# Patient Record
Sex: FEMALE | Race: BLACK OR AFRICAN AMERICAN | NOT HISPANIC OR LATINO | Employment: OTHER | URBAN - METROPOLITAN AREA
[De-identification: names, ages, dates, MRNs, and addresses within clinical notes are randomized per-mention and may not be internally consistent; named-entity substitution may affect disease eponyms.]

---

## 2017-01-03 DIAGNOSIS — Z85.3 HISTORY OF BREAST CANCER: Primary | ICD-10-CM

## 2017-01-04 DIAGNOSIS — R52 PAIN: ICD-10-CM

## 2017-01-04 RX ORDER — ACETAMINOPHEN AND CODEINE PHOSPHATE 300; 60 MG/1; MG/1
1 TABLET ORAL 2 TIMES DAILY PRN
Qty: 60 TABLET | Refills: 1 | Status: ON HOLD | OUTPATIENT
Start: 2017-01-04 | End: 2017-02-22 | Stop reason: HOSPADM

## 2017-01-10 ENCOUNTER — OFFICE VISIT (OUTPATIENT)
Dept: SPINE | Facility: CLINIC | Age: 57
End: 2017-01-10
Attending: NEUROLOGICAL SURGERY
Payer: COMMERCIAL

## 2017-01-10 VITALS
BODY MASS INDEX: 49.09 KG/M2 | HEART RATE: 93 BPM | HEIGHT: 61 IN | DIASTOLIC BLOOD PRESSURE: 76 MMHG | WEIGHT: 260 LBS | SYSTOLIC BLOOD PRESSURE: 146 MMHG

## 2017-01-10 DIAGNOSIS — M48.061 SPINAL STENOSIS, LUMBAR: ICD-10-CM

## 2017-01-10 DIAGNOSIS — M43.16 SPONDYLOLISTHESIS AT L3-L4 LEVEL: Primary | ICD-10-CM

## 2017-01-10 PROCEDURE — 99999 PR PBB SHADOW E&M-EST. PATIENT-LVL III: CPT | Mod: PBBFAC,,, | Performed by: NEUROLOGICAL SURGERY

## 2017-01-10 PROCEDURE — 99214 OFFICE O/P EST MOD 30 MIN: CPT | Mod: S$GLB,,, | Performed by: NEUROLOGICAL SURGERY

## 2017-01-10 NOTE — PROGRESS NOTES
CHIEF COMPLAINT:  Pain across the whole lower back and in the bilateral leg pain       HPI:  Tanika Wayne is a 57 y.o. female who presents for neurosurgical evaluation. She is having pain right low back  6 as that started 1 1/2 years ago.  The pain came on gradually, as a result of lifting/twisting/pulling/bending, as a result of a fall and as a result of an injury at work, and it has been constant ever since. The patient describes the pain as aching, rated as a 6 on a pain scale of 1-10. She reports there is weakness in the right leg and left leg. The pain gets better with lying down, and the pain gets worse with standing ,bending and sitting . She has tried epidural steroid injections and muscle relaxers with little relief. Patient denies accidents or trauma, denies bowel or bladder symptoms, denies gait change, reports clumsiness, reports incoordination, and denies changes in fine motor skills in the hands/fingers.    She was scheduled for surgery but had to have thyroidectomy prior to her lumbar surgery.       (Not in a hospital admission)    Review of patient's allergies indicates:  No Known Allergies    Past Medical History   Diagnosis Date    Abnormal echocardiogram 11/26/2012    Arthritis     Breast cancer 1998    Breast cancer, right breast 11/26/2012    Cardiomyopathy due to systemic disease     Clotting disorder     Diabetes mellitus     Diabetes mellitus type II     DM (diabetes mellitus) 11/26/2012    HTN (hypertension) 11/26/2012    Hypertension     Increased glucose level 11/26/2012    ITP (idiopathic thrombocytopenic purpura) 11/26/2012    Kidney stones 11/26/2012    PONV (postoperative nausea and vomiting)     Stenosis of lumbosacral spine 11/26/2012    Tympanic membrane perforation 9/15/2014     right 2014 Dr. Jeffries     Past Surgical History   Procedure Laterality Date    Strabismus surgery  8.30.06     OU    Eye surgery      Cholecystectomy      Hysterectomy       Splenectomy, total      Thyroidectomy  09/2016     Family History   Problem Relation Age of Onset    Heart disease Mother 69     MI    Diabetes Mother     Diabetes Father     Hypertension Father     Stroke Father      Social History   Substance Use Topics    Smoking status: Never Smoker    Smokeless tobacco: None    Alcohol use No        Review of Systems:  Constitutional: no fever or chills, pain well controlled  Eyes: no visual changes  ENT: no nasal congestion or sore throat  Respiratory: no cough or shortness of breath  Cardiovascular: no chest pain or palpitations  Gastrointestinal: no nausea or vomiting, tolerating diet  Genitourinary: no hematuria or dysuria  Integument/Breast: no rash or pruritis  Hematologic/Lymphatic: no easy bruising or lymphadenopathy  Musculoskeletal: no arthralgias or myalgias, positive for back pain  Neurological: no seizures or tremors  Behavioral/Psych: no auditory or visual hallucinations  Endocrine: no heat or cold intolerance    Review of Systems    OBJECTIVE:     Vital Signs (Most Recent)       Physical Exam:    Physical Exam:  Nursing note and vitals reviewed.    Constitutional: She appears well-developed and well-nourished.     Eyes: Pupils are equal, round, and reactive to light. Conjunctivae and EOM are normal.     Abdominal: Soft. Bowel sounds are normal.     Psych/Behavior: She is alert. She has a normal mood and affect.     Musculoskeletal: Gait is abnormal.        Neck: Range of motion is full. Muscle strength is 5/5. Tone is normal.        Back: Range of motion is limited. Muscle strength is 5/5. Tone is normal.        Right Upper Extremities: Range of motion is full. Muscle strength is 5/5. Tone is normal.        Left Upper Extremities: Range of motion is full. Muscle strength is 5/5. Tone is normal.       Right Lower Extremities: Range of motion is limited. Muscle strength is 4/5. Tone is normal.        Left Lower Extremities: Range of motion is full. Muscle  strength is 5/5. Tone is normal.     Neurological:        DTRs: DTRs are DTRS NORMAL AND SYMMETRICnormal and symmetric.        Cranial nerves: Cranial nerve(s) II, III, IV, V, VI, VII, VIII, IX, X, XI and XII are intact.       Laboratory  none    Diagnostic Results:  none new    ASSESSMENT/PLAN:     Lumbar spondylosis and lumbar spinal stenosis L3-4.     Plan- Repeat L-spine MRI since last one was more than 6 months ago.

## 2017-01-16 ENCOUNTER — HOSPITAL ENCOUNTER (OUTPATIENT)
Dept: RADIOLOGY | Facility: OTHER | Age: 57
Discharge: HOME | End: 2017-01-16
Attending: INTERNAL MEDICINE
Payer: COMMERCIAL

## 2017-01-16 DIAGNOSIS — Z85.3 HISTORY OF BREAST CANCER: ICD-10-CM

## 2017-01-16 DIAGNOSIS — M62.838 NIGHT MUSCLE SPASMS: ICD-10-CM

## 2017-01-16 PROCEDURE — 77066 DX MAMMO INCL CAD BI: CPT | Mod: 26,,, | Performed by: RADIOLOGY

## 2017-01-16 PROCEDURE — 77062 BREAST TOMOSYNTHESIS BI: CPT | Mod: 26,,, | Performed by: RADIOLOGY

## 2017-01-16 PROCEDURE — 77062 BREAST TOMOSYNTHESIS BI: CPT | Mod: TC

## 2017-01-16 RX ORDER — CYCLOBENZAPRINE HCL 10 MG
TABLET ORAL
Qty: 30 TABLET | Refills: 3 | Status: ON HOLD | OUTPATIENT
Start: 2017-01-16 | End: 2017-02-22 | Stop reason: HOSPADM

## 2017-02-07 ENCOUNTER — OFFICE VISIT (OUTPATIENT)
Dept: SPINE | Facility: CLINIC | Age: 57
End: 2017-02-07
Attending: NEUROLOGICAL SURGERY
Payer: COMMERCIAL

## 2017-02-07 ENCOUNTER — HOSPITAL ENCOUNTER (OUTPATIENT)
Dept: RADIOLOGY | Facility: OTHER | Age: 57
Discharge: HOME | End: 2017-02-07
Attending: NEUROLOGICAL SURGERY
Payer: COMMERCIAL

## 2017-02-07 VITALS
HEIGHT: 61 IN | SYSTOLIC BLOOD PRESSURE: 129 MMHG | DIASTOLIC BLOOD PRESSURE: 78 MMHG | BODY MASS INDEX: 47.95 KG/M2 | HEART RATE: 97 BPM | WEIGHT: 254 LBS

## 2017-02-07 DIAGNOSIS — M48.061 SPINAL STENOSIS, LUMBAR: ICD-10-CM

## 2017-02-07 DIAGNOSIS — M48.061 SPINAL STENOSIS, LUMBAR: Primary | ICD-10-CM

## 2017-02-07 DIAGNOSIS — M43.16 SPONDYLOLISTHESIS AT L3-L4 LEVEL: ICD-10-CM

## 2017-02-07 PROCEDURE — 99214 OFFICE O/P EST MOD 30 MIN: CPT | Mod: S$GLB,,, | Performed by: NEUROLOGICAL SURGERY

## 2017-02-07 PROCEDURE — 99999 PR PBB SHADOW E&M-EST. PATIENT-LVL II: CPT | Mod: PBBFAC,,, | Performed by: NEUROLOGICAL SURGERY

## 2017-02-07 PROCEDURE — 72148 MRI LUMBAR SPINE W/O DYE: CPT | Mod: TC

## 2017-02-07 PROCEDURE — 72148 MRI LUMBAR SPINE W/O DYE: CPT | Mod: 26,,, | Performed by: RADIOLOGY

## 2017-02-07 NOTE — PROGRESS NOTES
CHIEF COMPLAINT:    Follow-up: Low Back Pain    HPI:    Tanika Wayne is a 57 y.o.-year-old female who presents for neurosurgical follow-up. I last saw Tanika Wayne on 1/10/17, and at that time she was complaining of pain across the whole lower back. I sent the patient for a MRI Lumbar to get updates on her spinal stenosis. The patient's symptoms are now unchanged. Today She is complaining of pain across the whole lower back moving into both legs with associated paresthesias into the anterior, posterior, medial and lateral right leg and anterior, posterior, medial and lateral left leg. She denies any new onset of weakness. The patient describes the pain as aching. The patient rates the pain 6 on the pain scale. The patient has now been suffering with this pain for 7 month(s), and it originally started gradually. The pain is worse with sitting, standing, walking and bending forward and better with lying down. The patient currently denies any changes in bowel or bladder control.      (Not in a hospital admission)    Review of patient's allergies indicates:  No Known Allergies    Past Medical History   Diagnosis Date    Abnormal echocardiogram 11/26/2012    Arthritis     Breast cancer 1998    Breast cancer, right breast 11/26/2012    Cardiomyopathy due to systemic disease     Clotting disorder     Diabetes mellitus     Diabetes mellitus type II     DM (diabetes mellitus) 11/26/2012    HTN (hypertension) 11/26/2012    Hypertension     Increased glucose level 11/26/2012    ITP (idiopathic thrombocytopenic purpura) 11/26/2012    Kidney stones 11/26/2012    PONV (postoperative nausea and vomiting)     Stenosis of lumbosacral spine 11/26/2012    Tympanic membrane perforation 9/15/2014     right 2014 Dr. Jeffries     Past Surgical History   Procedure Laterality Date    Strabismus surgery  8.30.06     OU    Eye surgery      Cholecystectomy      Hysterectomy      Splenectomy, total       Thyroidectomy  09/2016     Family History   Problem Relation Age of Onset    Heart disease Mother 69     MI    Diabetes Mother     Diabetes Father     Hypertension Father     Stroke Father      Social History   Substance Use Topics    Smoking status: Never Smoker    Smokeless tobacco: None    Alcohol use No        Review of Systems:  Constitutional: no fever or chills, pain well controlled  Eyes: no visual changes  ENT: no nasal congestion or sore throat  Respiratory: no cough or shortness of breath  Cardiovascular: no chest pain or palpitations  Gastrointestinal: no nausea or vomiting, tolerating diet  Genitourinary: no hematuria or dysuria  Integument/Breast: no rash or pruritis  Hematologic/Lymphatic: no easy bruising or lymphadenopathy  Musculoskeletal: no arthralgias or myalgias  Neurological: no seizures or tremors  Behavioral/Psych: no auditory or visual hallucinations  Endocrine: no heat or cold intolerance    Review of Systems    OBJECTIVE:     Vital Signs (Most Recent)       Physical Exam:    Physical Exam:  Vitals reviewed.    Constitutional: She appears well-nourished.     Eyes: Pupils are equal, round, and reactive to light. Conjunctivae and EOM are normal.     Cardiovascular: Normal rate.     Abdominal: Soft.     Psych/Behavior: She is alert. She is oriented to person, place, and time. She has a normal mood and affect.     Musculoskeletal: Gait is normal.        Neck: Range of motion is full. Muscle strength is 5/5. Tone is normal.        Back: Range of motion is limited. Muscle strength is 5/5. Tone is normal.        Right Upper Extremities: Range of motion is full. Muscle strength is 5/5. Tone is normal.        Left Upper Extremities: Range of motion is full. Muscle strength is 5/5. Tone is normal.       Right Lower Extremities: Range of motion is full. Muscle strength is 5/5. Tone is normal.        Left Lower Extremities: Range of motion is full. Muscle strength is 5/5. Tone is normal.      Neurological:        DTRs: DTRs are DTRS NORMAL AND SYMMETRICnormal and symmetric.        Cranial nerves: Cranial nerve(s) II, III, IV, V, VI, VII, VIII, IX, X, XI and XII are intact.       Laboratory  none    Diagnostic Results:  MRI: Reviewed  L-Spine: L4-5 grade I spondylolisthesis with moderate spinal stenosis    ASSESSMENT/PLAN:     Impression- we will do L4-5 MIS laminectomy to improve her walking ability but she is not a candidate for spinal fusion given morbid obesity.    Plan- MIS L4-5 laminectomy. All attendant risks, benefits and potential complications of the anticipated surgery were dicussed and patient wants to proceed with surgery

## 2017-02-08 ENCOUNTER — TELEPHONE (OUTPATIENT)
Dept: NEUROSURGERY | Facility: CLINIC | Age: 57
End: 2017-02-08

## 2017-02-08 DIAGNOSIS — M48.061 LUMBAR STENOSIS: Primary | ICD-10-CM

## 2017-02-13 RX ORDER — TRAMADOL HYDROCHLORIDE 50 MG/1
50 TABLET ORAL EVERY 6 HOURS PRN
Qty: 120 TABLET | Refills: 1 | Status: ON HOLD | OUTPATIENT
Start: 2017-02-13 | End: 2017-02-22 | Stop reason: HOSPADM

## 2017-02-15 ENCOUNTER — OFFICE VISIT (OUTPATIENT)
Dept: INTERNAL MEDICINE | Facility: CLINIC | Age: 57
End: 2017-02-15
Payer: COMMERCIAL

## 2017-02-15 ENCOUNTER — HOSPITAL ENCOUNTER (OUTPATIENT)
Dept: RADIOLOGY | Facility: OTHER | Age: 57
Discharge: HOME | End: 2017-02-15
Attending: NURSE PRACTITIONER
Payer: COMMERCIAL

## 2017-02-15 ENCOUNTER — HOSPITAL ENCOUNTER (OUTPATIENT)
Dept: PREADMISSION TESTING | Facility: OTHER | Age: 57
Discharge: HOME | End: 2017-02-15
Attending: NEUROLOGICAL SURGERY
Payer: COMMERCIAL

## 2017-02-15 ENCOUNTER — ANESTHESIA EVENT (OUTPATIENT)
Dept: SURGERY | Facility: OTHER | Age: 57
End: 2017-02-15
Payer: COMMERCIAL

## 2017-02-15 VITALS
BODY MASS INDEX: 48.71 KG/M2 | SYSTOLIC BLOOD PRESSURE: 110 MMHG | OXYGEN SATURATION: 95 % | DIASTOLIC BLOOD PRESSURE: 70 MMHG | HEART RATE: 90 BPM | WEIGHT: 258 LBS | HEIGHT: 61 IN

## 2017-02-15 VITALS
WEIGHT: 254 LBS | SYSTOLIC BLOOD PRESSURE: 145 MMHG | TEMPERATURE: 99 F | RESPIRATION RATE: 16 BRPM | HEART RATE: 95 BPM | DIASTOLIC BLOOD PRESSURE: 78 MMHG | HEIGHT: 61 IN | OXYGEN SATURATION: 98 % | BODY MASS INDEX: 47.95 KG/M2

## 2017-02-15 DIAGNOSIS — N39.0 URINARY TRACT INFECTION WITHOUT HEMATURIA, SITE UNSPECIFIED: ICD-10-CM

## 2017-02-15 DIAGNOSIS — Z01.818 PREOP EXAMINATION: ICD-10-CM

## 2017-02-15 DIAGNOSIS — R94.31 ABNORMAL FINDING ON EKG: ICD-10-CM

## 2017-02-15 DIAGNOSIS — M54.50 BACK PAIN AT L4-L5 LEVEL: Primary | ICD-10-CM

## 2017-02-15 DIAGNOSIS — R06.02 SOBOE (SHORTNESS OF BREATH ON EXERTION): ICD-10-CM

## 2017-02-15 DIAGNOSIS — Z00.00 ROUTINE HISTORY AND PHYSICAL EXAMINATION OF ADULT: ICD-10-CM

## 2017-02-15 LAB — EKG 12-LEAD: NORMAL

## 2017-02-15 PROCEDURE — 99213 OFFICE O/P EST LOW 20 MIN: CPT | Mod: 25,S$GLB,, | Performed by: NURSE PRACTITIONER

## 2017-02-15 PROCEDURE — 3074F SYST BP LT 130 MM HG: CPT | Mod: S$GLB,,, | Performed by: NURSE PRACTITIONER

## 2017-02-15 PROCEDURE — 71020 XR CHEST PA AND LATERAL: CPT | Mod: 26,,, | Performed by: RADIOLOGY

## 2017-02-15 PROCEDURE — 3078F DIAST BP <80 MM HG: CPT | Mod: S$GLB,,, | Performed by: NURSE PRACTITIONER

## 2017-02-15 PROCEDURE — 93000 ELECTROCARDIOGRAM COMPLETE: CPT | Mod: S$GLB,,, | Performed by: NURSE PRACTITIONER

## 2017-02-15 PROCEDURE — 71020 XR CHEST PA AND LATERAL: CPT | Mod: TC

## 2017-02-15 RX ORDER — SODIUM CHLORIDE, SODIUM LACTATE, POTASSIUM CHLORIDE, CALCIUM CHLORIDE 600; 310; 30; 20 MG/100ML; MG/100ML; MG/100ML; MG/100ML
INJECTION, SOLUTION INTRAVENOUS CONTINUOUS
Status: CANCELLED | OUTPATIENT
Start: 2017-02-15

## 2017-02-15 NOTE — IP AVS SNAPSHOT
[facility] Location ([facility])  [address] [place], [place] [address]  Phone: [phone]           Patient Discharge Instructions    Our goal is to set you up for success. This packet includes information on your condition, medications, and your home care. It will help you to care for yourself so you don't get sicker.     Please ask your nurse if you have any questions.        There are many details to remember when preparing for your surgery. Here is what you will need to do, please ask your nurse if there are more specific instructions and if you have any questions:    1. 24 hours before procedure Do not smoke or drink alcoholic beverages 24 hours prior to your procedure    2. Eating before procedure Do not eat or drink anything 8 hours before your procedure - this includes gum, mints, and candy.     3. Day of procedure Please remove all jewelry for the procedure. If you wear contact lenses, dentures, hearing aids or glasses, bring a container to put them in during your surgery and give to a family member for safekeeping.  If your doctor has scheduled you for an overnight stay, bring a small overnight bag with any personal items that you need.    4. After procedure Make arrangements in advance for transportation home by a responsible adult. It is not safe to drive a vehicle during the 24 hours following surgery.     PLEASE NOTE: You may be contacted the day before your surgery to confirm your surgery date and arrival time. The Surgery schedule has many variables which may affect the time of your surgery case. Family members should be available if your surgery time changes.                [facility]  Unless otherwise directed by your provider, please contact [facility], our nurse care line that is available for 24/7 assistance.     [phone] (toll-free)    Registered nurses in the [facility] provide clinical advisement, health education, appointment booking, and other  advisory services.                    ** Verify the list of medication(s) below is accurate and up to date. Carry this with you in case of emergency. If your medications have changed, please notify your healthcare provider.             Medication List      TAKE these medications        Additional Info                      acetaminophen-codeine 300-60mg 300-60 mg Tab   Commonly known as:  TYLENOL #4   Quantity:  60 tablet   Refills:  1   Dose:  1 tablet    Instructions:  Take 1 tablet by mouth 2 (two) times daily as needed.     Begin Date    AM    Noon    PM    Bedtime       alprazolam 0.25 MG tablet   Commonly known as:  XANAX   Quantity:  30 tablet   Refills:  1   Dose:  0.25 mg    Instructions:  Take 1 tablet (0.25 mg total) by mouth daily as needed.     Begin Date    AM    Noon    PM    Bedtime       carvedilol 3.125 MG tablet   Commonly known as:  COREG   Quantity:  180 tablet   Refills:  2    Instructions:  TAKE 1 TABLET TWICE DAILY     Begin Date    AM    Noon    PM    Bedtime       clotrimazole-betamethasone 1-0.05% cream   Commonly known as:  LOTRISONE   Quantity:  45 g   Refills:  1    Instructions:  Apply topically 2 (two) times daily as needed.     Begin Date    AM    Noon    PM    Bedtime       cyclobenzaprine 10 MG tablet   Commonly known as:  FLEXERIL   Quantity:  30 tablet   Refills:  3    Instructions:  TAKE ONE-HALF TO ONE TABLET BY MOUTH EVERY NIGHT AT BEDTIME     Begin Date    AM    Noon    PM    Bedtime       fluticasone 50 mcg/actuation nasal spray   Commonly known as:  FLONASE   Quantity:  16 g   Refills:  3   Dose:  2 spray    Instructions:  2 sprays by Each Nare route once daily.     Begin Date    AM    Noon    PM    Bedtime       * levothyroxine 75 MCG tablet   Commonly known as:  SYNTHROID   Quantity:  30 tablet   Refills:  11   Dose:  75 mcg    Instructions:  Take 1 tablet (75 mcg total) by mouth once daily.     Begin Date    AM    Noon    PM    Bedtime       * levothyroxine 100 MCG tablet    Commonly known as:  SYNTHROID   Quantity:  30 tablet   Refills:  11   Dose:  100 mcg    Instructions:  Take 1 tablet (100 mcg total) by mouth once daily.     Begin Date    AM    Noon    PM    Bedtime       losartan-hydrochlorothiazide 100-12.5 mg 100-12.5 mg Tab   Commonly known as:  HYZAAR   Quantity:  90 tablet   Refills:  3   Dose:  1 tablet    Instructions:  Take 1 tablet by mouth every morning.     Begin Date    AM    Noon    PM    Bedtime       naproxen sodium 220 MG tablet   Commonly known as:  ANAPROX   Refills:  0   Dose:  220 mg    Instructions:  Take 220 mg by mouth every 12 (twelve) hours.     Begin Date    AM    Noon    PM    Bedtime       nifedipine 90 MG (OSM) Tr24   Commonly known as:  PROCARDIA-XL   Quantity:  90 tablet   Refills:  3    Instructions:  TAKE 1 TABLET ONE TIME DAILY     Begin Date    AM    Noon    PM    Bedtime       potassium chloride SA 20 MEQ tablet   Commonly known as:  K-DUR,KLOR-CON   Quantity:  360 tablet   Refills:  2    Instructions:  TAKE 2 TABLETS TWICE DAILY     Begin Date    AM    Noon    PM    Bedtime       tramadol 50 mg tablet   Commonly known as:  ULTRAM   Quantity:  120 tablet   Refills:  1   Dose:  50 mg    Instructions:  Take 1 tablet (50 mg total) by mouth every 6 (six) hours as needed for Pain.     Begin Date    AM    Noon    PM    Bedtime       venlafaxine 75 MG 24 hr capsule   Commonly known as:  EFFEXOR-XR   Quantity:  90 capsule   Refills:  3    Instructions:  TAKE 1 CAPSULE EVERY NIGHT     Begin Date    AM    Noon    PM    Bedtime       * Notice:  This list has 2 medication(s) that are the same as other medications prescribed for you. Read the directions carefully, and ask your doctor or other care provider to review them with you.               Please bring to all follow up appointments:    1. A copy of your discharge instructions.  2. All medicines you are currently taking in their original bottles.  3. Identification and insurance card.    Please arrive  15 minutes ahead of scheduled appointment time.    Please call 24 hours in advance if you must reschedule your appointment and/or time.        Your Scheduled Appointments     Feb 15, 2017 10:00 AM CST   Pre-Admit Testing Visit with PRE-ADMIT, [facility])    [address] [place] [id number] [phone]            Feb 15, 2017  1:00 PM CST   Pre OP with LAMONT Rao ([facility])    [address] [place] [address]  [facility] [id number] [phone]            Feb 20, 2017  3:00 PM CST   Education Class with SPINE CLASS, [facility] - Spine Class [facility] ([facility] )    [facility]  [place] [address]   [phone]            Mar 09, 2017 10:30 AM CST   Post OP with RN, NEUROSURGERY   [facility] - Neurosurgery [facility] ([facility] )    [facility]  [place] [address]   [phone]            Mar 24, 2017  4:00 PM CDT   Back & Spine Post-Op with Dave Sullivan MD   [facility] - Spine Services ([address]  [facility] [id number] [phone]              Your Future Surgeries/Procedures     Feb 22, 2017   Surgery with Dave Sullivan MD   [facility]    [address] [place] [id number] [phone]                  Discharge Instructions       PRE-ADMIT TESTING -  [phone]    [address] [place] [address]        OUTPATIENT SURGERY UNIT - [phone]    Your surgery has been scheduled at [facility]. We are pleased to have the opportunity to serve you. For Further Information please call [phone].    On the day of surgery please report to the Information Desk on the 1st floor.    CONTACT YOUR PHYSICIAN'S OFFICE THE DAY PRIOR TO YOUR SURGERY TO OBTAIN YOUR ARRIVAL TIME.     The evening before surgery do not eat anything after 9 p.m. ( this includes hard candy,  chewing gum and mints).  You may have GATORADE, POWERADE AND WATER FROM 9 p.m. until leaving home to come to the hospital.   DO NOT DRINK ANY LIQUIDS ON THE WAY TO THE HOSPITAL.     SPECIAL MEDICATION INSTRUCTIONS: TAKE medications checked off by the Anesthesiologist on your Medication List.    Angiogram Patients: Take medications as instructed by your physician, including aspirin.     Surgery Patients:    If you take ASPIRIN - Your PHYSICIAN/SURGEON will need to inform you IF/OR when you need to stop taking aspirin prior to your surgery.     Do Not take any medications containing IBUPROFEN.  Do Not Wear any make-up or dark nail polish   (especially eye make-up) to surgery. If you come to surgery with makeup on you will be required to remove the makeup or nail polish.    Do not shave your surgical area at least 5 days prior to your surgery. The surgical prep will be performed at the hospital according to Infection Control regulations.    Leave all valuables at home.   Do Not wear any jewelry or watches, including any metal in body piercings.  Contact Lens must be removed before surgery. Either do not wear the contact lens or bring a case and solution for storage.  Please bring a container for eyeglasses or dentures as required.  Bring any paperwork your physician has provided, such as consent forms,  history and physicals, doctor's orders, etc.   Bring comfortable clothes that are loose fitting to wear upon discharge. Take into consideration the type of surgery being performed.  Maintain your diet as advised per your physician the day prior to surgery.      Adequate rest the night before surgery is advised.   Park in the Parking lot behind the hospital or in the [facility] across the street from the parking lot. Parking is complimentary.  If you will be discharged the same day as your procedure, please arrange for a responsible adult to drive you home or to accompany you if traveling by taxi.   YOU WILL  "NOT BE PERMITTED TO DRIVE OR TO LEAVE THE HOSPITAL ALONE AFTER SURGERY.   It is strongly recommended that you arrange for someone to remain with you for the first 24 hrs following your surgery.       Thank you for your cooperation.  The Staff of [facility].        Bathing Instructions                                                                 Please shower the evening before and morning of your procedure with    ANTIBACTERIAL SOAP. ( DIAL, etc )  Concentrate on the surgical area   for at least 3 minutes and rinse completely. Dry off as usual.   Do not use any deodorant, powder, body lotions, perfume, after shave or    cologne.                                                Admission Information     Date & Time Provider Department CSN    2/15/2017 10:00 AM Dave Sullivan MD [facility] [phone]      Care Providers     Provider Role Specialty Primary office phone    Dave Sullivan MD Attending Provider Neurosurgery [phone]      Your Vitals Were     BP Pulse Temp Resp Height Weight    145/78 95 98.6 °F (37 °C) 16 5' 1" (1.549 m) 115.2 kg (254 lb)    SpO2 BMI             98% 47.99 kg/m2         Recent Lab Values     No lab values to display.      Allergies as of 2/15/2017     No Known Allergies      Advance Directives     An advance directive is a document which, in the event you are no longer able to make decisions for yourself, tells your healthcare team what kind of treatment you do or do not want to receive, or who you would like to make those decisions for you.  If you do not currently have an advance directive, [facility] encourages you to create one.  For more information call:  [phone]), [phone] ([phone]),  or log on to [email or web address].        Language Assistance Services     ATTENTION: Language assistance services are available, free of charge. Please call [phone].      ATENCIÓN: rukhsana Ryan " disposición servicios gratuitos de asistencia lingüística. Pablo al [phone].     BEREKET Ý: N?u b?n nói Ti?ng Vi?t, có các d?ch v? h? tr? ngôn ng? mi?n phí dành cho b?n. G?i s? [phone].        Diabetes Discharge Instructions                                    [facility] complies with applicable Federal civil rights laws and does not discriminate on the basis of race, color, national origin, age, disability, or sex.

## 2017-02-15 NOTE — ANESTHESIA PREPROCEDURE EVALUATION
02/15/2017  Tanika Wayne is a 57 y.o., female.    [facility] Anesthesia Evaluation    I have reviewed the Patient Summary Reports.    I have reviewed the Nursing Notes.   I have reviewed the Medications.     Review of Systems  Anesthesia Hx:  No problems with previous Anesthesia  History of prior surgery of interest to airway management or planning: Previous anesthesia: General 9/16 thyroidectomy with general anesthesia.  Airway issues documented on chart review include mask, easy, GETA, glidescope used , view on video-laryngoscopy Grade I      Social:  Non-Smoker    Hematology/Oncology:        Hematology Comments: History of ITP resolved with splenectomy inh 2001   Cardiovascular:   Exercise tolerance: good Hypertension, well controlled    Pulmonary:  Pulmonary Normal    Renal/:   Denies Chronic Renal Disease. renal calculi     Neurological:  Neurology Normal    Endocrine:   Hyperthyroidism No meds. Diet controled       Physical Exam  General:  Morbid Obesity    Airway/Jaw/Neck:  Airway Findings: Mouth Opening: Normal Tongue: Normal  General Airway Assessment: Adult, Possible difficult intubation  posible difficult ETT due to thyroid  Mallampati: II  TM Distance: Normal, at least 6 cm      Dental:  Dental Findings: upper partial dentures             Anesthesia Plan  Type of Anesthesia, risks & benefits discussed:  Anesthesia Type:  general  Patient's Preference:   Intra-op Monitoring Plan:   Intra-op Monitoring Plan Comments:   Post Op Pain Control Plan:   Post Op Pain Control Plan Comments:   Induction:    Beta Blocker:         Informed Consent: Patient understands risks and agrees with Anesthesia plan.  Questions answered. Anesthesia consent signed with patient representative.  ASA Score: 3     Day of Surgery Review of History & Physical:    H&P update referred to the surgeon.     Anesthesia Plan Notes:  Had thyroidectomy 9/9/16 at [facility] without incident. Will get labs today and have clearance by Dr. Marcelo.    Labs reviewed, Dr. Marcelo cleared pt        Ready For Surgery From Anesthesia Perspective.

## 2017-02-15 NOTE — DISCHARGE INSTRUCTIONS
PRE-ADMIT TESTING -  [phone]    [address]  [facility]        OUTPATIENT SURGERY UNIT - [phone]    Your surgery has been scheduled at [facility]. We are pleased to have the opportunity to serve you. For Further Information please call [phone].    On the day of surgery please report to the Information Desk on the 1st floor.    CONTACT YOUR PHYSICIAN'S OFFICE THE DAY PRIOR TO YOUR SURGERY TO OBTAIN YOUR ARRIVAL TIME.     The evening before surgery do not eat anything after 9 p.m. ( this includes hard candy, chewing gum and mints).  You may have GATORADE, POWERADE AND WATER FROM 9 p.m. until leaving home to come to the hospital.   DO NOT DRINK ANY LIQUIDS ON THE WAY TO THE HOSPITAL.     SPECIAL MEDICATION INSTRUCTIONS: TAKE medications checked off by the Anesthesiologist on your Medication List.    Angiogram Patients: Take medications as instructed by your physician, including aspirin.     Surgery Patients:    If you take ASPIRIN - Your PHYSICIAN/SURGEON will need to inform you IF/OR when you need to stop taking aspirin prior to your surgery.     Do Not take any medications containing IBUPROFEN.  Do Not Wear any make-up or dark nail polish   (especially eye make-up) to surgery. If you come to surgery with makeup on you will be required to remove the makeup or nail polish.    Do not shave your surgical area at least 5 days prior to your surgery. The surgical prep will be performed at the hospital according to Infection Control regulations.    Leave all valuables at home.   Do Not wear any jewelry or watches, including any metal in body piercings.  Contact Lens must be removed before surgery. Either do not wear the contact lens or bring a case and solution for storage.  Please bring a container for eyeglasses or dentures as required.  Bring any paperwork your physician has provided, such as consent forms,  history and physicals, doctor's orders, etc.   Bring comfortable  clothes that are loose fitting to wear upon discharge. Take into consideration the type of surgery being performed.  Maintain your diet as advised per your physician the day prior to surgery.      Adequate rest the night before surgery is advised.   Park in the Parking lot behind the hospital or in the [facility] across the street from the parking lot. Parking is complimentary.  If you will be discharged the same day as your procedure, please arrange for a responsible adult to drive you home or to accompany you if traveling by taxi.   YOU WILL NOT BE PERMITTED TO DRIVE OR TO LEAVE THE HOSPITAL ALONE AFTER SURGERY.   It is strongly recommended that you arrange for someone to remain with you for the first 24 hrs following your surgery.       Thank you for your cooperation.  The Staff of [facility].        Bathing Instructions                                                                 Please shower the evening before and morning of your procedure with    ANTIBACTERIAL SOAP. ( DIAL, etc )  Concentrate on the surgical area   for at least 3 minutes and rinse completely. Dry off as usual.   Do not use any deodorant, powder, body lotions, perfume, after shave or    cologne.

## 2017-02-15 NOTE — PROGRESS NOTES
"CC: Presurgical evaluation for L3-4 & 4-5 Laminectomy    Anesthesia planned: General    SSulaiman cheduled with Dr. DELFINA Reid on 2.22.17     PMH significant for :   Hypertension    This is a chronic problem. The problem is unchanged.    No chest pain; + SOB with exertin  Pt denies anxiety, blurred vision, headaches, malaise/fatigue, or sweats.      Additional Chronic Conditions include:  Morbid obesity, Thyroid nodularity (with thyroidectomy); Blood Glucose anomalies; arthritis and hip pain per [facility].      Review of Systems    Constitution: Denies eports being nervous, or nauseous.  Denies recent, fever, weakness, malaise/fatigue and weight gain.    Cardiovascular: Denies chest pain, claudication, + may have some "usual" dyspnea on exertion.  Denies, irregular heartbeat, leg swelling, near-syncope, palpitations, paroxysmal nocturnal dyspnea or syncope  HEENT.  Denies hx of glaucoma; Reports hx of injury to L eye as child (no vision loss); lid lags when pt is tired.  No nasal, o/p or ear complaints at this time.   Respiratory: Negative for chest tightness, cough, hemoptysis, shortness of breath and sputum production.    Musculoskeletal: Positive for arthritis and back pain. Positive for falls (uses a johnson to ambulate).  Denies hx of gou.  Reports occasional  muscle weakness, myalgias and back pain.    Gastrointestinal: Negative for abdominal pain, heartburn, hematemesis, hematochezia, hemorrhoids, jaundice, melena, nausea and vomiting.    Genitourinary: Negative for dysuria and hematuria.    Neurological: Negative for dizziness, focal weakness, light-headedness, loss of balance, numbness and vertigo.    Endocrine: Negative for cold intolerance and heat intolerance  Hx of Glucose intolerance noted.    Skin: Denies rash.or lesions    Behavioral: Denies depression, memory loss, anxiety or sadness        Objective:   Constitutional:  Morbidly obese, in no acute distress  Eye:  L eyelid lags, covering upper " 1/3 of eye.  Lungs:  B-CTA, no adventitious sounds or silent areas  Hear:  RRR, no R/M/G  Musculoskeletal: FROM; no wasting, no obvious deformity. Ambulates with cane; antalgic gait L knee scissors across midline and L foot appears to over-pronate.  Neurological: Alert & oriented. CN 2-1 2 grossly intact  Lymphadenopathy: No cervical adenopathy.scar at base of neck (thyroidectomy); thyroid not palpable Skin: Skin is warm and dry. No rashes appreciated  Behavioral/Psychiatric: No excess anxiety or distress; Denies sadness/depression; no difficulty attending/following conversation; information is judged to be reliable    Constitutional:     See Vital Signs, this visit (first set of vitals taken with large cuff; correct sized cuff utilized to obtain the 2nd set of vital signs); Appears well-developed and well-nourished.   Head: Normocephalic and atraumatic.    Neck: Neck supple.  Chest/Pulmonary  No gross anomalies of the the chest no labored breathing; B- CTA A/P& Lateral; no adventitious sounds appreciated; no respirtaroy distress noted  CV:   +RRR, +S1, S2; No rubs, murmurs or gallops appreciated. +soft click at mitral valve area   PV No carotid bruits appreciated; no lower extremity discolorations or varicosities noted; + pedal pulses equal and strong;   Neuro:  Alert and oriented x 3; CN 2- 12 grossly intact; no gait anomalies appreciated;  No cranial nerve deficit.   Skin: Skin is warm and dry.   Behavioral/Psychiatric:  No excess anxiety or distress; Denies sadness/depression; no difficulty attending/following conversation.      Assessment:        See Visit Diagnosis   .        Plan:     Evaluation for surgery continues with the following diagnostics ordered   EKG toady (no abnormalities)   CXR   CMP   CBC   PT/INR   PTT   UA   When tests return; evaluation summation to be completed

## 2017-02-15 NOTE — MR AVS SNAPSHOT
[facility]  [address], [address]  [facility] [address]  Phone: [phone]  Fax: [phone]                  Tanika Wayne   2/15/2017 1:00 PM   Office Visit    Description:  Female : 1960   Provider:  Nataly Schmidt NP   Department:  [facility]           Reason for Visit     Pre-op Exam           Diagnoses this Visit        Comments    Back pain at L4-L5 level    -  Primary     Abnormal finding on EKG         SOBOE (shortness of breath on exertion)         Routine history and physical examination of adult         Urinary tract infection without hematuria, site unspecified         Preop examination                To Do List           Future Appointments        Provider Department Dept Phone    2017 3[facility] [phone]    3/9/2017 10:30 AM RN, NEUROSURGERY [facility] Neuro[facility] [phone]    3/24/2017 4:00 PM Dave Sullivan MD [facility] - Spine Services [phone]      Your Future Surgeries/Procedures     2017   Surgery with Dave Sullivan MD   [facility] ([facility])    [address] [place] [id number] [phone]              Goals (5 Years of Data)     None      [facility]     [facility] -  Assistance  Registered nurses in the [facility] provide clinical advisement, health education, appointment booking, and other advisory services.  Call for this free service at [phone].             Medications           Message regarding Medications     Verify the changes and/or additions to your medication regime listed below are the same as discussed with your clinician today.  If any of these changes or additions are incorrect, please notify your healthcare provider.             Verify that the below list of medications is an accurate representation of the medications you are currently taking.  If none reported, the  "list may be blank. If incorrect, please contact your healthcare provider. Carry this list with you in case of emergency.           Current Medications     acetaminophen-codeine 300-60mg (TYLENOL #4) 300-60 mg Tab Take 1 tablet by mouth 2 (two) times daily as needed.    alprazolam (XANAX) 0.25 MG tablet Take 1 tablet (0.25 mg total) by mouth daily as needed.    carvedilol (COREG) 3.125 MG tablet TAKE 1 TABLET TWICE DAILY    clotrimazole-betamethasone 1-0.05% (LOTRISONE) cream Apply topically 2 (two) times daily as needed.    cyclobenzaprine (FLEXERIL) 10 MG tablet TAKE ONE-HALF TO ONE TABLET BY MOUTH EVERY NIGHT AT BEDTIME    fluticasone (FLONASE) 50 mcg/actuation nasal spray 2 sprays by Each Nare route once daily.    levothyroxine (SYNTHROID) 100 MCG tablet Take 1 tablet (100 mcg total) by mouth once daily.    levothyroxine (SYNTHROID) 75 MCG tablet Take 1 tablet (75 mcg total) by mouth once daily.    losartan-hydrochlorothiazide 100-12.5 mg (HYZAAR) 100-12.5 mg Tab Take 1 tablet by mouth every morning.    naproxen sodium (ANAPROX) 220 MG tablet Take 220 mg by mouth every 12 (twelve) hours.    nifedipine (PROCARDIA-XL) 90 MG (OSM) TR24 TAKE 1 TABLET ONE TIME DAILY    potassium chloride SA (K-DUR,KLOR-CON) 20 MEQ tablet TAKE 2 TABLETS TWICE DAILY    tramadol (ULTRAM) 50 mg tablet Take 1 tablet (50 mg total) by mouth every 6 (six) hours as needed for Pain.    venlafaxine (EFFEXOR-XR) 75 MG 24 hr capsule TAKE 1 CAPSULE EVERY NIGHT           Clinical Reference Information           Your Vitals Were     BP Pulse Height Weight SpO2 BMI    110/70 90 5' 1" (1.549 m) 117 kg (258 lb) 95% 48.75 kg/m2      Blood Pressure          Most Recent Value    BP  110/70      Allergies as of 2/15/2017     No Known Allergies      Immunizations Administered on Date of Encounter - 2/15/2017     None      Orders Placed During Today's Visit      Normal Orders This Visit    APTT     CBC auto differential     Comprehensive metabolic panel     " EKG 12-LEAD ([facility] Only)     Protime-INR     Urinalysis, Complete with Reflex To Culture     Urinalysis     Future Labs/Procedures Expected by Expires    APTT  2/15/2017 4/16/2018    CBC auto differential  2/15/2017 (Approximate) 4/16/2018    Comprehensive metabolic panel  2/15/2017 (Approximate) 4/16/2018    Protime-INR  2/15/2017 4/16/2018    Urinalysis, Complete with Reflex To Culture  2/15/2017 4/16/2018    X-Ray Chest PA And Lateral  2/15/2017 2/15/2018    EKG 12-lead  As directed 2/15/2018      Language Assistance Services     ATTENTION: Language assistance services are available, free of charge. Please call [phone].      ATENCIÓN: Si joselinla dante, tiene a dos santos disposición servicios gratuitos de asistencia lingüística. Llame al [phone].     CHÚ Ý: N?u b?n nói Ti?ng Vi?t, có các d?ch v? h? tr? ngôn ng? mi?n phí dành cho b?n. G?i s? [phone].         [facility] complies with applicable Federal civil rights laws and does not discriminate on the basis of race, color, national origin, age, disability, or sex.

## 2017-02-16 LAB
ALBUMIN SERPL-MCNC: 4.4 G/DL (ref 3.6–5.1)
ALBUMIN/GLOB SERPL: 1.3 (CALC) (ref 1–2.5)
ALP SERPL-CCNC: 69 U/L (ref 33–130)
ALT SERPL-CCNC: 22 U/L (ref 6–29)
APTT PPP: 30 SEC (ref 22–34)
AST SERPL-CCNC: 23 U/L (ref 10–35)
BASOPHILS # BLD AUTO: 37 CELLS/UL (ref 0–200)
BASOPHILS NFR BLD AUTO: 0.5 %
BILIRUB SERPL-MCNC: 0.3 MG/DL (ref 0.2–1.2)
BUN SERPL-MCNC: 19 MG/DL (ref 7–25)
BUN/CREAT SERPL: ABNORMAL (CALC) (ref 6–22)
CALCIUM SERPL-MCNC: 9.5 MG/DL (ref 8.6–10.4)
CHLORIDE SERPL-SCNC: 99 MMOL/L (ref 98–110)
CO2 SERPL-SCNC: 32 MMOL/L (ref 20–31)
CREAT SERPL-MCNC: 0.88 MG/DL (ref 0.5–1.05)
EOSINOPHIL # BLD AUTO: 644 CELLS/UL (ref 15–500)
EOSINOPHIL NFR BLD AUTO: 8.7 %
ERYTHROCYTE [DISTWIDTH] IN BLOOD BY AUTOMATED COUNT: 14.2 % (ref 11–15)
GFR SERPL CREATININE-BSD FRML MDRD: 73 ML/MIN/1.73M2
GLOBULIN SER CALC-MCNC: 3.3 G/DL (CALC) (ref 1.9–3.7)
GLUCOSE SERPL-MCNC: 109 MG/DL (ref 65–99)
HCT VFR BLD AUTO: 41.8 % (ref 35–45)
HGB BLD-MCNC: 13.6 G/DL (ref 11.7–15.5)
INR PPP: 1
LYMPHOCYTES # BLD AUTO: 4336 CELLS/UL (ref 850–3900)
LYMPHOCYTES NFR BLD AUTO: 58.6 %
MCH RBC QN AUTO: 28.2 PG (ref 27–33)
MCHC RBC AUTO-ENTMCNC: 32.4 G/DL (ref 32–36)
MCV RBC AUTO: 86.9 FL (ref 80–100)
MONOCYTES # BLD AUTO: 496 CELLS/UL (ref 200–950)
MONOCYTES NFR BLD AUTO: 6.7 %
NEUTROPHILS # BLD AUTO: 1887 CELLS/UL (ref 1500–7800)
NEUTROPHILS NFR BLD AUTO: 25.5 %
PLATELET # BLD AUTO: 253 THOUSAND/UL (ref 140–400)
PMV BLD REES-ECKER: 9 FL (ref 7.5–12.5)
POTASSIUM SERPL-SCNC: 4.2 MMOL/L (ref 3.5–5.3)
PROT SERPL-MCNC: 7.7 G/DL (ref 6.1–8.1)
PROTHROMBIN TIME: 10 SEC (ref 9–11.5)
RBC # BLD AUTO: 4.81 MILLION/UL (ref 3.8–5.1)
SODIUM SERPL-SCNC: 140 MMOL/L (ref 135–146)
WBC # BLD AUTO: 7.4 THOUSAND/UL (ref 3.8–10.8)

## 2017-02-17 DIAGNOSIS — F41.9 ANXIETY: ICD-10-CM

## 2017-02-17 DIAGNOSIS — I10 ESSENTIAL HYPERTENSION, MALIGNANT: ICD-10-CM

## 2017-02-17 RX ORDER — LOSARTAN POTASSIUM AND HYDROCHLOROTHIAZIDE 12.5; 1 MG/1; MG/1
1 TABLET ORAL EVERY MORNING
Qty: 90 TABLET | Refills: 3 | Status: SHIPPED | OUTPATIENT
Start: 2017-02-17 | End: 2018-02-22 | Stop reason: SDUPTHER

## 2017-02-17 RX ORDER — ALPRAZOLAM 0.25 MG/1
0.25 TABLET ORAL DAILY PRN
Qty: 30 TABLET | Refills: 1 | Status: SHIPPED | OUTPATIENT
Start: 2017-02-17 | End: 2017-06-14 | Stop reason: ALTCHOICE

## 2017-02-17 RX ORDER — CIPROFLOXACIN 500 MG/1
500 TABLET ORAL 2 TIMES DAILY
Qty: 10 TABLET | Refills: 0 | Status: SHIPPED | OUTPATIENT
Start: 2017-02-17 | End: 2017-02-22

## 2017-02-18 LAB
APPEARANCE UR: ABNORMAL
BACTERIA #/AREA URNS HPF: ABNORMAL /HPF
BACTERIA UR CULT: ABNORMAL
BACTERIA UR CULT: ABNORMAL
BILIRUB UR QL STRIP: NEGATIVE
COLOR UR: ABNORMAL
GLUCOSE UR QL STRIP: NEGATIVE
HGB UR QL STRIP: NEGATIVE
HYALINE CASTS #/AREA URNS LPF: ABNORMAL /LPF
KETONES UR QL STRIP: ABNORMAL
LEUKOCYTE ESTERASE UR QL STRIP: ABNORMAL
NITRITE UR QL STRIP: NEGATIVE
PH UR STRIP: 7 [PH] (ref 5–8)
PROT UR QL STRIP: ABNORMAL
RBC #/AREA URNS HPF: ABNORMAL /HPF
SERVICE CMNT-IMP: ABNORMAL
SP GR UR STRIP: 1.02 (ref 1–1.03)
SQUAMOUS #/AREA URNS HPF: ABNORMAL /HPF
WBC #/AREA URNS HPF: ABNORMAL /HPF

## 2017-02-20 ENCOUNTER — CLINICAL SUPPORT (OUTPATIENT)
Dept: NEUROSURGERY | Facility: CLINIC | Age: 57
End: 2017-02-20
Payer: COMMERCIAL

## 2017-02-21 ENCOUNTER — TELEPHONE (OUTPATIENT)
Dept: NEUROSURGERY | Facility: CLINIC | Age: 57
End: 2017-02-21

## 2017-02-21 NOTE — TELEPHONE ENCOUNTER
RN spoke with patient and advised to arrive for 6 am at [facility] for surgery with Dr. Sullivan in the morning. Verbalized understanding, agreed to comply and thanked RN for calling.

## 2017-02-22 ENCOUNTER — ANESTHESIA (OUTPATIENT)
Dept: SURGERY | Facility: OTHER | Age: 57
End: 2017-02-22
Payer: COMMERCIAL

## 2017-02-22 ENCOUNTER — HOSPITAL ENCOUNTER (OUTPATIENT)
Facility: OTHER | Age: 57
Discharge: HOME | End: 2017-02-24
Attending: NEUROLOGICAL SURGERY | Admitting: NEUROLOGICAL SURGERY
Payer: COMMERCIAL

## 2017-02-22 ENCOUNTER — SURGERY (OUTPATIENT)
Age: 57
End: 2017-02-22

## 2017-02-22 DIAGNOSIS — M48.061 LUMBAR SPINAL STENOSIS: ICD-10-CM

## 2017-02-22 DIAGNOSIS — M48.061 SPINAL STENOSIS, LUMBAR: Primary | ICD-10-CM

## 2017-02-22 LAB — POCT GLUCOSE: 122 MG/DL (ref 70–110)

## 2017-02-22 PROCEDURE — 82962 GLUCOSE BLOOD TEST: CPT | Performed by: NEUROLOGICAL SURGERY

## 2017-02-22 PROCEDURE — 63600175 PHARM REV CODE 636 W HCPCS: Performed by: PHYSICIAN ASSISTANT

## 2017-02-22 PROCEDURE — 25000003 PHARM REV CODE 250: Performed by: ANESTHESIOLOGY

## 2017-02-22 PROCEDURE — 63047 LAM FACETEC & FORAMOT LUMBAR: CPT | Mod: ,,, | Performed by: NEUROLOGICAL SURGERY

## 2017-02-22 PROCEDURE — 63600175 PHARM REV CODE 636 W HCPCS: Performed by: ANESTHESIOLOGY

## 2017-02-22 PROCEDURE — 36000711: Performed by: NEUROLOGICAL SURGERY

## 2017-02-22 PROCEDURE — 25000003 PHARM REV CODE 250: Performed by: NEUROLOGICAL SURGERY

## 2017-02-22 PROCEDURE — 63047 LAM FACETEC & FORAMOT LUMBAR: CPT | Mod: AS,,, | Performed by: PHYSICIAN ASSISTANT

## 2017-02-22 PROCEDURE — 71000033 HC RECOVERY, INTIAL HOUR: Performed by: NEUROLOGICAL SURGERY

## 2017-02-22 PROCEDURE — 37000009 HC ANESTHESIA EA ADD 15 MINS: Performed by: NEUROLOGICAL SURGERY

## 2017-02-22 PROCEDURE — 25000003 PHARM REV CODE 250: Performed by: NURSE ANESTHETIST, CERTIFIED REGISTERED

## 2017-02-22 PROCEDURE — 63600175 PHARM REV CODE 636 W HCPCS: Performed by: NURSE ANESTHETIST, CERTIFIED REGISTERED

## 2017-02-22 PROCEDURE — 63048 LAM FACETEC &FORAMOT EA ADDL: CPT | Mod: ,,, | Performed by: NEUROLOGICAL SURGERY

## 2017-02-22 PROCEDURE — 63600175 PHARM REV CODE 636 W HCPCS: Performed by: NEUROLOGICAL SURGERY

## 2017-02-22 PROCEDURE — 27201423 OPTIME MED/SURG SUP & DEVICES STERILE SUPPLY: Performed by: NEUROLOGICAL SURGERY

## 2017-02-22 PROCEDURE — 37000008 HC ANESTHESIA 1ST 15 MINUTES: Performed by: NEUROLOGICAL SURGERY

## 2017-02-22 PROCEDURE — 63048 LAM FACETEC &FORAMOT EA ADDL: CPT | Mod: AS,,, | Performed by: PHYSICIAN ASSISTANT

## 2017-02-22 PROCEDURE — 71000016 HC POSTOP RECOV ADDL HR: Performed by: NEUROLOGICAL SURGERY

## 2017-02-22 PROCEDURE — 71000015 HC POSTOP RECOV 1ST HR: Performed by: NEUROLOGICAL SURGERY

## 2017-02-22 PROCEDURE — 71000039 HC RECOVERY, EACH ADD'L HOUR: Performed by: NEUROLOGICAL SURGERY

## 2017-02-22 PROCEDURE — 36000710: Performed by: NEUROLOGICAL SURGERY

## 2017-02-22 PROCEDURE — 25000003 PHARM REV CODE 250: Performed by: PHYSICIAN ASSISTANT

## 2017-02-22 RX ORDER — DIAZEPAM 5 MG/1
5 TABLET ORAL EVERY 6 HOURS PRN
Status: DISCONTINUED | OUTPATIENT
Start: 2017-02-22 | End: 2017-02-24 | Stop reason: HOSPADM

## 2017-02-22 RX ORDER — FENTANYL CITRATE 50 UG/ML
25 INJECTION, SOLUTION INTRAMUSCULAR; INTRAVENOUS EVERY 5 MIN PRN
Status: DISCONTINUED | OUTPATIENT
Start: 2017-02-22 | End: 2017-02-22 | Stop reason: HOSPADM

## 2017-02-22 RX ORDER — ONDANSETRON 2 MG/ML
8 INJECTION INTRAMUSCULAR; INTRAVENOUS EVERY 6 HOURS PRN
Status: DISCONTINUED | OUTPATIENT
Start: 2017-02-22 | End: 2017-02-24 | Stop reason: HOSPADM

## 2017-02-22 RX ORDER — ROCURONIUM BROMIDE 10 MG/ML
INJECTION, SOLUTION INTRAVENOUS
Status: DISCONTINUED | OUTPATIENT
Start: 2017-02-22 | End: 2017-02-22

## 2017-02-22 RX ORDER — METHYLPREDNISOLONE ACETATE 40 MG/ML
INJECTION, SUSPENSION INTRA-ARTICULAR; INTRALESIONAL; INTRAMUSCULAR; SOFT TISSUE
Status: DISCONTINUED | OUTPATIENT
Start: 2017-02-22 | End: 2017-02-22 | Stop reason: HOSPADM

## 2017-02-22 RX ORDER — OXYCODONE HYDROCHLORIDE 5 MG/1
5 TABLET ORAL
Status: DISCONTINUED | OUTPATIENT
Start: 2017-02-22 | End: 2017-02-22 | Stop reason: HOSPADM

## 2017-02-22 RX ORDER — PROPOFOL 10 MG/ML
VIAL (ML) INTRAVENOUS
Status: DISCONTINUED | OUTPATIENT
Start: 2017-02-22 | End: 2017-02-22

## 2017-02-22 RX ORDER — DOCUSATE SODIUM 100 MG/1
100 CAPSULE, LIQUID FILLED ORAL 3 TIMES DAILY PRN
COMMUNITY
End: 2019-01-18 | Stop reason: SDUPTHER

## 2017-02-22 RX ORDER — MEPERIDINE HYDROCHLORIDE 50 MG/ML
12.5 INJECTION INTRAMUSCULAR; INTRAVENOUS; SUBCUTANEOUS ONCE AS NEEDED
Status: DISCONTINUED | OUTPATIENT
Start: 2017-02-22 | End: 2017-02-22 | Stop reason: HOSPADM

## 2017-02-22 RX ORDER — NEOSTIGMINE METHYLSULFATE 1 MG/ML
INJECTION, SOLUTION INTRAVENOUS
Status: DISCONTINUED | OUTPATIENT
Start: 2017-02-22 | End: 2017-02-22

## 2017-02-22 RX ORDER — SODIUM CHLORIDE 9 MG/ML
INJECTION, SOLUTION INTRAVENOUS CONTINUOUS
Status: DISCONTINUED | OUTPATIENT
Start: 2017-02-22 | End: 2017-02-23

## 2017-02-22 RX ORDER — SODIUM CHLORIDE 0.9 % (FLUSH) 0.9 %
3 SYRINGE (ML) INJECTION
Status: DISCONTINUED | OUTPATIENT
Start: 2017-02-22 | End: 2017-02-24 | Stop reason: HOSPADM

## 2017-02-22 RX ORDER — OXYCODONE AND ACETAMINOPHEN 10; 325 MG/1; MG/1
1 TABLET ORAL EVERY 4 HOURS PRN
Status: DISCONTINUED | OUTPATIENT
Start: 2017-02-22 | End: 2017-02-23

## 2017-02-22 RX ORDER — SODIUM CHLORIDE, SODIUM LACTATE, POTASSIUM CHLORIDE, CALCIUM CHLORIDE 600; 310; 30; 20 MG/100ML; MG/100ML; MG/100ML; MG/100ML
INJECTION, SOLUTION INTRAVENOUS CONTINUOUS PRN
Status: DISCONTINUED | OUTPATIENT
Start: 2017-02-22 | End: 2017-02-22

## 2017-02-22 RX ORDER — PHENYLEPHRINE HYDROCHLORIDE 10 MG/ML
INJECTION INTRAVENOUS
Status: DISCONTINUED | OUTPATIENT
Start: 2017-02-22 | End: 2017-02-22

## 2017-02-22 RX ORDER — CEPHALEXIN 500 MG/1
500 CAPSULE ORAL 3 TIMES DAILY
Qty: 30 CAPSULE | Refills: 0 | Status: SHIPPED | OUTPATIENT
Start: 2017-02-22 | End: 2017-02-24 | Stop reason: HOSPADM

## 2017-02-22 RX ORDER — DOCUSATE SODIUM 100 MG/1
100 CAPSULE, LIQUID FILLED ORAL 2 TIMES DAILY
Status: DISCONTINUED | OUTPATIENT
Start: 2017-02-22 | End: 2017-02-24 | Stop reason: HOSPADM

## 2017-02-22 RX ORDER — HYDROCODONE BITARTRATE AND ACETAMINOPHEN 7.5; 325 MG/1; MG/1
1 TABLET ORAL EVERY 6 HOURS PRN
Qty: 60 TABLET | Refills: 0 | Status: SHIPPED | OUTPATIENT
Start: 2017-02-22 | End: 2017-02-24 | Stop reason: HOSPADM

## 2017-02-22 RX ORDER — MIDAZOLAM HYDROCHLORIDE 1 MG/ML
INJECTION INTRAMUSCULAR; INTRAVENOUS
Status: DISCONTINUED | OUTPATIENT
Start: 2017-02-22 | End: 2017-02-22

## 2017-02-22 RX ORDER — OXYCODONE HYDROCHLORIDE 5 MG/1
5 TABLET ORAL ONCE
Status: COMPLETED | OUTPATIENT
Start: 2017-02-22 | End: 2017-02-22

## 2017-02-22 RX ORDER — SODIUM CHLORIDE, SODIUM LACTATE, POTASSIUM CHLORIDE, CALCIUM CHLORIDE 600; 310; 30; 20 MG/100ML; MG/100ML; MG/100ML; MG/100ML
INJECTION, SOLUTION INTRAVENOUS CONTINUOUS
Status: DISCONTINUED | OUTPATIENT
Start: 2017-02-22 | End: 2017-02-23

## 2017-02-22 RX ORDER — OXYCODONE HYDROCHLORIDE 5 MG/1
5 TABLET ORAL ONCE AS NEEDED
Status: COMPLETED | OUTPATIENT
Start: 2017-02-22 | End: 2017-02-22

## 2017-02-22 RX ORDER — DIAZEPAM 5 MG/1
5 TABLET ORAL EVERY 6 HOURS PRN
Status: DISCONTINUED | OUTPATIENT
Start: 2017-02-22 | End: 2017-02-23

## 2017-02-22 RX ORDER — ONDANSETRON 2 MG/ML
4 INJECTION INTRAMUSCULAR; INTRAVENOUS ONCE AS NEEDED
Status: DISCONTINUED | OUTPATIENT
Start: 2017-02-22 | End: 2017-02-22 | Stop reason: HOSPADM

## 2017-02-22 RX ORDER — GLYCOPYRROLATE 0.2 MG/ML
INJECTION INTRAMUSCULAR; INTRAVENOUS
Status: DISCONTINUED | OUTPATIENT
Start: 2017-02-22 | End: 2017-02-22

## 2017-02-22 RX ORDER — BACITRACIN 50000 [IU]/1
INJECTION, POWDER, FOR SOLUTION INTRAMUSCULAR
Status: DISCONTINUED | OUTPATIENT
Start: 2017-02-22 | End: 2017-02-22 | Stop reason: HOSPADM

## 2017-02-22 RX ORDER — ONDANSETRON 2 MG/ML
INJECTION INTRAMUSCULAR; INTRAVENOUS
Status: DISCONTINUED | OUTPATIENT
Start: 2017-02-22 | End: 2017-02-22

## 2017-02-22 RX ORDER — MULTIVITAMIN
1 TABLET ORAL DAILY
COMMUNITY

## 2017-02-22 RX ORDER — LIDOCAINE HCL/PF 100 MG/5ML
SYRINGE (ML) INTRAVENOUS
Status: DISCONTINUED | OUTPATIENT
Start: 2017-02-22 | End: 2017-02-22

## 2017-02-22 RX ORDER — CEFAZOLIN SODIUM 2 G/50ML
2 SOLUTION INTRAVENOUS
Status: COMPLETED | OUTPATIENT
Start: 2017-02-22 | End: 2017-02-22

## 2017-02-22 RX ORDER — HYDROCODONE BITARTRATE AND ACETAMINOPHEN 7.5; 325 MG/1; MG/1
1 TABLET ORAL EVERY 6 HOURS PRN
Status: DISCONTINUED | OUTPATIENT
Start: 2017-02-22 | End: 2017-02-23

## 2017-02-22 RX ORDER — BACITRACIN 500 [USP'U]/G
OINTMENT TOPICAL
Status: DISCONTINUED | OUTPATIENT
Start: 2017-02-22 | End: 2017-02-22 | Stop reason: HOSPADM

## 2017-02-22 RX ORDER — DIAZEPAM 5 MG/1
5 TABLET ORAL EVERY 6 HOURS PRN
Qty: 60 TABLET | Refills: 0 | Status: SHIPPED | OUTPATIENT
Start: 2017-02-22 | End: 2017-03-21 | Stop reason: SDUPTHER

## 2017-02-22 RX ORDER — FENTANYL CITRATE 50 UG/ML
INJECTION, SOLUTION INTRAMUSCULAR; INTRAVENOUS
Status: DISCONTINUED | OUTPATIENT
Start: 2017-02-22 | End: 2017-02-22

## 2017-02-22 RX ORDER — ONDANSETRON 8 MG/1
8 TABLET, ORALLY DISINTEGRATING ORAL EVERY 6 HOURS PRN
Status: DISCONTINUED | OUTPATIENT
Start: 2017-02-22 | End: 2017-02-24 | Stop reason: HOSPADM

## 2017-02-22 RX ORDER — ACETAMINOPHEN 10 MG/ML
INJECTION, SOLUTION INTRAVENOUS
Status: DISCONTINUED | OUTPATIENT
Start: 2017-02-22 | End: 2017-02-22

## 2017-02-22 RX ORDER — LIDOCAINE HYDROCHLORIDE AND EPINEPHRINE 5; 5 MG/ML; UG/ML
INJECTION, SOLUTION INFILTRATION; PERINEURAL
Status: DISCONTINUED | OUTPATIENT
Start: 2017-02-22 | End: 2017-02-22 | Stop reason: HOSPADM

## 2017-02-22 RX ORDER — CEPHALEXIN 500 MG/1
500 CAPSULE ORAL 3 TIMES DAILY
Status: DISCONTINUED | OUTPATIENT
Start: 2017-02-22 | End: 2017-02-23

## 2017-02-22 RX ORDER — BUPIVACAINE HCL/EPINEPHRINE 0.5-1:200K
VIAL (ML) INJECTION
Status: DISCONTINUED | OUTPATIENT
Start: 2017-02-22 | End: 2017-02-22 | Stop reason: HOSPADM

## 2017-02-22 RX ORDER — HYDROMORPHONE HYDROCHLORIDE 2 MG/ML
0.4 INJECTION, SOLUTION INTRAMUSCULAR; INTRAVENOUS; SUBCUTANEOUS EVERY 5 MIN PRN
Status: DISCONTINUED | OUTPATIENT
Start: 2017-02-22 | End: 2017-02-22 | Stop reason: HOSPADM

## 2017-02-22 RX ADMIN — PROPOFOL 220 MG: 10 INJECTION, EMULSION INTRAVENOUS at 08:02

## 2017-02-22 RX ADMIN — HYDROMORPHONE HYDROCHLORIDE 0.4 MG: 2 INJECTION, SOLUTION INTRAMUSCULAR; INTRAVENOUS; SUBCUTANEOUS at 03:02

## 2017-02-22 RX ADMIN — ACETAMINOPHEN 1000 MG: 10 INJECTION, SOLUTION INTRAVENOUS at 09:02

## 2017-02-22 RX ADMIN — ROCURONIUM BROMIDE 50 MG: 10 INJECTION, SOLUTION INTRAVENOUS at 08:02

## 2017-02-22 RX ADMIN — NEOSTIGMINE METHYLSULFATE 2.5 MG: 1 INJECTION INTRAVENOUS at 12:02

## 2017-02-22 RX ADMIN — BACITRACIN 50000 UNITS: 50000 INJECTION, POWDER, FOR SOLUTION INTRAMUSCULAR at 10:02

## 2017-02-22 RX ADMIN — THROMBIN, TOPICAL (BOVINE) 5000 UNITS: KIT at 10:02

## 2017-02-22 RX ADMIN — LIDOCAINE HYDROCHLORIDE 60 MG: 20 INJECTION, SOLUTION INTRAVENOUS at 08:02

## 2017-02-22 RX ADMIN — SODIUM CHLORIDE, SODIUM LACTATE, POTASSIUM CHLORIDE, AND CALCIUM CHLORIDE: 600; 310; 30; 20 INJECTION, SOLUTION INTRAVENOUS at 10:02

## 2017-02-22 RX ADMIN — CEPHALEXIN 500 MG: 500 CAPSULE ORAL at 10:02

## 2017-02-22 RX ADMIN — FENTANYL CITRATE 50 MCG: 50 INJECTION, SOLUTION INTRAMUSCULAR; INTRAVENOUS at 11:02

## 2017-02-22 RX ADMIN — SODIUM CHLORIDE, SODIUM LACTATE, POTASSIUM CHLORIDE, AND CALCIUM CHLORIDE: 600; 310; 30; 20 INJECTION, SOLUTION INTRAVENOUS at 08:02

## 2017-02-22 RX ADMIN — DIAZEPAM 5 MG: 5 TABLET ORAL at 10:02

## 2017-02-22 RX ADMIN — GLYCOPYRROLATE 0.4 MG: 0.2 INJECTION, SOLUTION INTRAMUSCULAR; INTRAVENOUS at 12:02

## 2017-02-22 RX ADMIN — FENTANYL CITRATE 100 MCG: 50 INJECTION, SOLUTION INTRAMUSCULAR; INTRAVENOUS at 08:02

## 2017-02-22 RX ADMIN — DOCUSATE SODIUM 100 MG: 100 CAPSULE, LIQUID FILLED ORAL at 10:02

## 2017-02-22 RX ADMIN — OXYCODONE HYDROCHLORIDE 5 MG: 5 TABLET ORAL at 04:02

## 2017-02-22 RX ADMIN — MIDAZOLAM HYDROCHLORIDE 2 MG: 1 INJECTION, SOLUTION INTRAMUSCULAR; INTRAVENOUS at 08:02

## 2017-02-22 RX ADMIN — FENTANYL CITRATE 50 MCG: 50 INJECTION, SOLUTION INTRAMUSCULAR; INTRAVENOUS at 10:02

## 2017-02-22 RX ADMIN — BUPIVACAINE HYDROCHLORIDE AND EPINEPHRINE BITARTRATE 50 ML: 5; .005 INJECTION, SOLUTION PERINEURAL at 10:02

## 2017-02-22 RX ADMIN — OXYCODONE HYDROCHLORIDE 5 MG: 5 TABLET ORAL at 07:02

## 2017-02-22 RX ADMIN — LIDOCAINE HYDROCHLORIDE AND EPINEPHRINE 50 ML: 5; 5 INJECTION, SOLUTION INFILTRATION; PERINEURAL at 10:02

## 2017-02-22 RX ADMIN — HYDROMORPHONE HYDROCHLORIDE 0.4 MG: 2 INJECTION, SOLUTION INTRAMUSCULAR; INTRAVENOUS; SUBCUTANEOUS at 01:02

## 2017-02-22 RX ADMIN — ONDANSETRON 8 MG: 2 INJECTION INTRAMUSCULAR; INTRAVENOUS at 12:02

## 2017-02-22 RX ADMIN — BACITRACIN 7 G: 500 OINTMENT TOPICAL at 12:02

## 2017-02-22 RX ADMIN — PHENYLEPHRINE HYDROCHLORIDE 100 MCG: 10 INJECTION INTRAVENOUS at 11:02

## 2017-02-22 RX ADMIN — PROMETHAZINE HYDROCHLORIDE 6.25 MG: 25 INJECTION INTRAMUSCULAR; INTRAVENOUS at 05:02

## 2017-02-22 RX ADMIN — PHENYLEPHRINE HYDROCHLORIDE 100 MCG: 10 INJECTION INTRAVENOUS at 10:02

## 2017-02-22 RX ADMIN — METHYLPREDNISOLONE ACETATE 40 MG: 40 INJECTION, SUSPENSION INTRA-ARTICULAR; INTRALESIONAL; INTRAMUSCULAR; SOFT TISSUE at 11:02

## 2017-02-22 RX ADMIN — FENTANYL CITRATE 50 MCG: 50 INJECTION, SOLUTION INTRAMUSCULAR; INTRAVENOUS at 09:02

## 2017-02-22 RX ADMIN — PROPOFOL 30 MG: 10 INJECTION, EMULSION INTRAVENOUS at 10:02

## 2017-02-22 RX ADMIN — CEFAZOLIN SODIUM 2 G: 2 SOLUTION INTRAVENOUS at 08:02

## 2017-02-22 RX ADMIN — OXYCODONE HYDROCHLORIDE AND ACETAMINOPHEN 1 TABLET: 10; 325 TABLET ORAL at 10:02

## 2017-02-22 RX ADMIN — Medication 1 G: at 10:02

## 2017-02-22 NOTE — OR NURSING
Dr Sullivan and Natividad at the bedside to see pt. Dr Sullivan brings head of bed to 30 degrees, asks Natividad to check her in 1 hour and she will determine if she can go home. Pt wihtout headache or pain at this time.

## 2017-02-22 NOTE — ANESTHESIA POSTPROCEDURE EVALUATION
"Anesthesia Post Evaluation    Patient: Tanika Wayne    Procedure(s) Performed: Procedure(s) (LRB):  Left LAMINECTOMY-MINIMALLY INVASIVE L3-4 and L4-5 (Right)    Final Anesthesia Type: general  Patient location during evaluation: PACU  Patient participation: Yes- Able to Participate  Level of consciousness: awake and alert  Post-procedure vital signs: reviewed and stable  Pain management: adequate  Airway patency: patent  PONV status at discharge: No PONV  Anesthetic complications: no      Cardiovascular status: blood pressure returned to baseline  Respiratory status: unassisted, spontaneous ventilation and room air  Hydration status: euvolemic  Follow-up not needed.        Visit Vitals    BP (!) 133/54    Pulse 101    Temp 36.6 °C (97.9 °F) (Oral)    Resp 16    Ht 5' 1" (1.549 m)    Wt 115.2 kg (254 lb)    SpO2 96%    Breastfeeding No    BMI 47.99 kg/m2       Pain/Conor Score: Pain Assessment Performed: Yes (2/22/2017  1:43 PM)  Presence of Pain: complains of pain/discomfort (2/22/2017  1:43 PM)  Pain Rating Prior to Med Admin: 5 (2/22/2017  1:56 PM)  Conor Score: 8 (2/22/2017  1:43 PM)      "

## 2017-02-22 NOTE — IP AVS SNAPSHOT
[facility]  [address] [place], [place] [id number]  Phone: [phone]           Patient Discharge Instructions     Our goal is to set you up for success. This packet includes information on your condition, medications, and your home care. It will help you to care for yourself so you don't get sicker and need to go back to the hospital.     Please ask your nurse if you have any questions.        There are many details to remember when preparing to leave the hospital. Here is what you will need to do:    1. Take your medicine. If you are prescribed medications, review your Medication List in the following pages. You may have new medications to  at the pharmacy and others that you'll need to stop taking. Review the instructions for how and when to take your medications. Talk with your doctor or nurses if you are unsure of what to do.     2. Go to your follow-up appointments. Specific follow-up information is listed in the following pages. Your may be contacted by a transition nurse or clinical provider about future appointments. Be sure we have all of the phone numbers to reach you, if needed. Please contact your provider's office if you are unable to make an appointment.     3. Watch for warning signs. Your doctor or nurse will give you detailed warning signs to watch for and when to call for assistance. These instructions may also include educational information about your condition. If you experience any of warning signs to your health, call your doctor.               [facility]  Unless otherwise directed by your provider, please contact [facility], our nurse care line that is available for 24/7 assistance.     [phone] (toll-free)    Registered nurses in the [facility] provide clinical advisement, health education, appointment booking, and other advisory services.                    ** Verify the list of medication(s) below is accurate and up to  date. Carry this with you in case of emergency. If your medications have changed, please notify your healthcare provider.             Medication List      START taking these medications        Additional Info                      * diazePAM 5 MG tablet   Commonly known as:  VALIUM   Quantity:  60 tablet   Refills:  0   Dose:  5 mg    Last time this was given:  5 mg on 2/24/2017  5:02 PM   Instructions:  Take 1 tablet (5 mg total) by mouth every 6 (six) hours as needed (spasm).     Begin Date    AM    Noon    PM    Bedtime       * diazePAM 5 MG tablet   Commonly known as:  VALIUM   Quantity:  60 tablet   Refills:  0   Dose:  5 mg    Last time this was given:  5 mg on 2/24/2017  5:02 PM   Instructions:  Take 1 tablet (5 mg total) by mouth every 6 (six) hours as needed (spasm).     Begin Date    AM    Noon    PM    Bedtime       methylPREDNISolone 4 mg tablet   Commonly known as:  MEDROL DOSEPACK   Quantity:  1 Package   Refills:  0    Instructions:  Take as directed     Begin Date    AM    Noon    PM    Bedtime       oxycodone-acetaminophen  mg per tablet   Commonly known as:  PERCOCET   Quantity:  90 tablet   Refills:  0   Dose:  1 tablet    Last time this was given:  1 tablet on 2/24/2017  2:29 PM   Instructions:  Take 1 tablet by mouth every 6 (six) hours as needed for Pain.     Begin Date    AM    Noon    PM    Bedtime       * Notice:  This list has 2 medication(s) that are the same as other medications prescribed for you. Read the directions carefully, and ask your doctor or other care provider to review them with you.      CONTINUE taking these medications        Additional Info                      alprazolam 0.25 MG tablet   Commonly known as:  XANAX   Quantity:  30 tablet   Refills:  1   Dose:  0.25 mg    Instructions:  Take 1 tablet (0.25 mg total) by mouth daily as needed.     Begin Date    AM    Noon    PM    Bedtime       carvedilol 3.125 MG tablet   Commonly known as:  COREG   Quantity:  180 tablet    Refills:  2    Last time this was given:  3.125 mg on 2/24/2017  8:25 AM   Instructions:  TAKE 1 TABLET TWICE DAILY     Begin Date    AM    Noon    PM    Bedtime       clotrimazole-betamethasone 1-0.05% cream   Commonly known as:  LOTRISONE   Quantity:  45 g   Refills:  1    Instructions:  Apply topically 2 (two) times daily as needed.     Begin Date    AM    Noon    PM    Bedtime       fluticasone 50 mcg/actuation nasal spray   Commonly known as:  FLONASE   Quantity:  16 g   Refills:  3   Dose:  2 spray    Instructions:  2 sprays by Each Nare route once daily.     Begin Date    AM    Noon    PM    Bedtime       levothyroxine 100 MCG tablet   Commonly known as:  SYNTHROID   Quantity:  30 tablet   Refills:  11   Dose:  100 mcg    Last time this was given:  100 mcg on 2/24/2017  5:38 AM   Instructions:  Take 1 tablet (100 mcg total) by mouth once daily.     Begin Date    AM    Noon    PM    Bedtime       losartan-hydrochlorothiazide 100-12.5 mg 100-12.5 mg Tab   Commonly known as:  HYZAAR   Quantity:  90 tablet   Refills:  3   Dose:  1 tablet    Instructions:  Take 1 tablet by mouth every morning.     Begin Date    AM    Noon    PM    Bedtime       multivitamin per tablet   Commonly known as:  THERAGRAN   Refills:  0   Dose:  1 tablet    Instructions:  Take 1 tablet by mouth once daily.     Begin Date    AM    Noon    PM    Bedtime       nifedipine 90 MG (OSM) Tr24   Commonly known as:  PROCARDIA-XL   Quantity:  90 tablet   Refills:  3    Last time this was given:  90 mg on 2/24/2017  8:25 AM   Instructions:  TAKE 1 TABLET ONE TIME DAILY     Begin Date    AM    Noon    PM    Bedtime       potassium chloride SA 20 MEQ tablet   Commonly known as:  K-DUR,KLOR-CON   Quantity:  360 tablet   Refills:  2    Instructions:  TAKE 2 TABLETS TWICE DAILY     Begin Date    AM    Noon    PM    Bedtime       STOOL SOFTENER 100 MG capsule   Refills:  0   Dose:  100 mg   Generic drug:  docusate sodium    Last time this was given:   100 mg on 2/24/2017  8:24 AM   Instructions:  Take 100 mg by mouth 3 (three) times daily as needed for Constipation.     Begin Date    AM    Noon    PM    Bedtime       venlafaxine 75 MG 24 hr capsule   Commonly known as:  EFFEXOR-XR   Quantity:  90 capsule   Refills:  3    Last time this was given:  75 mg on 2/23/2017  8:58 PM   Instructions:  TAKE 1 CAPSULE EVERY NIGHT     Begin Date    AM    Noon    PM    Bedtime         STOP taking these medications     acetaminophen-codeine 300-60mg 300-60 mg Tab   Commonly known as:  TYLENOL #4       cyclobenzaprine 10 MG tablet   Commonly known as:  FLEXERIL       naproxen sodium 220 MG tablet   Commonly known as:  ANAPROX       tramadol 50 mg tablet   Commonly known as:  ULTRAM         ASK your doctor about these medications        Additional Info                      ciprofloxacin HCl 500 MG tablet   Commonly known as:  CIPRO   Quantity:  10 tablet   Refills:  0   Dose:  500 mg   Ask about: Should I take this medication?    Instructions:  Take 1 tablet (500 mg total) by mouth 2 (two) times daily.     Begin Date    AM    Noon    PM    Bedtime            Where to Get Your Medications      You can get these medications from any pharmacy     Bring a paper prescription for each of these medications     diazePAM 5 MG tablet    diazePAM 5 MG tablet    methylPREDNISolone 4 mg tablet    oxycodone-acetaminophen  mg per tablet                  Please bring to all follow up appointments:    1. A copy of your discharge instructions.  2. All medicines you are currently taking in their original bottles.  3. Identification and insurance card.    Please arrive 15 minutes ahead of scheduled appointment time.    Please call 24 hours in advance if you must reschedule your appointment and/or time.        Your Scheduled Appointments     Mar 09, 2017 10:30 AM CST   Post OP with RN, NEUROSURGERY   [facility] ([facility] ) [address]  [facility]  [address]   [phone]            Mar 24, 2017  4:00 PM CDT   [facility] Spine [facility]-Op with Dave Sullivan MD   [facility]    [address]  [address]  [facility] [address]   [phone]                Discharge Instructions     Future Orders    Call MD for:  difficulty breathing, headache or visual disturbances     Call MD for:  extreme fatigue     Call MD for:  hives     Call MD for:  persistent dizziness or light-headedness     Call MD for:  persistent nausea and vomiting     Call MD for:  redness, tenderness, or signs of infection (pain, swelling, redness, odor or green/yellow discharge around incision site)     Call MD for:  severe uncontrolled pain     Call MD for:  temperature >100.4     Diet general     Questions:    Total calories:      Fat restriction, if any:      Protein restriction, if any:      Na restriction, if any:      Fluid restriction:      Additional restrictions:      Remove dressing in 48 hours         Discharge Instructions           Anesthesia: After Your Surgery  Youve just had surgery. During surgery, you received medication called anesthesia to keep you comfortable and pain-free. After surgery, you may experience some pain or nausea. This is common. Here are some tips for feeling better and recovering after surgery.    Going home  Your doctor or nurse will show you how to take care of yourself when you go home. He or she will also answer your questions. Have an adult family member or friend drive you home. For the first 24 hours after your surgery:  · Do not drive or use heavy equipment.  · Do not make important decisions or sign legal documents.  · Avoid alcohol.  · Have someone stay with you, if needed. He or she can watch for problems and help keep you safe.  Be sure to keep all follow-up appointments with your doctor. And rest after your procedure for as long as your doctor tells you to.    Coping with pain  If you have pain after surgery, pain  medication will help you feel better. Take it as directed, before pain becomes severe. Also, ask your doctor or pharmacist about other ways to control pain, such as with heat, ice, and relaxation. And follow any other instructions your surgeon or nurse gives you.    Tips for taking pain medication  To get the best relief possible, remember these points:  · Pain medications can upset your stomach. Taking them with a little food may help.  · Most pain relievers taken by mouth need at least 20 to 30 minutes to take effect.  · Taking medication on a schedule can help you remember to take it. Try to time your medication so that you can take it before beginning an activity, such as dressing, walking, or sitting down for dinner.  · Constipation is a common side effect of pain medications. Contact your doctor before taking any medications like laxatives or stool softeners to help relieve constipation. Also ask about any dietary restrictions, because drinking lots of fluids and eating foods like fruits and vegetables that are high in fiber can also help. Remember, dont take laxatives unless your surgeon has prescribed them.  · Mixing alcohol and pain medication can cause dizziness and slow your breathing. It can even be fatal. Dont drink alcohol while taking pain medication.  · Pain medication can slow your reflexes. Dont drive or operate machinery while taking pain medication.  If your health care provider tells you to take acetaminophen to help relieve your pain, ask him or her how much you are supposed to take each day. (Acetaminophen is the generic name for Tylenol and other brand-name pain relievers.) Acetaminophen or other pain relievers may interact with your prescription medicines or other over-the-counter (OTC) drugs. Some prescription medications contain acetaminophen along with other active ingredients. Using both prescription and OTC acetaminophen for pain can cause you to overdose. The FDA recommends that you  read the labels on your OTC medications carefully. This will help you to clearly understand the list of active ingredients, dosing instructions, and any warnings. It may also help you avoid taking too much acetaminophen. If you have questions or don't understand the information, ask your pharmacist or health care provider to explain it to you before you take the OTC medication.    Managing nausea  Some people have an upset stomach after surgery. This is often due to anesthesia, pain, pain medications, or the stress of surgery. The following tips will help you manage nausea and get good nutrition as you recover. If you were on a special diet before surgery, ask your doctor if you should follow it during recovery. These tips may help:  · Dont push yourself to eat. Your body will tell you when to eat and how much.  · Start off with clear liquids and soup. They are easier to digest.  · Progress to semi-solid foods (mashed potatoes, applesauce, and gelatin) as you feel ready.  · Slowly move to solid foods. Dont eat fatty, rich, or spicy foods at first.  · Dont force yourself to have three large meals a day. Instead, eat smaller amounts more often.  · Take pain medications with a small amount of solid food, such as crackers or toast to avoid nausea.      Call your surgeon if  · You still have pain an hour after taking medication (it may not be strong enough).  · You feel too sleepy, dizzy, or groggy (medication may be too strong).  · You have side effects like nausea, vomiting, or skin changes (rash, itching, or hives).   © 2913-9763 [employer]. [address] [place] [address], [place] [address]. All rights reserved. This information is not intended as a substitute for professional medical care. Always follow your healthcare professional's instructions.                    Please follow ONLY the instructions that are checked below.    Activity Restrictions:  [x]  Return to work will be determined on an individual  basis.  [x]  No lifting greater than 10 pounds.  [x]  Avoid bending and twisting the area of your surgery more than 45 degrees from neutral position in any direction.  [x]  No driving or operating machinery:  [x]  until cleared by your surgeon.  [x]  while taking narcotic pain medications or muscle relaxants.  [x]  No cervical collar, soft collar, or lumbar brace required.  []  Wear your brace at all times. You may be given an extra brace or soft collar to wear when showering.  []  Wear your brace at all times except when flat in bed.  []  Wear brace for comfort only.  [x]  Increase ambulation over the next 2 weeks so that you are walking 2 miles per day at 2 weeks post-operatively.  [x]  Walk on paved surfaces only. It is okay to walk up and down stairs while holding onto a side rail.  [x]  No sexual activity for 2-3 weeks.    Discharge Medication/Follow-up:  [x]  Please refer to discharge medication reconciliation form.  [x]  Do not take ANY non-steroidal anti-inflammatory drugs (NSAIDS), including the following: ibuprofen, naprosyn, Aleve, Advil, Indocin, Mobic, or Celebrex for:  []  4 weeks  [x]  8 weeks  []  6 months  [x]  Prescriptions for appropriate medication will be given upon discharge.   [x]  Pain control:             [x]  Muscle relaxer:            [x]  Take docusate (Colace 100 mg): take one capsule a day as needed for constipation. You can get this over the counter.  [x]  Follow-up appointment:  [x]  10-14 days post-op for wound check by physician assistant/nurse  [x]  4-6 weeks with MD:  []  with x-rays  [x]  without x-rays  []  An appointment will be mailed to you.    Wound Care:  [x]  Remove dressing or bandaid in  1  days.  [x]  No bandage required. Keep your incision open to the air once the dressing is removed.  [x]  You may shower on the 2nd day after your surgery. Have the force of water hit you opposite from the incision. Pat the incision dry after your shower; do not scrub the incision.  [x]   You cannot take a bath until 8 weeks after surgery.  [x]  Apply bacitracin to incision twice a day for  10  more days once the dressing is removed.    Call your doctor or go to the Emergency Room for any signs of infection, including: increased redness, drainage, pain, or fever (temperature ?101.5 for 24 hours). Call your doctor or go to the Emergency Room if there are any localized neurological changes; problems with speech, vision, numbness, tingling, weakness, or severe headache; or for other concerns.    Special Instructions:  [x]  No use of tobacco products.  [x]  Diet: Please eat a regular diet as tolerated.  []  Other diet:              Specific physician instructions:  Stay flat tonight and overnight in bed.  May roll side to side.  Can get up to go to the bathroom.  Can get up in the morning and start walking around. Start day three of the medrol dosepack on 02/25/17.        Physicians need 3 days' notice for pain medicine to be refilled. Pain medicine will only be refilled between 8 AM and 5 PM, Monday through Friday, due to Food and Drug Administration regulation of documentation.    If you have any questions about this form, please call [phone].    Form No. 49772 (Revised 10/31/2013)        Primary Diagnosis     Your primary diagnosis was:  Narrowing Of Spinal Canal      Admission Information     Date & Time Provider Department CSN    2/22/2017  5:55 AM Dave Sullivan MD [facility] [id number]      Care Providers     Provider Role Specialty Primary office phone    Dave Sullivan MD Attending Provider Neurosurgery [phone]    Dave Sullivan MD Surgeon  Neurosurgery [phone]      Your Vitals Were     BP                   134/63 (BP Location: Right arm, Patient Position: Lying, BP Method: Automatic)           Recent Lab Values        2/23/2017                           5:57 PM           A1C 6.8 (H)           Comment for A1C at  5:57 PM on 2/23/2017:  According to ADA  guidelines, hemoglobin A1C <7.0% represents  optimal control in non-pregnant diabetic patients.  Different  metrics may apply to specific populations.   Standards of Medical Care in Diabetes - 2016.  For the purpose of screening for the presence of diabetes:  <5.7%     Consistent with the absence of diabetes  5.7-6.4%  Consistent with increasing risk for diabetes   (prediabetes)  >or=6.5%  Consistent with diabetes  Currently no consensus exists for use of hemoglobin A1C  for diagnosis of diabetes for children.        Allergies as of 2/24/2017     No Known Allergies      Advance Directives     An advance directive is a document which, in the event you are no longer able to make decisions for yourself, tells your healthcare team what kind of treatment you do or do not want to receive, or who you would like to make those decisions for you.  If you do not currently have an advance directive, [facility] encourages you to create one.  For more information call:  [phone]), [phone] ([phone]),  or log on to [email or web address].        Language Assistance Services     ATTENTION: Language assistance services are available, free of charge. Please call [phone].      ATENCIÓN: Si habla español, tiene a dos santos disposición servicios gratuitos de asistencia lingüística. Llame al [phone].     CHÚ Ý: N?u b?n nói Ti?ng Vi?t, có các d?ch v? h? tr? ngôn ng? mi?n phí dành cho b?n. G?i s? [phone].        Diabetes Discharge Instructions                                    [facility] complies with applicable Federal civil rights laws and does not discriminate on the basis of race, color, national origin, age, disability, or sex.

## 2017-02-22 NOTE — PLAN OF CARE
Patient prefers to have sister Delmi Frye present for discharge teaching. Please contact them @[phone].

## 2017-02-22 NOTE — DISCHARGE INSTRUCTIONS
Anesthesia: After Your Surgery  Youve just had surgery. During surgery, you received medication called anesthesia to keep you comfortable and pain-free. After surgery, you may experience some pain or nausea. This is common. Here are some tips for feeling better and recovering after surgery.    Going home  Your doctor or nurse will show you how to take care of yourself when you go home. He or she will also answer your questions. Have an adult family member or friend drive you home. For the first 24 hours after your surgery:  · Do not drive or use heavy equipment.  · Do not make important decisions or sign legal documents.  · Avoid alcohol.  · Have someone stay with you, if needed. He or she can watch for problems and help keep you safe.  Be sure to keep all follow-up appointments with your doctor. And rest after your procedure for as long as your doctor tells you to.    Coping with pain  If you have pain after surgery, pain medication will help you feel better. Take it as directed, before pain becomes severe. Also, ask your doctor or pharmacist about other ways to control pain, such as with heat, ice, and relaxation. And follow any other instructions your surgeon or nurse gives you.    Tips for taking pain medication  To get the best relief possible, remember these points:  · Pain medications can upset your stomach. Taking them with a little food may help.  · Most pain relievers taken by mouth need at least 20 to 30 minutes to take effect.  · Taking medication on a schedule can help you remember to take it. Try to time your medication so that you can take it before beginning an activity, such as dressing, walking, or sitting down for dinner.  · Constipation is a common side effect of pain medications. Contact your doctor before taking any medications like laxatives or stool softeners to help relieve constipation. Also ask about any dietary restrictions, because drinking lots of fluids and eating  foods like fruits and vegetables that are high in fiber can also help. Remember, dont take laxatives unless your surgeon has prescribed them.  · Mixing alcohol and pain medication can cause dizziness and slow your breathing. It can even be fatal. Dont drink alcohol while taking pain medication.  · Pain medication can slow your reflexes. Dont drive or operate machinery while taking pain medication.  If your health care provider tells you to take acetaminophen to help relieve your pain, ask him or her how much you are supposed to take each day. (Acetaminophen is the generic name for Tylenol and other brand-name pain relievers.) Acetaminophen or other pain relievers may interact with your prescription medicines or other over-the-counter (OTC) drugs. Some prescription medications contain acetaminophen along with other active ingredients. Using both prescription and OTC acetaminophen for pain can cause you to overdose. The FDA recommends that you read the labels on your OTC medications carefully. This will help you to clearly understand the list of active ingredients, dosing instructions, and any warnings. It may also help you avoid taking too much acetaminophen. If you have questions or don't understand the information, ask your pharmacist or health care provider to explain it to you before you take the OTC medication.    Managing nausea  Some people have an upset stomach after surgery. This is often due to anesthesia, pain, pain medications, or the stress of surgery. The following tips will help you manage nausea and get good nutrition as you recover. If you were on a special diet before surgery, ask your doctor if you should follow it during recovery. These tips may help:  · Dont push yourself to eat. Your body will tell you when to eat and how much.  · Start off with clear liquids and soup. They are easier to digest.  · Progress to semi-solid foods (mashed potatoes, applesauce, and gelatin) as you feel  ready.  · Slowly move to solid foods. Dont eat fatty, rich, or spicy foods at first.  · Dont force yourself to have three large meals a day. Instead, eat smaller amounts more often.  · Take pain medications with a small amount of solid food, such as crackers or toast to avoid nausea.      Call your surgeon if  · You still have pain an hour after taking medication (it may not be strong enough).  · You feel too sleepy, dizzy, or groggy (medication may be too strong).  · You have side effects like nausea, vomiting, or skin changes (rash, itching, or hives).   © 2611-7876 [employer]. [address] [place] [address], [place] [address]. All rights reserved. This information is not intended as a substitute for professional medical care. Always follow your healthcare professional's instructions.                    Please follow ONLY the instructions that are checked below.    Activity Restrictions:  [x]  Return to work will be determined on an individual basis.  [x]  No lifting greater than 10 pounds.  [x]  Avoid bending and twisting the area of your surgery more than 45 degrees from neutral position in any direction.  [x]  No driving or operating machinery:  [x]  until cleared by your surgeon.  [x]  while taking narcotic pain medications or muscle relaxants.  [x]  No cervical collar, soft collar, or lumbar brace required.  []  Wear your brace at all times. You may be given an extra brace or soft collar to wear when showering.  []  Wear your brace at all times except when flat in bed.  []  Wear brace for comfort only.  [x]  Increase ambulation over the next 2 weeks so that you are walking 2 miles per day at 2 weeks post-operatively.  [x]  Walk on paved surfaces only. It is okay to walk up and down stairs while holding onto a side rail.  [x]  No sexual activity for 2-3 weeks.    Discharge Medication/Follow-up:  [x]  Please refer to discharge medication reconciliation form.  [x]  Do not take ANY non-steroidal  anti-inflammatory drugs (NSAIDS), including the following: ibuprofen, naprosyn, Aleve, Advil, Indocin, Mobic, or Celebrex for:  []  4 weeks  [x]  8 weeks  []  6 months  [x]  Prescriptions for appropriate medication will be given upon discharge.   [x]  Pain control:             [x]  Muscle relaxer:            [x]  Take docusate (Colace 100 mg): take one capsule a day as needed for constipation. You can get this over the counter.  [x]  Follow-up appointment:  [x]  10-14 days post-op for wound check by physician assistant/nurse  [x]  4-6 weeks with MD:  []  with x-rays  [x]  without x-rays  []  An appointment will be mailed to you.    Wound Care:  [x]  Remove dressing or bandaid in  1  days.  [x]  No bandage required. Keep your incision open to the air once the dressing is removed.  [x]  You may shower on the 2nd day after your surgery. Have the force of water hit you opposite from the incision. Pat the incision dry after your shower; do not scrub the incision.  [x]  You cannot take a bath until 8 weeks after surgery.  [x]  Apply bacitracin to incision twice a day for  10  more days once the dressing is removed.    Call your doctor or go to the Emergency Room for any signs of infection, including: increased redness, drainage, pain, or fever (temperature ?101.5 for 24 hours). Call your doctor or go to the Emergency Room if there are any localized neurological changes; problems with speech, vision, numbness, tingling, weakness, or severe headache; or for other concerns.    Special Instructions:  [x]  No use of tobacco products.  [x]  Diet: Please eat a regular diet as tolerated.  []  Other diet:              Specific physician instructions:  Stay flat tonight and overnight in bed.  May roll side to side.  Can get up to go to the bathroom.  Can get up in the morning and start walking around. Start day three of the medrol dosepack on 02/25/17.        Physicians need 3 days' notice for pain medicine to be refilled. Pain  medicine will only be refilled between 8 AM and 5 PM, Monday through Friday, due to Food and Drug Administration regulation of documentation.    If you have any questions about this form, please call [phone].    Form No. 34224 (Revised 10/31/2013)

## 2017-02-22 NOTE — PROGRESS NOTES
Certification of Assistant at Surgery       Surgery Date: 2/22/2017     Participating Surgeons:  Surgeon(s) and Role:     * Dave Sullivan MD - Primary    Procedures:  Procedure(s) (LRB):  Left LAMINECTOMY-MINIMALLY INVASIVE L3-4 and L4-5 (Right)    Assistant Surgeon's Certification of Necessity:  I understand that section 1842 (b) (6) (d) of the Social Security Act generally prohibits Medicare Part B reasonable charge payment for the services of assistants at surgery in teaching hospitals when qualified residents are available to furnish such services. I certify that the services for which payment is claimed were medically necessary, and that no qualified resident was available to perform the services. I further understand that these services are subject to post-payment review by the Medicare carrier.      Natividad Tafoya PA-C    02/22/2017  3:48 PM

## 2017-02-22 NOTE — INTERVAL H&P NOTE
The patient has been examined and the H&P has been reviewed:    I concur with the findings and no changes have occurred since H&P was written.    Anesthesia/Surgery risks, benefits and alternative options discussed and understood by patient/family.          Active Hospital Problems    Diagnosis  POA    Lumbar spinal stenosis [M48.06]  Yes      Resolved Hospital Problems    Diagnosis Date Resolved POA   No resolved problems to display.       FANNIE Nevarez, PA-C  Neurosurgery  [facility]  [facility]

## 2017-02-22 NOTE — OP NOTE
DATE OF PROCEDURE: 2/22/2017     PREOPERATIVE DIAGNOSES:   Lumbar stenosis [M48.06]    POSTOPERATIVE DIAGNOSES:   Lumbar stenosis [M48.06]    PROCEDURES PERFORMED:   Procedure(s) (LRB):  Left LAMINECTOMY-MINIMALLY INVASIVE L3-4 and L4-5 (Right)    Surgeon(s) and Role:     * Dave Sullivan MD - Primary  Assistant- Natividad Tafoya PA-C (there was no neurosurgery resident to assist in the surgery)    ANESTHESIA: General     ESTIMATED BLOOD LOSS: 50cc    CLINICAL HISTORY AND INDICATIONS FOR SURGERY:  Tanika Wayne is a 57 y.o. morbidly obese female that presents with chronic LBP and signs and symptoms of neurogenic claudication. she  had tried physical therapy, pain management and medication management without any significant improvement in her symptoms. We discussed all the attendant risks, benefits, and potential complications of surgery and she wanted us to proceed with surgery. Please note that the patient has a sacralized lumbar spine with an S1-2 segment, so the levels operated on were the 3rd and 4th disc levels from sacrum.     OPERATIVE PROCEDURE: The patient was brought into the Operating Room, identified and general anesthesia was induced using endotracheal intubation.   All the required IV lines were placed. Thigh-high NIKKI stockings and SCDs were placed for DVT prophylaxis. The patient was then gently logrolled in a prone   position on the Balaji frame and all neurocutaneous pressure points were checked and padded. Lumbosacral region was prepped and draped in the usual sterile   technique. We then used AP x-rays to tammy the midline, the medial aspect of the L4 to L5 pediclse on the right side. We then designed a paramedian incision over the L4 to L5 disk space after local anesthetic.     We incised the skin, subcutaneous tissue, and assembled the METRx X-Tube over the L4-5 disk space using lateral x-ray. We brought in the operating microscope and then used monopolar cautery to remove soft tissue  overlying the L4-5 lamina and medial one-third of the L4-5 facet. We then used a combination of high speed drill and Kerrison of different configuration to do a laminotomy at L4-5 and to remove medial 1/3 of L4-5 facet on the right side. We found the attachment of the ligamentum flavum and peeled inferolaterally. We cut it in peace meal using kerrison of different configurations. We then undercut the overgrown facet fully decompressing the lateral recess and had good visualization of the lateral margin of the thecal sac.     Once we were satisfied with the decompression, we then angled our METRx X-Tube medially to see the base of the spinous process and then drilled across to  the   contralateral side, left side. On the contralateral side. We used Kerrison of different configurations to undercut the contralateral facet and to remove the lamina. We then undercut the thickened ligament flavum. The thecal sac was well decompressed at the end of the surgery.     Once we were done at L4-5 we took down the Metrx tube and reassembled it at L3-4. We the followed the same step to decompress L3-4. We brought in the operating microscope and then used monopolar cautery to remove soft tissue overlying the L3-4 lamina and medial one-third of the L3-4 facet. We then used a combination of high speed drill and Kerrison of different configuration to do a laminotomy at L3-4 and to remove medial 1/3 of L3-4 facet on the right side. We found the attachment of the ligamentum flavum and peeled inferolaterally. We cut it in peace meal using kerrison of different configurations. We then undercut the overgrown facet fully decompressing the lateral recess and had good visualization of the lateral margin of the thecal sac.     Once we were satisfied with the decompression, we then angled our METRx X-Tube medially to see the base of the spinous process and then drilled across to  the   contralateral side at L3-4, left side. On the contralateral  side. We used Kerrison of different configurations to undercut the contralateral facet and to remove the lamina. We then undercut the thickened ligament flavum. The thecal sac was well decompressed at the end of the surgery at L3-4. We used bipolar cautery and gelfoam soaked in thrombin for hemostasis throughout the surgery. There was a couple of dura tear which we packed with gelfoam and sealed with Tiseel.      We used 0 Vicryl suture to approximate the thoracolumbar fascia and deep cutaneous tissue, 2-0 Vicryl stitch for the subcuticular layer and 4-0 Monocryl   to approximate the skin edges. We applied bacitracin ointment, Telfa and Tegaderm for final dressing of the incision. The patient was repositioned supine on the hospital bed, weaned off general anesthesia and extubated. she  was moving both lower extremities symmetrically and strongly and was transferred to the Recovery Room in stable condition.

## 2017-02-22 NOTE — OR NURSING
Patient remains in PACU awaiting re-evaluation by MABEL Henson for Dr Sullivan prior to transferring to ACU.  Paged Natividad, awaiting call back. Patient denies headaches, experiencing urgency to void. Patient remains at HOB less than 30 degrees as ordered, having difficulty voiding on bedpan.

## 2017-02-22 NOTE — TRANSFER OF CARE
"Anesthesia Transfer of Care Note    Patient: Tanika Wayne    Procedure(s) Performed: Procedure(s) (LRB):  Left LAMINECTOMY-MINIMALLY INVASIVE L3-4 and L4-5 (Right)    Patient location: PACU    Anesthesia Type: general    Transport from OR: Transported from OR on 2-3 L/min O2 by NC with adequate spontaneous ventilation    Post pain: adequate analgesia    Post assessment: no apparent anesthetic complications and tolerated procedure well    Post vital signs: stable    Level of consciousness: awake, alert and oriented    Nausea/Vomiting: no nausea/vomiting    Complications: none          Last vitals:   Visit Vitals    BP (!) 141/72 (BP Location: Right arm, Patient Position: Lying, BP Method: Automatic)    Pulse 94    Temp 36.8 °C (98.2 °F) (Oral)    Resp 16    Ht 5' 1" (1.549 m)    Wt 115.2 kg (254 lb)    SpO2 95%    Breastfeeding No    BMI 47.99 kg/m2     "

## 2017-02-22 NOTE — DISCHARGE SUMMARY
[facility]  Discharge Summary  Neurosurgery    Admit Date: 2/22/2017    Discharge Date and Time: 02/24/17  4:00PM    Attending Physician: Dave Sullivan MD     Discharge Physician: Natividad Tafoya PA-C    Procedures Performed: Procedure(s) (LRB):  Left LAMINECTOMY-MINIMALLY INVASIVE L3-4 and L4-5 (Right)    Hospital Course Patient with chronic back and bilateral leg pain, right more than left consistent with neurogenic claudication.  She had the above named procedure and had a CSF leak.  Was doing well initially and started to have headache and was admitted overnight.  She was getting up to go to the bathroom every thirty minutes and persisted with headache.  The next morning she was put on bedrest with HOB flat, fluids started, increased caffeine intake, and medrol pack started and headache and nausea improved.  HOB increased to 30 degrees in the afternoon.  Today she got up after lunchtime and did well with no headaches.  Donohue taken out.  She had increased pain in her posterior thighs but no headache.  She was discharged home in stable condition and told to lay down and stay on bedrest overnight and to get up in the morning.  Fu in 2 weeks for wound check and in 6 weeks with Dr. Sullivan.    Discharged Condition: good   Principal Problem: Lumbar spinal stenosis   Secondary Diagnoses:   Active Hospital Problems    Diagnosis  POA    *Lumbar spinal stenosis [M48.06]  Yes      Resolved Hospital Problems    Diagnosis Date Resolved POA   No resolved problems to display.       Disposition: Home or Self Care    Follow Up/Patient Instructions:     Medications:  Reconciled Home Medications:   Current Discharge Medication List      START taking these medications    Details          !! diazePAM (VALIUM) 5 MG tablet Take 1 tablet (5 mg total) by mouth every 6 (six) hours as needed (spasm).  Qty: 60 tablet, Refills: 0      methylPREDNISolone (MEDROL DOSEPACK) 4 mg tablet Take as directed  Qty: 1  Package, Refills: 0      oxycodone-acetaminophen (PERCOCET)  mg per tablet Take 1 tablet by mouth every 6 (six) hours as needed for Pain.  Qty: 90 tablet, Refills: 0       !! - Potential duplicate medications found. Please discuss with provider.      CONTINUE these medications which have NOT CHANGED    Details   alprazolam (XANAX) 0.25 MG tablet Take 1 tablet (0.25 mg total) by mouth daily as needed.  Qty: 30 tablet, Refills: 1    Associated Diagnoses: Anxiety      carvedilol (COREG) 3.125 MG tablet TAKE 1 TABLET TWICE DAILY  Qty: 180 tablet, Refills: 2      docusate sodium (STOOL SOFTENER) 100 MG capsule Take 100 mg by mouth 3 (three) times daily as needed for Constipation.      fluticasone (FLONASE) 50 mcg/actuation nasal spray 2 sprays by Each Nare route once daily.  Qty: 16 g, Refills: 3    Associated Diagnoses: Allergic rhinitis, unspecified allergic rhinitis trigger, unspecified rhinitis seasonality      levothyroxine (SYNTHROID) 100 MCG tablet Take 1 tablet (100 mcg total) by mouth once daily.  Qty: 30 tablet, Refills: 11      losartan-hydrochlorothiazide 100-12.5 mg (HYZAAR) 100-12.5 mg Tab Take 1 tablet by mouth every morning.  Qty: 90 tablet, Refills: 3    Associated Diagnoses: Essential hypertension, malignant      multivitamin (THERAGRAN) per tablet Take 1 tablet by mouth once daily.      nifedipine (PROCARDIA-XL) 90 MG (OSM) TR24 TAKE 1 TABLET ONE TIME DAILY  Qty: 90 tablet, Refills: 3    Associated Diagnoses: Essential hypertension, malignant      potassium chloride SA (K-DUR,KLOR-CON) 20 MEQ tablet TAKE 2 TABLETS TWICE DAILY  Qty: 360 tablet, Refills: 2      venlafaxine (EFFEXOR-XR) 75 MG 24 hr capsule TAKE 1 CAPSULE EVERY NIGHT  Qty: 90 capsule, Refills: 3    Associated Diagnoses: Depression      clotrimazole-betamethasone 1-0.05% (LOTRISONE) cream Apply topically 2 (two) times daily as needed.  Qty: 45 g, Refills: 1    Associated Diagnoses: Rash         STOP taking these medications        ciprofloxacin HCl (CIPRO) 500 MG tablet Comments:   Reason for Stopping:         acetaminophen-codeine 300-60mg (TYLENOL #4) 300-60 mg Tab Comments:   Reason for Stopping:         cyclobenzaprine (FLEXERIL) 10 MG tablet Comments:   Reason for Stopping:         tramadol (ULTRAM) 50 mg tablet Comments:   Reason for Stopping:         naproxen sodium (ANAPROX) 220 MG tablet Comments:   Reason for Stopping:               Discharge Procedure Orders  Diet general     Call MD for:  temperature >100.4     Call MD for:  persistent nausea and vomiting     Call MD for:  difficulty breathing, headache or visual disturbances     Call MD for:  severe uncontrolled pain     Call MD for:  redness, tenderness, or signs of infection (pain, swelling, redness, odor or green/yellow discharge around incision site)     Call MD for:  hives     Call MD for:  persistent dizziness or light-headedness     Call MD for:  extreme fatigue     Remove dressing in 48 hours        Natividad Tafoya, FANNIE, PA-C  Neurosurgery  [facility]  [facility]

## 2017-02-23 LAB
POCT GLUCOSE: 150 MG/DL (ref 70–110)
POCT GLUCOSE: 165 MG/DL (ref 70–110)

## 2017-02-23 PROCEDURE — 25000003 PHARM REV CODE 250: Performed by: NEUROLOGICAL SURGERY

## 2017-02-23 PROCEDURE — 36415 COLL VENOUS BLD VENIPUNCTURE: CPT

## 2017-02-23 PROCEDURE — 25000003 PHARM REV CODE 250: Performed by: PHYSICIAN ASSISTANT

## 2017-02-23 PROCEDURE — 83036 HEMOGLOBIN GLYCOSYLATED A1C: CPT

## 2017-02-23 PROCEDURE — 63600175 PHARM REV CODE 636 W HCPCS: Performed by: PHYSICIAN ASSISTANT

## 2017-02-23 RX ORDER — CARVEDILOL 3.12 MG/1
3.12 TABLET ORAL 2 TIMES DAILY
Status: DISCONTINUED | OUTPATIENT
Start: 2017-02-23 | End: 2017-02-24 | Stop reason: HOSPADM

## 2017-02-23 RX ORDER — HYDROCHLOROTHIAZIDE 12.5 MG/1
12.5 TABLET ORAL DAILY
Status: DISCONTINUED | OUTPATIENT
Start: 2017-02-23 | End: 2017-02-24 | Stop reason: HOSPADM

## 2017-02-23 RX ORDER — VENLAFAXINE HYDROCHLORIDE 75 MG/1
75 CAPSULE, EXTENDED RELEASE ORAL NIGHTLY
Status: DISCONTINUED | OUTPATIENT
Start: 2017-02-23 | End: 2017-02-24 | Stop reason: HOSPADM

## 2017-02-23 RX ORDER — METHYLPREDNISOLONE 4 MG/1
4 TABLET ORAL ONCE
Status: COMPLETED | OUTPATIENT
Start: 2017-02-24 | End: 2017-02-24

## 2017-02-23 RX ORDER — METHYLPREDNISOLONE 4 MG/1
4 TABLET ORAL ONCE
Status: DISCONTINUED | OUTPATIENT
Start: 2017-02-26 | End: 2017-02-24 | Stop reason: HOSPADM

## 2017-02-23 RX ORDER — NIFEDIPINE 30 MG/1
90 TABLET, EXTENDED RELEASE ORAL DAILY
Status: DISCONTINUED | OUTPATIENT
Start: 2017-02-23 | End: 2017-02-24 | Stop reason: HOSPADM

## 2017-02-23 RX ORDER — INSULIN ASPART 100 [IU]/ML
1-10 INJECTION, SOLUTION INTRAVENOUS; SUBCUTANEOUS
Status: DISCONTINUED | OUTPATIENT
Start: 2017-02-23 | End: 2017-02-24 | Stop reason: HOSPADM

## 2017-02-23 RX ORDER — METHYLPREDNISOLONE 4 MG/1
4 TABLET ORAL ONCE
Status: DISCONTINUED | OUTPATIENT
Start: 2017-02-25 | End: 2017-02-24 | Stop reason: HOSPADM

## 2017-02-23 RX ORDER — SODIUM CHLORIDE AND POTASSIUM CHLORIDE 150; 900 MG/100ML; MG/100ML
INJECTION, SOLUTION INTRAVENOUS CONTINUOUS
Status: DISCONTINUED | OUTPATIENT
Start: 2017-02-23 | End: 2017-02-24 | Stop reason: HOSPADM

## 2017-02-23 RX ORDER — METHYLPREDNISOLONE 4 MG/1
8 TABLET ORAL ONCE
Status: COMPLETED | OUTPATIENT
Start: 2017-02-23 | End: 2017-02-23

## 2017-02-23 RX ORDER — LOSARTAN POTASSIUM 50 MG/1
100 TABLET ORAL DAILY
Status: DISCONTINUED | OUTPATIENT
Start: 2017-02-23 | End: 2017-02-24 | Stop reason: HOSPADM

## 2017-02-23 RX ORDER — METHYLPREDNISOLONE 4 MG/1
8 TABLET ORAL ONCE
Status: DISCONTINUED | OUTPATIENT
Start: 2017-02-24 | End: 2017-02-24 | Stop reason: HOSPADM

## 2017-02-23 RX ORDER — METHYLPREDNISOLONE 4 MG/1
4 TABLET ORAL ONCE
Status: DISCONTINUED | OUTPATIENT
Start: 2017-02-28 | End: 2017-02-24 | Stop reason: HOSPADM

## 2017-02-23 RX ORDER — LEVOTHYROXINE SODIUM 50 UG/1
100 TABLET ORAL
Status: DISCONTINUED | OUTPATIENT
Start: 2017-02-23 | End: 2017-02-24 | Stop reason: HOSPADM

## 2017-02-23 RX ORDER — OXYCODONE AND ACETAMINOPHEN 10; 325 MG/1; MG/1
1 TABLET ORAL EVERY 4 HOURS PRN
Status: DISCONTINUED | OUTPATIENT
Start: 2017-02-23 | End: 2017-02-24 | Stop reason: HOSPADM

## 2017-02-23 RX ORDER — LOSARTAN POTASSIUM AND HYDROCHLOROTHIAZIDE 12.5; 1 MG/1; MG/1
1 TABLET ORAL EVERY MORNING
Status: DISCONTINUED | OUTPATIENT
Start: 2017-02-23 | End: 2017-02-23

## 2017-02-23 RX ORDER — METHYLPREDNISOLONE 4 MG/1
4 TABLET ORAL ONCE
Status: DISCONTINUED | OUTPATIENT
Start: 2017-02-27 | End: 2017-02-24 | Stop reason: HOSPADM

## 2017-02-23 RX ORDER — IBUPROFEN 200 MG
24 TABLET ORAL
Status: DISCONTINUED | OUTPATIENT
Start: 2017-02-23 | End: 2017-02-24 | Stop reason: HOSPADM

## 2017-02-23 RX ORDER — METHYLPREDNISOLONE 4 MG/1
4 TABLET ORAL ONCE
Status: COMPLETED | OUTPATIENT
Start: 2017-02-23 | End: 2017-02-23

## 2017-02-23 RX ORDER — METHYLPREDNISOLONE 4 MG/1
4 TABLET ORAL ONCE
Status: DISCONTINUED | OUTPATIENT
Start: 2017-02-24 | End: 2017-02-24 | Stop reason: HOSPADM

## 2017-02-23 RX ORDER — IBUPROFEN 200 MG
16 TABLET ORAL
Status: DISCONTINUED | OUTPATIENT
Start: 2017-02-23 | End: 2017-02-24 | Stop reason: HOSPADM

## 2017-02-23 RX ORDER — CEFAZOLIN SODIUM 2 G/50ML
2 SOLUTION INTRAVENOUS
Status: COMPLETED | OUTPATIENT
Start: 2017-02-23 | End: 2017-02-23

## 2017-02-23 RX ORDER — GLUCAGON 1 MG
1 KIT INJECTION
Status: DISCONTINUED | OUTPATIENT
Start: 2017-02-23 | End: 2017-02-24 | Stop reason: HOSPADM

## 2017-02-23 RX ORDER — CEFAZOLIN SODIUM 2 G/50ML
2 SOLUTION INTRAVENOUS
Status: DISCONTINUED | OUTPATIENT
Start: 2017-02-23 | End: 2017-02-23

## 2017-02-23 RX ADMIN — OXYCODONE HYDROCHLORIDE AND ACETAMINOPHEN 1 TABLET: 10; 325 TABLET ORAL at 06:02

## 2017-02-23 RX ADMIN — HYDROCODONE BITARTRATE AND ACETAMINOPHEN 1 TABLET: 7.5; 325 TABLET ORAL at 06:02

## 2017-02-23 RX ADMIN — SODIUM CHLORIDE AND POTASSIUM CHLORIDE: 9; 1.49 INJECTION, SOLUTION INTRAVENOUS at 11:02

## 2017-02-23 RX ADMIN — CARVEDILOL 3.12 MG: 3.12 TABLET, FILM COATED ORAL at 08:02

## 2017-02-23 RX ADMIN — HYDROCODONE BITARTRATE AND ACETAMINOPHEN 1 TABLET: 7.5; 325 TABLET ORAL at 12:02

## 2017-02-23 RX ADMIN — CEFAZOLIN SODIUM 2 G: 2 SOLUTION INTRAVENOUS at 10:02

## 2017-02-23 RX ADMIN — DIAZEPAM 5 MG: 5 TABLET ORAL at 06:02

## 2017-02-23 RX ADMIN — LOSARTAN POTASSIUM 100 MG: 50 TABLET, FILM COATED ORAL at 11:02

## 2017-02-23 RX ADMIN — VENLAFAXINE HYDROCHLORIDE 75 MG: 75 CAPSULE, EXTENDED RELEASE ORAL at 08:02

## 2017-02-23 RX ADMIN — SODIUM CHLORIDE AND POTASSIUM CHLORIDE: 9; 1.49 INJECTION, SOLUTION INTRAVENOUS at 09:02

## 2017-02-23 RX ADMIN — METHYLPREDNISOLONE 4 MG: 4 TABLET ORAL at 06:02

## 2017-02-23 RX ADMIN — OXYCODONE HYDROCHLORIDE AND ACETAMINOPHEN 1 TABLET: 10; 325 TABLET ORAL at 01:02

## 2017-02-23 RX ADMIN — OXYCODONE HYDROCHLORIDE AND ACETAMINOPHEN 1 TABLET: 10; 325 TABLET ORAL at 03:02

## 2017-02-23 RX ADMIN — METHYLPREDNISOLONE 4 MG: 4 TABLET ORAL at 02:02

## 2017-02-23 RX ADMIN — CEPHALEXIN 500 MG: 500 CAPSULE ORAL at 06:02

## 2017-02-23 RX ADMIN — DOCUSATE SODIUM 100 MG: 100 CAPSULE, LIQUID FILLED ORAL at 08:02

## 2017-02-23 RX ADMIN — METHYLPREDNISOLONE 8 MG: 4 TABLET ORAL at 11:02

## 2017-02-23 RX ADMIN — CARVEDILOL 3.12 MG: 3.12 TABLET, FILM COATED ORAL at 11:02

## 2017-02-23 RX ADMIN — METHYLPREDNISOLONE 8 MG: 4 TABLET ORAL at 10:02

## 2017-02-23 RX ADMIN — NIFEDIPINE 90 MG: 30 TABLET, FILM COATED, EXTENDED RELEASE ORAL at 11:02

## 2017-02-23 RX ADMIN — LEVOTHYROXINE SODIUM 100 MCG: 50 TABLET ORAL at 11:02

## 2017-02-23 RX ADMIN — ONDANSETRON 8 MG: 8 TABLET, ORALLY DISINTEGRATING ORAL at 08:02

## 2017-02-23 RX ADMIN — HYDROCHLOROTHIAZIDE 12.5 MG: 12.5 TABLET ORAL at 11:02

## 2017-02-23 RX ADMIN — CEFAZOLIN SODIUM 2 G: 2 SOLUTION INTRAVENOUS at 01:02

## 2017-02-23 NOTE — PLAN OF CARE
02/23/17 0957   Discharge Assessment   Assessment Type Discharge Planning Assessment   Confirmed/corrected address and phone number on facesheet? Yes   Assessment information obtained from? Patient   Expected Length of Stay (days) 1   Communicated expected length of stay with patient/caregiver yes   If Healthcare Directive is received, is it scanned into Epic? no (comment)   Prior to hospitilization cognitive status: Alert/Oriented   Prior to hospitalization functional status: Assistive Equipment;Needs Assistance   Current cognitive status: Alert/Oriented   Current Functional Status: Assistive Equipment;Needs Assistance   Arrived From home or self-care   Lives With spouse;child(jennifer), adult   Able to Return to Prior Arrangements yes   Is patient able to care for self after discharge? Yes   How many people do you have in your home that can help with your care after discharge? 2   Who are your caregiver(s) and their phone number(s)? Bravo  [phone], Daughter [phone]   Patient's perception of discharge disposition home or selfcare   Readmission Within The Last 30 Days no previous admission in last 30 days   Patient currently being followed by outpatient case management? No   Patient currently receives home health services? No   Does the patient currently use HME? Yes   Patient currently receives private duty nursing? N/A   Patient currently receives any other outside agency services? No   Equipment Currently Used at Home cane, straight   Do you have any problems affording any of your prescribed medications? TBD   Is the patient taking medications as prescribed? yes   Do you have any financial concerns preventing you from receiving the healthcare you need? No   Does the patient have transportation to healthcare appointments? Yes   Transportation Available family or friend will provide   On Dialysis? No   Does the patient receive services at the Coumadin Clinic? No   Are there any open cases? No    Discharge Plan A Home with family;Other  (Outpatient Rehab)   Discharge Plan B Home with family   Patient/Family In Agreement With Plan yes

## 2017-02-23 NOTE — PLAN OF CARE
Patient states she does not want to be discharge today. She does not feel she is capable of going home. She request to stay overnight.

## 2017-02-23 NOTE — PROGRESS NOTES
Natividad MIGUEL paged in ref to pt's c/o ha, frequent urination and pains to back of legs.  Waiting for callback

## 2017-02-23 NOTE — PROGRESS NOTES
Spoke with Marilee in ref to trying to contact Natividad for pt's complaints, Marilee related message and stated Natividad will come see pt  Will continue to monitor

## 2017-02-23 NOTE — PROGRESS NOTES
Progress Note  Neurosurgery  02/23/17  Admit Date: 2/22/2017  Post-operative Day: 1 Day Post-Op  Hospital Day: 2    SUBJECTIVE:     Tanika Wayne is a 57 y.o. female   Follow-up For:  Procedure(s) (LRB):  Left LAMINECTOMY-MINIMALLY INVASIVE L3-4 and L4-5 (Right)    Patient started to have severe headache and bilateral posterior leg pain and was admitted overnight.  She states she has had urinary frequency and has been getting up to the bathroom almost every thirty minutes throughout the night.      Headache is constant rated 9/10 that is better with pain meds.  Some nausea.  No vomiting.    Leg pain is worse than preoperatively and is in the bilateral posterior legs to the ankle worse when she gets up to walk.    She has been laying in bed and only getting up to urinate which has been frequent.    Scheduled Meds:   carvedilol  3.125 mg Oral BID    ceFAZolin (ANCEF) IVPB  2 g Intravenous Q8H    docusate sodium  100 mg Oral BID    losartan  100 mg Oral Daily    And    hydrochlorothiazide  12.5 mg Oral Daily    levothyroxine  100 mcg Oral Before breakfast    methylPREDNISolone  4 mg Oral Once    Followed by    methylPREDNISolone  4 mg Oral Once    Followed by    methylPREDNISolone  8 mg Oral Once    Followed by    [START ON 2/24/2017] methylPREDNISolone  4 mg Oral Once    Followed by    [START ON 2/24/2017] methylPREDNISolone  4 mg Oral Once    Followed by    [START ON 2/24/2017] methylPREDNISolone  4 mg Oral Once    Followed by    [START ON 2/24/2017] methylPREDNISolone  8 mg Oral Once    Followed by    [START ON 2/25/2017] methylPREDNISolone  4 mg Oral Once    Followed by    [START ON 2/25/2017] methylPREDNISolone  4 mg Oral Once    Followed by    [START ON 2/25/2017] methylPREDNISolone  4 mg Oral Once    Followed by    [START ON 2/25/2017] methylPREDNISolone  4 mg Oral Once    Followed by    [START ON 2/26/2017] methylPREDNISolone  4 mg Oral Once    Followed by    [START ON 2/26/2017]  methylPREDNISolone  4 mg Oral Once    Followed by    [START ON 2/26/2017] methylPREDNISolone  4 mg Oral Once    Followed by    [START ON 2/27/2017] methylPREDNISolone  4 mg Oral Once    Followed by    [START ON 2/27/2017] methylPREDNISolone  4 mg Oral Once    Followed by    [START ON 2/28/2017] methylPREDNISolone  4 mg Oral Once    nifedipine  90 mg Oral Daily    venlafaxine  75 mg Oral QHS     Continuous Infusions:   0/9% NACL & POTASSIUM CHLORIDE 20 MEQ/L 100 mL/hr at 02/23/17 1131     PRN Meds:diazePAM, ondansetron, ondansetron, oxycodone-acetaminophen, sodium chloride 0.9%    Review of patient's allergies indicates:  No Known Allergies    OBJECTIVE:     Vital Signs (Most Recent)  Temp: 99.6 °F (37.6 °C) (02/23/17 1200)  Pulse: (!) 113 (02/23/17 1200)  Resp: 18 (02/23/17 1200)  BP: (!) 175/80 (02/23/17 1200)  SpO2: 95 % (02/23/17 1200)    Vital Signs Range (Last 24H):  Temp:  [98.1 °F (36.7 °C)-99.8 °F (37.7 °C)]   Pulse:  []   Resp:  [16-18]   BP: (104-186)/(54-83)   SpO2:  [94 %-98 %]     I & O (Last 24H):  Intake/Output Summary (Last 24 hours) at 02/23/17 1352  Last data filed at 02/23/17 0450   Gross per 24 hour   Intake              480 ml   Output             1300 ml   Net             -820 ml     Physical Exam:  General: well developed, well nourished, no distress  Mental Status: Awake, Alert, Oriented X3.Thought content appropriate  GCS: Motor: 6/Verbal: 5/Eyes: 4 GCS Total: 15    Motor Strength:  Strength            HF KF KE DF PF EHL   Lower: R 5/5 5/5 5/5 5/5 5/5 5/5    L 5/5 5/5 5/5 5/5 5/5 5/5     Clonus: absent B/L  Incision- CDI      Lines/Drains:       Urethral Catheter 02/23/17 1050 (Active)   Number of days:0         ASSESSMENT/PLAN:     A/P:  POD # 1 L3-5, L4-5 MIS laminectomy with CSF leak    -Bed rest  -HOB flat  -Insert patel  -Drink coffee for increased caffeine intake  -Start IV fluids  -Medrol Dosepack  -Insulin sliding scale  -Increased oral pain medication  -Restart home  medications and will monitor blood pressure    All of the above discussed and reviewed with Dr. Nate Tafoya, FANNIE, PA-C  Neurosurgery  [facility]  [facility]

## 2017-02-24 VITALS
SYSTOLIC BLOOD PRESSURE: 134 MMHG | RESPIRATION RATE: 18 BRPM | WEIGHT: 254 LBS | HEART RATE: 89 BPM | OXYGEN SATURATION: 91 % | TEMPERATURE: 99 F | HEIGHT: 61 IN | DIASTOLIC BLOOD PRESSURE: 63 MMHG | BODY MASS INDEX: 47.95 KG/M2

## 2017-02-24 LAB
ESTIMATED AVG GLUCOSE: 148 MG/DL
HBA1C MFR BLD HPLC: 6.8 %
POCT GLUCOSE: 102 MG/DL (ref 70–110)
POCT GLUCOSE: 134 MG/DL (ref 70–110)
POCT GLUCOSE: 195 MG/DL (ref 70–110)

## 2017-02-24 PROCEDURE — 25000003 PHARM REV CODE 250: Performed by: PHYSICIAN ASSISTANT

## 2017-02-24 PROCEDURE — 63600175 PHARM REV CODE 636 W HCPCS: Performed by: PHYSICIAN ASSISTANT

## 2017-02-24 PROCEDURE — 25000003 PHARM REV CODE 250: Performed by: NEUROLOGICAL SURGERY

## 2017-02-24 RX ORDER — METHYLPREDNISOLONE 4 MG/1
TABLET ORAL
Qty: 1 PACKAGE | Refills: 0 | Status: SHIPPED | OUTPATIENT
Start: 2017-02-24 | End: 2017-03-21

## 2017-02-24 RX ORDER — DIAZEPAM 5 MG/1
5 TABLET ORAL EVERY 6 HOURS PRN
Qty: 60 TABLET | Refills: 0 | Status: SHIPPED | OUTPATIENT
Start: 2017-02-24 | End: 2017-03-21

## 2017-02-24 RX ORDER — OXYCODONE AND ACETAMINOPHEN 10; 325 MG/1; MG/1
1 TABLET ORAL EVERY 6 HOURS PRN
Qty: 90 TABLET | Refills: 0 | Status: SHIPPED | OUTPATIENT
Start: 2017-02-24 | End: 2017-03-09 | Stop reason: SDUPTHER

## 2017-02-24 RX ADMIN — OXYCODONE HYDROCHLORIDE AND ACETAMINOPHEN 1 TABLET: 10; 325 TABLET ORAL at 10:02

## 2017-02-24 RX ADMIN — NIFEDIPINE 90 MG: 30 TABLET, FILM COATED, EXTENDED RELEASE ORAL at 08:02

## 2017-02-24 RX ADMIN — OXYCODONE HYDROCHLORIDE AND ACETAMINOPHEN 1 TABLET: 10; 325 TABLET ORAL at 12:02

## 2017-02-24 RX ADMIN — CARVEDILOL 3.12 MG: 3.12 TABLET, FILM COATED ORAL at 08:02

## 2017-02-24 RX ADMIN — METHYLPREDNISOLONE 4 MG: 4 TABLET ORAL at 08:02

## 2017-02-24 RX ADMIN — OXYCODONE HYDROCHLORIDE AND ACETAMINOPHEN 1 TABLET: 10; 325 TABLET ORAL at 05:02

## 2017-02-24 RX ADMIN — OXYCODONE HYDROCHLORIDE AND ACETAMINOPHEN 1 TABLET: 10; 325 TABLET ORAL at 02:02

## 2017-02-24 RX ADMIN — HYDROCHLOROTHIAZIDE 12.5 MG: 12.5 TABLET ORAL at 08:02

## 2017-02-24 RX ADMIN — DOCUSATE SODIUM 100 MG: 100 CAPSULE, LIQUID FILLED ORAL at 08:02

## 2017-02-24 RX ADMIN — SODIUM CHLORIDE AND POTASSIUM CHLORIDE: 9; 1.49 INJECTION, SOLUTION INTRAVENOUS at 08:02

## 2017-02-24 RX ADMIN — LEVOTHYROXINE SODIUM 100 MCG: 50 TABLET ORAL at 05:02

## 2017-02-24 RX ADMIN — DIAZEPAM 5 MG: 5 TABLET ORAL at 02:02

## 2017-02-24 RX ADMIN — METHYLPREDNISOLONE 4 MG: 4 TABLET ORAL at 02:02

## 2017-02-24 RX ADMIN — LOSARTAN POTASSIUM 100 MG: 50 TABLET, FILM COATED ORAL at 08:02

## 2017-02-24 RX ADMIN — DIAZEPAM 5 MG: 5 TABLET ORAL at 05:02

## 2017-02-24 NOTE — PROGRESS NOTES
Patient walked in the halls and no complaint of headache or nausea.    She had severe pain in the back of the thighs after walking a few steps.    Discussed with Dr. Sullivan and she will be discharged home today after she voids.    She is to lay at home on bedrest and can get up in the morning.    Natividad Tafoya, [facility], PA-C  Neurosurgery  [facility]  [facility]

## 2017-02-24 NOTE — PROGRESS NOTES
Progress Note  Neurosurgery  02/24/17  Admit Date: 2/22/2017  Post-operative Day: 2 Days Post-Op  Hospital Day: 3    SUBJECTIVE:     Tanika Wayne is a 57 y.o. female  Follow-up For:  Procedure(s) (LRB):  Left LAMINECTOMY-MINIMALLY INVASIVE L3-4 and L4-5 (Right)     Patient states that she hasn't had a headache since last night.  No nausea.  No leg pain just some cramping in the bilateral calves.  No back pain.    Patient Active Problem List   Diagnosis    History of blurred vision - Both Eyes    Kidney stones    Breast cancer, right breast    ITP (idiopathic thrombocytopenic purpura)    HTN (hypertension)    DM (diabetes mellitus)    Stenosis of lumbosacral spine    Abnormal echocardiogram    Thoracic or lumbosacral neuritis or radiculitis    Pain in knee    Hip pain    Tympanic membrane perforation    Spondylolisthesis at L3-L4 level    DDD (degenerative disc disease), lumbar    Multiple thyroid nodules    History of subtotal thyroidectomy    Spinal stenosis, lumbar    Lumbar spinal stenosis         Scheduled Meds:   carvedilol  3.125 mg Oral BID    docusate sodium  100 mg Oral BID    losartan  100 mg Oral Daily    And    hydrochlorothiazide  12.5 mg Oral Daily    levothyroxine  100 mcg Oral Before breakfast    methylPREDNISolone  4 mg Oral Once    Followed by    methylPREDNISolone  4 mg Oral Once    Followed by    methylPREDNISolone  8 mg Oral Once    Followed by    [START ON 2/25/2017] methylPREDNISolone  4 mg Oral Once    Followed by    [START ON 2/25/2017] methylPREDNISolone  4 mg Oral Once    Followed by    [START ON 2/25/2017] methylPREDNISolone  4 mg Oral Once    Followed by    [START ON 2/25/2017] methylPREDNISolone  4 mg Oral Once    Followed by    [START ON 2/26/2017] methylPREDNISolone  4 mg Oral Once    Followed by    [START ON 2/26/2017] methylPREDNISolone  4 mg Oral Once    Followed by    [START ON 2/26/2017] methylPREDNISolone  4 mg Oral Once    Followed by     [START ON 2/27/2017] methylPREDNISolone  4 mg Oral Once    Followed by    [START ON 2/27/2017] methylPREDNISolone  4 mg Oral Once    Followed by    [START ON 2/28/2017] methylPREDNISolone  4 mg Oral Once    nifedipine  90 mg Oral Daily    venlafaxine  75 mg Oral QHS     Continuous Infusions:   0/9% NACL & POTASSIUM CHLORIDE 20 MEQ/L 100 mL/hr at 02/24/17 0851     PRN Meds:dextrose 50%, dextrose 50%, diazePAM, glucagon (human recombinant), glucose, glucose, insulin aspart, ondansetron, ondansetron, oxycodone-acetaminophen, sodium chloride 0.9%    Review of patient's allergies indicates:  No Known Allergies    OBJECTIVE:     Vital Signs (Most Recent)  Temp: 99.2 °F (37.3 °C) (02/24/17 0807)  Pulse: 110 (02/24/17 0807)  Resp: 18 (02/24/17 0807)  BP: (!) 172/77 (02/24/17 0807)  SpO2: (!) 92 % (02/24/17 0807)    Vital Signs Range (Last 24H):  Temp:  [99.2 °F (37.3 °C)-99.9 °F (37.7 °C)]   Pulse:  [105-113]   Resp:  [18]   BP: (138-175)/(65-80)   SpO2:  [91 %-100 %]     I & O (Last 24H):  Intake/Output Summary (Last 24 hours) at 02/24/17 1151  Last data filed at 02/24/17 0807   Gross per 24 hour   Intake                0 ml   Output             4000 ml   Net            -4000 ml   Physical Exam:  General: well developed, well nourished, no distress  Mental Status: Awake, Alert, Oriented X3.Thought content appropriate  GCS: Motor: 6/Verbal: 5/Eyes: 4 GCS Total: 15     Motor Strength:  Strength                     HF KF KE DF PF EHL   Lower: R 5/5 5/5 5/5 5/5 5/5 5/5     L 5/5 5/5 5/5 5/5 5/5 5/5      Clonus: absent B/L  Incision- CDI      Lines/Drains:       Peripheral IV - Single Lumen 02/22/17 0658 Left Hand (Active)   Site Assessment Clean;Dry;Intact;No redness;No swelling 2/24/2017  8:07 AM   Line Status Infusing 2/24/2017  8:07 AM   Dressing Status Dry;Intact;Clean 2/24/2017  8:07 AM   Dressing Change Due 02/26/17 2/24/2017  8:07 AM   Site Change Due 02/26/17 2/24/2017  8:07 AM   Reason Not Rotated Not due  "2/24/2017  8:07 AM   Number of days:2            Urethral Catheter 02/23/17 1050 (Active)   Site Assessment Clean;Intact 2/24/2017  8:07 AM   Collection Container Urimeter 2/24/2017  8:07 AM   Securement Method secured to top of thigh w/ adhesive device 2/24/2017  8:07 AM   Catheter Care Performed yes 2/24/2017  8:07 AM   Reason for Continuing Urinary Catheterization Required immobilization 2/24/2017  8:07 AM   CAUTI Prevention Bundle StatLock in place w 1" slack;Intact seal between catheter & drainage tubing;Drainage bag off the floor;Green sheeting clip in use;No dependent loops or kinks;Drainage bag not overfilled (<2/3 full);Drainage bag below bladder 2/24/2017  8:07 AM   Output (mL) 325 mL 2/24/2017  8:07 AM   Number of days:1           ASSESSMENT/PLAN:     A/P: POD # 2 L3-5, L4-5 MIS laminectomy with CSF leak     -OOB with assistance with patel in  -HOB 45 or less  -Drink coffee for increased caffeine intake  -IV fluids  -Continue Medrol Dosepack  -Insulin sliding scale  -If no headache with ambulation will remove patel.    -If doing ok with ambulation, no headache, and can void will discharge home this afternoon     All of the above discussed and reviewed with Dr. Nate Tafoya, [facility], PA-C  Neurosurgery  [facility]  [facility]  "

## 2017-02-24 NOTE — PROGRESS NOTES
"Pt ambulated in [place] with cane escorted by Britta, KRIS. Pt denies HA, N/V but complaines of pain "in the back on my thighs". Pt rates pain 10/10.  "

## 2017-02-24 NOTE — PLAN OF CARE
02/24/17 1651   Final Note   Assessment Type Final Discharge Note   Discharge Disposition Home   Discharge planning education complete? Yes   Hospital Follow Up  Appt(s) scheduled? Yes   Discharge plans and expectations educations in teach back method with documentation complete? Yes   Offered [facility] Bedside Delivery? n/a   Discharge/Hospital Encounter Summary to (non-[facility]) PCP No   Referral to Outpatient Case Management complete? n/a   Referral to / orders for Home Health Complete? n/a   30 day supply of medicines given at discharge, if documented non-compliance / non-adherence? n/a   Any social issues identified prior to discharge? n/a   Did you assess the readiness or willingness of the family or caregiver to support self management of care? Yes

## 2017-02-24 NOTE — PLAN OF CARE
Problem: Patient Care Overview  Goal: Plan of Care Review  Outcome: Ongoing (interventions implemented as appropriate)  Pt eager & in agreement w/ DC. VU of DC instructions and the need to attend follow-up appointment--paperwork & pain prescription passed & explained. IV removed w/ cath tip intact, WNL. Voiding, ambulating, & tolerating PO well. Incision WNL. Dressing CDI. To be DCd home w/ family--will be escorted downstairs via  transport team once dressed, ready & ride arrives. Free from falls, injury, or skin breakdown this hospital admission. Pt discharged in no distress.

## 2017-02-24 NOTE — PROGRESS NOTES
Pt ambulated in room x3 with cane and escorted by KRIS Callejas. Pt denies headache, N/V at this time but noted spasms in legs.

## 2017-03-02 ENCOUNTER — TELEPHONE (OUTPATIENT)
Dept: NEUROSURGERY | Facility: CLINIC | Age: 57
End: 2017-03-02

## 2017-03-02 NOTE — TELEPHONE ENCOUNTER
Long Term Disability claim received from [employer] x 6 pages. Documents labeled and placed in Disability bin for .

## 2017-03-08 ENCOUNTER — TELEPHONE (OUTPATIENT)
Dept: NEUROSURGERY | Facility: CLINIC | Age: 57
End: 2017-03-08

## 2017-03-08 NOTE — TELEPHONE ENCOUNTER
[place] Insurance Restriction form completed and signed by Dr. Sullivan. RN placed in the Disability bin for .

## 2017-03-09 ENCOUNTER — CLINICAL SUPPORT (OUTPATIENT)
Dept: NEUROSURGERY | Facility: CLINIC | Age: 57
End: 2017-03-09
Payer: COMMERCIAL

## 2017-03-09 VITALS
BODY MASS INDEX: 47.95 KG/M2 | HEIGHT: 61 IN | SYSTOLIC BLOOD PRESSURE: 149 MMHG | DIASTOLIC BLOOD PRESSURE: 84 MMHG | HEART RATE: 105 BPM | WEIGHT: 254 LBS

## 2017-03-09 PROCEDURE — 99999 PR PBB SHADOW E&M-EST. PATIENT-LVL III: CPT | Mod: PBBFAC,,,

## 2017-03-09 RX ORDER — OXYCODONE AND ACETAMINOPHEN 10; 325 MG/1; MG/1
1 TABLET ORAL EVERY 6 HOURS PRN
Qty: 90 TABLET | Refills: 0 | Status: SHIPPED | OUTPATIENT
Start: 2017-03-09 | End: 2017-03-21 | Stop reason: SDUPTHER

## 2017-03-09 NOTE — PROGRESS NOTES
Patient seen 2 weeks post op L3-5 laminectomy on 2/22/17 with Dr. Sullivan. Accompanied by family member in waiting area. Patient presents today in wheelchair. Able to ambulate into exam room with cane. Incision to lower back assessed. Dissolvable sutures intact. Edges well approximated. No erythema, swelling, or drainage noted. Instructed patient to keep incision open to air, no scrubbing incision, no submerging incision in tub bath/pool for 6 more weeks, and pat to dry. Patient denies any new numbness or weakness. Reports preoperative pain to the back of bilateral legs is improving. Discharge instructions reviewed including no lifting greater than 10 pounds, avoid bending or twisting of the back, no driving while taking narcotic pain medication/muscle relaxants, and continue to increase ambulation daily as tolerated. Patient verbalized understanding of all instructions given. Patient requesting Percocet refill. Percocet Rx provided in print. Encouraged patient to begin weaning off of pain medication. 6 week post op appointment reviewed and provided in print. Encouraged to call clinic with any questions or concerns.

## 2017-03-13 DIAGNOSIS — I10 ESSENTIAL HYPERTENSION, MALIGNANT: ICD-10-CM

## 2017-03-13 DIAGNOSIS — R21 RASH: ICD-10-CM

## 2017-03-13 RX ORDER — NIFEDIPINE 90 MG/1
TABLET, EXTENDED RELEASE ORAL
Qty: 90 TABLET | Refills: 3 | Status: SHIPPED | OUTPATIENT
Start: 2017-03-13 | End: 2018-03-08 | Stop reason: SDUPTHER

## 2017-03-13 RX ORDER — CLOTRIMAZOLE AND BETAMETHASONE DIPROPIONATE 10; .64 MG/G; MG/G
CREAM TOPICAL 2 TIMES DAILY PRN
Qty: 45 G | Refills: 1 | Status: SHIPPED | OUTPATIENT
Start: 2017-03-13 | End: 2019-08-20

## 2017-03-21 ENCOUNTER — OFFICE VISIT (OUTPATIENT)
Dept: NEUROSURGERY | Facility: CLINIC | Age: 57
End: 2017-03-21
Attending: NEUROLOGICAL SURGERY
Payer: COMMERCIAL

## 2017-03-21 VITALS
DIASTOLIC BLOOD PRESSURE: 77 MMHG | SYSTOLIC BLOOD PRESSURE: 143 MMHG | TEMPERATURE: 99 F | HEART RATE: 89 BPM | BODY MASS INDEX: 47.95 KG/M2 | HEIGHT: 61 IN | WEIGHT: 254 LBS

## 2017-03-21 DIAGNOSIS — Z98.890 S/P LUMBAR LAMINECTOMY: Primary | ICD-10-CM

## 2017-03-21 PROCEDURE — 99024 POSTOP FOLLOW-UP VISIT: CPT | Mod: S$GLB,,, | Performed by: NEUROLOGICAL SURGERY

## 2017-03-21 PROCEDURE — 99999 PR PBB SHADOW E&M-EST. PATIENT-LVL III: CPT | Mod: PBBFAC,,, | Performed by: NEUROLOGICAL SURGERY

## 2017-03-21 RX ORDER — SULFAMETHOXAZOLE AND TRIMETHOPRIM 800; 160 MG/1; MG/1
1 TABLET ORAL 2 TIMES DAILY
Status: SHIPPED | OUTPATIENT
Start: 2017-03-21 | End: 2017-03-28

## 2017-03-21 RX ORDER — DIAZEPAM 5 MG/1
5 TABLET ORAL EVERY 12 HOURS PRN
Qty: 60 TABLET | Refills: 0 | Status: SHIPPED | OUTPATIENT
Start: 2017-03-21 | End: 2017-04-10 | Stop reason: SDUPTHER

## 2017-03-21 RX ORDER — OXYCODONE AND ACETAMINOPHEN 10; 325 MG/1; MG/1
1 TABLET ORAL EVERY 8 HOURS PRN
Qty: 90 TABLET | Refills: 0 | Status: SHIPPED | OUTPATIENT
Start: 2017-03-21 | End: 2017-04-10 | Stop reason: SDUPTHER

## 2017-03-21 NOTE — LETTER
March 21, 2017      FREDY Marcelo MD  [address]  [address]  [facility] [address]           [facility] - Neurosurgery 7th Fl  [address]  [place] [address]  Phone: [phone]          Patient: Tanika Wayne   MR Number: [id number]   YOB: 1960   Date of Visit: 3/21/2017       Dear Dr. FREDY Marcelo:    Thank you for referring Tanika Wayne to me for evaluation. Attached you will find relevant portions of my assessment and plan of care.    If you have questions, please do not hesitate to call me. I look forward to following Tanika Wayne along with you.    Sincerely,    Dave Sullivan MD    Enclosure  CC:  No Recipients    If you would like to receive this communication electronically, please contact [email or web address] or [phone] to request more information on [employer] Link access.    For providers and/or their staff who would like to refer a patient to [facility], please contact us through our one-stop-shop provider referral line, [facility] , at [phone].    If you feel you have received this communication in error or would no longer like to receive these types of communications, please e-mail [email or web address]

## 2017-03-21 NOTE — PROGRESS NOTES
CHIEF COMPLAINT:    4-6 week follow-up    HPI:    Tanika Wayne is a 57 y.o.-year-old female who presents today for post-operative follow-up.She is s/p Left MIS L3-4 and L4-5 Laminectomy  that was done by  on 2/22/17. Currently, the patient's symptoms are better in the legs but she still has back and hip pain since surgery. The patient is complaining of pain in the low back pain moving into the right hip. The patient describes the pain as aching. The patient rates the pain 5 on the pain scale. The pain is worse with walking and better with laying down and resting. Post-op medications the patient is currently taking are:medication.    ROS:    Morbid obesity    PE:    AAOX3  PERRL  EOMI    Lumbar incision:  incision is open, with crusty yellowish tissue, no discharges and no fowl smell     ROM:   Decreased secondary to pain    Sensation:  Intact to light touch (All 4 extremities)    Strength: Full strength 4/5      Deltoids Biceps Triceps Wrist Ext. Wrist Flex. Hand    RUE         LUE          Hip Flex. Knee Flex. Knee Ext. Dorsi Flex Plantar Flex EHL   RLE         LLE           Gait:  antalgic    DTR:  2+ and symmetric bilaterally    No abnormal reflexes    SLR- negative    IMAGING:    XRays: none    CT: none    MRI: none    ASSESSMENT:   Doing better with regards to leg pain but she is expectedly still having back and hip pain which is related to her BMI    PLAN:   Refill pain meds and valium. Started on Bactrim for superficial wound infection. Follow up with one of the MLPs for wound check in 2 weeks. Follow up with me in 6 months.

## 2017-03-22 ENCOUNTER — PATIENT MESSAGE (OUTPATIENT)
Dept: NEUROSURGERY | Facility: CLINIC | Age: 57
End: 2017-03-22

## 2017-03-23 ENCOUNTER — TELEPHONE (OUTPATIENT)
Dept: NEUROSURGERY | Facility: CLINIC | Age: 57
End: 2017-03-23

## 2017-03-23 NOTE — TELEPHONE ENCOUNTER
RN spoke with patient and advised the Bactrim prescription has been called in to the [facility] on [address]. Verbalized understanding and thanked RN for calling.

## 2017-03-23 NOTE — TELEPHONE ENCOUNTER
RN called to speak with pharmacist at [facility] on [address]. States they did not receive e-scribed Bactrim DS. RN provided details for prescription and advised she will fill for the patient.

## 2017-03-23 NOTE — TELEPHONE ENCOUNTER
----- Message from Ana Bobby sent at 3/23/2017 10:16 AM CDT -----  Contact: pt [phone]  Pt is calling to speak with nurse regarding her medication.pls call

## 2017-04-03 ENCOUNTER — OFFICE VISIT (OUTPATIENT)
Dept: NEUROSURGERY | Facility: CLINIC | Age: 57
End: 2017-04-03
Payer: COMMERCIAL

## 2017-04-03 VITALS
HEART RATE: 83 BPM | BODY MASS INDEX: 47.95 KG/M2 | SYSTOLIC BLOOD PRESSURE: 141 MMHG | WEIGHT: 254 LBS | HEIGHT: 61 IN | TEMPERATURE: 99 F | DIASTOLIC BLOOD PRESSURE: 80 MMHG

## 2017-04-03 PROBLEM — T81.49XA SUPERFICIAL POSTOPERATIVE WOUND INFECTION: Status: ACTIVE | Noted: 2017-04-03

## 2017-04-03 PROCEDURE — 99999 PR PBB SHADOW E&M-EST. PATIENT-LVL IV: CPT | Mod: PBBFAC,,, | Performed by: PHYSICIAN ASSISTANT

## 2017-04-03 PROCEDURE — 99024 POSTOP FOLLOW-UP VISIT: CPT | Mod: S$GLB,,, | Performed by: PHYSICIAN ASSISTANT

## 2017-04-03 RX ORDER — SULFAMETHOXAZOLE AND TRIMETHOPRIM 800; 160 MG/1; MG/1
1 TABLET ORAL 2 TIMES DAILY
Qty: 14 TABLET | Refills: 0 | Status: SHIPPED | OUTPATIENT
Start: 2017-04-03 | End: 2017-04-10

## 2017-04-03 NOTE — LETTER
April 3, 2017      FREDY Marcelo MD  [address]  [address]  [facility] [address]           [facility] - Neurosurgery 7th Fl  [address]  [place] [address]  Phone: [phone]          Patient: Tanika Wayne   MR Number: [id number]   YOB: 1960   Date of Visit: 4/3/2017       Dear Dr. FREDY Marcelo:    Thank you for referring Tanika Wayne to me for evaluation. Attached you will find relevant portions of my assessment and plan of care.    If you have questions, please do not hesitate to call me. I look forward to following Tanika Wayne along with you.    Sincerely,    Marla Darby PA-C    Enclosure  CC:  No Recipients    If you would like to receive this communication electronically, please contact [email or web address] or [phone] to request more information on [employer] Link access.    For providers and/or their staff who would like to refer a patient to [facility], please contact us through our one-stop-shop provider referral line, [facility] , at [phone].    If you feel you have received this communication in error or would no longer like to receive these types of communications, please e-mail [email or web address]

## 2017-04-03 NOTE — PROGRESS NOTES
Wound Check   Neurosurgery      Tanika Wayne is a 57 y.o. female with superficial wound infection s/p L3-5 laminectomy on 2/22/17 who presents to clinic today for wound check. Denies fevers, chills, night sweats or N/V. Pts  report the incision looks improved from previous.     Physical Exam:   General: well developed, well nourished, no distress  Neurologic: Alert and oriented. Thought content appropriate.   GCS: Motor: 6/Verbal: 5/Eyes: 4 GCS Total: 15   Mental Status: Awake, Alert, Oriented x3   Cranial nerves: face symmetric, tongue midline, pupils equal, round, reactive to light with accomodation, EOMI.   Motor Strength: moves all extremities with good strength and tone   Sensation: response to light touch throughout  No gait disturbances     Incision is clean, dry and intact with no signs of erythema, swelling or purulent drainage. There is some superficial breakdown at the superior portion of the incision with granulation tissue. All skin edges are completely approximated.       Vitals:    04/03/17 0932   BP: (!) 141/80   Pulse: 83   Temp: 98.9 °F (37.2 °C)         Assessment/Plan:   -Keep incision open to air   -Decrease bacitracin to once daily  -Rx for bactrim x 7 days  -Can shower and get incision wet, just pat dry and no vigorous scrubbing. Do not submerge incision for another 4 weeks.   -No lifting more than 10 lbs or excessive bending/twisting.   -Follow up in 1 week   -Encouraged patient to call if they have any questions or concerns prior to next follow up appt      Marla Darby PA-C  Neurosurgery  [phone]

## 2017-04-03 NOTE — MR AVS SNAPSHOT
[address]  [address]  [place] [address]  Phone: [phone]                  Tanika Wayne   4/3/2017 9:20 AM   Office Visit    Description:  Female : 1960   Provider:  Marla Darby PA-C   Department:  [address]           Reason for Visit     Post-op Evaluation           Diagnoses this Visit        Comments    Superficial postoperative wound infection, initial encounter                To Do List           Future Appointments        Provider Department Dept Phone    4/10/2017 11:00 AM RN, NEUROSURGERY [address] [phone]      Goals (5 Years of Data)     None       These Medications        Disp Refills Start End    sulfamethoxazole-trimethoprim 800-160mg (BACTRIM DS) 800-160 mg Tab 14 tablet 0 4/3/2017 4/10/2017    Take 1 tablet by mouth 2 (two) times daily. - Oral    Pharmacy: [facility] #[id number] - [place], [place] - [address] Ph #: [phone]         [facility] On Call     [facility] On Call Nurse Care Line -  Assistance  Unless otherwise directed by your provider, please contact [facility], our nurse care line that is available for  assistance.     Registered nurses in the [facility] provide: appointment scheduling, clinical advisement, health education, and other advisory services.  Call: [phone] (toll free)               Medications           Message regarding Medications     Verify the changes and/or additions to your medication regime listed below are the same as discussed with your clinician today.  If any of these changes or additions are incorrect, please notify your healthcare provider.        START taking these NEW medications        Refills    sulfamethoxazole-trimethoprim 800-160mg (BACTRIM DS) 800-160 mg Tab 0    Sig: Take 1 tablet by mouth 2 (two) times daily.    Class: Print    Route: Oral           Verify that the below list of medications is an  "accurate representation of the medications you are currently taking.  If none reported, the list may be blank. If incorrect, please contact your healthcare provider. Carry this list with you in case of emergency.           Current Medications     alprazolam (XANAX) 0.25 MG tablet Take 1 tablet (0.25 mg total) by mouth daily as needed.    carvedilol (COREG) 3.125 MG tablet TAKE 1 TABLET TWICE DAILY    clotrimazole-betamethasone 1-0.05% (LOTRISONE) cream Apply topically 2 (two) times daily as needed.    diazePAM (VALIUM) 5 MG tablet Take 1 tablet (5 mg total) by mouth every 12 (twelve) hours as needed (spasm).    docusate sodium (STOOL SOFTENER) 100 MG capsule Take 100 mg by mouth 3 (three) times daily as needed for Constipation.    fluticasone (FLONASE) 50 mcg/actuation nasal spray 2 sprays by Each Nare route once daily.    levothyroxine (SYNTHROID) 100 MCG tablet Take 1 tablet (100 mcg total) by mouth once daily.    losartan-hydrochlorothiazide 100-12.5 mg (HYZAAR) 100-12.5 mg Tab Take 1 tablet by mouth every morning.    multivitamin (THERAGRAN) per tablet Take 1 tablet by mouth once daily.    nifedipine (PROCARDIA-XL) 90 MG (OSM) TR24 TAKE 1 TABLET ONE TIME DAILY    oxycodone-acetaminophen (PERCOCET)  mg per tablet Take 1 tablet by mouth every 8 (eight) hours as needed for Pain.    potassium chloride SA (K-DUR,KLOR-CON) 20 MEQ tablet TAKE 2 TABLETS TWICE DAILY    venlafaxine (EFFEXOR-XR) 75 MG 24 hr capsule TAKE 1 CAPSULE EVERY NIGHT    sulfamethoxazole-trimethoprim 800-160mg (BACTRIM DS) 800-160 mg Tab Take 1 tablet by mouth 2 (two) times daily.           Clinical Reference Information           Your Vitals Were     BP Pulse Temp Height Weight BMI    141/80 83 98.9 °F (37.2 °C) (Oral) 5' 1" (1.549 m) 115.2 kg (254 lb) 47.99 kg/m2      Blood Pressure          Most Recent Value    BP  (!)  141/80      Allergies as of 4/3/2017     No Known Allergies      Immunizations Administered on Date of Encounter - " 4/3/2017     None      Language Assistance Services     ATTENTION: Language assistance services are available, free of charge. Please call [phone].      ATENCIÓN: Si habla dante, tiene a dos santos disposición servicios gratuitos de asistencia lingüística. Llame al [phone].     CHÚ Ý: N?u b?n nói Ti?ng Vi?t, có các d?ch v? h? tr? ngôn ng? mi?n phí dành cho b?n. G?i s? [phone].         [facility] [address] complies with applicable Federal civil rights laws and does not discriminate on the basis of race, color, national origin, age, disability, or sex.

## 2017-04-05 ENCOUNTER — TELEPHONE (OUTPATIENT)
Dept: NEUROSURGERY | Facility: CLINIC | Age: 57
End: 2017-04-05

## 2017-04-07 ENCOUNTER — TELEPHONE (OUTPATIENT)
Dept: NEUROSURGERY | Facility: CLINIC | Age: 57
End: 2017-04-07

## 2017-04-07 NOTE — TELEPHONE ENCOUNTER
RN spoke with My at [facility]. States she hasn't received the Disability forms yet and she needs to process the claim to prevent any further delay. RN answered questions regarding the type of surgical procedure, follow up appointments, recovery delays, restrictions and estimated recovery time. States she will fax a form over today for Dr. Sullivan to sign in relation to the above mentioned. Advised I will await fax and have Dr. Sullivan sign the document when he returns to the office. Advised it may not be until next week as he is in meetings all day today. Also advised I will send a message to the disability department regarding the documents being completed and faxed over. Thanked RN for calling. E-mail sent to Иван Sebastian.

## 2017-04-07 NOTE — TELEPHONE ENCOUNTER
----- Message from Chris Quinones sent at 4/7/2017 10:06 AM CDT -----  Contact: Arron Thompson  X_  1st Request  _  2nd Request  _  3rd Request        Who: Arron Thompson    Why: Need to verify what type of surgery patient had and needs information on the surgery conducted for disability reasons. Please call back to follow up. Fax No. [phone]    What Number to Call Back: [phone]    When to Expect a call back: (Before the end of the day)   -- if the call is after 12:00, the call back will be tomorrow.

## 2017-04-10 ENCOUNTER — CLINICAL SUPPORT (OUTPATIENT)
Dept: NEUROSURGERY | Facility: CLINIC | Age: 57
End: 2017-04-10
Payer: COMMERCIAL

## 2017-04-10 VITALS
WEIGHT: 256.75 LBS | TEMPERATURE: 98 F | HEART RATE: 84 BPM | BODY MASS INDEX: 48.47 KG/M2 | SYSTOLIC BLOOD PRESSURE: 149 MMHG | HEIGHT: 61 IN | DIASTOLIC BLOOD PRESSURE: 78 MMHG

## 2017-04-10 PROCEDURE — 99999 PR PBB SHADOW E&M-EST. PATIENT-LVL III: CPT | Mod: PBBFAC,,,

## 2017-04-10 RX ORDER — DIAZEPAM 5 MG/1
5 TABLET ORAL EVERY 12 HOURS PRN
Qty: 60 TABLET | Refills: 0 | Status: SHIPPED | OUTPATIENT
Start: 2017-04-10 | End: 2017-05-10

## 2017-04-10 RX ORDER — OXYCODONE AND ACETAMINOPHEN 10; 325 MG/1; MG/1
1 TABLET ORAL EVERY 8 HOURS PRN
Qty: 90 TABLET | Refills: 0 | Status: SHIPPED | OUTPATIENT
Start: 2017-04-10 | End: 2017-06-09

## 2017-04-12 NOTE — PROGRESS NOTES
Patient seen for wound check s/p L3-5 laminectomy on 2/22/17. Patient presents today in wheelchair. She is able to ambulate into exam room with cane. She is accompanied by friend today. She was last seen in clinic on 4/3/17 by Marla Darby PA-C for a superficial wound infection. States she completed Bactrim Rx and has discontinued Bacitracin. Incision to lower back assessed. Scab noted to upper portion of incision. No erythema, swelling, or drainage. Patient denies fevers, night sweats, chills, or drainage from incision. Instructed patient to keep incision open to air, no scrubbing incision, no submerging incision in tub bath/pool for another 3 weeks, and pat to dry. Patient verbalized understanding of all instructions given. Percocet and Valium Rx's provided in print. Encouraged to call clinic with any questions or concerns.

## 2017-04-17 ENCOUNTER — TELEPHONE (OUTPATIENT)
Dept: NEUROSURGERY | Facility: CLINIC | Age: 57
End: 2017-04-17

## 2017-04-17 NOTE — TELEPHONE ENCOUNTER
----- Message from Judy Sotelo sent at 4/17/2017 10:05 AM CDT -----  Contact: Regina Pepe would like to speak to Flor Mohr about the signed documents and medical records for the above referenced pts. My can be reached at the numbers listed below  [phone] (F)  [phone] (P)

## 2017-04-17 NOTE — TELEPHONE ENCOUNTER
RN called Ms. Pepe with [facility]; no answer. Message left advising the document she sent to me was signed and faxed to her on last week. Any other disability documents received were sent to the disability department for completion and I do not have those on hand.

## 2017-04-24 ENCOUNTER — TELEPHONE (OUTPATIENT)
Dept: NEUROSURGERY | Facility: CLINIC | Age: 57
End: 2017-04-24

## 2017-04-24 NOTE — TELEPHONE ENCOUNTER
Requesting clearance to do physical therapy to address her hip. Advised she is only 8 weeks post-op and will not be able to maintain post-op restrictions during physical therapy for hip. Advised we can address on her follow up visit with Dr. Sullivan. Verbalized understanding and agreed to comply. Thanked RN for calling.

## 2017-04-24 NOTE — TELEPHONE ENCOUNTER
----- Message from Stacie Wilson sent at 4/24/2017  2:43 PM CDT -----  Contact: WALTER CABA [[id number]]  x_  1st Request  _  2nd Request  _  3rd Request        Who: WALTER CABA [[id number]]    Why: Patient states she/he would like to speak with staff in regards to her physical therapy    What Number to Call Back: [phone]    When to Expect a call back: (Before the end of the day)   -- if call after 3:00 call back will be tomorrow.

## 2017-05-01 DIAGNOSIS — J30.9 ALLERGIC RHINITIS, UNSPECIFIED ALLERGIC RHINITIS TRIGGER, UNSPECIFIED RHINITIS SEASONALITY: ICD-10-CM

## 2017-05-01 RX ORDER — FLUTICASONE PROPIONATE 50 MCG
2 SPRAY, SUSPENSION (ML) NASAL DAILY
Qty: 16 G | Refills: 3 | Status: SHIPPED | OUTPATIENT
Start: 2017-05-01 | End: 2018-04-03 | Stop reason: SDUPTHER

## 2017-05-15 ENCOUNTER — PATIENT MESSAGE (OUTPATIENT)
Dept: NEUROSURGERY | Facility: CLINIC | Age: 57
End: 2017-05-15

## 2017-05-18 ENCOUNTER — PATIENT MESSAGE (OUTPATIENT)
Dept: NEUROSURGERY | Facility: CLINIC | Age: 57
End: 2017-05-18

## 2017-05-19 ENCOUNTER — TELEPHONE (OUTPATIENT)
Dept: NEUROSURGERY | Facility: CLINIC | Age: 57
End: 2017-05-19

## 2017-05-19 NOTE — TELEPHONE ENCOUNTER
----- Message from Gabby Adames sent at 5/19/2017 10:09 AM CDT -----  Contact: PT  Has a few questions regarding physical therapy clearance.    Call: [phone]

## 2017-05-19 NOTE — TELEPHONE ENCOUNTER
Patient is requesting clearance for physical therapy. I explained to patient that request must come from worker's comp . Patient stated that she is not requesting therapy for her back but is needing it for her hip. Will discuss with PANCHITO Ray.

## 2017-05-30 ENCOUNTER — TELEPHONE (OUTPATIENT)
Dept: NEUROSURGERY | Facility: CLINIC | Age: 57
End: 2017-05-30

## 2017-05-30 ENCOUNTER — PATIENT MESSAGE (OUTPATIENT)
Dept: NEUROSURGERY | Facility: CLINIC | Age: 57
End: 2017-05-30

## 2017-05-30 DIAGNOSIS — Z98.890 S/P LUMBAR LAMINECTOMY: Primary | ICD-10-CM

## 2017-05-30 DIAGNOSIS — G89.29 CHRONIC BACK PAIN, UNSPECIFIED BACK LOCATION, UNSPECIFIED BACK PAIN LATERALITY: ICD-10-CM

## 2017-05-30 DIAGNOSIS — M54.9 CHRONIC BACK PAIN, UNSPECIFIED BACK LOCATION, UNSPECIFIED BACK PAIN LATERALITY: ICD-10-CM

## 2017-05-30 NOTE — TELEPHONE ENCOUNTER
----- Message from Gabby Adames sent at 5/30/2017 12:39 PM CDT -----  Contact: PT  Has a few questions regarding her physical therapy and medication.    Call; [phone]

## 2017-05-30 NOTE — TELEPHONE ENCOUNTER
Letter for Physical therapy written an emailed to Israel Ordaz. Referral to Physical Medicine and Rehab for pain management.

## 2017-06-08 DIAGNOSIS — R52 PAIN: ICD-10-CM

## 2017-06-09 RX ORDER — ACETAMINOPHEN AND CODEINE PHOSPHATE 300; 60 MG/1; MG/1
1 TABLET ORAL DAILY PRN
Qty: 30 TABLET | Refills: 1 | Status: SHIPPED | OUTPATIENT
Start: 2017-06-09 | End: 2017-06-14 | Stop reason: ALTCHOICE

## 2017-06-14 ENCOUNTER — INITIAL CONSULT (OUTPATIENT)
Dept: PHYSICAL MEDICINE AND REHAB | Facility: CLINIC | Age: 57
End: 2017-06-14
Payer: COMMERCIAL

## 2017-06-14 VITALS
SYSTOLIC BLOOD PRESSURE: 152 MMHG | BODY MASS INDEX: 50.31 KG/M2 | DIASTOLIC BLOOD PRESSURE: 89 MMHG | HEIGHT: 61 IN | HEART RATE: 83 BPM | WEIGHT: 266.44 LBS

## 2017-06-14 DIAGNOSIS — M48.07 STENOSIS OF LUMBOSACRAL SPINE: ICD-10-CM

## 2017-06-14 DIAGNOSIS — M48.061 LUMBAR SPINAL STENOSIS: ICD-10-CM

## 2017-06-14 DIAGNOSIS — M51.36 DDD (DEGENERATIVE DISC DISEASE), LUMBAR: ICD-10-CM

## 2017-06-14 DIAGNOSIS — M48.061 SPINAL STENOSIS, LUMBAR: Primary | ICD-10-CM

## 2017-06-14 DIAGNOSIS — Z98.890 S/P LUMBAR LAMINECTOMY: ICD-10-CM

## 2017-06-14 DIAGNOSIS — M43.16 SPONDYLOLISTHESIS AT L3-L4 LEVEL: ICD-10-CM

## 2017-06-14 PROCEDURE — 99999 PR PBB SHADOW E&M-EST. PATIENT-LVL III: CPT | Mod: PBBFAC,,, | Performed by: PHYSICAL MEDICINE & REHABILITATION

## 2017-06-14 PROCEDURE — 99204 OFFICE O/P NEW MOD 45 MIN: CPT | Mod: S$GLB,,, | Performed by: PHYSICAL MEDICINE & REHABILITATION

## 2017-06-14 RX ORDER — DIAZEPAM 5 MG/1
5 TABLET ORAL EVERY 12 HOURS PRN
Qty: 60 TABLET | Refills: 1 | Status: SHIPPED | OUTPATIENT
Start: 2017-06-14 | End: 2017-08-11 | Stop reason: SDUPTHER

## 2017-06-14 RX ORDER — OXYCODONE AND ACETAMINOPHEN 10; 325 MG/1; MG/1
1 TABLET ORAL EVERY 8 HOURS PRN
Qty: 90 TABLET | Refills: 0 | Status: SHIPPED | OUTPATIENT
Start: 2017-07-14 | End: 2017-08-11 | Stop reason: SDUPTHER

## 2017-06-14 RX ORDER — OXYCODONE AND ACETAMINOPHEN 10; 325 MG/1; MG/1
1 TABLET ORAL EVERY 8 HOURS PRN
Qty: 90 TABLET | Refills: 0 | Status: SHIPPED | OUTPATIENT
Start: 2017-06-14 | End: 2017-07-14

## 2017-06-14 NOTE — LETTER
June 18, 2017      Dave Sullivan MD  [address]  [facility] [address]           [facility]  [address]  [facility] [address]  Phone: [phone]          Patient: Tanika Wayne   MR Number: [id number]   YOB: 1960   Date of Visit: 6/14/2017       Dear Dr. Dave Sullivan:    Thank you for referring Tanika Wayne to me for evaluation. Attached you will find relevant portions of my assessment and plan of care.    If you have questions, please do not hesitate to call me. I look forward to following Tanika Wayne along with you.    Sincerely,    Rosario Cruz MD    Enclosure  CC:  No Recipients    If you would like to receive this communication electronically, please contact [email or web address] or [phone] to request more information on [facility] access.    For providers and/or their staff who would like to refer a patient to [facility], please contact us through our one-stop-shop provider referral line, [facility], at [phone].    If you feel you have received this communication in error or would no longer like to receive these types of communications, please e-mail [email or web address]

## 2017-06-14 NOTE — PROGRESS NOTES
Subjective:       Patient ID: Tanika Wayne is a 57 y.o. female.    Chief Complaint: Back Pain    HPI   Mrs. Wayne is coming first time to clinic for worsening of chronic back pain, and chronic pain management.   She is s/p L3-5 laminectomy on 2/22/17, cx with superficial wound infection.  Referred by Dr. Sullivan.   She is on long term disability after back surgery.  She is also on [employer] for Rt knee and hip ( she fell at work , on January 7/2016)   Back Pain Description:  Lenght: pain is a chronic pain. Length > many, more than 10 yrs, with worsening last year.   Any past, recent injury, falls.  Intensity:  CURRENT  8/10,  AVERAGE  Pain  /10. at BEST /10 , At WORST 10 /10 on the WORST day.   Location: pain is localized at  Rt side of back and buttock, running to Rt hip and leg.    It is more Back at right side >> leg pain.   Radiation: Rt  Buttocks all the way to Rt legs., to calf   Timing :it is  constant day/yamila at night pain ,cannot sleep on RT side,  worse with activity, in evening  Cannot stand 2-5 minutes, needs tos it down, cannot walk a distance of one room, secondary to back pain , has to sit down for 5-10 minutes,   and can walk again.   Uses SC  ( borrowed from her ). She does noot have any AD at home.   She states that she ordered RW with seat through [employer].   QUALITY: Back pain is more dull ache.  No Sharp/shooting/ neuropathic : burning, tingling, numbness.  She reports muscle spasm in RT calf.   She has  RT weakness, she reports a couple of times inside house, no injury.   Worsening factors:  Standing, walking.   Alleviating factors: sitting and laying down.   Symptoms interfere with daily activity, sleeping and work.   Current medications: Tramadol , Aleve   Failed medications: Gabapentin ( bruises, bluish nails) Lyrica ( ankle swelling) .   Prior procedures.  Minimally invasive laminectomy.   PT/OT: None. Per patient, Dr Sullivan told her not to have PT until October  .   Patient denies night fever/night sweats, bowel incontinence, significant weight loss and significant motor weakness (red flags).  Patient denies any suicidal or homicidal ideations.  She is here for chronic pain management with opiates.    Past Medical History:   Diagnosis Date    Abnormal echocardiogram 2012    Arthritis     Breast cancer 1998    Breast cancer, right breast 2012    Cardiomyopathy due to systemic disease     Clotting disorder     Diabetes mellitus     Diabetes mellitus type II     DM (diabetes mellitus) 2012    Encounter for blood transfusion     HTN (hypertension) 2012    Hypertension     Increased glucose level 2012    ITP (idiopathic thrombocytopenic purpura) 2012    Kidney stones 2012    PONV (postoperative nausea and vomiting)     Stenosis of lumbosacral spine 2012    Thyroid disease     Tympanic membrane perforation 9/15/2014    right 2014 Dr. Jeffries       Past Surgical History:   Procedure Laterality Date    BREAST SURGERY       SECTION      CHOLECYSTECTOMY      EYE SURGERY      HYSTERECTOMY      SPLENECTOMY, TOTAL      STRABISMUS SURGERY  8.30.06    OU    THYROIDECTOMY  2016       Family History   Problem Relation Age of Onset    Heart disease Mother 69     MI    Diabetes Mother     Diabetes Father     Hypertension Father     Stroke Father        Social History     Social History    Marital status:      Spouse name: N/A    Number of children: 1    Years of education: N/A     Occupational History    registration      [employer]     Social History Main Topics    Smoking status: Never Smoker    Smokeless tobacco: None    Alcohol use No    Drug use: Unknown    Sexual activity: Not Asked     Other Topics Concern    None     Social History Narrative    None       Current Outpatient Prescriptions   Medication Sig Dispense Refill    carvedilol (COREG) 3.125 MG tablet TAKE 1 TABLET TWICE  DAILY 180 tablet 2    clotrimazole-betamethasone 1-0.05% (LOTRISONE) cream Apply topically 2 (two) times daily as needed. 45 g 1    docusate sodium (STOOL SOFTENER) 100 MG capsule Take 100 mg by mouth 3 (three) times daily as needed for Constipation.      fluticasone (FLONASE) 50 mcg/actuation nasal spray 2 sprays by Each Nare route once daily. 16 g 3    levothyroxine (SYNTHROID) 100 MCG tablet Take 1 tablet (100 mcg total) by mouth once daily. 30 tablet 11    losartan-hydrochlorothiazide 100-12.5 mg (HYZAAR) 100-12.5 mg Tab Take 1 tablet by mouth every morning. 90 tablet 3    multivitamin (THERAGRAN) per tablet Take 1 tablet by mouth once daily.      nifedipine (PROCARDIA-XL) 90 MG (OSM) TR24 TAKE 1 TABLET ONE TIME DAILY 90 tablet 3    potassium chloride SA (K-DUR,KLOR-CON) 20 MEQ tablet TAKE 2 TABLETS TWICE DAILY 360 tablet 2    venlafaxine (EFFEXOR-XR) 75 MG 24 hr capsule TAKE 1 CAPSULE EVERY NIGHT 90 capsule 3    diazePAM (VALIUM) 5 MG tablet Take 1 tablet (5 mg total) by mouth every 12 (twelve) hours as needed for Anxiety (muscle spasm). 60 tablet 1    oxycodone-acetaminophen (PERCOCET)  mg per tablet Take 1 tablet by mouth every 8 (eight) hours as needed for Pain. 90 tablet 0    [START ON 7/14/2017] oxycodone-acetaminophen (PERCOCET)  mg per tablet Take 1 tablet by mouth every 8 (eight) hours as needed for Pain. 90 tablet 0     No current facility-administered medications for this visit.        Review of patient's allergies indicates:  No Known Allergies      Review of Systems   Constitutional: Negative for appetite change, chills, fatigue, fever and unexpected weight change.   HENT: Negative for drooling, trouble swallowing and voice change.    Eyes: Negative for pain and visual disturbance.   Respiratory: Negative for shortness of breath and wheezing.    Cardiovascular: Negative for chest pain and palpitations.   Gastrointestinal: Negative for abdominal distention, abdominal pain,  constipation and diarrhea.   Genitourinary: Negative for difficulty urinating.   Musculoskeletal: Positive for back pain and gait problem. Negative for arthralgias, joint swelling, myalgias and neck stiffness.               Skin: Negative for color change and rash.   Neurological: Negative for dizziness, facial asymmetry, speech difficulty, weakness, light-headedness and numbness. Headaches:     Hematological: Negative for adenopathy.   Psychiatric/Behavioral: Negative for behavioral problems, confusion and sleep disturbance. The patient is not nervous/anxious.            Objective:      Physical Exam      GENERAL: The patient is alert, oriented, pleasant.   HEENT; PERRLA  NECK: supple,    MUSCULOSKELETAL:   Gait is normal .  Cervical spine: full  AROM in cervical spine.  Lumbar spine, full range of motion in all planes, flexion to 90 degrees , ext. 0.  Side bending and rotation to 35-40 degrees, b/l.  Straight leg raising Negative bilaterally.   Full range of motion in all joints x4 extremities.   Muscle strength 5/5 throughout x4 extremities.   No  joint laxity throughout x4 extremities.   NEUROLOGIC: Cranial nerves II through XII intact.   Deep tendon reflexes is normal, +2 in the upper and lower extremities bilaterally.   Muscle tone is normal.   Sensory is intact to light touch and pinprick throughout x4 extremities.       MRI of Lumbar spine ( 01/2017) showed:   Continued grade 1 anterolisthesis of L3 on L4.   The lumbar vertebral body height, contour and bone marrow signal is relatively stable without evidence for acute fracture.  The distal spinal cord and conus is normal in signal and contour the tip of the conus approximates the mid [id number].  T12/L1 No significant disc bulge, central canal or neural foraminal stenosis.   L1/L2: Small disc bulge with ligamentum flavum hypertrophy and facet joint arthropathy with mild central canal and bilateral neural foraminal stenosis  L2/L3: Trace disc bulge with  ligamentum flavum hypertrophy and facet joint arthropathy with moderate-to-severe central canal stenosis.   There is mild resulting neuroforaminal stenosis.  L3/L4: Bulging disc with ligament flavum hypertrophy and facet arthropathy with severe central canal stenosis and mild bilateral neuroforaminal stenosis.  L4/L5: Bulging disc ligamentum flavum hypertrophy and facet joint arthropathy without significant central canal stenosis   with mild/moderate neuroforaminal stenosis bilaterally  L5/S1: Bulging disc with left paracentral disc protrusion and annular fissure. No significant central canal stenosis with mild right and   moderate left neuroforaminal stenosis.  Impression:   Continued multilevel degenerative change of the lumbar spine which remains most prominent at L3/L4 with small disc bulge with   exuberant ligamentum flavum hypertrophy and facet joint arthropathy   with severe central canal stenosis and mild neuroforaminal stenosis.  Continued bulging disc with left paracentral disc protrusion L5/S1.    Assessment:       1. Spinal stenosis, lumbar    2. S/P lumbar laminectomy    3. Stenosis of lumbosacral spine    4. Spondylolisthesis at L3-L4 level    5. Lumbar spinal stenosis    6. DDD (degenerative disc disease), lumbar        Plan:       Spinal stenosis, lumbar  -     oxycodone-acetaminophen (PERCOCET)  mg per tablet; Take 1 tablet by mouth every 8 (eight) hours as needed for Pain.  Dispense: 90 tablet; Refill: 0  -     oxycodone-acetaminophen (PERCOCET)  mg per tablet; Take 1 tablet by mouth every 8 (eight) hours as needed for Pain.  Dispense: 90 tablet; Refill: 0  -     diazePAM (VALIUM) 5 MG tablet; Take 1 tablet (5 mg total) by mouth every 12 (twelve) hours as needed for Anxiety (muscle spasm).  Dispense: 60 tablet; Refill: 1    S/P lumbar laminectomy  -     oxycodone-acetaminophen (PERCOCET)  mg per tablet; Take 1 tablet by mouth every 8 (eight) hours as needed for Pain.  Dispense: 90  tablet; Refill: 0  -     oxycodone-acetaminophen (PERCOCET)  mg per tablet; Take 1 tablet by mouth every 8 (eight) hours as needed for Pain.  Dispense: 90 tablet; Refill: 0  -     diazePAM (VALIUM) 5 MG tablet; Take 1 tablet (5 mg total) by mouth every 12 (twelve) hours as needed for Anxiety (muscle spasm).  Dispense: 60 tablet; Refill: 1    Stenosis of lumbosacral spine  -     oxycodone-acetaminophen (PERCOCET)  mg per tablet; Take 1 tablet by mouth every 8 (eight) hours as needed for Pain.  Dispense: 90 tablet; Refill: 0  -     oxycodone-acetaminophen (PERCOCET)  mg per tablet; Take 1 tablet by mouth every 8 (eight) hours as needed for Pain.  Dispense: 90 tablet; Refill: 0  -     diazePAM (VALIUM) 5 MG tablet; Take 1 tablet (5 mg total) by mouth every 12 (twelve) hours as needed for Anxiety (muscle spasm).  Dispense: 60 tablet; Refill: 1    Spondylolisthesis at L3-L4 level  -     oxycodone-acetaminophen (PERCOCET)  mg per tablet; Take 1 tablet by mouth every 8 (eight) hours as needed for Pain.  Dispense: 90 tablet; Refill: 0  -     oxycodone-acetaminophen (PERCOCET)  mg per tablet; Take 1 tablet by mouth every 8 (eight) hours as needed for Pain.  Dispense: 90 tablet; Refill: 0  -     diazePAM (VALIUM) 5 MG tablet; Take 1 tablet (5 mg total) by mouth every 12 (twelve) hours as needed for Anxiety (muscle spasm).  Dispense: 60 tablet; Refill: 1    Lumbar spinal stenosis  -     oxycodone-acetaminophen (PERCOCET)  mg per tablet; Take 1 tablet by mouth every 8 (eight) hours as needed for Pain.  Dispense: 90 tablet; Refill: 0  -     oxycodone-acetaminophen (PERCOCET)  mg per tablet; Take 1 tablet by mouth every 8 (eight) hours as needed for Pain.  Dispense: 90 tablet; Refill: 0  -     diazePAM (VALIUM) 5 MG tablet; Take 1 tablet (5 mg total) by mouth every 12 (twelve) hours as needed for Anxiety (muscle spasm).  Dispense: 60 tablet; Refill: 1    DDD (degenerative disc disease),  lumbar  -     oxycodone-acetaminophen (PERCOCET)  mg per tablet; Take 1 tablet by mouth every 8 (eight) hours as needed for Pain.  Dispense: 90 tablet; Refill: 0  -     oxycodone-acetaminophen (PERCOCET)  mg per tablet; Take 1 tablet by mouth every 8 (eight) hours as needed for Pain.  Dispense: 90 tablet; Refill: 0  -     diazePAM (VALIUM) 5 MG tablet; Take 1 tablet (5 mg total) by mouth every 12 (twelve) hours as needed for Anxiety (muscle spasm).  Dispense: 60 tablet; Refill: 1    Patient with chronic low back pain, with neurogenic claudication secondary to multi level Lumbar spinal stenosis, moderate-to-severe at L2-3, and   Severe at L3/L4, secondary to bulging disc with ligament flavum hypertrophy and facet arthropathy, s/p L3-5 laminectomy on 2/22/17, cx with   superficial wound infection.    RTC in 2 months.    Total time spent face to face with patient was 45 minutes.   More than 50% of that time was spent in counseling on diagnosis , prognosis and treatment options.   I also caunsel patient  on common and most usual side effect of prescribed medications.   Risk and benefits of opiates, possible risk of developing opiate dependence and tolerance, need of strict compliance with prescribed medications.  Pain contract, rules and obligations were discussed with patient in details.  He is aware that I would be the only doctor prescribing him pain medications and ED in a case of emergency.  I reviewed Primary care , and other specialty's notes to better coordinate patient's  care.   All questions were answered, and patient voiced understanding.

## 2017-08-02 ENCOUNTER — TELEPHONE (OUTPATIENT)
Dept: SPINE | Facility: CLINIC | Age: 57
End: 2017-08-02

## 2017-08-02 NOTE — TELEPHONE ENCOUNTER
----- Message from Chris Quinones sent at 8/2/2017  9:41 AM CDT -----  Contact: Tanika Wayne  _X  1st Request  _  2nd Request  _  3rd Request        Who: Tanika Wayne    Why: Patient needs to schedule a six month follow up post-op appt. Please call back to follow up.    What Number to Call Back: [phone]    When to Expect a call back: (With in 24 hours)

## 2017-08-11 ENCOUNTER — OFFICE VISIT (OUTPATIENT)
Dept: PHYSICAL MEDICINE AND REHAB | Facility: CLINIC | Age: 57
End: 2017-08-11
Payer: COMMERCIAL

## 2017-08-11 VITALS
WEIGHT: 266 LBS | BODY MASS INDEX: 50.22 KG/M2 | HEIGHT: 61 IN | DIASTOLIC BLOOD PRESSURE: 81 MMHG | HEART RATE: 83 BPM | SYSTOLIC BLOOD PRESSURE: 139 MMHG

## 2017-08-11 DIAGNOSIS — M43.16 SPONDYLOLISTHESIS AT L3-L4 LEVEL: ICD-10-CM

## 2017-08-11 DIAGNOSIS — M54.41 CHRONIC BILATERAL LOW BACK PAIN WITH RIGHT-SIDED SCIATICA: ICD-10-CM

## 2017-08-11 DIAGNOSIS — Z98.890 S/P LUMBAR LAMINECTOMY: ICD-10-CM

## 2017-08-11 DIAGNOSIS — G89.29 CHRONIC BILATERAL LOW BACK PAIN WITH RIGHT-SIDED SCIATICA: ICD-10-CM

## 2017-08-11 DIAGNOSIS — M51.36 DDD (DEGENERATIVE DISC DISEASE), LUMBAR: ICD-10-CM

## 2017-08-11 DIAGNOSIS — Z79.891 CHRONIC USE OF OPIATE FOR THERAPEUTIC PURPOSE: ICD-10-CM

## 2017-08-11 DIAGNOSIS — M48.061 SPINAL STENOSIS, LUMBAR: ICD-10-CM

## 2017-08-11 DIAGNOSIS — M48.061 LUMBAR SPINAL STENOSIS: ICD-10-CM

## 2017-08-11 DIAGNOSIS — M48.07 STENOSIS OF LUMBOSACRAL SPINE: Primary | ICD-10-CM

## 2017-08-11 PROCEDURE — 99999 PR PBB SHADOW E&M-EST. PATIENT-LVL III: CPT | Mod: PBBFAC,,, | Performed by: PHYSICAL MEDICINE & REHABILITATION

## 2017-08-11 PROCEDURE — 3008F BODY MASS INDEX DOCD: CPT | Mod: S$GLB,,, | Performed by: PHYSICAL MEDICINE & REHABILITATION

## 2017-08-11 PROCEDURE — 99214 OFFICE O/P EST MOD 30 MIN: CPT | Mod: S$GLB,,, | Performed by: PHYSICAL MEDICINE & REHABILITATION

## 2017-08-11 PROCEDURE — 3075F SYST BP GE 130 - 139MM HG: CPT | Mod: S$GLB,,, | Performed by: PHYSICAL MEDICINE & REHABILITATION

## 2017-08-11 PROCEDURE — 3079F DIAST BP 80-89 MM HG: CPT | Mod: S$GLB,,, | Performed by: PHYSICAL MEDICINE & REHABILITATION

## 2017-08-11 RX ORDER — OXYCODONE AND ACETAMINOPHEN 10; 325 MG/1; MG/1
1 TABLET ORAL EVERY 8 HOURS PRN
Qty: 90 TABLET | Refills: 0 | Status: SHIPPED | OUTPATIENT
Start: 2017-09-17 | End: 2017-10-10

## 2017-08-11 RX ORDER — DIAZEPAM 5 MG/1
5 TABLET ORAL EVERY 12 HOURS PRN
Qty: 60 TABLET | Refills: 2 | Status: SHIPPED | OUTPATIENT
Start: 2017-08-11 | End: 2017-11-15 | Stop reason: SDUPTHER

## 2017-08-11 RX ORDER — OXYCODONE AND ACETAMINOPHEN 10; 325 MG/1; MG/1
1 TABLET ORAL EVERY 8 HOURS PRN
Qty: 90 TABLET | Refills: 0 | Status: SHIPPED | OUTPATIENT
Start: 2017-08-17 | End: 2017-09-16

## 2017-08-11 RX ORDER — OXYCODONE AND ACETAMINOPHEN 10; 325 MG/1; MG/1
1 TABLET ORAL EVERY 8 HOURS PRN
Qty: 90 TABLET | Refills: 0 | Status: SHIPPED | OUTPATIENT
Start: 2017-10-17 | End: 2017-11-15 | Stop reason: SDUPTHER

## 2017-08-11 NOTE — PROGRESS NOTES
Subjective:       Patient ID: Tanika Wayne is a 57 y.o. female.    Chief Complaint: Back Pain    Back Pain   Pertinent negatives include no abdominal pain, chest pain, fever, numbness or weakness. Headaches:        Mrs. Wayne returns to clinic for worsening of chronic back pain, and chronic pain management.   She is s/p L3-5 laminectomy on 2/22/17, cx with superficial wound infection.  Referred by Dr. Sullivan.   LCV 6/14/17.  Since LCV, she reports significant improvement of back pain.   Percocet decreases her pain down to 3-4. She takes it BID, early in am,a nd 7-8 pm.  She also reports that Valium helps with leg spasms, takes it in am/pm. Takes also Flexerila t bedtime, that helps with sleep.   She sates that she was taking Tramadol before surgery and was making her constipated.  Also , reports that  did not want her to have PT until October 2017.    She is on long term disability after back surgery.  She is also on [employer] for Rt knee and hip ( she fell at work , on January 7/2016)   Back Pain Description:  Lenght: pain is a chronic pain. Length > many, more than 10 yrs, with worsening last year.   Any past, recent injury, falls.  Intensity:  CURRENT  5/10,  AVERAGE  Pain  /10. at BEST /10 , At WORST 7-8 /10 on the WORST day.   Location: pain is localized at  Rt side of back and buttock, running to Rt hip and leg.    It is more Back at right side >> leg pain.   Radiation: Rt  Buttocks all the way to Rt legs, to mid calf   Timing :it is  constant day/yamila at night pain ,cannot sleep on RT side,  worse with activity, in evening  Cannot stand 2-5 minutes, needs tos it down, cannot walk a distance of one room, secondary to back pain , has to sit down for 5-10 minutes,   and can walk again.   Uses SC  ( borrowed from her ). She does noot have any AD at home.   She states that she ordered RW with seat through [employer] comp.   QUALITY: Back pain is more dull ache.  No Sharp/shooting/  neuropathic : burning, tingling, numbness.  She reports muscle spasm in RT calf.   She has  RT weakness, she reports a couple of times inside house, no injury.   Worsening factors:  Standing, walking.   Alleviating factors: sitting and laying down.   Symptoms interfere with daily activity, sleeping and work.   Current medications: Tramadol , Aleve   Failed medications: Gabapentin ( makes bruises on skin, bluish nails) Lyrica ( makes ankle swelling) .   Prior procedures.  Minimally invasive laminectomy.   PT/OT: None. Per patient, Dr Sullivan told her not to have PT until 2017.   Patient denies night fever/night sweats, bowel incontinence, significant weight loss and significant motor weakness (red flags).  Patient denies any suicidal or homicidal ideations.  She is here for chronic pain management with opiates.    Past Medical History:   Diagnosis Date    Abnormal echocardiogram 2012    Arthritis     Breast cancer 1998    Breast cancer, right breast 2012    Cardiomyopathy due to systemic disease     Clotting disorder     Diabetes mellitus     Diabetes mellitus type II     DM (diabetes mellitus) 2012    Encounter for blood transfusion     HTN (hypertension) 2012    Hypertension     Increased glucose level 2012    ITP (idiopathic thrombocytopenic purpura) 2012    Kidney stones 2012    PONV (postoperative nausea and vomiting)     Stenosis of lumbosacral spine 2012    Thyroid disease     Tympanic membrane perforation 9/15/2014    right  Dr. Jeffries       Past Surgical History:   Procedure Laterality Date    BREAST SURGERY       SECTION      CHOLECYSTECTOMY      EYE SURGERY      HYSTERECTOMY      SPLENECTOMY, TOTAL      STRABISMUS SURGERY  8.30.06    OU    THYROIDECTOMY  2016       Family History   Problem Relation Age of Onset    Heart disease Mother 69     MI    Diabetes Mother     Diabetes Father     Hypertension  Father     Stroke Father        Social History     Social History    Marital status:      Spouse name: N/A    Number of children: 1    Years of education: N/A     Occupational History    [place]      [employer] [place]     Social History Main Topics    Smoking status: Never Smoker    Smokeless tobacco: None    Alcohol use No    Drug use: Unknown    Sexual activity: Not Asked     Other Topics Concern    None     Social History Narrative    None       Current Outpatient Prescriptions   Medication Sig Dispense Refill    carvedilol (COREG) 3.125 MG tablet TAKE 1 TABLET TWICE DAILY 180 tablet 2    clotrimazole-betamethasone 1-0.05% (LOTRISONE) cream Apply topically 2 (two) times daily as needed. 45 g 1    diazePAM (VALIUM) 5 MG tablet Take 1 tablet (5 mg total) by mouth every 12 (twelve) hours as needed for Anxiety (muscle spasm). 60 tablet 2    docusate sodium (STOOL SOFTENER) 100 MG capsule Take 100 mg by mouth 3 (three) times daily as needed for Constipation.      fluticasone (FLONASE) 50 mcg/actuation nasal spray 2 sprays by Each Nare route once daily. 16 g 3    levothyroxine (SYNTHROID) 100 MCG tablet Take 1 tablet (100 mcg total) by mouth once daily. 30 tablet 11    losartan-hydrochlorothiazide 100-12.5 mg (HYZAAR) 100-12.5 mg Tab Take 1 tablet by mouth every morning. 90 tablet 3    multivitamin (THERAGRAN) per tablet Take 1 tablet by mouth once daily.      nifedipine (PROCARDIA-XL) 90 MG (OSM) TR24 TAKE 1 TABLET ONE TIME DAILY 90 tablet 3    [START ON 8/17/2017] oxycodone-acetaminophen (PERCOCET)  mg per tablet Take 1 tablet by mouth every 8 (eight) hours as needed for Pain. 90 tablet 0    [START ON 9/17/2017] oxycodone-acetaminophen (PERCOCET)  mg per tablet Take 1 tablet by mouth every 8 (eight) hours as needed for Pain. 90 tablet 0    [START ON 10/17/2017] oxycodone-acetaminophen (PERCOCET)  mg per tablet Take 1 tablet by mouth every 8 (eight) hours as needed for Pain. 90  tablet 0    potassium chloride SA (K-DUR,KLOR-CON) 20 MEQ tablet TAKE 2 TABLETS TWICE DAILY 360 tablet 2    venlafaxine (EFFEXOR-XR) 75 MG 24 hr capsule TAKE 1 CAPSULE EVERY NIGHT 90 capsule 3     No current facility-administered medications for this visit.        Review of patient's allergies indicates:  No Known Allergies      Review of Systems   Constitutional: Negative for appetite change, chills, fatigue, fever and unexpected weight change.   HENT: Negative for drooling, trouble swallowing and voice change.    Eyes: Negative for pain and visual disturbance.   Respiratory: Negative for shortness of breath and wheezing.    Cardiovascular: Negative for chest pain and palpitations.   Gastrointestinal: Negative for abdominal distention, abdominal pain, constipation and diarrhea.   Genitourinary: Negative for difficulty urinating.   Musculoskeletal: Positive for back pain and gait problem. Negative for arthralgias, joint swelling, myalgias and neck stiffness.               Skin: Negative for color change and rash.   Neurological: Negative for dizziness, facial asymmetry, speech difficulty, weakness, light-headedness and numbness. Headaches:     Hematological: Negative for adenopathy.   Psychiatric/Behavioral: Negative for behavioral problems, confusion and sleep disturbance. The patient is not nervous/anxious.        Objective:      Physical Exam      GENERAL: The patient is alert, oriented, pleasant.   HEENT; PERRLA  NECK: supple,    MUSCULOSKELETAL:   Gait is normal .  Cervical spine: full  AROM in cervical spine.  Lumbar spine, full range of motion in all planes, flexion to 90 degrees , ext. 0.  Side bending and rotation to 35-40 degrees, b/l.  Straight leg raising Negative bilaterally.   Full range of motion in all joints x4 extremities.   Muscle strength 5/5 throughout x4 extremities.   No  joint laxity throughout x4 extremities.   NEUROLOGIC: Cranial nerves II through XII intact.   Deep tendon reflexes is  normal, +2 in the upper and lower extremities bilaterally.   Muscle tone is normal.   Sensory is intact to light touch and pinprick throughout x4 extremities.       MRI of Lumbar spine ( 01/2017) showed:   Continued grade 1 anterolisthesis of L3 on L4.   The lumbar vertebral body height, contour and bone marrow signal is relatively stable without evidence for acute fracture.  The distal spinal cord and conus is normal in signal and contour the tip of the conus approximates the mid [id number].  T12/L1 No significant disc bulge, central canal or neural foraminal stenosis.   L1/L2: Small disc bulge with ligamentum flavum hypertrophy and facet joint arthropathy with mild central canal and bilateral neural foraminal stenosis  L2/L3: Trace disc bulge with ligamentum flavum hypertrophy and facet joint arthropathy with moderate-to-severe central canal stenosis.   There is mild resulting neuroforaminal stenosis.  L3/L4: Bulging disc with ligament flavum hypertrophy and facet arthropathy with severe central canal stenosis and mild bilateral neuroforaminal stenosis.  L4/L5: Bulging disc ligamentum flavum hypertrophy and facet joint arthropathy without significant central canal stenosis   with mild/moderate neuroforaminal stenosis bilaterally  L5/S1: Bulging disc with left paracentral disc protrusion and annular fissure. No significant central canal stenosis with mild right and   moderate left neuroforaminal stenosis.  Impression:   Continued multilevel degenerative change of the lumbar spine which remains most prominent at L3/L4 with small disc bulge with   exuberant ligamentum flavum hypertrophy and facet joint arthropathy   with severe central canal stenosis and mild neuroforaminal stenosis.  Continued bulging disc with left paracentral disc protrusion L5/S1.    Assessment:       1. Stenosis of lumbosacral spine    2. Spondylolisthesis at L3-L4 level    3. Spinal stenosis, lumbar    4. S/P lumbar laminectomy    5. Chronic  bilateral low back pain with right-sided sciatica    6. Chronic use of opiate for therapeutic purpose    7. Lumbar spinal stenosis    8. DDD (degenerative disc disease), lumbar        Plan:       Stenosis of lumbosacral spine  -     oxycodone-acetaminophen (PERCOCET)  mg per tablet; Take 1 tablet by mouth every 8 (eight) hours as needed for Pain.  Dispense: 90 tablet; Refill: 0  -     oxycodone-acetaminophen (PERCOCET)  mg per tablet; Take 1 tablet by mouth every 8 (eight) hours as needed for Pain.  Dispense: 90 tablet; Refill: 0  -     oxycodone-acetaminophen (PERCOCET)  mg per tablet; Take 1 tablet by mouth every 8 (eight) hours as needed for Pain.  Dispense: 90 tablet; Refill: 0  -     diazePAM (VALIUM) 5 MG tablet; Take 1 tablet (5 mg total) by mouth every 12 (twelve) hours as needed for Anxiety (muscle spasm).  Dispense: 60 tablet; Refill: 2    Spondylolisthesis at L3-L4 level  -     oxycodone-acetaminophen (PERCOCET)  mg per tablet; Take 1 tablet by mouth every 8 (eight) hours as needed for Pain.  Dispense: 90 tablet; Refill: 0  -     oxycodone-acetaminophen (PERCOCET)  mg per tablet; Take 1 tablet by mouth every 8 (eight) hours as needed for Pain.  Dispense: 90 tablet; Refill: 0  -     oxycodone-acetaminophen (PERCOCET)  mg per tablet; Take 1 tablet by mouth every 8 (eight) hours as needed for Pain.  Dispense: 90 tablet; Refill: 0  -     diazePAM (VALIUM) 5 MG tablet; Take 1 tablet (5 mg total) by mouth every 12 (twelve) hours as needed for Anxiety (muscle spasm).  Dispense: 60 tablet; Refill: 2    Spinal stenosis, lumbar  -     oxycodone-acetaminophen (PERCOCET)  mg per tablet; Take 1 tablet by mouth every 8 (eight) hours as needed for Pain.  Dispense: 90 tablet; Refill: 0  -     oxycodone-acetaminophen (PERCOCET)  mg per tablet; Take 1 tablet by mouth every 8 (eight) hours as needed for Pain.  Dispense: 90 tablet; Refill: 0  -     oxycodone-acetaminophen  (PERCOCET)  mg per tablet; Take 1 tablet by mouth every 8 (eight) hours as needed for Pain.  Dispense: 90 tablet; Refill: 0  -     diazePAM (VALIUM) 5 MG tablet; Take 1 tablet (5 mg total) by mouth every 12 (twelve) hours as needed for Anxiety (muscle spasm).  Dispense: 60 tablet; Refill: 2    S/P lumbar laminectomy  -     oxycodone-acetaminophen (PERCOCET)  mg per tablet; Take 1 tablet by mouth every 8 (eight) hours as needed for Pain.  Dispense: 90 tablet; Refill: 0  -     oxycodone-acetaminophen (PERCOCET)  mg per tablet; Take 1 tablet by mouth every 8 (eight) hours as needed for Pain.  Dispense: 90 tablet; Refill: 0  -     oxycodone-acetaminophen (PERCOCET)  mg per tablet; Take 1 tablet by mouth every 8 (eight) hours as needed for Pain.  Dispense: 90 tablet; Refill: 0  -     diazePAM (VALIUM) 5 MG tablet; Take 1 tablet (5 mg total) by mouth every 12 (twelve) hours as needed for Anxiety (muscle spasm).  Dispense: 60 tablet; Refill: 2    Chronic bilateral low back pain with right-sided sciatica  -     oxycodone-acetaminophen (PERCOCET)  mg per tablet; Take 1 tablet by mouth every 8 (eight) hours as needed for Pain.  Dispense: 90 tablet; Refill: 0  -     oxycodone-acetaminophen (PERCOCET)  mg per tablet; Take 1 tablet by mouth every 8 (eight) hours as needed for Pain.  Dispense: 90 tablet; Refill: 0  -     oxycodone-acetaminophen (PERCOCET)  mg per tablet; Take 1 tablet by mouth every 8 (eight) hours as needed for Pain.  Dispense: 90 tablet; Refill: 0  -     diazePAM (VALIUM) 5 MG tablet; Take 1 tablet (5 mg total) by mouth every 12 (twelve) hours as needed for Anxiety (muscle spasm).  Dispense: 60 tablet; Refill: 2    Chronic use of opiate for therapeutic purpose  -     oxycodone-acetaminophen (PERCOCET)  mg per tablet; Take 1 tablet by mouth every 8 (eight) hours as needed for Pain.  Dispense: 90 tablet; Refill: 0  -     oxycodone-acetaminophen (PERCOCET)  mg  per tablet; Take 1 tablet by mouth every 8 (eight) hours as needed for Pain.  Dispense: 90 tablet; Refill: 0  -     oxycodone-acetaminophen (PERCOCET)  mg per tablet; Take 1 tablet by mouth every 8 (eight) hours as needed for Pain.  Dispense: 90 tablet; Refill: 0  -     diazePAM (VALIUM) 5 MG tablet; Take 1 tablet (5 mg total) by mouth every 12 (twelve) hours as needed for Anxiety (muscle spasm).  Dispense: 60 tablet; Refill: 2    Lumbar spinal stenosis  -     oxycodone-acetaminophen (PERCOCET)  mg per tablet; Take 1 tablet by mouth every 8 (eight) hours as needed for Pain.  Dispense: 90 tablet; Refill: 0  -     oxycodone-acetaminophen (PERCOCET)  mg per tablet; Take 1 tablet by mouth every 8 (eight) hours as needed for Pain.  Dispense: 90 tablet; Refill: 0  -     oxycodone-acetaminophen (PERCOCET)  mg per tablet; Take 1 tablet by mouth every 8 (eight) hours as needed for Pain.  Dispense: 90 tablet; Refill: 0  -     diazePAM (VALIUM) 5 MG tablet; Take 1 tablet (5 mg total) by mouth every 12 (twelve) hours as needed for Anxiety (muscle spasm).  Dispense: 60 tablet; Refill: 2    DDD (degenerative disc disease), lumbar  -     oxycodone-acetaminophen (PERCOCET)  mg per tablet; Take 1 tablet by mouth every 8 (eight) hours as needed for Pain.  Dispense: 90 tablet; Refill: 0  -     oxycodone-acetaminophen (PERCOCET)  mg per tablet; Take 1 tablet by mouth every 8 (eight) hours as needed for Pain.  Dispense: 90 tablet; Refill: 0  -     oxycodone-acetaminophen (PERCOCET)  mg per tablet; Take 1 tablet by mouth every 8 (eight) hours as needed for Pain.  Dispense: 90 tablet; Refill: 0  -     diazePAM (VALIUM) 5 MG tablet; Take 1 tablet (5 mg total) by mouth every 12 (twelve) hours as needed for Anxiety (muscle spasm).  Dispense: 60 tablet; Refill: 2    Patient with chronic low back pain, with neurogenic claudication secondary to multi level Lumbar spinal stenosis, moderate-to-severe at  L2-3, and   Severe at L3/L4, secondary to bulging disc with ligament flavum hypertrophy and facet arthropathy, s/p L3-5 laminectomy on 2/22/17, cx with   superficial wound infection.    RTC in 3 months.    Total time spent face to face with patient was 25 minutes.   More than 50% of that time was spent in counseling on diagnosis , prognosis and treatment options.   I also caunsel patient  on common and most usual side effect of prescribed medications.   Risk and benefits of opiates, possible risk of developing opiate dependence and tolerance, need of strict compliance with prescribed medications.  Pain contract, rules and obligations were discussed with patient in details.  He is aware that I would be the only doctor prescribing him pain medications and ED in a case of emergency.  I reviewed Primary care , and other specialty's notes to better coordinate patient's  care.   All questions were answered, and patient voiced understanding.

## 2017-08-29 DIAGNOSIS — M62.838 NIGHT MUSCLE SPASMS: ICD-10-CM

## 2017-08-29 RX ORDER — CYCLOBENZAPRINE HCL 10 MG
TABLET ORAL
Qty: 30 TABLET | Refills: 3 | Status: SHIPPED | OUTPATIENT
Start: 2017-08-29 | End: 2017-12-20 | Stop reason: SDUPTHER

## 2017-09-11 DIAGNOSIS — F41.9 ANXIETY: ICD-10-CM

## 2017-09-11 RX ORDER — ALPRAZOLAM 0.25 MG/1
0.25 TABLET ORAL DAILY PRN
Qty: 30 TABLET | Refills: 0 | Status: SHIPPED | OUTPATIENT
Start: 2017-09-11 | End: 2017-11-15

## 2017-10-02 DIAGNOSIS — F32.A DEPRESSION, UNSPECIFIED DEPRESSION TYPE: ICD-10-CM

## 2017-10-02 RX ORDER — CARVEDILOL 3.12 MG/1
3.12 TABLET ORAL 2 TIMES DAILY
Qty: 180 TABLET | Refills: 0 | Status: SHIPPED | OUTPATIENT
Start: 2017-10-02 | End: 2017-12-15 | Stop reason: SDUPTHER

## 2017-10-02 RX ORDER — POTASSIUM CHLORIDE 20 MEQ/1
40 TABLET, EXTENDED RELEASE ORAL 2 TIMES DAILY
Qty: 360 TABLET | Refills: 0 | Status: SHIPPED | OUTPATIENT
Start: 2017-10-02 | End: 2017-12-15 | Stop reason: SDUPTHER

## 2017-10-02 RX ORDER — VENLAFAXINE HYDROCHLORIDE 75 MG/1
CAPSULE, EXTENDED RELEASE ORAL
Qty: 90 CAPSULE | Refills: 0 | Status: SHIPPED | OUTPATIENT
Start: 2017-10-02 | End: 2017-12-15 | Stop reason: SDUPTHER

## 2017-10-10 ENCOUNTER — OFFICE VISIT (OUTPATIENT)
Dept: INTERNAL MEDICINE | Facility: CLINIC | Age: 57
End: 2017-10-10
Attending: INTERNAL MEDICINE
Payer: COMMERCIAL

## 2017-10-10 VITALS
DIASTOLIC BLOOD PRESSURE: 72 MMHG | SYSTOLIC BLOOD PRESSURE: 138 MMHG | OXYGEN SATURATION: 98 % | BODY MASS INDEX: 51.54 KG/M2 | WEIGHT: 273 LBS | HEIGHT: 61 IN | HEART RATE: 91 BPM

## 2017-10-10 DIAGNOSIS — Z13.89 SCREENING FOR OBESITY: ICD-10-CM

## 2017-10-10 DIAGNOSIS — Z00.00 ROUTINE ADULT HEALTH MAINTENANCE: ICD-10-CM

## 2017-10-10 DIAGNOSIS — Z13.39 SCREENING FOR ALCOHOLISM: ICD-10-CM

## 2017-10-10 DIAGNOSIS — E11.9 TYPE 2 DIABETES MELLITUS WITHOUT COMPLICATION, WITHOUT LONG-TERM CURRENT USE OF INSULIN: Primary | ICD-10-CM

## 2017-10-10 DIAGNOSIS — I10 ESSENTIAL HYPERTENSION: ICD-10-CM

## 2017-10-10 DIAGNOSIS — Z13.31 SCREENING FOR DEPRESSION: ICD-10-CM

## 2017-10-10 DIAGNOSIS — M25.561 CHRONIC PAIN OF RIGHT KNEE: ICD-10-CM

## 2017-10-10 DIAGNOSIS — E66.01 OBESITY, MORBID, BMI 50 OR HIGHER: ICD-10-CM

## 2017-10-10 DIAGNOSIS — M25.551 RIGHT HIP PAIN: ICD-10-CM

## 2017-10-10 DIAGNOSIS — G89.29 CHRONIC PAIN OF RIGHT KNEE: ICD-10-CM

## 2017-10-10 PROCEDURE — 90686 IIV4 VACC NO PRSV 0.5 ML IM: CPT | Mod: S$GLB,,, | Performed by: INTERNAL MEDICINE

## 2017-10-10 PROCEDURE — 99396 PREV VISIT EST AGE 40-64: CPT | Mod: 25,S$GLB,, | Performed by: INTERNAL MEDICINE

## 2017-10-10 PROCEDURE — 90471 IMMUNIZATION ADMIN: CPT | Mod: S$GLB,,, | Performed by: INTERNAL MEDICINE

## 2017-10-10 PROCEDURE — G0442 ANNUAL ALCOHOL SCREEN 15 MIN: HCPCS | Mod: S$GLB,,, | Performed by: INTERNAL MEDICINE

## 2017-10-10 PROCEDURE — G0444 DEPRESSION SCREEN ANNUAL: HCPCS | Mod: S$GLB,,, | Performed by: INTERNAL MEDICINE

## 2017-10-10 NOTE — PROGRESS NOTES
Subjective:       Patient ID: Tanika Wayne is a 57 y.o. female.    Chief Complaint: Follow-up (wants flu and pneumonia vaccine)    Using a walker since L-spine surgery in 2/17.  Needs Right BRYNN and TKA. Needs to loose weight first.      Diabetes   She presents for her follow-up diabetic visit. She has type 2 diabetes mellitus. Her disease course has been stable. Pertinent negatives for diabetes include no chest pain.   Hypertension   This is a chronic problem. The current episode started more than 1 year ago. The problem is controlled. Pertinent negatives include no chest pain or shortness of breath.     Review of Systems   Constitutional: Negative.    Respiratory: Negative for shortness of breath.    Cardiovascular: Negative for chest pain.   Psychiatric/Behavioral: Negative for dysphoric mood.       Objective:      Physical Exam   Constitutional: She appears well-developed and well-nourished.   HENT:   Head: Normocephalic.   Eyes: Pupils are equal, round, and reactive to light.   Cardiovascular: Normal rate, regular rhythm and normal heart sounds.  Exam reveals no friction rub.    Pulmonary/Chest: Effort normal.   Neurological: She is alert.       Assessment:       1. Type 2 diabetes mellitus without complication, without long-term current use of insulin    2. Essential hypertension    3. Right hip pain    4. Chronic pain of right knee    5. Obesity, morbid, BMI 50 or higher    6. Screening for depression    7. Screening for alcoholism    8. Screening for obesity        Plan:       Per orders and D/C instructions.  Continue meds/diet for DM and HTN, which are stable.  F/u with Dr. Fischer for right hip and knee pain.  She defers labs until next visit.    Screening: The patient was screened for depression with the PHQ2 questionnaire and possible health consequences were discussed with the patient, who understands (15 minutes spent). The patient was screened for the misuse of alcohol, by asking the number of  drinks per average week, and if pt has had more than 4 drinks (more than 3 for women and elderly) in 1 day within the past year. The health and legal consequences of misuse were discussed (15 minutes spent). The patient was screened for obesity (BMI>30), If the current BMI > 30, then the possible consequences of obesity, as well as the benefits of diet, exercise, and weight loss were discussed (15 minutes spent).

## 2017-10-24 ENCOUNTER — OFFICE VISIT (OUTPATIENT)
Dept: SPINE | Facility: CLINIC | Age: 57
End: 2017-10-24
Attending: NEUROLOGICAL SURGERY
Payer: COMMERCIAL

## 2017-10-24 VITALS
BODY MASS INDEX: 50.41 KG/M2 | SYSTOLIC BLOOD PRESSURE: 128 MMHG | TEMPERATURE: 96 F | WEIGHT: 267 LBS | HEIGHT: 61 IN | HEART RATE: 86 BPM | DIASTOLIC BLOOD PRESSURE: 82 MMHG

## 2017-10-24 DIAGNOSIS — E66.01 MORBID OBESITY WITH BMI OF 50.0-59.9, ADULT: ICD-10-CM

## 2017-10-24 DIAGNOSIS — Z98.890 S/P LUMBAR LAMINECTOMY: Primary | ICD-10-CM

## 2017-10-24 PROCEDURE — 99212 OFFICE O/P EST SF 10 MIN: CPT | Mod: S$GLB,,, | Performed by: NEUROLOGICAL SURGERY

## 2017-10-24 PROCEDURE — 99999 PR PBB SHADOW E&M-EST. PATIENT-LVL III: CPT | Mod: PBBFAC,,, | Performed by: NEUROLOGICAL SURGERY

## 2017-10-24 NOTE — PROGRESS NOTES
CHIEF COMPLAINT:    6 month follow-up    HPI:    Tanika Wayne is a 57 y.o.-year-old female who presents today for post-operative follow-up. She is s/p L3-4, L4-5 laminectomy that was done by Dr. Sullivan on 2/22/2017. Currently, the patient's symptoms are slightly better since surgery mostly due to ongoing issues with back pain. She has lost some weight but still significantly overweight. The patient is complaining of pain in the lower back  moving into the hips with associated paresthesias into the knees. She states that she has been experiencing new onset of weakness due to bilateral knee pain. The patient describes the pain as aching. The patient rates the pain 7 on the pain scale. The pain is worse with walking and better with laying down. Post-op medications the patient is currently taking are: Diazepam.    ROS:    Morbid obesity    PE:    AAOX3  PERRL  EOMI    Lumbar incision:  C/D/I    ROM:   Decreased secondary to pain    Sensation:  Intact to light touch (All 4 extremities)    Strength: Full strength      Deltoids Biceps Triceps Wrist Ext. Wrist Flex. Hand    RUE         LUE          Hip Flex. Knee Flex. Knee Ext. Dorsi Flex Plantar Flex EHL   RLE         LLE           Gait:  antalgic and She sat in the wheel chair in the clinic    DTR:  not tested      IMAGING:    XRays: none    CT: none    MRI: none    ASSESSMENT:   Axial mechanical LBP due to morbid obesity    PLAN:   I will strongly recommend bariatric surgery for Mrs Wayne which will address a lot of her underlying medical issues including LBP. Nutrition consult and refer to healthy back program.  Follow up as needed.

## 2017-10-30 DIAGNOSIS — R52 PAIN: Primary | ICD-10-CM

## 2017-10-30 RX ORDER — TRAMADOL HYDROCHLORIDE 50 MG/1
50 TABLET ORAL EVERY 6 HOURS PRN
Qty: 120 TABLET | Refills: 0 | Status: SHIPPED | OUTPATIENT
Start: 2017-10-30 | End: 2017-11-09

## 2017-11-15 ENCOUNTER — OFFICE VISIT (OUTPATIENT)
Dept: PHYSICAL MEDICINE AND REHAB | Facility: CLINIC | Age: 57
End: 2017-11-15
Payer: COMMERCIAL

## 2017-11-15 VITALS
BODY MASS INDEX: 50.6 KG/M2 | WEIGHT: 268 LBS | HEART RATE: 90 BPM | DIASTOLIC BLOOD PRESSURE: 97 MMHG | HEIGHT: 61 IN | SYSTOLIC BLOOD PRESSURE: 166 MMHG

## 2017-11-15 DIAGNOSIS — M43.16 SPONDYLOLISTHESIS AT L3-L4 LEVEL: ICD-10-CM

## 2017-11-15 DIAGNOSIS — M48.07 STENOSIS OF LUMBOSACRAL SPINE: ICD-10-CM

## 2017-11-15 DIAGNOSIS — M48.061 SPINAL STENOSIS OF LUMBAR REGION, UNSPECIFIED WHETHER NEUROGENIC CLAUDICATION PRESENT: Primary | ICD-10-CM

## 2017-11-15 DIAGNOSIS — M54.41 CHRONIC BILATERAL LOW BACK PAIN WITH RIGHT-SIDED SCIATICA: ICD-10-CM

## 2017-11-15 DIAGNOSIS — Z98.890 S/P LUMBAR LAMINECTOMY: ICD-10-CM

## 2017-11-15 DIAGNOSIS — Z79.891 CHRONIC USE OF OPIATE FOR THERAPEUTIC PURPOSE: ICD-10-CM

## 2017-11-15 DIAGNOSIS — M51.36 DDD (DEGENERATIVE DISC DISEASE), LUMBAR: ICD-10-CM

## 2017-11-15 DIAGNOSIS — G89.29 CHRONIC BILATERAL LOW BACK PAIN WITH RIGHT-SIDED SCIATICA: ICD-10-CM

## 2017-11-15 PROCEDURE — 99999 PR PBB SHADOW E&M-EST. PATIENT-LVL III: CPT | Mod: PBBFAC,,, | Performed by: PHYSICAL MEDICINE & REHABILITATION

## 2017-11-15 PROCEDURE — 99214 OFFICE O/P EST MOD 30 MIN: CPT | Mod: S$GLB,,, | Performed by: PHYSICAL MEDICINE & REHABILITATION

## 2017-11-15 RX ORDER — OXYCODONE AND ACETAMINOPHEN 10; 325 MG/1; MG/1
1 TABLET ORAL EVERY 8 HOURS PRN
Qty: 70 TABLET | Refills: 0 | Status: SHIPPED | OUTPATIENT
Start: 2018-01-15 | End: 2018-02-15 | Stop reason: SDUPTHER

## 2017-11-15 RX ORDER — OXYCODONE AND ACETAMINOPHEN 10; 325 MG/1; MG/1
1 TABLET ORAL EVERY 8 HOURS PRN
Qty: 70 TABLET | Refills: 0 | Status: SHIPPED | OUTPATIENT
Start: 2017-11-15 | End: 2017-12-15

## 2017-11-15 RX ORDER — OXYCODONE AND ACETAMINOPHEN 10; 325 MG/1; MG/1
1 TABLET ORAL EVERY 8 HOURS PRN
Qty: 70 TABLET | Refills: 0 | Status: SHIPPED | OUTPATIENT
Start: 2017-12-15 | End: 2018-01-14

## 2017-11-15 RX ORDER — DIAZEPAM 5 MG/1
5 TABLET ORAL EVERY 12 HOURS PRN
Qty: 60 TABLET | Refills: 2 | Status: SHIPPED | OUTPATIENT
Start: 2017-11-15 | End: 2018-05-15 | Stop reason: SDUPTHER

## 2017-11-15 NOTE — PROGRESS NOTES
Subjective:       Patient ID: Tanika Wayne is a 57 y.o. female.    Chief Complaint: Back Pain and Hip Pain (R hip)    Back Pain   Pertinent negatives include no abdominal pain, chest pain, fever, numbness or weakness. Headaches:     Hip Pain    Pertinent negatives include no numbness.      Mrs. Wayne returns to clinic for worsening of chronic back pain, and chronic pain management.   She is s/p L3-5 laminectomy on 2/22/17, cx with superficial wound infection.  Referred by Dr. Sullivan.   LCV 8/11/17.  Since LCV, she reports significant improvement of back pain.   Percocet decreases her pain down to 3-4. She takes it BID, early in am,a nd 7-8 pm.  She also reports that Valium helps with leg spasms, takes it in am/pm. Takes also Flexerila t bedtime, that helps with sleep.   She sates that she was taking Tramadol before surgery and was making her constipated.  Also , reports that  did not want her to have PT until October 2017.    She is on long term disability after back surgery.  She is also on Workman's Comp for Rt knee and hip ( she fell at work , on January 7/2016)   Back Pain Description:  Lenght: pain is a chronic pain. Length > many, more than 10 yrs, with worsening last year.   Any past, recent injury, falls.  Intensity:  CURRENT  5/10,  AVERAGE  Pain  /10. at BEST /10 , At WORST 7-8 /10 on the WORST day.   Location: pain is localized at  Rt side of back and buttock, running to Rt hip and leg.    It is more Back at right side >> leg pain.   Radiation: Rt  Buttocks all the way to Rt legs, to mid calf   Timing :it is  constant day/yamila at night pain ,cannot sleep on RT side,  worse with activity, in evening  Cannot stand 2-5 minutes, needs tos it down, cannot walk a distance of one room, secondary to back pain , has to sit down for 5-10 minutes,   and can walk again.   Uses SC  ( borrowed from her ). She does noot have any AD at home.   She states that she ordered RW with seat through  Workmans' comp.   QUALITY: Back pain is more dull ache.  No Sharp/shooting/ neuropathic : burning, tingling, numbness.  She reports muscle spasm in RT calf.   She has  RT weakness, she reports a couple of times inside house, no injury.   Worsening factors:  Standing, walking.   Alleviating factors: sitting and laying down.   Symptoms interfere with daily activity, sleeping and work.   Current medications: Tramadol , Aleve   Failed medications: Gabapentin ( makes bruises on skin, bluish nails) Lyrica ( makes ankle swelling) .   Prior procedures.  Minimally invasive laminectomy.   PT/OT: None. Per patient, Dr Sullivan told her not to have PT until 2017.   Patient denies night fever/night sweats, bowel incontinence, significant weight loss and significant motor weakness (red flags).  Patient denies any suicidal or homicidal ideations.  She is here for chronic pain management with opiates.    Past Medical History:   Diagnosis Date    Abnormal echocardiogram 2012    Arthritis     Breast cancer 1998    Breast cancer, right breast 2012    Cardiomyopathy due to systemic disease     Clotting disorder     Diabetes mellitus     Diabetes mellitus type II     DM (diabetes mellitus) 2012    Encounter for blood transfusion     HTN (hypertension) 2012    Hypertension     Increased glucose level 2012    ITP (idiopathic thrombocytopenic purpura) 2012    Kidney stones 2012    PONV (postoperative nausea and vomiting)     Stenosis of lumbosacral spine 2012    Thyroid disease     Tympanic membrane perforation 9/15/2014    right  Dr. Jeffries       Past Surgical History:   Procedure Laterality Date    BREAST SURGERY       SECTION      CHOLECYSTECTOMY      EYE SURGERY      HYSTERECTOMY      L3-5 Laminectomy  2017    Dr. 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 180 tablet 0    clotrimazole-betamethasone 1-0.05% (LOTRISONE) cream Apply topically 2 (two) times daily as needed. 45 g 1    cyclobenzaprine (FLEXERIL) 10 MG tablet TAKE ONE-HALF TO ONE TABLET BY MOUTH EVERY NIGHT AT BEDTIME 30 tablet 3    diazePAM (VALIUM) 5 MG tablet Take 1 tablet (5 mg total) by mouth every 12 (twelve) hours as needed for Anxiety (muscle spasm). 60 tablet 2    docusate sodium (STOOL SOFTENER) 100 MG capsule Take 100 mg by mouth 3 (three) times daily as needed for Constipation.      fluticasone (FLONASE) 50 mcg/actuation nasal spray 2 sprays by Each Nare route once daily. 16 g 3    levothyroxine (SYNTHROID) 100 MCG tablet TAKE 1 TABLET BY MOUTH ONCE DAILY 30 tablet 11    losartan-hydrochlorothiazide 100-12.5 mg (HYZAAR) 100-12.5 mg Tab Take 1 tablet by mouth every morning. 90 tablet 3    multivitamin (THERAGRAN) per tablet Take 1 tablet by mouth once daily.      nifedipine (PROCARDIA-XL) 90 MG (OSM) TR24 TAKE 1 TABLET ONE TIME DAILY 90 tablet 3    oxyCODONE-acetaminophen (PERCOCET)  mg per tablet Take 1 tablet by mouth every 8 (eight) hours as needed for Pain (max 3 tabs a day). 70 tablet 0    [START ON 12/15/2017] oxyCODONE-acetaminophen  (PERCOCET)  mg per tablet Take 1 tablet by mouth every 8 (eight) hours as needed for Pain (max 3 tabs a day). 70 tablet 0    [START ON 1/15/2018] oxyCODONE-acetaminophen (PERCOCET)  mg per tablet Take 1 tablet by mouth every 8 (eight) hours as needed for Pain (max 3 tabs a day). 70 tablet 0    potassium chloride SA (K-DUR,KLOR-CON) 20 MEQ tablet Take 2 tablets (40 mEq total) by mouth 2 (two) times daily. 360 tablet 0    venlafaxine (EFFEXOR-XR) 75 MG 24 hr capsule TAKE 1 CAPSULE EVERY NIGHT 90 capsule 0     No current facility-administered medications for this visit.        Review of patient's allergies indicates:  No Known Allergies      Review of Systems   Constitutional: Negative for appetite change, chills, fatigue, fever and unexpected weight change.   HENT: Negative for drooling, trouble swallowing and voice change.    Eyes: Negative for pain and visual disturbance.   Respiratory: Negative for shortness of breath and wheezing.    Cardiovascular: Negative for chest pain and palpitations.   Gastrointestinal: Negative for abdominal distention, abdominal pain, constipation and diarrhea.   Genitourinary: Negative for difficulty urinating.   Musculoskeletal: Positive for back pain and gait problem. Negative for arthralgias, joint swelling, myalgias and neck stiffness.               Skin: Negative for color change and rash.   Neurological: Negative for dizziness, facial asymmetry, speech difficulty, weakness, light-headedness and numbness. Headaches:     Hematological: Negative for adenopathy.   Psychiatric/Behavioral: Negative for behavioral problems, confusion and sleep disturbance. The patient is not nervous/anxious.        Objective:      Physical Exam      GENERAL: The patient is alert, oriented, pleasant.   HEENT; PERRLA  NECK: supple,    MUSCULOSKELETAL:   Gait is normal .  Cervical spine: full  AROM in cervical spine.  Lumbar spine, full range of motion in all planes, flexion to 90 degrees ,  ext. 0.  Side bending and rotation to 35-40 degrees, b/l.  Straight leg raising Negative bilaterally.   Full range of motion in all joints x4 extremities.   Muscle strength 5/5 throughout x4 extremities.   No  joint laxity throughout x4 extremities.   NEUROLOGIC: Cranial nerves II through XII intact.   Deep tendon reflexes is normal, +2 in the upper and lower extremities bilaterally.   Muscle tone is normal.   Sensory is intact to light touch and pinprick throughout x4 extremities.       MRI of Lumbar spine ( 01/2017) showed:   Continued grade 1 anterolisthesis of L3 on L4.   The lumbar vertebral body height, contour and bone marrow signal is relatively stable without evidence for acute fracture.  The distal spinal cord and conus is normal in signal and contour the tip of the conus approximates the mid [id number].  T12/L1 No significant disc bulge, central canal or neural foraminal stenosis.   L1/L2: Small disc bulge with ligamentum flavum hypertrophy and facet joint arthropathy with mild central canal and bilateral neural foraminal stenosis  L2/L3: Trace disc bulge with ligamentum flavum hypertrophy and facet joint arthropathy with moderate-to-severe central canal stenosis.   There is mild resulting neuroforaminal stenosis.  L3/L4: Bulging disc with ligament flavum hypertrophy and facet arthropathy with severe central canal stenosis and mild bilateral neuroforaminal stenosis.  L4/L5: Bulging disc ligamentum flavum hypertrophy and facet joint arthropathy without significant central canal stenosis   with mild/moderate neuroforaminal stenosis bilaterally  L5/S1: Bulging disc with left paracentral disc protrusion and annular fissure. No significant central canal stenosis with mild right and   moderate left neuroforaminal stenosis.  Impression:   Continued multilevel degenerative change of the lumbar spine which remains most prominent at L3/L4 with small disc bulge with   exuberant ligamentum flavum hypertrophy and facet  joint arthropathy   with severe central canal stenosis and mild neuroforaminal stenosis.  Continued bulging disc with left paracentral disc protrusion L5/S1.    Assessment:       1. Spinal stenosis of lumbar region, unspecified whether neurogenic claudication present    2. DDD (degenerative disc disease), lumbar    3. Spondylolisthesis at L3-L4 level    4. S/P lumbar laminectomy    5. Chronic bilateral low back pain with right-sided sciatica    6. Stenosis of lumbosacral spine    7. Chronic use of opiate for therapeutic purpose        Plan:       Spinal stenosis of lumbar region, unspecified whether neurogenic claudication present  -     oxyCODONE-acetaminophen (PERCOCET)  mg per tablet; Take 1 tablet by mouth every 8 (eight) hours as needed for Pain (max 3 tabs a day).  Dispense: 70 tablet; Refill: 0  -     oxyCODONE-acetaminophen (PERCOCET)  mg per tablet; Take 1 tablet by mouth every 8 (eight) hours as needed for Pain (max 3 tabs a day).  Dispense: 70 tablet; Refill: 0  -     oxyCODONE-acetaminophen (PERCOCET)  mg per tablet; Take 1 tablet by mouth every 8 (eight) hours as needed for Pain (max 3 tabs a day).  Dispense: 70 tablet; Refill: 0  -     diazePAM (VALIUM) 5 MG tablet; Take 1 tablet (5 mg total) by mouth every 12 (twelve) hours as needed for Anxiety (muscle spasm).  Dispense: 60 tablet; Refill: 2    DDD (degenerative disc disease), lumbar  -     oxyCODONE-acetaminophen (PERCOCET)  mg per tablet; Take 1 tablet by mouth every 8 (eight) hours as needed for Pain (max 3 tabs a day).  Dispense: 70 tablet; Refill: 0  -     oxyCODONE-acetaminophen (PERCOCET)  mg per tablet; Take 1 tablet by mouth every 8 (eight) hours as needed for Pain (max 3 tabs a day).  Dispense: 70 tablet; Refill: 0  -     oxyCODONE-acetaminophen (PERCOCET)  mg per tablet; Take 1 tablet by mouth every 8 (eight) hours as needed for Pain (max 3 tabs a day).  Dispense: 70 tablet; Refill: 0  -     diazePAM  (VALIUM) 5 MG tablet; Take 1 tablet (5 mg total) by mouth every 12 (twelve) hours as needed for Anxiety (muscle spasm).  Dispense: 60 tablet; Refill: 2    Spondylolisthesis at L3-L4 level  -     oxyCODONE-acetaminophen (PERCOCET)  mg per tablet; Take 1 tablet by mouth every 8 (eight) hours as needed for Pain (max 3 tabs a day).  Dispense: 70 tablet; Refill: 0  -     oxyCODONE-acetaminophen (PERCOCET)  mg per tablet; Take 1 tablet by mouth every 8 (eight) hours as needed for Pain (max 3 tabs a day).  Dispense: 70 tablet; Refill: 0  -     oxyCODONE-acetaminophen (PERCOCET)  mg per tablet; Take 1 tablet by mouth every 8 (eight) hours as needed for Pain (max 3 tabs a day).  Dispense: 70 tablet; Refill: 0  -     diazePAM (VALIUM) 5 MG tablet; Take 1 tablet (5 mg total) by mouth every 12 (twelve) hours as needed for Anxiety (muscle spasm).  Dispense: 60 tablet; Refill: 2    S/P lumbar laminectomy  -     oxyCODONE-acetaminophen (PERCOCET)  mg per tablet; Take 1 tablet by mouth every 8 (eight) hours as needed for Pain (max 3 tabs a day).  Dispense: 70 tablet; Refill: 0  -     oxyCODONE-acetaminophen (PERCOCET)  mg per tablet; Take 1 tablet by mouth every 8 (eight) hours as needed for Pain (max 3 tabs a day).  Dispense: 70 tablet; Refill: 0  -     oxyCODONE-acetaminophen (PERCOCET)  mg per tablet; Take 1 tablet by mouth every 8 (eight) hours as needed for Pain (max 3 tabs a day).  Dispense: 70 tablet; Refill: 0  -     diazePAM (VALIUM) 5 MG tablet; Take 1 tablet (5 mg total) by mouth every 12 (twelve) hours as needed for Anxiety (muscle spasm).  Dispense: 60 tablet; Refill: 2    Chronic bilateral low back pain with right-sided sciatica  -     oxyCODONE-acetaminophen (PERCOCET)  mg per tablet; Take 1 tablet by mouth every 8 (eight) hours as needed for Pain (max 3 tabs a day).  Dispense: 70 tablet; Refill: 0  -     oxyCODONE-acetaminophen (PERCOCET)  mg per tablet; Take 1 tablet  by mouth every 8 (eight) hours as needed for Pain (max 3 tabs a day).  Dispense: 70 tablet; Refill: 0  -     oxyCODONE-acetaminophen (PERCOCET)  mg per tablet; Take 1 tablet by mouth every 8 (eight) hours as needed for Pain (max 3 tabs a day).  Dispense: 70 tablet; Refill: 0  -     diazePAM (VALIUM) 5 MG tablet; Take 1 tablet (5 mg total) by mouth every 12 (twelve) hours as needed for Anxiety (muscle spasm).  Dispense: 60 tablet; Refill: 2    Stenosis of lumbosacral spine  -     oxyCODONE-acetaminophen (PERCOCET)  mg per tablet; Take 1 tablet by mouth every 8 (eight) hours as needed for Pain (max 3 tabs a day).  Dispense: 70 tablet; Refill: 0  -     oxyCODONE-acetaminophen (PERCOCET)  mg per tablet; Take 1 tablet by mouth every 8 (eight) hours as needed for Pain (max 3 tabs a day).  Dispense: 70 tablet; Refill: 0  -     oxyCODONE-acetaminophen (PERCOCET)  mg per tablet; Take 1 tablet by mouth every 8 (eight) hours as needed for Pain (max 3 tabs a day).  Dispense: 70 tablet; Refill: 0  -     diazePAM (VALIUM) 5 MG tablet; Take 1 tablet (5 mg total) by mouth every 12 (twelve) hours as needed for Anxiety (muscle spasm).  Dispense: 60 tablet; Refill: 2    Chronic use of opiate for therapeutic purpose  -     oxyCODONE-acetaminophen (PERCOCET)  mg per tablet; Take 1 tablet by mouth every 8 (eight) hours as needed for Pain (max 3 tabs a day).  Dispense: 70 tablet; Refill: 0  -     oxyCODONE-acetaminophen (PERCOCET)  mg per tablet; Take 1 tablet by mouth every 8 (eight) hours as needed for Pain (max 3 tabs a day).  Dispense: 70 tablet; Refill: 0  -     oxyCODONE-acetaminophen (PERCOCET)  mg per tablet; Take 1 tablet by mouth every 8 (eight) hours as needed for Pain (max 3 tabs a day).  Dispense: 70 tablet; Refill: 0  -     diazePAM (VALIUM) 5 MG tablet; Take 1 tablet (5 mg total) by mouth every 12 (twelve) hours as needed for Anxiety (muscle spasm).  Dispense: 60 tablet; Refill:  2    Patient with chronic low back pain, with neurogenic claudication secondary to multi level Lumbar spinal stenosis, moderate-to-severe at L2-3, and   Severe at L3/L4, secondary to bulging disc with ligament flavum hypertrophy and facet arthropathy, s/p L3-5 laminectomy on 2/22/17.    -- Pain mgm, will resume Percocet 10/325 mg po q8 hrs prn.  And Valium prn muscle spasms.  RTC in 3 months.    Total time spent face to face with patient was 25 minutes.   More than 50% of that time was spent in counseling on diagnosis , prognosis and treatment options.   I also caunsel patient  on common and most usual side effect of prescribed medications.   Risk and benefits of opiates, possible risk of developing opiate dependence and tolerance, need of strict compliance with prescribed medications.  Pain contract, rules and obligations were discussed with patient in details.  He is aware that I would be the only doctor prescribing him pain medications and ED in a case of emergency.  I reviewed Primary care , and other specialty's notes to better coordinate patient's  care.   All questions were answered, and patient voiced understanding.

## 2017-11-24 ENCOUNTER — CLINICAL SUPPORT (OUTPATIENT)
Dept: REHABILITATION | Facility: OTHER | Age: 57
End: 2017-11-24
Attending: NEUROLOGICAL SURGERY
Payer: COMMERCIAL

## 2017-11-24 DIAGNOSIS — Z98.890 HISTORY OF LUMBAR LAMINECTOMY: ICD-10-CM

## 2017-11-24 PROCEDURE — 97162 PT EVAL MOD COMPLEX 30 MIN: CPT | Performed by: PHYSICAL MEDICINE & REHABILITATION

## 2017-11-24 PROCEDURE — 97110 THERAPEUTIC EXERCISES: CPT | Performed by: PHYSICAL MEDICINE & REHABILITATION

## 2017-11-24 NOTE — PLAN OF CARE
[facility] BACK - PHYSICAL THERAPY EVALUATION     Name: Tanika Wayne  Clinic Number: [id number]    Tanika is a 57 y.o. female evaluated on 11/24/2017.   Time In: 9:00 am  Time out: 11:00 am    Diagnosis:   Encounter Diagnosis   Name Primary?    History of lumbar laminectomy      Physician: Dave Sullivan MD  Treatment Orders: PT Eval and Treat    Past Medical History:   Diagnosis Date    Abnormal echocardiogram 11/26/2012    Arthritis     Breast cancer 1998    Breast cancer, right breast 11/26/2012    Cardiomyopathy due to systemic disease     Clotting disorder     Diabetes mellitus     Diabetes mellitus type II     DM (diabetes mellitus) 11/26/2012    Encounter for blood transfusion     HTN (hypertension) 11/26/2012    Hypertension     Increased glucose level 11/26/2012    ITP (idiopathic thrombocytopenic purpura) 11/26/2012    Kidney stones 11/26/2012    PONV (postoperative nausea and vomiting)     Stenosis of lumbosacral spine 11/26/2012    Thyroid disease     Tympanic membrane perforation 9/15/2014    right 2014 Dr. Jeffries     Current Outpatient Prescriptions   Medication Sig    carvedilol (COREG) 3.125 MG tablet Take 1 tablet (3.125 mg total) by mouth 2 (two) times daily.    clotrimazole-betamethasone 1-0.05% (LOTRISONE) cream Apply topically 2 (two) times daily as needed.    cyclobenzaprine (FLEXERIL) 10 MG tablet TAKE ONE-HALF TO ONE TABLET BY MOUTH EVERY NIGHT AT BEDTIME    diazePAM (VALIUM) 5 MG tablet Take 1 tablet (5 mg total) by mouth every 12 (twelve) hours as needed for Anxiety (muscle spasm).    docusate sodium (STOOL SOFTENER) 100 MG capsule Take 100 mg by mouth 3 (three) times daily as needed for Constipation.    fluticasone (FLONASE) 50 mcg/actuation nasal spray 2 sprays by Each Nare route once daily.    levothyroxine (SYNTHROID) 100 MCG tablet TAKE 1 TABLET BY MOUTH ONCE DAILY    losartan-hydrochlorothiazide 100-12.5 mg (HYZAAR) 100-12.5 mg Tab Take 1  tablet by mouth every morning.    multivitamin (THERAGRAN) per tablet Take 1 tablet by mouth once daily.    nifedipine (PROCARDIA-XL) 90 MG (OSM) TR24 TAKE 1 TABLET ONE TIME DAILY    oxyCODONE-acetaminophen (PERCOCET)  mg per tablet Take 1 tablet by mouth every 8 (eight) hours as needed for Pain (max 3 tabs a day).    [START ON 12/15/2017] oxyCODONE-acetaminophen (PERCOCET)  mg per tablet Take 1 tablet by mouth every 8 (eight) hours as needed for Pain (max 3 tabs a day).    [START ON 1/15/2018] oxyCODONE-acetaminophen (PERCOCET)  mg per tablet Take 1 tablet by mouth every 8 (eight) hours as needed for Pain (max 3 tabs a day).    potassium chloride SA (K-DUR,KLOR-CON) 20 MEQ tablet Take 2 tablets (40 mEq total) by mouth 2 (two) times daily.    venlafaxine (EFFEXOR-XR) 75 MG 24 hr capsule TAKE 1 CAPSULE EVERY NIGHT     No current facility-administered medications for this visit.      Review of patient's allergies indicates:  No Known Allergies    Precautions:  - L3-4, L4-5 laminectomy that was done by Dr. Sullivan on 2/22/2017  -will be having right hip replacement and right knee replacement at some point for arthritis and pain in these joints  -fall 2016  -thyroidectomy  -DM borderline  -breast CA 1998       Visit # authorized:  20  Authorization period: 12/31/17  Plan of care Expiration: sent 11/24/17    PATTERN: 1 PEN    Date of eval:  11/24/17  Assessment due:  12/24/17      HISTORY   History of Present Illness: Tanika Wayne is a 57 y.o.-year-old female who presents today for post-operative follow-up. She is s/p L3-4, L4-5 laminectomy that was done by Dr. Sullivan on 2/22/2017.   She had back pain for over 5 years that got to the point she couldn't function.  She also had leg pain with walking/standing.    She had failed MILLY before the sx.  Currently, the patient's symptoms are slightly better since surgery.  She reports her leg pain is better since sx, but better.   She has  complicated factors of right hip and knee deterioration and is waiting for replacements but needs to loose weight first.   She has been using a rolator walker since June due to back, and right hip and knee.  She always uses the walker.     She currently has back pain, intermittent,daily, worse on the right, worse with walking/standing and sitting too long.  She currently has right hip and knee pain with standing/walking.  She doesn't really think her pain runs from her back into her legs  No numbness or tingling in  legs      Aggravating factors: standing < 5 min, can't stand to shower, walking > 1 block, sitting too long, doesn't go on outing due to reduced function  Easing Factors: sitting after walking  Disturbed Sleep: not from back      Pain Scale: Tanika rates pain on a scale of 0-10 to be 10 at worst; 1 currently; 0 at best using VAS.   Pain location: back and right hip and right knee           Diagnostic Tests: From EPIC MRI   Continued multilevel degenerative change of the lumbar spine which remains most prominent at L3/L4 with small disc bulge with exuberant ligamentum flavum hypertrophy and facet joint arthropathy with severe central canal stenosis and mild neuroforaminal stenosis.  Pattern of pain questions:  1.  Where is your pain the worst? back  2.  Is your pain constant or intermittent? intermittent  3.  Does bending forward make your typical pain worse? yes  4.  Since the start of your back pain, has there been a change in your bowel or bladder? no  5.  What can't you do now that you use to be able to do? Stand/walk, cook a meal    Prior Treatment: no therapy  Prior functional status: lives with dtr who helps with shopping and meal prep, tub bench, rolator walker,   DME owned/used: tub bench, she can bath and dress self, using walker all the time  Occupation:  disability   Leisure: sits a lot                     Pts goals:  Wants to get out more, she can't walk far enough now    Red Flag Screening:    Cough  Sneeze  Strain: neg  Bladder/ bowel: neg  Falls: no  General health: good  Night pain: neg  Unexplained weight loss: neg    OBJECTIVE     POSTURE  Posture Alignment :poor posture, sits slouched with head forward  Postural examination/scapula alignment:rounded shoulders, head forward  Standing: stands with weight shifted left, uses a rolator walker  Correction of posture: better with lumbar roll    MOVEMENT LOSS    ROM Loss   Flexion major loss   Extension major loss   Side bending Right major loss   Side bending Left major loss   Rotation Right major loss   Rotation Left major loss     Lower Extremity Strength  Right LE  Left LE    Hip flexion: 4/5 Hip flexion: 4/5   Hip extension:  4/5 Hip extension: 4/5   Hip abduction: 4/5 Hip abduction: 4/5   Knee Flexion 4/5 Knee Flexion 4/5   Knee Extension 4/5 Knee Extension 4/5   Ankle dorsiflexion: 4/5 Ankle dorsiflexion: 4/5   Ankle plantarflexion: 4/5 Ankle plantarflexion: 4/5     Dynamic strength 4-/5  Needs hand assist sit to stand and stand to sit  Unable to SLS   Able to stand 1 min without support and able to reach mod out side base of support  Able to stand and close eyes 10 sec  Unable to squat and return at all  Unable to do stairs without rail and stand by        GAIT:  amb with rolator walker, step through gait, trendelenberg bilat but pronounced on right    Special Tests:   Test Name  Test Result   Prone Instability Test (--)   SI Joint Provocation Test (--)   Straight Leg Raise (--)   Neural Tension Test (--)   Crossed Straight Leg Raise (--)   Walking on toes able   Walking on heels  able       NEUROLOGICAL SCREENING     Sensory deficit: intact including saddle sensation    Reflexes:    Left Right   Patella Tendon 1+ 1+   Achilles Tendon 1+ 1+   Babinski  (--) (--)   Clonus (--) (--)     REPEATED TEST MOVEMENTS: back 2/10  Repeated Flexion in Standing end range pain  no worse   Repeated Extension in Standing end range pain  no worse   Repeated  Flexion in lying end range pain  no worse   Repeated Extension in lying  not tested         Baseline Isometric Testing on Med X equipment: Testing administered by PT    [facility] 2017   Visit Number 1   VAS Pain Rating 2   Lumbar Extension Seat Pad 1   Femur Restraint 6   Top Dead Center 24   Counterweight 200   Lumbar Flexion 36   Lumbar Extension 6   Lumbar Peak Torque 94   Min Torque 41   Percent From Norm 72   Ice - Z Lie (in min.) 10         Baseline IM Testing Results:   Date of testin17  ROM 6-36 deg   Max Peak Torque 94 ft lbs    Min Peak Torque 41 ft lbs    Flex/Ext Ratio 2/1   % below normative data 72%   Counter weight 200   Femur number 6   Seat pad 1           FOTO: Focus on Therapeutic Outcomes   Category: lumbar   % Impaired: 73%  Current Score  = CL = least 60% but < 80% impaired, limited or restricted  Goal at Discharge Score = CK = at least 40% but < 60% impaired, limited or restricted    Score interpretation is as follows:     TEST SCORE  Modifier  Impairment Limitation Restriction    0/50  CH  0 % impaired, limited or restricted   1-9/50  CI  @ least 1% but less than 20% impaired, limited or restricted   10-19/50  CJ  @ least 20%<40% impaired, limited or restricted   20-29/50  CK  @ least 40%<60% impaired, limited or restricted   30-39/50  CL  @ least 60% <80% impaired, limited or restricted   40-49/50  CM  @ least 80%<100% impaired limited or restricted   50/50  CN  100% impaired, limited or restricted       Treatment   Time In: 10:20 am  Time Out: 11:00 am    PT Evaluation Completed? Yes  Discussed Plan of Care with patient: Yes      Written Home Exercises Provided:   Handouts were given to the patient. Pt demo good understanding of the education provided. Tanika demonstrated good return demonstration of activities.        Pt was instructed in and performed the following:   Cardiovascular exercise and therapeutic exercise to improve posture, lumbar/cervical ROM,  "strength, and muscular endurance as follows:     Tanika received therapeutic exercises to develop/improve posture, lumbar/cervical ROM, strength and muscular endurance for 30 minutes including the following exercises:   Med ex warm up  Med ex testing  Education regarding posture  HEP performed      HEP started as follows:  -handouts given regarding back care, with information regarding posture, body mech, ergonomics, and components of good exercise program  -Patient received education regarding proper posture and body mechanics.  Chen roll tried, recommended, and purchase information was provided.  -discussed concept of developing "tool box" or "strategies, using positions or exercises to reduce symptoms.  Discussed using these tools to reduce symptoms, and also to prevent symptoms if able.      --gave top 10 tips handouts on back and neck care and discusses sitting posture, use of lumbar roll, standing  Posture, correct lifting techniques, need to exercise and encouraged walking, drinking water, healthy diet, regular sleep    HEP as follows:  Seated hip flexion 10 reps 2/day  Seated long arc quads 10 reps 2/day  Seated pillow squeeze 10 reps 2/day  Seated pelvic tilts 10 reps 2/day    Fall precautions and safety reviewed          - Patient received a handout regarding anticipated muscular soreness following the isometric test and strategies for management were reviewed with patient including stretching, using ice and scheduled rest.     Use this checklist to minimize fall risk  Outside your home   Paint the edges of outdoor steps and any steps that are especially narrow or are higher or lower than the rest.   Paint outside stairs with a mixture of sand and paint for better traction. Keep outdoor walkways clear and well-lit.   Clear snow and ice from entrances and sidewalks.  Inside your home   Remove all extraneous clutter in your house.   Keep telephone and electrical cords out of pathways.   Tack rugs " and glue vinyl citlaly so they lie flat. Remove or replace rugs or runners that tend to slip, or attach nonslip backing.   Ensure that carpets are firmly attached to the stairs.   Do not stand on a chair to reach things. Store frequently used objects where you can reach them easily.  Keep a well-lit home   Have a lamp or light switch that you can easily reach without getting out of bed.   Use night lights in the bedroom, bathroom and hallways.   Keep a flashlight handy.   Have light switches at both ends of stairs and halls. Install handrails on both sides of stairs.   Turn on the lights when you go into the house at night.  Bathroom tips   Add grab bars in shower, tub and toilet areas.   Use nonslip adhesive strips or a mat in shower or tub.   Consider sitting on a bench or stool in the shower.   Consider using an elevated toilet seat.  Use care walking   Use helping devices, such as canes, as directed by your healthcare provider.   Wear nonslip, low-heeled shoes or slippers that fit snugly. Avoid walking in stocking feet.  And dont forget...   Review medications with your doctor or pharmacist. Some drugs, including over-thecounter drugs, can make you drowsy, dizzy and unsteady.   Have your hearing and eyesight tested. Inner ear problems can affect balance. Vision problems make it difficult to see potential hazards.   Discuss safe amounts of alcohol intake with your physician.   Exercise regularly to improve muscle flexibility, strength, and balance. Talk to your healthcare provider about exercise programs that are right for you.   If you feel dizzy or lightheaded, sit down or stay seated until your head clears. Stand up slowly to avoid unsteadiness.      Assessment   This is a 57 y.o. female referred to [facility] and presents with a medical diagnosis of   Encounter Diagnosis   Name Primary?    History of lumbar laminectomy     and demonstrates limitations as described below in the  problem list. Pt rehab potential is Good. Pt presents with back pain, reduced back strength and reduced back mobility, poor LE and back strength, poor function, unable to manage indep without dtr assist    Pain Pattern: 1 PEN       Patient received education on the [facility] program, purpose of the isometric test, progression of back strengthening as well as wellness approach and systemic strengthening.  Details of the program were discussed.  Reviewed that patient should feel support/pressure from med ex restraints but no pain or discomfort and patient expressed understanding.    Based on the above history and physical examination an active physical therapy program is recommended.  Pt will continue to benefit from skilled outpatient physical therapy to address the deficits listed below in the chart, provide pt/family education and to maximize pt's level of independence in the home and community environment. .     No environmental, cultural, spiritual, developmental or education needs expressed or noted    Medical necessity is demonstrated by the following problem list.    Pt presents with the following impairments:   History  Co-morbidities and personal factors that may impact the plan of care Examination  Body Structures and Functions, activity limitations and participation restrictions that may impact the plan of care Clinical Presentation   Decision Making/ Complexity Score   Co-morbidities:     - L3-4, L4-5 laminectomy that was done by Dr. Sullivan on 2/22/2017  -will be having right hip replacement and right knee replacement at some point for arthritis and pain in these joints  -fall 2016  -thyroidectomy  -DM borderline  -breast CA 1998          Personal Factors:   Over weight  Fall precautions Body Regions:   back  lower extremities    Body Systems:   gross symmetry  ROM  strength  gross coordinated movement  transitions  motor control  motor learning    Activity limitations:   Learning and applying  knowledge  no deficits    General Tasks and Commands  no deficits    Communication  no deficits    Mobility  using transportation (bus, train, plane, car)    Self care  looking after one's health    Domestic Life  doing house work (cleaning house, washing dishes, laundry)    Interactions/Relationships  no deficits    Life Areas  no deficits    Community and Social Life  no deficits    Participation Restrictions:   Unable to shop and go out     evolving clinical presentation with changing clinical characteristics   mod         GOALS: Pt is in agreement with the following goals.    Short term goals:  6 weeks or 10 visits   1.  Pt will demonstrate increased lumbar ROM by at least 3 degrees from the initial ROM value with improvements noted in functional ROM and ability to perform ADLs  2.  Pt will demonstrate increased improved lumbar strength on testing by 10 % with improvements functionally noted with standing/walking to make a meal and get dressed with greater ease. Improved  self care  3.  Patient report a reduction in worst pain score by 1-2 points for improved tolerance during work and recreational activities  4.  Pt able to perform HEP correctly with minimal cueing or supervision for therapist  5. amb 2 blocks, into department without needing rest      Long term goals: 13 weeks or 20 visits   1. Pt will demonstrate increased lumbar ROM by at least 6 degrees from initial ROM value, resulting in improved ability to perform functional fwd bending while standing and sitting.   2. Pt will demonstrate increased maximum isometric torque value by 30% when compared to the initial value resulting in improved ability to perform bending, lifting, and carrying activities safely, confidently.  Go to store with dtr  3. Pt to demonstrate ability to independently control and reduce their pain through posture positioning and mechanical movements throughout a typical day.  4.  Patient will demonstrate improved overall function per  "FOTO Survey to CK = at least 40% but < 60% impaired, limited or restricted score or less.  5. No falls  6.go on outings  7. Walk > 2 blocks for store      Plan   Outpatient physical therapy 2x week for 13 weeks or 20 visits to include the following:   - Patient education  - Therapeutic exercise  - Manual therapy  - Performance testing   - Neuromuscular Re-education  - Therapeutic activity   - Modalities    Pt may be seen by PTA as part of the rehabilitation team.     Therapist: Allie Robbins, PT  11/24/2017    "I certify the need for these services furnished under this plan of treatment and while under my care."    ____________________________________  Physician/Referring Practitioner    _______________  Date of Signature          "

## 2017-11-27 ENCOUNTER — CLINICAL SUPPORT (OUTPATIENT)
Dept: REHABILITATION | Facility: OTHER | Age: 57
End: 2017-11-27
Attending: NEUROLOGICAL SURGERY
Payer: COMMERCIAL

## 2017-11-27 ENCOUNTER — TELEPHONE (OUTPATIENT)
Dept: SPINE | Facility: CLINIC | Age: 57
End: 2017-11-27

## 2017-11-27 DIAGNOSIS — Z98.890 HISTORY OF LUMBAR LAMINECTOMY: ICD-10-CM

## 2017-11-27 PROCEDURE — 97110 THERAPEUTIC EXERCISES: CPT

## 2017-11-27 NOTE — PROGRESS NOTES
[facility]    Name: Tanika Wayne  Clinic Number: [id number]  Date of Treatment: 2017   Diagnosis:   Encounter Diagnosis   Name Primary?    History of lumbar laminectomy      Physician: Dave Sullivan MD    Pain pattern determined: 1 PEN  Plan of care signed: POC not signed as of 2017     Time in: 12:30  Time Out: 1:30  Total Treatment time: 45  Precautions: - L3-4, L4-5 laminectomy that was done by Dr. Sullivan on 2017  -will be having right hip replacement and right knee replacement at some point for arthritis and pain in these joints  -fall   -thyroidectomy  -DM borderline  -breast CA   Visit #: 2    POC due: POC not signed as of 2017  Reassessment due:17    Subjective   Tanika reports no significant change in symptoms. She had some mild DOMS after IE. She has been inconsistant with HEP. However, she notes she feels a little more active over the past week. She reported feeling a little better by end of session.     Patient reports their pain to be 4/10 on a 0-10 scale with 0 being no pain and 10 being the worst pain imaginable.    Pain Location: back and right hip and right knee     Occupation:  disability   Leisure: sits a lot                     Pts goals:  Wants to get out more, she can't walk far enough now       Objective        Baseline IM Testing Results:   Date of testin17  ROM 6-36 deg   Max Peak Torque 94 ft lbs    Min Peak Torque 41 ft lbs    Flex/Ext Ratio 2/1   % below normative data 72%   Counter weight 200   Femur number 6   Seat pad 1               FOTO: Focus on Therapeutic Outcomes   Category: lumbar   % Impaired: 73%  Current Score  = CL = least 60% but < 80% impaired, limited or restricted  Goal at Discharge Score = CK = at least 40% but < 60% impaired, limited or restricted    Treatment    Pt was instructed in and performed the following:     Tanika received therapeutic exercises to develop/improved  posture, cardiovascular endurance, muscular endurance, lumbar/cervical ROM, strength and muscular endurance for 45 minutes including the following exercises:   [facility] 11/27/2017   Visit Number 2   VAS Pain Rating 4   Recumbent Bike Seat Pos. 12   Time 5   Lumbar Extension Seat Pad -   Femur Restraint -   Top Dead Center -   Counterweight -   Lumbar Flexion -   Lumbar Extension -   Lumbar Peak Torque -   Min Torque -   Percent From Norm -   Lumbar Weight 47   Repetitions 20   Rating of Perceived Exertion 3   Ice - Z Lie (in min.) 10     Peripheral muscle strengthening which included 1 set of 15-20 repetitions at a slow, controlled 7 second per rep pace focused on strengthening supporting musculature for improved body mechanics and functional mobility.  Pt and therapist focused on proper form during treatment to ensure optimal strengthening of each targeted muscle group.  Machines were utilized including torso rotation, leg extension, leg curl, chest press, upright row. Tricep extension, bicep curl, leg press, and hip abduction added on third visit.       Tanika received the following manual therapy techniques: None      Home Exercise Program as follows:   Seated hip flexion 10 reps 2/day  Seated long arc quads 10 reps 2/day  Seated pillow squeeze 10 reps 2/day  Seated pelvic tilts 10 reps 2/day     Handouts were given to the patient. Pt demo good understanding of the education provided. Tanika demonstrated good return demonstration of activities.     Lumbar roll use compliance: Pt plans on buying lumbar roll by end of day  Additional exercises taught this treatment session:   HEP review     Assessment     Pt presents to physical therapy for 2nd visit following initial evaluation. Reviewed HEP exercises. Pt performed with moderate verbal cues. Pt is not consistently compliant with HEP. Pt introduced to Med X lumbar dynamic exercises and peripheral resistance exercises up to upright row.  Pt tolerated Med X  lumbar exercise weight of 50% peak torque  with reported 3/10 Abhinav Exertion scale. Pt required mild verbal cues to maintian speed to 7 sec per rep. Pt completed all peripheral resistance exercises with no reports of increased symptoms or discomfort.     Patient is making good progress towards established goals.  Pt will continue to benefit from skilled outpatient physical therapy to address the deficits stated in the impairment chart, provide pt/family education and to maximize pt's level of independence in the home and community environment.       Pt's spiritual, cultural and educational needs considered and pt agreeable to plan of care and goals as stated below:     Medical necessity is demonstrated by the following problem list.    Pt presents with the following impairments: back pain, reduced back strength and reduced back mobility, poor LE and back strength, poor function, unable to manage indep without dtr assist  History  Co-morbidities and personal factors that may impact the plan of care Examination  Body Structures and Functions, activity limitations and participation restrictions that may impact the plan of care Clinical Presentation Decision Making/ Complexity Score   Co-morbidities:      - L3-4, L4-5 laminectomy that was done by Dr. Sullivan on 2/22/2017  -will be having right hip replacement and right knee replacement at some point for arthritis and pain in these joints  -fall 2016  -thyroidectomy  -DM borderline  -breast CA 1998              Personal Factors:   Over weight  Fall precautions Body Regions:   back  lower extremities     Body Systems:   gross symmetry  ROM  strength  gross coordinated movement  transitions  motor control  motor learning     Activity limitations:   Learning and applying knowledge  no deficits     General Tasks and Commands  no deficits     Communication  no deficits     Mobility  using transportation (bus, train, plane, car)     Self care  looking after one's  health     Domestic Life  doing house work (cleaning house, washing dishes, laundry)     Interactions/Relationships  no deficits     Life Areas  no deficits     Community and Social Life  no deficits     Participation Restrictions:   Unable to shop and go out    evolving clinical presentation with changing clinical characteristics    mod            GOALS: Pt is in agreement with the following goals.     Short term goals:  6 weeks or 10 visits   1.  Pt will demonstrate increased lumbar ROM by at least 3 degrees from the initial ROM value with improvements noted in functional ROM and ability to perform ADLs  2.  Pt will demonstrate increased improved lumbar strength on testing by 10 % with improvements functionally noted with standing/walking to make a meal and get dressed with greater ease. Improved  self care  3.  Patient report a reduction in worst pain score by 1-2 points for improved tolerance during work and recreational activities  4.  Pt able to perform HEP correctly with minimal cueing or supervision for therapist  5. amb 2 blocks, into department without needing rest        Long term goals: 13 weeks or 20 visits   1. Pt will demonstrate increased lumbar ROM by at least 6 degrees from initial ROM value, resulting in improved ability to perform functional fwd bending while standing and sitting.   2. Pt will demonstrate increased maximum isometric torque value by 30% when compared to the initial value resulting in improved ability to perform bending, lifting, and carrying activities safely, confidently.  Go to store with dtr  3. Pt to demonstrate ability to independently control and reduce their pain through posture positioning and mechanical movements throughout a typical day.  4.  Patient will demonstrate improved overall function per FOTO Survey to CK = at least 40% but < 60% impaired, limited or restricted score or less.  5. No falls  6.go on outings  7. Walk > 2 blocks for store      Plan   Continue with  established Plan of Care towards established PT goals.

## 2017-11-27 NOTE — TELEPHONE ENCOUNTER
Spoke with patient , she states that she needs an appointment with a nutrition per Dr. Sullivan.  I let the patient know that we will set that appointment up and give her a call back     ----- Message from Flor Mohr RN sent at 11/27/2017 11:04 AM CST -----  Contact: Patient @ [phone]  Per his last visit note, she was to follow up prn. If she is having issues she can see a PA.  ----- Message -----  From: David Rashid  Sent: 11/27/2017   8:32 AM  To: Nate Acosta Staff    Patient is calling to schedule the f/u visit and if Dr Adhikari will accept her new insurance  ( medicaid ) at the first of year

## 2017-11-29 ENCOUNTER — CLINICAL SUPPORT (OUTPATIENT)
Dept: REHABILITATION | Facility: OTHER | Age: 57
End: 2017-11-29
Attending: NEUROLOGICAL SURGERY
Payer: COMMERCIAL

## 2017-11-29 DIAGNOSIS — Z98.890 HISTORY OF LUMBAR LAMINECTOMY: ICD-10-CM

## 2017-11-29 PROCEDURE — 97110 THERAPEUTIC EXERCISES: CPT

## 2017-11-29 NOTE — PROGRESS NOTES
[facility]    Name: Tanika Wayne  Clinic Number: [id number]  Date of Treatment: 2017   Diagnosis:   Encounter Diagnosis   Name Primary?    History of lumbar laminectomy      Physician: Dave Sullivan MD    Pain pattern determined: 1 PEN  Plan of care signed: POC not signed as of 2017     Time in: 1:36 pm  Time Out: 2:36 pm  Total Treatment time: 60  Precautions: - L3-4, L4-5 laminectomy that was done by Dr. Sullivan on 2017  -will be having right hip replacement and right knee replacement at some point for arthritis and pain in these joints  -fall   -thyroidectomy  -DM borderline  -breast CA   Visit #: 3    POC due: POC not signed as of 2017  Reassessment due:17    Subjective   Tanika reports no significant change in symptoms. She had some soreness after last vist but it did not last long, she reports minimal pain or discomfort in the hips at this time    Patient reports their pain to be 3/10 on a 0-10 scale with 0 being no pain and 10 being the worst pain imaginable.    Pain Location: back and right hip and right knee     Occupation:  disability   Leisure: sits a lot                     Pts goals:  Wants to get out more, she can't walk far enough now       Objective        Baseline IM Testing Results:   Date of testin17  ROM 6-36 deg   Max Peak Torque 94 ft lbs    Min Peak Torque 41 ft lbs    Flex/Ext Ratio 2/1   % below normative data 72%   Counter weight 200   Femur number 6   Seat pad 1               FOTO: Focus on Therapeutic Outcomes   Category: lumbar   % Impaired: 73%  Current Score  = CL = least 60% but < 80% impaired, limited or restricted  Goal at Discharge Score = CK = at least 40% but < 60% impaired, limited or restricted    Treatment    Pt was instructed in and performed the following:     Tanika received therapeutic exercises to develop/improved posture, cardiovascular endurance, muscular endurance,  lumbar/cervical ROM, strength and muscular endurance for 45 minutes including the following exercise    [facility] 11/29/2017   Visit Number 3   VAS Pain Rating 3   Recumbent Bike Seat Pos. 12   Time 5   Flexion in Sitting 10   Lumbar Extension Seat Pad -   Femur Restraint -   Top Dead Center -   Counterweight -   Lumbar Flexion -   Lumbar Extension -   Lumbar Peak Torque -   Min Torque -   Percent From Norm -   Lumbar Weight 49   Repetitions 20   Rating of Perceived Exertion 3   Ice - Z Lie (in min.) -   Ice - Sitting 10       Seated trunk flexion with T-Ball 10x  Seated trunk extension over chair 10x  Seated PPT 10x    Peripheral muscle strengthening which included 1 set of 15-20 repetitions at a slow, controlled 7 second per rep pace focused on strengthening supporting musculature for improved body mechanics and functional mobility.  Pt and therapist focused on proper form during treatment to ensure optimal strengthening of each targeted muscle group.  Machines were utilized including torso rotation, leg extension, leg curl, chest press, upright row. Tricep extension, bicep curl, leg press, and hip abduction added on third visit.       Tanika received the following manual therapy techniques: None      Home Exercise Program as follows:   Seated hip flexion 10 reps 2/day  Seated long arc quads 10 reps 2/day  Seated pillow squeeze 10 reps 2/day  Seated pelvic tilts 10 reps 2/day     Handouts were given to the patient. Pt demo good understanding of the education provided. Tanika demonstrated good return demonstration of activities.     Lumbar roll use compliance: Pt plans on buying lumbar roll by end of day  Additional exercises taught this treatment session:   HEP review     Assessment     Pt presents to physical therapy with minimal hip pain, pt reports feeling better post therapy. She was able to complete exercises with the addition of trunk flexion exercises and trunk extension  Pt tolerated Med X lumbar  exercise weight of with an increased resistance she was able to complete 20 repetitions with reported 3/10 Abhinav Exertion scale and will increase resistance by 5% next visit. Pt required mild verbal cues to maintian speed to 7 sec per rep. Pt completed all peripheral resistance exercises, she requires modification for leg curl and bicep curl because of difficulty getting on and off the machine and her increased risk of falls. Discussed with rehab tech that we will modify by using theraband for hamstring curl and weights for bicep curl.    Patient is making good progress towards established goals.  Pt will continue to benefit from skilled outpatient physical therapy to address the deficits stated in the impairment chart, provide pt/family education and to maximize pt's level of independence in the home and community environment.       Pt's spiritual, cultural and educational needs considered and pt agreeable to plan of care and goals as stated below:     Medical necessity is demonstrated by the following problem list.    Pt presents with the following impairments: back pain, reduced back strength and reduced back mobility, poor LE and back strength, poor function, unable to manage indep without dtr assist  History  Co-morbidities and personal factors that may impact the plan of care Examination  Body Structures and Functions, activity limitations and participation restrictions that may impact the plan of care Clinical Presentation Decision Making/ Complexity Score   Co-morbidities:      - L3-4, L4-5 laminectomy that was done by Dr. Sullivan on 2/22/2017  -will be having right hip replacement and right knee replacement at some point for arthritis and pain in these joints  -fall 2016  -thyroidectomy  -DM borderline  -breast CA 1998              Personal Factors:   Over weight  Fall precautions Body Regions:   back  lower extremities     Body Systems:   gross symmetry  ROM  strength  gross coordinated  movement  transitions  motor control  motor learning     Activity limitations:   Learning and applying knowledge  no deficits     General Tasks and Commands  no deficits     Communication  no deficits     Mobility  using transportation (bus, train, plane, car)     Self care  looking after one's health     Domestic Life  doing house work (cleaning house, washing dishes, laundry)     Interactions/Relationships  no deficits     Life Areas  no deficits     Community and Social Life  no deficits     Participation Restrictions:   Unable to shop and go out    evolving clinical presentation with changing clinical characteristics    mod            GOALS: Pt is in agreement with the following goals.     Short term goals:  6 weeks or 10 visits   1.  Pt will demonstrate increased lumbar ROM by at least 3 degrees from the initial ROM value with improvements noted in functional ROM and ability to perform ADLs  2.  Pt will demonstrate increased improved lumbar strength on testing by 10 % with improvements functionally noted with standing/walking to make a meal and get dressed with greater ease. Improved  self care  3.  Patient report a reduction in worst pain score by 1-2 points for improved tolerance during work and recreational activities  4.  Pt able to perform HEP correctly with minimal cueing or supervision for therapist  5. amb 2 blocks, into department without needing rest        Long term goals: 13 weeks or 20 visits   1. Pt will demonstrate increased lumbar ROM by at least 6 degrees from initial ROM value, resulting in improved ability to perform functional fwd bending while standing and sitting.   2. Pt will demonstrate increased maximum isometric torque value by 30% when compared to the initial value resulting in improved ability to perform bending, lifting, and carrying activities safely, confidently.  Go to store with dtr  3. Pt to demonstrate ability to independently control and reduce their pain through posture  positioning and mechanical movements throughout a typical day.  4.  Patient will demonstrate improved overall function per FOTO Survey to CK = at least 40% but < 60% impaired, limited or restricted score or less.  5. No falls  6.go on outings  7. Walk > 2 blocks for store      Plan   Continue with established Plan of Care towards established PT goals.

## 2017-12-06 ENCOUNTER — CLINICAL SUPPORT (OUTPATIENT)
Dept: REHABILITATION | Facility: OTHER | Age: 57
End: 2017-12-06
Attending: NEUROLOGICAL SURGERY
Payer: COMMERCIAL

## 2017-12-06 DIAGNOSIS — Z98.890 HISTORY OF LUMBAR LAMINECTOMY: ICD-10-CM

## 2017-12-06 PROCEDURE — 97110 THERAPEUTIC EXERCISES: CPT | Performed by: PHYSICAL THERAPIST

## 2017-12-06 NOTE — PROGRESS NOTES
[facility]    Name: Tanika Wayne  Clinic Number: [id number]  Date of Treatment: 2017   Diagnosis:   Encounter Diagnosis   Name Primary?    History of lumbar laminectomy      Physician: Dave Sullivan MD    Pain pattern determined: 1 PEN  Plan of care signed: POC not signed as of 2017     Time in: 1:30 pm  Time Out: 2:30 pm  Total Treatment time: 60  Precautions: - L3-4, L4-5 laminectomy that was done by Dr. Sullivan on 2017  -will be having right hip replacement and right knee replacement at some point for arthritis and pain in these joints  -fall   -thyroidectomy  -DM borderline  -breast CA   Visit #: 4    POC due: POC not signed as of 2017  Reassessment due:17    Subjective   Tanika reports she is feeling some better.  She had no pain after her last visit here. She reports minimal pain or discomfort in the hips at this time.  She denies any lower back pain but has left groin pain/hip pain today    Patient reports their pain to be 7/10 on a 0-10 scale at her hip, 0/10 at lower back with 0 being no pain and 10 being the worst pain imaginable.    Pain Location: back and right hip and right knee     Occupation:  disability   Leisure: sits a lot                     Pts goals:  Wants to get out more, she can't walk far enough now       Objective        Baseline IM Testing Results:   Date of testin17  ROM 6-36 deg   Max Peak Torque 94 ft lbs    Min Peak Torque 41 ft lbs    Flex/Ext Ratio 2/1   % below normative data 72%   Counter weight 200   Femur number 6   Seat pad 1               FOTO: Focus on Therapeutic Outcomes   Category: lumbar   % Impaired: 73%  Current Score  = CL = least 60% but < 80% impaired, limited or restricted  Goal at Discharge Score = CK = at least 40% but < 60% impaired, limited or restricted    Treatment    Pt was instructed in and performed the following:     Tanika received therapeutic exercises to  develop/improved posture, cardiovascular endurance, muscular endurance, lumbar/cervical ROM, strength and muscular endurance for 45 minutes including the following exercise    [facility] 12/6/2017   Visit Number 4   VAS Pain Rating 3   Recumbent Bike Seat Pos. 12   Time 6   Flexion in Sitting 10   Lumbar Weight 52   Repetitions 20   Rating of Perceived Exertion 3   Ice - Sitting 10       Seated trunk flexion with T-Ball 10x  Seated trunk extension over chair 10x  Seated PPT 10x    Peripheral muscle strengthening which included 1 set of 15-20 repetitions at a slow, controlled 7 second per rep pace focused on strengthening supporting musculature for improved body mechanics and functional mobility.  Pt and therapist focused on proper form during treatment to ensure optimal strengthening of each targeted muscle group.  Machines were utilized including torso rotation, leg extension, leg curl, chest press, upright row. Tricep extension, bicep curl, leg press, and hip abduction added on third visit.       Tanika received the following manual therapy techniques: None      Home Exercise Program as follows:   Supine GUY   Seated hip flexion 10 reps 2/day  Seated long arc quads 10 reps 2/day  Seated pillow squeeze 10 reps 2/day  Seated pelvic tilts 10 reps 2/day     Handouts were given to the patient. Pt demo good understanding of the education provided. Tanika demonstrated good return demonstration of activities.     Lumbar roll use compliance: Pt plans on buying lumbar roll by end of day  Additional exercises taught this treatment session:   HEP review, GUY, LTR    Assessment     Pt presents to physical therapy with moderate hip pain, pt reports feeling better post therapy. She was able to complete exercises with trunk flexion exercises and trunk extension, and supine GUY with ball.   Pt tolerated Med X lumbar exercise weight of with an increased resistance she was able to complete 20 repetitions with reported 3/10  Abhinav Exertion scale and will increase resistance by 5% next visit. May need to increase ROM next visit as well on MEd X.  Pt required mild verbal cues to maintian speed to 7 sec per rep. Pt completed all peripheral resistance exercises, she requires modification for leg curl and bicep curl because of difficulty getting on and off the machine and her increased risk of falls. Discussed with rehab tech that we will modify by using theraband for hamstring curl and weights for bicep curl.    Patient is making good progress towards established goals.  Pt will continue to benefit from skilled outpatient physical therapy to address the deficits stated in the impairment chart, provide pt/family education and to maximize pt's level of independence in the home and community environment.       Pt's spiritual, cultural and educational needs considered and pt agreeable to plan of care and goals as stated below:     Medical necessity is demonstrated by the following problem list.    Pt presents with the following impairments: back pain, reduced back strength and reduced back mobility, poor LE and back strength, poor function, unable to manage indep without dtr assist  History  Co-morbidities and personal factors that may impact the plan of care Examination  Body Structures and Functions, activity limitations and participation restrictions that may impact the plan of care Clinical Presentation Decision Making/ Complexity Score   Co-morbidities:      - L3-4, L4-5 laminectomy that was done by Dr. Sullivan on 2/22/2017  -will be having right hip replacement and right knee replacement at some point for arthritis and pain in these joints  -fall 2016  -thyroidectomy  -DM borderline  -breast CA 1998              Personal Factors:   Over weight  Fall precautions Body Regions:   back  lower extremities     Body Systems:   gross symmetry  ROM  strength  gross coordinated movement  transitions  motor control  motor learning     Activity  limitations:   Learning and applying knowledge  no deficits     General Tasks and Commands  no deficits     Communication  no deficits     Mobility  using transportation (bus, train, plane, car)     Self care  looking after one's health     Domestic Life  doing house work (cleaning house, washing dishes, laundry)     Interactions/Relationships  no deficits     Life Areas  no deficits     Community and Social Life  no deficits     Participation Restrictions:   Unable to shop and go out    evolving clinical presentation with changing clinical characteristics    mod            GOALS: Pt is in agreement with the following goals.     Short term goals:  6 weeks or 10 visits   1.  Pt will demonstrate increased lumbar ROM by at least 3 degrees from the initial ROM value with improvements noted in functional ROM and ability to perform ADLs  2.  Pt will demonstrate increased improved lumbar strength on testing by 10 % with improvements functionally noted with standing/walking to make a meal and get dressed with greater ease. Improved  self care  3.  Patient report a reduction in worst pain score by 1-2 points for improved tolerance during work and recreational activities  4.  Pt able to perform HEP correctly with minimal cueing or supervision for therapist  5. amb 2 blocks, into department without needing rest        Long term goals: 13 weeks or 20 visits   1. Pt will demonstrate increased lumbar ROM by at least 6 degrees from initial ROM value, resulting in improved ability to perform functional fwd bending while standing and sitting.   2. Pt will demonstrate increased maximum isometric torque value by 30% when compared to the initial value resulting in improved ability to perform bending, lifting, and carrying activities safely, confidently.  Go to store with dtr  3. Pt to demonstrate ability to independently control and reduce their pain through posture positioning and mechanical movements throughout a typical day.  4.   Patient will demonstrate improved overall function per FOTO Survey to CK = at least 40% but < 60% impaired, limited or restricted score or less.  5. No falls  6.go on outings  7. Walk > 2 blocks for store      Plan   Continue with established Plan of Care towards established PT goals. Increase ROM lumbar Med X machine next visit if no increase in symptoms after todays visit.

## 2017-12-11 ENCOUNTER — CLINICAL SUPPORT (OUTPATIENT)
Dept: REHABILITATION | Facility: OTHER | Age: 57
End: 2017-12-11
Attending: NEUROLOGICAL SURGERY
Payer: COMMERCIAL

## 2017-12-11 DIAGNOSIS — Z98.890 HISTORY OF LUMBAR LAMINECTOMY: ICD-10-CM

## 2017-12-11 PROCEDURE — G8979 MOBILITY GOAL STATUS: HCPCS | Mod: CK

## 2017-12-11 PROCEDURE — 97110 THERAPEUTIC EXERCISES: CPT

## 2017-12-11 PROCEDURE — G8978 MOBILITY CURRENT STATUS: HCPCS | Mod: CL

## 2017-12-11 NOTE — PROGRESS NOTES
"[facility]    Name: Tanika Wayne  Clinic Number: [id number]  Date of Treatment: 2017   Diagnosis:   Encounter Diagnosis   Name Primary?    History of lumbar laminectomy      Physician: Dave Sullivan MD    Pain pattern determined: 1 PEN  Plan of care signed: POC not signed as of 2017     Time in: 1:30 pm  Time Out: 2:30 pm  Total Treatment time: 60  Precautions: - L3-4, L4-5 laminectomy that was done by Dr. Sullivan on 2017  -will be having right hip replacement and right knee replacement at some point for arthritis and pain in these joints  -fall   -thyroidectomy  -DM borderline  -breast CA   Visit #: 5    POC due: POC not signed as of 2017  Reassessment due:17    Subjective   Tanika reports she is feeling some better, she can notice that she is standing up more straight. She is having a little pain in her R hip she states is "moderate"    Patient reports their pain to be 4/10 on a 0-10 scale at her hip, 0/10 at lower back with 0 being no pain and 10 being the worst pain imaginable.    Pain Location: back and right hip and right knee     Occupation:  disability   Leisure: sits a lot                     Pts goals:  Wants to get out more, she can't walk far enough now       Objective        Baseline IM Testing Results:   Date of testin17  ROM 6-36 deg   Max Peak Torque 94 ft lbs    Min Peak Torque 41 ft lbs    Flex/Ext Ratio 2/1   % below normative data 72%   Counter weight 200   Femur number 6   Seat pad 1               FOTO: Focus on Therapeutic Outcomes   Category: lumbar   % Impaired: 73%  Current Score  = CL = least 60% but < 80% impaired, limited or restricted  Goal at Discharge Score = CK = at least 40% but < 60% impaired, limited or restricted  17: 63%    Treatment    Pt was instructed in and performed the following:     Tanika received therapeutic exercises to develop/improved posture, cardiovascular endurance, " muscular endurance, lumbar/cervical ROM, strength and muscular endurance for 45 minutes including the following exercise    [facility] 12/11/2017   Visit Number 5   VAS Pain Rating 4   Recumbent Bike Seat Pos. 12   Time 6   Flexion in Lying 10   Flexion in Sitting 10   Lumbar Weight 54   Repetitions 10   Rating of Perceived Exertion 3   Ice - Z Lie (in min.) -   Ice - Sitting 10         Seated trunk flexion with T-Ball 10x  Seated trunk extension over chair 10x NT  Seated PPT 10x - NT  LTR without ball 10x  Supine PPT 10x    Peripheral muscle strengthening which included 1 set of 15-20 repetitions at a slow, controlled 7 second per rep pace focused on strengthening supporting musculature for improved body mechanics and functional mobility.  Pt and therapist focused on proper form during treatment to ensure optimal strengthening of each targeted muscle group.  Machines were utilized including torso rotation, leg extension, leg curl, chest press, upright row. Tricep extension, bicep curl, leg press, and hip abduction added on third visit.       Tanika received the following manual therapy techniques: None      Home Exercise Program as follows:   Supine GUY   Seated hip flexion 10 reps 2/day  Seated long arc quads 10 reps 2/day  Seated pillow squeeze 10 reps 2/day  Seated pelvic tilts 10 reps 2/day     Handouts were given to the patient. Pt demo good understanding of the education provided. Tanika demonstrated good return demonstration of activities.     Lumbar roll use compliance: Pt plans on buying lumbar roll by end of day  Additional exercises taught this treatment session:   HEP review, GUY, LTR    Assessment     Pt presents to physical therapy with moderate hip pain, pt reports feeling better post therapy. She was able to complete some supine and sitting exercises this visit.  Pt tolerated Med X lumbar exercise weight of with an increased resistance she was able to complete 20 repetitions with reported  3/10 Abhinav Exertion scale and will increase resistance by 5% next visit. May need to increase ROM next visit as well on Med X.  Pt required mild verbal cues to maintian speed to 7 sec per rep, she did well with the eccentric contraction but would go too fast or too slow for the concentric contraction. Pt completed all peripheral resistance exercises, she requires modification for leg curl and bicep curl because of difficulty getting on and off the machine and her increased risk of falls. Discussed with rehab tech that we will modify by using theraband for hamstring curl and weights for bicep curl.    Patient is making good progress towards established goals.  Pt will continue to benefit from skilled outpatient physical therapy to address the deficits stated in the impairment chart, provide pt/family education and to maximize pt's level of independence in the home and community environment.       Pt's spiritual, cultural and educational needs considered and pt agreeable to plan of care and goals as stated below:     Medical necessity is demonstrated by the following problem list.    Pt presents with the following impairments: back pain, reduced back strength and reduced back mobility, poor LE and back strength, poor function, unable to manage indep without dtr assist  History  Co-morbidities and personal factors that may impact the plan of care Examination  Body Structures and Functions, activity limitations and participation restrictions that may impact the plan of care Clinical Presentation Decision Making/ Complexity Score   Co-morbidities:      - L3-4, L4-5 laminectomy that was done by Dr. Sullivan on 2/22/2017  -will be having right hip replacement and right knee replacement at some point for arthritis and pain in these joints  -fall 2016  -thyroidectomy  -DM borderline  -breast CA 1998              Personal Factors:   Over weight  Fall precautions Body Regions:   back  lower extremities     Body Systems:   gross  symmetry  ROM  strength  gross coordinated movement  transitions  motor control  motor learning     Activity limitations:   Learning and applying knowledge  no deficits     General Tasks and Commands  no deficits     Communication  no deficits     Mobility  using transportation (bus, train, plane, car)     Self care  looking after one's health     Domestic Life  doing house work (cleaning house, washing dishes, laundry)     Interactions/Relationships  no deficits     Life Areas  no deficits     Community and Social Life  no deficits     Participation Restrictions:   Unable to shop and go out    evolving clinical presentation with changing clinical characteristics    mod            GOALS: Pt is in agreement with the following goals.     Short term goals:  6 weeks or 10 visits   1.  Pt will demonstrate increased lumbar ROM by at least 3 degrees from the initial ROM value with improvements noted in functional ROM and ability to perform ADLs  2.  Pt will demonstrate increased improved lumbar strength on testing by 10 % with improvements functionally noted with standing/walking to make a meal and get dressed with greater ease. Improved  self care  3.  Patient report a reduction in worst pain score by 1-2 points for improved tolerance during work and recreational activities  4.  Pt able to perform HEP correctly with minimal cueing or supervision for therapist  5. amb 2 blocks, into department without needing rest        Long term goals: 13 weeks or 20 visits   1. Pt will demonstrate increased lumbar ROM by at least 6 degrees from initial ROM value, resulting in improved ability to perform functional fwd bending while standing and sitting.   2. Pt will demonstrate increased maximum isometric torque value by 30% when compared to the initial value resulting in improved ability to perform bending, lifting, and carrying activities safely, confidently.  Go to store with dtr  3. Pt to demonstrate ability to independently control  and reduce their pain through posture positioning and mechanical movements throughout a typical day.  4.  Patient will demonstrate improved overall function per FOTO Survey to CK = at least 40% but < 60% impaired, limited or restricted score or less.  5. No falls  6.go on outings  7. Walk > 2 blocks for store      Plan   Continue with established Plan of Care towards established PT goals. Increase ROM lumbar Med X machine next visit if no increase in symptoms after todays visit.

## 2017-12-13 ENCOUNTER — CLINICAL SUPPORT (OUTPATIENT)
Dept: REHABILITATION | Facility: OTHER | Age: 57
End: 2017-12-13
Attending: NEUROLOGICAL SURGERY
Payer: COMMERCIAL

## 2017-12-13 DIAGNOSIS — Z98.890 HISTORY OF LUMBAR LAMINECTOMY: ICD-10-CM

## 2017-12-13 PROCEDURE — 97110 THERAPEUTIC EXERCISES: CPT

## 2017-12-13 NOTE — PROGRESS NOTES
"[facility]    Name: Tanika Wayne  Clinic Number: [id number]  Date of Treatment: 2017   Diagnosis:   No diagnosis found.  Physician: Dave Sullivan MD    Pain pattern determined: 1 PEN  Plan of care signed: POC not signed as of 2017     Time in: 9:00 pm  Time Out: 10:00 pm  Total Treatment time: 40  Precautions: - L3-4, L4-5 laminectomy that was done by Dr. Sullivan on 2017  -will be having right hip replacement and right knee replacement at some point for arthritis and pain in these joints  -fall   -thyroidectomy  -DM borderline  -breast CA   Visit #: 6    POC due: POC not signed as of 2017  Reassessment due:17    Face to Face discussion of patient was done between PT and PTA.     Subjective   Tanika reports she is feeling some better, she can notice that she is standing up more straight. She is having a little pain in her R hip she states is "moderate"    Patient reports their pain to be 4/10 on a 0-10 scale at her hip, 0/10 at lower back with 0 being no pain and 10 being the worst pain imaginable.    Pain Location: back and right hip and right knee     Occupation:  disability   Leisure: sits a lot                     Pts goals:  Wants to get out more, she can't walk far enough now       Objective        Baseline IM Testing Results:   Date of testin17  ROM 6-36 deg   Max Peak Torque 94 ft lbs    Min Peak Torque 41 ft lbs    Flex/Ext Ratio 2/1   % below normative data 72%   Counter weight 200   Femur number 6   Seat pad 1               FOTO: Focus on Therapeutic Outcomes   Category: lumbar   % Impaired: 73%  Current Score  = CL = least 60% but < 80% impaired, limited or restricted  Goal at Discharge Score = CK = at least 40% but < 60% impaired, limited or restricted  17: 63%    Treatment    Pt was instructed in and performed the following:     Tanika received therapeutic exercises to develop/improved posture, " cardiovascular endurance, muscular endurance, lumbar/cervical ROM, strength and muscular endurance for 45 minutes including the following exercise        [facility] 12/13/2017   Visit Number 6   VAS Pain Rating 4   Recumbent Bike Seat Pos. 12   Time 6   Flexion in Lying 10   Flexion in Sitting 10   Lumbar Weight 57   Repetitions 20   Rating of Perceived Exertion 3   Ice - Z Lie (in min.) -   Ice - Sitting 10       Seated trunk flexion with T-Ball 10x  Seated trunk extension over chair 10x NT  Seated PPT 10x - NT  LTR without ball 10x  Supine PPT 10x    Peripheral muscle strengthening which included 1 set of 15-20 repetitions at a slow, controlled 7 second per rep pace focused on strengthening supporting musculature for improved body mechanics and functional mobility.  Pt and therapist focused on proper form during treatment to ensure optimal strengthening of each targeted muscle group.  Machines were utilized including torso rotation, leg extension, leg curl, chest press, upright row. Tricep extension, bicep curl, leg press, and hip abduction added on third visit.       Tanika received the following manual therapy techniques: None      Home Exercise Program as follows:   Supine GUY   Seated hip flexion 10 reps 2/day  Seated long arc quads 10 reps 2/day  Seated pillow squeeze 10 reps 2/day  Seated pelvic tilts 10 reps 2/day     Handouts were given to the patient. Pt demo good understanding of the education provided. Tanika demonstrated good return demonstration of activities.     Lumbar roll use compliance: Pt plans on buying lumbar roll.  Additional exercises taught this treatment session:   HEP review    Assessment     Pt presents to physical therapy with moderate hip pain, pt reports feeling better post therapy. She was able to complete some supine and sitting exercises this visit.  Pt tolerated Med X lumbar exercise weight of with an increased resistance she was able to complete 20 repetitions with  reported 3/10 Abhinav Exertion scale and will increase resistance by 5% next visit. May need to increase ROM next visit as well on Med X.  Pt required mild verbal cues to maintian speed to 7 sec per rep, she did well with the eccentric contraction but would go too fast or too slow for the concentric contraction. Pt completed all peripheral resistance exercises and she was able to get on the leg curl machine and bicep machine better today. Please cont to monitor. Discussed with rehab tech if needed that to modify by using theraband for hamstring curl and weights for bicep curl.    Patient is making good progress towards established goals.  Pt will continue to benefit from skilled outpatient physical therapy to address the deficits stated in the impairment chart, provide pt/family education and to maximize pt's level of independence in the home and community environment.       Pt's spiritual, cultural and educational needs considered and pt agreeable to plan of care and goals as stated below:     Medical necessity is demonstrated by the following problem list.    Pt presents with the following impairments: back pain, reduced back strength and reduced back mobility, poor LE and back strength, poor function, unable to manage indep without dtr assist  History  Co-morbidities and personal factors that may impact the plan of care Examination  Body Structures and Functions, activity limitations and participation restrictions that may impact the plan of care Clinical Presentation Decision Making/ Complexity Score   Co-morbidities:      - L3-4, L4-5 laminectomy that was done by Dr. Sullivan on 2/22/2017  -will be having right hip replacement and right knee replacement at some point for arthritis and pain in these joints  -fall 2016  -thyroidectomy  -DM borderline  -breast CA 1998              Personal Factors:   Over weight  Fall precautions Body Regions:   back  lower extremities     Body Systems:   gross  symmetry  ROM  strength  gross coordinated movement  transitions  motor control  motor learning     Activity limitations:   Learning and applying knowledge  no deficits     General Tasks and Commands  no deficits     Communication  no deficits     Mobility  using transportation (bus, train, plane, car)     Self care  looking after one's health     Domestic Life  doing house work (cleaning house, washing dishes, laundry)     Interactions/Relationships  no deficits     Life Areas  no deficits     Community and Social Life  no deficits     Participation Restrictions:   Unable to shop and go out    evolving clinical presentation with changing clinical characteristics    mod            GOALS: Pt is in agreement with the following goals.     Short term goals:  6 weeks or 10 visits   1.  Pt will demonstrate increased lumbar ROM by at least 3 degrees from the initial ROM value with improvements noted in functional ROM and ability to perform ADLs  2.  Pt will demonstrate increased improved lumbar strength on testing by 10 % with improvements functionally noted with standing/walking to make a meal and get dressed with greater ease. Improved  self care  3.  Patient report a reduction in worst pain score by 1-2 points for improved tolerance during work and recreational activities  4.  Pt able to perform HEP correctly with minimal cueing or supervision for therapist  5. amb 2 blocks, into department without needing rest        Long term goals: 13 weeks or 20 visits   1. Pt will demonstrate increased lumbar ROM by at least 6 degrees from initial ROM value, resulting in improved ability to perform functional fwd bending while standing and sitting.   2. Pt will demonstrate increased maximum isometric torque value by 30% when compared to the initial value resulting in improved ability to perform bending, lifting, and carrying activities safely, confidently.  Go to store with dtr  3. Pt to demonstrate ability to independently control  and reduce their pain through posture positioning and mechanical movements throughout a typical day.  4.  Patient will demonstrate improved overall function per FOTO Survey to CK = at least 40% but < 60% impaired, limited or restricted score or less.  5. No falls  6.go on outings  7. Walk > 2 blocks for store      Plan   Continue with established Plan of Care towards established PT goals. Increase ROM lumbar Med X machine next visit if no increase in symptoms after todays visit.

## 2017-12-15 DIAGNOSIS — F32.A DEPRESSION, UNSPECIFIED DEPRESSION TYPE: ICD-10-CM

## 2017-12-15 RX ORDER — POTASSIUM CHLORIDE 20 MEQ/1
40 TABLET, EXTENDED RELEASE ORAL 2 TIMES DAILY
Qty: 360 TABLET | Refills: 2 | Status: SHIPPED | OUTPATIENT
Start: 2017-12-15 | End: 2018-07-23 | Stop reason: SDUPTHER

## 2017-12-15 RX ORDER — CARVEDILOL 3.12 MG/1
3.12 TABLET ORAL 2 TIMES DAILY
Qty: 180 TABLET | Refills: 3 | Status: SHIPPED | OUTPATIENT
Start: 2017-12-15 | End: 2018-07-23 | Stop reason: SDUPTHER

## 2017-12-15 RX ORDER — VENLAFAXINE HYDROCHLORIDE 75 MG/1
CAPSULE, EXTENDED RELEASE ORAL
Qty: 90 CAPSULE | Refills: 3 | Status: SHIPPED | OUTPATIENT
Start: 2017-12-15 | End: 2018-07-23 | Stop reason: SDUPTHER

## 2017-12-20 ENCOUNTER — CLINICAL SUPPORT (OUTPATIENT)
Dept: REHABILITATION | Facility: OTHER | Age: 57
End: 2017-12-20
Attending: NEUROLOGICAL SURGERY
Payer: COMMERCIAL

## 2017-12-20 DIAGNOSIS — M62.838 NIGHT MUSCLE SPASMS: ICD-10-CM

## 2017-12-20 DIAGNOSIS — Z98.890 HISTORY OF LUMBAR LAMINECTOMY: ICD-10-CM

## 2017-12-20 PROCEDURE — 97110 THERAPEUTIC EXERCISES: CPT

## 2017-12-20 RX ORDER — CYCLOBENZAPRINE HCL 10 MG
TABLET ORAL
Qty: 30 TABLET | Refills: 3 | Status: SHIPPED | OUTPATIENT
Start: 2017-12-20 | End: 2018-02-15 | Stop reason: SDUPTHER

## 2017-12-20 NOTE — PROGRESS NOTES
[facility]    Name: Tanika Wayne  Clinic Number: [id number]  Date of Treatment: 2017   Diagnosis:   No diagnosis found.  Physician: Dave Sullivan MD    Pain pattern determined: 1 PEN  Plan of care signed: POC not signed as of 2017     Time in: 1:10 pm  Time Out: 1:10 pm  Total Treatment time: 40  Precautions: - L3-4, L4-5 laminectomy that was done by Dr. Sullivan on 2017  -will be having right hip replacement and right knee replacement at some point for arthritis and pain in these joints  -fall   -thyroidectomy  -DM borderline  -breast CA   Visit #: 6    POC due: POC not signed as of 2017  Reassessment due:17    Face to Face discussion of patient was done between PT and PTA.     Subjective   Tanika reports she is feeling a lot of R hip pain today. She is stretching at home.    Patient reports their pain to be 6/10 on a 0-10 scale at her hip, 0/10 at lower back with 0 being no pain and 10 being the worst pain imaginable.    Pain Location: back and right hip and right knee     Occupation:  disability   Leisure: sits a lot                     Pts goals:  Wants to get out more, she can't walk far enough now       Objective        Baseline IM Testing Results:   Date of testin17  ROM 6-36 deg   Max Peak Torque 94 ft lbs    Min Peak Torque 41 ft lbs    Flex/Ext Ratio 2/1   % below normative data 72%   Counter weight 200   Femur number 6   Seat pad 1               FOTO: Focus on Therapeutic Outcomes   Category: lumbar   % Impaired: 73%  Current Score  = CL = least 60% but < 80% impaired, limited or restricted  Goal at Discharge Score = CK = at least 40% but < 60% impaired, limited or restricted  17: 63%    Treatment    Pt was instructed in and performed the following:     Tanika received therapeutic exercises to develop/improved posture, cardiovascular endurance, muscular endurance, lumbar/cervical ROM, strength and muscular  endurance for 45 minutes including the following exercise          [facility] 12/20/2017   Visit Number 7   VAS Pain Rating 4   Recumbent Bike Seat Pos. 12   Time 6   Flexion in Lying 10   Flexion in Sitting 10   Lumbar Weight 60   Repetitions 18   Rating of Perceived Exertion 3   Ice - Z Lie (in min.) -   Ice - Sitting 10       Seated trunk flexion with T-Ball 10x  Seated trunk extension over chair 10x NT  Seated PPT 10x - NT  LTR without ball 10x  Supine PPT 10x    Peripheral muscle strengthening which included 1 set of 15-20 repetitions at a slow, controlled 7 second per rep pace focused on strengthening supporting musculature for improved body mechanics and functional mobility.  Pt and therapist focused on proper form during treatment to ensure optimal strengthening of each targeted muscle group.  Machines were utilized including torso rotation, leg extension, leg curl, chest press, upright row. Tricep extension, bicep curl, leg press, and hip abduction added on third visit.       Tanika received the following manual therapy techniques: None      Home Exercise Program as follows:   Supine GUY   Seated hip flexion 10 reps 2/day  Seated long arc quads 10 reps 2/day  Seated pillow squeeze 10 reps 2/day  Seated pelvic tilts 10 reps 2/day   LTR 10x  Handouts were given to the patient. Pt demo good understanding of the education provided. Tanika demonstrated good return demonstration of activities.     Lumbar roll use compliance: Pt plans on buying lumbar roll.  Additional exercises taught this treatment session:   HEP review    Assessment     Pt presents to physical therapy with moderate hip pain, pt reports feeling better post therapy. She was able to complete some supine and sitting exercises this visit.  Pt tolerated Med X lumbar exercise weight of with an increased resistance she was able to complete 18 repetitions with reported 3/10 Abhinav Exertion. May need to increase ROM next visit as well on Med X.  Pt  completed all peripheral resistance exercises and she was able to get on the leg curl machine and bicep machine better today. Please cont to monitor. Discussed with rehab tech if needed that to modify by using theraband for bicep curl.    Patient is making good progress towards established goals.  Pt will continue to benefit from skilled outpatient physical therapy to address the deficits stated in the impairment chart, provide pt/family education and to maximize pt's level of independence in the home and community environment.       Pt's spiritual, cultural and educational needs considered and pt agreeable to plan of care and goals as stated below:     Medical necessity is demonstrated by the following problem list.    Pt presents with the following impairments: back pain, reduced back strength and reduced back mobility, poor LE and back strength, poor function, unable to manage indep without dtr assist  History  Co-morbidities and personal factors that may impact the plan of care Examination  Body Structures and Functions, activity limitations and participation restrictions that may impact the plan of care Clinical Presentation Decision Making/ Complexity Score   Co-morbidities:      - L3-4, L4-5 laminectomy that was done by Dr. Sullivan on 2/22/2017  -will be having right hip replacement and right knee replacement at some point for arthritis and pain in these joints  -fall 2016  -thyroidectomy  -DM borderline  -breast CA 1998              Personal Factors:   Over weight  Fall precautions Body Regions:   back  lower extremities     Body Systems:   gross symmetry  ROM  strength  gross coordinated movement  transitions  motor control  motor learning     Activity limitations:   Learning and applying knowledge  no deficits     General Tasks and Commands  no deficits     Communication  no deficits     Mobility  using transportation (bus, train, plane, car)     Self care  looking after one's health     Domestic  Life  doing house work (cleaning house, washing dishes, laundry)     Interactions/Relationships  no deficits     Life Areas  no deficits     Community and Social Life  no deficits     Participation Restrictions:   Unable to shop and go out    evolving clinical presentation with changing clinical characteristics    mod            GOALS: Pt is in agreement with the following goals.     Short term goals:  6 weeks or 10 visits   1.  Pt will demonstrate increased lumbar ROM by at least 3 degrees from the initial ROM value with improvements noted in functional ROM and ability to perform ADLs  2.  Pt will demonstrate increased improved lumbar strength on testing by 10 % with improvements functionally noted with standing/walking to make a meal and get dressed with greater ease. Improved  self care  3.  Patient report a reduction in worst pain score by 1-2 points for improved tolerance during work and recreational activities  4.  Pt able to perform HEP correctly with minimal cueing or supervision for therapist  5. amb 2 blocks, into department without needing rest        Long term goals: 13 weeks or 20 visits   1. Pt will demonstrate increased lumbar ROM by at least 6 degrees from initial ROM value, resulting in improved ability to perform functional fwd bending while standing and sitting.   2. Pt will demonstrate increased maximum isometric torque value by 30% when compared to the initial value resulting in improved ability to perform bending, lifting, and carrying activities safely, confidently.  Go to store with dtr  3. Pt to demonstrate ability to independently control and reduce their pain through posture positioning and mechanical movements throughout a typical day.  4.  Patient will demonstrate improved overall function per FOTO Survey to CK = at least 40% but < 60% impaired, limited or restricted score or less.  5. No falls  6.go on outings  7. Walk > 2 blocks for store      Plan   Continue with established Plan of  Care towards established PT goals. Increase ROM lumbar Med X machine next visit if no increase in symptoms after todays visit.

## 2017-12-27 ENCOUNTER — CLINICAL SUPPORT (OUTPATIENT)
Dept: REHABILITATION | Facility: OTHER | Age: 57
End: 2017-12-27
Attending: NEUROLOGICAL SURGERY
Payer: COMMERCIAL

## 2017-12-27 DIAGNOSIS — Z98.890 HISTORY OF LUMBAR LAMINECTOMY: ICD-10-CM

## 2017-12-27 PROCEDURE — 97110 THERAPEUTIC EXERCISES: CPT

## 2017-12-27 NOTE — PROGRESS NOTES
[facility]    Name: Tanika Wayne  Clinic Number: [id number]  Date of Treatment: 2017   Diagnosis:   Encounter Diagnosis   Name Primary?    History of lumbar laminectomy      Physician: Dave Sullivan MD    Pain pattern determined: 1 PEN  Plan of care signed: POC not signed as of 2017     Time in: 1:10 pm  Time Out: 1:10 pm  Total Treatment time: 40  Precautions: - L3-4, L4-5 laminectomy that was done by Dr. Sullivan on 2017  -will be having right hip replacement and right knee replacement at some point for arthritis and pain in these joints  -fall   -thyroidectomy  -DM borderline  -breast CA   Visit #: 6    POC due: POC not signed as of 2017  Reassessment due:17    Face to Face discussion of patient was done between PT and PTA.     Subjective   Tanika reports she is feeling a lot of R hip pain today. She is stretching at home.    Patient reports their pain to be 2/10 on a 0-10 scale at her hip, 0/10 at lower back with 0 being no pain and 10 being the worst pain imaginable.    Pain Location: back and right hip and right knee     Occupation:  disability   Leisure: sits a lot                     Pts goals:  Wants to get out more, she can't walk far enough now       Objective        Baseline IM Testing Results:   Date of testin17  ROM 6-36 deg   Max Peak Torque 94 ft lbs    Min Peak Torque 41 ft lbs    Flex/Ext Ratio 2/1   % below normative data 72%   Counter weight 200   Femur number 6   Seat pad 1               FOTO: Focus on Therapeutic Outcomes   Category: lumbar   % Impaired: 73%  Current Score  = CL = least 60% but < 80% impaired, limited or restricted  Goal at Discharge Score = CK = at least 40% but < 60% impaired, limited or restricted  17: 63%    Treatment    Pt was instructed in and performed the following:     Tanika received therapeutic exercises to develop/improved posture, cardiovascular endurance, muscular  endurance, lumbar/cervical ROM, strength and muscular endurance for 45 minutes including the following exercise        [facility] 12/27/2017   Visit Number 8   VAS Pain Rating 2   Recumbent Bike Seat Pos. 16   Time 8   Flexion in Lying 10   Flexion in Sitting 10   Lumbar Extension Seat Pad -   Femur Restraint -   Top Dead Center -   Counterweight -   Lumbar Flexion -   Lumbar Extension -   Lumbar Peak Torque -   Min Torque -   Percent From Norm -   Lumbar Weight 60   Repetitions 20   Rating of Perceived Exertion 3   Ice - Z Lie (in min.) -   Ice - Sitting 10           Seated trunk flexion with T-Ball 10x NT  Seated trunk extension over chair 10x NT  Seated PPT 10x -   LTR without ball 10x  Supine PPT 10x    Peripheral muscle strengthening which included 1 set of 15-20 repetitions at a slow, controlled 7 second per rep pace focused on strengthening supporting musculature for improved body mechanics and functional mobility.  Pt and therapist focused on proper form during treatment to ensure optimal strengthening of each targeted muscle group.  Machines were utilized including torso rotation, leg extension, leg curl, chest press, upright row. Tricep extension, bicep curl, leg press, and hip abduction added on third visit.       Tanika received the following manual therapy techniques: None      Home Exercise Program as follows:   Supine GUY   Seated hip flexion 10 reps 2/day  Seated long arc quads 10 reps 2/day  Seated pillow squeeze 10 reps 2/day  Seated pelvic tilts 10 reps 2/day   LTR 10x  Handouts were given to the patient. Pt demo good understanding of the education provided. Tanika demonstrated good return demonstration of activities.     Lumbar roll use compliance: Pt plans on buying lumbar roll.  Additional exercises taught this treatment session:   HEP review    Assessment     Pt presents to physical therapy with min hip pain, pt reports feeling better post therapy. She was able to complete some supine  and sitting exercises this visit.  Pt tolerated Med X lumbar exercise weight of with an increased resistance she was able to complete 20 repetitions with reported 3/10 Abhinav Exertion.  Pt completed all peripheral resistance exercises and she was able to get on the leg curl machine and bicep machine better today. Please cont to monitor. Discussed with rehab tech if needed that to modify by using theraband for bicep curl.    Patient is making good progress towards established goals.  Pt will continue to benefit from skilled outpatient physical therapy to address the deficits stated in the impairment chart, provide pt/family education and to maximize pt's level of independence in the home and community environment.       Pt's spiritual, cultural and educational needs considered and pt agreeable to plan of care and goals as stated below:     Medical necessity is demonstrated by the following problem list.    Pt presents with the following impairments: back pain, reduced back strength and reduced back mobility, poor LE and back strength, poor function, unable to manage indep without dtr assist  History  Co-morbidities and personal factors that may impact the plan of care Examination  Body Structures and Functions, activity limitations and participation restrictions that may impact the plan of care Clinical Presentation Decision Making/ Complexity Score   Co-morbidities:      - L3-4, L4-5 laminectomy that was done by Dr. Sullivan on 2/22/2017  -will be having right hip replacement and right knee replacement at some point for arthritis and pain in these joints  -fall 2016  -thyroidectomy  -DM borderline  -breast CA 1998              Personal Factors:   Over weight  Fall precautions Body Regions:   back  lower extremities     Body Systems:   gross symmetry  ROM  strength  gross coordinated movement  transitions  motor control  motor learning     Activity limitations:   Learning and applying knowledge  no deficits     General  Tasks and Commands  no deficits     Communication  no deficits     Mobility  using transportation (bus, train, plane, car)     Self care  looking after one's health     Domestic Life  doing house work (cleaning house, washing dishes, laundry)     Interactions/Relationships  no deficits     Life Areas  no deficits     Community and Social Life  no deficits     Participation Restrictions:   Unable to shop and go out    evolving clinical presentation with changing clinical characteristics    mod            GOALS: Pt is in agreement with the following goals.     Short term goals:  6 weeks or 10 visits   1.  Pt will demonstrate increased lumbar ROM by at least 3 degrees from the initial ROM value with improvements noted in functional ROM and ability to perform ADLs  2.  Pt will demonstrate increased improved lumbar strength on testing by 10 % with improvements functionally noted with standing/walking to make a meal and get dressed with greater ease. Improved  self care  3.  Patient report a reduction in worst pain score by 1-2 points for improved tolerance during work and recreational activities  4.  Pt able to perform HEP correctly with minimal cueing or supervision for therapist  5. amb 2 blocks, into department without needing rest        Long term goals: 13 weeks or 20 visits   1. Pt will demonstrate increased lumbar ROM by at least 6 degrees from initial ROM value, resulting in improved ability to perform functional fwd bending while standing and sitting.   2. Pt will demonstrate increased maximum isometric torque value by 30% when compared to the initial value resulting in improved ability to perform bending, lifting, and carrying activities safely, confidently.  Go to store with dtr  3. Pt to demonstrate ability to independently control and reduce their pain through posture positioning and mechanical movements throughout a typical day.  4.  Patient will demonstrate improved overall function per FOTO Survey to CK =  at least 40% but < 60% impaired, limited or restricted score or less.  5. No falls  6.go on outings  7. Walk > 2 blocks for store      Plan   Continue with established Plan of Care towards established PT goals. Increase ROM lumbar Med X machine next visit if no increase in symptoms after todays visit.

## 2018-01-10 ENCOUNTER — PATIENT MESSAGE (OUTPATIENT)
Dept: PHYSICAL MEDICINE AND REHAB | Facility: CLINIC | Age: 58
End: 2018-01-10

## 2018-01-10 DIAGNOSIS — Z79.891 CHRONIC USE OF OPIATE FOR THERAPEUTIC PURPOSE: ICD-10-CM

## 2018-01-10 DIAGNOSIS — M48.061 SPINAL STENOSIS OF LUMBAR REGION, UNSPECIFIED WHETHER NEUROGENIC CLAUDICATION PRESENT: ICD-10-CM

## 2018-01-10 DIAGNOSIS — M48.07 STENOSIS OF LUMBOSACRAL SPINE: ICD-10-CM

## 2018-01-10 DIAGNOSIS — G89.29 CHRONIC BILATERAL LOW BACK PAIN WITH RIGHT-SIDED SCIATICA: ICD-10-CM

## 2018-01-10 DIAGNOSIS — M43.16 SPONDYLOLISTHESIS AT L3-L4 LEVEL: ICD-10-CM

## 2018-01-10 DIAGNOSIS — Z98.890 S/P LUMBAR LAMINECTOMY: ICD-10-CM

## 2018-01-10 DIAGNOSIS — M51.36 DDD (DEGENERATIVE DISC DISEASE), LUMBAR: ICD-10-CM

## 2018-01-10 DIAGNOSIS — M54.41 CHRONIC BILATERAL LOW BACK PAIN WITH RIGHT-SIDED SCIATICA: ICD-10-CM

## 2018-01-10 NOTE — TELEPHONE ENCOUNTER
----- Message from David Rashid sent at 1/9/2018 10:41 AM CST -----  Contact:  [facility] @ [phone]  Caller is calling to inform that the medication is needing a prior authorization for   ( oxyCODONE-acetaminophen (PERCOCET)  mg per tablet)  Pt call Pharmacy Prior Authorization @  [phone]

## 2018-01-11 ENCOUNTER — PATIENT MESSAGE (OUTPATIENT)
Dept: PHYSICAL MEDICINE AND REHAB | Facility: CLINIC | Age: 58
End: 2018-01-11

## 2018-01-11 NOTE — TELEPHONE ENCOUNTER
----- Message from David Rashid sent at 1/11/2018 10:33 AM CST -----  Contact: Patient @ [phone]  Patient is requesting a return call about a prior authorization for   ( oxyCODONE-acetaminophen (PERCOCET)  mg per  )   Please contact [facility] @ [phone] to obtain/provide the authorization     [id number] - [place], [place] - [address] AT [facility] [address] [place] [address]  Phone: [phone] Fax: [phone]

## 2018-01-11 NOTE — TELEPHONE ENCOUNTER
Spoke with patient to inform her that waiting on the dr to fill out the paper work and it will be faxed over

## 2018-01-12 RX ORDER — OXYCODONE AND ACETAMINOPHEN 10; 325 MG/1; MG/1
1 TABLET ORAL EVERY 8 HOURS PRN
Qty: 70 TABLET | Refills: 0 | OUTPATIENT
Start: 2018-01-15 | End: 2018-02-14

## 2018-01-23 ENCOUNTER — OFFICE VISIT (OUTPATIENT)
Dept: INTERNAL MEDICINE | Facility: CLINIC | Age: 58
End: 2018-01-23
Attending: FAMILY MEDICINE
Payer: MEDICAID

## 2018-01-23 VITALS
TEMPERATURE: 98 F | DIASTOLIC BLOOD PRESSURE: 88 MMHG | WEIGHT: 268.5 LBS | OXYGEN SATURATION: 95 % | SYSTOLIC BLOOD PRESSURE: 138 MMHG | BODY MASS INDEX: 50.69 KG/M2 | HEART RATE: 87 BPM | HEIGHT: 61 IN

## 2018-01-23 DIAGNOSIS — E11.9 TYPE 2 DIABETES MELLITUS WITHOUT COMPLICATION, WITHOUT LONG-TERM CURRENT USE OF INSULIN: ICD-10-CM

## 2018-01-23 DIAGNOSIS — E89.0 HISTORY OF SUBTOTAL THYROIDECTOMY: ICD-10-CM

## 2018-01-23 DIAGNOSIS — C50.911 MALIGNANT NEOPLASM OF RIGHT FEMALE BREAST, UNSPECIFIED ESTROGEN RECEPTOR STATUS, UNSPECIFIED SITE OF BREAST: ICD-10-CM

## 2018-01-23 DIAGNOSIS — I10 ESSENTIAL HYPERTENSION: ICD-10-CM

## 2018-01-23 DIAGNOSIS — E66.01 OBESITY, MORBID, BMI 50 OR HIGHER: Primary | ICD-10-CM

## 2018-01-23 PROBLEM — M48.061 LUMBAR SPINAL STENOSIS: Status: RESOLVED | Noted: 2017-02-22 | Resolved: 2018-01-23

## 2018-01-23 PROBLEM — T81.49XA SUPERFICIAL POSTOPERATIVE WOUND INFECTION: Status: RESOLVED | Noted: 2017-04-03 | Resolved: 2018-01-23

## 2018-01-23 LAB
BACTERIA #/AREA URNS AUTO: ABNORMAL /HPF
BILIRUB UR QL STRIP: NEGATIVE
CLARITY UR REFRACT.AUTO: ABNORMAL
COLOR UR AUTO: YELLOW
GLUCOSE UR QL STRIP: NEGATIVE
HGB UR QL STRIP: NEGATIVE
HYALINE CASTS UR QL AUTO: 5 /LPF
KETONES UR QL STRIP: NEGATIVE
LEUKOCYTE ESTERASE UR QL STRIP: NEGATIVE
MICROSCOPIC COMMENT: ABNORMAL
NITRITE UR QL STRIP: NEGATIVE
PH UR STRIP: 6 [PH] (ref 5–8)
PROT UR QL STRIP: ABNORMAL
RBC #/AREA URNS AUTO: 0 /HPF (ref 0–4)
SP GR UR STRIP: 1.02 (ref 1–1.03)
SQUAMOUS #/AREA URNS AUTO: 1 /HPF
URN SPEC COLLECT METH UR: ABNORMAL
UROBILINOGEN UR STRIP-ACNC: 2 EU/DL
WBC #/AREA URNS AUTO: 4 /HPF (ref 0–5)

## 2018-01-23 PROCEDURE — 99999 PR PBB SHADOW E&M-EST. PATIENT-LVL III: CPT | Mod: PBBFAC,,, | Performed by: INTERNAL MEDICINE

## 2018-01-23 PROCEDURE — 87086 URINE CULTURE/COLONY COUNT: CPT

## 2018-01-23 PROCEDURE — 99214 OFFICE O/P EST MOD 30 MIN: CPT | Mod: S$PBB,,, | Performed by: INTERNAL MEDICINE

## 2018-01-23 PROCEDURE — 81001 URINALYSIS AUTO W/SCOPE: CPT

## 2018-01-23 PROCEDURE — 99213 OFFICE O/P EST LOW 20 MIN: CPT | Mod: PBBFAC,PO | Performed by: INTERNAL MEDICINE

## 2018-01-23 NOTE — PROGRESS NOTES
Subjective:       Patient ID: Tanika Wayne is a 58 y.o. female.    Chief Complaint: Establish Care    HPI the patient is a 58-year-old female comes in for a new patient evaluation.  The patient is a number of medical problems including diabetes, hypertension, obesity, hypothyroidism, breast cancer, and hyperlipidemia.  She is currently concern regarding mild burning at urination which has been going on for about 1-2 weeks.  She has not noticed any blood in the urine.  She has not had any fevers.  Review of Systems   Constitutional: Negative for fatigue, fever and unexpected weight change.   HENT: Negative for congestion, hearing loss and sore throat.    Eyes: Negative for visual disturbance.   Respiratory: Negative for cough, shortness of breath and wheezing.    Cardiovascular: Negative for chest pain and palpitations.   Gastrointestinal: Negative for abdominal distention, abdominal pain, blood in stool, diarrhea, nausea and vomiting.   Genitourinary: Negative for difficulty urinating, dysuria, frequency and hematuria.   Musculoskeletal: Positive for arthralgias and gait problem. Negative for joint swelling.        Positive for pain in her right hip and knee.   Skin: Negative for color change and rash.   Neurological: Negative for dizziness, syncope, weakness and headaches.   Hematological: Negative for adenopathy.   Psychiatric/Behavioral: Negative for confusion, decreased concentration and suicidal ideas.       Objective:      Physical Exam   Constitutional: She is oriented to person, place, and time. She appears well-developed and well-nourished.   Obesity   HENT:   Head: Normocephalic.   Mouth/Throat: No oropharyngeal exudate.   Eyes: Conjunctivae and EOM are normal. Pupils are equal, round, and reactive to light. Right eye exhibits no discharge. Left eye exhibits no discharge. No scleral icterus.   Slight ptosis of the left eyelid   Neck: No JVD present. No tracheal deviation present. No thyromegaly  present.   Cardiovascular: Normal rate, regular rhythm and normal heart sounds.  Exam reveals no gallop and no friction rub.    No murmur heard.  Pulmonary/Chest: Effort normal and breath sounds normal. No respiratory distress. She has no wheezes. She has no rales. She exhibits no tenderness.   Abdominal: Soft. Bowel sounds are normal. She exhibits no distension and no mass. There is no tenderness. There is no rebound and no guarding.   Musculoskeletal: Normal range of motion. She exhibits no edema or tenderness.   Lymphadenopathy:     She has no cervical adenopathy.   Neurological: She is alert and oriented to person, place, and time. She displays normal reflexes. No cranial nerve deficit. She exhibits normal muscle tone. Coordination normal.   Skin: Skin is warm and dry. No rash noted. No erythema. No pallor.   Psychiatric: She has a normal mood and affect. Her behavior is normal. Judgment and thought content normal.     foot exam: Intact sensation to monofilament line bilaterally.  No edema.  Good dorsalis pedis pulses.  Onychomycosis noted in right toenails  Assessment:       1. Obesity, morbid, BMI 50 or higher    2. History of subtotal thyroidectomy    3. Type 2 diabetes mellitus without complication, without long-term current use of insulin    4. Malignant neoplasm of right female breast, unspecified estrogen receptor status, unspecified site of breast    5. Essential hypertension        Plan:       1.  UA with [facility]  2.  TSH, CBC, CMP, fasting lipids, hemoglobin A1c  3.  Diagnostic mammogram to follow-up on previous breast cancer  4.  Weight reduction  5.  Return to clinic in 4 months

## 2018-01-25 ENCOUNTER — LAB VISIT (OUTPATIENT)
Dept: LAB | Facility: OTHER | Age: 58
End: 2018-01-25
Attending: INTERNAL MEDICINE
Payer: MEDICAID

## 2018-01-25 DIAGNOSIS — E89.0 HISTORY OF SUBTOTAL THYROIDECTOMY: ICD-10-CM

## 2018-01-25 DIAGNOSIS — E11.9 TYPE 2 DIABETES MELLITUS WITHOUT COMPLICATION, WITHOUT LONG-TERM CURRENT USE OF INSULIN: ICD-10-CM

## 2018-01-25 LAB
ALBUMIN SERPL BCP-MCNC: 4 G/DL
ALP SERPL-CCNC: 78 U/L
ALT SERPL W/O P-5'-P-CCNC: 27 U/L
ANION GAP SERPL CALC-SCNC: 12 MMOL/L
AST SERPL-CCNC: 25 U/L
BACTERIA UR CULT: NORMAL
BACTERIA UR CULT: NORMAL
BASOPHILS # BLD AUTO: 0.02 K/UL
BASOPHILS NFR BLD: 0.3 %
BILIRUB SERPL-MCNC: 0.4 MG/DL
BUN SERPL-MCNC: 19 MG/DL
CALCIUM SERPL-MCNC: 9.5 MG/DL
CHLORIDE SERPL-SCNC: 100 MMOL/L
CHOLEST SERPL-MCNC: 217 MG/DL
CHOLEST/HDLC SERPL: 4.3 {RATIO}
CO2 SERPL-SCNC: 27 MMOL/L
CREAT SERPL-MCNC: 0.9 MG/DL
DIFFERENTIAL METHOD: ABNORMAL
EOSINOPHIL # BLD AUTO: 0.2 K/UL
EOSINOPHIL NFR BLD: 2.3 %
ERYTHROCYTE [DISTWIDTH] IN BLOOD BY AUTOMATED COUNT: 14.6 %
EST. GFR  (AFRICAN AMERICAN): >60 ML/MIN/1.73 M^2
EST. GFR  (NON AFRICAN AMERICAN): >60 ML/MIN/1.73 M^2
ESTIMATED AVG GLUCOSE: 166 MG/DL
GLUCOSE SERPL-MCNC: 149 MG/DL
HBA1C MFR BLD HPLC: 7.4 %
HCT VFR BLD AUTO: 41.6 %
HDLC SERPL-MCNC: 50 MG/DL
HDLC SERPL: 23 %
HGB BLD-MCNC: 13.2 G/DL
LDLC SERPL CALC-MCNC: 133.6 MG/DL
LYMPHOCYTES # BLD AUTO: 4.9 K/UL
LYMPHOCYTES NFR BLD: 62.7 %
MCH RBC QN AUTO: 28.1 PG
MCHC RBC AUTO-ENTMCNC: 31.7 G/DL
MCV RBC AUTO: 89 FL
MONOCYTES # BLD AUTO: 0.5 K/UL
MONOCYTES NFR BLD: 6.8 %
NEUTROPHILS # BLD AUTO: 2.2 K/UL
NEUTROPHILS NFR BLD: 27.8 %
NONHDLC SERPL-MCNC: 167 MG/DL
PLATELET # BLD AUTO: 353 K/UL
PMV BLD AUTO: 10.3 FL
POTASSIUM SERPL-SCNC: 4.3 MMOL/L
PROT SERPL-MCNC: 8 G/DL
RBC # BLD AUTO: 4.69 M/UL
SODIUM SERPL-SCNC: 139 MMOL/L
T4 FREE SERPL-MCNC: 0.84 NG/DL
TRIGL SERPL-MCNC: 167 MG/DL
TSH SERPL DL<=0.005 MIU/L-ACNC: 25.01 UIU/ML
WBC # BLD AUTO: 7.81 K/UL

## 2018-01-25 PROCEDURE — 36415 COLL VENOUS BLD VENIPUNCTURE: CPT

## 2018-01-25 PROCEDURE — 83036 HEMOGLOBIN GLYCOSYLATED A1C: CPT

## 2018-01-25 PROCEDURE — 80061 LIPID PANEL: CPT

## 2018-01-25 PROCEDURE — 85025 COMPLETE CBC W/AUTO DIFF WBC: CPT

## 2018-01-25 PROCEDURE — 80053 COMPREHEN METABOLIC PANEL: CPT

## 2018-01-25 PROCEDURE — 84443 ASSAY THYROID STIM HORMONE: CPT

## 2018-01-25 PROCEDURE — 84439 ASSAY OF FREE THYROXINE: CPT

## 2018-01-26 ENCOUNTER — TELEPHONE (OUTPATIENT)
Dept: NEUROSURGERY | Facility: CLINIC | Age: 58
End: 2018-01-26

## 2018-01-26 ENCOUNTER — TELEPHONE (OUTPATIENT)
Dept: INTERNAL MEDICINE | Facility: CLINIC | Age: 58
End: 2018-01-26

## 2018-01-26 DIAGNOSIS — E89.0 HISTORY OF SUBTOTAL THYROIDECTOMY: Primary | ICD-10-CM

## 2018-01-26 RX ORDER — LEVOTHYROXINE SODIUM 112 UG/1
112 TABLET ORAL DAILY
Qty: 30 TABLET | Refills: 11 | Status: SHIPPED | OUTPATIENT
Start: 2018-01-26 | End: 2018-03-05 | Stop reason: SDUPTHER

## 2018-01-26 NOTE — TELEPHONE ENCOUNTER
Spoke to patient and states that she is taking Levothyroxine 100 mcg daily----pls send script to [facility]

## 2018-01-26 NOTE — TELEPHONE ENCOUNTER
Return call placed to Dr. Headley's office. Spoke with Kelin to clarify the need for a peer to peer with Physical Medicine and Rehab. Advised there may be a physician name discrepancy and the call may have been intended for Dr. Rosenberg and not Dr. Sullivan. Kelin will call back once clarified. Call back number provided.

## 2018-01-26 NOTE — TELEPHONE ENCOUNTER
Hi, increased dose sent in for 112mcg daily, take first thing in morning, 30 minute before any food or pills.  Repeat TSH in 4 weeks, order is in.  Let me know if patient has any questions.  Thank you, Rayo Hernandez

## 2018-01-26 NOTE — TELEPHONE ENCOUNTER
----- Message from Lisa Reynolds sent at 1/25/2018  2:57 PM CST -----  Contact: Dr. Headley- [phone]  Nate- Dr. Headley called to speak with Dr. Sullivan to do a peer to peer review regarding the pt- please call her back at [phone]

## 2018-01-26 NOTE — TELEPHONE ENCOUNTER
Hi, please call patient --  (I am covering for Dr. Abebe Gamboa Jr, MD   who is away from the office today.)  Her thyroid blood test was low.  Please make sure whether she is taking the levothyroxine thyroid medicine each day. If she has been taking it, then I would like to increase the day.  Let me know,  Thank you, Rayo Hernandez    The rest of the blood tests were OK, the sugars were a bit high but not very high. We will wait on Dr. Abebe Gamboa Jr, MD on whether to make any changes with the diabetes.

## 2018-01-29 ENCOUNTER — TELEPHONE (OUTPATIENT)
Dept: NEUROSURGERY | Facility: CLINIC | Age: 58
End: 2018-01-29

## 2018-01-29 ENCOUNTER — HOSPITAL ENCOUNTER (OUTPATIENT)
Dept: RADIOLOGY | Facility: OTHER | Age: 58
Discharge: HOME | End: 2018-01-29
Attending: INTERNAL MEDICINE
Payer: MEDICAID

## 2018-01-29 DIAGNOSIS — C50.911 MALIGNANT NEOPLASM OF RIGHT FEMALE BREAST, UNSPECIFIED ESTROGEN RECEPTOR STATUS, UNSPECIFIED SITE OF BREAST: ICD-10-CM

## 2018-01-29 PROCEDURE — 77066 DX MAMMO INCL CAD BI: CPT | Mod: TC

## 2018-01-29 PROCEDURE — 77066 DX MAMMO INCL CAD BI: CPT | Mod: 26,,, | Performed by: RADIOLOGY

## 2018-01-29 NOTE — TELEPHONE ENCOUNTER
"Spoke with Dr. Headley and advised that Kelin was suppose to call me back on Friday with clarification regarding the peer to peer and I did not receive a call. Advised that we typically receive notification for peer to peer requests in a reasonable amount of time which allows schedules to be considered on behalf of both MDs. Dr. Headley states she called on 3 separate occassions on 1/25, 1/26 and today. RN acknowledged the calls but advises that is not sufficient timing to set up peer to peer in less than a week. Dr. Headley states, "It's no big deal. If Dr. Sullivan disagrees with the Disability restrictions that I am imposing he can notate that on appeal". RN verbalized understanding and advised I will let Dr. Sullivan know.  "

## 2018-01-29 NOTE — TELEPHONE ENCOUNTER
----- Message from Stacy Robbins sent at 1/29/2018  1:06 PM CST -----  Contact: Dr Kayode ARMAS (long term disability with [employer])     [phone]  Calling for a peer to peer.  It is due today.  Called in the past but have not heard from Dr Sullivan yet.

## 2018-01-29 NOTE — TELEPHONE ENCOUNTER
No additional treatment for her glucoses necessary.  This should improve as she normalizes her thyroid function with the thyroid supplement.

## 2018-01-29 NOTE — TELEPHONE ENCOUNTER
Spoke with patient, states that she has started taking the 112 mcg Levothyroxine yesterday. Scheduled lab 2/26/18. Reminder letter mailed.

## 2018-01-31 ENCOUNTER — TELEPHONE (OUTPATIENT)
Dept: NEUROSURGERY | Facility: CLINIC | Age: 58
End: 2018-01-31

## 2018-01-31 NOTE — TELEPHONE ENCOUNTER
Called patient, patient did not answer. I left message with patient  imani    ----- Message from Flor Mohr RN sent at 1/31/2018  2:41 PM CST -----  Her appointment is not due until August. The schedule is not made out that far.  ----- Message -----  From: Erna Correa MA  Sent: 1/31/2018   1:16 PM  To: Flor Mohr RN        ----- Message -----  From: Jaci Ramos  Sent: 1/31/2018  10:56 AM  To: Nate Acosta Staff    _  1st Request  _  2nd Request  _  3rd Request        Who: patient     Why: Requesting a call back in regards to scheduling a f/u appt with dr Sullivan    What Number to Call Back: [phone]    When to Expect a call back: (Within 24 hours)    Please return the call at earliest convenience. Thanks!

## 2018-02-02 ENCOUNTER — TELEPHONE (OUTPATIENT)
Dept: NEUROSURGERY | Facility: CLINIC | Age: 58
End: 2018-02-02

## 2018-02-02 NOTE — TELEPHONE ENCOUNTER
Spoke with patient , I advised her that I will have pako scheduled her appointment with Dr. Godoy as though she's the only person that schedule on his schedule . When the appointment is scheduled I will call her and update her on the appointment and send her a appointment  letter in the mail      . ----- Message from Javier Walton sent at 2/2/2018  9:19 AM CST -----  Contact: Pt  X_ 1st Request  _ 2nd Request  _ 3rd Request    Who: WALTER CABA [[id number]]    Why: Patient would like to speak with staff in regards to a follow up appointment in March    What Number to Call Back: [phone]    When to Expect a call back: (Before the end of the day)  -- if call after 3:00 call back will be tomorrow.

## 2018-02-15 ENCOUNTER — OFFICE VISIT (OUTPATIENT)
Dept: PHYSICAL MEDICINE AND REHAB | Facility: CLINIC | Age: 58
End: 2018-02-15
Payer: MEDICAID

## 2018-02-15 VITALS
WEIGHT: 250.75 LBS | DIASTOLIC BLOOD PRESSURE: 88 MMHG | HEART RATE: 95 BPM | HEIGHT: 61 IN | SYSTOLIC BLOOD PRESSURE: 146 MMHG | BODY MASS INDEX: 47.34 KG/M2

## 2018-02-15 DIAGNOSIS — Z98.890 HISTORY OF LUMBAR LAMINECTOMY: ICD-10-CM

## 2018-02-15 DIAGNOSIS — M54.41 CHRONIC BILATERAL LOW BACK PAIN WITH RIGHT-SIDED SCIATICA: Primary | ICD-10-CM

## 2018-02-15 DIAGNOSIS — M51.36 DDD (DEGENERATIVE DISC DISEASE), LUMBAR: ICD-10-CM

## 2018-02-15 DIAGNOSIS — M48.061 SPINAL STENOSIS OF LUMBAR REGION, UNSPECIFIED WHETHER NEUROGENIC CLAUDICATION PRESENT: ICD-10-CM

## 2018-02-15 DIAGNOSIS — G89.29 CHRONIC BILATERAL LOW BACK PAIN WITH RIGHT-SIDED SCIATICA: Primary | ICD-10-CM

## 2018-02-15 DIAGNOSIS — M62.838 NIGHT MUSCLE SPASMS: ICD-10-CM

## 2018-02-15 DIAGNOSIS — Z98.890 S/P LUMBAR LAMINECTOMY: ICD-10-CM

## 2018-02-15 DIAGNOSIS — M25.551 PAIN OF RIGHT HIP JOINT: ICD-10-CM

## 2018-02-15 DIAGNOSIS — M48.062 SPINAL STENOSIS OF LUMBAR REGION WITH NEUROGENIC CLAUDICATION: ICD-10-CM

## 2018-02-15 DIAGNOSIS — Z79.891 CHRONIC USE OF OPIATE FOR THERAPEUTIC PURPOSE: ICD-10-CM

## 2018-02-15 DIAGNOSIS — M43.16 SPONDYLOLISTHESIS AT L3-L4 LEVEL: ICD-10-CM

## 2018-02-15 DIAGNOSIS — M48.07 STENOSIS OF LUMBOSACRAL SPINE: ICD-10-CM

## 2018-02-15 PROCEDURE — 3008F BODY MASS INDEX DOCD: CPT | Mod: ,,, | Performed by: PHYSICAL MEDICINE & REHABILITATION

## 2018-02-15 PROCEDURE — 99214 OFFICE O/P EST MOD 30 MIN: CPT | Mod: S$PBB,,, | Performed by: PHYSICAL MEDICINE & REHABILITATION

## 2018-02-15 PROCEDURE — 99999 PR PBB SHADOW E&M-EST. PATIENT-LVL III: CPT | Mod: PBBFAC,,, | Performed by: PHYSICAL MEDICINE & REHABILITATION

## 2018-02-15 PROCEDURE — 99213 OFFICE O/P EST LOW 20 MIN: CPT | Mod: PBBFAC | Performed by: PHYSICAL MEDICINE & REHABILITATION

## 2018-02-15 RX ORDER — CYCLOBENZAPRINE HCL 10 MG
TABLET ORAL
Qty: 30 TABLET | Refills: 11 | Status: SHIPPED | OUTPATIENT
Start: 2018-02-15 | End: 2019-01-17 | Stop reason: SDUPTHER

## 2018-02-15 RX ORDER — OXYCODONE AND ACETAMINOPHEN 10; 325 MG/1; MG/1
1 TABLET ORAL EVERY 12 HOURS PRN
Qty: 60 TABLET | Refills: 0 | Status: SHIPPED | OUTPATIENT
Start: 2018-04-15 | End: 2018-04-17

## 2018-02-15 RX ORDER — OXYCODONE AND ACETAMINOPHEN 10; 325 MG/1; MG/1
1 TABLET ORAL EVERY 12 HOURS PRN
Qty: 60 TABLET | Refills: 0 | Status: SHIPPED | OUTPATIENT
Start: 2018-03-15 | End: 2018-04-14

## 2018-02-15 RX ORDER — OXYCODONE AND ACETAMINOPHEN 10; 325 MG/1; MG/1
1 TABLET ORAL EVERY 12 HOURS PRN
Qty: 60 TABLET | Refills: 0 | Status: SHIPPED | OUTPATIENT
Start: 2018-02-15 | End: 2018-03-17

## 2018-02-15 NOTE — PROGRESS NOTES
Subjective:       Patient ID: Tanika Wayne is a 58 y.o. female.    Chief Complaint: Back Pain and Hip Pain    Back Pain   Pertinent negatives include no abdominal pain, chest pain, fever, numbness or weakness. Headaches:     Hip Pain    Pertinent negatives include no numbness.      Mrs. Wayne returns to clinic for worsening of chronic back pain, and chronic pain management.   She is s/p L3-5 laminectomy on 2/22/17, cx with superficial wound infection.  Referred by Dr. Sullivan.   LCV 11/15/17.    Since LCV, she reports some improvement of back pain.   Percocet decreases her pain down to 3-4.   She takes it BID, early in am,a nd 7-8 pm.  She also reports that Valium helps with leg spasms, takes it in am/pm.   Takes also Flexeril at bedtime, that helps with sleep.   She sates that she was taking Tramadol before surgery and was making her constipated.  Also , reported that  did not want her to have PT until October 2017.  She is on long term disability after back surgery.  She is also on Workman's Comp for Rt knee and hip ( she fell at work ,in January 7/2016).    Back Pain Description:  Lenght: pain is a chronic pain. Length > many, more than 10 yrs, with worsening last year.   Any past, recent injury, falls.  Intensity:  CURRENT  5/10,  AVERAGE  Pain  /10. at BEST /10 , At WORST 7-8 /10 on the WORST day.   Location: pain is localized at  Rt side of back and buttock, running to Rt hip and leg.    It is more Back at right side >> leg pain.   Radiation: Rt  Buttocks all the way to Rt legs, to mid calf   Timing :it is  constant day/yamila at night pain ,cannot sleep on RT side,  worse with activity, in evening  Cannot stand 2-5 minutes, needs tos it down, cannot walk a distance of one room, secondary to back pain , has to sit down for 5-10 minutes,   and can walk again.   Uses SC  ( borrowed from her ). She does noot have any AD at home.   She states that she ordered RW with seat through Workmans'  comp.   QUALITY: Back pain is more dull ache.  No Sharp/shooting/ neuropathic : burning, tingling, numbness.  She reports muscle spasm in RT calf.   She has  RT weakness, she reports a couple of times inside house, no injury.   Worsening factors:  Standing, walking.   Alleviating factors: sitting and laying down.   Symptoms interfere with daily activity, sleeping and work.   Current medications: Tramadol , Aleve   Failed medications: Gabapentin ( makes bruises on skin, bluish nails) Lyrica ( makes ankle swelling) .   Prior procedures.  Minimally invasive laminectomy.   PT/OT: None. Per patient, Dr Sullivan told her not to have PT until 2017.   Patient denies night fever/night sweats, bowel incontinence, significant weight loss and significant motor weakness (red flags).  Patient denies any suicidal or homicidal ideations.  She is here for chronic pain management with opiates.    Past Medical History:   Diagnosis Date    Abnormal echocardiogram 2012    Arthritis     Breast cancer 1998    Breast cancer, right breast     Cardiomyopathy due to systemic disease     Clotting disorder     Diabetes mellitus     Diabetes mellitus type II     DM (diabetes mellitus) 2012    Encounter for blood transfusion     HTN (hypertension) 2012    Hypertension     Increased glucose level 2012    ITP (idiopathic thrombocytopenic purpura) 2012    Kidney stones 2012    PONV (postoperative nausea and vomiting)     Stenosis of lumbosacral spine 2012    Thyroid disease     Tympanic membrane perforation 9/15/2014    right 2014 Dr. Jeffries       Past Surgical History:   Procedure Laterality Date    BREAST LUMPECTOMY Right      SECTION      CHOLECYSTECTOMY      EYE SURGERY      HYSTERECTOMY      L3-5 Laminectomy  2017    Dr. 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       Current Outpatient Prescriptions   Medication Sig Dispense Refill    carvedilol (COREG) 3.125 MG tablet Take 1 tablet (3.125 mg total) by mouth 2 (two) times daily. 180 tablet 3    clotrimazole-betamethasone 1-0.05% (LOTRISONE) cream Apply topically 2 (two) times daily as needed. 45 g 1    cyclobenzaprine (FLEXERIL) 10 MG tablet TAKE ONE-HALF TO ONE TABLET BY MOUTH EVERY NIGHT AT BEDTIME 30 tablet 11    diazePAM (VALIUM) 5 MG tablet Take 1 tablet (5 mg total) by mouth every 12 (twelve) hours as needed for Anxiety (muscle spasm). 60 tablet 2    docusate sodium (STOOL SOFTENER) 100 MG capsule Take 100 mg by mouth 3 (three) times daily as needed for Constipation.      fluticasone (FLONASE) 50 mcg/actuation nasal spray 2 sprays by Each Nare route once daily. 16 g 3    levothyroxine (SYNTHROID) 112 MCG tablet Take 1 tablet (112 mcg total) by mouth once daily. 30 tablet 11    losartan-hydrochlorothiazide 100-12.5 mg (HYZAAR) 100-12.5 mg Tab Take 1 tablet by mouth every morning. 90 tablet 3    multivitamin (THERAGRAN) per tablet Take 1 tablet by mouth once daily.      nifedipine (PROCARDIA-XL) 90 MG (OSM) TR24 TAKE 1 TABLET ONE TIME DAILY 90 tablet 3    oxyCODONE-acetaminophen (PERCOCET)   mg per tablet Take 1 tablet by mouth every 12 (twelve) hours as needed for Pain (max 3 tabs a day). 60 tablet 0    [START ON 3/15/2018] oxyCODONE-acetaminophen (PERCOCET)  mg per tablet Take 1 tablet by mouth every 12 (twelve) hours as needed for Pain (max 3 tabs a day). 60 tablet 0    [START ON 4/15/2018] oxyCODONE-acetaminophen (PERCOCET)  mg per tablet Take 1 tablet by mouth every 12 (twelve) hours as needed for Pain (max 3 tabs a day). 60 tablet 0    potassium chloride SA (K-DUR,KLOR-CON) 20 MEQ tablet Take 2 tablets (40 mEq total) by mouth 2 (two) times daily. 360 tablet 2    venlafaxine (EFFEXOR-XR) 75 MG 24 hr capsule TAKE 1 CAPSULE EVERY NIGHT 90 capsule 3     No current facility-administered medications for this visit.        Review of patient's allergies indicates:  No Known Allergies      Review of Systems   Constitutional: Negative for appetite change, chills, fatigue, fever and unexpected weight change.   HENT: Negative for drooling, trouble swallowing and voice change.    Eyes: Negative for pain and visual disturbance.   Respiratory: Negative for shortness of breath and wheezing.    Cardiovascular: Negative for chest pain and palpitations.   Gastrointestinal: Negative for abdominal distention, abdominal pain, constipation and diarrhea.   Genitourinary: Negative for difficulty urinating.   Musculoskeletal: Positive for back pain and gait problem. Negative for arthralgias, joint swelling, myalgias and neck stiffness.               Skin: Negative for color change and rash.   Neurological: Negative for dizziness, facial asymmetry, speech difficulty, weakness, light-headedness and numbness. Headaches:     Hematological: Negative for adenopathy.   Psychiatric/Behavioral: Negative for behavioral problems, confusion and sleep disturbance. The patient is not nervous/anxious.        Objective:      Physical Exam      GENERAL: The patient is alert, oriented, pleasant.   HEENT; PERRLA  NECK:  supple,    MUSCULOSKELETAL:   Gait- NT, she is in manual WC, pushed by her friend.  Cervical spine: full  AROM in cervical spine.  Lumbar spine, NT, in WC.  Straight leg raising Negative bilaterally ( sitting).  Full range of motion in all joints x4 extremities ( tested sitting).  Muscle strength 5/5 throughout x4 extremities.   No  joint laxity throughout x4 extremities.   NEUROLOGIC: Cranial nerves II through XII intact.   Deep tendon reflexes is normal, +2 in the upper and lower extremities bilaterally.   Muscle tone is normal.   Sensory is intact to light touch and pinprick throughout x4 extremities.       MRI of Lumbar spine ( 01/2017) showed:   Continued grade 1 anterolisthesis of L3 on L4.   The lumbar vertebral body height, contour and bone marrow signal is relatively stable without evidence for acute fracture.  The distal spinal cord and conus is normal in signal and contour the tip of the conus approximates the mid [id number].  T12/L1 No significant disc bulge, central canal or neural foraminal stenosis.   L1/L2: Small disc bulge with ligamentum flavum hypertrophy and facet joint arthropathy with mild central canal and bilateral neural foraminal stenosis  L2/L3: Trace disc bulge with ligamentum flavum hypertrophy and facet joint arthropathy with moderate-to-severe central canal stenosis.   There is mild resulting neuroforaminal stenosis.  L3/L4: Bulging disc with ligament flavum hypertrophy and facet arthropathy with severe central canal stenosis and mild bilateral neuroforaminal stenosis.  L4/L5: Bulging disc ligamentum flavum hypertrophy and facet joint arthropathy without significant central canal stenosis   with mild/moderate neuroforaminal stenosis bilaterally  L5/S1: Bulging disc with left paracentral disc protrusion and annular fissure. No significant central canal stenosis with mild right and   moderate left neuroforaminal stenosis.  Impression:   Continued multilevel degenerative change of the lumbar  spine which remains most prominent at L3/L4 with small disc bulge with   exuberant ligamentum flavum hypertrophy and facet joint arthropathy   with severe central canal stenosis and mild neuroforaminal stenosis.  Continued bulging disc with left paracentral disc protrusion L5/S1.    Assessment:       1. Chronic bilateral low back pain with right-sided sciatica    2. S/P lumbar laminectomy    3. Spondylolisthesis at L3-L4 level    4. Pain of right hip joint    5. Spinal stenosis of lumbar region with neurogenic claudication    6. History of lumbar laminectomy    7. DDD (degenerative disc disease), lumbar    8. Chronic use of opiate for therapeutic purpose    9. Spinal stenosis of lumbar region, unspecified whether neurogenic claudication present    10. Stenosis of lumbosacral spine    11. Night muscle spasms        Plan:       Chronic bilateral low back pain with right-sided sciatica  -     oxyCODONE-acetaminophen (PERCOCET)  mg per tablet; Take 1 tablet by mouth every 12 (twelve) hours as needed for Pain (max 3 tabs a day).  Dispense: 60 tablet; Refill: 0  -     oxyCODONE-acetaminophen (PERCOCET)  mg per tablet; Take 1 tablet by mouth every 12 (twelve) hours as needed for Pain (max 3 tabs a day).  Dispense: 60 tablet; Refill: 0  -     oxyCODONE-acetaminophen (PERCOCET)  mg per tablet; Take 1 tablet by mouth every 12 (twelve) hours as needed for Pain (max 3 tabs a day).  Dispense: 60 tablet; Refill: 0  -     cyclobenzaprine (FLEXERIL) 10 MG tablet; TAKE ONE-HALF TO ONE TABLET BY MOUTH EVERY NIGHT AT BEDTIME  Dispense: 30 tablet; Refill: 11    S/P lumbar laminectomy  -     oxyCODONE-acetaminophen (PERCOCET)  mg per tablet; Take 1 tablet by mouth every 12 (twelve) hours as needed for Pain (max 3 tabs a day).  Dispense: 60 tablet; Refill: 0  -     oxyCODONE-acetaminophen (PERCOCET)  mg per tablet; Take 1 tablet by mouth every 12 (twelve) hours as needed for Pain (max 3 tabs a day).   Dispense: 60 tablet; Refill: 0  -     oxyCODONE-acetaminophen (PERCOCET)  mg per tablet; Take 1 tablet by mouth every 12 (twelve) hours as needed for Pain (max 3 tabs a day).  Dispense: 60 tablet; Refill: 0  -     cyclobenzaprine (FLEXERIL) 10 MG tablet; TAKE ONE-HALF TO ONE TABLET BY MOUTH EVERY NIGHT AT BEDTIME  Dispense: 30 tablet; Refill: 11    Spondylolisthesis at L3-L4 level  -     oxyCODONE-acetaminophen (PERCOCET)  mg per tablet; Take 1 tablet by mouth every 12 (twelve) hours as needed for Pain (max 3 tabs a day).  Dispense: 60 tablet; Refill: 0  -     oxyCODONE-acetaminophen (PERCOCET)  mg per tablet; Take 1 tablet by mouth every 12 (twelve) hours as needed for Pain (max 3 tabs a day).  Dispense: 60 tablet; Refill: 0  -     oxyCODONE-acetaminophen (PERCOCET)  mg per tablet; Take 1 tablet by mouth every 12 (twelve) hours as needed for Pain (max 3 tabs a day).  Dispense: 60 tablet; Refill: 0  -     cyclobenzaprine (FLEXERIL) 10 MG tablet; TAKE ONE-HALF TO ONE TABLET BY MOUTH EVERY NIGHT AT BEDTIME  Dispense: 30 tablet; Refill: 11    Pain of right hip joint  -     oxyCODONE-acetaminophen (PERCOCET)  mg per tablet; Take 1 tablet by mouth every 12 (twelve) hours as needed for Pain (max 3 tabs a day).  Dispense: 60 tablet; Refill: 0  -     oxyCODONE-acetaminophen (PERCOCET)  mg per tablet; Take 1 tablet by mouth every 12 (twelve) hours as needed for Pain (max 3 tabs a day).  Dispense: 60 tablet; Refill: 0  -     oxyCODONE-acetaminophen (PERCOCET)  mg per tablet; Take 1 tablet by mouth every 12 (twelve) hours as needed for Pain (max 3 tabs a day).  Dispense: 60 tablet; Refill: 0  -     cyclobenzaprine (FLEXERIL) 10 MG tablet; TAKE ONE-HALF TO ONE TABLET BY MOUTH EVERY NIGHT AT BEDTIME  Dispense: 30 tablet; Refill: 11    Spinal stenosis of lumbar region with neurogenic claudication  -     oxyCODONE-acetaminophen (PERCOCET)  mg per tablet; Take 1 tablet by mouth every  12 (twelve) hours as needed for Pain (max 3 tabs a day).  Dispense: 60 tablet; Refill: 0  -     oxyCODONE-acetaminophen (PERCOCET)  mg per tablet; Take 1 tablet by mouth every 12 (twelve) hours as needed for Pain (max 3 tabs a day).  Dispense: 60 tablet; Refill: 0  -     oxyCODONE-acetaminophen (PERCOCET)  mg per tablet; Take 1 tablet by mouth every 12 (twelve) hours as needed for Pain (max 3 tabs a day).  Dispense: 60 tablet; Refill: 0  -     cyclobenzaprine (FLEXERIL) 10 MG tablet; TAKE ONE-HALF TO ONE TABLET BY MOUTH EVERY NIGHT AT BEDTIME  Dispense: 30 tablet; Refill: 11    History of lumbar laminectomy  -     oxyCODONE-acetaminophen (PERCOCET)  mg per tablet; Take 1 tablet by mouth every 12 (twelve) hours as needed for Pain (max 3 tabs a day).  Dispense: 60 tablet; Refill: 0  -     oxyCODONE-acetaminophen (PERCOCET)  mg per tablet; Take 1 tablet by mouth every 12 (twelve) hours as needed for Pain (max 3 tabs a day).  Dispense: 60 tablet; Refill: 0  -     oxyCODONE-acetaminophen (PERCOCET)  mg per tablet; Take 1 tablet by mouth every 12 (twelve) hours as needed for Pain (max 3 tabs a day).  Dispense: 60 tablet; Refill: 0  -     cyclobenzaprine (FLEXERIL) 10 MG tablet; TAKE ONE-HALF TO ONE TABLET BY MOUTH EVERY NIGHT AT BEDTIME  Dispense: 30 tablet; Refill: 11    DDD (degenerative disc disease), lumbar  -     oxyCODONE-acetaminophen (PERCOCET)  mg per tablet; Take 1 tablet by mouth every 12 (twelve) hours as needed for Pain (max 3 tabs a day).  Dispense: 60 tablet; Refill: 0  -     oxyCODONE-acetaminophen (PERCOCET)  mg per tablet; Take 1 tablet by mouth every 12 (twelve) hours as needed for Pain (max 3 tabs a day).  Dispense: 60 tablet; Refill: 0  -     oxyCODONE-acetaminophen (PERCOCET)  mg per tablet; Take 1 tablet by mouth every 12 (twelve) hours as needed for Pain (max 3 tabs a day).  Dispense: 60 tablet; Refill: 0  -     cyclobenzaprine (FLEXERIL) 10 MG  tablet; TAKE ONE-HALF TO ONE TABLET BY MOUTH EVERY NIGHT AT BEDTIME  Dispense: 30 tablet; Refill: 11    Chronic use of opiate for therapeutic purpose  -     oxyCODONE-acetaminophen (PERCOCET)  mg per tablet; Take 1 tablet by mouth every 12 (twelve) hours as needed for Pain (max 3 tabs a day).  Dispense: 60 tablet; Refill: 0  -     oxyCODONE-acetaminophen (PERCOCET)  mg per tablet; Take 1 tablet by mouth every 12 (twelve) hours as needed for Pain (max 3 tabs a day).  Dispense: 60 tablet; Refill: 0  -     oxyCODONE-acetaminophen (PERCOCET)  mg per tablet; Take 1 tablet by mouth every 12 (twelve) hours as needed for Pain (max 3 tabs a day).  Dispense: 60 tablet; Refill: 0  -     cyclobenzaprine (FLEXERIL) 10 MG tablet; TAKE ONE-HALF TO ONE TABLET BY MOUTH EVERY NIGHT AT BEDTIME  Dispense: 30 tablet; Refill: 11    Spinal stenosis of lumbar region, unspecified whether neurogenic claudication present  -     oxyCODONE-acetaminophen (PERCOCET)  mg per tablet; Take 1 tablet by mouth every 12 (twelve) hours as needed for Pain (max 3 tabs a day).  Dispense: 60 tablet; Refill: 0  -     oxyCODONE-acetaminophen (PERCOCET)  mg per tablet; Take 1 tablet by mouth every 12 (twelve) hours as needed for Pain (max 3 tabs a day).  Dispense: 60 tablet; Refill: 0  -     oxyCODONE-acetaminophen (PERCOCET)  mg per tablet; Take 1 tablet by mouth every 12 (twelve) hours as needed for Pain (max 3 tabs a day).  Dispense: 60 tablet; Refill: 0  -     cyclobenzaprine (FLEXERIL) 10 MG tablet; TAKE ONE-HALF TO ONE TABLET BY MOUTH EVERY NIGHT AT BEDTIME  Dispense: 30 tablet; Refill: 11    Stenosis of lumbosacral spine  -     oxyCODONE-acetaminophen (PERCOCET)  mg per tablet; Take 1 tablet by mouth every 12 (twelve) hours as needed for Pain (max 3 tabs a day).  Dispense: 60 tablet; Refill: 0  -     oxyCODONE-acetaminophen (PERCOCET)  mg per tablet; Take 1 tablet by mouth every 12 (twelve) hours as needed  for Pain (max 3 tabs a day).  Dispense: 60 tablet; Refill: 0  -     oxyCODONE-acetaminophen (PERCOCET)  mg per tablet; Take 1 tablet by mouth every 12 (twelve) hours as needed for Pain (max 3 tabs a day).  Dispense: 60 tablet; Refill: 0  -     cyclobenzaprine (FLEXERIL) 10 MG tablet; TAKE ONE-HALF TO ONE TABLET BY MOUTH EVERY NIGHT AT BEDTIME  Dispense: 30 tablet; Refill: 11    Night muscle spasms  -     cyclobenzaprine (FLEXERIL) 10 MG tablet; TAKE ONE-HALF TO ONE TABLET BY MOUTH EVERY NIGHT AT BEDTIME  Dispense: 30 tablet; Refill: 11    Patient with chronic low back pain, with neurogenic claudication secondary to multi level Lumbar spinal stenosis, moderate-to-severe at L2-3, and   Severe at L3/L4, secondary to bulging disc with ligament flavum hypertrophy and facet arthropathy, s/p L3-5 laminectomy on 2/22/17.    -- Pain mgm, will resume Percocet 10/325 mg po BID  prn.  And Valium prn muscle spasms.   reviewed and appropriate.    RTC in 3 months.    Total time spent face to face with patient was 25 minutes.   More than 50% of that time was spent in counseling on diagnosis , prognosis and treatment options.   I also caunsel patient  on common and most usual side effect of prescribed medications.   Risk and benefits of opiates, possible risk of developing opiate dependence and tolerance, need of strict compliance with prescribed medications.  Pain contract, rules and obligations were discussed with patient in details.  He is aware that I would be the only doctor prescribing him pain medications and ED in a case of emergency.  I reviewed Primary care , and other specialty's notes to better coordinate patient's  care.   All questions were answered, and patient voiced understanding.

## 2018-02-22 DIAGNOSIS — I10 ESSENTIAL HYPERTENSION, MALIGNANT: ICD-10-CM

## 2018-02-22 RX ORDER — LOSARTAN POTASSIUM AND HYDROCHLOROTHIAZIDE 12.5; 1 MG/1; MG/1
1 TABLET ORAL EVERY MORNING
Qty: 90 TABLET | Refills: 0 | Status: SHIPPED | OUTPATIENT
Start: 2018-02-22 | End: 2018-06-13 | Stop reason: SDUPTHER

## 2018-02-26 ENCOUNTER — LAB VISIT (OUTPATIENT)
Dept: LAB | Facility: OTHER | Age: 58
End: 2018-02-26
Payer: MEDICAID

## 2018-02-26 DIAGNOSIS — E89.0 HISTORY OF SUBTOTAL THYROIDECTOMY: ICD-10-CM

## 2018-02-26 LAB
T4 FREE SERPL-MCNC: 0.98 NG/DL
TSH SERPL DL<=0.005 MIU/L-ACNC: 10.24 UIU/ML

## 2018-02-26 PROCEDURE — 84443 ASSAY THYROID STIM HORMONE: CPT

## 2018-02-26 PROCEDURE — 36415 COLL VENOUS BLD VENIPUNCTURE: CPT

## 2018-02-26 PROCEDURE — 84439 ASSAY OF FREE THYROXINE: CPT

## 2018-02-27 ENCOUNTER — TELEPHONE (OUTPATIENT)
Dept: NEUROSURGERY | Facility: CLINIC | Age: 58
End: 2018-02-27

## 2018-02-27 NOTE — TELEPHONE ENCOUNTER
Spoke with patient. Appointment with Nutrition and follow up with Dr. Sullivan scheduled. Appointment letters mailed.

## 2018-02-27 NOTE — TELEPHONE ENCOUNTER
----- Message from Kathleen Torres sent at 2/26/2018 10:16 AM CST -----  Contact: pt   x 1st Request  _ 2nd Request  _ 3rd Request    Who:pt     Why:pt is requesting a f/u appt with Dr. Sullivan. Please call and advise.      What Number to Call Back:[phone]     When to Expect a call back: (Before the end of the day)  -- if call after 3:00 call back will be tomorrow.

## 2018-03-01 ENCOUNTER — DOCUMENTATION ONLY (OUTPATIENT)
Dept: REHABILITATION | Facility: OTHER | Age: 58
End: 2018-03-01

## 2018-03-01 NOTE — PROGRESS NOTES
DC NOTE FOR [facility]    Pt was treated 7 times from 11/24/17 to 12/27/17.  Treatments consisted of stretching and strengthening exercises for the lumbar spine.  Goals of PT not met. Pt did not return for any further follow up.  DC from [facility] as she did not continue with any further treatment.

## 2018-03-05 ENCOUNTER — NUTRITION (OUTPATIENT)
Dept: NUTRITION | Facility: CLINIC | Age: 58
End: 2018-03-05
Payer: MEDICAID

## 2018-03-05 VITALS — WEIGHT: 261.44 LBS | BODY MASS INDEX: 49.36 KG/M2 | HEIGHT: 61 IN

## 2018-03-05 DIAGNOSIS — E78.2 MIXED HYPERLIPIDEMIA: ICD-10-CM

## 2018-03-05 DIAGNOSIS — E66.01 MORBID OBESITY WITH BMI OF 45.0-49.9, ADULT: ICD-10-CM

## 2018-03-05 DIAGNOSIS — Z71.3 DIETARY COUNSELING: ICD-10-CM

## 2018-03-05 DIAGNOSIS — E66.9 DIABETES MELLITUS TYPE 2 IN OBESE: Primary | ICD-10-CM

## 2018-03-05 DIAGNOSIS — I10 ESSENTIAL HYPERTENSION: ICD-10-CM

## 2018-03-05 DIAGNOSIS — E11.69 DIABETES MELLITUS TYPE 2 IN OBESE: Primary | ICD-10-CM

## 2018-03-05 DIAGNOSIS — E89.0 HISTORY OF SUBTOTAL THYROIDECTOMY: ICD-10-CM

## 2018-03-05 PROCEDURE — 99213 OFFICE O/P EST LOW 20 MIN: CPT | Mod: PBBFAC

## 2018-03-05 PROCEDURE — 97802 MEDICAL NUTRITION INDIV IN: CPT | Mod: PBBFAC,59 | Performed by: DIETITIAN, REGISTERED

## 2018-03-05 PROCEDURE — 99999 PR PBB SHADOW E&M-EST. PATIENT-LVL III: CPT | Mod: PBBFAC,,,

## 2018-03-05 RX ORDER — LEVOTHYROXINE SODIUM 125 UG/1
125 TABLET ORAL DAILY
Qty: 90 TABLET | Refills: 11 | Status: SHIPPED | OUTPATIENT
Start: 2018-03-05 | End: 2018-07-23 | Stop reason: SDUPTHER

## 2018-03-05 NOTE — PROGRESS NOTES
"Referring Physician:Dave Sullivan MD     Reason for visit:  Chief Complaint   Patient presents with    Diabetes    Obesity    Hypertension    Hyperlipidemia    Nutrition Counseling        :1960     Allergies Reviewed  Meds Reviewed    Anthropometrics  Weight:118.6 kg (261 lb 7.5 oz)  Height:5' 1" (1.549 m)  BMI:Body mass index is 49.4 kg/m².   IBW:   47.7 kg    Meds:  Outpatient Medications Prior to Visit   Medication Sig Dispense Refill    carvedilol (COREG) 3.125 MG tablet Take 1 tablet (3.125 mg total) by mouth 2 (two) times daily. 180 tablet 3    clotrimazole-betamethasone 1-0.05% (LOTRISONE) cream Apply topically 2 (two) times daily as needed. 45 g 1    cyclobenzaprine (FLEXERIL) 10 MG tablet TAKE ONE-HALF TO ONE TABLET BY MOUTH EVERY NIGHT AT BEDTIME 30 tablet 11    diazePAM (VALIUM) 5 MG tablet Take 1 tablet (5 mg total) by mouth every 12 (twelve) hours as needed for Anxiety (muscle spasm). 60 tablet 2    docusate sodium (STOOL SOFTENER) 100 MG capsule Take 100 mg by mouth 3 (three) times daily as needed for Constipation.      fluticasone (FLONASE) 50 mcg/actuation nasal spray 2 sprays by Each Nare route once daily. 16 g 3    levothyroxine (SYNTHROID) 112 MCG tablet Take 1 tablet (112 mcg total) by mouth once daily. 30 tablet 11    losartan-hydrochlorothiazide 100-12.5 mg (HYZAAR) 100-12.5 mg Tab Take 1 tablet by mouth every morning. 90 tablet 0    multivitamin (THERAGRAN) per tablet Take 1 tablet by mouth once daily.      nifedipine (PROCARDIA-XL) 90 MG (OSM) TR24 TAKE 1 TABLET ONE TIME DAILY 90 tablet 3    oxyCODONE-acetaminophen (PERCOCET)  mg per tablet Take 1 tablet by mouth every 12 (twelve) hours as needed for Pain (max 3 tabs a day). 60 tablet 0    [START ON 3/15/2018] oxyCODONE-acetaminophen (PERCOCET)  mg per tablet Take 1 tablet by mouth every 12 (twelve) hours as needed for Pain (max 3 tabs a day). 60 tablet 0    [START ON 4/15/2018] " oxyCODONE-acetaminophen (PERCOCET)  mg per tablet Take 1 tablet by mouth every 12 (twelve) hours as needed for Pain (max 3 tabs a day). 60 tablet 0    potassium chloride SA (K-DUR,KLOR-CON) 20 MEQ tablet Take 2 tablets (40 mEq total) by mouth 2 (two) times daily. 360 tablet 2    venlafaxine (EFFEXOR-XR) 75 MG 24 hr capsule TAKE 1 CAPSULE EVERY NIGHT 90 capsule 3     No facility-administered medications prior to visit.          Labs:   01/25/18  HgbA1c  7.4   Chol  217   HDL  50   LDL Chol  133.6   TG  167     Estimated Nutrition Needs:   1431 Kcals/day( 30 kcal/kg IBW),    48 g protein( 1.0 g/kg IBW)     Diet Hx:   Pt & her sister-in-law present for encounter.  Pt presently on long-term disability from fall; using rolling walker today.  Pt reports she needs hip/knee surgery, but must lose weight first.  Note pt with approx 10 lb weight gain over past two weeks, attributed to excessive intake of honey buns/sweets and physical inactivity.  Pt makes grocery list; her  and daughter do grocery shopping;  is cooking all meals.  Pt states she has weakness for sweets, and cannot control intake of them.  Unable to exercise; states she plans to resume physical therapy in the next few weeks.     Breakfast:   Lemon muffin, banana, grapes, water.  Lunch:   Tur[place] and american cheese on wheat with regular haque, baked cheewees, water.  Dinner:    is fixing spaghetti and meat sauce, garlic bread, salad with regular ranch dressing for dinner tonight.  Drinks one caffeine-free diet Coke daily.  Snacks throughout the day:  Honey buns; muffins; cake; sweets.    Assessment:   Pt attentive and asked relevant questions about snack substitutes for sweets; foods recommended & to avoid; reading food labels for fat/carb/fiber content; sample meal plan and menus; portion sizes; grocery shopping and cooking tips.  All questions answered, and pt verbalized understanding of information.  Encouraged pt to make a  realistic plan for reducing calorie/fat/sugar intake, and to increase physical activity as tolerated.    Nutrition Diagnosis:   Obesity RT excess energy intake and physical inactivity AEB Obesity - grade III, BMI 40+ and food recall.    Recommendations:   Low fat, low sodium, high fiber diet.  Exercise as tolerated, with goal of 30 minutes per day.  Handouts provided and reviewed:  Cardiac Nutrition Therapy; 1200 Calorie Sample 5-Day Menus; Weight Loss Tips; Label Reading Tips for Weight Management; Cooking Tips for Weight Management; Get Fit Shopping List; Eating Right for a Healthy Weight; Eat Fit Plan...Anytime/Anywhere; Health and Nutrition-Related Websites; Servings of Carbohydrates for Meal Planning; Walking Works; Building a Healthy Plate;  Heart Healthy Eating:  Shopping Tips and Label Reading Tips; 30-45 gm CHO meal plan      Consultation Time:45 minutes.    Follow Up:  Pt provided with dietitian contact number and advised to call with questions or make future appointment if further intervention needed.

## 2018-03-07 DIAGNOSIS — I10 ESSENTIAL HYPERTENSION, MALIGNANT: ICD-10-CM

## 2018-03-07 RX ORDER — NIFEDIPINE 90 MG/1
90 TABLET, EXTENDED RELEASE ORAL DAILY
Qty: 90 TABLET | Refills: 0 | OUTPATIENT
Start: 2018-03-07

## 2018-03-08 DIAGNOSIS — I10 ESSENTIAL HYPERTENSION, MALIGNANT: ICD-10-CM

## 2018-03-09 RX ORDER — NIFEDIPINE 90 MG/1
TABLET, EXTENDED RELEASE ORAL
Qty: 90 TABLET | Refills: 3 | Status: SHIPPED | OUTPATIENT
Start: 2018-03-09 | End: 2018-07-23 | Stop reason: SDUPTHER

## 2018-03-12 ENCOUNTER — CLINICAL SUPPORT (OUTPATIENT)
Dept: REHABILITATION | Facility: OTHER | Age: 58
End: 2018-03-12
Attending: NEUROLOGICAL SURGERY
Payer: MEDICAID

## 2018-03-12 DIAGNOSIS — Z98.890 HISTORY OF LUMBAR LAMINECTOMY: ICD-10-CM

## 2018-03-12 PROCEDURE — 97110 THERAPEUTIC EXERCISES: CPT

## 2018-03-12 NOTE — PROGRESS NOTES
[facility]    Name: Tanika Wayne  Clinic Number: [id number]  Date of Treatment: 2018   Diagnosis:   Encounter Diagnosis   Name Primary?    History of lumbar laminectomy      Physician: Dave Sullivan MD    Pain pattern determined: 1 PEN  Plan of care signed: 2018     Time in: 2:50 pm  Time Out: 3:45 pm  Total Treatment time: 40  Precautions: - L3-4, L4-5 laminectomy that was done by Dr. Sullivan on 2017  -will be having right hip replacement and right knee replacement at some point for arthritis and pain in these joints  -fall   -thyroidectomy  -DM borderline  -breast CA   Visit #: 9    POC due: 2018 (sent 3/12/2018)  Reassessment due: 2018    Face to Face discussion of patient was done between PT and PTA.     Subjective   Tanika reports she had not been to therapy in 3 months initially because of insurance issues, then her  got very sick with the flu and was in the hospital for a month,  Now he is better and back to being her caregiver. Pt reports that she has not been doing her exercises frequently. She has minimal pain in the low back, more pain in the hip.    Patient reports their pain to be 2/10 on a 0-10 scale at her hip, 0/10 at lower back with 0 being no pain and 10 being the worst pain imaginable.    Pain Location: back and right hip and right knee     Occupation:  disability   Leisure: sits a lot                     Pts goals:  Wants to get out more, she can't walk far enough now       Objective        Baseline IM Testing Results:   Date of testin17  ROM 6-36 deg   Max Peak Torque 94 ft lbs    Min Peak Torque 41 ft lbs    Flex/Ext Ratio 2/1   % below normative data 72%   Counter weight 200   Femur number 6   Seat pad 1               FOTO: Focus on Therapeutic Outcomes   Category: lumbar   % Impaired: 73%  Current Score  = CL = least 60% but < 80% impaired, limited or restricted  Goal at Discharge Score = CK = at  least 40% but < 60% impaired, limited or restricted  12/11/17: 63%    Treatment    Pt was instructed in and performed the following:     Tanika received therapeutic exercises to develop/improved posture, cardiovascular endurance, muscular endurance, lumbar/cervical ROM, strength and muscular endurance for 45 minutes including the following exercise      [facility] 3/12/2018   Visit Number 9   VAS Pain Rating 2   Recumbent Bike Seat Pos. -   Time 4   Flexion in Lying 10   Flexion in Sitting 10   Lumbar Extension Seat Pad -   Femur Restraint -   Top Dead Center -   Counterweight -   Lumbar Flexion 39   Lumbar Extension 3   Lumbar Peak Torque -   Min Torque -   Percent From Norm -   Lumbar Weight 50   Repetitions 16   Rating of Perceived Exertion 5   Ice - Z Lie (in min.) -   Ice - Sitting 10           Seated trunk flexion with T-Ball 10x NT  Seated trunk extension over chair 10x NT  Seated PPT 10x -   LTR without ball 10x  Supine PPT 10x    Peripheral muscle strengthening which included 1 set of 15-20 repetitions at a slow, controlled 7 second per rep pace focused on strengthening supporting musculature for improved body mechanics and functional mobility.  Pt and therapist focused on proper form during treatment to ensure optimal strengthening of each targeted muscle group.  Machines were utilized including torso rotation, leg extension, leg curl, chest press, upright row. Tricep extension, bicep curl, leg press, and hip abduction added on third visit.       Tanika received the following manual therapy techniques: None      Home Exercise Program as follows:   Supine GUY   Seated hip flexion 10 reps 2/day  Seated long arc quads 10 reps 2/day  Seated pillow squeeze 10 reps 2/day  Seated pelvic tilts 10 reps 2/day   LTR 10x  Handouts were given to the patient. Pt demo good understanding of the education provided. Tanika demonstrated good return demonstration of activities.     Lumbar roll use compliance: Pt plans  on buying lumbar roll.  Additional exercises taught this treatment session:   HEP review    Assessment     Pt presents to physical therapy with minimal low back pain, and increased hip pain that is not related to the low back. She was able to increase ROM on the lumbar medX, however needed to decrease the resistance and was able to complete 16 repetitions with an exertion of 5/10. Will increase repetitions next visit as tolerated and continue to progress in strengthening. Pt completed all peripheral resistance exercises and she was able to get on the leg curl machine and bicep machine better today. Please cont to monitor. Discussed with rehab tech if needed that to modify by using theraband for bicep curl.    Patient is making good progress towards established goals.  Pt will continue to benefit from skilled outpatient physical therapy to address the deficits stated in the impairment chart, provide pt/family education and to maximize pt's level of independence in the home and community environment.       Pt's spiritual, cultural and educational needs considered and pt agreeable to plan of care and goals as stated below:     Medical necessity is demonstrated by the following problem list.    Pt presents with the following impairments: back pain, reduced back strength and reduced back mobility, poor LE and back strength, poor function, unable to manage indep without dtr assist  History  Co-morbidities and personal factors that may impact the plan of care Examination  Body Structures and Functions, activity limitations and participation restrictions that may impact the plan of care Clinical Presentation Decision Making/ Complexity Score   Co-morbidities:      - L3-4, L4-5 laminectomy that was done by Dr. Sullivan on 2/22/2017  -will be having right hip replacement and right knee replacement at some point for arthritis and pain in these joints  -fall 2016  -thyroidectomy  -DM borderline  -breast CA  1998              Personal Factors:   Over weight  Fall precautions Body Regions:   back  lower extremities     Body Systems:   gross symmetry  ROM  strength  gross coordinated movement  transitions  motor control  motor learning     Activity limitations:   Learning and applying knowledge  no deficits     General Tasks and Commands  no deficits     Communication  no deficits     Mobility  using transportation (bus, train, plane, car)     Self care  looking after one's health     Domestic Life  doing house work (cleaning house, washing dishes, laundry)     Interactions/Relationships  no deficits     Life Areas  no deficits     Community and Social Life  no deficits     Participation Restrictions:   Unable to shop and go out    evolving clinical presentation with changing clinical characteristics    mod            GOALS: Pt is in agreement with the following goals.     Short term goals:  6 weeks or 10 visits   1.  Pt will demonstrate increased lumbar ROM by at least 3 degrees from the initial ROM value with improvements noted in functional ROM and ability to perform ADLs  2.  Pt will demonstrate increased improved lumbar strength on testing by 10 % with improvements functionally noted with standing/walking to make a meal and get dressed with greater ease. Improved  self care  3.  Patient report a reduction in worst pain score by 1-2 points for improved tolerance during work and recreational activities  4.  Pt able to perform HEP correctly with minimal cueing or supervision for therapist  5. amb 2 blocks, into department without needing rest        Long term goals: 13 weeks or 20 visits   1. Pt will demonstrate increased lumbar ROM by at least 6 degrees from initial ROM value, resulting in improved ability to perform functional fwd bending while standing and sitting.   2. Pt will demonstrate increased maximum isometric torque value by 30% when compared to the initial value resulting in improved ability to perform  bending, lifting, and carrying activities safely, confidently.  Go to store with dtr  3. Pt to demonstrate ability to independently control and reduce their pain through posture positioning and mechanical movements throughout a typical day.  4.  Patient will demonstrate improved overall function per FOTO Survey to CK = at least 40% but < 60% impaired, limited or restricted score or less.  5. No falls  6.go on outings  7. Walk > 2 blocks for store      Plan   Continue with established Plan of Care towards established PT goals. Increase ROM lumbar Med X machine next visit if no increase in symptoms after todays visit.

## 2018-03-12 NOTE — PLAN OF CARE
[facility]    Name: Tanika Wayne  Clinic Number: [id number]  Date of Treatment: 2018   Diagnosis:   Encounter Diagnosis   Name Primary?    History of lumbar laminectomy      Physician: Dave Sullivan MD    Pain pattern determined: 1 PEN  Plan of care signed: POC not signed as of 2018     Time in: 2:50 pm  Time Out: 3:45 pm  Total Treatment time: 40  Precautions: - L3-4, L4-5 laminectomy that was done by Dr. Sullivan on 2017  -will be having right hip replacement and right knee replacement at some point for arthritis and pain in these joints  -fall   -thyroidectomy  -DM borderline  -breast CA   Visit #: 9    POC due: 2018 (sent 3/12/2018  Reassessment due: 2018    Face to Face discussion of patient was done between PT and PTA.     Subjective   Tanika reports she had not been to therapy in 3 months initially because of insurance issues, then her  got very sick with the flu and was in the hospital for a month,  Now he is better and back to being her caregiver. Pt reports that she has not been doing her exercises frequently. She has minimal pain in the low back, more pain in the hip.    Patient reports their pain to be 2/10 on a 0-10 scale at her hip, 0/10 at lower back with 0 being no pain and 10 being the worst pain imaginable.    Pain Location: back and right hip and right knee     Occupation:  disability   Leisure: sits a lot                     Pts goals:  Wants to get out more, she can't walk far enough now       Objective        Baseline IM Testing Results:   Date of testin17  ROM 6-36 deg   Max Peak Torque 94 ft lbs    Min Peak Torque 41 ft lbs    Flex/Ext Ratio 2/1   % below normative data 72%   Counter weight 200   Femur number 6   Seat pad 1               FOTO: Focus on Therapeutic Outcomes   Category: lumbar   % Impaired: 73%  Current Score  = CL = least 60% but < 80% impaired, limited or restricted  Goal at  Discharge Score = CK = at least 40% but < 60% impaired, limited or restricted  12/11/17: 63%    Treatment    Pt was instructed in and performed the following:     Tanika received therapeutic exercises to develop/improved posture, cardiovascular endurance, muscular endurance, lumbar/cervical ROM, strength and muscular endurance for 45 minutes including the following exercise      [facility] 3/12/2018   Visit Number 9   VAS Pain Rating 2   Recumbent Bike Seat Pos. -   Time 4   Flexion in Lying 10   Flexion in Sitting 10   Lumbar Extension Seat Pad -   Femur Restraint -   Top Dead Center -   Counterweight -   Lumbar Flexion 39   Lumbar Extension 3   Lumbar Peak Torque -   Min Torque -   Percent From Norm -   Lumbar Weight 50   Repetitions 16   Rating of Perceived Exertion 5   Ice - Z Lie (in min.) -   Ice - Sitting 10           Seated trunk flexion with T-Ball 10x NT  Seated trunk extension over chair 10x NT  Seated PPT 10x -   LTR without ball 10x  Supine PPT 10x    Peripheral muscle strengthening which included 1 set of 15-20 repetitions at a slow, controlled 7 second per rep pace focused on strengthening supporting musculature for improved body mechanics and functional mobility.  Pt and therapist focused on proper form during treatment to ensure optimal strengthening of each targeted muscle group.  Machines were utilized including torso rotation, leg extension, leg curl, chest press, upright row. Tricep extension, bicep curl, leg press, and hip abduction added on third visit.       Tanika received the following manual therapy techniques: None      Home Exercise Program as follows:   Supine GUY   Seated hip flexion 10 reps 2/day  Seated long arc quads 10 reps 2/day  Seated pillow squeeze 10 reps 2/day  Seated pelvic tilts 10 reps 2/day   LTR 10x  Handouts were given to the patient. Pt demo good understanding of the education provided. Tanika demonstrated good return demonstration of activities.     Lumbar roll  use compliance: Pt plans on buying lumbar roll.  Additional exercises taught this treatment session:   HEP review    Assessment     Pt presents to physical therapy with minimal low back pain, and increased hip pain that is not related to the low back. She was able to increase ROM on the lumbar medX, however needed to decrease the resistance and was able to complete 16 repetitions with an exertion of 5/10. Will increase repetitions next visit as tolerated and continue to progress in strengthening. Pt completed all peripheral resistance exercises and she was able to get on the leg curl machine and bicep machine better today. Please cont to monitor. Discussed with rehab tech if needed that to modify by using theraband for bicep curl.    Patient is making good progress towards established goals.  Pt will continue to benefit from skilled outpatient physical therapy to address the deficits stated in the impairment chart, provide pt/family education and to maximize pt's level of independence in the home and community environment.       Pt's spiritual, cultural and educational needs considered and pt agreeable to plan of care and goals as stated below:     Medical necessity is demonstrated by the following problem list.    Pt presents with the following impairments: back pain, reduced back strength and reduced back mobility, poor LE and back strength, poor function, unable to manage indep without dtr assist  History  Co-morbidities and personal factors that may impact the plan of care Examination  Body Structures and Functions, activity limitations and participation restrictions that may impact the plan of care Clinical Presentation Decision Making/ Complexity Score   Co-morbidities:      - L3-4, L4-5 laminectomy that was done by Dr. Sullivan on 2/22/2017  -will be having right hip replacement and right knee replacement at some point for arthritis and pain in these joints  -fall 2016  -thyroidectomy  -DM borderline  -breast  CA 1998              Personal Factors:   Over weight  Fall precautions Body Regions:   back  lower extremities     Body Systems:   gross symmetry  ROM  strength  gross coordinated movement  transitions  motor control  motor learning     Activity limitations:   Learning and applying knowledge  no deficits     General Tasks and Commands  no deficits     Communication  no deficits     Mobility  using transportation (bus, train, plane, car)     Self care  looking after one's health     Domestic Life  doing house work (cleaning house, washing dishes, laundry)     Interactions/Relationships  no deficits     Life Areas  no deficits     Community and Social Life  no deficits     Participation Restrictions:   Unable to shop and go out    evolving clinical presentation with changing clinical characteristics    mod            GOALS: Pt is in agreement with the following goals.     Short term goals:  6 weeks or 10 visits   1.  Pt will demonstrate increased lumbar ROM by at least 3 degrees from the initial ROM value with improvements noted in functional ROM and ability to perform ADLs  2.  Pt will demonstrate increased improved lumbar strength on testing by 10 % with improvements functionally noted with standing/walking to make a meal and get dressed with greater ease. Improved  self care  3.  Patient report a reduction in worst pain score by 1-2 points for improved tolerance during work and recreational activities  4.  Pt able to perform HEP correctly with minimal cueing or supervision for therapist  5. amb 2 blocks, into department without needing rest        Long term goals: 13 weeks or 20 visits   1. Pt will demonstrate increased lumbar ROM by at least 6 degrees from initial ROM value, resulting in improved ability to perform functional fwd bending while standing and sitting.   2. Pt will demonstrate increased maximum isometric torque value by 30% when compared to the initial value resulting in improved ability to perform  bending, lifting, and carrying activities safely, confidently.  Go to store with dtr  3. Pt to demonstrate ability to independently control and reduce their pain through posture positioning and mechanical movements throughout a typical day.  4.  Patient will demonstrate improved overall function per FOTO Survey to CK = at least 40% but < 60% impaired, limited or restricted score or less.  5. No falls  6.go on outings  7. Walk > 2 blocks for store      Plan   Continue with established Plan of Care towards established PT goals. Increase ROM lumbar Med X machine next visit if no increase in symptoms after todays visit.

## 2018-03-20 ENCOUNTER — CLINICAL SUPPORT (OUTPATIENT)
Dept: REHABILITATION | Facility: OTHER | Age: 58
End: 2018-03-20
Attending: NEUROLOGICAL SURGERY
Payer: MEDICAID

## 2018-03-20 DIAGNOSIS — Z98.890 HISTORY OF LUMBAR LAMINECTOMY: ICD-10-CM

## 2018-03-20 PROCEDURE — 97110 THERAPEUTIC EXERCISES: CPT

## 2018-03-20 NOTE — PROGRESS NOTES
"[facility] Physical Therapy Treatment    Name: Tanika Wayne  Clinic Number: [id number]  Date of Treatment: 2018   Diagnosis:   Encounter Diagnosis   Name Primary?    History of lumbar laminectomy      Physician: Dave Sullivan MD    Pain pattern determined: 1 PEN  Plan of care signed: 2018     Time in: 9:30am  Time Out:10:30am  Total Treatment time: 60  Precautions: - L3-4, L4-5 laminectomy that was done by Dr. Sullivan on 2017  -will be having right hip replacement and right knee replacement at some point for arthritis and pain in these joints  -fall   -thyroidectomy  -DM borderline  -breast CA   Visit #: 10    POC due: 2018 (sent 3/12/2018)  Reassessment due: 2018    Face to Face discussion of patient was done between PT and PTA.     Subjective   Tanika reports she"s doing pretty good today    Patient reports their pain to be 3/10 on a 0-10 scale at her hip, 0/10 at lower back with 0 being no pain and 10 being the worst pain imaginable.    Pain Location: back and right hip and right knee     Occupation:  disability   Leisure: sits a lot                     Pts goals:  Wants to get out more, she can't walk far enough now       Objective        Baseline IM Testing Results:   Date of testin17  ROM 6-36 deg   Max Peak Torque 94 ft lbs    Min Peak Torque 41 ft lbs    Flex/Ext Ratio 2/1   % below normative data 72%   Counter weight 200   Femur number 6   Seat pad 1               MID point IM Testing Results:   Date of testing: 3/20/18  ROM 6-36 deg   Max Peak Torque 108ft lbs    Min Peak Torque 52 ft lbs    Flex/Ext Ratio 2.07   % below normative data 72%   Counter weight 200   Femur number 6   Seat pad 1            FOTO: Focus on Therapeutic Outcomes   Category: lumbar   % Impaired: 73%  Current Score  = CL = least 60% but < 80% impaired, limited or restricted  Goal at Discharge Score = CK = at least 40% but < 60% impaired, limited or restricted  17: " 63%    Treatment    Pt was instructed in and performed the following:     Tanika received therapeutic exercises to develop/improved posture, cardiovascular endurance, muscular endurance, lumbar/cervical ROM, strength and muscular endurance for 40 minutes including the following exercise      [facility] 3/20/2018   Visit Number 10   VAS Pain Rating 3   Recumbent Bike Seat Pos. 16   Time 4   Extension in Standing 10   Flexion in Lying 10   Flexion in Sitting 10   Lumbar Extension Seat Pad -   Femur Restraint -   Top Dead Center -   Counterweight -   Lumbar Flexion -   Lumbar Extension -   Lumbar Peak Torque 108   Min Torque 52   Percent From Norm 72   Percent Change from Initial 8   Lumbar Weight -   Repetitions -   Rating of Perceived Exertion -   Ice - Z Lie (in min.) -   Ice - Sitting 10             Seated trunk flexion with T-Ball 10x NT  Seated trunk extension over chair 10x NT  Seated PPT 10x -   LTR without ball 10x  Supine PPT 10x    Peripheral muscle strengthening which included 1 set of 15-20 repetitions at a slow, controlled 7 second per rep pace focused on strengthening supporting musculature for improved body mechanics and functional mobility.  Pt and therapist focused on proper form during treatment to ensure optimal strengthening of each targeted muscle group.  Machines were utilized including torso rotation, leg extension, leg curl, chest press, upright row. Tricep extension, bicep curl, leg press, and hip abduction added on third visit.       Tanika received the following manual therapy techniques: None      Home Exercise Program as follows:   Supine GUY   Seated hip flexion 10 reps 2/day  Seated long arc quads 10 reps 2/day  Seated pillow squeeze 10 reps 2/day  Seated pelvic tilts 10 reps 2/day   LTR 10x  Handouts were given to the patient. Pt demo good understanding of the education provided. Tanika demonstrated good return demonstration of activities.     Lumbar roll use compliance: Pt plans  on buying lumbar roll.  Additional exercises taught this treatment session:   HEP review    Assessment     Patient has attended 10 visits at [facility] which included MD evaluation, PT evaluation with isometric testing, and physical therapy treatment including HEP instruction, education, aerobic work, dynamic strengthening on med ex equipment for the spine, and whole body strengthening on med ex equipment with increasing weight loads.  Patient  is demonstrating increased ability to reduce symptoms, improved posture, improved   ROM, and improved   strength on med ex test by 8 %  Average despite inconsistency with attendance.  Patient is making good progress towards established goals.  Pt will continue to benefit from skilled outpatient physical therapy to address the deficits stated in the impairment chart, provide pt/family education and to maximize pt's level of independence in the home and community environment.       Pt's spiritual, cultural and educational needs considered and pt agreeable to plan of care and goals as stated below:     Medical necessity is demonstrated by the following problem list.    Pt presents with the following impairments: back pain, reduced back strength and reduced back mobility, poor LE and back strength, poor function, unable to manage indep without dtr assist  History  Co-morbidities and personal factors that may impact the plan of care Examination  Body Structures and Functions, activity limitations and participation restrictions that may impact the plan of care Clinical Presentation Decision Making/ Complexity Score   Co-morbidities:      - L3-4, L4-5 laminectomy that was done by Dr. Sullivan on 2/22/2017  -will be having right hip replacement and right knee replacement at some point for arthritis and pain in these joints  -fall 2016  -thyroidectomy  -DM borderline  -breast CA 1998              Personal Factors:   Over weight  Fall precautions Body Regions:   back  lower  extremities     Body Systems:   gross symmetry  ROM  strength  gross coordinated movement  transitions  motor control  motor learning     Activity limitations:   Learning and applying knowledge  no deficits     General Tasks and Commands  no deficits     Communication  no deficits     Mobility  using transportation (bus, train, plane, car)     Self care  looking after one's health     Domestic Life  doing house work (cleaning house, washing dishes, laundry)     Interactions/Relationships  no deficits     Life Areas  no deficits     Community and Social Life  no deficits     Participation Restrictions:   Unable to shop and go out    evolving clinical presentation with changing clinical characteristics    mod            GOALS: Pt is in agreement with the following goals.     Short term goals:  6 weeks or 10 visits   1.  Pt will demonstrate increased lumbar ROM by at least 3 degrees from the initial ROM value with improvements noted in functional ROM and ability to perform ADLs  2.  Pt will demonstrate increased improved lumbar strength on testing by 10 % with improvements functionally noted with standing/walking to make a meal and get dressed with greater ease. Improved  self care  3.  Patient report a reduction in worst pain score by 1-2 points for improved tolerance during work and recreational activities  4.  Pt able to perform HEP correctly with minimal cueing or supervision for therapist  5. amb 2 blocks, into department without needing rest        Long term goals: 13 weeks or 20 visits   1. Pt will demonstrate increased lumbar ROM by at least 6 degrees from initial ROM value, resulting in improved ability to perform functional fwd bending while standing and sitting.   2. Pt will demonstrate increased maximum isometric torque value by 30% when compared to the initial value resulting in improved ability to perform bending, lifting, and carrying activities safely, confidently.  Go to store with dtr  3. Pt to  demonstrate ability to independently control and reduce their pain through posture positioning and mechanical movements throughout a typical day.  4.  Patient will demonstrate improved overall function per FOTO Survey to CK = at least 40% but < 60% impaired, limited or restricted score or less.  5. No falls  6.go on outings  7. Walk > 2 blocks for store      Plan   Continue with established Plan of Care towards established PT goals. Increase ROM lumbar Med X machine next visit if no increase in symptoms after todays visit.

## 2018-03-23 ENCOUNTER — TELEPHONE (OUTPATIENT)
Dept: PHYSICAL MEDICINE AND REHAB | Facility: CLINIC | Age: 58
End: 2018-03-23

## 2018-03-23 NOTE — TELEPHONE ENCOUNTER
----- Message from Stacy Robbins sent at 3/23/2018 11:02 AM CDT -----  Contact: Cherise NEWMAN    [employer], [employer]    [phone]  Calling for the status of pts prior auth for oxycodone 10/325.  Pt says she has been waiting since this past Monday.  [facility] ran it again on yesterday and it was rejected due to needing the prior auth.  Pls tammy the prior auth urgent.  Pls call.

## 2018-03-26 ENCOUNTER — TELEPHONE (OUTPATIENT)
Dept: PHYSICAL MEDICINE AND REHAB | Facility: CLINIC | Age: 58
End: 2018-03-26

## 2018-03-26 NOTE — TELEPHONE ENCOUNTER
----- Message from Marian Fournier MA sent at 3/19/2018  5:36 PM CDT -----  Contact: self @ [phone]  Medicaid [employer] for p/a    ----- Message -----  From: Stacy Robbins  Sent: 3/19/2018  10:26 AM  To: Anthony Ponce Staff    Pt says she needs a prior auth for oxycodone 10/325.    [facility] [id number] - [place], [place] - [address] AT [facility] [phone] (Phone)         [phone] (Fax)

## 2018-03-27 ENCOUNTER — PATIENT MESSAGE (OUTPATIENT)
Dept: PHYSICAL MEDICINE AND REHAB | Facility: CLINIC | Age: 58
End: 2018-03-27

## 2018-04-02 ENCOUNTER — TELEPHONE (OUTPATIENT)
Dept: PHYSICAL MEDICINE AND REHAB | Facility: CLINIC | Age: 58
End: 2018-04-02

## 2018-04-02 NOTE — TELEPHONE ENCOUNTER
----- Message from Marian Fournier MA sent at 3/28/2018 11:57 AM CDT -----  Contact: Margot soto [facility]  Request faxed as urgent.    ----- Message -----  From: David Rashid  Sent: 3/28/2018  11:09 AM  To: Anthony Ponce Staff    Patient needs a prior authorization for (oxyCODONE-acetaminophen (PERCOCET)  mg per tablet ) pls tammy as urgent    [facility] - [place], [place] - [address] AT [facility] [address] [facility] [address] [facility] [address] [place] [address]  Phone: [phone] Fax: [phone]

## 2018-04-03 DIAGNOSIS — J32.9 SINUSITIS, UNSPECIFIED CHRONICITY, UNSPECIFIED LOCATION: Primary | ICD-10-CM

## 2018-04-03 RX ORDER — FLUTICASONE PROPIONATE 50 MCG
2 SPRAY, SUSPENSION (ML) NASAL DAILY
Qty: 16 G | Refills: 3 | Status: SHIPPED | OUTPATIENT
Start: 2018-04-03 | End: 2018-07-31 | Stop reason: SDUPTHER

## 2018-04-04 ENCOUNTER — TELEPHONE (OUTPATIENT)
Dept: PHYSICAL MEDICINE AND REHAB | Facility: CLINIC | Age: 58
End: 2018-04-04

## 2018-04-04 NOTE — TELEPHONE ENCOUNTER
----- Message from Rosario Cruz MD sent at 4/3/2018  1:30 AM CDT -----  Contact: Nathalie ([facility]) @ [phone]  Marian, I follow this patient since 6/17, after she had a back surgery with Dr. Sullivan, and she has been on percocet since she started. Therefore  Dr. Sullivan sent her to me for opioid therapy.  On other side, she has Workman's comp for another problem ( knee after a work injury in 2016) and she is using that insurance.  That might be a Workman's comp insurance limitations.   OK, I understand there is something to sign, or they have approved it.  Did we sent that to Pharmacy.  Thanks   B.    ----- Message -----  From: Marian Fournier MA  Sent: 4/2/2018   5:33 PM  To: Rosario Cruz MD    I put a note from her insurance company letting you know what they will cover. Yellow folder outside the exam room.    ----- Message -----  From: Marian Fournier MA  Sent: 4/2/2018   1:38 PM  To: Marian Fournier MA    Spoke to [facility]. They will fax over additional paperwork to fill.     ----- Message -----  From: Kia Varghese  Sent: 4/2/2018  12:38 PM  To: Anthony Ponce Staff    Caller is asking to speak with someone in Dr. Cruz's office regarding medication: needing an auth for medication: oxyCODONE-acetaminophen (PERCOCET)  mg per tablet, saying that patient is being denied because it doesn't show that she has tried other medications.please call.

## 2018-04-04 NOTE — TELEPHONE ENCOUNTER
----- Message from Howard Kim sent at 4/4/2018  8:35 AM CDT -----  Contact: Destiny soto/ HUYEN @ [phone]  Destiny puri PA for oxyCODONE-acetaminophen (PERCOCET)  mg per tablet was denied, and they're asking another PA be faxed to .

## 2018-04-06 ENCOUNTER — TELEPHONE (OUTPATIENT)
Dept: PHYSICAL MEDICINE AND REHAB | Facility: CLINIC | Age: 58
End: 2018-04-06

## 2018-04-06 ENCOUNTER — PATIENT MESSAGE (OUTPATIENT)
Dept: PHYSICAL MEDICINE AND REHAB | Facility: CLINIC | Age: 58
End: 2018-04-06

## 2018-04-06 NOTE — TELEPHONE ENCOUNTER
----- Message from Stacy Robbins sent at 4/6/2018  1:47 PM CDT -----  Contact: Teja JOHNSTON   [facility]      [facility] Auth Dept    [phone] or fax [phone]  Pts paper work for prior shelia was faxed to you at 12:00 and we have not received it back yet.  Calling to see if you received it, if his has been filled out and when it will be sent back.  Pls call.  Pt is also asking for a call back.

## 2018-04-06 NOTE — TELEPHONE ENCOUNTER
----- Message from Stacy Robbins sent at 4/6/2018 11:25 AM CDT -----  Contact: self @ [phone]  Pt says she left a message this morning stating she needs a prior auth for oxycodone 10/325.  She is calling to see if the prior auth has been started.  Pls call.

## 2018-04-06 NOTE — TELEPHONE ENCOUNTER
Called and informed them that just waiting for the Dr to fill paperwork and it will be faxed once completed

## 2018-04-06 NOTE — TELEPHONE ENCOUNTER
I spoke with the patient and informed her that the new paperwork will be started but not sure if it will get approval on today

## 2018-04-09 ENCOUNTER — TELEPHONE (OUTPATIENT)
Dept: PHYSICAL MEDICINE AND REHAB | Facility: CLINIC | Age: 58
End: 2018-04-09

## 2018-04-09 ENCOUNTER — PATIENT MESSAGE (OUTPATIENT)
Dept: PHYSICAL MEDICINE AND REHAB | Facility: CLINIC | Age: 58
End: 2018-04-09

## 2018-04-09 NOTE — TELEPHONE ENCOUNTER
----- Message from Rosario Cruz MD sent at 4/6/2018  5:54 PM CDT -----  Contact: pt @ [phone]  I just called Mrs. Wayne, and left msg on phone, and send her this msg on portal.   Please be aware when she come we have her to sign the pain contract.   Thanks.        See msg mazin.   Mrs. Wayne,   Please come to my office to sign pain contract with my clinic, since I need to attach it to form that I completed for medication, Percocet authorizations,   Otherwise they will not approve.   Thanks,   .  ----- Message -----  From: Dexter Carpio  Sent: 4/6/2018  10:21 AM  To: Rosario Cruz MD, Marian Fournier MA        ----- Message -----  From: Kia Varghese  Sent: 4/6/2018   9:23 AM  To: Anthony Ponce Staff    Calling regarding patient medication: oxyCODONE-acetaminophen (PERCOCET)  mg per tablet needing prior authorization, patient had 1 denial,  the doctors office needs to call [phone] to do a per to per or the doctor can correct and send a new prior auth form to include the other list of medications that the patient has taken. Asking if an urgent request can be done. Patient asking if this can be done today as to she is completely out of the medication. Please call.

## 2018-04-10 ENCOUNTER — PATIENT MESSAGE (OUTPATIENT)
Dept: PHYSICAL MEDICINE AND REHAB | Facility: CLINIC | Age: 58
End: 2018-04-10

## 2018-04-10 ENCOUNTER — TELEPHONE (OUTPATIENT)
Dept: PHYSICAL MEDICINE AND REHAB | Facility: CLINIC | Age: 58
End: 2018-04-10

## 2018-04-10 RX ORDER — OXYCODONE AND ACETAMINOPHEN 5; 325 MG/1; MG/1
1 TABLET ORAL EVERY 8 HOURS PRN
Qty: 60 TABLET | Refills: 0 | Status: SHIPPED | OUTPATIENT
Start: 2018-04-10 | End: 2018-05-15 | Stop reason: SDUPTHER

## 2018-04-10 NOTE — TELEPHONE ENCOUNTER
----- Message from Howard Kim sent at 4/10/2018  8:34 AM CDT -----  Contact: Self @ [phone]  Pt is asking to speak w/ the MA or the doctor regarding PA for oxyCODONE-acetaminophen (PERCOCET)  mg per tablet. Pls call.

## 2018-04-17 ENCOUNTER — OFFICE VISIT (OUTPATIENT)
Dept: SPINE | Facility: CLINIC | Age: 58
End: 2018-04-17
Attending: NEUROLOGICAL SURGERY
Payer: MEDICAID

## 2018-04-17 VITALS
HEIGHT: 72 IN | SYSTOLIC BLOOD PRESSURE: 127 MMHG | BODY MASS INDEX: 35.05 KG/M2 | TEMPERATURE: 98 F | HEART RATE: 99 BPM | WEIGHT: 258.81 LBS | DIASTOLIC BLOOD PRESSURE: 63 MMHG

## 2018-04-17 DIAGNOSIS — Z98.890 S/P LUMBAR LAMINECTOMY: Primary | ICD-10-CM

## 2018-04-17 PROCEDURE — 99213 OFFICE O/P EST LOW 20 MIN: CPT | Mod: PBBFAC | Performed by: NEUROLOGICAL SURGERY

## 2018-04-17 PROCEDURE — 99999 PR PBB SHADOW E&M-EST. PATIENT-LVL III: CPT | Mod: PBBFAC,,, | Performed by: NEUROLOGICAL SURGERY

## 2018-04-17 PROCEDURE — 99213 OFFICE O/P EST LOW 20 MIN: CPT | Mod: S$PBB,,, | Performed by: NEUROLOGICAL SURGERY

## 2018-04-17 NOTE — PROGRESS NOTES
CHIEF COMPLAINT:    1 year follow-up    HPI:    Tanika Wayne is a 58 y.o.-year-old female who presents today for post-operative follow-up. She is is s/p L3/4, L4-5 Laminectomy that was done by Dr. Sullivan on 2/22/2017. Currently, the patient's symptoms are better since surgery. The patient is complaining today of back pain on the right side 8/10, that is better when she is laying flat, and worse with walking, standing, and even when she breaths in an out. She denies any new onset of weakness. The patient rates the pain 8/10.  Post-op medications was reviewed and reconciled on the patient's medication list in EPIC.    ROS:    NAD    PE:    AAOX3  PERRL  EOMI    Lumbar incision:  C/D/I    ROM:   Decreased secondary to pain    Sensation:  Intact to light touch (All 4 extremities)    Strength: Full strength      Deltoids Biceps Triceps Wrist Ext. Wrist Flex. Hand    RUE         LUE          Hip Flex. Knee Flex. Knee Ext. Dorsi Flex Plantar Flex EHL   RLE         LLE           Gait:  antalgic and sitting in a sheel chair    DTR:  2+ and symmetric bilaterally    No abnormal reflexes    SLR- negative    IMAGING:    XRays: none    CT: none    MRI: none    ASSESSMENT:   Doing okay post op. She has lost some weight and she is still working on losing more which should help with her back pain.     PLAN:   Follow up as needed. No surgery anticipated.

## 2018-04-29 ENCOUNTER — PATIENT MESSAGE (OUTPATIENT)
Dept: PHYSICAL MEDICINE AND REHAB | Facility: CLINIC | Age: 58
End: 2018-04-29

## 2018-05-01 DIAGNOSIS — R52 PAIN: Primary | ICD-10-CM

## 2018-05-01 NOTE — TELEPHONE ENCOUNTER
----- Message from Kia Varghese sent at 4/30/2018  4:40 PM CDT -----  Rx Refill/Request    Who Called:Tanika (patient)   Refill or New Rx:Refill  RX Name and Strength:oxyCODONE-acetaminophen (PERCOCET) 5-325 mg per tablet  How is the patient currently taking it? (ex. 1XDay):1 every 8 hours as needed  Is this a 30 day or 90 day RX:30 day  Preferred Pharmacy with phone number:  [id number] - [place], [place] - [address]  [address] [facility] [address]  Phone: [phone] Fax: [phone]      Local or Mail Order:Local  Ordering Provider:Dr. Cruz  Communication Preference (Tina response to Pt. (or) Call Back # and timeframe):[phone]  Additional Information: says the last prescription was for 20 days, currently out of the medication

## 2018-05-04 RX ORDER — OXYCODONE AND ACETAMINOPHEN 5; 325 MG/1; MG/1
1 TABLET ORAL EVERY 8 HOURS PRN
Qty: 60 TABLET | Refills: 0 | OUTPATIENT
Start: 2018-05-04 | End: 2018-06-03

## 2018-05-09 ENCOUNTER — PATIENT MESSAGE (OUTPATIENT)
Dept: PHYSICAL MEDICINE AND REHAB | Facility: CLINIC | Age: 58
End: 2018-05-09

## 2018-05-11 RX ORDER — OXYCODONE AND ACETAMINOPHEN 5; 325 MG/1; MG/1
1 TABLET ORAL EVERY 8 HOURS PRN
Qty: 60 TABLET | Refills: 0 | OUTPATIENT
Start: 2018-05-11 | End: 2018-06-10

## 2018-05-11 NOTE — TELEPHONE ENCOUNTER
----- Message from Rosario Cruz MD sent at 5/11/2018  6:57 AM CDT -----  Dexter,   please inform pt that she cannot miss appointment on Tuesday, 5/15/18   ( since we have no appointment available before September) when her Pain medications are due.   I will be not able to give her Oxycodone for the next 3 months if she has no appointment.   Thanks,

## 2018-05-15 ENCOUNTER — TELEPHONE (OUTPATIENT)
Dept: INTERNAL MEDICINE | Facility: CLINIC | Age: 58
End: 2018-05-15

## 2018-05-15 ENCOUNTER — OFFICE VISIT (OUTPATIENT)
Dept: PHYSICAL MEDICINE AND REHAB | Facility: CLINIC | Age: 58
End: 2018-05-15
Payer: MEDICAID

## 2018-05-15 ENCOUNTER — LAB VISIT (OUTPATIENT)
Dept: LAB | Facility: HOSPITAL | Age: 58
End: 2018-05-15
Attending: PHYSICAL MEDICINE & REHABILITATION
Payer: MEDICAID

## 2018-05-15 VITALS
WEIGHT: 262 LBS | BODY MASS INDEX: 35.49 KG/M2 | SYSTOLIC BLOOD PRESSURE: 140 MMHG | HEIGHT: 72 IN | HEART RATE: 98 BPM | DIASTOLIC BLOOD PRESSURE: 88 MMHG

## 2018-05-15 DIAGNOSIS — F11.90 UNCOMPLICATED OPIOID USE: Primary | ICD-10-CM

## 2018-05-15 DIAGNOSIS — G89.29 CHRONIC PAIN OF RIGHT KNEE: ICD-10-CM

## 2018-05-15 DIAGNOSIS — M25.561 CHRONIC PAIN OF RIGHT KNEE: ICD-10-CM

## 2018-05-15 DIAGNOSIS — G89.29 CHRONIC BILATERAL LOW BACK PAIN WITH RIGHT-SIDED SCIATICA: ICD-10-CM

## 2018-05-15 DIAGNOSIS — M48.07 STENOSIS OF LUMBOSACRAL SPINE: ICD-10-CM

## 2018-05-15 DIAGNOSIS — M48.062 SPINAL STENOSIS OF LUMBAR REGION WITH NEUROGENIC CLAUDICATION: ICD-10-CM

## 2018-05-15 DIAGNOSIS — M54.41 CHRONIC BILATERAL LOW BACK PAIN WITH RIGHT-SIDED SCIATICA: ICD-10-CM

## 2018-05-15 DIAGNOSIS — Z79.891 CHRONIC USE OF OPIATE FOR THERAPEUTIC PURPOSE: ICD-10-CM

## 2018-05-15 DIAGNOSIS — Z98.890 S/P LUMBAR LAMINECTOMY: ICD-10-CM

## 2018-05-15 DIAGNOSIS — M25.551 PAIN OF RIGHT HIP JOINT: ICD-10-CM

## 2018-05-15 DIAGNOSIS — M43.16 SPONDYLOLISTHESIS AT L3-L4 LEVEL: ICD-10-CM

## 2018-05-15 DIAGNOSIS — M51.36 DDD (DEGENERATIVE DISC DISEASE), LUMBAR: ICD-10-CM

## 2018-05-15 DIAGNOSIS — M48.061 SPINAL STENOSIS OF LUMBAR REGION, UNSPECIFIED WHETHER NEUROGENIC CLAUDICATION PRESENT: ICD-10-CM

## 2018-05-15 DIAGNOSIS — F11.90 UNCOMPLICATED OPIOID USE: ICD-10-CM

## 2018-05-15 LAB
AMPHET+METHAMPHET UR QL: NEGATIVE
BARBITURATES UR QL SCN>200 NG/ML: NEGATIVE
BENZODIAZ UR QL SCN>200 NG/ML: NORMAL
BZE UR QL SCN: NEGATIVE
CANNABINOIDS UR QL SCN: NEGATIVE
CREAT UR-MCNC: 31 MG/DL
ETHANOL UR-MCNC: <10 MG/DL
METHADONE UR QL SCN>300 NG/ML: NEGATIVE
OPIATES UR QL SCN: NORMAL
PCP UR QL SCN>25 NG/ML: NEGATIVE
TOXICOLOGY INFORMATION: NORMAL

## 2018-05-15 PROCEDURE — 99999 PR PBB SHADOW E&M-EST. PATIENT-LVL III: CPT | Mod: PBBFAC,,, | Performed by: PHYSICAL MEDICINE & REHABILITATION

## 2018-05-15 PROCEDURE — 99214 OFFICE O/P EST MOD 30 MIN: CPT | Mod: S$PBB,,, | Performed by: PHYSICAL MEDICINE & REHABILITATION

## 2018-05-15 PROCEDURE — 80307 DRUG TEST PRSMV CHEM ANLYZR: CPT

## 2018-05-15 PROCEDURE — 99213 OFFICE O/P EST LOW 20 MIN: CPT | Mod: PBBFAC | Performed by: PHYSICAL MEDICINE & REHABILITATION

## 2018-05-15 RX ORDER — DIAZEPAM 5 MG/1
5 TABLET ORAL NIGHTLY PRN
Qty: 30 TABLET | Refills: 3 | Status: SHIPPED | OUTPATIENT
Start: 2018-05-15 | End: 2018-06-14

## 2018-05-15 RX ORDER — OXYCODONE AND ACETAMINOPHEN 5; 325 MG/1; MG/1
1 TABLET ORAL EVERY 8 HOURS PRN
Qty: 60 TABLET | Refills: 0 | Status: SHIPPED | OUTPATIENT
Start: 2018-05-15 | End: 2018-06-14

## 2018-05-15 RX ORDER — OXYCODONE AND ACETAMINOPHEN 5; 325 MG/1; MG/1
1 TABLET ORAL EVERY 8 HOURS PRN
Qty: 60 TABLET | Refills: 0 | Status: SHIPPED | OUTPATIENT
Start: 2018-07-15 | End: 2018-08-17 | Stop reason: SDUPTHER

## 2018-05-15 RX ORDER — OXYCODONE AND ACETAMINOPHEN 5; 325 MG/1; MG/1
1 TABLET ORAL EVERY 8 HOURS PRN
Qty: 60 TABLET | Refills: 0 | Status: SHIPPED | OUTPATIENT
Start: 2018-06-15 | End: 2018-07-15

## 2018-05-15 NOTE — TELEPHONE ENCOUNTER
----- Message from Kelin Spangler sent at 5/15/2018  1:11 PM CDT -----  Contact: self/[phone]  Patient called in regards needing to talk with Dr Gamboa medical assistant, patient stated that she was told that he will assigned a PCP to her and that will be dr Hernandez. Please call and advise. Thank you!!!

## 2018-05-15 NOTE — PROGRESS NOTES
Subjective:       Patient ID: Tanika Wayne is a 58 y.o. female.    Chief Complaint: Back Pain; Hip Pain; and Knee Pain    Back Pain   Pertinent negatives include no abdominal pain, chest pain, fever, numbness or weakness. Headaches:     Hip Pain    Pertinent negatives include no numbness.   Knee Pain    Pertinent negatives include no numbness.      Mrs. Wayne is 57 y/o female, who returns to clinic for worsening of chronic back pain, and chronic pain management with opioids.  She is s/p L3-5 laminectomy on 2/22/17, cx with superficial wound infection.  Referred by Dr. Sullivan.   LCV 02/15/18.     Today, she complains about:  1.Back pain that is radiation to RT hip ( bacet   2. Rt hip.   3 Rt knee pain.    Since LCV, she reports some improvement of back pain.   Percocet decreases her pain down to 3-4.   She takes it BID, early in am,a nd 7-8 pm.  She also reports that Valium helps with leg spasms, takes it in am/pm.   Takes also Flexeril at bedtime, that helps with sleep.   She sates that she was taking Tramadol before surgery and was making her constipated.  Also , reported that  did not want her to have PT until October 2017.  She is on long term disability after back surgery.  She I s also on Workman's Comp for Rt knee and hip   ( she fell at work ,in January 7/2016).    Back Pain Description:  Lenght: pain is a chronic pain. Length > many, more than 10 yrs, with worsening last year.   Any past, recent injury, falls.  Intensity:  CURRENT  5/10,  AVERAGE  Pain  /10. at BEST /10 , At WORST 7-8 /10 on the WORST day.   Location: pain is localized at  Rt side of back and buttock, running to Rt hip and leg.    It is more Back at right side >> leg pain.   Radiation: Rt  Buttocks all the way to Rt legs, to mid calf   Timing :it is  constant day/yamila at night pain ,cannot sleep on RT side,  worse with activity, in evening  Cannot stand 2-5 minutes, needs tos it down, cannot walk a distance of one room,  secondary to back pain , has to sit down for 5-10 minutes,   and can walk again.   Uses SC  ( borrowed from her ). She does noot have any AD at home.   She states that she ordered RW with seat through [employer].   QUALITY: Back pain is more dull ache.  No Sharp/shooting/ neuropathic : burning, tingling, numbness.  She reports muscle spasm in RT calf.   She has  RT weakness, she reports a couple of times inside house, no injury.   Worsening factors:  Standing, walking.   Alleviating factors: sitting and laying down.   Symptoms interfere with daily activity, sleeping and work.   Current medications: Tramadol , Aleve   Failed medications: Gabapentin ( makes bruises on skin, bluish nails) Lyrica ( makes ankle swelling) .   Prior procedures.  Minimally invasive laminectomy.   PT/OT: None. Per patient, Dr Sullivan told her not to have PT until 2017.   Patient denies night fever/night sweats, bowel incontinence, significant weight loss and significant motor weakness (red flags).  Patient denies any suicidal or homicidal ideations.  She is here for chronic pain management with opiates.    Past Medical History:   Diagnosis Date    Abnormal echocardiogram 2012    Arthritis     Breast cancer 1998    Breast cancer, right breast     Cardiomyopathy due to systemic disease     Clotting disorder     Diabetes mellitus     Diabetes mellitus type II     DM (diabetes mellitus) 2012    Encounter for blood transfusion     HTN (hypertension) 2012    Hypertension     Increased glucose level 2012    ITP (idiopathic thrombocytopenic purpura) 2012    Kidney stones 2012    PONV (postoperative nausea and vomiting)     Stenosis of lumbosacral spine 2012    Thyroid disease     Tympanic membrane perforation 9/15/2014    right 2014 Dr. Jeffries       Past Surgical History:   Procedure Laterality Date    BREAST LUMPECTOMY Right      SECTION       CHOLECYSTECTOMY      EYE SURGERY      HYSTERECTOMY      L3-5 Laminectomy  02/2017    Dr. Mendoza    SPLENECTOMY, TOTAL      STRABISMUS SURGERY  8.30.06    OU    THYROIDECTOMY  09/2016       Family History   Problem Relation Age of Onset    Heart disease Mother 69        MI    Diabetes Mother     Diabetes Father     Hypertension Father     Stroke Father     Diabetes Sister     Diabetes Brother        Social History     Social History    Marital status:      Spouse name: N/A    Number of children: 1    Years of education: N/A     Occupational History    registration      [employer]     Social History Main Topics    Smoking status: Never Smoker    Smokeless tobacco: Never Used    Alcohol use No    Drug use: No    Sexual activity: No     Other Topics Concern    None     Social History Narrative    2017 - Using a walker    The patient is not getting much exercise but is able to get about with the aid of a cane or walker.       Current Outpatient Prescriptions   Medication Sig Dispense Refill    carvedilol (COREG) 3.125 MG tablet Take 1 tablet (3.125 mg total) by mouth 2 (two) times daily. 180 tablet 3    clotrimazole-betamethasone 1-0.05% (LOTRISONE) cream Apply topically 2 (two) times daily as needed. 45 g 1    cyclobenzaprine (FLEXERIL) 10 MG tablet TAKE ONE-HALF TO ONE TABLET BY MOUTH EVERY NIGHT AT BEDTIME 30 tablet 11    docusate sodium (STOOL SOFTENER) 100 MG capsule Take 100 mg by mouth 3 (three) times daily as needed for Constipation.      fluticasone (FLONASE) 50 mcg/actuation nasal spray 2 sprays (100 mcg total) by Each Nare route once daily. 16 g 3    levothyroxine (SYNTHROID) 125 MCG tablet Take 1 tablet (125 mcg total) by mouth once daily. 90 tablet 11    losartan-hydrochlorothiazide 100-12.5 mg (HYZAAR) 100-12.5 mg Tab Take 1 tablet by mouth every morning. 90 tablet 0    multivitamin (THERAGRAN) per tablet Take 1 tablet by mouth once daily.      NIFEdipine (PROCARDIA-XL) 90  MG (OSM) 24 hr tablet TAKE 1 TABLET BY MOUTH EVERY DAY 90 tablet 3    potassium chloride SA (K-DUR,KLOR-CON) 20 MEQ tablet Take 2 tablets (40 mEq total) by mouth 2 (two) times daily. 360 tablet 2    venlafaxine (EFFEXOR-XR) 75 MG 24 hr capsule TAKE 1 CAPSULE EVERY NIGHT 90 capsule 3    diazePAM (VALIUM) 5 MG tablet Take 1 tablet (5 mg total) by mouth nightly as needed for Anxiety (muscle spasm). 30 tablet 3    oxyCODONE-acetaminophen (PERCOCET) 5-325 mg per tablet Take 1 tablet by mouth every 8 (eight) hours as needed for Pain. 60 tablet 0    [START ON 6/15/2018] oxyCODONE-acetaminophen (PERCOCET) 5-325 mg per tablet Take 1 tablet by mouth every 8 (eight) hours as needed for Pain. 60 tablet 0    [START ON 7/15/2018] oxyCODONE-acetaminophen (PERCOCET) 5-325 mg per tablet Take 1 tablet by mouth every 8 (eight) hours as needed for Pain. 60 tablet 0     No current facility-administered medications for this visit.        Review of patient's allergies indicates:  No Known Allergies      Review of Systems   Constitutional: Negative for appetite change, chills, fatigue, fever and unexpected weight change.   HENT: Negative for drooling, trouble swallowing and voice change.    Eyes: Negative for pain and visual disturbance.   Respiratory: Negative for shortness of breath and wheezing.    Cardiovascular: Negative for chest pain and palpitations.   Gastrointestinal: Negative for abdominal distention, abdominal pain, constipation and diarrhea.   Genitourinary: Negative for difficulty urinating.   Musculoskeletal: Positive for back pain and gait problem. Negative for arthralgias, joint swelling, myalgias and neck stiffness.               Skin: Negative for color change and rash.   Neurological: Negative for dizziness, facial asymmetry, speech difficulty, weakness, light-headedness and numbness. Headaches:     Hematological: Negative for adenopathy.   Psychiatric/Behavioral: Negative for behavioral problems, confusion and  sleep disturbance. The patient is not nervous/anxious.        Objective:      Physical Exam      GENERAL: The patient is alert, oriented, pleasant.   HEENT; PERRLA  NECK: supple,    MUSCULOSKELETAL:   Gait- NT, she is in manual WC, pushed by her friend.  Cervical spine: full  AROM in cervical spine.  Lumbar spine, NT, in WC.  Straight leg raising Negative bilaterally ( sitting).  Full range of motion in all joints x4 extremities ( tested sitting).  Muscle strength 5/5 throughout x4 extremities.   No  joint laxity throughout x4 extremities.   NEUROLOGIC: Cranial nerves II through XII intact.   Deep tendon reflexes is normal, +2 in the upper and lower extremities bilaterally.   Muscle tone is normal.   Sensory is intact to light touch and pinprick throughout x4 extremities.       MRI of Lumbar spine ( 01/2017) showed:   Continued grade 1 anterolisthesis of L3 on L4.   The lumbar vertebral body height, contour and bone marrow signal is relatively stable without evidence for acute fracture.  The distal spinal cord and conus is normal in signal and contour the tip of the conus approximates the mid [id number].  T12/L1 No significant disc bulge, central canal or neural foraminal stenosis.   L1/L2: Small disc bulge with ligamentum flavum hypertrophy and facet joint arthropathy with mild central canal and bilateral neural foraminal stenosis  L2/L3: Trace disc bulge with ligamentum flavum hypertrophy and facet joint arthropathy with moderate-to-severe central canal stenosis.   There is mild resulting neuroforaminal stenosis.  L3/L4: Bulging disc with ligament flavum hypertrophy and facet arthropathy with severe central canal stenosis and mild bilateral neuroforaminal stenosis.  L4/L5: Bulging disc ligamentum flavum hypertrophy and facet joint arthropathy without significant central canal stenosis   with mild/moderate neuroforaminal stenosis bilaterally  L5/S1: Bulging disc with left paracentral disc protrusion and annular  fissure. No significant central canal stenosis with mild right and   moderate left neuroforaminal stenosis.  Impression:   Continued multilevel degenerative change of the lumbar spine which remains most prominent at L3/L4 with small disc bulge with   exuberant ligamentum flavum hypertrophy and facet joint arthropathy   with severe central canal stenosis and mild neuroforaminal stenosis.  Continued bulging disc with left paracentral disc protrusion L5/S1.    Assessment:       1. Uncomplicated opioid use    2. Pain of right hip joint    3. Chronic pain of right knee    4. Chronic bilateral low back pain with right-sided sciatica    5. Spondylolisthesis at L3-L4 level    6. S/P lumbar laminectomy    7. Spinal stenosis of lumbar region with neurogenic claudication    8. DDD (degenerative disc disease), lumbar    9. Spinal stenosis of lumbar region, unspecified whether neurogenic claudication present    10. Stenosis of lumbosacral spine    11. Chronic use of opiate for therapeutic purpose        Plan:       Uncomplicated opioid use  -     oxyCODONE-acetaminophen (PERCOCET) 5-325 mg per tablet; Take 1 tablet by mouth every 8 (eight) hours as needed for Pain.  Dispense: 60 tablet; Refill: 0  -     oxyCODONE-acetaminophen (PERCOCET) 5-325 mg per tablet; Take 1 tablet by mouth every 8 (eight) hours as needed for Pain.  Dispense: 60 tablet; Refill: 0  -     oxyCODONE-acetaminophen (PERCOCET) 5-325 mg per tablet; Take 1 tablet by mouth every 8 (eight) hours as needed for Pain.  Dispense: 60 tablet; Refill: 0  -     Toxicology screen, urine; Future    Pain of right hip joint  -     oxyCODONE-acetaminophen (PERCOCET) 5-325 mg per tablet; Take 1 tablet by mouth every 8 (eight) hours as needed for Pain.  Dispense: 60 tablet; Refill: 0  -     oxyCODONE-acetaminophen (PERCOCET) 5-325 mg per tablet; Take 1 tablet by mouth every 8 (eight) hours as needed for Pain.  Dispense: 60 tablet; Refill: 0  -     oxyCODONE-acetaminophen  (PERCOCET) 5-325 mg per tablet; Take 1 tablet by mouth every 8 (eight) hours as needed for Pain.  Dispense: 60 tablet; Refill: 0  -     Toxicology screen, urine; Future    Chronic pain of right knee  -     oxyCODONE-acetaminophen (PERCOCET) 5-325 mg per tablet; Take 1 tablet by mouth every 8 (eight) hours as needed for Pain.  Dispense: 60 tablet; Refill: 0  -     oxyCODONE-acetaminophen (PERCOCET) 5-325 mg per tablet; Take 1 tablet by mouth every 8 (eight) hours as needed for Pain.  Dispense: 60 tablet; Refill: 0  -     oxyCODONE-acetaminophen (PERCOCET) 5-325 mg per tablet; Take 1 tablet by mouth every 8 (eight) hours as needed for Pain.  Dispense: 60 tablet; Refill: 0  -     Toxicology screen, urine; Future    Chronic bilateral low back pain with right-sided sciatica  -     oxyCODONE-acetaminophen (PERCOCET) 5-325 mg per tablet; Take 1 tablet by mouth every 8 (eight) hours as needed for Pain.  Dispense: 60 tablet; Refill: 0  -     oxyCODONE-acetaminophen (PERCOCET) 5-325 mg per tablet; Take 1 tablet by mouth every 8 (eight) hours as needed for Pain.  Dispense: 60 tablet; Refill: 0  -     oxyCODONE-acetaminophen (PERCOCET) 5-325 mg per tablet; Take 1 tablet by mouth every 8 (eight) hours as needed for Pain.  Dispense: 60 tablet; Refill: 0  -     diazePAM (VALIUM) 5 MG tablet; Take 1 tablet (5 mg total) by mouth nightly as needed for Anxiety (muscle spasm).  Dispense: 30 tablet; Refill: 3  -     Toxicology screen, urine; Future    Spondylolisthesis at L3-L4 level  -     oxyCODONE-acetaminophen (PERCOCET) 5-325 mg per tablet; Take 1 tablet by mouth every 8 (eight) hours as needed for Pain.  Dispense: 60 tablet; Refill: 0  -     oxyCODONE-acetaminophen (PERCOCET) 5-325 mg per tablet; Take 1 tablet by mouth every 8 (eight) hours as needed for Pain.  Dispense: 60 tablet; Refill: 0  -     oxyCODONE-acetaminophen (PERCOCET) 5-325 mg per tablet; Take 1 tablet by mouth every 8 (eight) hours as needed for Pain.  Dispense: 60  tablet; Refill: 0  -     diazePAM (VALIUM) 5 MG tablet; Take 1 tablet (5 mg total) by mouth nightly as needed for Anxiety (muscle spasm).  Dispense: 30 tablet; Refill: 3  -     Toxicology screen, urine; Future    S/P lumbar laminectomy  -     oxyCODONE-acetaminophen (PERCOCET) 5-325 mg per tablet; Take 1 tablet by mouth every 8 (eight) hours as needed for Pain.  Dispense: 60 tablet; Refill: 0  -     oxyCODONE-acetaminophen (PERCOCET) 5-325 mg per tablet; Take 1 tablet by mouth every 8 (eight) hours as needed for Pain.  Dispense: 60 tablet; Refill: 0  -     oxyCODONE-acetaminophen (PERCOCET) 5-325 mg per tablet; Take 1 tablet by mouth every 8 (eight) hours as needed for Pain.  Dispense: 60 tablet; Refill: 0  -     diazePAM (VALIUM) 5 MG tablet; Take 1 tablet (5 mg total) by mouth nightly as needed for Anxiety (muscle spasm).  Dispense: 30 tablet; Refill: 3  -     Toxicology screen, urine; Future    Spinal stenosis of lumbar region with neurogenic claudication  -     oxyCODONE-acetaminophen (PERCOCET) 5-325 mg per tablet; Take 1 tablet by mouth every 8 (eight) hours as needed for Pain.  Dispense: 60 tablet; Refill: 0  -     oxyCODONE-acetaminophen (PERCOCET) 5-325 mg per tablet; Take 1 tablet by mouth every 8 (eight) hours as needed for Pain.  Dispense: 60 tablet; Refill: 0  -     oxyCODONE-acetaminophen (PERCOCET) 5-325 mg per tablet; Take 1 tablet by mouth every 8 (eight) hours as needed for Pain.  Dispense: 60 tablet; Refill: 0  -     Toxicology screen, urine; Future    DDD (degenerative disc disease), lumbar  -     oxyCODONE-acetaminophen (PERCOCET) 5-325 mg per tablet; Take 1 tablet by mouth every 8 (eight) hours as needed for Pain.  Dispense: 60 tablet; Refill: 0  -     oxyCODONE-acetaminophen (PERCOCET) 5-325 mg per tablet; Take 1 tablet by mouth every 8 (eight) hours as needed for Pain.  Dispense: 60 tablet; Refill: 0  -     oxyCODONE-acetaminophen (PERCOCET) 5-325 mg per tablet; Take 1 tablet by mouth every 8  (eight) hours as needed for Pain.  Dispense: 60 tablet; Refill: 0  -     diazePAM (VALIUM) 5 MG tablet; Take 1 tablet (5 mg total) by mouth nightly as needed for Anxiety (muscle spasm).  Dispense: 30 tablet; Refill: 3  -     Toxicology screen, urine; Future    Spinal stenosis of lumbar region, unspecified whether neurogenic claudication present  -     diazePAM (VALIUM) 5 MG tablet; Take 1 tablet (5 mg total) by mouth nightly as needed for Anxiety (muscle spasm).  Dispense: 30 tablet; Refill: 3  -     Toxicology screen, urine; Future    Stenosis of lumbosacral spine  -     diazePAM (VALIUM) 5 MG tablet; Take 1 tablet (5 mg total) by mouth nightly as needed for Anxiety (muscle spasm).  Dispense: 30 tablet; Refill: 3  -     Toxicology screen, urine; Future    Chronic use of opiate for therapeutic purpose  -     diazePAM (VALIUM) 5 MG tablet; Take 1 tablet (5 mg total) by mouth nightly as needed for Anxiety (muscle spasm).  Dispense: 30 tablet; Refill: 3  -     Toxicology screen, urine; Future    Patient with chronic low back pain, with neurogenic claudication secondary to multi level Lumbar spinal stenosis, moderate-to-severe at L2-3, and   Severe at L3/L4, secondary to bulging disc with ligament flavum hypertrophy and facet arthropathy,  s/p L3-5 laminectomy on 2/22/17.    -- Pain mgm,  She will resume Percocet 10/325 mg po BID  Prn, #60 tabs,   And Valium prn muscle spasms.   reviewed and appropriate.  Percocet refills  On 4/10 ,2/15, and 1/25/18.  She is also on Effexor 75 mg po QHS.  Urine drug test.    RTC in 4 months.    Total time spent face to face with patient was 25 minutes.   More than 50% of that time was spent in counseling on diagnosis , prognosis and treatment options.   I also caunsel patient  on common and most usual side effect of prescribed medications.   Risk and benefits of opiates, possible risk of developing opiate dependence and tolerance, need of strict compliance with prescribed  medications.  Pain contract, rules and obligations were discussed with patient in details.  He is aware that I would be the only doctor prescribing him pain medications and ED in a case of emergency.  I reviewed Primary care , and other specialty's notes to better coordinate patient's  care.   All questions were answered, and patient voiced understanding.

## 2018-05-16 ENCOUNTER — TELEPHONE (OUTPATIENT)
Dept: INTERNAL MEDICINE | Facility: CLINIC | Age: 58
End: 2018-05-16

## 2018-05-16 DIAGNOSIS — E11.9 TYPE 2 DIABETES MELLITUS WITHOUT COMPLICATION, WITHOUT LONG-TERM CURRENT USE OF INSULIN: Primary | ICD-10-CM

## 2018-05-16 DIAGNOSIS — E03.4 HYPOTHYROIDISM DUE TO ACQUIRED ATROPHY OF THYROID: ICD-10-CM

## 2018-05-16 NOTE — TELEPHONE ENCOUNTER
----- Message from Martín Meraz sent at 5/15/2018  3:38 PM CDT -----  Contact: Patient [phone]  Type: Orders Request    What orders/ testing are being requested? Transfer Physical labs    Is there a future appointment scheduled for the patient with PCP? Yes    When? 7/23/18

## 2018-05-16 NOTE — TELEPHONE ENCOUNTER
Hi, here are the lab orders that are due, preferably prior to her upcoming appointment with me. Is this was the patient called about?  Let me know if patient has any questions.  Thank you, Rayo Hernandez

## 2018-05-22 DIAGNOSIS — I10 ESSENTIAL HYPERTENSION, MALIGNANT: ICD-10-CM

## 2018-05-22 RX ORDER — LOSARTAN POTASSIUM AND HYDROCHLOROTHIAZIDE 12.5; 1 MG/1; MG/1
1 TABLET ORAL EVERY MORNING
Qty: 90 TABLET | Refills: 0 | Status: CANCELLED | OUTPATIENT
Start: 2018-05-22

## 2018-05-29 ENCOUNTER — OFFICE VISIT (OUTPATIENT)
Dept: OPHTHALMOLOGY | Facility: CLINIC | Age: 58
End: 2018-05-29
Attending: OPHTHALMOLOGY
Payer: MEDICAID

## 2018-05-29 DIAGNOSIS — I10 ESSENTIAL HYPERTENSION, MALIGNANT: ICD-10-CM

## 2018-05-29 DIAGNOSIS — H26.9 INCIPIENT CATARACT: Primary | ICD-10-CM

## 2018-05-29 PROCEDURE — 92014 COMPRE OPH EXAM EST PT 1/>: CPT | Mod: S$PBB,,, | Performed by: OPHTHALMOLOGY

## 2018-05-29 PROCEDURE — 99212 OFFICE O/P EST SF 10 MIN: CPT | Mod: PBBFAC | Performed by: OPHTHALMOLOGY

## 2018-05-29 PROCEDURE — 99999 PR PBB SHADOW E&M-EST. PATIENT-LVL II: CPT | Mod: PBBFAC,,, | Performed by: OPHTHALMOLOGY

## 2018-05-29 RX ORDER — LOSARTAN POTASSIUM AND HYDROCHLOROTHIAZIDE 12.5; 1 MG/1; MG/1
1 TABLET ORAL EVERY MORNING
Qty: 90 TABLET | Refills: 0 | OUTPATIENT
Start: 2018-05-29

## 2018-05-29 NOTE — PROGRESS NOTES
HPI     57 y/o female returns for Ocular Health Check.    Pt states vision  Is alright see little black specs ever now and then, was   told on last visit had cataracts but in some years may have to have them   removed.     Eye Med(s): none    Hemoglobin A1C       Date                     Value               Ref Range             Status                01/25/2018               7.4 (H)             4.0 - 5.6 %           Final                      02/23/2017               6.8 (H)             4.5 - 6.2 %           Final     (-)Flain, Pain or Double Vision                   Last edited by Ivon Lopez MA on 5/29/2018  1:08 PM. (History)            Assessment /Plan     For exam results, see Encounter Report.    Incipient cataract      Incipient cataract: Patient does reports mild visual decline, but not sufficient to affect activities of daily living. I recommend monitoring visual status and follow up when visual symptoms worsen.  High myope/astig

## 2018-06-13 ENCOUNTER — TELEPHONE (OUTPATIENT)
Dept: PHYSICAL MEDICINE AND REHAB | Facility: CLINIC | Age: 58
End: 2018-06-13

## 2018-06-13 DIAGNOSIS — I10 ESSENTIAL HYPERTENSION, MALIGNANT: ICD-10-CM

## 2018-06-13 RX ORDER — LOSARTAN POTASSIUM AND HYDROCHLOROTHIAZIDE 12.5; 1 MG/1; MG/1
1 TABLET ORAL EVERY MORNING
Qty: 90 TABLET | Refills: 0 | Status: SHIPPED | OUTPATIENT
Start: 2018-06-13 | End: 2018-07-23 | Stop reason: SDUPTHER

## 2018-06-13 NOTE — TELEPHONE ENCOUNTER
I called to see what the peer to peer was about and all they did was took my information and stated that someone would call me back

## 2018-06-13 NOTE — TELEPHONE ENCOUNTER
----- Message from Rosario Cruz MD sent at 6/13/2018  1:39 PM CDT -----  Contact: dr. head with peer to peer for [facility] [phone]  Can someone please tell me what is the problem, and why I have to call peer to peer.  Thanks.         ----- Message -----  From: Dexter Carpio  Sent: 6/12/2018   4:51 PM  To: Rosario Cruz MD        ----- Message -----  From: Ioana Nj  Sent: 6/12/2018   4:36 PM  To: [facility] Staff              Name of Who is Calling: dr. all with peer to peer for [facility] [phone]      What is the request in detail: needs peer to peer with dr cruz. Call dr. head      Can the clinic reply by [facility]: no      What Number to Call Back if not in [facility]: dr. head with peer to peer for [facility] [phone]

## 2018-06-20 ENCOUNTER — TELEPHONE (OUTPATIENT)
Dept: PHYSICAL MEDICINE AND REHAB | Facility: CLINIC | Age: 58
End: 2018-06-20

## 2018-06-20 NOTE — TELEPHONE ENCOUNTER
----- Message from Marian Fournier MA sent at 6/14/2018 12:40 PM CDT -----  Contact: dr chapman with [facility] disability [facility] [phone]  Left a message need additional information concerning the peer to peer. Not sure what it is for.    ----- Message -----  From: Ioana Nj  Sent: 6/14/2018  11:58 AM  To: Anthony Ponce Staff              Name of Who is Calling: dr chapman with [facility] disability [facility] [phone]      What is the request in detail: needs peer to peer to review information for claim. Call dr all      Can the clinic reply by [facility]: no      What Number to Call Back [facility] not [facility]: dr chapman with [facility] disability [facility] [phone]

## 2018-07-11 ENCOUNTER — DOCUMENTATION ONLY (OUTPATIENT)
Dept: REHABILITATION | Facility: OTHER | Age: 58
End: 2018-07-11

## 2018-07-11 NOTE — PROGRESS NOTES
DC NOTE FOR [facility]    PT was treated 10 times from 11/24/17 to 3/20/18.  Treatments consisted of stretching and strengthening exercises for the lumbar spine.  Goals of PT only partially met.  Pt did not return for any further follow up.  DC from [facility] as she did not continue with any further treatment.

## 2018-07-16 ENCOUNTER — LAB VISIT (OUTPATIENT)
Dept: LAB | Facility: HOSPITAL | Age: 58
End: 2018-07-16
Attending: INTERNAL MEDICINE
Payer: MEDICAID

## 2018-07-16 DIAGNOSIS — E11.9 TYPE 2 DIABETES MELLITUS WITHOUT COMPLICATION, WITHOUT LONG-TERM CURRENT USE OF INSULIN: ICD-10-CM

## 2018-07-16 DIAGNOSIS — E03.4 HYPOTHYROIDISM DUE TO ACQUIRED ATROPHY OF THYROID: ICD-10-CM

## 2018-07-16 LAB
CHOLEST SERPL-MCNC: 199 MG/DL
CHOLEST/HDLC SERPL: 4.7 {RATIO}
ESTIMATED AVG GLUCOSE: 160 MG/DL
HBA1C MFR BLD HPLC: 7.2 %
HDLC SERPL-MCNC: 42 MG/DL
HDLC SERPL: 21.1 %
LDLC SERPL CALC-MCNC: 123.4 MG/DL
NONHDLC SERPL-MCNC: 157 MG/DL
T4 FREE SERPL-MCNC: 1.02 NG/DL
TRIGL SERPL-MCNC: 168 MG/DL
TSH SERPL DL<=0.005 MIU/L-ACNC: 4.53 UIU/ML

## 2018-07-16 PROCEDURE — 84443 ASSAY THYROID STIM HORMONE: CPT

## 2018-07-16 PROCEDURE — 84439 ASSAY OF FREE THYROXINE: CPT

## 2018-07-16 PROCEDURE — 80061 LIPID PANEL: CPT

## 2018-07-16 PROCEDURE — 83036 HEMOGLOBIN GLYCOSYLATED A1C: CPT

## 2018-07-16 PROCEDURE — 36415 COLL VENOUS BLD VENIPUNCTURE: CPT

## 2018-07-23 ENCOUNTER — OFFICE VISIT (OUTPATIENT)
Dept: INTERNAL MEDICINE | Facility: CLINIC | Age: 58
End: 2018-07-23
Payer: MEDICAID

## 2018-07-23 VITALS
OXYGEN SATURATION: 98 % | SYSTOLIC BLOOD PRESSURE: 150 MMHG | HEART RATE: 97 BPM | WEIGHT: 257.06 LBS | HEIGHT: 61 IN | DIASTOLIC BLOOD PRESSURE: 90 MMHG | BODY MASS INDEX: 48.53 KG/M2

## 2018-07-23 DIAGNOSIS — E89.0 HISTORY OF SUBTOTAL THYROIDECTOMY: ICD-10-CM

## 2018-07-23 DIAGNOSIS — L30.4 INTERTRIGO: ICD-10-CM

## 2018-07-23 DIAGNOSIS — I10 ESSENTIAL HYPERTENSION, MALIGNANT: Primary | ICD-10-CM

## 2018-07-23 DIAGNOSIS — E66.01 CLASS 3 SEVERE OBESITY DUE TO EXCESS CALORIES WITH SERIOUS COMORBIDITY AND BODY MASS INDEX (BMI) OF 45.0 TO 49.9 IN ADULT: ICD-10-CM

## 2018-07-23 DIAGNOSIS — F32.A DEPRESSION, UNSPECIFIED DEPRESSION TYPE: ICD-10-CM

## 2018-07-23 PROBLEM — E66.813 CLASS 3 SEVERE OBESITY DUE TO EXCESS CALORIES WITH SERIOUS COMORBIDITY AND BODY MASS INDEX (BMI) OF 45.0 TO 49.9 IN ADULT: Status: ACTIVE | Noted: 2018-07-23

## 2018-07-23 PROCEDURE — 99214 OFFICE O/P EST MOD 30 MIN: CPT | Mod: S$PBB,,, | Performed by: INTERNAL MEDICINE

## 2018-07-23 PROCEDURE — 99999 PR PBB SHADOW E&M-EST. PATIENT-LVL IV: CPT | Mod: PBBFAC,,, | Performed by: INTERNAL MEDICINE

## 2018-07-23 PROCEDURE — 99214 OFFICE O/P EST MOD 30 MIN: CPT | Mod: PBBFAC | Performed by: INTERNAL MEDICINE

## 2018-07-23 RX ORDER — NIFEDIPINE 90 MG/1
90 TABLET, EXTENDED RELEASE ORAL DAILY
Qty: 90 TABLET | Refills: 11 | Status: SHIPPED | OUTPATIENT
Start: 2018-07-23 | End: 2019-09-13 | Stop reason: SDUPTHER

## 2018-07-23 RX ORDER — CLOTRIMAZOLE 1 %
CREAM (GRAM) TOPICAL 2 TIMES DAILY
Qty: 113 G | Refills: 11 | Status: SHIPPED | OUTPATIENT
Start: 2018-07-23 | End: 2023-08-01

## 2018-07-23 RX ORDER — CARVEDILOL 3.12 MG/1
3.12 TABLET ORAL 2 TIMES DAILY
Qty: 180 TABLET | Refills: 11 | Status: SHIPPED | OUTPATIENT
Start: 2018-07-23 | End: 2019-10-13 | Stop reason: SDUPTHER

## 2018-07-23 RX ORDER — DIAZEPAM 5 MG/1
TABLET ORAL
Refills: 3 | COMMUNITY
Start: 2018-07-16 | End: 2019-01-17

## 2018-07-23 RX ORDER — POTASSIUM CHLORIDE 20 MEQ/1
40 TABLET, EXTENDED RELEASE ORAL 2 TIMES DAILY
Qty: 360 TABLET | Refills: 11 | Status: SHIPPED | OUTPATIENT
Start: 2018-07-23 | End: 2019-10-13 | Stop reason: SDUPTHER

## 2018-07-23 RX ORDER — LOSARTAN POTASSIUM AND HYDROCHLOROTHIAZIDE 12.5; 1 MG/1; MG/1
1 TABLET ORAL EVERY MORNING
Qty: 90 TABLET | Refills: 11 | Status: SHIPPED | OUTPATIENT
Start: 2018-07-23 | End: 2019-10-07 | Stop reason: SDUPTHER

## 2018-07-23 RX ORDER — VENLAFAXINE HYDROCHLORIDE 75 MG/1
CAPSULE, EXTENDED RELEASE ORAL
Qty: 90 CAPSULE | Refills: 3 | Status: SHIPPED | OUTPATIENT
Start: 2018-07-23 | End: 2019-07-10 | Stop reason: SDUPTHER

## 2018-07-23 RX ORDER — LEVOTHYROXINE SODIUM 125 UG/1
125 TABLET ORAL DAILY
Qty: 90 TABLET | Refills: 11 | Status: SHIPPED | OUTPATIENT
Start: 2018-07-23 | End: 2019-08-19 | Stop reason: SDUPTHER

## 2018-07-31 DIAGNOSIS — J32.9 SINUSITIS, UNSPECIFIED CHRONICITY, UNSPECIFIED LOCATION: ICD-10-CM

## 2018-08-01 RX ORDER — FLUTICASONE PROPIONATE 50 MCG
2 SPRAY, SUSPENSION (ML) NASAL DAILY
Qty: 16 G | Refills: 3 | Status: SHIPPED | OUTPATIENT
Start: 2018-08-01 | End: 2018-11-25 | Stop reason: SDUPTHER

## 2018-08-17 DIAGNOSIS — M48.062 SPINAL STENOSIS OF LUMBAR REGION WITH NEUROGENIC CLAUDICATION: ICD-10-CM

## 2018-08-17 DIAGNOSIS — M25.561 CHRONIC PAIN OF RIGHT KNEE: ICD-10-CM

## 2018-08-17 DIAGNOSIS — F11.90 UNCOMPLICATED OPIOID USE: ICD-10-CM

## 2018-08-17 DIAGNOSIS — M54.41 CHRONIC BILATERAL LOW BACK PAIN WITH RIGHT-SIDED SCIATICA: ICD-10-CM

## 2018-08-17 DIAGNOSIS — G89.29 CHRONIC BILATERAL LOW BACK PAIN WITH RIGHT-SIDED SCIATICA: ICD-10-CM

## 2018-08-17 DIAGNOSIS — M51.36 DDD (DEGENERATIVE DISC DISEASE), LUMBAR: ICD-10-CM

## 2018-08-17 DIAGNOSIS — G89.29 CHRONIC PAIN OF RIGHT KNEE: ICD-10-CM

## 2018-08-17 DIAGNOSIS — M43.16 SPONDYLOLISTHESIS AT L3-L4 LEVEL: ICD-10-CM

## 2018-08-17 DIAGNOSIS — Z98.890 S/P LUMBAR LAMINECTOMY: ICD-10-CM

## 2018-08-17 DIAGNOSIS — M25.551 PAIN OF RIGHT HIP JOINT: ICD-10-CM

## 2018-08-19 ENCOUNTER — PATIENT MESSAGE (OUTPATIENT)
Dept: PHYSICAL MEDICINE AND REHAB | Facility: CLINIC | Age: 58
End: 2018-08-19

## 2018-08-20 RX ORDER — OXYCODONE AND ACETAMINOPHEN 5; 325 MG/1; MG/1
1 TABLET ORAL EVERY 8 HOURS PRN
Qty: 60 TABLET | Refills: 0 | Status: SHIPPED | OUTPATIENT
Start: 2018-08-20 | End: 2018-09-17 | Stop reason: SDUPTHER

## 2018-09-17 ENCOUNTER — OFFICE VISIT (OUTPATIENT)
Dept: PHYSICAL MEDICINE AND REHAB | Facility: CLINIC | Age: 58
End: 2018-09-17
Payer: MEDICAID

## 2018-09-17 VITALS
SYSTOLIC BLOOD PRESSURE: 150 MMHG | HEIGHT: 61 IN | WEIGHT: 257.5 LBS | DIASTOLIC BLOOD PRESSURE: 86 MMHG | BODY MASS INDEX: 48.62 KG/M2 | HEART RATE: 96 BPM

## 2018-09-17 DIAGNOSIS — M54.41 CHRONIC BILATERAL LOW BACK PAIN WITH RIGHT-SIDED SCIATICA: ICD-10-CM

## 2018-09-17 DIAGNOSIS — M25.561 CHRONIC PAIN OF RIGHT KNEE: ICD-10-CM

## 2018-09-17 DIAGNOSIS — M25.551 PAIN OF RIGHT HIP JOINT: ICD-10-CM

## 2018-09-17 DIAGNOSIS — G89.29 CHRONIC BILATERAL LOW BACK PAIN WITH RIGHT-SIDED SCIATICA: ICD-10-CM

## 2018-09-17 DIAGNOSIS — M51.36 DDD (DEGENERATIVE DISC DISEASE), LUMBAR: ICD-10-CM

## 2018-09-17 DIAGNOSIS — Z98.890 HISTORY OF LUMBAR LAMINECTOMY: ICD-10-CM

## 2018-09-17 DIAGNOSIS — F11.90 UNCOMPLICATED OPIOID USE: ICD-10-CM

## 2018-09-17 DIAGNOSIS — M43.16 SPONDYLOLISTHESIS AT L3-L4 LEVEL: ICD-10-CM

## 2018-09-17 DIAGNOSIS — M48.062 SPINAL STENOSIS OF LUMBAR REGION WITH NEUROGENIC CLAUDICATION: Primary | ICD-10-CM

## 2018-09-17 DIAGNOSIS — G89.29 CHRONIC PAIN OF RIGHT KNEE: ICD-10-CM

## 2018-09-17 DIAGNOSIS — Z98.890 S/P LUMBAR LAMINECTOMY: ICD-10-CM

## 2018-09-17 PROCEDURE — 99214 OFFICE O/P EST MOD 30 MIN: CPT | Mod: S$PBB,,, | Performed by: PHYSICAL MEDICINE & REHABILITATION

## 2018-09-17 PROCEDURE — 99999 PR PBB SHADOW E&M-EST. PATIENT-LVL III: CPT | Mod: PBBFAC,,, | Performed by: PHYSICAL MEDICINE & REHABILITATION

## 2018-09-17 PROCEDURE — 99213 OFFICE O/P EST LOW 20 MIN: CPT | Mod: PBBFAC | Performed by: PHYSICAL MEDICINE & REHABILITATION

## 2018-09-17 RX ORDER — OXYCODONE AND ACETAMINOPHEN 5; 325 MG/1; MG/1
1 TABLET ORAL EVERY 8 HOURS PRN
Qty: 60 TABLET | Refills: 0 | Status: SHIPPED | OUTPATIENT
Start: 2018-09-19 | End: 2018-10-11 | Stop reason: CLARIF

## 2018-09-17 RX ORDER — OXYCODONE AND ACETAMINOPHEN 5; 325 MG/1; MG/1
1 TABLET ORAL EVERY 8 HOURS PRN
Qty: 60 TABLET | Refills: 0 | Status: SHIPPED | OUTPATIENT
Start: 2018-10-19 | End: 2018-11-18

## 2018-09-17 RX ORDER — OXYCODONE AND ACETAMINOPHEN 5; 325 MG/1; MG/1
1 TABLET ORAL EVERY 8 HOURS PRN
Qty: 60 TABLET | Refills: 0 | Status: SHIPPED | OUTPATIENT
Start: 2018-11-19 | End: 2018-12-19

## 2018-09-17 NOTE — PROGRESS NOTES
Subjective:       Patient ID: Tanika Wayne is a 58 y.o. female.    Chief Complaint: No chief complaint on file.    Back Pain   Pertinent negatives include no abdominal pain, chest pain, fever, numbness or weakness. Headaches:     Hip Pain    Pertinent negatives include no numbness.   Knee Pain    Pertinent negatives include no numbness.      Mrs. Wayne is 59 y/o female, who returns to clinic for worsening of chronic back pain, and chronic pain management with opioids.  She is s/p L3-5 laminectomy on 2/22/17 by dr. Sullivan, cx with superficial wound infection.  Referred by Dr. Sullivan.   LCV 05/15/18.     Today, she complains about:  1.Back pain that is radiation to RT hip  2. Rt hip pain.  3 Rt knee pain.    Since LCV, she reports some improvement of back pain.   Percocet decreases her pain down to 3-4.   She takes it BID, early in am,a nd 7-8 pm.  She also reports that Valium helps with leg spasms, takes it in am/pm.   Takes also Flexeril at bedtime, that helps with sleep.   She sates that she was taking Tramadol before surgery and was making her constipated.  Also , reported that  did not want her to have PT until October 2017.  She is on long term disability, [employer], after back surgery.  She is also on [employer] for Rt knee and hip pain  (she fell at work ,in January 7/2016). She will soon go for  THR in Oct.11, 2018, with dr. Fischer ( who is with [employer]).    Back Pain Description:  Lenght: pain is a chronic pain. Length > many, more than 10 yrs, with worsening last year.   Any past, recent injury, falls.  Intensity:  CURRENT  3/10,  AVERAGE  Pain  3-4/10. at BEST /10 , At WORST 7-8 /10 on the WORST day.   Location: pain is localized at  Rt side of back and buttock, running to Rt hip and leg.    It is more Back at right side >> leg pain.   Radiation: Rt  Buttocks all the way to Rt legs, to mid calf   Timing :it is  constant day/yamila at night pain ,cannot sleep on RT side,   worse with activity, in evening  Cannot stand 2-5 minutes, needs tos it down, cannot walk a distance of one room, secondary to back pain , has to sit down for 5-10 minutes,   and can walk again.   Uses SC  ( borrowed from her ). She does noot have any AD at home.   She states that she ordered RW with seat through [employer].   QUALITY: Back pain is more dull ache.  No Sharp/shooting/ neuropathic : burning, tingling, numbness.  She reports muscle spasm in RT calf.   She has  RT weakness, she reports a couple of times inside house, no injury.   Worsening factors:  Standing, walking.   Alleviating factors: sitting and laying down.   Symptoms interfere with daily activity, sleeping and work.   Current medications: Tramadol , Aleve   Failed medications: Gabapentin ( makes bruises on skin, bluish nails) Lyrica ( makes ankle swelling) .   Prior procedures.  Minimally invasive laminectomy.   PT/OT: None. Per patient, Dr Sullivan told her not to have PT until October 2017.   Patient denies night fever/night sweats, bowel incontinence, significant weight loss and significant motor weakness (red flags).  Patient denies any suicidal or homicidal ideations.  She is here for chronic pain management with opiates.    Past Medical History:   Diagnosis Date    Abnormal echocardiogram 11/26/2012    Arthritis     Breast cancer 1998    Breast cancer, right breast     Cardiomyopathy due to systemic disease     Cataract     Clotting disorder     Diabetes mellitus     Diabetes mellitus type II     DM (diabetes mellitus) 11/26/2012    Encounter for blood transfusion     HTN (hypertension) 11/26/2012    Hypertension     Increased glucose level 11/26/2012    ITP (idiopathic thrombocytopenic purpura) 11/26/2012    Kidney stones 11/26/2012    PONV (postoperative nausea and vomiting)     Stenosis of lumbosacral spine 11/26/2012    Thyroid disease     Tympanic membrane perforation 9/15/2014    right 2014 Dr. Jeffries        Past Surgical History:   Procedure Laterality Date    BREAST LUMPECTOMY Right 1998     SECTION      CHOLECYSTECTOMY      EYE SURGERY      strabismus, 2006    HYSTERECTOMY      L3-5 Laminectomy  2017    Dr. Mendoza    Left LAMINECTOMY-MINIMALLY INVASIVE L3-4 and L4-5 Right 2017    [facility]    [facility]    [facility]    STRABISMUS SURGERY  8.30.06    OU    THYROIDECTOMY  2016    THYROIDECTOMY N/A 2016    [facility] by [facility]ish Travis [facility] at [facility] OR       Family History   Problem Relation Age of Onset    Heart disease Mother 69        MI    Diabetes Mother     Hypertension Mother     Diabetes Father     Hypertension Father     Stroke Father     Diabetes Sister     Diabetes Brother        Social History     Socioeconomic History    Marital status:      Spouse name: None    Number of children: 1    Years of education: None    Highest education level: None   Social Needs    Financial resource strain: None    Food insecurity - worry: None    Food insecurity - inability: None    Transportation needs - medical: None    Transportation needs - non-medical: None   Occupational History    Occupation: registration     Comment: [employer]   Tobacco Use    Smoking status: Never Smoker    Smokeless tobacco: Never Used   Substance and Sexual Activity    Alcohol use: No    Drug use: No    Sexual activity: No   Other Topics Concern    None   Social History Narrative    2017 - Using a walker    The patient is not getting much exercise but is able to get about with the aid of a cane or walker.     (has copd, arthritis), 2 kids, dtr 24, son 14 (healthy kids)       Current Outpatient Medications   Medication Sig Dispense Refill    carvedilol (COREG) 3.125 MG tablet Take 1 tablet (3.125 mg total) by mouth 2 (two) times daily. 180 tablet 11    clotrimazole (LOTRIMIN) 1 % cream Apply topically 2 (two) times daily. 113  g 11    clotrimazole-betamethasone 1-0.05% (LOTRISONE) cream Apply topically 2 (two) times daily as needed. 45 g 1    cyclobenzaprine (FLEXERIL) 10 MG tablet TAKE ONE-HALF TO ONE TABLET BY MOUTH EVERY NIGHT AT BEDTIME 30 tablet 11    diazePAM (VALIUM) 5 MG tablet TAKE 1 TABLET BY MOUTH NIGHTLY AS NEEDED FOR ANXIETY (MUSCLE SPASM)\  3    docusate sodium (STOOL SOFTENER) 100 MG capsule Take 100 mg by mouth 3 (three) times daily as needed for Constipation.      fluticasone (FLONASE) 50 mcg/actuation nasal spray 2 sprays (100 mcg total) by Each Nare route once daily. 16 g 3    levothyroxine (SYNTHROID) 125 MCG tablet Take 1 tablet (125 mcg total) by mouth once daily. 90 tablet 11    losartan-hydrochlorothiazide 100-12.5 mg (HYZAAR) 100-12.5 mg Tab Take 1 tablet by mouth every morning. 90 tablet 11    multivitamin (THERAGRAN) per tablet Take 1 tablet by mouth once daily.      NIFEdipine (PROCARDIA-XL) 90 MG (OSM) 24 hr tablet Take 1 tablet (90 mg total) by mouth once daily. 90 tablet 11    oxyCODONE-acetaminophen (PERCOCET) 5-325 mg per tablet Take 1 tablet by mouth every 8 (eight) hours as needed for Pain. 60 tablet 0    [START ON 10/19/2018] oxyCODONE-acetaminophen (PERCOCET) 5-325 mg per tablet Take 1 tablet by mouth every 8 (eight) hours as needed for Pain. 60 tablet 0    [START ON 11/19/2018] oxyCODONE-acetaminophen (PERCOCET) 5-325 mg per tablet Take 1 tablet by mouth every 8 (eight) hours as needed for Pain. 60 tablet 0    potassium chloride SA (K-DUR,KLOR-CON) 20 MEQ tablet Take 2 tablets (40 mEq total) by mouth 2 (two) times daily. 360 tablet 11    venlafaxine (EFFEXOR-XR) 75 MG 24 hr capsule TAKE 1 CAPSULE EVERY NIGHT 90 capsule 3     No current facility-administered medications for this visit.        Review of patient's allergies indicates:  No Known Allergies      Review of Systems   Constitutional: Negative for appetite change, chills, fatigue, fever and unexpected weight change.   HENT:  Negative for drooling, trouble swallowing and voice change.    Eyes: Negative for pain and visual disturbance.   Respiratory: Negative for shortness of breath and wheezing.    Cardiovascular: Negative for chest pain and palpitations.   Gastrointestinal: Negative for abdominal distention, abdominal pain, constipation and diarrhea.   Genitourinary: Negative for difficulty urinating.   Musculoskeletal: Positive for back pain and gait problem. Negative for arthralgias, joint swelling, myalgias and neck stiffness.               Skin: Negative for color change and rash.   Neurological: Negative for dizziness, facial asymmetry, speech difficulty, weakness, light-headedness and numbness. Headaches:     Hematological: Negative for adenopathy.   Psychiatric/Behavioral: Negative for behavioral problems, confusion and sleep disturbance. The patient is not nervous/anxious.        Objective:      Physical Exam      GENERAL: The patient is alert, oriented, pleasant.   HEENT; PERRLA  NECK: supple,    MUSCULOSKELETAL:   Gait- NT, she is in manual WC, pushed by her friend.  Cervical spine: full  AROM in cervical spine.  Lumbar spine, NT, in WC.  Straight leg raising Negative bilaterally ( sitting).  Full range of motion in all joints x4 extremities ( tested sitting).  Muscle strength 5/5 throughout x4 extremities.   No  joint laxity throughout x4 extremities.   NEUROLOGIC: Cranial nerves II through XII intact.   Deep tendon reflexes is normal, +2 in the upper and lower extremities bilaterally.   Muscle tone is normal.   Sensory is intact to light touch and pinprick throughout x4 extremities.       MRI of Lumbar spine ( 01/2017) showed:   Continued grade 1 anterolisthesis of L3 on L4.   The lumbar vertebral body height, contour and bone marrow signal is relatively stable without evidence for acute fracture.  The distal spinal cord and conus is normal in signal and contour the tip of the conus approximates the mid [id number].  T12/L1 No  significant disc bulge, central canal or neural foraminal stenosis.   L1/L2: Small disc bulge with ligamentum flavum hypertrophy and facet joint arthropathy with mild central canal and bilateral neural foraminal stenosis  L2/L3: Trace disc bulge with ligamentum flavum hypertrophy and facet joint arthropathy with moderate-to-severe central canal stenosis.   There is mild resulting neuroforaminal stenosis.  L3/L4: Bulging disc with ligament flavum hypertrophy and facet arthropathy with severe central canal stenosis and mild bilateral neuroforaminal stenosis.  L4/L5: Bulging disc ligamentum flavum hypertrophy and facet joint arthropathy without significant central canal stenosis   with mild/moderate neuroforaminal stenosis bilaterally  L5/S1: Bulging disc with left paracentral disc protrusion and annular fissure. No significant central canal stenosis with mild right and   moderate left neuroforaminal stenosis.  Impression:   Continued multilevel degenerative change of the lumbar spine which remains most prominent at L3/L4 with small disc bulge with   exuberant ligamentum flavum hypertrophy and facet joint arthropathy   with severe central canal stenosis and mild neuroforaminal stenosis.  Continued bulging disc with left paracentral disc protrusion L5/S1.    Assessment:       1. Spinal stenosis of lumbar region with neurogenic claudication    2. History of lumbar laminectomy    3. Pain of right hip joint    4. Chronic pain of right knee    5. Chronic bilateral low back pain with right-sided sciatica    6. Spondylolisthesis at L3-L4 level    7. S/P lumbar laminectomy    8. DDD (degenerative disc disease), lumbar    9. Uncomplicated opioid use        Plan:       Spinal stenosis of lumbar region with neurogenic claudication  -     oxyCODONE-acetaminophen (PERCOCET) 5-325 mg per tablet; Take 1 tablet by mouth every 8 (eight) hours as needed for Pain.  Dispense: 60 tablet; Refill: 0  -     oxyCODONE-acetaminophen (PERCOCET)  5-325 mg per tablet; Take 1 tablet by mouth every 8 (eight) hours as needed for Pain.  Dispense: 60 tablet; Refill: 0  -     oxyCODONE-acetaminophen (PERCOCET) 5-325 mg per tablet; Take 1 tablet by mouth every 8 (eight) hours as needed for Pain.  Dispense: 60 tablet; Refill: 0    History of lumbar laminectomy  -     oxyCODONE-acetaminophen (PERCOCET) 5-325 mg per tablet; Take 1 tablet by mouth every 8 (eight) hours as needed for Pain.  Dispense: 60 tablet; Refill: 0  -     oxyCODONE-acetaminophen (PERCOCET) 5-325 mg per tablet; Take 1 tablet by mouth every 8 (eight) hours as needed for Pain.  Dispense: 60 tablet; Refill: 0  -     oxyCODONE-acetaminophen (PERCOCET) 5-325 mg per tablet; Take 1 tablet by mouth every 8 (eight) hours as needed for Pain.  Dispense: 60 tablet; Refill: 0    Pain of right hip joint  -     oxyCODONE-acetaminophen (PERCOCET) 5-325 mg per tablet; Take 1 tablet by mouth every 8 (eight) hours as needed for Pain.  Dispense: 60 tablet; Refill: 0  -     oxyCODONE-acetaminophen (PERCOCET) 5-325 mg per tablet; Take 1 tablet by mouth every 8 (eight) hours as needed for Pain.  Dispense: 60 tablet; Refill: 0  -     oxyCODONE-acetaminophen (PERCOCET) 5-325 mg per tablet; Take 1 tablet by mouth every 8 (eight) hours as needed for Pain.  Dispense: 60 tablet; Refill: 0    Chronic pain of right knee  -     oxyCODONE-acetaminophen (PERCOCET) 5-325 mg per tablet; Take 1 tablet by mouth every 8 (eight) hours as needed for Pain.  Dispense: 60 tablet; Refill: 0  -     oxyCODONE-acetaminophen (PERCOCET) 5-325 mg per tablet; Take 1 tablet by mouth every 8 (eight) hours as needed for Pain.  Dispense: 60 tablet; Refill: 0  -     oxyCODONE-acetaminophen (PERCOCET) 5-325 mg per tablet; Take 1 tablet by mouth every 8 (eight) hours as needed for Pain.  Dispense: 60 tablet; Refill: 0    Chronic bilateral low back pain with right-sided sciatica  -     oxyCODONE-acetaminophen (PERCOCET) 5-325 mg per tablet; Take 1 tablet by  mouth every 8 (eight) hours as needed for Pain.  Dispense: 60 tablet; Refill: 0  -     oxyCODONE-acetaminophen (PERCOCET) 5-325 mg per tablet; Take 1 tablet by mouth every 8 (eight) hours as needed for Pain.  Dispense: 60 tablet; Refill: 0  -     oxyCODONE-acetaminophen (PERCOCET) 5-325 mg per tablet; Take 1 tablet by mouth every 8 (eight) hours as needed for Pain.  Dispense: 60 tablet; Refill: 0    Spondylolisthesis at L3-L4 level  -     oxyCODONE-acetaminophen (PERCOCET) 5-325 mg per tablet; Take 1 tablet by mouth every 8 (eight) hours as needed for Pain.  Dispense: 60 tablet; Refill: 0  -     oxyCODONE-acetaminophen (PERCOCET) 5-325 mg per tablet; Take 1 tablet by mouth every 8 (eight) hours as needed for Pain.  Dispense: 60 tablet; Refill: 0  -     oxyCODONE-acetaminophen (PERCOCET) 5-325 mg per tablet; Take 1 tablet by mouth every 8 (eight) hours as needed for Pain.  Dispense: 60 tablet; Refill: 0    S/P lumbar laminectomy  -     oxyCODONE-acetaminophen (PERCOCET) 5-325 mg per tablet; Take 1 tablet by mouth every 8 (eight) hours as needed for Pain.  Dispense: 60 tablet; Refill: 0  -     oxyCODONE-acetaminophen (PERCOCET) 5-325 mg per tablet; Take 1 tablet by mouth every 8 (eight) hours as needed for Pain.  Dispense: 60 tablet; Refill: 0  -     oxyCODONE-acetaminophen (PERCOCET) 5-325 mg per tablet; Take 1 tablet by mouth every 8 (eight) hours as needed for Pain.  Dispense: 60 tablet; Refill: 0    DDD (degenerative disc disease), lumbar  -     oxyCODONE-acetaminophen (PERCOCET) 5-325 mg per tablet; Take 1 tablet by mouth every 8 (eight) hours as needed for Pain.  Dispense: 60 tablet; Refill: 0  -     oxyCODONE-acetaminophen (PERCOCET) 5-325 mg per tablet; Take 1 tablet by mouth every 8 (eight) hours as needed for Pain.  Dispense: 60 tablet; Refill: 0  -     oxyCODONE-acetaminophen (PERCOCET) 5-325 mg per tablet; Take 1 tablet by mouth every 8 (eight) hours as needed for Pain.  Dispense: 60 tablet; Refill:  0    Uncomplicated opioid use  -     oxyCODONE-acetaminophen (PERCOCET) 5-325 mg per tablet; Take 1 tablet by mouth every 8 (eight) hours as needed for Pain.  Dispense: 60 tablet; Refill: 0  -     oxyCODONE-acetaminophen (PERCOCET) 5-325 mg per tablet; Take 1 tablet by mouth every 8 (eight) hours as needed for Pain.  Dispense: 60 tablet; Refill: 0  -     oxyCODONE-acetaminophen (PERCOCET) 5-325 mg per tablet; Take 1 tablet by mouth every 8 (eight) hours as needed for Pain.  Dispense: 60 tablet; Refill: 0    Patient with chronic low back pain, with neurogenic claudication secondary to multi level Lumbar spinal stenosis, moderate-to-severe at L2-3, and   Severe at L3/L4, secondary to bulging disc with ligament flavum hypertrophy and facet arthropathy,  s/p L3-5 laminectomy on 2/22/17.    -- Pain mgm,  She will resume Percocet 10/325 mg po BID  Prn, #60 tabs,   And Valium prn muscle spasms.   reviewed and appropriate.  Percocet refills  On 8/20, 7/16, 6/15/184   She is also on Effexor 75 mg po QHS.  Urine drug test, done on  5/15/18, appropriately pos for opiates.    RTC in 4 months.    Total time spent face to face with patient was 25 minutes.   More than 50% of that time was spent in counseling on diagnosis , prognosis and treatment options.   I also caunsel patient  on common and most usual side effect of prescribed medications.   Risk and benefits of opiates, possible risk of developing opiate dependence and tolerance, need of strict compliance with prescribed medications.  Pain contract, rules and obligations were discussed with patient in details.  He is aware that I would be the only doctor prescribing him pain medications and ED in a case of emergency.  I reviewed Primary care , and other specialty's notes to better coordinate patient's  care.   All questions were answered, and patient voiced understanding.

## 2018-10-05 NOTE — H&P
Subjective:     Multiple orthopedic problems.  The most severe right hip.  She has been losing weight.  She's lost about 50 pounds.  Hopefully now better tolerated with hip replacement.    Patient Active Problem List    Diagnosis Date Noted    Class 3 severe obesity due to excess calories with serious comorbidity and body mass index (BMI) of 45.0 to 49.9 in adult 07/23/2018    Uncomplicated opioid use 05/15/2018    History of lumbar laminectomy 11/24/2017    Obesity, morbid, BMI 50 or higher 10/10/2017    Chronic bilateral low back pain with right-sided sciatica 08/11/2017    Chronic use of opiate for therapeutic purpose 08/11/2017    S/P lumbar laminectomy 03/21/2017    Spinal stenosis, lumbar 01/10/2017    History of subtotal thyroidectomy 09/09/2016    Spondylolisthesis at L3-L4 level 08/02/2016    DDD (degenerative disc disease), lumbar 08/02/2016    Tympanic membrane perforation 09/15/2014    Thoracic or lumbosacral neuritis or radiculitis 12/18/2012    Pain in knee 12/18/2012    Hip pain 12/18/2012    Kidney stones 11/26/2012    Breast cancer, right breast 11/26/2012    Idiopathic thrombocytopenic purpura 11/26/2012    Essential hypertension 11/26/2012    Type 2 diabetes mellitus without complication, without long-term current use of insulin 11/26/2012    Abnormal echocardiogram 11/26/2012    History of blurred vision - Both Eyes 07/24/2012     Past Medical History:   Diagnosis Date    Abnormal echocardiogram 11/26/2012    Arthritis     Breast cancer 1998    Breast cancer, right breast     Cardiomyopathy due to systemic disease     Cataract     Clotting disorder     Diabetes mellitus     Diabetes mellitus type II     DM (diabetes mellitus) 11/26/2012    Encounter for blood transfusion     HTN (hypertension) 11/26/2012    Hypertension     Increased glucose level 11/26/2012    ITP (idiopathic thrombocytopenic purpura) 11/26/2012    Kidney stones 11/26/2012    PONV  (postoperative nausea and vomiting)     Stenosis of lumbosacral spine 2012    Thyroid disease     Tympanic membrane perforation 9/15/2014    right 2014 Dr. Jeffries      Past Surgical History:   Procedure Laterality Date    BREAST LUMPECTOMY Right 1998     SECTION      CHOLECYSTECTOMY      EYE SURGERY      strabismus, 2006    HYSTERECTOMY      L3-5 Laminectomy  2017    Dr. Mendoza    Left LAMINECTOMY-MINIMALLY INVASIVE L3-4 and L4-5 Right 2017    [facility]    [facility]    STRABISMUS SURGERY  8.30.06    [facility]    THYROIDECTOMY  2016    THYROIDECTOMY N/A 2016    Performed by Anish Travis [facility] at [facility] OR      No medications prior to admission.     Review of patient's allergies indicates:  No Known Allergies   Social History     Tobacco Use    Smoking status: Never Smoker    Smokeless tobacco: Never Used   Substance Use Topics    Alcohol use: No      Family History   Problem Relation Age of Onset    Heart disease Mother 69        MI    Diabetes Mother     Hypertension Mother     Diabetes Father     Hypertension Father     Stroke Father     Diabetes Sister     Diabetes Brother       Review of Systems  Pertinent items are noted in HPI.    Objective:     No data found.  Heart regular lungs clear minimal rotation right hip significant limp tenderness in the right groin region    Imaging Review  Complete loss of joint space with sclerosis osteophyte formation    Assessment:     There are no hospital problems to display for this patient.      Plan:     The various methods of treatment have been discussed with the patient and family.   After consideration of risks, benefits and other options for treatment, the patient has consented to surgical interventions (Significant Risks discussed.  She still very heavy.  Difficult surgery anticipated.).  Questions were answered and Pre-op teaching was done by me.

## 2018-10-10 NOTE — NURSING
Total Joint Replacement Questionnaire     Tanika Wayne participated in Joint Class Oct 6    1. Have you ever had a Joint Replacement?   []Yes  [x] No    2. How many stairs/steps do you have to enter your home? 4  When going up, on what side is the railing?  [] Left  [x] Right  [] Bilateral  [] None    3. Do you own any Durable Medical Equipment?   [] Rolling Walker  [] Standard Walker  [x] Rollator  [] Cane  [] Crutches  [] Bed Side Commode  [] Hip Kit  [] Tub Transfer Bench  [] Shower Chair     4. Have you used a Home Health Company before?   [] Yes  [x] No  If Yes, Name of Company: [employer]  Would you like to use this company again?   [] Yes  [] No  [x] Would you like to use your physician's preference?  [x] Yes  [] No    5. Have you arranged for someone to help you, for at least for 3 days, when you are discharged from the hospital?  [x] Yes  [] No  If Yes, Name(s) of person assisting: ?      Christine Emanuel  10/10/2018

## 2018-10-10 NOTE — DISCHARGE INSTRUCTIONS
Hip Replacement Discharge Instructions    1. Pain:  a. After surgery you may feel some pain in the operative leg groin area. This is normal. Your hip will likely have been injected with a numbing medicine (Exparel) prior to completion of surgery for pain control. This is indicated on a green bracelet that you will wear for 4 days after surgery. You will also get a prescription for pain control before you leave the hospital.  b. Elevate your leg when sitting for comfort  2. Incision Care:  a. Some drainage from the incision in the first 72 hours is normal. If drainage is excessive, remove bandage,  pat dry, cover with sterile gauze, and secure with tape. Notify M.D. for excessive drainage.  b. Staples will be removed 14 days after surgery.  3. Activity:  a. See attached hip precautions and follow for 6 weeks (instructions found at the end of packet):  b. Perform exercises 3 times a day.  c. Use a hard, flat surface, such as a firm mattress, when exercising.  d. .DR MEZA PATIENTS CANNOT SHOWER UNTIL STAPLES ARE REMOVED. Support/help is mandatory during showering. If dressing becomes wet, replace with a new dressing. No bathing or swimming for 6 weeks or until incision is completely healed.  e. Wear tierney hose for 3 weeks after surgery. You may remove for 1-2 hours during the day only.Send patient home with an extra pair tierney hose.  4. Safety:  a. Add cushions to low chairs and car seats for elevation.  b. When getting in and out of a car, it is important to keep your leg straight and out to the side. Wear a seatbelt at all times.  c. You will be given a raised toilet seat (3-1 commode).  d. Use a walker, cane, or crutches as long as M.D. recommends.  5. Possible Complications: Report to Surgeon  a. Infection  i. Unexpected redness  ii. Persistent drainage  iii. Temperature ,can be treated with tylenol. Do not go to the emergency room or urgent care for a temperature, call your surgeon.   iii. Additional  swelling  iv. Pain not controlled with current pain medicine  b. Blood clot  i. Unusual pain  ii. Red or discolored skin  iii. Swelling  iv. Unusual warm skin  c. Dislocation  i. Severe hip pain especially when moved  ii. The injured leg is shorter than the uninjured leg  iii. The injured leg lies in an abnormal position. In most cases the leg is bent at the hip, turned inward and pulled toward the middle of the body.

## 2018-10-11 ENCOUNTER — HOSPITAL ENCOUNTER (OUTPATIENT)
Dept: PREADMISSION TESTING | Facility: OTHER | Age: 58
Discharge: HOME | End: 2018-10-11
Attending: ORTHOPAEDIC SURGERY
Payer: OTHER MISCELLANEOUS

## 2018-10-11 ENCOUNTER — ANESTHESIA EVENT (OUTPATIENT)
Dept: SURGERY | Facility: OTHER | Age: 58
DRG: 470 | End: 2018-10-11
Payer: OTHER MISCELLANEOUS

## 2018-10-11 VITALS
SYSTOLIC BLOOD PRESSURE: 166 MMHG | BODY MASS INDEX: 47.95 KG/M2 | DIASTOLIC BLOOD PRESSURE: 88 MMHG | OXYGEN SATURATION: 97 % | HEART RATE: 100 BPM | HEIGHT: 61 IN | TEMPERATURE: 98 F | WEIGHT: 254 LBS

## 2018-10-11 LAB
ANION GAP SERPL CALC-SCNC: 10 MMOL/L
BACTERIA #/AREA URNS HPF: ABNORMAL /HPF
BASOPHILS # BLD AUTO: 0.02 K/UL
BASOPHILS NFR BLD: 0.3 %
BILIRUB UR QL STRIP: NEGATIVE
BUN SERPL-MCNC: 15 MG/DL
CALCIUM SERPL-MCNC: 10 MG/DL
CHLORIDE SERPL-SCNC: 102 MMOL/L
CLARITY UR: ABNORMAL
CO2 SERPL-SCNC: 28 MMOL/L
COLOR UR: YELLOW
CREAT SERPL-MCNC: 0.8 MG/DL
DIFFERENTIAL METHOD: ABNORMAL
EOSINOPHIL # BLD AUTO: 0.2 K/UL
EOSINOPHIL NFR BLD: 3.3 %
ERYTHROCYTE [DISTWIDTH] IN BLOOD BY AUTOMATED COUNT: 14.4 %
EST. GFR  (AFRICAN AMERICAN): >60 ML/MIN/1.73 M^2
EST. GFR  (NON AFRICAN AMERICAN): >60 ML/MIN/1.73 M^2
GLUCOSE SERPL-MCNC: 157 MG/DL
GLUCOSE UR QL STRIP: NEGATIVE
HCT VFR BLD AUTO: 43 %
HGB BLD-MCNC: 13.6 G/DL
HGB UR QL STRIP: NEGATIVE
HYALINE CASTS #/AREA URNS LPF: 0 /LPF
KETONES UR QL STRIP: NEGATIVE
LEUKOCYTE ESTERASE UR QL STRIP: ABNORMAL
LYMPHOCYTES # BLD AUTO: 4 K/UL
LYMPHOCYTES NFR BLD: 59.6 %
MCH RBC QN AUTO: 27.8 PG
MCHC RBC AUTO-ENTMCNC: 31.6 G/DL
MCV RBC AUTO: 88 FL
MICROSCOPIC COMMENT: ABNORMAL
MONOCYTES # BLD AUTO: 0.5 K/UL
MONOCYTES NFR BLD: 7.4 %
NEUTROPHILS # BLD AUTO: 2 K/UL
NEUTROPHILS NFR BLD: 29.3 %
NITRITE UR QL STRIP: POSITIVE
PH UR STRIP: 6 [PH] (ref 5–8)
PLATELET # BLD AUTO: 391 K/UL
PMV BLD AUTO: 9.6 FL
POTASSIUM SERPL-SCNC: 4.6 MMOL/L
PROT UR QL STRIP: ABNORMAL
RBC # BLD AUTO: 4.89 M/UL
RBC #/AREA URNS HPF: 2 /HPF (ref 0–4)
SODIUM SERPL-SCNC: 140 MMOL/L
SP GR UR STRIP: 1.02 (ref 1–1.03)
SQUAMOUS #/AREA URNS HPF: 2 /HPF
URN SPEC COLLECT METH UR: ABNORMAL
UROBILINOGEN UR STRIP-ACNC: 1 EU/DL
WBC # BLD AUTO: 6.73 K/UL
WBC #/AREA URNS HPF: 17 /HPF (ref 0–5)

## 2018-10-11 PROCEDURE — 36415 COLL VENOUS BLD VENIPUNCTURE: CPT

## 2018-10-11 PROCEDURE — 80048 BASIC METABOLIC PNL TOTAL CA: CPT

## 2018-10-11 PROCEDURE — 81000 URINALYSIS NONAUTO W/SCOPE: CPT

## 2018-10-11 PROCEDURE — 85025 COMPLETE CBC W/AUTO DIFF WBC: CPT

## 2018-10-11 RX ORDER — SODIUM CHLORIDE, SODIUM LACTATE, POTASSIUM CHLORIDE, CALCIUM CHLORIDE 600; 310; 30; 20 MG/100ML; MG/100ML; MG/100ML; MG/100ML
INJECTION, SOLUTION INTRAVENOUS CONTINUOUS
Status: CANCELLED | OUTPATIENT
Start: 2018-10-11

## 2018-10-11 RX ORDER — FAMOTIDINE 20 MG/1
20 TABLET, FILM COATED ORAL
Status: CANCELLED | OUTPATIENT
Start: 2018-10-11 | End: 2018-10-11

## 2018-10-11 RX ORDER — LIDOCAINE HYDROCHLORIDE 10 MG/ML
0.5 INJECTION, SOLUTION EPIDURAL; INFILTRATION; INTRACAUDAL; PERINEURAL ONCE
Status: CANCELLED | OUTPATIENT
Start: 2018-10-11 | End: 2018-10-11

## 2018-10-11 RX ORDER — PREGABALIN 75 MG/1
150 CAPSULE ORAL
Status: ACTIVE | OUTPATIENT
Start: 2018-10-11 | End: 2018-10-11

## 2018-10-11 NOTE — DISCHARGE INSTRUCTIONS
PRE-ADMIT TESTING -  [phone]    [address]  [facility]          Your surgery has been scheduled at [facility]. We are pleased to have the opportunity to serve you. For Further Information please call [phone].    On the day of surgery please report to the Information Desk on the 1st floor.    · CONTACT YOUR PHYSICIAN'S OFFICE THE DAY PRIOR TO YOUR SURGERY TO OBTAIN YOUR ARRIVAL TIME.     · The evening before surgery do not eat anything after 9 p.m. ( this includes hard candy, chewing gum and mints).  You may only have GATORADE, POWERADE AND WATER  from 9 p.m. until you leave your home.   DO NOT DRINK ANY LIQUIDS ON THE WAY TO THE HOSPITAL.      SPECIAL MEDICATION INSTRUCTIONS: TAKE medications checked off by the Anesthesiologist on your Medication List.    Angiogram Patients: Take medications as instructed by your physician, including aspirin.     Surgery Patients:    If you take ASPIRIN - Your PHYSICIAN/SURGEON will need to inform you IF/OR when you need to stop taking aspirin prior to your surgery.     Do Not take any medications containing IBUPROFEN.  Do Not Wear any make-up or dark nail polish   (especially eye make-up) to surgery. If you come to surgery with makeup on you will be required to remove the makeup or nail polish.    Do not shave your surgical area at least 5 days prior to your surgery. The surgical prep will be performed at the hospital according to Infection Control regulations.    Leave all valuables at home.   Do Not wear any jewelry or watches, including any metal in body piercings.  Contact Lens must be removed before surgery. Either do not wear the contact lens or bring a case and solution for storage.  Please bring a container for eyeglasses or dentures as required.  Bring any paperwork your physician has provided, such as consent forms,  history and physicals, doctor's orders, etc.   Bring comfortable clothes that are loose fitting to wear upon  discharge. Take into consideration the type of surgery being performed.  Maintain your diet as advised per your physician the day prior to surgery.      Adequate rest the night before surgery is advised.   Park in the Parking lot behind the hospital or in the [facility] across the street from the parking lot. Parking is complimentary.  If you will be discharged the same day as your procedure, please arrange for a responsible adult to drive you home or to accompany you if traveling by taxi.   YOU WILL NOT BE PERMITTED TO DRIVE OR TO LEAVE THE HOSPITAL ALONE AFTER SURGERY.   It is strongly recommended that you arrange for someone to remain with you for the first 24 hrs following your surgery.       Thank you for your cooperation.  The Staff of [facility].        Bathing Instructions                                                                 Please shower the evening before and morning of your procedure with    ANTIBACTERIAL SOAP. ( DIAL, etc )  Concentrate on the surgical area   for at least 3 minutes and rinse completely. Dry off as usual.   Do not use any deodorant, powder, body lotions, perfume, after shave or    cologne.

## 2018-10-11 NOTE — ANESTHESIA PREPROCEDURE EVALUATION
10/11/2018  Tanika Wayne is a 58 y.o., female.    Anesthesia Evaluation    I have reviewed the Patient Summary Reports.    I have reviewed the Nursing Notes.   I have reviewed the Medications.     Review of Systems  Anesthesia Hx:  No problems with previous Anesthesia PONV History of prior surgery of interest to airway management or planning: Previous anesthesia: General Lum Tian Hindu Feb 2017 [facility] with general anesthesia.  Procedure performed at an [facility]. Airway issues documented on chart review include mask, easy, GETA, glidescope used , view on video-laryngoscopy Grade I    Denies Family Hx of Anesthesia complications.   Denies Personal Hx of Anesthesia complications.   Social:  Non-Smoker    Hematology/Oncology:     Oncology Normal   Hematology Comments: History of ITP resolved with splenectomy inh 2001   EENT/Dental:EENT/Dental Normal   Cardiovascular:   Exercise tolerance: good Hypertension, well controlled hyperlipidemia    Pulmonary:  Pulmonary Normal    Renal/:   Denies Chronic Renal Disease. renal calculi     Musculoskeletal:  Spine Disorders: lumbar Degenerative disease and Disc disease    Neurological:  Neurology Normal    Endocrine:   Hyperthyroidism No meds. Diet controled   Dermatological:  Skin Normal    Psych:  Psychiatric Normal           Physical Exam  General:  Morbid Obesity    Airway/Jaw/Neck:  Airway Findings: Mouth Opening: Normal Tongue: Normal  General Airway Assessment: Adult  Mallampati: II      Dental:  Dental Findings: upper partial dentures        Mental Status:  Mental Status Findings:  Cooperative, Alert and Oriented         Anesthesia Plan  Type of Anesthesia, risks & benefits discussed:  Anesthesia Type:  spinal  Patient's Preference:   Intra-op Monitoring Plan:   Intra-op Monitoring Plan Comments:   Post Op Pain Control Plan: multimodal analgesia  Post  Op Pain Control Plan Comments:   Induction:    Beta Blocker:         Informed Consent: Patient understands risks and agrees with Anesthesia plan.  Questions answered. Anesthesia consent signed with patient.  ASA Score: 3     Day of Surgery Review of History & Physical:    H&P update referred to the surgeon.     Anesthesia Plan Notes: Labs today,plan Spinal w GA Back up        Ready For Surgery From Anesthesia Perspective.

## 2018-10-12 ENCOUNTER — TELEPHONE (OUTPATIENT)
Dept: INTERNAL MEDICINE | Facility: CLINIC | Age: 58
End: 2018-10-12

## 2018-10-12 NOTE — TELEPHONE ENCOUNTER
----- Message from Toñocarlos enrique Sinan sent at 10/12/2018  4:23 PM CDT -----  Contact: self/[phone]  Pt is calling [facility] with lizzie in the office in regards to her urine test. Pt states that she took her urine test at [facility] and she got back results and was told that it seems like she has bacteria in her urine and it might be a UTI. Pt would like to see if she can start some antibiotics and get it called into [facility]/pharmacy #[id number] - [place], [place] - [phone]. Please advise.          Thanks

## 2018-10-14 ENCOUNTER — PATIENT MESSAGE (OUTPATIENT)
Dept: INTERNAL MEDICINE | Facility: CLINIC | Age: 58
End: 2018-10-14

## 2018-10-14 DIAGNOSIS — N39.0 URINARY TRACT INFECTION WITHOUT HEMATURIA, SITE UNSPECIFIED: Primary | ICD-10-CM

## 2018-10-15 ENCOUNTER — PATIENT MESSAGE (OUTPATIENT)
Dept: INTERNAL MEDICINE | Facility: CLINIC | Age: 58
End: 2018-10-15

## 2018-10-15 RX ORDER — NITROFURANTOIN 25; 75 MG/1; MG/1
100 CAPSULE ORAL 2 TIMES DAILY
Qty: 6 CAPSULE | Refills: 0 | Status: SHIPPED | OUTPATIENT
Start: 2018-10-15 | End: 2018-10-18

## 2018-10-15 NOTE — TELEPHONE ENCOUNTER
Hi, please ask if she is having any symptoms of a UTI -- any painful urination, is it cloudy? I think she had the test for a preop for hip surgery, please let me know if that is correct.  Please let me know.  Thank you, Rayo Hernandez

## 2018-10-16 ENCOUNTER — HOSPITAL ENCOUNTER (OUTPATIENT)
Dept: RADIOLOGY | Facility: HOSPITAL | Age: 58
Discharge: HOME | End: 2018-10-16
Attending: INTERNAL MEDICINE
Payer: MEDICAID

## 2018-10-16 ENCOUNTER — OFFICE VISIT (OUTPATIENT)
Dept: INTERNAL MEDICINE | Facility: CLINIC | Age: 58
End: 2018-10-16
Payer: MEDICAID

## 2018-10-16 ENCOUNTER — HOSPITAL ENCOUNTER (OUTPATIENT)
Dept: CARDIOLOGY | Facility: CLINIC | Age: 58
Discharge: HOME | End: 2018-10-16
Payer: MEDICAID

## 2018-10-16 VITALS
HEIGHT: 61 IN | SYSTOLIC BLOOD PRESSURE: 142 MMHG | TEMPERATURE: 98 F | DIASTOLIC BLOOD PRESSURE: 78 MMHG | WEIGHT: 251.56 LBS | BODY MASS INDEX: 47.5 KG/M2

## 2018-10-16 DIAGNOSIS — I10 HYPERTENSION, UNSPECIFIED TYPE: ICD-10-CM

## 2018-10-16 DIAGNOSIS — Z01.818 PREOP EXAM FOR INTERNAL MEDICINE: ICD-10-CM

## 2018-10-16 DIAGNOSIS — M16.11 OSTEOARTHRITIS OF RIGHT HIP, UNSPECIFIED OSTEOARTHRITIS TYPE: Primary | ICD-10-CM

## 2018-10-16 DIAGNOSIS — E11.9 TYPE 2 DIABETES MELLITUS WITHOUT COMPLICATION, WITHOUT LONG-TERM CURRENT USE OF INSULIN: ICD-10-CM

## 2018-10-16 DIAGNOSIS — E66.9 OBESITY, UNSPECIFIED CLASSIFICATION, UNSPECIFIED OBESITY TYPE, UNSPECIFIED WHETHER SERIOUS COMORBIDITY PRESENT: ICD-10-CM

## 2018-10-16 DIAGNOSIS — E03.4 HYPOTHYROIDISM DUE TO ACQUIRED ATROPHY OF THYROID: ICD-10-CM

## 2018-10-16 DIAGNOSIS — M25.551 RIGHT HIP PAIN: ICD-10-CM

## 2018-10-16 DIAGNOSIS — I10 ESSENTIAL HYPERTENSION: ICD-10-CM

## 2018-10-16 PROCEDURE — 93010 ELECTROCARDIOGRAM REPORT: CPT | Mod: S$PBB,,, | Performed by: INTERNAL MEDICINE

## 2018-10-16 PROCEDURE — 71046 X-RAY EXAM CHEST 2 VIEWS: CPT | Mod: TC

## 2018-10-16 PROCEDURE — 99213 OFFICE O/P EST LOW 20 MIN: CPT | Mod: PBBFAC,25 | Performed by: INTERNAL MEDICINE

## 2018-10-16 PROCEDURE — 99999 PR PBB SHADOW E&M-EST. PATIENT-LVL III: CPT | Mod: PBBFAC,,, | Performed by: INTERNAL MEDICINE

## 2018-10-16 PROCEDURE — 99214 OFFICE O/P EST MOD 30 MIN: CPT | Mod: S$PBB,,, | Performed by: INTERNAL MEDICINE

## 2018-10-16 PROCEDURE — 71046 X-RAY EXAM CHEST 2 VIEWS: CPT | Mod: 26,,, | Performed by: RADIOLOGY

## 2018-10-16 PROCEDURE — 93005 ELECTROCARDIOGRAM TRACING: CPT | Mod: PBBFAC | Performed by: INTERNAL MEDICINE

## 2018-10-16 NOTE — PROGRESS NOTES
"Subjective:       Patient ID: Tanika Wayne is a 58 y.o. female.    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 Pt reports she has been having problems with arthritis for some time and working on getting her weight down   befor having surgery. She reprots plan hip replacment later this week she denies fever or chills. She denies cp or palpiations  She denies increased fluid retention and overall has been feeling well. She is completing course of antiboitic for recent hazy urine and currently asympomatic.   No fever or chills. She has hx diabetes but she has been working on dietary measrues     HPI  Review of Systems   Constitutional: Negative for fever.   Respiratory: Negative for chest tightness and shortness of breath.    Cardiovascular: Negative for chest pain, palpitations and leg swelling.   Genitourinary: Negative for dysuria.   Musculoskeletal: Positive for arthralgias.        Right hip pain   Knee pain     Hematological: Does not bruise/bleed easily.       Objective:     Blood pressure (!) 142/78, temperature 98 °F (36.7 °C), height 5' 1" (1.549 m), weight 114.1 kg (251 lb 8.7 oz).  walks with walker  Body mass index is 47.53 kg/m².  Physical Exam   Constitutional: No distress.   HENT:   Head: Normocephalic.   Mouth/Throat: Oropharynx is clear and moist.   Eyes: No scleral icterus.   Neck: Neck supple.   Cardiovascular: Normal rate, regular rhythm and normal heart sounds. Exam reveals no gallop and no friction rub.   No murmur heard.  Pulmonary/Chest: Effort normal and breath sounds normal. No respiratory distress.   Abdominal: Soft. Bowel sounds are normal. She exhibits no mass. There is no tenderness.   Musculoskeletal: She exhibits no edema.   Neurological: She is alert.   Skin: No erythema.   Psychiatric: She has a normal mood and affect.   Vitals reviewed.      Assessment:       1. Osteoarthritis of right hip, unspecified osteoarthritis " type    2. Preop exam for internal medicine    3. Right hip pain    4. Hypertension, unspecified type    5. Type 2 diabetes mellitus without complication, without long-term current use of insulin    6. Essential hypertension    7. Obesity, unspecified classification, unspecified obesity type, unspecified whether serious comorbidity present        Plan:       Tanika was seen today for pre-op exam.    Diagnoses and all orders for this visit:    Preop exam for internal medicine    Hypertension, unspecified type  Has been fairly well controlled continues to improve with weight loss  Maintain current medications and pcp follow up   -     EKG 12-lead; Future  -     X-Ray Chest PA And Lateral; Future    Right hip pain  Osteoarthritis of right hip, unspecified osteoarthritis type  Pt with pain and increased difficulty with ambulation     hypothyroidsm eythyroid remains on synthroid     Obesity pt has been working on getting her weight down over time     Type 2 diabetes mellitus without complication, without long-term current use of insulin  Pt has been working on dietary meaures and weight loss  Perioperative sliding scale insulin as needed   She is following with her pcp    Recent hazy urine she will complete her antibiotic as planned  Currently asympomatic      Pt asymptomatic from cv standpoint   Clear for anesthesia and orthopedic surgery as planned   Recent labs ekg and chest xray reviewed  She will hold her asprin, nsaids prior to procedure as planned   Perioperative dvt precuations     She will continue to follow up with her pcp  postoperatively as recommended

## 2018-10-17 ENCOUNTER — HOSPITAL ENCOUNTER (OUTPATIENT)
Dept: PREADMISSION TESTING | Facility: OTHER | Age: 58
Discharge: HOME | End: 2018-10-17
Attending: ORTHOPAEDIC SURGERY
Payer: MEDICAID

## 2018-10-17 LAB
BACTERIA #/AREA URNS HPF: ABNORMAL /HPF
BILIRUB UR QL STRIP: NEGATIVE
CLARITY UR: CLEAR
COLOR UR: YELLOW
GLUCOSE UR QL STRIP: NEGATIVE
HGB UR QL STRIP: NEGATIVE
HYALINE CASTS #/AREA URNS LPF: 1 /LPF
KETONES UR QL STRIP: NEGATIVE
LEUKOCYTE ESTERASE UR QL STRIP: NEGATIVE
MICROSCOPIC COMMENT: ABNORMAL
NITRITE UR QL STRIP: NEGATIVE
PH UR STRIP: 6 [PH] (ref 5–8)
PROT UR QL STRIP: ABNORMAL
RBC #/AREA URNS HPF: 4 /HPF (ref 0–4)
SP GR UR STRIP: 1.02 (ref 1–1.03)
SQUAMOUS #/AREA URNS HPF: 8 /HPF
URN SPEC COLLECT METH UR: ABNORMAL
UROBILINOGEN UR STRIP-ACNC: 1 EU/DL
WBC #/AREA URNS HPF: 5 /HPF (ref 0–5)

## 2018-10-17 PROCEDURE — 81000 URINALYSIS NONAUTO W/SCOPE: CPT

## 2018-10-18 ENCOUNTER — ANESTHESIA (OUTPATIENT)
Dept: SURGERY | Facility: OTHER | Age: 58
DRG: 470 | End: 2018-10-18
Payer: OTHER MISCELLANEOUS

## 2018-10-18 ENCOUNTER — HOSPITAL ENCOUNTER (INPATIENT)
Facility: OTHER | Age: 58
LOS: 1 days | Discharge: HOME HEALTH | DRG: 470 | End: 2018-10-19
Attending: ORTHOPAEDIC SURGERY | Admitting: ORTHOPAEDIC SURGERY
Payer: OTHER MISCELLANEOUS

## 2018-10-18 DIAGNOSIS — M43.16 SPONDYLOLISTHESIS AT L3-L4 LEVEL: ICD-10-CM

## 2018-10-18 DIAGNOSIS — R26.81 GAIT INSTABILITY: Primary | ICD-10-CM

## 2018-10-18 DIAGNOSIS — M16.10 HIP ARTHRITIS: ICD-10-CM

## 2018-10-18 DIAGNOSIS — M51.36 DDD (DEGENERATIVE DISC DISEASE), LUMBAR: ICD-10-CM

## 2018-10-18 DIAGNOSIS — M25.551 PAIN OF RIGHT HIP JOINT: ICD-10-CM

## 2018-10-18 LAB — POCT GLUCOSE: 136 MG/DL (ref 70–110)

## 2018-10-18 PROCEDURE — 63600175 PHARM REV CODE 636 W HCPCS: Performed by: ORTHOPAEDIC SURGERY

## 2018-10-18 PROCEDURE — 25000003 PHARM REV CODE 250: Performed by: ORTHOPAEDIC SURGERY

## 2018-10-18 PROCEDURE — 63600175 PHARM REV CODE 636 W HCPCS: Performed by: NURSE ANESTHETIST, CERTIFIED REGISTERED

## 2018-10-18 PROCEDURE — C1776 JOINT DEVICE (IMPLANTABLE): HCPCS | Performed by: ORTHOPAEDIC SURGERY

## 2018-10-18 PROCEDURE — 82962 GLUCOSE BLOOD TEST: CPT | Performed by: ORTHOPAEDIC SURGERY

## 2018-10-18 PROCEDURE — 94799 UNLISTED PULMONARY SVC/PX: CPT

## 2018-10-18 PROCEDURE — 27000221 HC OXYGEN, UP TO 24 HOURS

## 2018-10-18 PROCEDURE — 25000003 PHARM REV CODE 250: Performed by: NURSE PRACTITIONER

## 2018-10-18 PROCEDURE — 36000711: Performed by: ORTHOPAEDIC SURGERY

## 2018-10-18 PROCEDURE — 25000003 PHARM REV CODE 250: Performed by: ANESTHESIOLOGY

## 2018-10-18 PROCEDURE — 71000039 HC RECOVERY, EACH ADD'L HOUR: Performed by: ORTHOPAEDIC SURGERY

## 2018-10-18 PROCEDURE — 97530 THERAPEUTIC ACTIVITIES: CPT

## 2018-10-18 PROCEDURE — 11000001 HC ACUTE MED/SURG PRIVATE ROOM

## 2018-10-18 PROCEDURE — 0SR90JA REPLACEMENT OF RIGHT HIP JOINT WITH SYNTHETIC SUBSTITUTE, UNCEMENTED, OPEN APPROACH: ICD-10-PCS | Performed by: ORTHOPAEDIC SURGERY

## 2018-10-18 PROCEDURE — 37000009 HC ANESTHESIA EA ADD 15 MINS: Performed by: ORTHOPAEDIC SURGERY

## 2018-10-18 PROCEDURE — C9290 INJ, BUPIVACAINE LIPOSOME: HCPCS | Performed by: ORTHOPAEDIC SURGERY

## 2018-10-18 PROCEDURE — 25000003 PHARM REV CODE 250: Performed by: NURSE ANESTHETIST, CERTIFIED REGISTERED

## 2018-10-18 PROCEDURE — 88311 DECALCIFY TISSUE: CPT | Mod: 26,,, | Performed by: PATHOLOGY

## 2018-10-18 PROCEDURE — 36000710: Performed by: ORTHOPAEDIC SURGERY

## 2018-10-18 PROCEDURE — 27201423 OPTIME MED/SURG SUP & DEVICES STERILE SUPPLY: Performed by: ORTHOPAEDIC SURGERY

## 2018-10-18 PROCEDURE — G8979 MOBILITY GOAL STATUS: HCPCS | Mod: CI

## 2018-10-18 PROCEDURE — 97116 GAIT TRAINING THERAPY: CPT

## 2018-10-18 PROCEDURE — G8978 MOBILITY CURRENT STATUS: HCPCS | Mod: CK

## 2018-10-18 PROCEDURE — 71000033 HC RECOVERY, INTIAL HOUR: Performed by: ORTHOPAEDIC SURGERY

## 2018-10-18 PROCEDURE — 88304 TISSUE EXAM BY PATHOLOGIST: CPT | Mod: 26,,, | Performed by: PATHOLOGY

## 2018-10-18 PROCEDURE — 99900035 HC TECH TIME PER 15 MIN (STAT)

## 2018-10-18 PROCEDURE — 94761 N-INVAS EAR/PLS OXIMETRY MLT: CPT

## 2018-10-18 PROCEDURE — 97110 THERAPEUTIC EXERCISES: CPT

## 2018-10-18 PROCEDURE — 88311 DECALCIFY TISSUE: CPT | Performed by: PATHOLOGY

## 2018-10-18 PROCEDURE — 63600175 PHARM REV CODE 636 W HCPCS: Performed by: ANESTHESIOLOGY

## 2018-10-18 PROCEDURE — 97161 PT EVAL LOW COMPLEX 20 MIN: CPT

## 2018-10-18 PROCEDURE — 37000008 HC ANESTHESIA 1ST 15 MINUTES: Performed by: ORTHOPAEDIC SURGERY

## 2018-10-18 PROCEDURE — G8980 MOBILITY D/C STATUS: HCPCS | Mod: CI

## 2018-10-18 DEVICE — IMPLANTABLE DEVICE
Type: IMPLANTABLE DEVICE | Site: HIP | Status: FUNCTIONAL
Brand: BIOLOX DELTA OPTION HIP SYSTEM

## 2018-10-18 DEVICE — IMPLANTABLE DEVICE
Type: IMPLANTABLE DEVICE | Site: HIP | Status: FUNCTIONAL
Brand: BIOLOX OPTION HIP SYSTEM

## 2018-10-18 DEVICE — IMPLANTABLE DEVICE: Type: IMPLANTABLE DEVICE | Site: HIP | Status: FUNCTIONAL

## 2018-10-18 DEVICE — IMPLANTABLE DEVICE
Type: IMPLANTABLE DEVICE | Site: HIP | Status: FUNCTIONAL
Brand: G7 OSSEOTI ACETABULAR SHELL

## 2018-10-18 RX ORDER — MORPHINE SULFATE 4 MG/ML
4 INJECTION, SOLUTION INTRAMUSCULAR; INTRAVENOUS
Status: DISCONTINUED | OUTPATIENT
Start: 2018-10-18 | End: 2018-10-19 | Stop reason: HOSPADM

## 2018-10-18 RX ORDER — MEPERIDINE HYDROCHLORIDE 50 MG/ML
12.5 INJECTION INTRAMUSCULAR; INTRAVENOUS; SUBCUTANEOUS ONCE AS NEEDED
Status: DISCONTINUED | OUTPATIENT
Start: 2018-10-18 | End: 2018-10-18 | Stop reason: HOSPADM

## 2018-10-18 RX ORDER — LIDOCAINE HYDROCHLORIDE 10 MG/ML
0.5 INJECTION, SOLUTION EPIDURAL; INFILTRATION; INTRACAUDAL; PERINEURAL ONCE
Status: DISCONTINUED | OUTPATIENT
Start: 2018-10-18 | End: 2018-10-18 | Stop reason: HOSPADM

## 2018-10-18 RX ORDER — HYDROMORPHONE HYDROCHLORIDE 2 MG/ML
0.4 INJECTION, SOLUTION INTRAMUSCULAR; INTRAVENOUS; SUBCUTANEOUS EVERY 5 MIN PRN
Status: DISCONTINUED | OUTPATIENT
Start: 2018-10-18 | End: 2018-10-18 | Stop reason: HOSPADM

## 2018-10-18 RX ORDER — OXYCODONE HYDROCHLORIDE 5 MG/1
5 TABLET ORAL EVERY 4 HOURS PRN
Status: DISCONTINUED | OUTPATIENT
Start: 2018-10-18 | End: 2018-10-19 | Stop reason: HOSPADM

## 2018-10-18 RX ORDER — POLYETHYLENE GLYCOL 3350 17 G/17G
17 POWDER, FOR SOLUTION ORAL DAILY
Status: DISCONTINUED | OUTPATIENT
Start: 2018-10-18 | End: 2018-10-19 | Stop reason: HOSPADM

## 2018-10-18 RX ORDER — SODIUM CHLORIDE 0.9 % (FLUSH) 0.9 %
3 SYRINGE (ML) INJECTION
Status: ACTIVE | OUTPATIENT
Start: 2018-10-18

## 2018-10-18 RX ORDER — SODIUM CHLORIDE, SODIUM LACTATE, POTASSIUM CHLORIDE, CALCIUM CHLORIDE 600; 310; 30; 20 MG/100ML; MG/100ML; MG/100ML; MG/100ML
INJECTION, SOLUTION INTRAVENOUS CONTINUOUS
Status: DISCONTINUED | OUTPATIENT
Start: 2018-10-18 | End: 2018-10-18

## 2018-10-18 RX ORDER — FENTANYL CITRATE 50 UG/ML
25 INJECTION, SOLUTION INTRAMUSCULAR; INTRAVENOUS EVERY 5 MIN PRN
Status: DISCONTINUED | OUTPATIENT
Start: 2018-10-18 | End: 2018-10-18 | Stop reason: HOSPADM

## 2018-10-18 RX ORDER — MUPIROCIN 20 MG/G
OINTMENT TOPICAL
Status: DISCONTINUED | OUTPATIENT
Start: 2018-10-18 | End: 2018-10-18

## 2018-10-18 RX ORDER — DEXTROSE MONOHYDRATE AND SODIUM CHLORIDE 5; .9 G/100ML; G/100ML
INJECTION, SOLUTION INTRAVENOUS CONTINUOUS
Status: DISCONTINUED | OUTPATIENT
Start: 2018-10-18 | End: 2018-10-19 | Stop reason: HOSPADM

## 2018-10-18 RX ORDER — NIFEDIPINE 30 MG/1
90 TABLET, EXTENDED RELEASE ORAL DAILY
Status: DISCONTINUED | OUTPATIENT
Start: 2018-10-18 | End: 2018-10-19 | Stop reason: HOSPADM

## 2018-10-18 RX ORDER — PROPOFOL 10 MG/ML
VIAL (ML) INTRAVENOUS
Status: DISCONTINUED | OUTPATIENT
Start: 2018-10-18 | End: 2018-10-18

## 2018-10-18 RX ORDER — ACETAMINOPHEN 10 MG/ML
INJECTION, SOLUTION INTRAVENOUS
Status: DISCONTINUED | OUTPATIENT
Start: 2018-10-18 | End: 2018-10-18

## 2018-10-18 RX ORDER — ROPIVACAINE HYDROCHLORIDE 5 MG/ML
INJECTION, SOLUTION EPIDURAL; INFILTRATION; PERINEURAL
Status: COMPLETED | OUTPATIENT
Start: 2018-10-18 | End: 2018-10-18

## 2018-10-18 RX ORDER — FAMOTIDINE 20 MG/1
20 TABLET, FILM COATED ORAL 2 TIMES DAILY
Status: DISCONTINUED | OUTPATIENT
Start: 2018-10-18 | End: 2018-10-19 | Stop reason: HOSPADM

## 2018-10-18 RX ORDER — DIAZEPAM 5 MG/1
5 TABLET ORAL EVERY 12 HOURS PRN
Status: DISCONTINUED | OUTPATIENT
Start: 2018-10-18 | End: 2018-10-19 | Stop reason: HOSPADM

## 2018-10-18 RX ORDER — TRANEXAMIC ACID 100 MG/ML
INJECTION, SOLUTION INTRAVENOUS
Status: DISCONTINUED | OUTPATIENT
Start: 2018-10-18 | End: 2018-10-18 | Stop reason: HOSPADM

## 2018-10-18 RX ORDER — SODIUM CHLORIDE 0.9 % (FLUSH) 0.9 %
3 SYRINGE (ML) INJECTION
Status: DISCONTINUED | OUTPATIENT
Start: 2018-10-18 | End: 2018-10-19 | Stop reason: HOSPADM

## 2018-10-18 RX ORDER — DOCUSATE SODIUM 100 MG/1
100 CAPSULE, LIQUID FILLED ORAL 3 TIMES DAILY PRN
Status: DISCONTINUED | OUTPATIENT
Start: 2018-10-18 | End: 2018-10-19 | Stop reason: HOSPADM

## 2018-10-18 RX ORDER — LEVOTHYROXINE SODIUM 125 UG/1
125 TABLET ORAL DAILY
Status: DISCONTINUED | OUTPATIENT
Start: 2018-10-18 | End: 2018-10-19 | Stop reason: HOSPADM

## 2018-10-18 RX ORDER — MIDAZOLAM HYDROCHLORIDE 1 MG/ML
INJECTION INTRAMUSCULAR; INTRAVENOUS
Status: DISCONTINUED | OUTPATIENT
Start: 2018-10-18 | End: 2018-10-18

## 2018-10-18 RX ORDER — PROPOFOL 10 MG/ML
VIAL (ML) INTRAVENOUS CONTINUOUS PRN
Status: DISCONTINUED | OUTPATIENT
Start: 2018-10-18 | End: 2018-10-18

## 2018-10-18 RX ORDER — CARVEDILOL 3.12 MG/1
3.12 TABLET ORAL 2 TIMES DAILY
Status: DISCONTINUED | OUTPATIENT
Start: 2018-10-18 | End: 2018-10-19 | Stop reason: HOSPADM

## 2018-10-18 RX ORDER — ONDANSETRON 8 MG/1
8 TABLET, ORALLY DISINTEGRATING ORAL EVERY 8 HOURS PRN
Status: DISCONTINUED | OUTPATIENT
Start: 2018-10-18 | End: 2018-10-19 | Stop reason: HOSPADM

## 2018-10-18 RX ORDER — VENLAFAXINE HYDROCHLORIDE 75 MG/1
75 CAPSULE, EXTENDED RELEASE ORAL DAILY
Status: DISCONTINUED | OUTPATIENT
Start: 2018-10-18 | End: 2018-10-19 | Stop reason: HOSPADM

## 2018-10-18 RX ORDER — CEFAZOLIN SODIUM 1 G/3ML
2 INJECTION, POWDER, FOR SOLUTION INTRAMUSCULAR; INTRAVENOUS
Status: COMPLETED | OUTPATIENT
Start: 2018-10-18 | End: 2018-10-18

## 2018-10-18 RX ORDER — CYCLOBENZAPRINE HCL 5 MG
10 TABLET ORAL 3 TIMES DAILY PRN
Status: DISCONTINUED | OUTPATIENT
Start: 2018-10-18 | End: 2018-10-19 | Stop reason: HOSPADM

## 2018-10-18 RX ORDER — EPHEDRINE SULFATE 50 MG/ML
INJECTION, SOLUTION INTRAVENOUS
Status: DISCONTINUED | OUTPATIENT
Start: 2018-10-18 | End: 2018-10-18

## 2018-10-18 RX ORDER — BUPIVACAINE HCL/EPINEPHRINE 0.25-.0005
VIAL (ML) INJECTION
Status: DISCONTINUED | OUTPATIENT
Start: 2018-10-18 | End: 2018-10-18 | Stop reason: HOSPADM

## 2018-10-18 RX ORDER — CEFAZOLIN SODIUM 2 G/50ML
2 SOLUTION INTRAVENOUS
Status: COMPLETED | OUTPATIENT
Start: 2018-10-18 | End: 2018-10-19

## 2018-10-18 RX ORDER — LIDOCAINE HCL/PF 100 MG/5ML
SYRINGE (ML) INTRAVENOUS
Status: DISCONTINUED | OUTPATIENT
Start: 2018-10-18 | End: 2018-10-18

## 2018-10-18 RX ORDER — RAMELTEON 8 MG/1
8 TABLET ORAL NIGHTLY PRN
Status: DISCONTINUED | OUTPATIENT
Start: 2018-10-18 | End: 2018-10-19 | Stop reason: HOSPADM

## 2018-10-18 RX ORDER — ONDANSETRON 2 MG/ML
4 INJECTION INTRAMUSCULAR; INTRAVENOUS DAILY PRN
Status: DISCONTINUED | OUTPATIENT
Start: 2018-10-18 | End: 2018-10-18 | Stop reason: HOSPADM

## 2018-10-18 RX ORDER — FAMOTIDINE 20 MG/1
20 TABLET, FILM COATED ORAL
Status: COMPLETED | OUTPATIENT
Start: 2018-10-18 | End: 2018-10-18

## 2018-10-18 RX ORDER — NAPROXEN SODIUM 220 MG/1
81 TABLET, FILM COATED ORAL 2 TIMES DAILY
Status: DISCONTINUED | OUTPATIENT
Start: 2018-10-18 | End: 2018-10-19 | Stop reason: HOSPADM

## 2018-10-18 RX ORDER — PHENYLEPHRINE HYDROCHLORIDE 10 MG/ML
INJECTION INTRAVENOUS
Status: DISCONTINUED | OUTPATIENT
Start: 2018-10-18 | End: 2018-10-18

## 2018-10-18 RX ORDER — HYDROCODONE BITARTRATE AND ACETAMINOPHEN 10; 325 MG/1; MG/1
1 TABLET ORAL EVERY 4 HOURS PRN
Status: DISCONTINUED | OUTPATIENT
Start: 2018-10-18 | End: 2018-10-19

## 2018-10-18 RX ORDER — SODIUM CHLORIDE 0.9 % (FLUSH) 0.9 %
5 SYRINGE (ML) INJECTION
Status: DISCONTINUED | OUTPATIENT
Start: 2018-10-18 | End: 2018-10-19 | Stop reason: HOSPADM

## 2018-10-18 RX ORDER — OXYCODONE HYDROCHLORIDE 5 MG/1
5 TABLET ORAL
Status: DISCONTINUED | OUTPATIENT
Start: 2018-10-18 | End: 2018-10-18 | Stop reason: HOSPADM

## 2018-10-18 RX ADMIN — HYDROCODONE BITARTRATE AND ACETAMINOPHEN 1 TABLET: 10; 325 TABLET ORAL at 04:10

## 2018-10-18 RX ADMIN — FAMOTIDINE 20 MG: 20 TABLET ORAL at 10:10

## 2018-10-18 RX ADMIN — OXYCODONE HYDROCHLORIDE 5 MG: 5 TABLET ORAL at 07:10

## 2018-10-18 RX ADMIN — VENLAFAXINE HYDROCHLORIDE 75 MG: 75 CAPSULE, EXTENDED RELEASE ORAL at 10:10

## 2018-10-18 RX ADMIN — SODIUM CHLORIDE, SODIUM LACTATE, POTASSIUM CHLORIDE, AND CALCIUM CHLORIDE: 600; 310; 30; 20 INJECTION, SOLUTION INTRAVENOUS at 06:10

## 2018-10-18 RX ADMIN — FAMOTIDINE 20 MG: 20 TABLET ORAL at 05:10

## 2018-10-18 RX ADMIN — MORPHINE SULFATE 4 MG: 4 INJECTION, SOLUTION INTRAMUSCULAR; INTRAVENOUS at 08:10

## 2018-10-18 RX ADMIN — MORPHINE SULFATE 4 MG: 4 INJECTION, SOLUTION INTRAMUSCULAR; INTRAVENOUS at 06:10

## 2018-10-18 RX ADMIN — CEFAZOLIN SODIUM 2 G: 2 SOLUTION INTRAVENOUS at 03:10

## 2018-10-18 RX ADMIN — CARVEDILOL 3.12 MG: 3.12 TABLET, FILM COATED ORAL at 08:10

## 2018-10-18 RX ADMIN — RAMELTEON 8 MG: 8 TABLET, FILM COATED ORAL at 11:10

## 2018-10-18 RX ADMIN — MIDAZOLAM HYDROCHLORIDE 2 MG: 1 INJECTION, SOLUTION INTRAMUSCULAR; INTRAVENOUS at 06:10

## 2018-10-18 RX ADMIN — EPHEDRINE SULFATE 10 MG: 50 INJECTION INTRAMUSCULAR; INTRAVENOUS; SUBCUTANEOUS at 07:10

## 2018-10-18 RX ADMIN — ACETAMINOPHEN 1000 MG: 10 INJECTION, SOLUTION INTRAVENOUS at 07:10

## 2018-10-18 RX ADMIN — MORPHINE SULFATE 4 MG: 4 INJECTION, SOLUTION INTRAMUSCULAR; INTRAVENOUS at 01:10

## 2018-10-18 RX ADMIN — CEFAZOLIN SODIUM 2 G: 2 SOLUTION INTRAVENOUS at 10:10

## 2018-10-18 RX ADMIN — CEFAZOLIN 1 G: 330 INJECTION, POWDER, FOR SOLUTION INTRAMUSCULAR; INTRAVENOUS at 07:10

## 2018-10-18 RX ADMIN — POLYETHYLENE GLYCOL 3350 17 G: 17 POWDER, FOR SOLUTION ORAL at 10:10

## 2018-10-18 RX ADMIN — MUPIROCIN: 20 OINTMENT TOPICAL at 05:10

## 2018-10-18 RX ADMIN — ASPIRIN 81 MG CHEWABLE TABLET 81 MG: 81 TABLET CHEWABLE at 08:10

## 2018-10-18 RX ADMIN — OXYCODONE HYDROCHLORIDE 5 MG: 5 TABLET ORAL at 09:10

## 2018-10-18 RX ADMIN — LIDOCAINE HYDROCHLORIDE 100 MG: 20 INJECTION, SOLUTION INTRAVENOUS at 08:10

## 2018-10-18 RX ADMIN — FAMOTIDINE 20 MG: 20 TABLET ORAL at 08:10

## 2018-10-18 RX ADMIN — ASPIRIN 81 MG CHEWABLE TABLET 81 MG: 81 TABLET CHEWABLE at 10:10

## 2018-10-18 RX ADMIN — CYCLOBENZAPRINE HYDROCHLORIDE 10 MG: 5 TABLET, FILM COATED ORAL at 03:10

## 2018-10-18 RX ADMIN — ROPIVACAINE HYDROCHLORIDE 3 ML: 5 INJECTION, SOLUTION EPIDURAL; INFILTRATION; PERINEURAL at 06:10

## 2018-10-18 RX ADMIN — PROPOFOL 50 MCG/KG/MIN: 10 INJECTION, EMULSION INTRAVENOUS at 07:10

## 2018-10-18 RX ADMIN — DEXTROSE AND SODIUM CHLORIDE: 5; .9 INJECTION, SOLUTION INTRAVENOUS at 10:10

## 2018-10-18 RX ADMIN — OXYCODONE HYDROCHLORIDE 5 MG: 5 TABLET ORAL at 11:10

## 2018-10-18 RX ADMIN — DIAZEPAM 5 MG: 5 TABLET ORAL at 07:10

## 2018-10-18 RX ADMIN — PHENYLEPHRINE HYDROCHLORIDE 200 MCG: 10 INJECTION INTRAVENOUS at 07:10

## 2018-10-18 RX ADMIN — PROPOFOL 20 MG: 10 INJECTION, EMULSION INTRAVENOUS at 07:10

## 2018-10-18 RX ADMIN — PHENYLEPHRINE HYDROCHLORIDE 200 MCG: 10 INJECTION INTRAVENOUS at 08:10

## 2018-10-18 RX ADMIN — DOCUSATE SODIUM 100 MG: 100 CAPSULE, LIQUID FILLED ORAL at 11:10

## 2018-10-18 RX ADMIN — OXYCODONE HYDROCHLORIDE 5 MG: 5 TABLET ORAL at 12:10

## 2018-10-18 RX ADMIN — HYDROCODONE BITARTRATE AND ACETAMINOPHEN 1 TABLET: 10; 325 TABLET ORAL at 08:10

## 2018-10-18 NOTE — PROGRESS NOTES
Ana from [employer]/worker's comp, approved RW and BSC from [facility] and will send 1010 form and directed SW to send HH orders to HH agency.  Ana also would like VAISHALI to complete 1010 Form and send form, along with clilnicals to [phone], but doesn't want to delay discharge and can send HH order to HH agency and will approve the initial eval for HH over phone.    VAISHALI sent HH orders to [facility] via [facility].

## 2018-10-18 NOTE — PT/OT/SLP PROGRESS
Occupational Therapy  Not Seen    Tanika Wayne   MRN: [id number]     Patient not seen for Occupational Therapy today due to ( ) departmental protocol for elective surgery patients.    Patient with Primary osteoarthritis of right hip [M16.11], s/p Procedure(s):  ARTHROPLASTY, HIP 10/18/2018 who will be seen for Occupational Therapy evaluation POD#1.    FELA Holt   10/18/2018

## 2018-10-18 NOTE — OP NOTE
DATE OF PROCEDURE:  10/18/2018.    CHIEF COMPLAINT AND PRESENT ILLNESS:  A 58-year-old multiple arthritic problems,   most severe is right hip.  She had injured it while back with a fall and   arthritis just progressed to where she is barely ambulate, consequently elected   for right hip replacement, fully understands risks and benefits of surgery.    Urinalysis was abnormal.  She was treated with some Macrobid.  We checked the   urinalysis again yesterday and it cleaned up nicely.  Significant risks   discussed at length, especially with her morbid obesity, but she had lost 50   pounds to do the surgery and I was proud of her.    PREOPERATIVE DIAGNOSIS:  Osteoarthritis, right hip.    POSTOPERATIVE DIAGNOSIS:  Osteoarthritis, right hip.    PROCEDURE:  Right total hip replacement using Biomet Taperloc instrumentation.    A 6 standard stem, a -6, 28 ceramic head and neck and a 54 G7 OsseoTi sticky cup   all pressfit with a 28 liner traditional.    SURGEON:  Baldomero Fischer M.D.    ASSISTANT:  Mari Marx CST.    COMPLICATIONS:  None.    ANESTHESIA:  Spinal anesthesia.    BLOOD LOSS:  About 200 mL    PROCEDURE IN DETAIL:  The patient was brought to the Operating Room and   underwent spinal anesthesia without difficulty, placed in supine position and   the right hip was prepped and draped in the usual sterile fashion.  Using   posteriorly curved lateral incision, sharp dissection made down to IT band and   gluteus holly fascia.  This was split in the usual fashion and Linares   approach was performed by splitting the one-third gluteus medius and vas   lateralis off the femur.  The hip was dislocated atraumatically.  A standard   neck cut was performed.  Careful placement of the acetabular retractor, the   foveal contents were removed.  Progressive reaming up to a 49, which had   excellent central and peripheral fixation.  A 50 cup was then placed 10 degrees   of anteversion, 40 degrees of horizontal.  Excellent  fixation obtained.  A 28   liner placed.  Attention then turned to the femur.  With the cookie cutter,   straight reaming and broaching up to a 6, bit real well, somewhat small, but see   small bone, but no subsidence, excellent fixation.  So we did some trials off   of that with a -6 standard was the best configuration, little difficult to   assess her leg lengths because her thighs are so big as hard to get her ankles   together along with some valgus deformity of her knees, but it looked to be   matched up well.  The appropriate components opened and placed atraumatically.    Final reduction, again excellent range of motion and stability, leg lengths   equal.  Orthocord was used on the abductor musculature at the trochanter, #1   Vicryl on the rest of the abductor musculature and vas lateralis, #1 Vicryl in   the IT band and gluteus max fascia, #1 Vicryl in deep fatty layer, 2-0 Vicryl   subcutaneously, staples on the skin.  Exparel, tranexamic acid and Marcaine were   instilled in the soft tissues.  She was placed in a bulky sterile dressing and   brought to Recovery Room in stable fashion.        /petr [id number] holly(s)        DAMIAN/JOLANTA  dd: 10/18/2018 08:19:37 (CDT)  td: 10/18/2018 08:45:33 (CDT)  Doc ID   #[id number]  Job ID #[id number]    CC:

## 2018-10-18 NOTE — PT/OT/SLP PROGRESS
Physical Therapy Treatment    Patient Name:  Tanika Wayne   MRN:  [id number]    Recommendations:     Discharge Recommendations:  home with home health, home health PT, home health OT   Discharge Equipment Recommendations: walker, rolling(Jr. bariatric RW; other equpiment per OT discretion )   Barriers to discharge: Inaccessible home    Assessment:     Tanika Wayne is a 58 y.o. female admitted with a medical diagnosis of Hip arthritis.  She presents with the following impairments/functional limitations:  weakness, impaired endurance, impaired self care skills, impaired functional mobilty, gait instability, impaired balance, decreased lower extremity function, pain, decreased ROM, impaired skin, orthopedic precautions.    Rehab Prognosis:  Good; patient would benefit from acute skilled PT services to address these deficits and reach maximum level of function.      Patient in increased pain for PM session minimally controlled by PO and IV medication. Patient became tearful sitting at EOB but insisted on ambulating for therapy. While highly motivated to participate in therapy, patient limited in progression of gait distance and independence with mobility 2/2 pain.     Recent Surgery: Procedure(s) (LRB):  ARTHROPLASTY, HIP (Right) Day of Surgery    Plan:     During this hospitalization, patient to be seen BID to address the above listed problems via gait training, therapeutic activities, therapeutic exercises  · Plan of Care Expires:  10/25/18   Plan of Care Reviewed with: patient    Subjective     Communicated with PANCHITO Klein prior to session.  Patient found supine upon PT entry to room, agreeable to treatment.      Chief Complaint: Increased pain in groin  Patient comments/goals: To have less pain  Pain/Comfort:  · Pain Rating 1: 7/10  · Location - Side 1: Right  · Location 1: groin  · Pain Addressed 1: Pre-medicate for activity, Distraction, Cessation of Activity, Nurse notified  · Pain Rating  Post-Intervention 1: 7/10    Patients cultural, spiritual, Tenriism conflicts given the current situation: None per EMR    Objective:     Patient found with: patel catheter, oxygen, peripheral IV     General Precautions: Standard, fall   Orthopedic Precautions:RLE weight bearing as tolerated   Braces: N/A     Functional Mobility:  · Bed Mobility:     · Supine to Sit: minimum assistance at RLE  · Sit to Supine: minimum assistance at RLE  · Transfers:     · Sit to Stand:  minimum assistance with rolling walker  · Gait: 1x2 ft forward/backward at bedside with RW and CGA, 1x25 ft in room with RW and CGA, HR increased from 115 sitting to 134 during gait bout and SpO2 decreased to 80% without pt reporting becoming sympotmatic, with decreased stride length of L and decreased gait speed      AM-PAC 6 CLICK MOBILITY  Turning over in bed (including adjusting bedclothes, sheets and blankets)?: 3  Sitting down on and standing up from a chair with arms (e.g., wheelchair, bedside commode, etc.): 3  Moving from lying on back to sitting on the side of the bed?: 3  Moving to and from a bed to a chair (including a wheelchair)?: 3  Need to walk in hospital room?: 3  Climbing 3-5 steps with a railing?: 2  Basic Mobility Total Score: 17       Therapeutic Activities and Exercises:   x10 Ankle pumps, SLR, SAQs, quad sets, glute sets    Patient left supine with all lines intact, call button in reach and RN Latoya notified..    GOALS:   Multidisciplinary Problems     Physical Therapy Goals        Problem: Physical Therapy Goal    Goal Priority Disciplines Outcome Goal Variances Interventions   Physical Therapy Goal     PT, PT/OT Ongoing (interventions implemented as appropriate)     Description:  Goals to be met by: 10/25/18     Patient will increase functional independence with mobility by performin. Supine to sit with supervision.   2. Sit to supine with supervision.   3. Sit<>stand transfer with supervision using rolling  walker.   4. Gait > 150 feet with SBA using rolling walker.   5. Ascend/descend 4 stairs with R handrails with CGA with or without AD.                    Time Tracking:     PT Received On: 10/18/18  PT Start Time: 1458     PT Stop Time: 1528  PT Total Time (min): 30 min     Billable Minutes: Therapeutic Activity 17 and Therapeutic Exercise 13    Treatment Type: Treatment  PT/PTA: PT     PTA Visit Number: 0     Mariah Castro, PT  10/18/2018

## 2018-10-18 NOTE — ANESTHESIA POSTPROCEDURE EVALUATION
"Anesthesia Post Evaluation    Patient: Tanika Wayne    Procedure(s) Performed: Procedure(s) (LRB):  ARTHROPLASTY, HIP (Right)    Final Anesthesia Type: spinal  Patient location during evaluation: PACU  Patient participation: Yes- Able to Participate  Level of consciousness: awake and alert  Post-procedure vital signs: reviewed and stable  Pain management: adequate  Airway patency: patent  PONV status at discharge: No PONV  Anesthetic complications: no      Cardiovascular status: blood pressure returned to baseline  Respiratory status: unassisted and room air  Hydration status: euvolemic  Follow-up not needed.        Visit Vitals  /65 (BP Location: Left arm, Patient Position: Lying)   Pulse 94   Temp 36.4 °C (97.5 °F) (Oral)   Resp 16   Ht 5' 1" (1.549 m)   Wt 113.9 kg (251 lb)   SpO2 (!) 93%   Breastfeeding? No   BMI 47.43 kg/m²       Pain/Conor Score: Pain Assessment Performed: Yes (10/18/2018  8:23 AM)  Presence of Pain: denies (10/18/2018  8:23 AM)  Conor Score: 7 (10/18/2018  8:23 AM)        "

## 2018-10-18 NOTE — INTERVAL H&P NOTE
The patient has been examined and the H&P has been reviewed:    I concur with the findings and no changes have occurred since H&P was written.    Anesthesia/Surgery risks, benefits and alternative options discussed and understood by patient/family.          Active Hospital Problems    Diagnosis  POA    *Hip arthritis [M16.10]  Yes      Resolved Hospital Problems   No resolved problems to display.

## 2018-10-18 NOTE — PLAN OF CARE
SW verified PCP and uses [facility] on [address] and [address].  Pt's spouse will procvide transportation home.  Pt needs RW and BSC and HH.  Pt has worker comp but doesn't know name of  or phone #.     10/18/18 [id number]   Discharge Assessment   Assessment Type Discharge Planning Assessment   Confirmed/corrected address and phone number on facesheet? Yes   Assessment information obtained from? Patient   Communicated expected length of stay with patient/caregiver no   Prior to hospitilization cognitive status: Alert/Oriented   Prior to hospitalization functional status: Independent   Current cognitive status: Alert/Oriented   Current Functional Status: Assistive Equipment;Needs Assistance   Lives With spouse   Able to Return to Prior Arrangements yes   Is patient able to care for self after discharge? Unable to determine at this time (comments)   Readmission Within The Last 30 Days no previous admission in last 30 days   Patient currently being followed by outpatient case management? No   Patient currently receives any other outside agency services? No   Equipment Currently Used at Home none   Do you have any problems affording any of your prescribed medications? No   Is the patient taking medications as prescribed? yes   Does the patient have transportation home? Yes   Transportation Available family or friend will provide   Does the patient receive services at the [facility]madin Clinic? No   Discharge Plan A Home Health   Patient/Family In Agreement With Plan yes

## 2018-10-18 NOTE — ANESTHESIA PROCEDURE NOTES
Spinal    Diagnosis: Arthritis Right hip  Patient location during procedure: holding area  Timeout: 10/18/2018 6:40 AM  End time: 10/18/2018 6:54 AM  Staffing  Anesthesiologist: Fabian Palacios MD  Performed: anesthesiologist   Preanesthetic Checklist  Completed: patient identified, site marked, surgical consent, pre-op evaluation, timeout performed, IV checked, risks and benefits discussed and monitors and equipment checked  Spinal Block  Patient position: sitting  Prep: ChloraPrep  Patient monitoring: heart rate, cardiac monitor and continuous pulse ox  Location: L2-3  Injection technique: single shot  CSF Fluid: blood-tinged free-flowing CSF  Needle  Needle type: pencil-tip   Needle gauge: 22 G  Needle length: 3.5 in  Additional Documentation: incremental injection and no paresthesia on injection  Needle localization: anatomical landmarks

## 2018-10-18 NOTE — PLAN OF CARE
Problem: Physical Therapy Goal  Goal: Physical Therapy Goal  Goals to be met by: 10/25/18     Patient will increase functional independence with mobility by performin. Supine to sit with supervision.   2. Sit to supine with supervision.   3. Sit<>stand transfer with supervision using rolling walker.   4. Gait > 150 feet with SBA using rolling walker.   5. Ascend/descend 4 stairs with R handrails with CGA with or without AD.  Patient evaluated and goals established

## 2018-10-18 NOTE — PROGRESS NOTES
VAISHALI called Dr. Fischer's office [phone], spoke to Mary Kate and she gave Worker's Comp information:  Ana Ordaz,  [phone] and Tammy Nelson, nurse [phone]; fax # [phone] and [phone] Form fax [phone]; Claim # [id number] and date of injury 1/7/16.    SW called Ana and she stated she had to have nurse call SW regarding HH and DME.  VAISHALI stressed the need to expedite services because pt would be dc'g tomorrow.

## 2018-10-18 NOTE — PLAN OF CARE
Problem: Patient Care Overview  Goal: Plan of Care Review  Outcome: Ongoing (interventions implemented as appropriate)  Pt on 2L NC. Sats 99%. No distress noted. IS done with good effort. Will continue to monitor.

## 2018-10-18 NOTE — BRIEF OP NOTE
[facility]  Brief Operative Note    SUMMARY     Surgery Date: 10/18/2018     Surgeon(s) and Role:     * Baldomero Fischer MD - Primary    Assisting Surgeon: None    Pre-op Diagnosis:  Primary osteoarthritis of right hip [M16.11]    Post-op Diagnosis:  Post-Op Diagnosis Codes:     * Primary osteoarthritis of right hip [M16.11]    Procedure(s) (LRB):  ARTHROPLASTY, HIP (Right)    Anesthesia: Choice    Description of Procedure:  Right total hip replacement Biomet ceramic head    Description of the findings of the procedure: oa r    Estimated Blood Loss: * No values recorded between 10/18/2018  7:26 AM and 10/18/2018  8:15 AM *         Specimens:   Specimen (12h ago, onward)    Start     Ordered    10/18/18 0743  Specimen to Pathology - Surgery  Once     Comments:  1. Right femoral head     Start Status   10/18/18 0743 Collected (10/18/18 0750)       10/18/18 0743

## 2018-10-18 NOTE — TRANSFER OF CARE
"Anesthesia Transfer of Care Note    Patient: Tanika Wayne    Procedure(s) Performed: Procedure(s) (LRB):  ARTHROPLASTY, HIP (Right)    Patient location: PACU    Anesthesia Type: spinal    Transport from OR: Transported from OR on room air with adequate spontaneous ventilation    Post pain: adequate analgesia    Post assessment: no apparent anesthetic complications    Post vital signs: stable    Level of consciousness: responds to stimulation    Nausea/Vomiting: no nausea/vomiting    Complications: none    Transfer of care protocol was followed      Last vitals:   Visit Vitals  BP (!) 156/85 (BP Location: Left arm, Patient Position: Sitting)   Pulse 100   Temp 36.7 °C (98 °F) (Oral)   Resp 16   Ht 5' 1" (1.549 m)   Wt 113.9 kg (251 lb)   SpO2 95%   Breastfeeding? No   BMI 47.43 kg/m²     "

## 2018-10-18 NOTE — PLAN OF CARE
Problem: Physical Therapy Goal  Goal: Physical Therapy Goal  Goals to be met by: 10/25/18     Patient will increase functional independence with mobility by performin. Supine to sit with supervision.   2. Sit to supine with supervision.   3. Sit<>stand transfer with supervision using rolling walker.   4. Gait > 150 feet with SBA using rolling walker.   5. Ascend/descend 4 stairs with R handrails with CGA with or without AD.   Outcome: Ongoing (interventions implemented as appropriate)  Supine<>sit Drew  Gait x15 ft with RW and CGA  Limited d/t pain

## 2018-10-18 NOTE — PT/OT/SLP EVAL
Physical Therapy Evaluation    Patient Name:  Tanika Wayne   MRN:  [id number]    Recommendations:     Discharge Recommendations:  home with home health, home health PT, home health OT   Discharge Equipment Recommendations: walker, rolling(Jr. bariatric RW; other equpiment per OT discretion )   Barriers to discharge: Inaccessible home    Assessment:     Tanika Wayne is a 58 y.o. female admitted with a medical diagnosis of Hip arthritis.  She presents with the following impairments/functional limitations:  weakness, impaired endurance, impaired self care skills, impaired functional mobilty, gait instability, impaired balance, decreased lower extremity function, pain, decreased ROM, impaired skin, orthopedic precautions; the patient is limited in their ability to independently ambulate, transfer, and participate in their chosen activities.    Rehab Prognosis:  Good; patient would benefit from acute skilled PT services to address these deficits and reach maximum level of function.      Recent Surgery: Procedure(s) (LRB):  ARTHROPLASTY, HIP (Right) Day of Surgery    Plan:     During this hospitalization, patient to be seen   to address the above listed problems via gait training, therapeutic activities, therapeutic exercises  · Plan of Care Expires:  10/25/18   Plan of Care Reviewed with: patient    Subjective     Communicated with PANCHITO Klein prior to session.  Patient found supine upon PT entry to room, agreeable to evaluation.      Chief Complaint: Pain in R hip  Patient comments/goals: To do therapy to get better to go home  Pain/Comfort:  · Pain Rating 1: 2/10  · Location - Side 1: Right  · Location 1: hip  · Pain Addressed 1: Pre-medicate for activity, Distraction, Reposition  · Pain Rating Post-Intervention 1: 5/10    Patients cultural, spiritual, Religion conflicts given the current situation: None per EMR    Living Environment:  Patient resides in a one story shotgun house with 4 steps to enter with  one handrail on the right. She lives with her  and their 2 children (one 24, one 14). The bathroom is 2 rooms away from the patient's bedroom.    Prior to admission, patients level of function was Bro and she used a rollator for ambulation. Her  does the cooking d/t him being the better cook and she cleans the house. She previously was employed at [employer] but tripped on some carpeting and has not worked since 2/2 the pain and was limited in her ability to exercise 2/2 knee pain.Patient has the following equipment: rollator. Upon discharge, patient will have assistance from her  and children.    Objective:     Patient found with: SCD, peripheral IV, oxygen, patel catheter     General Precautions: Standard, fall   Orthopedic Precautions:RLE weight bearing as tolerated   Braces: N/A     Vitals:  Pre (supine): /57 HR 88 SpO2 97%  Patient denied sx of hypotension throughout session.      Exams:    · Cognition:   · Patient is oriented to name, , date, place, situation.  · Pt follows approximately 100% of one and two step commands.    · Mood: Pleasant and cooperative.   · Musculoskeletal:  · Posture: Protective guarding surgical joint  · LE ROM/Strength:   · LLE ROM/Strength WFL  · RLE ROM: pain limited hip flexion and other hip mvmt deferred, knee/ankle WFL  · RLE Strength: PF/DF WFL, Knee flexion 4/5, knee/hip extension assessed via SLR with noted extensor lag and unable to hold at 45 deg, hip ext assessed via functional movement to be WFL  · Neuromuscular:  · Sensation: Intact to light touch bilateral LEs. Pt denied paresthesias.   · Coordination/Tone/Reflexes: No impairments identified with functional mobility. No formal testing performed.   · Balance: CGA for dynamic standing with bilateral UE support.   · Visual-vestibular: No impairments identified with functional mobility. No formal testing performed.  · Integument:  Visible skin intact and surgical extremity dressing clean  and dry.   · Cardiopulmonary:  · Color/temperature/pulses: Slight orange color on RLE, temperature and pulses equal    Patient donned yellow socks and gait belt for OOB mobility.    Functional Mobility:  · Bed Mobility:     · Supine to Sit: minimum assistance, assist with RLE and pt utilized handrails for trunk support  · Transfers:     · Sit to Stand:  minimum assistance with rolling walker, after visual demonstration able to independently place hands in safe position  · Gait: x35 ft with RW and CGA, pt maintained step to pattern until end of gait bout 2/2 concerns of stepping too far for anterior precautions, maintained flexed neck to watch feet, decreased gait speed  · Balance:  · Patient maintains sitting balance >1 min with no UE support and BLE support    AM-PAC 6 CLICK MOBILITY  Total Score:17       Therapeutic Activities and Exercises:   Ankle pumps, supine<>sit, sit<>stand, gait, sitting balance    Patient left up in chair with all lines intact, call button in reach and RN Latoya notified.    GOALS:   Multidisciplinary Problems     Physical Therapy Goals        Problem: Physical Therapy Goal    Goal Priority Disciplines Outcome Goal Variances Interventions   Physical Therapy Goal     PT, PT/OT      Description:  Goals to be met by: 10/25/18     Patient will increase functional independence with mobility by performin. Supine to sit with supervision.   2. Sit to supine with supervision.   3. Sit<>stand transfer with supervision using rolling walker.   4. Gait > 150 feet with SBA using rolling walker.   5. Ascend/descend 4 stairs with R handrails with CGA with or without AD.                    History:     Past Medical History:   Diagnosis Date    Abnormal echocardiogram 2012    Arthritis     Breast cancer 1998    Breast cancer, right breast     Cardiomyopathy due to systemic disease     Cataract     Clotting disorder     Coronary artery disease     Diabetes mellitus     madison     Diabetes mellitus type II     DM (diabetes mellitus) 2012    Encounter for blood transfusion     HTN (hypertension) 2012    Hypertension     Increased glucose level 2012    ITP (idiopathic thrombocytopenic purpura) 2012    Kidney stones 2012    PONV (postoperative nausea and vomiting)     Stenosis of lumbosacral spine 2012    Thyroid disease     Tympanic membrane perforation 9/15/2014    right 2014 Dr. Jeffries       Past Surgical History:   Procedure Laterality Date    BREAST LUMPECTOMY Right 1998     SECTION      CHOLECYSTECTOMY      EYE SURGERY      strabismus, 2006    HYSTERECTOMY      L3-5 Laminectomy  2017    Dr. Mendoza    Left LAMINECTOMY-MINIMALLY INVASIVE L3-4 and L4-5 Right 2017    [facility]    SPLENECTOMY, TOTAL  2001    from ITP    STRABISMUS SURGERY  830.06    OU    THYROIDECTOMY  2016    THYROIDECTOMY N/A 2016    Performed by Anish Travis [facility] at [facility] OR       Clinical Decision Making:     History  Co-morbidities and personal factors that may impact the plan of care Examination  Body Structures and Functions, activity limitations and participation restrictions that may impact the plan of care Clinical Presentation   Decision Making/ Complexity Score   Co-morbidities:   [] Time since onset of injury / illness / exacerbation  [] Status of current condition  []Patient's cognitive status and safety concerns    [] Multiple Medical Problems (see med hx)  Personal Factors:   [] Patient's age  [] Prior Level of function   [] Patient's home situation (environment and family support)  [] Patient's level of motivation  [] Expected progression of patient      HISTORY:(criteria)    [] 22121 - no personal factors/history    [] 04331 - has 1-2 personal factor/comorbidity     [] 80592 - has >3 personal factor/comorbidity     Body Regions:  [] Objective examination findings  [] Head     []  Neck   [] Trunk   [] Upper Extremity  [] Lower Extremity    Body Systems:  [] For communication ability, affect, cognition, language, and learning style: the assessment of the ability to make needs known, consciousness, orientation (person, place, and time), expected emotional /behavioral responses, and learning preferences (eg, learning barriers, education  needs)  [] For the neuromuscular system: a general assessment of gross coordinated movement (eg, balance, gait, locomotion, transfers, and transitions) and motor function  (motor control and motor learning)  [] For the musculoskeletal system: the assessment of gross symmetry, gross range of motion, gross strength, height, and weight  [] For the integumentary system: the assessment of pliability(texture), presence of scar formation, skin color, and skin integrity  [] For cardiovascular/pulmonary system: the assessment of heart rate, respiratory rate, blood pressure, and edema     Activity limitations:    [] Patient's cognitive status and saf ety concerns          [] Status of current condition      [] Weight bearing restriction  [] Cardiopulmunary Restriction    Participation Restrictions:   [] Goals and goal agreement with the patient     [] Rehab potential (prognosis) and probable outcome      Examination of Body System: (criteria)    [] 26089 - addressing 1-2 elements    [] 12044 - addressing a total of 3 or more elements     [] 86938 -  Addressing a total of 4 or more elements         Clinical Presentation: (criteria)  Choose one     On examination of body system using standardized tests and measures patient presents with (CHOOSE ONE) elements from any of the following: body structures and functions, activity limitations, and/or participation restrictions.  Leading to a clinical presentation that is considered (CHOOSE ONE)                              Clinical Decision Making  (Eval Complexity):  Choose One     Time Tracking:     PT Received On: 10/18/18  PT Start  Time: 1130     PT Stop Time: 1205  PT Total Time (min): 35 min     Billable Minutes: Evaluation 18 and Gait Training 17      Mariah Castro, PT  10/18/2018

## 2018-10-18 NOTE — CONSULTS
Consult Note  IM    Consult Requested By: Baldomero Fischer MD  Reason for Consult: osteoarthritis, PONV, kidney stones, HTN, DM T2, CAD, cardiomyopathy, and breast CA    SUBJECTIVE:     History of Present Illness:   58 y.o. female presents with a scheduled right hip repair.  Epic and paper chart reviewed prior to eval.  Seen in room, preparing to work with PT. Denies CP,SOB,F,C,N or V.  Slightly elevated pulse (105 apical). Patient reports having some pain. Does not follow with cardiologist.  Does see pain management specialist for spinal stenosis.    Past Medical History:   Diagnosis Date    Abnormal echocardiogram 2012    Arthritis     Breast cancer 1998    Breast cancer, right breast     Cardiomyopathy due to systemic disease     Cataract     Clotting disorder     Coronary artery disease     Diabetes mellitus     bordeline    Diabetes mellitus type II     DM (diabetes mellitus) 2012    Encounter for blood transfusion     HTN (hypertension) 2012    Hypertension     Increased glucose level 2012    ITP (idiopathic thrombocytopenic purpura) 2012    Kidney stones 2012    PONV (postoperative nausea and vomiting)     Stenosis of lumbosacral spine 2012    Thyroid disease     Tympanic membrane perforation 9/15/2014    right 2014 Dr. Jeffries     Past Surgical History:   Procedure Laterality Date    BREAST LUMPECTOMY Right      SECTION      CHOLECYSTECTOMY      EYE SURGERY      strabismus,     HYSTERECTOMY      L3-5 Laminectomy  2017    Dr. Mendoza    Left LAMINECTOMY-MINIMALLY INVASIVE L3-4 and L4-5 Right 2017    [facility]    SPLENECTOMY, TOTAL  2001    from ITP    STRABISMUS SURGERY  8.30.06    OU    THYROIDECTOMY  2016    THYROIDECTOMY N/A 2016    [facility] by [facility] at [facility] OR     Family History   Problem Relation Age of Onset    Heart disease Mother 69        MI     Diabetes Mother     Hypertension Mother     Diabetes Father     Hypertension Father     Stroke Father     Diabetes Sister     Diabetes Brother      Social History     Tobacco Use    Smoking status: Never Smoker    Smokeless tobacco: Never Used   Substance Use Topics    Alcohol use: No    Drug use: No       Review of patient's allergies indicates:  No Known Allergies     Review of Systems:  Constitutional: No fever or chills  Respiratory: No cough or shortness of breath  Cardiovascular: No chest pain or palpitations  Gastrointestinal: No nausea or vomiting  Neurological: No confusion or weakness    OBJECTIVE:     Vital Signs (Most Recent)  Temp: 97.7 °F (36.5 °C) (10/18/18 1003)  Pulse: 84 (10/18/18 1003)  Resp: 18 (10/18/18 1003)  BP: (!) 114/53 (10/18/18 1003)  SpO2: 98 % (10/18/18 1003)    Vital Signs Range (Last 24H):  Temp:  [97.5 °F (36.4 °C)-98 °F (36.7 °C)]   Pulse:  []   Resp:  [16-18]   BP: (113-156)/(53-85)   SpO2:  [93 %-98 %]       Intake/Output Summary (Last 24 hours) at 10/18/2018 1130  Last data filed at 10/18/2018 1003  Gross per 24 hour   Intake 1500 ml   Output 860 ml   Net 640 ml       Physical Exam:  General appearance: Well developed, well nourished  Eyes:  Conjunctivae/corneas clear. PERRL.  Lungs: Normal respiratory effort,   clear to auscultation bilaterally   Heart: slightly elevated rate and rhythm, S1, S2 normal, no murmur, rub or karely.  Abdomen: Soft, non-tender non-distended; bowel sounds normal; no masses,  no organomegaly  Extremities: No cyanosis or clubbing. No edema.  +2 pulses BLE  Skin: Skin color, texture, turgor normal. No rashes or lesions, dressing right hip CDI  Neurologic: Normal strength and tone. No focal numbness or weakness   Donohue    Laboratory:    Reviewed    Diagnostic Results:      ASSESSMENT/PLAN:     1. Right hip repair (M16.11): per therapy and ortho teams  2. Depression (F32.9): continue home med  3.   PONV (R11.2, Z98.890): anti-emetics  PRN  4.   Hypothyroidism, surgically induced (E07.9): continue home med  5.   DM T2 (E11.9): diet controlled.    6.   HTN (I10): meds with hold parameters  7.   CAD (I25.10): defer  8.   H/O breast CA (C50.919): remote history, mastectomy 1998. Monitored annually  9.   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  Does not follow with hematology  10. Cardiomyopathy (I42.9): remote history. Does not follow with cardiology  11. DVT prophy: 81 mg BID, TEDs and SCDs per ortho    Plan: Thanks for consult, See above recommendations and orders. Will follow along.

## 2018-10-19 VITALS
DIASTOLIC BLOOD PRESSURE: 58 MMHG | SYSTOLIC BLOOD PRESSURE: 105 MMHG | HEART RATE: 94 BPM | TEMPERATURE: 98 F | RESPIRATION RATE: 20 BRPM | WEIGHT: 251.31 LBS | HEIGHT: 61 IN | OXYGEN SATURATION: 92 % | BODY MASS INDEX: 47.45 KG/M2

## 2018-10-19 PROBLEM — M16.10 HIP ARTHRITIS: Status: RESOLVED | Noted: 2018-10-18 | Resolved: 2018-10-19

## 2018-10-19 LAB
ANION GAP SERPL CALC-SCNC: 12 MMOL/L
BASOPHILS # BLD AUTO: 0.02 K/UL
BASOPHILS NFR BLD: 0.2 %
BUN SERPL-MCNC: 12 MG/DL
CALCIUM SERPL-MCNC: 8.6 MG/DL
CHLORIDE SERPL-SCNC: 100 MMOL/L
CO2 SERPL-SCNC: 24 MMOL/L
CREAT SERPL-MCNC: 0.7 MG/DL
DIFFERENTIAL METHOD: ABNORMAL
EOSINOPHIL # BLD AUTO: 0.1 K/UL
EOSINOPHIL NFR BLD: 0.5 %
ERYTHROCYTE [DISTWIDTH] IN BLOOD BY AUTOMATED COUNT: 14.3 %
EST. GFR  (AFRICAN AMERICAN): >60 ML/MIN/1.73 M^2
EST. GFR  (NON AFRICAN AMERICAN): >60 ML/MIN/1.73 M^2
GLUCOSE SERPL-MCNC: 178 MG/DL
HCT VFR BLD AUTO: 36.8 %
HGB BLD-MCNC: 11.7 G/DL
LYMPHOCYTES # BLD AUTO: 4.3 K/UL
LYMPHOCYTES NFR BLD: 38 %
MCH RBC QN AUTO: 27.5 PG
MCHC RBC AUTO-ENTMCNC: 31.8 G/DL
MCV RBC AUTO: 86 FL
MONOCYTES # BLD AUTO: 1 K/UL
MONOCYTES NFR BLD: 9 %
NEUTROPHILS # BLD AUTO: 5.8 K/UL
NEUTROPHILS NFR BLD: 52.1 %
PLATELET # BLD AUTO: 329 K/UL
PMV BLD AUTO: 9.7 FL
POTASSIUM SERPL-SCNC: 3.6 MMOL/L
RBC # BLD AUTO: 4.26 M/UL
SODIUM SERPL-SCNC: 136 MMOL/L
WBC # BLD AUTO: 11.21 K/UL

## 2018-10-19 PROCEDURE — 90471 IMMUNIZATION ADMIN: CPT | Performed by: ORTHOPAEDIC SURGERY

## 2018-10-19 PROCEDURE — 97535 SELF CARE MNGMENT TRAINING: CPT

## 2018-10-19 PROCEDURE — 3E02340 INTRODUCTION OF INFLUENZA VACCINE INTO MUSCLE, PERCUTANEOUS APPROACH: ICD-10-PCS | Performed by: ORTHOPAEDIC SURGERY

## 2018-10-19 PROCEDURE — 63600175 PHARM REV CODE 636 W HCPCS: Performed by: ORTHOPAEDIC SURGERY

## 2018-10-19 PROCEDURE — 36415 COLL VENOUS BLD VENIPUNCTURE: CPT

## 2018-10-19 PROCEDURE — 97530 THERAPEUTIC ACTIVITIES: CPT

## 2018-10-19 PROCEDURE — 94761 N-INVAS EAR/PLS OXIMETRY MLT: CPT

## 2018-10-19 PROCEDURE — 97116 GAIT TRAINING THERAPY: CPT

## 2018-10-19 PROCEDURE — 25000003 PHARM REV CODE 250: Performed by: NURSE PRACTITIONER

## 2018-10-19 PROCEDURE — 85025 COMPLETE CBC W/AUTO DIFF WBC: CPT

## 2018-10-19 PROCEDURE — 25000003 PHARM REV CODE 250: Performed by: ORTHOPAEDIC SURGERY

## 2018-10-19 PROCEDURE — 90686 IIV4 VACC NO PRSV 0.5 ML IM: CPT | Performed by: ORTHOPAEDIC SURGERY

## 2018-10-19 PROCEDURE — 94799 UNLISTED PULMONARY SVC/PX: CPT

## 2018-10-19 PROCEDURE — 97166 OT EVAL MOD COMPLEX 45 MIN: CPT

## 2018-10-19 PROCEDURE — 80048 BASIC METABOLIC PNL TOTAL CA: CPT

## 2018-10-19 RX ORDER — OXYCODONE AND ACETAMINOPHEN 10; 325 MG/1; MG/1
1 TABLET ORAL EVERY 6 HOURS PRN
Qty: 50 TABLET | Refills: 0 | Status: SHIPPED | OUTPATIENT
Start: 2018-10-19 | End: 2019-01-17 | Stop reason: DRUGHIGH

## 2018-10-19 RX ORDER — NAPROXEN SODIUM 220 MG/1
81 TABLET, FILM COATED ORAL 2 TIMES DAILY
Refills: 0 | COMMUNITY
Start: 2018-10-19 | End: 2019-08-20

## 2018-10-19 RX ORDER — OXYCODONE AND ACETAMINOPHEN 10; 325 MG/1; MG/1
1 TABLET ORAL EVERY 6 HOURS PRN
Status: DISCONTINUED | OUTPATIENT
Start: 2018-10-19 | End: 2018-10-19 | Stop reason: HOSPADM

## 2018-10-19 RX ADMIN — CYCLOBENZAPRINE HYDROCHLORIDE 10 MG: 5 TABLET, FILM COATED ORAL at 12:10

## 2018-10-19 RX ADMIN — VENLAFAXINE HYDROCHLORIDE 75 MG: 75 CAPSULE, EXTENDED RELEASE ORAL at 08:10

## 2018-10-19 RX ADMIN — HYDROCODONE BITARTRATE AND ACETAMINOPHEN 1 TABLET: 10; 325 TABLET ORAL at 01:10

## 2018-10-19 RX ADMIN — HYDROCODONE BITARTRATE AND ACETAMINOPHEN 1 TABLET: 10; 325 TABLET ORAL at 10:10

## 2018-10-19 RX ADMIN — CYCLOBENZAPRINE HYDROCHLORIDE 10 MG: 5 TABLET, FILM COATED ORAL at 10:10

## 2018-10-19 RX ADMIN — CARVEDILOL 3.12 MG: 3.12 TABLET, FILM COATED ORAL at 08:10

## 2018-10-19 RX ADMIN — NIFEDIPINE 90 MG: 30 TABLET, FILM COATED, EXTENDED RELEASE ORAL at 08:10

## 2018-10-19 RX ADMIN — DEXTROSE AND SODIUM CHLORIDE: 5; .9 INJECTION, SOLUTION INTRAVENOUS at 05:10

## 2018-10-19 RX ADMIN — OXYCODONE HYDROCHLORIDE 5 MG: 5 TABLET ORAL at 02:10

## 2018-10-19 RX ADMIN — HYDROCODONE BITARTRATE AND ACETAMINOPHEN 1 TABLET: 10; 325 TABLET ORAL at 05:10

## 2018-10-19 RX ADMIN — CYCLOBENZAPRINE HYDROCHLORIDE 10 MG: 5 TABLET, FILM COATED ORAL at 02:10

## 2018-10-19 RX ADMIN — ASPIRIN 81 MG CHEWABLE TABLET 81 MG: 81 TABLET CHEWABLE at 08:10

## 2018-10-19 RX ADMIN — CEFAZOLIN SODIUM 2 G: 2 SOLUTION INTRAVENOUS at 08:10

## 2018-10-19 RX ADMIN — LEVOTHYROXINE SODIUM 125 MCG: 125 TABLET ORAL at 05:10

## 2018-10-19 RX ADMIN — INFLUENZA A VIRUS A/MICHIGAN/45/2015 X-275 (H1N1) ANTIGEN (FORMALDEHYDE INACTIVATED), INFLUENZA A VIRUS A/SINGAPORE/INFIMH-16-0019/2016 IVR-186 (H3N2) ANTIGEN (FORMALDEHYDE INACTIVATED), INFLUENZA B VIRUS B/PHUKET/3073/2013 ANTIGEN (FORMALDEHYDE INACTIVATED), AND INFLUENZA B VIRUS B/MARYLAND/15/2016 BX-69A ANTIGEN (FORMALDEHYDE INACTIVATED) 0.5 ML: 15; 15; 15; 15 INJECTION, SUSPENSION INTRAMUSCULAR at 11:10

## 2018-10-19 RX ADMIN — OXYCODONE HYDROCHLORIDE AND ACETAMINOPHEN 1 TABLET: 10; 325 TABLET ORAL at 12:10

## 2018-10-19 RX ADMIN — OXYCODONE HYDROCHLORIDE 5 MG: 5 TABLET ORAL at 04:10

## 2018-10-19 RX ADMIN — POLYETHYLENE GLYCOL 3350 17 G: 17 POWDER, FOR SOLUTION ORAL at 08:10

## 2018-10-19 RX ADMIN — FAMOTIDINE 20 MG: 20 TABLET ORAL at 08:10

## 2018-10-19 RX ADMIN — MORPHINE SULFATE 4 MG: 4 INJECTION, SOLUTION INTRAMUSCULAR; INTRAVENOUS at 02:10

## 2018-10-19 RX ADMIN — MORPHINE SULFATE 4 MG: 4 INJECTION, SOLUTION INTRAMUSCULAR; INTRAVENOUS at 06:10

## 2018-10-19 NOTE — PT/OT/SLP PROGRESS
Physical Therapy Treatment    Patient Name:  Tanika Wayne   MRN:  [id number]    Recommendations:     Discharge Recommendations:  home with home health, home health OT, home health PT   Discharge Equipment Recommendations: walker, rolling(Jr. bariatric RW; other equpiment per OT discretion )   Barriers to discharge: None    Assessment:     Tanika Wayne is a 58 y.o. female admitted with a medical diagnosis of Hip arthritis.  She presents with the following impairments/functional limitations:  weakness, impaired endurance, impaired self care skills, impaired functional mobilty, gait instability, impaired balance, decreased lower extremity function, pain, decreased ROM, impaired skin, orthopedic precautions.    Rehab Prognosis:  Good; patient would benefit from acute skilled PT services to address these deficits and reach maximum level of function.      Recent Surgery: Procedure(s) (LRB):  ARTHROPLASTY, HIP (Right) 1 Day Post-Op    Plan:     During this hospitalization, patient to be seen BID to address the above listed problems via gait training, therapeutic activities, therapeutic exercises  · Plan of Care Expires:  10/25/18   Plan of Care Reviewed with: patient    Subjective     Communicated with PANCHITO Klein prior to session.  Patient found sitting in chair upon PT entry to room, agreeable to treatment.      Chief Complaint: None stated  Patient comments/goals: To try the stairs  Pain/Comfort:  · Pain Rating 1: 3/10  · Location - Side 1: Right  · Location 1: hip  · Pain Addressed 1: Reposition, Distraction, Cessation of Activity, Other (see comments)(Applied ice)  · Pain Rating Post-Intervention 1: 3/10    Patients cultural, spiritual, Shinto conflicts given the current situation: None per EMR    Objective:     Patient found with: peripheral IV     Patient donned yellow socks and gait belt for OOB mobility.    General Precautions: Standard, fall   Orthopedic Precautions:RLE weight bearing as tolerated    Braces: N/A     Functional Mobility:  · Bed Mobility:     · Sit to Supine: contact guard assistance  · Transfers:     · Sit to Stand:  contact guard assistance with rolling walker  · Gait: x100 ft with RW and CGA-SBA, demostrated decreased gait speed, decreased stride length of L, improveemnt noted with knee/hip flexion during stride of R, occasionally noted increased ER during stance of R which improveed with verbal cues, pt maintained forward flexiion of trunk consistently but able to independently correct for upright posture  · Stairs:  Pt ascended/descended 4 stair(s) with Quad Cane with right handrail with Minimal Assistance.       AM-PAC 6 CLICK MOBILITY  Turning over in bed (including adjusting bedclothes, sheets and blankets)?: 4  Sitting down on and standing up from a chair with arms (e.g., wheelchair, bedside commode, etc.): 3  Moving from lying on back to sitting on the side of the bed?: 3  Moving to and from a bed to a chair (including a wheelchair)?: 3  Need to walk in hospital room?: 3  Climbing 3-5 steps with a railing?: 3  Basic Mobility Total Score: 19       Therapeutic Activities and Exercises:   Gait, stairs, 10x heel slides, SAQ, quad and glute sets    Patient left HOB elevated with all lines intact, call button in reach and RN Latoya notified..    GOALS:   Multidisciplinary Problems     Physical Therapy Goals        Problem: Physical Therapy Goal    Goal Priority Disciplines Outcome Goal Variances Interventions   Physical Therapy Goal     PT, PT/OT Ongoing (interventions implemented as appropriate)     Description:  Goals to be met by: 10/25/18     Patient will increase functional independence with mobility by performin. Supine to sit with supervision.   2. Sit to supine with supervision.   3. Sit<>stand transfer with supervision using rolling walker.   4. Gait > 150 feet with SBA using rolling walker.   5. Ascend/descend 4 stairs with R handrails with CGA with or without AD.                      Time Tracking:     PT Received On: 10/19/18  PT Start Time: 1335     PT Stop Time: 1408  PT Total Time (min): 33 min     Billable Minutes: Gait Training 15 and Therapeutic Activity 18    Treatment Type: Treatment  PT/PTA: PT     PTA Visit Number: 0     Mariah aCstro, PT  10/19/2018

## 2018-10-19 NOTE — PLAN OF CARE
DME - Delivered 10/19/18 Room 352 [id number]  One - HD-Aubree Clemons   [place] -[place]- [place]-Ina Jacques, [facility] [place]  [phone]

## 2018-10-19 NOTE — PROGRESS NOTES
"IM  Progress Note    Admit Date: 10/18/2018   LOS: 1 day     SUBJECTIVE:     Follow-up For:  Hip arthritis    Interval History:     POD #1 right hip repair.  Resting in bed, reports issues with pain control overnight.  Denies CP,SOB,N or V. Due to void.    Review of Systems:  Constitutional: No fever or chills  Respiratory: No cough or shortness of breath  Cardiovascular: No chest pain or palpitations  Gastrointestinal: No nausea or vomiting  Neurological: No confusion or weakness    OBJECTIVE:     Vital Signs Range (Last 24H):  /78 (BP Location: Left arm, Patient Position: Lying)   Pulse 110   Temp 99 °F (37.2 °C) (Oral)   Resp 18   Ht 5' 1" (1.549 m)   Wt 114 kg (251 lb 5.2 oz)   SpO2 97%   Breastfeeding? No   BMI 47.49 kg/m²     Temp:  [97.7 °F (36.5 °C)-100.1 °F (37.8 °C)]   Pulse:  []   Resp:  [16-18]   BP: (114-152)/(53-78)   SpO2:  [94 %-99 %]     I & O (Last 24H):    Intake/Output Summary (Last 24 hours) at 10/19/2018 0834  Last data filed at 10/19/2018 0649  Gross per 24 hour   Intake 1441.25 ml   Output 1200 ml   Net 241.25 ml       Physical Exam:  General appearance: Well developed, well nourished  Eyes:  Conjunctivae/corneas clear. PERRL.  Lungs: Normal respiratory effort,   clear to auscultation bilaterally   Heart: Regular rate and rhythm, S1, S2 normal, no murmur, rub or karely.  Abdomen: Soft, non-tender non-distended; bowel sounds normal; no masses,  no organomegaly  Extremities: No cyanosis or clubbing. No edema.  +2 pulses BLE  Skin: Skin color, texture, turgor normal. No rashes or lesions, dressing right hip CDI  Neurologic: Normal strength and tone. No focal numbness or weakness     Laboratory Data:  Recent Labs   Lab 10/19/18  0638   WBC 11.21   RBC 4.26   HGB 11.7*   HCT 36.8*      MCV 86   MCH 27.5   MCHC 31.8*       BMP:   Recent Labs   Lab 10/19/18  0638   *      K 3.6      CO2 24   BUN 12   CREATININE 0.7   CALCIUM 8.6*     Lab Results "   Component Value Date    CALCIUM 8.6 (L) 10/19/2018               Medications:  Medication list was reviewed and changes noted under Assessment/Plan.    Diagnostic Results:    Reviewed      ASSESSMENT/PLAN:     1. Right hip repair (M16.11): POD #1,  per therapy and ortho teams  2. Depression (F32.9): continue home med  3.   PONV (R11.2, Z98.890): anti-emetics PRN  4.   Hypothyroidism, surgically induced (E07.9): continue home med  5.   DM T2 (E11.9): diet controlled.  Elevated BG today, likely due to non fasting status of lab draw  6.   HTN (I10): meds with hold parameters  7.   CAD (I25.10): defer  8.   H/O breast CA (C50.919): remote history, mastectomy 1998. Monitored annually  9.   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  Does not follow with hematology  10. Cardiomyopathy (I42.9): remote history. Does not follow with cardiology  11. Acute blood loss anemia (D62): likely due to Dilutional vs expected surgical loss. Asymptomatic.  12. DVT prophy: 81 mg BID, TEDs and SCDs per ortho

## 2018-10-19 NOTE — PT/OT/SLP EVAL
Occupational Therapy   Evaluation and Treatment    Name: Tanika Wayne  MRN: [id number]  Admitting Diagnosis:  Hip arthritis 1 Day Post-Op    Recommendations:     Discharge Recommendations: home with home health, home health OT, home, home health PT  Discharge Equipment Recommendations:  other (see comments)(standard 3-in-1 commode and bariatric RW already delivered to room; needs bariatric BSC )  Barriers to discharge:  Inaccessible home environment(4 ARI home; step-in tub/shower combo)    History:     Occupational Profile:  Living Environment: Lives with spouse, 24y.o. Daughter, and 14y.o. Son in [facility] with 4STE and R-handrail; tub/shower combo; standard toilet  Previous level of function: MOD I for ambulation with rollator and MOD I with ADL tasks; reports cleaning and spouse performs cooking.  Currently not working at this time.   Equipment Used at Home:  rollator  Assistance upon Discharge: Spouse home and able to assist.     Past Medical History:   Diagnosis Date    Abnormal echocardiogram 2012    Arthritis     Breast cancer 1998    Breast cancer, right breast     Cardiomyopathy due to systemic disease     Cataract     Clotting disorder     Coronary artery disease     Diabetes mellitus     madison    Diabetes mellitus type II     DM (diabetes mellitus) 2012    Encounter for blood transfusion     HTN (hypertension) 2012    Hypertension     Increased glucose level 2012    ITP (idiopathic thrombocytopenic purpura) 2012    Kidney stones 2012    PONV (postoperative nausea and vomiting)     Stenosis of lumbosacral spine 2012    Thyroid disease     Tympanic membrane perforation 9/15/2014    right 2014 Dr. Jeffries       Past Surgical History:   Procedure Laterality Date    ARTHROPLASTY, HIP Right 10/18/2018    Per[facility]ero Fischer [facility] at [facility] OR    BREAST LUMPECTOMY Right      SECTION      CHOLECYSTECTOMY      EYE SURGERY       strabismus, 2006    HIP ARTHROPLASTY Right 10/18/2018    Procedure: ARTHROPLASTY, HIP;  Surgeon: Baldomero Fischer MD;  Location: [facility], [place], operated by [facility] OR;  Service: Orthopedics;  Laterality: Right;    HYSTERECTOMY      L3-5 Laminectomy  02/2017    Dr. Mendoza    Left LAMINECTOMY-MINIMALLY INVASIVE L3-4 and L4-5 Right 2/22/2017    Performed by Dave Sullivan MD at [facility], [place], operated by [facility] OR    [facility] TOTAL  [facility]    [facility]    STRABISMUS SURGERY  8.30.06    OU    THYROIDECTOMY  09/2016    THYROIDECTOMY N/A 9/9/2016    Performed by Anish Travis MD at [facility], [place], operated by [facility] OR       Subjective     Chief Complaint: R-hip pain.  Patient/Family Comments/goals: Return to PLOF.    Pain/Comfort:  · Pain Rating 1: 4/10  · Location - Side 1: Right  · Location - Orientation 1: generalized  · Location 1: hip  · Pain Addressed 1: Distraction, Nurse notified, Cessation of Activity  · Pain Rating Post-Intervention 1: 7/10    Patients cultural, spiritual, Islam conflicts given the current situation: None stated.     Objective:     Communicated with: Nursing prior to session.  Patient found all lines intact, call button in reach and nursing notified and peripheral IV upon OT entry to room.    General Precautions: Standard, fall   Orthopedic Precautions:RLE weight bearing as tolerated(no hip extension, ADD, ABD, and ER to RLE)   Braces: N/A     Occupational Performance:    Functional Mobility/Transfers:  · Patient completed Sit <> Stand Transfer with contact guard assistance  with  rolling walker and increased verbal cues with safe hand placement and RLE positioning  · Patient completed Toilet Transfer Step Transfer technique with contact guard assistance with  rolling walker with verbal cues for small steps for turn, sequencing backward stepping towards commode to maintain hip precautions, and verbal cues for safe hand placement with good carryover of all instructions though requiring increased cues for safe hand placement at armrest of commode for  controlled descent  · Functional Mobility: Ambulated short household distance with RW and CGA with instructions on sequencing steps with good carryover; required consistent cues for upright posture, forward head, and relaxed shoulders    Activities of Daily Living:  · Grooming: contact guard assistance in stance at sink with MIN verbal cues for proximity to sink and RW positioning  · Upper Body Dressing: stand by assistance to thread arms and pull pullover dress over upper trunk and contact guard assistance in stance to pull dress over hips with good carryover of verbal cues for alternating 1UE at RW  to steady while using other pull dress down  · Lower Body Dressing: stand by assistance with instruction on reacher/sockaide use with good return demos.  Did not have underwear, pants, or shorts to practice donning these items; given instructions for safely sequencing task with reacher and Pt. Verbalized understanding     Cognitive/Visual Perceptual:  Cognitive/Psychosocial Skills:  -       Oriented to: Person, Place, Time and Situation   -       Follows Commands/attention:Follows one-step commands and Follows two-step commands  -       Communication: clear/fluent  -       Memory: No Deficits noted  -       Safety awareness/insight to disability: intact   Visual/Perceptual:  -Intact  -wears glasses     Physical Exam:  Postural examination/scapula alignment: -       Rounded shoulders  -       Forward head  -       Posterior pelvic tilt  Skin integrity: Visible skin intact and surgical dressing to R-hip  Edema:  None noted  Sensation: -       Intact light touch; denies numbness/tingling  Dominant hand: -       Right  Upper Extremity Range of Motion:  -       Right Upper Extremity: WFL   -       Left Upper Extremity: WFL    Upper Extremity Strength: -       Right Upper Extremity: WFL  -       Left Upper Extremity: WFL    Strength: -       Right Upper Extremity: WFL   -       Left Upper Extremity: WFL   Fine Motor  "Coordination: -       Intact  Left hand, manipulation of objects and Right hand, manipulation of objects    AMPAC 6 Click ADL:  AMPAC Total Score: 20    Treatment & Education:  Educated on role of OT, POC, functional transfers, hip precautions, DME/AE needs and use, and ADL/functional transfer safety.   Education:    Patient left up in chair with all lines intact, call button in reach and nursing notified    Assessment:   Initial OT eval/treat complete.  Requires CGA for steadying/safety due to impaired balance/stability.  Continues to require verbal cues for sequencing steps for short household ambulation, negotiating turns with short steps, safe hand positioning during functional transfers.  With good carryover of LB dress AE use this day after receiving instructions for task.  Verbalized understanding of hip precautions and use of reacher to don/doff underwear shorts, pants, or underwear this day. Has standard 3-in-1 commode and bariatric RW already delivered to bedside.  Also has rollator and access to hip kit.  Needs bariatric BSC instead of standard.  Recommend HH OT.  To benefit from continued acute care OT services to increase independence in self-care/functional transfers.  OT to follow.         Tanika Wayne is a 58 y.o. female with a medical diagnosis of Hip arthritis.  She presents with the following performance deficits affecting function: weakness, impaired endurance, impaired self care skills, gait instability, impaired functional mobilty, impaired balance, decreased lower extremity function, pain, decreased ROM, impaired skin, orthopedic precautions.      Rehab Prognosis: Good; patient would benefit from acute skilled OT services to address these deficits and reach maximum level of function.         Clinical Decision Makin.  OT Mod:  "Pt evaluation falls under moderate complexity for evaluation coding due to identification of 3-5 performance deficits noted as stated above. Eval required " "Min/Mod assistance to complete on this date and detailed assessment(s) were utilized. Moreover, an expanded review of history and occupational profile obtained with additional review of cognitive, physical and psychosocial hx."     Plan:     Patient to be seen daily to address the above listed problems via self-care/home management, therapeutic activities, therapeutic exercises  · Plan of Care Expires: 10/26/18  · Plan of Care Reviewed with: patient    This Plan of care has been discussed with the patient who was involved in its development and understands and is in agreement with the identified goals and treatment plan    GOALS:   Multidisciplinary Problems     Occupational Therapy Goals        Problem: Occupational Therapy Goal    Goal Priority Disciplines Outcome Interventions   Occupational Therapy Goal     OT, PT/OT Ongoing (interventions implemented as appropriate)    Description:  Goals to be met by: 10/26/2018     Patient will increase functional independence with ADLs by performing:    UE Dressing with Stand-by Assistance.  LE Dressing with Stand-by Assistance.  Grooming while standing at sink with Modified [place].  Toileting from bedside commode with Stand-by Assistance for hygiene and clothing management.   Toilet transfer to bedside commode with Stand-by Assistance.                      Time Tracking:     OT Date of Treatment: 10/19/18  OT Start Time: 1117  OT Stop Time: 1156  OT Total Time (min): 39 min    Billable Minutes:Evaluation 15  Self Care/Home Management 24    Nataliya Zurita OT  10/19/2018    "

## 2018-10-19 NOTE — PROGRESS NOTES
SW informed by Mariah PT, pt needs bariatric RW and BC An informed SW of pt's need for a bariatric BSC.  Pt is wide and short.  VAISHALI called [facility] and informed Jewell and she approved pulling both bariatric RW and BSC from closet.

## 2018-10-19 NOTE — PLAN OF CARE
Problem: Physical Therapy Goal  Goal: Physical Therapy Goal  Goals to be met by: 10/25/18     Patient will increase functional independence with mobility by performin. Supine to sit with supervision.   2. Sit to supine with supervision.   3. Sit<>stand transfer with supervision using rolling walker.   4. Gait > 150 feet with SBA using rolling walker.   5. Ascend/descend 4 stairs with R handrails with CGA with or without AD.   Outcome: Ongoing (interventions implemented as appropriate)  Supine to sit with HOB elevated and use of hospital bed features, CGA for RLE  Sit<>stand with RW and CGA  Gait x75 ft with RW and CGA-SBA

## 2018-10-19 NOTE — DISCHARGE SUMMARY
[facility]  Discharge Summary      Admit Date: 10/18/2018    Discharge Date and Time: No discharge date for patient encounter.    Attending Physician: Baldomero Fischer MD     Reason for Admission:  Osteoarthritis right hip    Procedures Performed: Procedure(s) (LRB):  ARTHROPLASTY, HIP (Right)    Hospital Course (synopsis of major diagnoses, care, treatment, and services provided during the course of the hospital stay): The above patient underwent the above procedure.  Post operative the patient received pain management and pt / ot. The patient progressed well and will be discharged--see orders.   So far tolerating things well.  Prior to discharge this afternoon health arranged instructions given follow-up 2 weeks  Consults: nephrology    Significant Diagnostic Studies: Labs:   CBC   Recent Labs   Lab 10/19/18  0638   WBC 11.21   HGB 11.7*   HCT 36.8*          Final Diagnoses:    Principal Problem: Hip arthritis   Secondary Diagnoses:   Active Hospital Problems   No active problems to display.      Resolved Hospital Problems    Diagnosis Date Resolved POA    *Hip arthritis [M16.10] 10/18/2018 Yes    Hip arthritis [M16.10] 10/19/2018 Yes       Discharged Condition: good    Disposition: Home-Health Care [facility]    Follow Up/Patient Instructions:     Medications:  Reconciled Home Medications:      Medication List      START taking these medications    aspirin 81 MG Chew  Take 1 tablet (81 mg total) by mouth 2 (two) times daily.        CHANGE how you take these medications    * oxyCODONE-acetaminophen 5-325 mg per tablet  Commonly known as:  PERCOCET  Take 1 tablet by mouth every 8 (eight) hours as needed for Pain.  What changed:  Another medication with the same name was added. Make sure you understand how and when to take each.     * oxyCODONE-acetaminophen  mg per tablet  Commonly known as:  PERCOCET  Take 1 tablet by mouth every 6 (six) hours as needed.  What changed:  You were  already taking a medication with the same name, and this prescription was added. Make sure you understand how and when to take each.     * oxyCODONE-acetaminophen 5-325 mg per tablet  Commonly known as:  PERCOCET  Take 1 tablet by mouth every 8 (eight) hours as needed for Pain.  Start taking on:  11/19/2018  What changed:  Another medication with the same name was added. Make sure you understand how and when to take each.         * This list has 3 medication(s) that are the same as other medications prescribed for you. Read the directions carefully, and ask your doctor or other care provider to review them with you.            CONTINUE taking these medications    carvedilol 3.125 MG tablet  Commonly known as:  COREG  Take 1 tablet (3.125 mg total) by mouth 2 (two) times daily.     clotrimazole 1 % cream  Commonly known as:  LOTRIMIN  Apply topically 2 (two) times daily.     clotrimazole-betamethasone 1-0.05% cream  Commonly known as:  LOTRISONE  Apply topically 2 (two) times daily as needed.     cyclobenzaprine 10 MG tablet  Commonly known as:  FLEXERIL  TAKE ONE-HALF TO ONE TABLET BY MOUTH EVERY NIGHT AT BEDTIME     diazePAM 5 MG tablet  Commonly known as:  VALIUM  TAKE 1 TABLET BY MOUTH NIGHTLY AS NEEDED FOR ANXIETY (MUSCLE SPASM)\     fluticasone 50 mcg/actuation nasal spray  Commonly known as:  FLONASE  2 sprays (100 mcg total) by Each Nare route once daily.     levothyroxine 125 MCG tablet  Commonly known as:  SYNTHROID  Take 1 tablet (125 mcg total) by mouth once daily.     losartan-hydrochlorothiazide 100-12.5 mg 100-12.5 mg Tab  Commonly known as:  HYZAAR  Take 1 tablet by mouth every morning.     multivitamin per tablet  Commonly known as:  THERAGRAN  Take 1 tablet by mouth once daily.     NIFEdipine 90 MG (OSM) 24 hr tablet  Commonly known as:  PROCARDIA-XL  Take 1 tablet (90 mg total) by mouth once daily.     potassium chloride SA 20 MEQ tablet  Commonly known as:  K-DUR,KLOR-CON  Take 2 tablets (40 mEq  "total) by mouth 2 (two) times daily.     STOOL SOFTENER 100 MG capsule  Generic drug:  docusate sodium  Take 100 mg by mouth 3 (three) times daily as needed for Constipation.     venlafaxine 75 MG 24 hr capsule  Commonly known as:  EFFEXOR-XR  TAKE 1 CAPSULE EVERY NIGHT        ASK your doctor about these medications    nitrofurantoin (macrocrystal-monohydrate) 100 MG capsule  Commonly known as:  MACROBID  Take 1 capsule (100 mg total) by mouth 2 (two) times daily. for 3 days  Ask about: Should I take this medication?          Discharge Procedure Orders   WALKER FOR HOME USE     Order Specific Question Answer Comments   Type of Walker: Luis Eduardo (4'4"-5'7")    With wheels? Yes    Height: 5' 1" (1.549 m)    Weight: 114 kg (251 lb 5.2 oz)    Length of need (1-99 months): 99    Does patient have medical equipment at home? none    Please check all that apply: Patient's condition impairs ambulation.    Please check all that apply: Patient is unable to safely ambulate without equipment.    Vendor: [facility] Gave Kasey approval    Expected Date of Delivery: 10/19/2018      COMMODE FOR HOME USE     Order Specific Question Answer Comments   Type: Heavy duty    Height: 5' 1" (1.549 m)    Weight: 114 kg (251 lb 5.2 oz)    Does patient have medical equipment at home? none    Length of need (1-99 months): 99      Leave dressing on - Keep it clean, dry, and intact until clinic visit     Follow-up Information     Please follow up.    Why:  AS SCHEDULED , Call [phone] to reach Dr. Fischer               "

## 2018-10-19 NOTE — PLAN OF CARE
Problem: Physical Therapy Goal  Goal: Physical Therapy Goal  Goals to be met by: 10/25/18     Patient will increase functional independence with mobility by performin. Supine to sit with supervision.   2. Sit to supine with supervision.   3. Sit<>stand transfer with supervision using rolling walker.   4. Gait > 150 feet with SBA using rolling walker.   5. Ascend/descend 4 stairs with R handrails with CGA with or without AD.   Outcome: Ongoing (interventions implemented as appropriate)  Sit to supine with HOB lowered and CGA with RLE  Sit<.stand with CGA with RW  Gait x100 ft with RW and CGA-SBA  Ascend/descend 4 stairs with R handrail and cane with [place]

## 2018-10-19 NOTE — PLAN OF CARE
Problem: Patient Care Overview  Goal: Plan of Care Review  Outcome: Ongoing (interventions implemented as appropriate)  Patient is s/p Right hip arthroplasty POD#1. AAOx4, on RA, VSS, slightly elevated temp of 100.1 deg F at 4am. Scheduled antibiotics given. Pain management has remained challenging - given prn OxyIR 5mg PO x3, prn Norco 10mg PO x 1, prn Morphine 4mg IVP x 3, and prn Flexeril 10 mg PO x 1. Anxiety managed with prn Valium 5mg PO x 1. Patient plans to ambulate with PT in the morning after Don[place]ue has been removed.

## 2018-10-19 NOTE — PLAN OF CARE
Problem: Occupational Therapy Goal  Goal: Occupational Therapy Goal  Goals to be met by: 10/26/2018     Patient will increase functional independence with ADLs by performing:    UE Dressing with Stand-by Assistance.  LE Dressing with Stand-by Assistance.  Grooming while standing at sink with Modified [place].  Toileting from bedside commode with Stand-by Assistance for hygiene and clothing management.   Toilet transfer to bedside commode with Stand-by Assistance.    Outcome: Ongoing (interventions implemented as appropriate)  Initial OT eval/treat complete.  Has standard 3-in-1 commode and bariatric RW already delivered to bedside.  Also has rollator and access to hip kit.  Needs bariatric BSC instead of standard.  Recommend  OT.  To benefit from continued acute care OT services to increase independence in self-care/functional transfers.  OT to follow.

## 2018-10-19 NOTE — PT/OT/SLP PROGRESS
Physical Therapy Treatment    Patient Name:  Tanika Wayne   MRN:  [id number]    Recommendations:     Discharge Recommendations:  home with home health, home health PT, home health OT   Discharge Equipment Recommendations: walker, rolling(Jr. bariatric RW; other equpiment per OT discretion )   Barriers to discharge: Inaccessible home    Assessment:     Tanika Wayne is a 58 y.o. female admitted with a medical diagnosis of Hip arthritis.  She presents with the following impairments/functional limitations:  weakness, impaired endurance, impaired self care skills, impaired functional mobilty, gait instability, impaired balance, decreased lower extremity function, pain, decreased ROM, impaired skin, orthopedic precautions. Due to these impairments the patient is limited in their ability to independently ambulate, transfer, and participate in their chosen activities..    Rehab Prognosis:  Good; patient would benefit from acute skilled PT services to address these deficits and reach maximum level of function.      Recent Surgery: Procedure(s) (LRB):  ARTHROPLASTY, HIP (Right) 1 Day Post-Op    Plan:     During this hospitalization, patient to be seen BID to address the above listed problems via gait training, therapeutic activities, therapeutic exercises  · Plan of Care Expires:  10/25/18   Plan of Care Reviewed with: patient    Subjective     Communicated with PANCHITO Klein prior to session.  Patient found supine upon PT entry to room, agreeable to treatment.      Chief Complaint: Pain was bad throughout the night  Patient comments/goals: To move and walk to feel better  Pain/Comfort:  · Pain Rating 1: 5/10  · Location - Side 1: Right  · Location 1: hip  · Pain Addressed 1: Distraction, Cessation of Activity, Reposition  · Pain Rating Post-Intervention 1: 6/10    Patients cultural, spiritual, Protestant conflicts given the current situation: None per EMR    Objective:     Patient found with: peripheral IV, hip  abduction pillow     General Precautions: Standard, fall   Orthopedic Precautions:RLE weight bearing as tolerated   Braces: N/A     Functional Mobility:  · Bed Mobility:     · Supine to Sit: contact guard assistance  · Transfers:     · Sit to Stand:  contact guard assistance with rolling walker  · Gait: x75 ft with RW and CGA-SBA, demostrates decreased stride length of L and decreased hip/knee flexion during swing on R which improved with verbal and visual cues, gait speed remained decreased thruout gait bout      AM-PAC 6 CLICK MOBILITY  Turning over in bed (including adjusting bedclothes, sheets and blankets)?: 4  Sitting down on and standing up from a chair with arms (e.g., wheelchair, bedside commode, etc.): 3  Moving from lying on back to sitting on the side of the bed?: 3  Moving to and from a bed to a chair (including a wheelchair)?: 3  Need to walk in hospital room?: 3  Climbing 3-5 steps with a railing?: 2  Basic Mobility Total Score: 18       Therapeutic Activities and Exercises:   Ankle pumps, quad and glute sets, LAQ, seated marching    Patient left up in chair with all lines intact, call button in reach and RN Latoya notified..    GOALS:   Multidisciplinary Problems     Physical Therapy Goals        Problem: Physical Therapy Goal    Goal Priority Disciplines Outcome Goal Variances Interventions   Physical Therapy Goal     PT, PT/OT Ongoing (interventions implemented as appropriate)     Description:  Goals to be met by: 10/25/18     Patient will increase functional independence with mobility by performin. Supine to sit with supervision.   2. Sit to supine with supervision.   3. Sit<>stand transfer with supervision using rolling walker.   4. Gait > 150 feet with SBA using rolling walker.   5. Ascend/descend 4 stairs with R handrails with CGA with or without AD.                     Time Tracking:     PT Received On: 10/19/18  PT Start Time: 0935     PT Stop Time: 1002  PT Total Time (min): 27 min      Billable Minutes: Gait Training 18 and Therapeutic Activity 9    Treatment Type: Treatment  PT/PTA: PT     PTA Visit Number: 0     Mariah Castro, PT  10/19/2018

## 2018-10-19 NOTE — PLAN OF CARE
Problem: Patient Care Overview  Goal: Plan of Care Review  Outcome: Ongoing (interventions implemented as appropriate)  Patient on RA with Sats 94%. IS done with good effort. No distress noted. Will continue to monitor.

## 2018-10-19 NOTE — NURSING
New orders placed for discharge, patient is agreeable with discharge. PIV removed, catheter tip intact. Dressing applied. Voiding spontaneously, tolerating regular diet and pain is moderately controlled with po pain medication. Discharge teaching done at bedside, verbalized understanding. Medications reviewed, appointments given. Will d/c home  per wheelchair with all belongings and equipment.

## 2018-10-19 NOTE — PLAN OF CARE
Problem: Patient Care Overview  Goal: Plan of Care Review  Outcome: Ongoing (interventions implemented as appropriate)  Pt remains on RA. IS done. No resp distress noted.

## 2018-10-20 NOTE — PT/OT/SLP DISCHARGE
Physical Therapy Discharge Summary    Name: Tanika Wayne  MRN: [id number]   Principal Problem: Hip arthritis     Patient Discharged from acute Physical Therapy on 10/19/18.  Please refer to prior PT noted date on 10/19 for functional status.     Assessment:     Goals partially met. Patient appropriate for care in another setting.    Objective:     GOALS:   Multidisciplinary Problems     Physical Therapy Goals        Problem: Physical Therapy Goal    Goal Priority Disciplines Outcome Goal Variances Interventions   Physical Therapy Goal     PT, PT/OT Ongoing (interventions implemented as appropriate)     Description:  Goals to be met by: 10/25/18     Patient will increase functional independence with mobility by performin. Supine to sit with supervision.   2. Sit to supine with supervision.   3. Sit<>stand transfer with supervision using rolling walker.   4. Gait > 150 feet with SBA using rolling walker.   5. Ascend/descend 4 stairs with R handrails with CGA with or without AD.                     Reasons for Discontinuation of Therapy Services  Transfer to alternate level of care.      Plan:     Patient Discharged to: Home with Home Health Service.    Mariah Castro, PT  10/20/2018

## 2018-10-21 NOTE — PT/OT/SLP DISCHARGE
Occupational Therapy Discharge Summary    Tanika aWyne  MRN: [id number]   Principal Problem: Hip arthritis      Patient Discharged from acute Occupational Therapy on 10/19/18.  Please refer to prior OT note dated 10/19/18 for functional status.    Assessment:      Patient appropriate for care in another setting.    Objective:     GOALS:   Multidisciplinary Problems     Occupational Therapy Goals        Problem: Occupational Therapy Goal    Goal Priority Disciplines Outcome Interventions   Occupational Therapy Goal     OT, PT/OT Ongoing (interventions implemented as appropriate)    Description:  Goals to be met by: 10/26/2018     Patient will increase functional independence with ADLs by performing:    UE Dressing with Stand-by Assistance.  LE Dressing with Stand-by Assistance.  Grooming while standing at sink with Modified [place].  Toileting from bedside commode with Stand-by Assistance for hygiene and clothing management.   Toilet transfer to bedside commode with Stand-by Assistance.                      Reasons for Discontinuation of Therapy Services  Transfer to alternate level of care.      Plan:     Patient Discharged to: Home with Home Health Service    FELA Li  10/21/2018

## 2018-10-22 ENCOUNTER — TELEPHONE (OUTPATIENT)
Dept: MEDSURG UNIT | Facility: OTHER | Age: 58
End: 2018-10-22

## 2018-11-08 ENCOUNTER — TELEPHONE (OUTPATIENT)
Dept: SPINE | Facility: CLINIC | Age: 58
End: 2018-11-08

## 2018-11-08 NOTE — TELEPHONE ENCOUNTER
----- Message from Xena Guerin sent at 11/8/2018 11:02 AM CST -----  Contact: Pt  Name of Who is Calling:WALTER CABA [[phone]]    What is the request in detail: Patient would like to schedule a follow up appointment , there weren't any appointment Please contact to further discuss and advise     Can the clinic reply by [facility]: No    What Number to Call Back [facility]: [phone]

## 2018-11-08 NOTE — TELEPHONE ENCOUNTER
Returned call to patient.  No answer.  Left message with phone number on voicemail to return phone call to office.

## 2018-11-12 ENCOUNTER — PATIENT MESSAGE (OUTPATIENT)
Dept: SPINE | Facility: CLINIC | Age: 58
End: 2018-11-12

## 2018-11-25 DIAGNOSIS — J32.9 SINUSITIS, UNSPECIFIED CHRONICITY, UNSPECIFIED LOCATION: ICD-10-CM

## 2018-11-25 RX ORDER — FLUTICASONE PROPIONATE 50 MCG
SPRAY, SUSPENSION (ML) NASAL
Qty: 16 ML | Refills: 3 | Status: SHIPPED | OUTPATIENT
Start: 2018-11-25 | End: 2018-12-05 | Stop reason: SDUPTHER

## 2018-11-27 ENCOUNTER — PATIENT MESSAGE (OUTPATIENT)
Dept: SPINE | Facility: CLINIC | Age: 58
End: 2018-11-27

## 2018-12-05 DIAGNOSIS — J32.9 SINUSITIS, UNSPECIFIED CHRONICITY, UNSPECIFIED LOCATION: ICD-10-CM

## 2018-12-05 RX ORDER — FLUTICASONE PROPIONATE 50 MCG
SPRAY, SUSPENSION (ML) NASAL
Qty: 48 ML | Refills: 11 | Status: SHIPPED | OUTPATIENT
Start: 2018-12-05 | End: 2019-12-01 | Stop reason: SDUPTHER

## 2019-01-17 ENCOUNTER — OFFICE VISIT (OUTPATIENT)
Dept: PHYSICAL MEDICINE AND REHAB | Facility: CLINIC | Age: 59
End: 2019-01-17
Payer: MEDICAID

## 2019-01-17 VITALS
BODY MASS INDEX: 47.49 KG/M2 | DIASTOLIC BLOOD PRESSURE: 84 MMHG | HEART RATE: 94 BPM | SYSTOLIC BLOOD PRESSURE: 153 MMHG | HEIGHT: 61 IN

## 2019-01-17 DIAGNOSIS — Z98.890 S/P LUMBAR LAMINECTOMY: ICD-10-CM

## 2019-01-17 DIAGNOSIS — M48.07 STENOSIS OF LUMBOSACRAL SPINE: ICD-10-CM

## 2019-01-17 DIAGNOSIS — G89.29 CHRONIC PAIN OF RIGHT KNEE: ICD-10-CM

## 2019-01-17 DIAGNOSIS — M54.41 CHRONIC BILATERAL LOW BACK PAIN WITH RIGHT-SIDED SCIATICA: ICD-10-CM

## 2019-01-17 DIAGNOSIS — G89.29 CHRONIC BILATERAL LOW BACK PAIN WITH RIGHT-SIDED SCIATICA: ICD-10-CM

## 2019-01-17 DIAGNOSIS — M48.061 SPINAL STENOSIS OF LUMBAR REGION, UNSPECIFIED WHETHER NEUROGENIC CLAUDICATION PRESENT: ICD-10-CM

## 2019-01-17 DIAGNOSIS — Z98.890 HISTORY OF LUMBAR LAMINECTOMY: ICD-10-CM

## 2019-01-17 DIAGNOSIS — Z79.891 CHRONIC USE OF OPIATE FOR THERAPEUTIC PURPOSE: ICD-10-CM

## 2019-01-17 DIAGNOSIS — M43.16 SPONDYLOLISTHESIS AT L3-L4 LEVEL: ICD-10-CM

## 2019-01-17 DIAGNOSIS — M25.551 PAIN OF RIGHT HIP JOINT: ICD-10-CM

## 2019-01-17 DIAGNOSIS — F11.90 UNCOMPLICATED OPIOID USE: ICD-10-CM

## 2019-01-17 DIAGNOSIS — M51.36 DDD (DEGENERATIVE DISC DISEASE), LUMBAR: ICD-10-CM

## 2019-01-17 DIAGNOSIS — M25.561 CHRONIC PAIN OF RIGHT KNEE: ICD-10-CM

## 2019-01-17 DIAGNOSIS — M48.062 SPINAL STENOSIS OF LUMBAR REGION WITH NEUROGENIC CLAUDICATION: Primary | ICD-10-CM

## 2019-01-17 DIAGNOSIS — M62.838 NIGHT MUSCLE SPASMS: ICD-10-CM

## 2019-01-17 PROCEDURE — 99214 OFFICE O/P EST MOD 30 MIN: CPT | Mod: S$PBB,,, | Performed by: PHYSICAL MEDICINE & REHABILITATION

## 2019-01-17 PROCEDURE — 99999 PR PBB SHADOW E&M-EST. PATIENT-LVL III: ICD-10-PCS | Mod: PBBFAC,,, | Performed by: PHYSICAL MEDICINE & REHABILITATION

## 2019-01-17 PROCEDURE — 99214 PR OFFICE/OUTPT VISIT, EST, LEVL IV, 30-39 MIN: ICD-10-PCS | Mod: S$PBB,,, | Performed by: PHYSICAL MEDICINE & REHABILITATION

## 2019-01-17 PROCEDURE — 99999 PR PBB SHADOW E&M-EST. PATIENT-LVL III: CPT | Mod: PBBFAC,,, | Performed by: PHYSICAL MEDICINE & REHABILITATION

## 2019-01-17 PROCEDURE — 99213 OFFICE O/P EST LOW 20 MIN: CPT | Mod: PBBFAC | Performed by: PHYSICAL MEDICINE & REHABILITATION

## 2019-01-17 RX ORDER — CYCLOBENZAPRINE HCL 10 MG
10 TABLET ORAL 3 TIMES DAILY PRN
Qty: 90 TABLET | Refills: 5 | Status: SHIPPED | OUTPATIENT
Start: 2019-01-17 | End: 2019-05-16 | Stop reason: SDUPTHER

## 2019-01-17 RX ORDER — OXYCODONE AND ACETAMINOPHEN 5; 325 MG/1; MG/1
1 TABLET ORAL EVERY 12 HOURS PRN
Qty: 60 TABLET | Refills: 0 | Status: SHIPPED | OUTPATIENT
Start: 2019-02-17 | End: 2019-01-29 | Stop reason: SDUPTHER

## 2019-01-17 RX ORDER — OXYCODONE AND ACETAMINOPHEN 5; 325 MG/1; MG/1
1 TABLET ORAL EVERY 12 HOURS PRN
Qty: 60 TABLET | Refills: 0 | Status: SHIPPED | OUTPATIENT
Start: 2019-01-17 | End: 2019-02-16

## 2019-01-17 RX ORDER — OXYCODONE AND ACETAMINOPHEN 5; 325 MG/1; MG/1
1 TABLET ORAL EVERY 12 HOURS PRN
Qty: 60 TABLET | Refills: 0 | Status: SHIPPED | OUTPATIENT
Start: 2019-03-17 | End: 2019-04-16

## 2019-01-17 NOTE — PROGRESS NOTES
Subjective:       Patient ID: Tanika Wayne is a 59 y.o. female.    Chief Complaint: Back Pain and Leg Pain    Back Pain   Associated symptoms include leg pain. Pertinent negatives include no abdominal pain, chest pain, fever, numbness or weakness. Headaches:     Hip Pain    Pertinent negatives include no numbness.   Knee Pain    Pertinent negatives include no numbness.   Leg Pain    Pertinent negatives include no numbness.      Mrs. Wayne is 60 y/o female, who returns to clinic for worsening of chronic back pain, and chronic pain management with opioids.  She is s/p L3-5 laminectomy on 2/22/17 by dr. Sullivan, cx with superficial wound infection.  Referred by Dr. Sullivan   LCV 09/17/18.    Today,, she reports improvement of RT hip pain, she underwent RT BRYNN on 10/18/18  By dr. Fischer ( who is with [facility]).  And she is scheduled for Rt TKA in 2-3 months.    Today, she complains about:  1.Back pain     She reports some improvement of back pain, since her RT hip surgery.  Her main pain is postoperative RT hip pain, that is improving on daily basis.  Percocet decreases her pain down to 3-4.   She takes it BID, early in am,a nd 7-8 pm.  She also reports that Valium helps with leg spasms, takes it in am/pm.   Takes also Flexeril at bedtime, that helps with sleep.   She sates that she was taking Tramadol before surgery and was making her constipated.  Also , reported that  did not want her to have PT until October 2017.  She is on long term disability, [employer], after back surgery.  She is also on [employer] for Rt knee and Rt hip pain since her injury at work,   (she fell at work ,in January 7/2016).    Back Pain Description:  Lenght: pain is a chronic pain. Length > many, more than 10 yrs, with worsening last year.   Any past, recent injury, falls.  Intensity:  CURRENT  3/10,  AVERAGE  Pain  3-4/10. at BEST /10 , At WORST 7-8 /10 on the WORST day.   Location: pain is localized at  Rt  side of back and buttock, running to Rt hip and leg.    It is more Back at right side >> leg pain.   Radiation: Rt  Buttocks all the way to Rt legs, to mid calf   Timing :it is  constant day/yamila at night pain ,cannot sleep on RT side,  worse with activity, in evening  Cannot stand 2-5 minutes, needs tos it down, cannot walk a distance of one room, secondary to back pain , has to sit down for 5-10 minutes,   and can walk again.   Uses SC  ( borrowed from her ). She does noot have any AD at home.   She states that she ordered RW with seat through [employer].   QUALITY: Back pain is more dull ache.  No Sharp/shooting/ neuropathic : burning, tingling, numbness.  She reports muscle spasm in RT calf.   She has  RT weakness, she reports a couple of times inside house, no injury.   Worsening factors:  Standing, walking.   Alleviating factors: sitting and laying down.   Symptoms interfere with daily activity, sleeping and work.   Current medications: Tramadol , Aleve   Failed medications: Gabapentin ( makes bruises on skin, bluish nails) Lyrica ( makes ankle swelling) .   Prior procedures.  Minimally invasive laminectomy.   PT/OT: None. Per patient, Dr Sullivan told her not to have PT until October 2017.   Patient denies night fever/night sweats, bowel incontinence, significant weight loss and significant motor weakness (red flags).  Patient denies any suicidal or homicidal ideations.  She is here for chronic pain management with opiates.    Past Medical History:   Diagnosis Date    Abnormal echocardiogram 11/26/2012    Arthritis     Breast cancer 1998    Breast cancer, right breast     Cardiomyopathy due to systemic disease     Cataract     Clotting disorder     Coronary artery disease     Diabetes mellitus     madison    Diabetes mellitus type II     DM (diabetes mellitus) 11/26/2012    Encounter for blood transfusion     HTN (hypertension) 11/26/2012    Hypertension     Increased glucose level  2012    ITP (idiopathic thrombocytopenic purpura) 2012    Kidney stones 2012    PONV (postoperative nausea and vomiting)     Stenosis of lumbosacral spine 2012    Thyroid disease     Tympanic membrane perforation 9/15/2014    right 2014 Dr. Jeffries       Past Surgical History:   Procedure Laterality Date    ARTHROPLASTY, HIP Right 10/18/2018    [facility]    BREAST LUMPECTOMY Right 1998     SECTION      CHOLECYSTECTOMY      EYE SURGERY      strabismus,     HYSTERECTOMY      L3-5 Laminectomy  2017    Dr. Mendoza    Left LAMINECTOMY-MINIMALLY INVASIVE L3-4 and L4-5 Right 2017    Performed by Dave [facility] at [facility] OR    SPLENECTOMY, TOTAL  2001    from ITP    STRABISMUS SURGERY  8.30.06    OU    THYROIDECTOMY  2016    THYROIDECTOMY N/A 2016    Performed by Anish Travis MD at [facility] OR       Family History   Problem Relation Age of Onset    Heart disease Mother 69        MI    Diabetes Mother     Hypertension Mother     Diabetes Father     Hypertension Father     Stroke Father     Diabetes Sister     Diabetes Brother        Social History     Socioeconomic History    Marital status:      Spouse name: None    Number of children: 1    Years of education: None    Highest education level: None   Social Needs    Financial resource strain: None    Food insecurity - worry: None    Food insecurity - inability: None    Transportation needs - medical: None    Transportation needs - non-medical: None   Occupational History    [place]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     (has copd, arthritis), 2 kids, dtr 24, son 14 (healthy kids)       Current Outpatient Medications   Medication Sig Dispense Refill    aspirin 81 MG Chew Take 1 tablet (81 mg total) by mouth 2 (two) times daily.  0    carvedilol (COREG) 3.125 MG tablet Take 1 tablet (3.125 mg total) by mouth 2 (two) times daily. 180 tablet 11    clotrimazole (LOTRIMIN) 1 % cream Apply topically 2 (two) times daily. 113 g 11    clotrimazole-betamethasone 1-0.05% (LOTRISONE) cream Apply topically 2 (two) times daily as needed. 45 g 1    cyclobenzaprine (FLEXERIL) 10 MG tablet Take 1 tablet (10 mg total) by mouth 3 (three) times daily as needed for Muscle spasms. 90 tablet 5    docusate sodium (STOOL SOFTENER) 100 MG capsule Take 1 capsule (100 mg total) by mouth 2 (two) times daily as needed for Constipation. 180 capsule 5    fluticasone (FLONASE) 50 mcg/actuation nasal spray USE 2 SPRAYS IN EACH NOSTRIL ONCE A DAY 48 mL 11    levothyroxine (SYNTHROID) 125 MCG tablet Take 1 tablet (125 mcg total) by mouth once daily. 90 tablet 11    losartan-hydrochlorothiazide 100-12.5 mg (HYZAAR) 100-12.5 mg Tab Take 1 tablet by mouth every morning. 90 tablet 11    multivitamin (THERAGRAN) per tablet Take 1 tablet by mouth once daily.      NIFEdipine (PROCARDIA-XL) 90 MG (OSM) 24 hr tablet Take 1 tablet (90 mg total) by mouth once daily. 90 tablet 11    [START ON 3/17/2019] oxyCODONE-acetaminophen (PERCOCET) 5-325 mg per tablet Take 1 tablet by mouth every 12 (twelve) hours as needed for Pain. 60 tablet 0    [START ON 2/17/2019] oxyCODONE-acetaminophen (PERCOCET) 5-325 mg per tablet Take 1 tablet by mouth every 12 (twelve) hours as needed for Pain. 60 tablet 0    oxyCODONE-acetaminophen (PERCOCET) 5-325 mg per tablet Take 1 tablet by mouth every 12 (twelve) hours as needed for Pain. 60 tablet 0    potassium chloride SA (K-DUR,KLOR-CON) 20 MEQ tablet Take 2 tablets (40 mEq total) by mouth 2  (two) times daily. 360 tablet 11    venlafaxine (EFFEXOR-XR) 75 MG 24 hr capsule TAKE 1 CAPSULE EVERY NIGHT 90 capsule 3     No current facility-administered medications for this visit.      Facility-Administered Medications Ordered in Other Visits   Medication Dose Route Frequency Provider Last Rate Last Dose    sodium chloride 0.9% flush 3 mL  3 mL Intravenous PRN Baldomero Fischer MD           Review of patient's allergies indicates:  No Known Allergies      Review of Systems   Constitutional: Negative for appetite change, chills, fatigue, fever and unexpected weight change.   HENT: Negative for drooling, trouble swallowing and voice change.    Eyes: Negative for pain and visual disturbance.   Respiratory: Negative for shortness of breath and wheezing.    Cardiovascular: Negative for chest pain and palpitations.   Gastrointestinal: Negative for abdominal distention, abdominal pain, constipation and diarrhea.   Genitourinary: Negative for difficulty urinating.   Musculoskeletal: Positive for back pain and gait problem. Negative for arthralgias, joint swelling, myalgias and neck stiffness.               Skin: Negative for color change and rash.   Neurological: Negative for dizziness, facial asymmetry, speech difficulty, weakness, light-headedness and numbness. Headaches:     Hematological: Negative for adenopathy.   Psychiatric/Behavioral: Negative for behavioral problems, confusion and sleep disturbance. The patient is not nervous/anxious.        Objective:      Physical Exam      GENERAL: The patient is alert, oriented, pleasant.   HEENT; PERRLA  NECK: supple,    MUSCULOSKELETAL:   Gait- NT, she is in manual WC, pushed by her friend.  Cervical spine: full  AROM in cervical spine.  Lumbar spine, NT, in WC.  Straight leg raising Negative bilaterally ( sitting).  Full range of motion in all joints x4 extremities ( tested sitting).  Muscle strength 5/5 throughout x4 extremities.   No  joint laxity throughout x4  extremities.   NEUROLOGIC: Cranial nerves II through XII intact.   Deep tendon reflexes is normal, +2 in the upper and lower extremities bilaterally.   Muscle tone is normal.   Sensory is intact to light touch and pinprick throughout x4 extremities.       MRI of Lumbar spine ( 01/2017) showed:   Continued grade 1 anterolisthesis of L3 on L4.   The lumbar vertebral body height, contour and bone marrow signal is relatively stable without evidence for acute fracture.  The distal spinal cord and conus is normal in signal and contour the tip of the conus approximates the mid [id number].  T12/L1 No significant disc bulge, central canal or neural foraminal stenosis.   L1/L2: Small disc bulge with ligamentum flavum hypertrophy and facet joint arthropathy with mild central canal and bilateral neural foraminal stenosis  L2/L3: Trace disc bulge with ligamentum flavum hypertrophy and facet joint arthropathy with moderate-to-severe central canal stenosis.   There is mild resulting neuroforaminal stenosis.  L3/L4: Bulging disc with ligament flavum hypertrophy and facet arthropathy with severe central canal stenosis and mild bilateral neuroforaminal stenosis.  L4/L5: Bulging disc ligamentum flavum hypertrophy and facet joint arthropathy without significant central canal stenosis   with mild/moderate neuroforaminal stenosis bilaterally  L5/S1: Bulging disc with left paracentral disc protrusion and annular fissure. No significant central canal stenosis with mild right and   moderate left neuroforaminal stenosis.  Impression:   Continued multilevel degenerative change of the lumbar spine which remains most prominent at L3/L4 with small disc bulge with   exuberant ligamentum flavum hypertrophy and facet joint arthropathy   with severe central canal stenosis and mild neuroforaminal stenosis.  Continued bulging disc with left paracentral disc protrusion L5/S1.    Assessment:       1. Spinal stenosis of lumbar region with neurogenic  claudication    2. History of lumbar laminectomy    3. Chronic bilateral low back pain with right-sided sciatica    4. Chronic pain of right knee    5. DDD (degenerative disc disease), lumbar    6. Spondylolisthesis at L3-L4 level    7. Uncomplicated opioid use    8. Night muscle spasms    9. S/P lumbar laminectomy    10. Pain of right hip joint    11. Chronic use of opiate for therapeutic purpose    12. Spinal stenosis of lumbar region, unspecified whether neurogenic claudication present    13. Stenosis of lumbosacral spine        Plan:       Spinal stenosis of lumbar region with neurogenic claudication  -     oxyCODONE-acetaminophen (PERCOCET) 5-325 mg per tablet; Take 1 tablet by mouth every 12 (twelve) hours as needed for Pain.  Dispense: 60 tablet; Refill: 0  -     oxyCODONE-acetaminophen (PERCOCET) 5-325 mg per tablet; Take 1 tablet by mouth every 12 (twelve) hours as needed for Pain.  Dispense: 60 tablet; Refill: 0  -     oxyCODONE-acetaminophen (PERCOCET) 5-325 mg per tablet; Take 1 tablet by mouth every 12 (twelve) hours as needed for Pain.  Dispense: 60 tablet; Refill: 0  -     cyclobenzaprine (FLEXERIL) 10 MG tablet; Take 1 tablet (10 mg total) by mouth 3 (three) times daily as needed for Muscle spasms.  Dispense: 90 tablet; Refill: 5    History of lumbar laminectomy  -     oxyCODONE-acetaminophen (PERCOCET) 5-325 mg per tablet; Take 1 tablet by mouth every 12 (twelve) hours as needed for Pain.  Dispense: 60 tablet; Refill: 0  -     oxyCODONE-acetaminophen (PERCOCET) 5-325 mg per tablet; Take 1 tablet by mouth every 12 (twelve) hours as needed for Pain.  Dispense: 60 tablet; Refill: 0  -     oxyCODONE-acetaminophen (PERCOCET) 5-325 mg per tablet; Take 1 tablet by mouth every 12 (twelve) hours as needed for Pain.  Dispense: 60 tablet; Refill: 0  -     cyclobenzaprine (FLEXERIL) 10 MG tablet; Take 1 tablet (10 mg total) by mouth 3 (three) times daily as needed for Muscle spasms.  Dispense: 90 tablet;  Refill: 5    Chronic bilateral low back pain with right-sided sciatica  -     oxyCODONE-acetaminophen (PERCOCET) 5-325 mg per tablet; Take 1 tablet by mouth every 12 (twelve) hours as needed for Pain.  Dispense: 60 tablet; Refill: 0  -     oxyCODONE-acetaminophen (PERCOCET) 5-325 mg per tablet; Take 1 tablet by mouth every 12 (twelve) hours as needed for Pain.  Dispense: 60 tablet; Refill: 0  -     oxyCODONE-acetaminophen (PERCOCET) 5-325 mg per tablet; Take 1 tablet by mouth every 12 (twelve) hours as needed for Pain.  Dispense: 60 tablet; Refill: 0  -     cyclobenzaprine (FLEXERIL) 10 MG tablet; Take 1 tablet (10 mg total) by mouth 3 (three) times daily as needed for Muscle spasms.  Dispense: 90 tablet; Refill: 5    Chronic pain of right knee  -     oxyCODONE-acetaminophen (PERCOCET) 5-325 mg per tablet; Take 1 tablet by mouth every 12 (twelve) hours as needed for Pain.  Dispense: 60 tablet; Refill: 0  -     oxyCODONE-acetaminophen (PERCOCET) 5-325 mg per tablet; Take 1 tablet by mouth every 12 (twelve) hours as needed for Pain.  Dispense: 60 tablet; Refill: 0  -     oxyCODONE-acetaminophen (PERCOCET) 5-325 mg per tablet; Take 1 tablet by mouth every 12 (twelve) hours as needed for Pain.  Dispense: 60 tablet; Refill: 0  -     cyclobenzaprine (FLEXERIL) 10 MG tablet; Take 1 tablet (10 mg total) by mouth 3 (three) times daily as needed for Muscle spasms.  Dispense: 90 tablet; Refill: 5    DDD (degenerative disc disease), lumbar  -     oxyCODONE-acetaminophen (PERCOCET) 5-325 mg per tablet; Take 1 tablet by mouth every 12 (twelve) hours as needed for Pain.  Dispense: 60 tablet; Refill: 0  -     oxyCODONE-acetaminophen (PERCOCET) 5-325 mg per tablet; Take 1 tablet by mouth every 12 (twelve) hours as needed for Pain.  Dispense: 60 tablet; Refill: 0  -     oxyCODONE-acetaminophen (PERCOCET) 5-325 mg per tablet; Take 1 tablet by mouth every 12 (twelve) hours as needed for Pain.  Dispense: 60 tablet; Refill: 0  -      cyclobenzaprine (FLEXERIL) 10 MG tablet; Take 1 tablet (10 mg total) by mouth 3 (three) times daily as needed for Muscle spasms.  Dispense: 90 tablet; Refill: 5    Spondylolisthesis at L3-L4 level  -     oxyCODONE-acetaminophen (PERCOCET) 5-325 mg per tablet; Take 1 tablet by mouth every 12 (twelve) hours as needed for Pain.  Dispense: 60 tablet; Refill: 0  -     oxyCODONE-acetaminophen (PERCOCET) 5-325 mg per tablet; Take 1 tablet by mouth every 12 (twelve) hours as needed for Pain.  Dispense: 60 tablet; Refill: 0  -     oxyCODONE-acetaminophen (PERCOCET) 5-325 mg per tablet; Take 1 tablet by mouth every 12 (twelve) hours as needed for Pain.  Dispense: 60 tablet; Refill: 0  -     cyclobenzaprine (FLEXERIL) 10 MG tablet; Take 1 tablet (10 mg total) by mouth 3 (three) times daily as needed for Muscle spasms.  Dispense: 90 tablet; Refill: 5    Uncomplicated opioid use  -     oxyCODONE-acetaminophen (PERCOCET) 5-325 mg per tablet; Take 1 tablet by mouth every 12 (twelve) hours as needed for Pain.  Dispense: 60 tablet; Refill: 0  -     oxyCODONE-acetaminophen (PERCOCET) 5-325 mg per tablet; Take 1 tablet by mouth every 12 (twelve) hours as needed for Pain.  Dispense: 60 tablet; Refill: 0  -     oxyCODONE-acetaminophen (PERCOCET) 5-325 mg per tablet; Take 1 tablet by mouth every 12 (twelve) hours as needed for Pain.  Dispense: 60 tablet; Refill: 0  -     cyclobenzaprine (FLEXERIL) 10 MG tablet; Take 1 tablet (10 mg total) by mouth 3 (three) times daily as needed for Muscle spasms.  Dispense: 90 tablet; Refill: 5    Night muscle spasms  -     cyclobenzaprine (FLEXERIL) 10 MG tablet; Take 1 tablet (10 mg total) by mouth 3 (three) times daily as needed for Muscle spasms.  Dispense: 90 tablet; Refill: 5    S/P lumbar laminectomy  -     cyclobenzaprine (FLEXERIL) 10 MG tablet; Take 1 tablet (10 mg total) by mouth 3 (three) times daily as needed for Muscle spasms.  Dispense: 90 tablet; Refill: 5    Pain of right hip joint  -      cyclobenzaprine (FLEXERIL) 10 MG tablet; Take 1 tablet (10 mg total) by mouth 3 (three) times daily as needed for Muscle spasms.  Dispense: 90 tablet; Refill: 5    Chronic use of opiate for therapeutic purpose  -     cyclobenzaprine (FLEXERIL) 10 MG tablet; Take 1 tablet (10 mg total) by mouth 3 (three) times daily as needed for Muscle spasms.  Dispense: 90 tablet; Refill: 5    Spinal stenosis of lumbar region, unspecified whether neurogenic claudication present  -     cyclobenzaprine (FLEXERIL) 10 MG tablet; Take 1 tablet (10 mg total) by mouth 3 (three) times daily as needed for Muscle spasms.  Dispense: 90 tablet; Refill: 5    Stenosis of lumbosacral spine  -     cyclobenzaprine (FLEXERIL) 10 MG tablet; Take 1 tablet (10 mg total) by mouth 3 (three) times daily as needed for Muscle spasms.  Dispense: 90 tablet; Refill: 5    Patient with chronic low back pain, with neurogenic claudication secondary to multi level Lumbar spinal stenosis, moderate-to-severe at L2-3, and   Severe at L3/L4, secondary to bulging disc with ligament flavum hypertrophy and facet arthropathy,  s/p L3-5 laminectomy on 2/22/17.    -- Pain mgm,  She will resume Percocet 5/325 mg po BID  Prn, #60 tabs,   And Valium prn muscle spasms.   reviewed and appropriate.  Post surgically, she was given Hydrocodone and Tramadol #60 tabs, on 1/04/19, 12/10/18.  She would take Tramadol, one tab in am, and one in pm, and at night she would take Percocet.  Percocet refills : 12/11, 09/20, 08/20/18.  She is also on Effexor 75 mg po QHS.  Urine drug test, done on  5/15/18, appropriately pos for opiates.    RTC in 3-4 months.    Total time spent face to face with patient was 25 minutes.   More than 50% of that time was spent in counseling on diagnosis , prognosis and treatment options.   I also caunsel patient  on common and most usual side effect of prescribed medications.   Risk and benefits of opiates, possible risk of developing opiate dependence and  tolerance, need of strict compliance with prescribed medications.  Pain contract, rules and obligations were discussed with patient in details.  He is aware that I would be the only doctor prescribing him pain medications and ED in a case of emergency.I explained her that she is allowed to get pain medication from   Surgeon post operatively but she has to take only one pain medication at time given by one doctor, and every doctor has to be inform about her existing pain medications.  I reviewed Primary care , and other specialty's notes to better coordinate patient's  care.   All questions were answered, and patient voiced understanding.

## 2019-01-18 RX ORDER — DOCUSATE SODIUM 100 MG/1
100 CAPSULE, LIQUID FILLED ORAL 2 TIMES DAILY PRN
Qty: 180 CAPSULE | Refills: 5 | Status: SHIPPED | OUTPATIENT
Start: 2019-01-18 | End: 2019-04-18

## 2019-01-22 ENCOUNTER — TELEPHONE (OUTPATIENT)
Dept: INTERNAL MEDICINE | Facility: CLINIC | Age: 59
End: 2019-01-22

## 2019-01-22 DIAGNOSIS — Z12.39 ENCOUNTER FOR SCREENING FOR MALIGNANT NEOPLASM OF BREAST: Primary | ICD-10-CM

## 2019-01-23 ENCOUNTER — DOCUMENTATION ONLY (OUTPATIENT)
Dept: INTERNAL MEDICINE | Facility: CLINIC | Age: 59
End: 2019-01-23

## 2019-01-23 NOTE — TELEPHONE ENCOUNTER
Left voicemail for patient to call back regarding patient's concerns and advised of appointment.

## 2019-01-23 NOTE — TELEPHONE ENCOUNTER
Hi, please contact the patient to assist in scheduling    Orders Placed This Encounter    Mammo Digital Screening Bilat       Thank you, Rayo Hernandez

## 2019-01-29 ENCOUNTER — OFFICE VISIT (OUTPATIENT)
Dept: SPINE | Facility: CLINIC | Age: 59
End: 2019-01-29
Attending: NEUROLOGICAL SURGERY
Payer: MEDICAID

## 2019-01-29 VITALS
DIASTOLIC BLOOD PRESSURE: 58 MMHG | HEART RATE: 96 BPM | WEIGHT: 250 LBS | HEIGHT: 61 IN | BODY MASS INDEX: 47.2 KG/M2 | SYSTOLIC BLOOD PRESSURE: 116 MMHG

## 2019-01-29 DIAGNOSIS — Z98.890 S/P LUMBAR LAMINECTOMY: Primary | ICD-10-CM

## 2019-01-29 PROCEDURE — 99214 OFFICE O/P EST MOD 30 MIN: CPT | Mod: S$PBB,,, | Performed by: NEUROLOGICAL SURGERY

## 2019-01-29 PROCEDURE — 99999 PR PBB SHADOW E&M-EST. PATIENT-LVL III: CPT | Mod: PBBFAC,,, | Performed by: NEUROLOGICAL SURGERY

## 2019-01-29 PROCEDURE — 99999 PR PBB SHADOW E&M-EST. PATIENT-LVL III: ICD-10-PCS | Mod: PBBFAC,,, | Performed by: NEUROLOGICAL SURGERY

## 2019-01-29 PROCEDURE — 99213 OFFICE O/P EST LOW 20 MIN: CPT | Mod: PBBFAC | Performed by: NEUROLOGICAL SURGERY

## 2019-01-29 PROCEDURE — 99214 PR OFFICE/OUTPT VISIT, EST, LEVL IV, 30-39 MIN: ICD-10-PCS | Mod: S$PBB,,, | Performed by: NEUROLOGICAL SURGERY

## 2019-01-29 NOTE — PROGRESS NOTES
CHIEF COMPLAINT:    Low back pain    HPI:    Tanika Wayne is a 59 y.o.-year-old female who presents for neurosurgical follow-up. I last saw Tanika Wayne on 4/17/2018, and at that time she was complaining of central lower back pain. The patient's symptoms are now better with improved ability to walk. She is still having right buttock and leg pain/ She has lost a lot of weight, had hip replacement and waiting for knee replacement. Today She is complaining of central lower back pain moving into the right knee with associated paresthesias into the anterior right leg. She denies any new onset of weakness. The patient describes the pain as aching. The patient rates the pain 5 on the pain scale. She is s/p L3/4, L4-5 Laminectomy that was done by Dr. Sullivan on 2/22/2017. The pain is worse with standing, sitting and walking and better with lying down and medication. The patient currently denies any changes in bowel or bladder control.   Patient takes Oxycodone and Flexeril to alleviate pain.      (Not in a hospital admission)    Review of patient's allergies indicates:  No Known Allergies    Past Medical History:   Diagnosis Date    Abnormal echocardiogram 11/26/2012    Arthritis     Breast cancer 1998    Breast cancer, right breast     Cardiomyopathy due to systemic disease     Cataract     Clotting disorder     Coronary artery disease     Diabetes mellitus     madison    Diabetes mellitus type II     DM (diabetes mellitus) 11/26/2012    Encounter for blood transfusion     HTN (hypertension) 11/26/2012    Hypertension     Increased glucose level 11/26/2012    ITP (idiopathic thrombocytopenic purpura) 11/26/2012    Kidney stones 11/26/2012    PONV (postoperative nausea and vomiting)     Stenosis of lumbosacral spine 11/26/2012    Thyroid disease     Tympanic membrane perforation 9/15/2014    right 2014 Dr. Jeffries     Past Surgical History:   Procedure Laterality Date    ARTHROPLASTY, HIP  Right 10/18/2018    [facility]ero Fischer [facility] at [facility]    BREAST LUMPECTOMY Right 1998     SECTION      CHOLECYSTECTOMY      EYE SURGERY      strabismus, 2006    HYSTERECTOMY      L3-5 Laminectomy  2017    Dr. Mendoza    Left LAMINECTOMY-MINIMALLY INVASIVE L3-4 and L4-5 Right 2017    Performed by Dave Sullivan MD at [facility] OR    SPLENECTOMY, TOTAL  2001    from [facility]    STRABISMUS SURGERY  8.30.06    OU    THYROIDECTOMY  2016    THYROIDECTOMY N/A 2016    Performed by Anish Travis MD at [facility] OR     Family History   Problem Relation Age of Onset    Heart disease Mother 69        MI    Diabetes Mother     Hypertension Mother     Diabetes Father     Hypertension Father     Stroke Father     Diabetes Sister     Diabetes Brother      Social History     Tobacco Use    Smoking status: Never Smoker    Smokeless tobacco: Never Used   Substance Use Topics    Alcohol use: No    Drug use: No        Review of Systems:  Review of Systems    OBJECTIVE:     Vital Signs (Most Recent)       Physical Exam:    Physical Exam:  Nursing note and vitals reviewed.    Constitutional: She appears well-developed and well-nourished.     Eyes: Pupils are equal, round, and reactive to light. Conjunctivae and EOM are normal.     Cardiovascular: Normal rate.     Abdominal: Soft. Bowel sounds are normal.     Skin: Skin displays no rash on trunk and no rash on extremities. Skin displays no lesions on trunk and no lesions on extremities.     Psych/Behavior: She is alert. She is oriented to person, place, and time. She has a normal mood and affect.     Musculoskeletal: Gait is abnormal.        Neck: Range of motion is full. Muscle strength is 5/5. Tone is normal.        Back: Range of motion is limited. Muscle strength is 5/5. Tone is normal.        Right Upper Extremities: Range of motion is full. Muscle strength is 5/5. Tone is normal.        Left Upper Extremities: Range of motion is  full. Muscle strength is 5/5. Tone is normal.       Right Lower Extremities: Range of motion is full. Muscle strength is 5/5. Tone is normal.        Left Lower Extremities: Range of motion is full. Muscle strength is 5/5. Tone is normal.     Neurological:        DTRs: DTRs are DTRS NORMAL AND SYMMETRICnormal and symmetric.        Cranial nerves: Cranial nerve(s) II, III, IV, V, VI, VII, VIII, IX, X, XI and XII are intact.       Laboratory  none    Diagnostic Results:  none    ASSESSMENT/PLAN:     S/p MIS laminectomies L3-4 and L4-5. Doing better     Plan- follow up as needed.

## 2019-02-05 ENCOUNTER — HOSPITAL ENCOUNTER (OUTPATIENT)
Dept: RADIOLOGY | Facility: OTHER | Age: 59
Discharge: HOME | End: 2019-02-05
Attending: INTERNAL MEDICINE
Payer: MEDICAID

## 2019-02-05 DIAGNOSIS — Z12.39 ENCOUNTER FOR SCREENING FOR MALIGNANT NEOPLASM OF BREAST: ICD-10-CM

## 2019-02-05 PROCEDURE — 77067 MAMMO DIGITAL SCREENING BILAT WITH CAD: ICD-10-PCS | Mod: 26,,, | Performed by: RADIOLOGY

## 2019-02-05 PROCEDURE — 77067 SCR MAMMO BI INCL CAD: CPT | Mod: TC

## 2019-02-05 PROCEDURE — 77067 SCR MAMMO BI INCL CAD: CPT | Mod: 26,,, | Performed by: RADIOLOGY

## 2019-02-06 DIAGNOSIS — E11.9 TYPE 2 DIABETES MELLITUS WITHOUT COMPLICATION: ICD-10-CM

## 2019-02-14 ENCOUNTER — PATIENT MESSAGE (OUTPATIENT)
Dept: INTERNAL MEDICINE | Facility: CLINIC | Age: 59
End: 2019-02-14

## 2019-02-14 NOTE — TELEPHONE ENCOUNTER
Hi, please see if she can come in tomorrow to see a resident doctor who I will be working with -- Dr Bj Vaca has 3 open slots.  Thank you, Rayo Hernandez

## 2019-02-15 NOTE — TELEPHONE ENCOUNTER
Hi, please call her with this message, I do not think that she read the [facility] message.  Thank you, Rayo Hernandez

## 2019-02-19 ENCOUNTER — OFFICE VISIT (OUTPATIENT)
Dept: INTERNAL MEDICINE | Facility: CLINIC | Age: 59
End: 2019-02-19
Payer: MEDICAID

## 2019-02-19 VITALS
HEART RATE: 91 BPM | HEIGHT: 61 IN | DIASTOLIC BLOOD PRESSURE: 80 MMHG | SYSTOLIC BLOOD PRESSURE: 138 MMHG | WEIGHT: 249.56 LBS | BODY MASS INDEX: 47.12 KG/M2 | OXYGEN SATURATION: 99 %

## 2019-02-19 DIAGNOSIS — E11.9 TYPE 2 DIABETES MELLITUS WITHOUT COMPLICATION, WITHOUT LONG-TERM CURRENT USE OF INSULIN: Primary | ICD-10-CM

## 2019-02-19 DIAGNOSIS — M17.11 PRIMARY OSTEOARTHRITIS OF RIGHT KNEE: ICD-10-CM

## 2019-02-19 DIAGNOSIS — Z01.810 PREOP CARDIOVASCULAR EXAM: ICD-10-CM

## 2019-02-19 DIAGNOSIS — I10 ESSENTIAL HYPERTENSION: ICD-10-CM

## 2019-02-19 DIAGNOSIS — R30.0 DYSURIA: ICD-10-CM

## 2019-02-19 DIAGNOSIS — E89.0 HISTORY OF SUBTOTAL THYROIDECTOMY: ICD-10-CM

## 2019-02-19 DIAGNOSIS — E66.01 CLASS 3 SEVERE OBESITY DUE TO EXCESS CALORIES WITH SERIOUS COMORBIDITY AND BODY MASS INDEX (BMI) OF 45.0 TO 49.9 IN ADULT: ICD-10-CM

## 2019-02-19 PROCEDURE — 99213 OFFICE O/P EST LOW 20 MIN: CPT | Mod: PBBFAC | Performed by: INTERNAL MEDICINE

## 2019-02-19 PROCEDURE — 99999 PR PBB SHADOW E&M-EST. PATIENT-LVL III: CPT | Mod: PBBFAC,,, | Performed by: INTERNAL MEDICINE

## 2019-02-19 PROCEDURE — 99214 OFFICE O/P EST MOD 30 MIN: CPT | Mod: S$PBB,,, | Performed by: INTERNAL MEDICINE

## 2019-02-19 PROCEDURE — 99214 PR OFFICE/OUTPT VISIT, EST, LEVL IV, 30-39 MIN: ICD-10-PCS | Mod: S$PBB,,, | Performed by: INTERNAL MEDICINE

## 2019-02-19 PROCEDURE — 99999 PR PBB SHADOW E&M-EST. PATIENT-LVL III: ICD-10-PCS | Mod: PBBFAC,,, | Performed by: INTERNAL MEDICINE

## 2019-02-19 RX ORDER — HYDROCODONE BITARTRATE AND ACETAMINOPHEN 7.5; 325 MG/1; MG/1
1 TABLET ORAL
Refills: 0 | COMMUNITY
Start: 2018-11-19 | End: 2019-04-05

## 2019-02-19 NOTE — PROGRESS NOTES
Subjective:       Patient ID: Tanika Wayne is a 59 y.o. female.    Chief Complaint: Urinary Tract Infection (about a week or two)    Patient is here for followup for chronic conditions.    Has burning with urination and funny odor of urine. Couple week burning, no blood.    Also her ears feel full and congested.    Surgery for R knee, will be at [facility], Dr. Fischer. Here for preop appt as well.      Review of Systems   Constitutional: Negative for fatigue, fever and unexpected weight change.   HENT: Negative for congestion, hearing loss and sore throat.    Eyes: Negative for visual disturbance.   Respiratory: Negative for cough, shortness of breath and wheezing.    Cardiovascular: Negative for chest pain and palpitations.   Gastrointestinal: Negative for abdominal distention, abdominal pain, blood in stool, diarrhea, nausea and vomiting.   Genitourinary: Positive for dysuria. Negative for difficulty urinating, frequency and hematuria.   Musculoskeletal: Positive for arthralgias and gait problem. Negative for joint swelling.        Positive for pain in her right knee.   Skin: Negative for color change and rash.        Intertrigo has resolved   Neurological: Negative for dizziness, syncope, weakness and headaches.   Hematological: Negative for adenopathy.   Psychiatric/Behavioral: Negative for confusion, decreased concentration and suicidal ideas.       Objective:      Physical Exam   Constitutional: She is oriented to person, place, and time. She appears well-developed and well-nourished. No distress.   HENT:   Head: Normocephalic and atraumatic.   Mouth/Throat: No oropharyngeal exudate.   TMs clear, sinuses nontender, nasal mucosa w/o purulence.   Eyes: EOM are normal. Pupils are equal, round, and reactive to light. No scleral icterus.   Neck: Normal range of motion. Neck supple. No thyromegaly present.   Thyroid surg scar present   Cardiovascular: Normal rate, regular rhythm and normal heart sounds.  Exam reveals no gallop and no friction rub.   No murmur heard.  Pulmonary/Chest: Effort normal and breath sounds normal. No respiratory distress. She has no wheezes. She has no rales.   Abdominal: Soft. Bowel sounds are normal. She exhibits no distension and no mass. There is no tenderness. There is no rebound and no guarding.   Musculoskeletal: Normal range of motion. She exhibits no edema or tenderness.   R knee decreased rom, significant crepitus   Lymphadenopathy:     She has no cervical adenopathy.   Neurological: She is alert and oriented to person, place, and time.   Skin: She is not diaphoretic.   Psychiatric: She has a normal mood and affect. Her speech is normal and behavior is normal. Cognition and memory are normal.       Assessment:       1. Type 2 diabetes mellitus without complication, without long-term current use of insulin    2. Essential hypertension    3. Class 3 severe obesity due to excess calories with serious comorbidity and body mass index (BMI) of 45.0 to 49.9 in adult    4. Primary osteoarthritis of right knee    5. Preop cardiovascular exam    6. Dysuria    7. History of subtotal thyroidectomy        Plan:       Tanika was seen today for urinary tract infection.    Diagnoses and all orders for this visit:    Preop cardiovascular exam  -     Basic metabolic panel; Future  Pt is at low risk for moderately risky knee procedure. Assuming that UTI symptoms have resolved pt is OK to proceed with surgery, cc Dr Fischer    Type 2 diabetes mellitus without complication, without long-term current use of insulin  -     Hemoglobin A1c; Future    Essential hypertension  At goal    Class 3 severe obesity due to excess calories with serious comorbidity and body mass index (BMI) of 45.0 to 49.9 in adult  Hopefully with TKA she will be able to work more on wt loss    Primary osteoarthritis of right knee        Dysuria  -     Urinalysis  -     Urine culture  -     Urinalysis Microscopic    History of  subtotal thyroidectomy  -     TSH; Future    Ear congestion -- ? From sinuses,  Patient Instructions   Loratadine or claritin 10mg every daily as needed for congestion        Health Maintenance       Date Due Completion Date    Low Dose Statin 01/01/1982 ---    Pneumococcal Vaccine (Highest Risk) (3 of 3 - PPSV23) 11/05/2016 9/10/2016    Colonoscopy 12/10/2017 12/10/2012    Hemoglobin A1c 01/16/2019 7/16/2018    Eye Exam 05/29/2019 5/29/2018    Lipid Panel 07/16/2019 7/16/2018    Foot Exam 07/23/2019 7/23/2018    Override on 7/23/2018: Done    Mammogram 02/05/2020 2/5/2019    TETANUS VACCINE 07/01/2021 7/1/2011      Last colo was OK, colo every 10 yrs, confirm no CRC fam hx next time as well  Discuss statin next time  She is utd on pna vax    Follow-up in about 6 months (around 8/19/2019).    Future Appointments   Date Time Provider Department Center   3/14/2019  2:00 PM LAB, SAME DAY [facility]   5/16/2019 10:20 AM MD [facility]

## 2019-02-19 NOTE — Clinical Note
Hi, I do not think she did the urine test, do you know what happened? Also when will she be doing her blood work?Thank you, Rayo Hernandez

## 2019-02-19 NOTE — LETTER
February 21, 2019        Baldomero Fischer MD  [address] [facility] [id number]             [facility] - Internal Medicine  [address]  [place] [address]  Phone: [phone]  Fax: [phone]   Patient: Tanika Wayne   MR Number: [id number]   YOB: 1960   Date of Visit: 2/19/2019       Dear Dr. Fischer:    Thank you for referring Tanika Wayne to me for evaluation. Attached you will find relevant portions of my assessment and plan of care.    If you have questions, please do not hesitate to call me. I look forward to following Tanika Wayne along with you.    Sincerely,      Rayo Hernandez MD              Rayo Hernandez MD    [facility]

## 2019-02-21 ENCOUNTER — TELEPHONE (OUTPATIENT)
Dept: INTERNAL MEDICINE | Facility: CLINIC | Age: 59
End: 2019-02-21

## 2019-02-21 ENCOUNTER — PATIENT MESSAGE (OUTPATIENT)
Dept: INTERNAL MEDICINE | Facility: CLINIC | Age: 59
End: 2019-02-21

## 2019-02-21 DIAGNOSIS — N39.0 URINARY TRACT INFECTION WITHOUT HEMATURIA, SITE UNSPECIFIED: Primary | ICD-10-CM

## 2019-02-21 NOTE — TELEPHONE ENCOUNTER
----- Message from Rayo Hernandez MD sent at 2/21/2019  9:11 AM CST -----  Hi, I do not think she did the urine test, do you know what happened? Also when will she be doing her blood work?  Thank you, Rayo Hernandez

## 2019-02-21 NOTE — PROGRESS NOTES
I don't know what happened either, last I checked I gave her another cup of water and allowed her to sit in the exam room until she had to go and when I checked back she was gone, happy you remembered. Will be giving her a call this afternoon regarding this.

## 2019-02-22 ENCOUNTER — TELEPHONE (OUTPATIENT)
Dept: INTERNAL MEDICINE | Facility: CLINIC | Age: 59
End: 2019-02-22

## 2019-02-25 ENCOUNTER — LAB VISIT (OUTPATIENT)
Dept: LAB | Facility: HOSPITAL | Age: 59
End: 2019-02-25
Attending: INTERNAL MEDICINE
Payer: MEDICAID

## 2019-02-25 DIAGNOSIS — N39.0 URINARY TRACT INFECTION WITHOUT HEMATURIA, SITE UNSPECIFIED: ICD-10-CM

## 2019-02-25 LAB
BILIRUB UR QL STRIP: NEGATIVE
CLARITY UR REFRACT.AUTO: CLEAR
COLOR UR AUTO: YELLOW
GLUCOSE UR QL STRIP: NEGATIVE
HGB UR QL STRIP: NEGATIVE
KETONES UR QL STRIP: NEGATIVE
LEUKOCYTE ESTERASE UR QL STRIP: NEGATIVE
NITRITE UR QL STRIP: NEGATIVE
PH UR STRIP: 6 [PH] (ref 5–8)
PROT UR QL STRIP: NEGATIVE
SP GR UR STRIP: 1.01 (ref 1–1.03)
URN SPEC COLLECT METH UR: NORMAL

## 2019-02-25 PROCEDURE — 81003 URINALYSIS AUTO W/O SCOPE: CPT

## 2019-02-25 PROCEDURE — 87086 URINE CULTURE/COLONY COUNT: CPT

## 2019-02-26 LAB — BACTERIA UR CULT: NORMAL

## 2019-02-27 ENCOUNTER — PATIENT MESSAGE (OUTPATIENT)
Dept: INTERNAL MEDICINE | Facility: CLINIC | Age: 59
End: 2019-02-27

## 2019-02-27 RX ORDER — ALPRAZOLAM 0.25 MG/1
0.25 TABLET ORAL ONCE
Qty: 1 TABLET | Refills: 0 | Status: CANCELLED | OUTPATIENT
Start: 2019-02-27 | End: 2019-02-27

## 2019-02-27 RX ORDER — ALPRAZOLAM 0.25 MG/1
1 TABLET ORAL ONCE
COMMUNITY
End: 2019-03-04

## 2019-03-01 ENCOUNTER — PATIENT MESSAGE (OUTPATIENT)
Dept: INTERNAL MEDICINE | Facility: CLINIC | Age: 59
End: 2019-03-01

## 2019-03-01 DIAGNOSIS — G47.09 OTHER INSOMNIA: Primary | ICD-10-CM

## 2019-03-04 ENCOUNTER — PATIENT MESSAGE (OUTPATIENT)
Dept: INTERNAL MEDICINE | Facility: CLINIC | Age: 59
End: 2019-03-04

## 2019-03-04 RX ORDER — ALPRAZOLAM 0.25 MG/1
0.25 TABLET ORAL DAILY PRN
Qty: 30 TABLET | Refills: 2 | Status: SHIPPED | OUTPATIENT
Start: 2019-03-04 | End: 2019-08-13

## 2019-03-12 ENCOUNTER — TELEPHONE (OUTPATIENT)
Dept: PHYSICAL MEDICINE AND REHAB | Facility: CLINIC | Age: 59
End: 2019-03-12

## 2019-03-12 NOTE — TELEPHONE ENCOUNTER
Left a message with the claim number asking that they send any forms that need to be filled out to our fax, left our fax and office number.    ----- Message from Howard Kim sent at 3/12/2019 10:19 AM CDT -----  Needs Advice    Reason for call: Pt states insurance company is needing the doctor to call them, due to claim being denied. Pls call , ext [phone] (claim# [id number]).        Communication Preference: [phone]    Additional Information:

## 2019-03-14 ENCOUNTER — LAB VISIT (OUTPATIENT)
Dept: LAB | Facility: HOSPITAL | Age: 59
End: 2019-03-14
Attending: INTERNAL MEDICINE
Payer: MEDICARE

## 2019-03-14 DIAGNOSIS — E89.0 HISTORY OF SUBTOTAL THYROIDECTOMY: ICD-10-CM

## 2019-03-14 DIAGNOSIS — E11.9 TYPE 2 DIABETES MELLITUS WITHOUT COMPLICATION, WITHOUT LONG-TERM CURRENT USE OF INSULIN: ICD-10-CM

## 2019-03-14 DIAGNOSIS — Z01.810 PREOP CARDIOVASCULAR EXAM: ICD-10-CM

## 2019-03-14 LAB
ANION GAP SERPL CALC-SCNC: 9 MMOL/L
BUN SERPL-MCNC: 16 MG/DL
CALCIUM SERPL-MCNC: 9.7 MG/DL
CHLORIDE SERPL-SCNC: 102 MMOL/L
CO2 SERPL-SCNC: 29 MMOL/L
CREAT SERPL-MCNC: 0.9 MG/DL
EST. GFR  (AFRICAN AMERICAN): >60 ML/MIN/1.73 M^2
EST. GFR  (NON AFRICAN AMERICAN): >60 ML/MIN/1.73 M^2
ESTIMATED AVG GLUCOSE: 163 MG/DL
GLUCOSE SERPL-MCNC: 113 MG/DL
HBA1C MFR BLD HPLC: 7.3 %
POTASSIUM SERPL-SCNC: 4.5 MMOL/L
SODIUM SERPL-SCNC: 140 MMOL/L
TSH SERPL DL<=0.005 MIU/L-ACNC: 3.01 UIU/ML

## 2019-03-14 PROCEDURE — 83036 HEMOGLOBIN GLYCOSYLATED A1C: CPT | Mod: HCNC

## 2019-03-14 PROCEDURE — 80048 BASIC METABOLIC PNL TOTAL CA: CPT | Mod: HCNC

## 2019-03-14 PROCEDURE — 36415 COLL VENOUS BLD VENIPUNCTURE: CPT | Mod: HCNC

## 2019-03-14 PROCEDURE — 84443 ASSAY THYROID STIM HORMONE: CPT | Mod: HCNC

## 2019-03-26 ENCOUNTER — TELEPHONE (OUTPATIENT)
Dept: INTERNAL MEDICINE | Facility: CLINIC | Age: 59
End: 2019-03-26

## 2019-03-26 NOTE — TELEPHONE ENCOUNTER
----- Message from Stacy Alegria sent at 3/26/2019  3:52 PM CDT -----  Contact: [facility] [phone] Ref [id number]  PA for NIFEdipine (PROCARDIA-XL) 90 MG (OSM) 24 hr tablet 90 tablet

## 2019-03-28 NOTE — TELEPHONE ENCOUNTER
Spoke with [facility] [employer], answered some additional question, they will fax the decision.

## 2019-04-05 ENCOUNTER — HOSPITAL ENCOUNTER (OUTPATIENT)
Dept: PREADMISSION TESTING | Facility: OTHER | Age: 59
Discharge: HOME | End: 2019-04-05
Attending: ORTHOPAEDIC SURGERY
Payer: OTHER MISCELLANEOUS

## 2019-04-05 VITALS
HEIGHT: 61 IN | OXYGEN SATURATION: 100 % | HEART RATE: 98 BPM | TEMPERATURE: 99 F | SYSTOLIC BLOOD PRESSURE: 146 MMHG | DIASTOLIC BLOOD PRESSURE: 76 MMHG | BODY MASS INDEX: 47.2 KG/M2 | WEIGHT: 250 LBS

## 2019-04-05 LAB
BACTERIA #/AREA URNS HPF: ABNORMAL /HPF
BASOPHILS # BLD AUTO: 0.03 K/UL (ref 0–0.2)
BASOPHILS NFR BLD: 0.4 % (ref 0–1.9)
BILIRUB UR QL STRIP: NEGATIVE
CLARITY UR: CLEAR
COLOR UR: YELLOW
DIFFERENTIAL METHOD: ABNORMAL
EOSINOPHIL # BLD AUTO: 0.2 K/UL (ref 0–0.5)
EOSINOPHIL NFR BLD: 2.3 % (ref 0–8)
ERYTHROCYTE [DISTWIDTH] IN BLOOD BY AUTOMATED COUNT: 15.5 % (ref 11.5–14.5)
GLUCOSE UR QL STRIP: NEGATIVE
HCT VFR BLD AUTO: 41.9 % (ref 37–48.5)
HGB BLD-MCNC: 13.4 G/DL (ref 12–16)
HGB UR QL STRIP: NEGATIVE
HYALINE CASTS #/AREA URNS LPF: 12 /LPF
KETONES UR QL STRIP: ABNORMAL
LEUKOCYTE ESTERASE UR QL STRIP: NEGATIVE
LYMPHOCYTES # BLD AUTO: 4.4 K/UL (ref 1–4.8)
LYMPHOCYTES NFR BLD: 63.1 % (ref 18–48)
MCH RBC QN AUTO: 27.1 PG (ref 27–31)
MCHC RBC AUTO-ENTMCNC: 32 G/DL (ref 32–36)
MCV RBC AUTO: 85 FL (ref 82–98)
MICROSCOPIC COMMENT: ABNORMAL
MONOCYTES # BLD AUTO: 0.6 K/UL (ref 0.3–1)
MONOCYTES NFR BLD: 8.1 % (ref 4–15)
NEUTROPHILS # BLD AUTO: 1.8 K/UL (ref 1.8–7.7)
NEUTROPHILS NFR BLD: 25.8 % (ref 38–73)
NITRITE UR QL STRIP: NEGATIVE
NON-SQ EPI CELLS #/AREA URNS HPF: 0 /HPF
PH UR STRIP: 7 [PH] (ref 5–8)
PLATELET # BLD AUTO: 362 K/UL (ref 150–350)
PMV BLD AUTO: 9.5 FL (ref 9.2–12.9)
PROT UR QL STRIP: ABNORMAL
RBC # BLD AUTO: 4.95 M/UL (ref 4–5.4)
RBC #/AREA URNS HPF: 2 /HPF (ref 0–4)
SP GR UR STRIP: 1.02 (ref 1–1.03)
SQUAMOUS #/AREA URNS HPF: 3 /HPF
URN SPEC COLLECT METH UR: ABNORMAL
UROBILINOGEN UR STRIP-ACNC: 1 EU/DL
WBC # BLD AUTO: 6.89 K/UL (ref 3.9–12.7)
WBC #/AREA URNS HPF: 5 /HPF (ref 0–5)
WBC CLUMPS URNS QL MICRO: ABNORMAL
YEAST URNS QL MICRO: ABNORMAL

## 2019-04-05 PROCEDURE — 36415 COLL VENOUS BLD VENIPUNCTURE: CPT

## 2019-04-05 PROCEDURE — 81000 URINALYSIS NONAUTO W/SCOPE: CPT

## 2019-04-05 PROCEDURE — 85025 COMPLETE CBC W/AUTO DIFF WBC: CPT

## 2019-04-05 RX ORDER — SODIUM CHLORIDE, SODIUM LACTATE, POTASSIUM CHLORIDE, CALCIUM CHLORIDE 600; 310; 30; 20 MG/100ML; MG/100ML; MG/100ML; MG/100ML
INJECTION, SOLUTION INTRAVENOUS CONTINUOUS
Status: CANCELLED | OUTPATIENT
Start: 2019-04-05

## 2019-04-05 NOTE — DISCHARGE INSTRUCTIONS
PRE-ADMIT TESTING -  [phone]    [address]  [place] [facility]          Your surgery has been scheduled at [facility]. We are pleased to have the opportunity to serve you. For Further Information please call [phone].    On the day of surgery please report to the Information Desk on the 1st floor.    · CONTACT YOUR PHYSICIAN'S OFFICE THE DAY PRIOR TO YOUR SURGERY TO OBTAIN YOUR ARRIVAL TIME.     · The evening before surgery do not eat anything after 9 p.m. ( this includes hard candy, chewing gum and mints).  You may only have GATORADE, POWERADE AND WATER  from 9 p.m. until you leave your home.   DO NOT DRINK ANY LIQUIDS ON THE WAY TO THE HOSPITAL.      SPECIAL MEDICATION INSTRUCTIONS: TAKE medications checked off by the Anesthesiologist on your Medication List.    Angiogram Patients: Take medications as instructed by your physician, including aspirin.     Surgery Patients:    If you take ASPIRIN - Your PHYSICIAN/SURGEON will need to inform you IF/OR when you need to stop taking aspirin prior to your surgery.     Do Not take any medications containing IBUPROFEN.  Do Not Wear any make-up or dark nail polish   (especially eye make-up) to surgery. If you come to surgery with makeup on you will be required to remove the makeup or nail polish.    Do not shave your surgical area at least 5 days prior to your surgery. The surgical prep will be performed at the hospital according to Infection Control regulations.    Leave all valuables at home.   Do Not wear any jewelry or watches, including any metal in body piercings. Jewelry must be removed prior to coming to the hospital.  There is a possibility that rings that are unable to be removed may be cut off if they are on the surgical extremity.    Contact Lens must be removed before surgery. Either do not wear the contact lens or bring a case and solution for storage.  Please bring a container for eyeglasses or dentures as required.  Bring  any paperwork your physician has provided, such as consent forms,  history and physicals, doctor's orders, etc.   Bring comfortable clothes that are loose fitting to wear upon discharge. Take into consideration the type of surgery being performed.  Maintain your diet as advised per your physician the day prior to surgery.      Adequate rest the night before surgery is advised.   Park in the Parking lot behind the hospital or in the [facility] across the street from the parking lot. Parking is complimentary.  If you will be discharged the same day as your procedure, please arrange for a responsible adult to drive you home or to accompany you if traveling by taxi.   YOU WILL NOT BE PERMITTED TO DRIVE OR TO LEAVE THE HOSPITAL ALONE AFTER SURGERY.   It is strongly recommended that you arrange for someone to remain with you for the first 24 hrs following your surgery.       Thank you for your cooperation.  The Staff of [facility].                Bathing Instructions with Hibiclens     Shower the evening before and morning of your procedure with Hibiclens:   Wash your face with water and your regular face wash/soap   Apply Hibiclens directly on your skin or on a wet washcloth and wash gently. When showering: Move away from the shower stream when applying Hibiclens to avoid rinsing off too soon.   Rinse thoroughly with warm water   Do not dilute Hibiclens         Dry off as usual, do not use any deodorant, powder, body lotions, perfume, after shave or cologne.

## 2019-04-11 ENCOUNTER — ANESTHESIA EVENT (OUTPATIENT)
Dept: SURGERY | Facility: OTHER | Age: 59
End: 2019-04-11
Payer: OTHER MISCELLANEOUS

## 2019-04-11 ENCOUNTER — HOSPITAL ENCOUNTER (OUTPATIENT)
Facility: OTHER | Age: 59
Discharge: HOME HEALTH | End: 2019-04-12
Attending: ORTHOPAEDIC SURGERY | Admitting: ORTHOPAEDIC SURGERY
Payer: OTHER MISCELLANEOUS

## 2019-04-11 ENCOUNTER — ANESTHESIA (OUTPATIENT)
Dept: SURGERY | Facility: OTHER | Age: 59
End: 2019-04-11
Payer: OTHER MISCELLANEOUS

## 2019-04-11 DIAGNOSIS — M17.9 OA (OSTEOARTHRITIS) OF KNEE: ICD-10-CM

## 2019-04-11 LAB — POCT GLUCOSE: 131 MG/DL (ref 70–110)

## 2019-04-11 PROCEDURE — C1713 ANCHOR/SCREW BN/BN,TIS/BN: HCPCS | Performed by: ORTHOPAEDIC SURGERY

## 2019-04-11 PROCEDURE — 71000033 HC RECOVERY, INTIAL HOUR: Performed by: ORTHOPAEDIC SURGERY

## 2019-04-11 PROCEDURE — 37000008 HC ANESTHESIA 1ST 15 MINUTES: Performed by: ORTHOPAEDIC SURGERY

## 2019-04-11 PROCEDURE — C9290 INJ, BUPIVACAINE LIPOSOME: HCPCS | Performed by: ORTHOPAEDIC SURGERY

## 2019-04-11 PROCEDURE — 94761 N-INVAS EAR/PLS OXIMETRY MLT: CPT

## 2019-04-11 PROCEDURE — 63600175 PHARM REV CODE 636 W HCPCS: Performed by: ORTHOPAEDIC SURGERY

## 2019-04-11 PROCEDURE — C1776 JOINT DEVICE (IMPLANTABLE): HCPCS | Performed by: ORTHOPAEDIC SURGERY

## 2019-04-11 PROCEDURE — 25000003 PHARM REV CODE 250: Performed by: SPECIALIST

## 2019-04-11 PROCEDURE — 36000711: Performed by: ORTHOPAEDIC SURGERY

## 2019-04-11 PROCEDURE — 37000009 HC ANESTHESIA EA ADD 15 MINS: Performed by: ORTHOPAEDIC SURGERY

## 2019-04-11 PROCEDURE — 27201423 OPTIME MED/SURG SUP & DEVICES STERILE SUPPLY: Performed by: ORTHOPAEDIC SURGERY

## 2019-04-11 PROCEDURE — 36000710: Performed by: ORTHOPAEDIC SURGERY

## 2019-04-11 PROCEDURE — 97162 PT EVAL MOD COMPLEX 30 MIN: CPT

## 2019-04-11 PROCEDURE — 94799 UNLISTED PULMONARY SVC/PX: CPT

## 2019-04-11 PROCEDURE — 63600175 PHARM REV CODE 636 W HCPCS: Performed by: SPECIALIST

## 2019-04-11 PROCEDURE — 71000039 HC RECOVERY, EACH ADD'L HOUR: Performed by: ORTHOPAEDIC SURGERY

## 2019-04-11 PROCEDURE — 25000003 PHARM REV CODE 250: Performed by: ORTHOPAEDIC SURGERY

## 2019-04-11 PROCEDURE — 63600175 PHARM REV CODE 636 W HCPCS: Performed by: NURSE ANESTHETIST, CERTIFIED REGISTERED

## 2019-04-11 PROCEDURE — 97110 THERAPEUTIC EXERCISES: CPT

## 2019-04-11 PROCEDURE — 25000003 PHARM REV CODE 250: Performed by: NURSE PRACTITIONER

## 2019-04-11 PROCEDURE — 97116 GAIT TRAINING THERAPY: CPT

## 2019-04-11 DEVICE — INSERT TIB POST 10X71-75MM: Type: IMPLANTABLE DEVICE | Site: KNEE | Status: FUNCTIONAL

## 2019-04-11 DEVICE — PATELLA COMPONENT3PEG 34X8.5MM: Type: IMPLANTABLE DEVICE | Site: KNEE | Status: FUNCTIONAL

## 2019-04-11 DEVICE — COMP FEM POST STBL 67.5MM R: Type: IMPLANTABLE DEVICE | Site: KNEE | Status: FUNCTIONAL

## 2019-04-11 DEVICE — TIBIAL TRAY CRUCIATE 75MM: Type: IMPLANTABLE DEVICE | Site: KNEE | Status: FUNCTIONAL

## 2019-04-11 DEVICE — CEMENT BONE STD: Type: IMPLANTABLE DEVICE | Site: KNEE | Status: FUNCTIONAL

## 2019-04-11 RX ORDER — NIFEDIPINE 30 MG/1
90 TABLET, EXTENDED RELEASE ORAL DAILY
Status: DISCONTINUED | OUTPATIENT
Start: 2019-04-12 | End: 2019-04-12 | Stop reason: HOSPADM

## 2019-04-11 RX ORDER — NAPROXEN SODIUM 220 MG/1
81 TABLET, FILM COATED ORAL 2 TIMES DAILY
Status: DISCONTINUED | OUTPATIENT
Start: 2019-04-11 | End: 2019-04-11 | Stop reason: SDUPTHER

## 2019-04-11 RX ORDER — NAPROXEN SODIUM 220 MG/1
81 TABLET, FILM COATED ORAL 2 TIMES DAILY
Status: DISCONTINUED | OUTPATIENT
Start: 2019-04-11 | End: 2019-04-12 | Stop reason: HOSPADM

## 2019-04-11 RX ORDER — RAMELTEON 8 MG/1
8 TABLET ORAL NIGHTLY PRN
Status: DISCONTINUED | OUTPATIENT
Start: 2019-04-11 | End: 2019-04-12 | Stop reason: HOSPADM

## 2019-04-11 RX ORDER — CEFAZOLIN SODIUM 2 G/50ML
2 SOLUTION INTRAVENOUS
Status: COMPLETED | OUTPATIENT
Start: 2019-04-11 | End: 2019-04-11

## 2019-04-11 RX ORDER — ONDANSETRON 8 MG/1
8 TABLET, ORALLY DISINTEGRATING ORAL EVERY 8 HOURS PRN
Status: DISCONTINUED | OUTPATIENT
Start: 2019-04-11 | End: 2019-04-12 | Stop reason: HOSPADM

## 2019-04-11 RX ORDER — SODIUM CHLORIDE 0.9 % (FLUSH) 0.9 %
5 SYRINGE (ML) INJECTION
Status: DISCONTINUED | OUTPATIENT
Start: 2019-04-11 | End: 2019-04-12 | Stop reason: HOSPADM

## 2019-04-11 RX ORDER — BUPIVACAINE HCL/EPINEPHRINE 0.25-.0005
VIAL (ML) INJECTION
Status: DISCONTINUED | OUTPATIENT
Start: 2019-04-11 | End: 2019-04-11 | Stop reason: HOSPADM

## 2019-04-11 RX ORDER — DOCUSATE SODIUM 100 MG/1
100 CAPSULE, LIQUID FILLED ORAL 2 TIMES DAILY PRN
Status: DISCONTINUED | OUTPATIENT
Start: 2019-04-11 | End: 2019-04-12 | Stop reason: HOSPADM

## 2019-04-11 RX ORDER — HYDROMORPHONE HYDROCHLORIDE 2 MG/ML
0.4 INJECTION, SOLUTION INTRAMUSCULAR; INTRAVENOUS; SUBCUTANEOUS EVERY 5 MIN PRN
Status: DISCONTINUED | OUTPATIENT
Start: 2019-04-11 | End: 2019-04-11

## 2019-04-11 RX ORDER — TRANEXAMIC ACID 100 MG/ML
INJECTION, SOLUTION INTRAVENOUS
Status: DISCONTINUED | OUTPATIENT
Start: 2019-04-11 | End: 2019-04-11 | Stop reason: HOSPADM

## 2019-04-11 RX ORDER — MUPIROCIN 20 MG/G
1 OINTMENT TOPICAL 2 TIMES DAILY
Status: DISCONTINUED | OUTPATIENT
Start: 2019-04-11 | End: 2019-04-12 | Stop reason: HOSPADM

## 2019-04-11 RX ORDER — CEFAZOLIN SODIUM 1 G/3ML
2 INJECTION, POWDER, FOR SOLUTION INTRAMUSCULAR; INTRAVENOUS
Status: COMPLETED | OUTPATIENT
Start: 2019-04-11 | End: 2019-04-11

## 2019-04-11 RX ORDER — OXYCODONE AND ACETAMINOPHEN 10; 325 MG/1; MG/1
1 TABLET ORAL EVERY 4 HOURS PRN
Status: DISCONTINUED | OUTPATIENT
Start: 2019-04-11 | End: 2019-04-12 | Stop reason: HOSPADM

## 2019-04-11 RX ORDER — DEXTROSE MONOHYDRATE AND SODIUM CHLORIDE 5; .9 G/100ML; G/100ML
INJECTION, SOLUTION INTRAVENOUS CONTINUOUS
Status: DISCONTINUED | OUTPATIENT
Start: 2019-04-11 | End: 2019-04-11

## 2019-04-11 RX ORDER — VENLAFAXINE HYDROCHLORIDE 75 MG/1
75 CAPSULE, EXTENDED RELEASE ORAL DAILY
Status: DISCONTINUED | OUTPATIENT
Start: 2019-04-11 | End: 2019-04-12 | Stop reason: HOSPADM

## 2019-04-11 RX ORDER — LOSARTAN POTASSIUM AND HYDROCHLOROTHIAZIDE 12.5; 1 MG/1; MG/1
1 TABLET ORAL EVERY MORNING
Status: DISCONTINUED | OUTPATIENT
Start: 2019-04-11 | End: 2019-04-11 | Stop reason: SDUPTHER

## 2019-04-11 RX ORDER — POTASSIUM CHLORIDE 20 MEQ/1
40 TABLET, EXTENDED RELEASE ORAL 2 TIMES DAILY
Status: DISCONTINUED | OUTPATIENT
Start: 2019-04-11 | End: 2019-04-11

## 2019-04-11 RX ORDER — OXYCODONE HYDROCHLORIDE 5 MG/1
5 TABLET ORAL
Status: DISCONTINUED | OUTPATIENT
Start: 2019-04-11 | End: 2019-04-11

## 2019-04-11 RX ORDER — LIDOCAINE HCL/PF 100 MG/5ML
SYRINGE (ML) INTRAVENOUS
Status: DISCONTINUED | OUTPATIENT
Start: 2019-04-11 | End: 2019-04-11

## 2019-04-11 RX ORDER — NIFEDIPINE 30 MG/1
90 TABLET, EXTENDED RELEASE ORAL DAILY
Status: DISCONTINUED | OUTPATIENT
Start: 2019-04-11 | End: 2019-04-11

## 2019-04-11 RX ORDER — ALPRAZOLAM 0.25 MG/1
0.25 TABLET ORAL DAILY PRN
Status: DISCONTINUED | OUTPATIENT
Start: 2019-04-11 | End: 2019-04-12 | Stop reason: HOSPADM

## 2019-04-11 RX ORDER — LEVOTHYROXINE SODIUM 125 UG/1
125 TABLET ORAL DAILY
Status: DISCONTINUED | OUTPATIENT
Start: 2019-04-11 | End: 2019-04-12 | Stop reason: HOSPADM

## 2019-04-11 RX ORDER — MUPIROCIN 20 MG/G
OINTMENT TOPICAL
Status: DISCONTINUED | OUTPATIENT
Start: 2019-04-11 | End: 2019-04-11

## 2019-04-11 RX ORDER — LOSARTAN POTASSIUM 50 MG/1
100 TABLET ORAL EVERY MORNING
Status: DISCONTINUED | OUTPATIENT
Start: 2019-04-11 | End: 2019-04-12 | Stop reason: HOSPADM

## 2019-04-11 RX ORDER — POLYETHYLENE GLYCOL 3350 17 G/17G
17 POWDER, FOR SOLUTION ORAL DAILY
Status: DISCONTINUED | OUTPATIENT
Start: 2019-04-11 | End: 2019-04-12 | Stop reason: HOSPADM

## 2019-04-11 RX ORDER — ONDANSETRON 2 MG/ML
4 INJECTION INTRAMUSCULAR; INTRAVENOUS DAILY PRN
Status: DISCONTINUED | OUTPATIENT
Start: 2019-04-11 | End: 2019-04-11

## 2019-04-11 RX ORDER — OXYCODONE AND ACETAMINOPHEN 5; 325 MG/1; MG/1
2 TABLET ORAL ONCE
Status: DISCONTINUED | OUTPATIENT
Start: 2019-04-11 | End: 2019-04-11

## 2019-04-11 RX ORDER — SODIUM CHLORIDE 9 MG/ML
INJECTION, SOLUTION INTRAVENOUS CONTINUOUS
Status: DISCONTINUED | OUTPATIENT
Start: 2019-04-11 | End: 2019-04-12 | Stop reason: HOSPADM

## 2019-04-11 RX ORDER — SODIUM CHLORIDE 0.9 % (FLUSH) 0.9 %
3 SYRINGE (ML) INJECTION
Status: DISCONTINUED | OUTPATIENT
Start: 2019-04-11 | End: 2019-04-12 | Stop reason: HOSPADM

## 2019-04-11 RX ORDER — CARVEDILOL 3.12 MG/1
3.12 TABLET ORAL 2 TIMES DAILY
Status: DISCONTINUED | OUTPATIENT
Start: 2019-04-11 | End: 2019-04-12 | Stop reason: HOSPADM

## 2019-04-11 RX ORDER — MIDAZOLAM HYDROCHLORIDE 1 MG/ML
INJECTION INTRAMUSCULAR; INTRAVENOUS
Status: DISCONTINUED | OUTPATIENT
Start: 2019-04-11 | End: 2019-04-11

## 2019-04-11 RX ORDER — ROPIVACAINE HYDROCHLORIDE 5 MG/ML
INJECTION, SOLUTION EPIDURAL; INFILTRATION; PERINEURAL
Status: COMPLETED | OUTPATIENT
Start: 2019-04-11 | End: 2019-04-11

## 2019-04-11 RX ORDER — MORPHINE SULFATE 10 MG/ML
4 INJECTION INTRAMUSCULAR; INTRAVENOUS; SUBCUTANEOUS
Status: DISCONTINUED | OUTPATIENT
Start: 2019-04-11 | End: 2019-04-12 | Stop reason: HOSPADM

## 2019-04-11 RX ORDER — PROPOFOL 10 MG/ML
VIAL (ML) INTRAVENOUS CONTINUOUS PRN
Status: DISCONTINUED | OUTPATIENT
Start: 2019-04-11 | End: 2019-04-11

## 2019-04-11 RX ORDER — FAMOTIDINE 20 MG/1
20 TABLET, FILM COATED ORAL 2 TIMES DAILY
Status: DISCONTINUED | OUTPATIENT
Start: 2019-04-11 | End: 2019-04-12 | Stop reason: HOSPADM

## 2019-04-11 RX ORDER — MEPERIDINE HYDROCHLORIDE 25 MG/ML
12.5 INJECTION INTRAMUSCULAR; INTRAVENOUS; SUBCUTANEOUS ONCE AS NEEDED
Status: DISCONTINUED | OUTPATIENT
Start: 2019-04-11 | End: 2019-04-11

## 2019-04-11 RX ORDER — OXYCODONE AND ACETAMINOPHEN 5; 325 MG/1; MG/1
1 TABLET ORAL EVERY 4 HOURS PRN
Status: DISCONTINUED | OUTPATIENT
Start: 2019-04-11 | End: 2019-04-12 | Stop reason: HOSPADM

## 2019-04-11 RX ORDER — CYCLOBENZAPRINE HCL 10 MG
10 TABLET ORAL 2 TIMES DAILY
Status: DISCONTINUED | OUTPATIENT
Start: 2019-04-11 | End: 2019-04-12 | Stop reason: HOSPADM

## 2019-04-11 RX ORDER — HYDROCHLOROTHIAZIDE 12.5 MG/1
12.5 TABLET ORAL EVERY MORNING
Status: DISCONTINUED | OUTPATIENT
Start: 2019-04-11 | End: 2019-04-12 | Stop reason: HOSPADM

## 2019-04-11 RX ORDER — DIPHENHYDRAMINE HYDROCHLORIDE 50 MG/ML
25 INJECTION INTRAMUSCULAR; INTRAVENOUS EVERY 6 HOURS PRN
Status: DISCONTINUED | OUTPATIENT
Start: 2019-04-11 | End: 2019-04-11

## 2019-04-11 RX ORDER — SODIUM CHLORIDE, SODIUM LACTATE, POTASSIUM CHLORIDE, CALCIUM CHLORIDE 600; 310; 30; 20 MG/100ML; MG/100ML; MG/100ML; MG/100ML
INJECTION, SOLUTION INTRAVENOUS CONTINUOUS PRN
Status: DISCONTINUED | OUTPATIENT
Start: 2019-04-11 | End: 2019-04-11

## 2019-04-11 RX ADMIN — LIDOCAINE HYDROCHLORIDE 100 MG: 20 INJECTION, SOLUTION INTRAVENOUS at 10:04

## 2019-04-11 RX ADMIN — OXYCODONE AND ACETAMINOPHEN 1 TABLET: 10; 325 TABLET ORAL at 12:04

## 2019-04-11 RX ADMIN — MORPHINE SULFATE 4 MG: 10 INJECTION INTRAVENOUS at 05:04

## 2019-04-11 RX ADMIN — MUPIROCIN: 20 OINTMENT TOPICAL at 07:04

## 2019-04-11 RX ADMIN — CEFAZOLIN SODIUM 2 G: 2 SOLUTION INTRAVENOUS at 12:04

## 2019-04-11 RX ADMIN — ROPIVACAINE HYDROCHLORIDE 3 ML: 5 INJECTION, SOLUTION EPIDURAL; INFILTRATION; PERINEURAL at 08:04

## 2019-04-11 RX ADMIN — PROPOFOL 75 MCG/KG/MIN: 10 INJECTION, EMULSION INTRAVENOUS at 08:04

## 2019-04-11 RX ADMIN — SODIUM CHLORIDE: 0.9 INJECTION, SOLUTION INTRAVENOUS at 11:04

## 2019-04-11 RX ADMIN — POLYETHYLENE GLYCOL 3350 17 G: 17 POWDER, FOR SOLUTION ORAL at 12:04

## 2019-04-11 RX ADMIN — OXYCODONE AND ACETAMINOPHEN 1 TABLET: 10; 325 TABLET ORAL at 02:04

## 2019-04-11 RX ADMIN — LOSARTAN POTASSIUM 100 MG: 50 TABLET, FILM COATED ORAL at 12:04

## 2019-04-11 RX ADMIN — CEFAZOLIN 2 G: 330 INJECTION, POWDER, FOR SOLUTION INTRAMUSCULAR; INTRAVENOUS at 08:04

## 2019-04-11 RX ADMIN — MUPIROCIN 1 G: 20 OINTMENT TOPICAL at 12:04

## 2019-04-11 RX ADMIN — ASPIRIN 81 MG CHEWABLE TABLET 81 MG: 81 TABLET CHEWABLE at 09:04

## 2019-04-11 RX ADMIN — CYCLOBENZAPRINE HYDROCHLORIDE 10 MG: 10 TABLET, FILM COATED ORAL at 12:04

## 2019-04-11 RX ADMIN — SODIUM CHLORIDE, SODIUM LACTATE, POTASSIUM CHLORIDE, AND CALCIUM CHLORIDE: 600; 310; 30; 20 INJECTION, SOLUTION INTRAVENOUS at 09:04

## 2019-04-11 RX ADMIN — OXYCODONE AND ACETAMINOPHEN 1 TABLET: 10; 325 TABLET ORAL at 10:04

## 2019-04-11 RX ADMIN — MORPHINE SULFATE 4 MG: 10 INJECTION INTRAVENOUS at 08:04

## 2019-04-11 RX ADMIN — FAMOTIDINE 20 MG: 20 TABLET, FILM COATED ORAL at 09:04

## 2019-04-11 RX ADMIN — FAMOTIDINE 20 MG: 20 TABLET, FILM COATED ORAL at 12:04

## 2019-04-11 RX ADMIN — HYDROCHLOROTHIAZIDE 12.5 MG: 12.5 TABLET ORAL at 12:04

## 2019-04-11 RX ADMIN — CYCLOBENZAPRINE HYDROCHLORIDE 10 MG: 10 TABLET, FILM COATED ORAL at 09:04

## 2019-04-11 RX ADMIN — MIDAZOLAM HYDROCHLORIDE 2 MG: 1 INJECTION, SOLUTION INTRAMUSCULAR; INTRAVENOUS at 08:04

## 2019-04-11 RX ADMIN — ONDANSETRON 8 MG: 8 TABLET, ORALLY DISINTEGRATING ORAL at 12:04

## 2019-04-11 RX ADMIN — ALPRAZOLAM 0.25 MG: 0.25 TABLET ORAL at 06:04

## 2019-04-11 RX ADMIN — CEFAZOLIN SODIUM 2 G: 2 SOLUTION INTRAVENOUS at 08:04

## 2019-04-11 RX ADMIN — OXYCODONE AND ACETAMINOPHEN 1 TABLET: 10; 325 TABLET ORAL at 06:04

## 2019-04-11 RX ADMIN — SODIUM CHLORIDE: 0.9 INJECTION, SOLUTION INTRAVENOUS at 10:04

## 2019-04-11 RX ADMIN — CARVEDILOL 3.12 MG: 3.12 TABLET, FILM COATED ORAL at 09:04

## 2019-04-11 RX ADMIN — MUPIROCIN 1 G: 20 OINTMENT TOPICAL at 09:04

## 2019-04-11 RX ADMIN — SODIUM CHLORIDE, SODIUM LACTATE, POTASSIUM CHLORIDE, AND CALCIUM CHLORIDE: 600; 310; 30; 20 INJECTION, SOLUTION INTRAVENOUS at 07:04

## 2019-04-11 NOTE — H&P
Subjective:     And status post recent hip replacement.  Doing well.  Now main problem is her knee.  She's lost weight.  Progressive pain and deformity right knee.  Was aggravated with fall at work.  Admitted for right knee replacement    Patient Active Problem List    Diagnosis Date Noted    Class 3 severe obesity due to excess calories with serious comorbidity and body mass index (BMI) of 45.0 to 49.9 in adult 07/23/2018    Uncomplicated opioid use 05/15/2018    History of lumbar laminectomy 11/24/2017    Obesity, morbid, BMI 50 or higher 10/10/2017    Chronic bilateral low back pain with right-sided sciatica 08/11/2017    Chronic use of opiate for therapeutic purpose 08/11/2017    S/P lumbar laminectomy 03/21/2017    Spinal stenosis, lumbar 01/10/2017    History of subtotal thyroidectomy 09/09/2016    Spondylolisthesis at L3-L4 level 08/02/2016    DDD (degenerative disc disease), lumbar 08/02/2016    Tympanic membrane perforation 09/15/2014    Thoracic or lumbosacral neuritis or radiculitis 12/18/2012    Pain in knee 12/18/2012    Hip pain 12/18/2012    Kidney stones 11/26/2012    Breast cancer, right breast 11/26/2012    Idiopathic thrombocytopenic purpura 11/26/2012    Essential hypertension 11/26/2012    Type 2 diabetes mellitus without complication, without long-term current use of insulin 11/26/2012    Abnormal echocardiogram 11/26/2012    History of blurred vision - Both Eyes 07/24/2012     Past Medical History:   Diagnosis Date    Abnormal echocardiogram 11/26/2012    Arthritis     Breast cancer 1998    Breast cancer, right breast     Cardiomyopathy due to systemic disease     Cataract     Clotting disorder     Coronary artery disease     Diabetes mellitus     bordeline    Diabetes mellitus type II     DM (diabetes mellitus) 11/26/2012    Encounter for blood transfusion     HTN (hypertension) 11/26/2012    Hypertension     Increased glucose level 11/26/2012    ITP  (idiopathic thrombocytopenic purpura) 2012    Kidney stones 2012    PONV (postoperative nausea and vomiting)     Stenosis of lumbosacral spine 2012    Thyroid disease     Tympanic membrane perforation 9/15/2014    right 2014 Dr. Jeffries      Past Surgical History:   Procedure Laterality Date    ARTHROPLASTY, HIP Right 10/18/2018    [facility]    BREAST LUMPECTOMY Right 1998     SECTION      CHOLECYSTECTOMY      EYE SURGERY      strabismus, 2006    HIP REPLACEMENT ARTHROPLASTY Right     dr fischer    HYSTERECTOMY      JOINT REPLACEMENT      hip    L3-5 Laminectomy  2017    Dr. Mendoza    Left LAMINECTOMY-MINIMALLY INVASIVE L3-4 and L4-5 Right 2017    [facility] Dave [facility]    [facility]ENECTOMY, TOTAL  2001    from [facility]    STRABISMUS SURGERY  830.06    OU    THYROIDECTOMY  2016    THYROIDECTOMY N/A 2016    [facility]ish Travis [facility]    TONSILLECTOMY        No medications prior to admission.     Review of patient's allergies indicates:  No Known Allergies   Social History     Tobacco Use    Smoking status: Never Smoker    Smokeless tobacco: Never Used   Substance Use Topics    Alcohol use: No      Family History   Problem Relation Age of Onset    Heart disease Mother 69        MI    Diabetes Mother     Hypertension Mother     Diabetes Father     Hypertension Father     Stroke Father     Diabetes Sister     Diabetes Brother       Review of Systems  Pertinent items are noted in HPI.    Objective:     No data found.  Heart regular lungs clear significant lateral deviation of the patella with crepitance    Imaging Review  rright knee tract or mild degenerative changes significant subluxation of the patella    Assessment:     There are no hospital problems to display for this patient.      Plan:     The various methods of treatment have been discussed with the patient and family.    After consideration of risks, benefits and other options for treatment, the patient has consented to surgical interventions (significant risks discussed especially for her weight and alignment).  Questions were answered and Pre-op teaching was done by me.

## 2019-04-11 NOTE — INTERVAL H&P NOTE
The patient has been examined and the H&P has been reviewed:    I concur with the findings and no changes have occurred since H&P was written.    Anesthesia/Surgery risks, benefits and alternative options discussed and understood by patient/family.          Active Hospital Problems    Diagnosis  POA    *OA (osteoarthritis) of knee [M17.10]  Yes      Resolved Hospital Problems   No resolved problems to display.

## 2019-04-11 NOTE — PLAN OF CARE
Problem: Physical Therapy Goal  Goal: Physical Therapy Goal  Goals to be met by: 19     Patient will increase functional independence with mobility by performin. Supine to sit with supervision.   2. Sit to supine with supervision.   3. Sit<>stand transfer with supervision using rolling walker.   4. Gait > 150 feet with SBA using rolling walker.   5. Ascend/descend 4 stairs with R handrails with CGA with or without AD.       Outcome: Ongoing (interventions implemented as appropriate)  Patient with no complaints of nausea and improved pain control this PM. Ambulated x80 ft with RW with CGA. Sit<>stand with SBA-CGA with RW. Sit>supine with CGA at E. Rec for d/c to home with assistance as needed with HH PT/OT progressing to OP PT.

## 2019-04-11 NOTE — CONSULTS
Consult Note  Medicine    Consult Requested By: Baldomero Fischer MD  Reason for Consult: HTN/DM/CAD/Hypothyroid    SUBJECTIVE:     History of Present Illness:  Patient is a 59 y.o. female presents with scheduled right knee repair.  Seen post op in PACU and doing well.  Discussed with Dr. Fischer.  Pre-op/EPIC reviewed.  Denies CP/SOB/N/V/D/F/C.      Past Medical History:   Diagnosis Date    Abnormal echocardiogram 2012    Arthritis     Breast cancer 1998    Breast cancer, right breast     Cardiomyopathy due to systemic disease     Cataract     Clotting disorder     Coronary artery disease     DM (diabetes mellitus) 2012    Encounter for blood transfusion     HTN (hypertension) 2012    Increased glucose level 2012    ITP (idiopathic thrombocytopenic purpura) 2012    Kidney stones 2012    PONV (postoperative nausea and vomiting)     Stenosis of lumbosacral spine 2012    Thyroid disease     Tympanic membrane perforation 9/15/2014    right 2014 Dr. Jeffries     Past Surgical History:   Procedure Laterality Date    ARTHROPLASTY, HIP Right 10/18/2018    [facility]    BREAST LUMPECTOMY Right      SECTION      CHOLECYSTECTOMY      EYE SURGERY      strabismus, 2006    HIP REPLACEMENT ARTHROPLASTY Right     dr fischer    HYSTERECTOMY      JOINT REPLACEMENT      hip    L3-5 Laminectomy  2017    Dr. Mendoza    Left LAMINECTOMY-MINIMALLY INVASIVE L3-4 and L4-5 Right 2017    [facility] at [facility] OR    SPLENECTOMY, TOTAL  2001    from ITP    STRABISMUS SURGERY  8.30.06    OU    THYROIDECTOMY  2016    THYROIDECTOMY N/A 2016    [facility]ish [facility] at [facility]    TONSILLECTOMY       Family History   Problem Relation Age of Onset    Heart disease Mother 69        MI    Diabetes Mother     Hypertension Mother     Diabetes Father     Hypertension Father      Stroke Father     Diabetes Sister     Diabetes Brother      Social History     Tobacco Use    Smoking status: Never Smoker    Smokeless tobacco: Never Used   Substance Use Topics    Alcohol use: No    Drug use: No       Review of patient's allergies indicates:  No Known Allergies     Review of Systems:  Constitutional: No fever or chills  Respiratory: No cough or shortness of breath  Cardiovascular: No chest pain or palpitations  Gastrointestinal: No nausea or vomiting  Neurological: No confusion or weakness    OBJECTIVE:     Vital Signs (Most Recent)  Temp: 97.7 °F (36.5 °C) (04/11/19 1101)  Pulse: 90 (04/11/19 1101)  Resp: 16 (04/11/19 1101)  BP: (!) 151/73 (04/11/19 1101)  SpO2: 100 % (04/11/19 1101)    Vital Signs Range (Last 24H):  Temp:  [97.4 °F (36.3 °C)-98 °F (36.7 °C)]   Pulse:  []   Resp:  [16]   BP: (120-186)/(66-79)   SpO2:  [99 %-100 %]       Intake/Output Summary (Last 24 hours) at 4/11/2019 1106  Last data filed at 4/11/2019 1059  Gross per 24 hour   Intake 1300 ml   Output 525 ml   Net 775 ml       Physical Exam:  General appearance: Well developed, well nourished  Eyes:  Conjunctivae/corneas clear. PERRL.  Lungs: Normal respiratory effort,   clear to auscultation bilaterally   Heart: Regular rate and rhythm, S1, S2 normal, no murmur, rub or karely.  Abdomen: Soft, non-tender non-distended; bowel sounds normal; no masses,  no organomegaly, obese  Extremities: No cyanosis or clubbing. No edema.    Skin: Skin color, texture, turgor normal. No rashes or lesions  Neurologic: Normal strength and tone. No focal numbness or weakness   Right knee:  CDI  patel      Laboratory:  Reviewed.     Diagnostic Results:  X-Ray Knee 1 or 2 View Right    (Results Pending)       ASSESSMENT/PLAN:     1. Right knee repair (M17.11): per therapy and ortho teams  2. Depression (F32.9): continue home med  3.   Hx of PONV (R11.2): anti-emetics PRN  4.   Hypothyroidism, surgically induced (E07.9): continue home  med  5.   DM T2 (E11.9): diet controlled.    6.   HTN (I10): meds with hold parameters  7.   CAD (I25.10): defer  8.   DVT prophy: ASA BID.   9.   Obesity (E44.0):  Needs weight mgmt.  Consider GLP as outpt.  Defer.     Thanks for consult  See above  Will follow along.

## 2019-04-11 NOTE — PT/OT/SLP PROGRESS
Physical Therapy Treatment    Patient Name:  Tanika Wayne   MRN:  [id number]    Recommendations:     Discharge Recommendations:  home with home health, home health PT, home health OT(With assistance as needed; HH progressing to OP PT)   Discharge Equipment Recommendations: none(Has recommended equipment)   Barriers to discharge: Inaccessible home    Assessment:     Tanika Wayne is a 59 y.o. female admitted with a medical diagnosis of OA (osteoarthritis) of knee.  She presents with the following impairments/functional limitations:  impaired endurance, impaired sensation, impaired self care skills, impaired functional mobilty, gait instability, decreased lower extremity function, pain, edema, orthopedic precautions.    Patient with no complaints of nausea and improved pain control this PM. Ambulated x80 ft with RW with CGA. Sit<>stand with SBA-CGA with RW. Sit>supine with CGA at RLE. Rec for d/c to home with assistance as needed with HH PT/OT progressing to OP PT.    Rehab Prognosis: Good; patient would benefit from acute skilled PT services to address these deficits and reach maximum level of function.    Recent Surgery: Procedure(s) (LRB):  ARTHROPLASTY, KNEE (Right) Day of Surgery    Plan:     During this hospitalization, patient to be seen BID to address the identified rehab impairments via gait training, therapeutic activities, therapeutic exercises and progress toward the following goals:    · Plan of Care Expires:  04/18/19    Subjective     Chief Complaint: Pain in anterior knee  Patient/Family Comments/goals: To walk and to go home  Pain/Comfort:  · Pain Rating 1: 4/10  · Location - Side 1: Right  · Location - Orientation 1: anterior  · Location 1: knee  · Pain Addressed 1: Pre-medicate for activity, Reposition, Distraction, Nurse notified  · Pain Rating Post-Intervention 1: 5/10      Objective:     Communicated with PANCHITO Kenyon prior to session.  Patient found up in chair with cryotherapy, patel  catheter, peripheral IV upon PT entry to room. Pt agreeable to treatment.    General Precautions: Standard, (fall risk)   Orthopedic Precautions:RLE weight bearing as tolerated   Braces: N/A     Patient donned yellow socks and gait belt for OOB mobility.    Functional Mobility:  · Bed Mobility:     · Sit to Supine: contact guard assistance  · Transfers:     · Sit to Stand:  stand by assistance and contact guard assistance with rolling walker  · Gait: x80 ft with RW and CGA. Noted gait deviations during eval imrpoved this session with verbal cues. Pt with toe off and heel strike on RLE with occasional insufficient foot clearance d/t decreased knee flexion of RLE in swing without LOB. increased WB with UE during R stance.      AM-PAC 6 CLICK MOBILITY  Turning over in bed (including adjusting bedclothes, sheets and blankets)?: 3  Sitting down on and standing up from a chair with arms (e.g., wheelchair, bedside commode, etc.): 3  Moving from lying on back to sitting on the side of the bed?: 3  Moving to and from a bed to a chair (including a wheelchair)?: 3  Need to walk in hospital room?: 3  Climbing 3-5 steps with a railing?: 3  Basic Mobility Total Score: 18       Therapeutic Activities and Exercises:   Pt performed supine therapeutic exercises including ankle pumps, quad sets, glute sets, SLR, SAQs, heel slides, abd/add x 10 reps with verbal and tactile cues.     Sit<>stand, gait, sit>supine    Patient left supine with all lines intact, call button in reach, PANCHITO Kenyon notified and HERMINIA Wood present..    GOALS:   Multidisciplinary Problems     Physical Therapy Goals        Problem: Physical Therapy Goal    Goal Priority Disciplines Outcome Goal Variances Interventions   Physical Therapy Goal     PT, PT/OT Ongoing (interventions implemented as appropriate)     Description:  Goals to be met by: 19     Patient will increase functional independence with mobility by performin. Supine to sit with  supervision.   2. Sit to supine with supervision.   3. Sit<>stand transfer with supervision using rolling walker.   4. Gait > 150 feet with SBA using rolling walker.   5. Ascend/descend 4 stairs with R handrails with CGA with or without AD.                        Time Tracking:     PT Received On: 04/11/19  PT Start Time: 1521     PT Stop Time: 1542  PT Total Time (min): 21 min     Billable Minutes: Gait Training 15    Treatment Type: Treatment  PT/PTA: PT     PTA Visit Number: 0     Mariah Castro, PT  04/11/2019

## 2019-04-11 NOTE — PLAN OF CARE
Problem: Adult Inpatient Plan of Care  Goal: Plan of Care Review  Outcome: Ongoing (interventions implemented as appropriate)  Remains free from fall, injury, and skin breakdown. Donohue catheter care performed. VS stable on RA and afebrile. Up in chair at this shift. Ambulated with PT. Pain mildly controlled with PO/IV PRN meds. Neuro checks WDL. TEDs/SCDs maintained. Patient tolerated CPM. Combipack in place. Dressing to R knee, CDI. Tolerating ordered diet. IV site WNL. Plan of care reviewed with patient and all questions answered. Bed low, locked w/ bed alarm on. Call light within reach. Purposeful rounding performed. No other complaints at this time.

## 2019-04-11 NOTE — PROGRESS NOTES
VAISHALI called Ana Ordaz [phone] at [facility] and was directed to fax  orders and clinicals to their UR at [phone] and [phone].  VAISHALI faxed as directed.

## 2019-04-11 NOTE — OR NURSING
Attempt to call report to Chantale FLANAGAN. [place] states she will call back. Daughter Nilesh updated by phone and sent to pt room.

## 2019-04-11 NOTE — ANESTHESIA POSTPROCEDURE EVALUATION
Anesthesia Post Evaluation    Patient: Tanika Wayne    Procedure(s) Performed: Procedure(s) (LRB):  ARTHROPLASTY, KNEE (Right)    Final Anesthesia Type: spinal  Patient location during evaluation: PACU  Patient participation: Yes- Able to Participate  Level of consciousness: awake and alert  Post-procedure vital signs: reviewed and stable  Pain management: adequate  Airway patency: patent  PONV status at discharge: No PONV  Anesthetic complications: no      Cardiovascular status: blood pressure returned to baseline and hemodynamically stable  Respiratory status: unassisted, spontaneous ventilation and nasal cannula  Hydration status: euvolemic  Follow-up not needed.          Vitals Value Taken Time   /67 4/11/2019 11:48 AM   Temp 36.5 °C (97.7 °F) 4/11/2019 11:48 AM   Pulse 91 4/11/2019 11:48 AM   Resp 16 4/11/2019 11:48 AM   SpO2 100 % 4/11/2019 11:48 AM         Event Time     Out of Recovery 11:35:28          Pain/Conor Score: Conor Score: 8 (4/11/2019 11:24 AM)

## 2019-04-11 NOTE — PLAN OF CARE
SW met with pt at bedside to complete discharge assessment, verified PCP and uses [facility] on [address].  Pt lives with spouse and 26 yo daughter and 15 yo son and spouse will provide transportation home.  PT has SC, RW, BSC and straight cane. Pt had [facility] previously and would like to use them again and signed choice form.       04/11/19 1210   Discharge Assessment   Assessment Type Discharge Planning Assessment   Confirmed/corrected address and phone number on facesheet? Yes   Assessment information obtained from? Patient   Communicated expected length of stay with patient/caregiver no   Prior to hospitilization cognitive status: Alert/Oriented   Prior to hospitalization functional status: Independent   Current cognitive status: Alert/Oriented   Current Functional Status: Assistive Equipment;Needs Assistance   Lives With spouse;child(jennifer), adult;child(jennifer), dependent   Is patient able to care for self after discharge? Unable to determine at this time (comments)   Who are your caregiver(s) and their phone number(s)?   (spouse)   Readmission Within the Last 30 Days no previous admission in last 30 days   Patient currently being followed by outpatient case management? No   Patient currently receives any other outside agency services? No   Equipment Currently Used at Home bedside commode;walker, rolling;shower chair;cane, straight   Do you have any problems affording any of your prescribed medications? No   Is the patient taking medications as prescribed? yes   Does the patient have transportation home? Yes   Transportation Anticipated family or friend will provide   Does the patient receive services at the [facility]? No   Discharge Plan A Home Health   DME Needed Upon Discharge  none   Patient/Family in Agreement with Plan yes

## 2019-04-11 NOTE — ANESTHESIA PREPROCEDURE EVALUATION
04/11/2019  Tanika Wayne is a 59 y.o., female.    Pre-op Assessment    I have reviewed the Patient Summary Reports.     I have reviewed the Nursing Notes.   I have reviewed the Medications.     Review of Systems  Anesthesia Hx:  PONV History of prior surgery of interest to airway management or planning: Previous anesthesia: General Lum Jaylan Rascont Feb 2017 [facility] with general anesthesia.  Procedure performed at an [facility]. Airway issues documented on chart review include mask, easy, GETA, glidescope used  Denies Family Hx of Anesthesia complications.   Denies Personal Hx of Anesthesia complications.   Social:  Non-Smoker    Hematology/Oncology:     Oncology Normal   Hematology Comments: History of ITP resolved with splenectomy inh 2001   EENT/Dental:EENT/Dental Normal   Cardiovascular:   Exercise tolerance: good Hypertension, well controlled CAD asymptomatic  hyperlipidemia    Pulmonary:  Pulmonary Normal    Renal/:   Denies Chronic Renal Disease. renal calculi     Hepatic/GI:  Hepatic/GI Normal    Musculoskeletal:   Arthritis   Spine Disorders: lumbar Degenerative disease and Disc disease    Neurological:   Neuromuscular Disease,    Endocrine:   Diabetes, well controlled, type 2 Hyperthyroidism No meds. Diet controled   Dermatological:  Skin Normal    Psych:  Psychiatric Normal           Physical Exam  General:  Morbid Obesity    Airway/Jaw/Neck:  Airway Findings: Mouth Opening: Normal Tongue: Normal  General Airway Assessment: Adult  Mallampati: II      Dental:  Dental Findings: upper partial dentures        Mental Status:  Mental Status Findings:  Cooperative, Alert and Oriented         Anesthesia Plan  Type of Anesthesia, risks & benefits discussed:  Anesthesia Type:  spinal  Patient's Preference:   Intra-op Monitoring Plan: standard ASA monitors  Intra-op Monitoring Plan Comments:   Post  Op Pain Control Plan: multimodal analgesia and per primary service following discharge from PACU  Post Op Pain Control Plan Comments:   Induction:    Beta Blocker:         Informed Consent: Patient understands risks and agrees with Anesthesia plan.  Questions answered. Anesthesia consent signed with patient.  ASA Score: 3     Day of Surgery Review of History & Physical:    H&P update referred to the surgeon.     Anesthesia Plan Notes: Had hip done under spinal 6 months ago. Says june well  Plan ssame with GA as back-up. Clearance on chart        Ready For Surgery From Anesthesia Perspective.

## 2019-04-11 NOTE — PROGRESS NOTES
1:57pm- VAISHALI received call from "[employer]" from , requesting a LW 1010 form and will email form to .      2:03pm - VAISHALI received email  from "[employer]    2:21pm - SW completed and faxed 1010 form to [phone] and [phone].

## 2019-04-11 NOTE — PT/OT/SLP EVAL
Physical Therapy Evaluation and Treatment    Patient Name:  Tanika Wayne   MRN:  [id number]    Recommendations:     Discharge Recommendations:  home with home health, home health PT, home health OT(With assistance as needed; HH progressing to OP PT)   Discharge Equipment Recommendations: none(Has recommended equipment)   Barriers to discharge: Inaccessible home    Assessment:     Tanika Wayne is a 59 y.o. female admitted with a medical diagnosis of OA (osteoarthritis) of knee s/p TKA POD#0. Pmhx signifiant for R BRYNN (10/2018), laminectomy (2017), R Breast cancer (1998), CAD, DM-II, HTN, chronic opioid use, and PONV.  She presents with the following impairments/functional limitations:  impaired endurance, impaired sensation, impaired self care skills, impaired functional mobilty, gait instability, decreased lower extremity function, pain, edema, orthopedic precautions. Due to the above impairments, the patient is limited in her ability to modified independently ambulate, transfer, and participate in her chosen activities.    Patient evaluated by PT and goals established. Presents to therapy as pleasant woman eager to be pain free and able to be more independent with IADLs. Patient with increased knee pain and onset of nausea limitiing tolerance for gait at this time. Supine>sit with CGA, sit<>stand with CGA, amb 1x3 ft with RW and Drew, 1x10 ft with RW with CGA. PT will continue to follow and progress as tolerated. Anticipating rec for d/c to home with HH PT/OT progressing to OP PT.    Patient appropriate to have PT interventions performed by a PTA.       Rehab Prognosis: Good; patient would benefit from acute skilled PT services to address these deficits and reach maximum level of function.    Recent Surgery: Procedure(s) (LRB):  ARTHROPLASTY, KNEE (Right) Day of Surgery    Plan:     During this hospitalization, patient to be seen BID to address the identified rehab impairments via gait training,  "therapeutic activities, therapeutic exercises and progress toward the following goals:    · Plan of Care Expires:  04/18/19    Subjective     Chief Complaint: Pain in knee; with initial gait onset of nausea  Patient/Family Comments/goals: To be able to be more independent with household chores  Pain/Comfort:  · Pain Rating 1: 8/10  · Location - Side 1: Right  · Location - Orientation 1: generalized  · Location 1: knee  · Pain Addressed 1: Pre-medicate for activity, Reposition, Distraction, Nurse notified, Other (see comments)(ambulation and ROM)  · Pain Rating Post-Intervention 1: 9/10    Patients cultural, spiritual, Confucianist conflicts given the current situation: no    Living Environment:  Per PT eval 10/2018 and confirmed with patient:   "Patient resides in a one story shotgun house with 4 steps to enter with one handrail on the right. She lives with her  and their 2 children (one 24, one 14). The bathroom is 2 rooms away from the patient's bedroom."     Prior to admission, patients level of function was Bro and she used a rollator for household and community ambulation. She is (I) with ADLs. Previously she had shared household chores with her  but since her BRYNN she has been unable to assist with IADLs due to increased knee pain. She previously was employed at [employer] but tripped on some carpeting and has not worked since 2/2 the pain and was limited in her ability to exercise 2/2 knee pain. Patient has the following equipment: rolling walker, rollator, single point cane, shower chair, commode (over toilet). Upon discharge, patient will have assistance from her  and children.    Objective:     Communicated with PANCHITO Kenyon prior to session.  Patient found HOB elevated with peripheral IV, oxygen, patel catheter, FCD, SCD(PANCHITO Kenyon removed NC prior to OOb mobility)  upon PT entry to room. Patient agreeable to evaluation.    General Precautions: Standard, (fall risk)   Orthopedic " Precautions:RLE weight bearing as tolerated   Braces: N/A     Patient donned yellow socks and gait belt for OOB mobility.    Exams:  · Cognition:   · Patient is oriented to name, , date, place, situation.  · Pt follows approximately 100% of one step commands.    · Mood: Pleasant and cooperative.   · Musculoskeletal:  · Posture: Protective guarding surgical joint  · LE ROM/Strength: 5/5 bilateral hip flexion, nonsurgical knee extension, bilateral ankle dorsiflexion. AROM surgical knee difficult to accurately assess with large bulky dressing, although knee flexion grossly 0>50 degrees. Surgical knee strength grossly 5/5 knee flexion and 3-/5 knee extension.  · Neuromuscular:  · Sensation: Pt reports impaired light touch to RLE.  · Coordination/Tone/Reflexes: No impairments identified with functional mobility. No formal testing performed.   · Balance: CGA for dynamic standing with bilateral UE support.   · Visual-vestibular: No impairments identified with functional mobility. No formal testing performed.  · Integument:  Visible skin intact and surgical extremity dressing clean and dry.   · Cardiopulmonary:  · Edema: Mild surgical extremity.    · Color/temperature/pulses: Equal      Functional Mobility:  · Bed Mobility:     · Scooting to EOB: supervision  · Supine to Sit: contact guard assistance  · Transfers:     · Sit to Stand:  contact guard assistance with rolling walker  · Gait: 1x3 ft ad 1x10 ft with RW.   · Initial gait bout ended 2/2 onset of nausea and pt sat on EOB until nausea abated  · Gait deviations: decreased knee/hip flexion of RLE, decreased heel strike/toe off of RLE, and insufficient foot clearance of RLE (shuffling of R foot in swing). Decreased gait speed noted with L>R stance time.      Therapeutic Activities and Exercises:   Pt performed supine therapeutic exercises including ankle pumps, quad sets, glute sets, SLR, SAQs, heel slides, abd/add x 10 reps with verbal and tactile cues.      Supine>sit, sit<>stand 2x, gait    PT educated patient re: PT plan of care, role of PT, safety with OOB mobility, use of RW, transfer technique, gait, discharge disposition, anatomy/physiology, orthopedic precautions.  Pt verbalize understanding.      AM-PAC 6 CLICK MOBILITY  Total Score:17     Patient left up in chair with all lines intact, call button in reach, RN Chantale notified and RLE positioned on towel roll to encourage passive extension with lateral support to prevent ER of hip..    GOALS:   Multidisciplinary Problems     Physical Therapy Goals        Problem: Physical Therapy Goal    Goal Priority Disciplines Outcome Goal Variances Interventions   Physical Therapy Goal     PT, PT/OT      Description:  Goals to be met by: 19     Patient will increase functional independence with mobility by performin. Supine to sit with supervision.   2. Sit to supine with supervision.   3. Sit<>stand transfer with supervision using rolling walker.   4. Gait > 150 feet with SBA using rolling walker.   5. Ascend/descend 4 stairs with R handrails with CGA with or without AD.                        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 Jeffries       Past Surgical History:   Procedure Laterality Date    ARTHROPLASTY, HIP Right 10/18/2018    [facility]  OR    BREAST LUMPECTOMY Right 1998     SECTION      CHOLECYSTECTOMY      EYE SURGERY      strabismus, 2006    HIP REPLACEMENT ARTHROPLASTY Right     dr walton    HYSTERECTOMY      JOINT REPLACEMENT      hip    L3-5 Laminectomy  2017    Dr. Mendoza    Left LAMINECTOMY-MINIMALLY INVASIVE L3-4 and L4-5 Right 2017    [facility] OR    [facility]ENECT[facility]Y, TOTAL  2001    from [facility]    STRABISMUS SURGERY  8.30.06    OU    THYROIDECTOMY  2016    THYROIDECTOMY N/A 2016    [facility] OR    TONSILLECTOMY         Time Tracking:     PT Received On: 19  PT Start Time: 1254     PT Stop Time: 1327  PT Total Time (min): 33 min     Billable Minutes: Evaluation 20 and Therapeutic Exercise 13      Mariah Catsro, PT  2019

## 2019-04-11 NOTE — PLAN OF CARE
Problem: Adult Inpatient Plan of Care  Goal: Plan of Care Review  Outcome: Ongoing (interventions implemented as appropriate)  Patient on room air SpO2 95% post-op IS instructed with good effort.

## 2019-04-11 NOTE — TRANSFER OF CARE
"Anesthesia Transfer of Care Note    Patient: Tanika Wayne    Procedure(s) Performed: Procedure(s) (LRB):  ARTHROPLASTY, KNEE (Right)    Patient location: PACU    Anesthesia Type: spinal    Transport from OR: Transported from OR on room air with adequate spontaneous ventilation    Post pain: adequate analgesia    Post assessment: no apparent anesthetic complications    Post vital signs: stable    Level of consciousness: awake    Nausea/Vomiting: no nausea/vomiting    Complications: none    Transfer of care protocol was followed      Last vitals:   Visit Vitals  BP (!) 140/71 (BP Location: Left arm, Patient Position: Lying)   Pulse 96   Temp 36.7 °C (98 °F) (Oral)   Resp 16   Ht 5' 1" (1.549 m)   Wt 113.4 kg (250 lb)   SpO2 100%   Breastfeeding? No   BMI 47.24 kg/m²     "

## 2019-04-11 NOTE — OR NURSING
Pt continues without c/o pain at this time. No change from previous assessment. Prepared for transfer to [facility].

## 2019-04-11 NOTE — PLAN OF CARE
Problem: Physical Therapy Goal  Goal: Physical Therapy Goal  Goals to be met by: 19     Patient will increase functional independence with mobility by performin. Supine to sit with supervision.   2. Sit to supine with supervision.   3. Sit<>stand transfer with supervision using rolling walker.   4. Gait > 150 feet with SBA using rolling walker.   5. Ascend/descend 4 stairs with R handrails with CGA with or without AD.      Patient evaluated by PT and goals established. Patient with increased knee pain and onset of nausea limitiing tolerance for gait at this time. Supine>sit with CGA, sit<>stand with CGA, amb 1x3 ft with RW and Drew, 1x10 ft with RW with CGA. PT will continue to follow and progress as tolerated. Anticipating rec for d/c to home with HH PT/OT progressing to OP PT. Please see progress note for detailed plan of care and recommendations.

## 2019-04-11 NOTE — ANESTHESIA PROCEDURE NOTES
Spinal    Diagnosis: OA R KNEE  Patient location during procedure: holding area  Start time: 4/11/2019 8:30 AM  Timeout: 4/11/2019 8:30 AM  End time: 4/11/2019 8:39 AM  Staffing  Anesthesiologist: Javid Thornton MD  Performed: anesthesiologist   Preanesthetic Checklist  Completed: patient identified, site marked, surgical consent, pre-op evaluation, timeout performed, IV checked, risks and benefits discussed and monitors and equipment checked  Spinal Block  Patient position: sitting  Prep: ChloraPrep  Patient monitoring: heart rate, cardiac monitor and continuous pulse ox  Approach: midline  Location: L2-3  Injection technique: single shot  CSF Fluid: clear free-flowing CSF  Needle  Needle type: Quincke   Needle gauge: 22 G  Needle length: 3.5 in  Additional Documentation: incremental injection, negative aspiration for heme and no paresthesia on injection  Needle localization: anatomical landmarks  Assessment  Sensory level: T7   Dermatomal levels determined by alcohol wipe and pinch or prick  Ease of block: moderate  Patient's tolerance of the procedure: comfortable throughout block and no complaints

## 2019-04-11 NOTE — OP NOTE
DATE OF PROCEDURE: 04/11/2019     CHIEF COMPLAINT AND PRESENT ILLNESS:   59-year-old progressive pain deformity right knee had a recent right hip replacement is done well now main impairment is right knee progressive valgus deformity with crepitance tenderness the limping consequently admitted for right a knee replacement.  Significant risk discussed especially with her morbid obesity     PREOPERATIVE DIAGNOSIS:  Osteoarthritis right knee     POSTOPERATIVE DIAGNOSIS:   same     PROCEDURES PERFORMED:   right knee replacement [employer] vanguard    SURGEON:  Baldomero Fischer M.D.     ASSISTANT:  Mari Marx CST.     COMPLICATIONS:   none     ANESTHESIA:  Spinal     BLOOD LOSS:   63     IMPLANTS:  67.5/75/10/34     PROCEDURE IN DETAIL:   patient brought to operating room and a spinal anesthesia without difficulty right lower extremity is prepped in usual fashion tourniquet applied 3 mm mercury after Esmarch.  Using standard midline incision sharp dissection made down extensor mechanism standard medial parapatellar arthrotomy was performed medial fat pad was removed and a small medial release performed in order to gain access to proximal tibia. She had a valgus deformity.  Anterior posterior cruciate ligaments were removed medial lateral meniscal remnants removed.  The tibial cut was made per midshaft tibia sizing was a 75 attention then turned to the femur with intramedullary guide a 3 degree provisional distal cut was performed.  Three was chosen because of her significant deformity.  With a distal cut sizing of the distal femur was a 67.  So with the tensor 1st in flexion a 67 anterior-posterior cut was performed flexion gap was symmetric at 10 attention then turned the extension gap again with the tensor a 10 mm distal cut was performed flexion-extension gaps were symmetric good alignment good stability chamber cuts performed notch cut was performed prepatellar thickness 23 post patellar thickness 25 with a 34 patella  with a floating trial 0-110 degrees. Limited by her very obese thigh.  Rotation was marked tibia was punched again excellent range of motion stability.  The appropriate components were opened.  Good cement technique was obtained. All components were cemented.  Excess cement was removed. Pulse lavage irrigation was used. Final 10 mm insert placed again excellent range of motion and stability. Flexion was limited by her size.  1.  Right was using parapatellar arthrotomy closed in flexion post closure 0-110 degrees range of motion to 0 Vicryl subcutaneously staples on the skin Exparel tranexamic acid and Marcaine were instilled the soft tissues. She was placed in bulky sterile dressing tourniquet released 63 min tolerated procedure well brought to recovery room sterile fashion

## 2019-04-12 VITALS
DIASTOLIC BLOOD PRESSURE: 54 MMHG | TEMPERATURE: 99 F | OXYGEN SATURATION: 94 % | WEIGHT: 250 LBS | RESPIRATION RATE: 18 BRPM | HEIGHT: 61 IN | BODY MASS INDEX: 47.2 KG/M2 | SYSTOLIC BLOOD PRESSURE: 99 MMHG | HEART RATE: 101 BPM

## 2019-04-12 PROBLEM — M17.9 OA (OSTEOARTHRITIS) OF KNEE: Status: RESOLVED | Noted: 2019-04-11 | Resolved: 2019-04-12

## 2019-04-12 LAB
ANION GAP SERPL CALC-SCNC: 9 MMOL/L (ref 8–16)
BASOPHILS # BLD AUTO: 0.02 K/UL (ref 0–0.2)
BASOPHILS NFR BLD: 0.2 % (ref 0–1.9)
BUN SERPL-MCNC: 11 MG/DL (ref 6–20)
CALCIUM SERPL-MCNC: 8.7 MG/DL (ref 8.7–10.5)
CHLORIDE SERPL-SCNC: 101 MMOL/L (ref 95–110)
CO2 SERPL-SCNC: 27 MMOL/L (ref 23–29)
CREAT SERPL-MCNC: 0.8 MG/DL (ref 0.5–1.4)
DIFFERENTIAL METHOD: ABNORMAL
EOSINOPHIL # BLD AUTO: 0.1 K/UL (ref 0–0.5)
EOSINOPHIL NFR BLD: 0.6 % (ref 0–8)
ERYTHROCYTE [DISTWIDTH] IN BLOOD BY AUTOMATED COUNT: 15.6 % (ref 11.5–14.5)
EST. GFR  (AFRICAN AMERICAN): >60 ML/MIN/1.73 M^2
EST. GFR  (NON AFRICAN AMERICAN): >60 ML/MIN/1.73 M^2
GLUCOSE SERPL-MCNC: 184 MG/DL (ref 70–110)
HCT VFR BLD AUTO: 36.7 % (ref 37–48.5)
HGB BLD-MCNC: 11.5 G/DL (ref 12–16)
LYMPHOCYTES # BLD AUTO: 3 K/UL (ref 1–4.8)
LYMPHOCYTES NFR BLD: 22.4 % (ref 18–48)
MCH RBC QN AUTO: 27.1 PG (ref 27–31)
MCHC RBC AUTO-ENTMCNC: 31.3 G/DL (ref 32–36)
MCV RBC AUTO: 86 FL (ref 82–98)
MONOCYTES # BLD AUTO: 1.2 K/UL (ref 0.3–1)
MONOCYTES NFR BLD: 9.2 % (ref 4–15)
NEUTROPHILS # BLD AUTO: 8.9 K/UL (ref 1.8–7.7)
NEUTROPHILS NFR BLD: 67.2 % (ref 38–73)
PLATELET # BLD AUTO: 351 K/UL (ref 150–350)
PMV BLD AUTO: 9.6 FL (ref 9.2–12.9)
POTASSIUM SERPL-SCNC: 4.3 MMOL/L (ref 3.5–5.1)
RBC # BLD AUTO: 4.25 M/UL (ref 4–5.4)
SODIUM SERPL-SCNC: 137 MMOL/L (ref 136–145)
WBC # BLD AUTO: 13.23 K/UL (ref 3.9–12.7)

## 2019-04-12 PROCEDURE — 97530 THERAPEUTIC ACTIVITIES: CPT

## 2019-04-12 PROCEDURE — 25000003 PHARM REV CODE 250: Performed by: NURSE PRACTITIONER

## 2019-04-12 PROCEDURE — 85025 COMPLETE CBC W/AUTO DIFF WBC: CPT

## 2019-04-12 PROCEDURE — 80048 BASIC METABOLIC PNL TOTAL CA: CPT

## 2019-04-12 PROCEDURE — 63600175 PHARM REV CODE 636 W HCPCS: Performed by: ORTHOPAEDIC SURGERY

## 2019-04-12 PROCEDURE — 36415 COLL VENOUS BLD VENIPUNCTURE: CPT

## 2019-04-12 PROCEDURE — 97165 OT EVAL LOW COMPLEX 30 MIN: CPT

## 2019-04-12 PROCEDURE — 25000003 PHARM REV CODE 250: Performed by: ORTHOPAEDIC SURGERY

## 2019-04-12 PROCEDURE — 97116 GAIT TRAINING THERAPY: CPT

## 2019-04-12 PROCEDURE — 97110 THERAPEUTIC EXERCISES: CPT

## 2019-04-12 RX ORDER — OXYCODONE AND ACETAMINOPHEN 10; 325 MG/1; MG/1
1 TABLET ORAL EVERY 6 HOURS PRN
Qty: 50 TABLET | Refills: 0 | Status: SHIPPED | OUTPATIENT
Start: 2019-04-12 | End: 2019-05-16

## 2019-04-12 RX ORDER — CARVEDILOL 12.5 MG/1
12.5 TABLET ORAL ONCE
Status: COMPLETED | OUTPATIENT
Start: 2019-04-12 | End: 2019-04-12

## 2019-04-12 RX ADMIN — MORPHINE SULFATE 4 MG: 10 INJECTION INTRAVENOUS at 12:04

## 2019-04-12 RX ADMIN — OXYCODONE AND ACETAMINOPHEN 1 TABLET: 10; 325 TABLET ORAL at 08:04

## 2019-04-12 RX ADMIN — OXYCODONE AND ACETAMINOPHEN 1 TABLET: 10; 325 TABLET ORAL at 02:04

## 2019-04-12 RX ADMIN — LOSARTAN POTASSIUM 100 MG: 50 TABLET, FILM COATED ORAL at 06:04

## 2019-04-12 RX ADMIN — CARVEDILOL 12.5 MG: 12.5 TABLET, FILM COATED ORAL at 12:04

## 2019-04-12 RX ADMIN — POLYETHYLENE GLYCOL 3350 17 G: 17 POWDER, FOR SOLUTION ORAL at 12:04

## 2019-04-12 RX ADMIN — MORPHINE SULFATE 4 MG: 10 INJECTION INTRAVENOUS at 04:04

## 2019-04-12 RX ADMIN — ASPIRIN 81 MG CHEWABLE TABLET 81 MG: 81 TABLET CHEWABLE at 09:04

## 2019-04-12 RX ADMIN — HYDROCHLOROTHIAZIDE 12.5 MG: 12.5 TABLET ORAL at 06:04

## 2019-04-12 RX ADMIN — LEVOTHYROXINE SODIUM 125 MCG: 125 TABLET ORAL at 09:04

## 2019-04-12 RX ADMIN — VENLAFAXINE HYDROCHLORIDE 75 MG: 75 CAPSULE, EXTENDED RELEASE ORAL at 09:04

## 2019-04-12 RX ADMIN — MUPIROCIN 1 G: 20 OINTMENT TOPICAL at 09:04

## 2019-04-12 RX ADMIN — CYCLOBENZAPRINE HYDROCHLORIDE 10 MG: 10 TABLET, FILM COATED ORAL at 09:04

## 2019-04-12 RX ADMIN — OXYCODONE AND ACETAMINOPHEN 1 TABLET: 10; 325 TABLET ORAL at 06:04

## 2019-04-12 RX ADMIN — CARVEDILOL 3.12 MG: 3.12 TABLET, FILM COATED ORAL at 09:04

## 2019-04-12 RX ADMIN — FAMOTIDINE 20 MG: 20 TABLET, FILM COATED ORAL at 09:04

## 2019-04-12 RX ADMIN — NIFEDIPINE 90 MG: 30 TABLET, FILM COATED, EXTENDED RELEASE ORAL at 09:04

## 2019-04-12 NOTE — DISCHARGE SUMMARY
[facility]  Discharge Summary      Admit Date: 4/11/2019    Discharge Date and Time: No discharge date for patient encounter.    Attending Physician: Baldomero Fischer MD     Reason for Admission: oa    Procedures Performed: Procedure(s) (LRB):  ARTHROPLASTY, KNEE (ADD ON ) (Right)    Hospital Course (synopsis of major diagnoses, care, treatment, and services provided during the course of the hospital stay): The above patient underwent the above procedure.  Post operative the patient received pain management and pt / ot. The patient progressed well and will be discharged--see orders.     Consults: nephrology    Significant Diagnostic Studies: Labs:   CBC   Recent Labs   Lab 04/12/19  0505   WBC 13.23*   HGB 11.5*   HCT 36.7*   *       Final Diagnoses:    Principal Problem: OA (osteoarthritis) of knee   Secondary Diagnoses:   Active Hospital Problems   No active problems to display.      Resolved Hospital Problems    Diagnosis Date Resolved POA    *OA (osteoarthritis) of knee [M17.10] 04/12/2019 Yes    OA (osteoarthritis) of knee [M17.10] 04/11/2019 Yes       Discharged Condition: good    Disposition: Home-Health Care [facility]    Follow Up/Patient Instructions:     Medications:  Reconciled Home Medications:      Medication List      CHANGE how you take these medications    aspirin 81 MG Chew  Take 1 tablet (81 mg total) by mouth 2 (two) times daily.  What changed:  additional instructions     cyclobenzaprine 10 MG tablet  Commonly known as:  FLEXERIL  Take 1 tablet (10 mg total) by mouth 3 (three) times daily as needed for Muscle spasms.  What changed:  when to take this     * oxyCODONE-acetaminophen 5-325 mg per tablet  Commonly known as:  PERCOCET  Take 1 tablet by mouth every 12 (twelve) hours as needed for Pain.  What changed:  Another medication with the same name was added. Make sure you understand how and when to take each.     * oxyCODONE-acetaminophen  mg per tablet  Commonly  known as:  PERCOCET  Take 1 tablet by mouth every 6 (six) hours as needed.  What changed:  You were already taking a medication with the same name, and this prescription was added. Make sure you understand how and when to take each.         * This list has 2 medication(s) that are the same as other medications prescribed for you. Read the directions carefully, and ask your doctor or other care provider to review them with you.            CONTINUE taking these medications    ALPRAZolam 0.25 MG tablet  Commonly known as:  XANAX  Take 1 tablet (0.25 mg total) by mouth daily as needed.     carvedilol 3.125 MG tablet  Commonly known as:  COREG  Take 1 tablet (3.125 mg total) by mouth 2 (two) times daily.     clotrimazole 1 % cream  Commonly known as:  LOTRIMIN  Apply topically 2 (two) times daily.     clotrimazole-betamethasone 1-0.05% cream  Commonly known as:  LOTRISONE  Apply topically 2 (two) times daily as needed.     docusate sodium 100 MG capsule  Commonly known as:  STOOL SOFTENER  Take 1 capsule (100 mg total) by mouth 2 (two) times daily as needed for Constipation.     fluticasone 50 mcg/actuation nasal spray  Commonly known as:  FLONASE  USE 2 SPRAYS IN EACH NOSTRIL ONCE A DAY     levothyroxine 125 MCG tablet  Commonly known as:  SYNTHROID  Take 1 tablet (125 mcg total) by mouth once daily.     losartan-hydrochlorothiazide 100-12.5 mg 100-12.5 mg Tab  Commonly known as:  HYZAAR  Take 1 tablet by mouth every morning.     multivitamin per tablet  Commonly known as:  THERAGRAN  Take 1 tablet by mouth once daily.     NIFEdipine 90 MG (OSM) 24 hr tablet  Commonly known as:  PROCARDIA-XL  Take 1 tablet (90 mg total) by mouth once daily.     potassium chloride SA 20 MEQ tablet  Commonly known as:  K-DUR,KLOR-CON  Take 2 tablets (40 mEq total) by mouth 2 (two) times daily.     venlafaxine 75 MG 24 hr capsule  Commonly known as:  EFFEXOR-XR  TAKE 1 CAPSULE EVERY NIGHT          No discharge procedures on file.

## 2019-04-12 NOTE — PLAN OF CARE
Patient has been assigned to [facility]      04/12/19 1237   Final Note   Assessment Type Final Discharge Note   Anticipated Discharge Disposition Home-Health   What phone number can be called within the next 1-3 days to see how you are doing after discharge? [phone]   Discharge plans and expectations educations in teach back method with documentation complete? Yes

## 2019-04-12 NOTE — PT/OT/SLP EVAL
Occupational Therapy   Evaluation    Name: Tanika Wayne  MRN: [id number]  Admitting Diagnosis:  OA (osteoarthritis) of knee 1 Day Post-Op    Recommendations:     Discharge Recommendations: home with home health  Discharge Equipment Recommendations:  none  Barriers to discharge:  None    Assessment:     Tanika Wayne is a 59 y.o. female with a medical diagnosis of OA (osteoarthritis) of knee.  She presents with mild pain in R knee. Performance deficits affecting function: impaired self care skills, impaired endurance, impaired functional mobilty, gait instability, decreased lower extremity function, pain, impaired balance.  Pt demonstrates strength and ROM in (B) UE needed for ADLs, and is able to perform sit <> stand transfer from EOB and chair with CGA and RW.  Pt able to don underwear and skirt with SBA; however, one minor instance of LOB noted when pt attempted to pull clothing up while standing.  Max A required for socks and Total A required to don shoes.  Pt ambulated within room with CGA and RW.  PTA pt reports being (I) with ADLs, and was utilizing a rollator for mobility.  Pt would benefit from skilled OT services to address problems listed above and increase independence with ADLs.  Home health is recommended upon d/c from acute care to further address deficits and help pt improve overall functional independence.           Rehab Prognosis: Good; patient would benefit from acute skilled OT services to address these deficits and reach maximum level of function.       Plan:     Patient to be seen daily to address the above listed problems via self-care/home management, therapeutic activities, therapeutic exercises  · Plan of Care Expires:  5/12/2019  · Plan of Care Reviewed with: patient    Subjective     Chief Complaint: Pain in Right knee  Patient/Family Comments/goals: Resume PLOF    Occupational Profile:  Living Environment: Pt lives with  and two kids (ages 24 and 14) in [facility], [address], HR on R  side.  Bathroom contains tub/shower combination.   Previous level of function: Pt reports being (I) with ADLs and was utilizing a rollator for mobility.    Roles and Routines: Pt is a mother and wife.  She drives and has not worked for the past 3 years.  She previously assisted with chores, but since her BRYNN she has not been able to.    Equipment Used at Home:  3-in-1 commode, shower chair, rollator  Assistance upon Discharge: Family able to provide assist    Pain/Comfort:  · Pain Rating 1: 2/10  · Location - Side 1: Right  · Location - Orientation 1: anterior  · Location 1: knee  · Pain Addressed 1: Pre-medicate for activity  · Pain Rating Post-Intervention 1: 2/10    Patients cultural, spiritual, Voodoo conflicts given the current situation: no    Objective:     Communicated with: RN prior to session.  Patient found supine with HOB elevated with cryotherapy, peripheral IV, oxygen upon OT entry to room.  *Attempted to see pt two times prior to completing evaluation at 8:30 and 9:20.  Pt with 9/10 pain both times and requesting time to allow pain medicine to take effect.  RN alerted.    General Precautions: Standard, fall   Orthopedic Precautions:RLE weight bearing as tolerated   Braces: N/A     Occupational Performance:    Bed Mobility:    · Patient completed Rolling/Turning to Left with  stand by assistance  · Patient completed Scooting/Bridging with stand by assistance  · Patient completed Supine to Sit with contact guard assistance    Functional Mobility/Transfers:  · Patient completed Sit <> Stand Transfer with contact guard assistance  with  rolling walker x 2 trials from EOB; x 1 trial from chair  · Functional Mobility: Pt ambulated 20 ft within room with CGA and RW.  No instances of LOB noted.    Activities of Daily Living:  · Upper Body Dressing: independence for doffing down and donning shirt.  · Lower Body Dressing: stand by assistance for donning underwear and skirt.  Pt started task in seated  position then stood to fully pull up.  Mild LOB when pulling up underwear and skirt requiring CGA-Min A to correct.  Cues provided to always achieve full upright position first prior to attempting to pull up clothing.   Max A required to doff hospital socks and don socks from home.  Total A required to don shoes while seated up in chair.    Cognitive/Visual Perceptual:  Cognitive/Psychosocial Skills:    -       Oriented to: Person, Place, Time and Situation   -       Follows Commands/attention:Follows multistep  commands  -       Communication: clear/fluent  -       Memory: No Deficits noted  -       Safety awareness/insight to disability: intact   -       Mood/Affect/Coping skills/emotional control: Appropriate to situation    Physical Exam:  Postural examination/scapula alignment: -       No postural abnormalities identified  Skin integrity: Visible skin intact  Edema:  Mild in right knee  Sensation: -       Intact  Motor Planning: -       WNL  Dominant hand: -       right  Upper Extremity Range of Motion:    -       Right Upper Extremity: WNL  -       Left Upper Extremity: WNL  Upper Extremity Strength:   -       Right Upper Extremity: WNL as observed during functional tasks  -       Left Upper Extremity: WNL as observed during functional tasks   Strength: 4/5 both hands  Fine Motor Coordination: Intact  Gross motor coordination: WFL  Balance:  Sitting- Independent; Standing- SBA for static; CGA for dynamic  Vision: Intact    AMPAC 6 Click ADL:  AMPAC Total Score: 21    Treatment & Education:  *Pt educated on knee precautions.  OT explained how to perform LB dressing while adhering to precautions.  Pt practiced donning underwear, skirt, socks, and shoes while seated at EOB.  *Pt ambulated ~20 ft within room and practiced sit <> stand transfers from chair and EOB.  *Knee precautions and LB dressing techniques reviewed with pt   Education:    Patient left up in chair with PTA present.      GOALS:    Multidisciplinary Problems     Occupational Therapy Goals        Problem: Occupational Therapy Goal    Goal Priority Disciplines Outcome Interventions   Occupational Therapy Goal     OT, PT/OT     Description:  Goals to be met by: 2019     Patient will increase functional independence with ADLs by performing:    LE Dressing with Stand-by Assistance.  Grooming while standing with Supervision.  Toileting from bedside commode with Supervision for hygiene and clothing management.   Toilet transfer to bedside commode with Supervision.                      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 Jeffries       Past Surgical History:   Procedure Laterality Date    ARTHROPLASTY, HIP Right 10/18/2018    [facility]    ARTHROPLASTY, KNEE (ADD ON ) Right 2019    [facility]    BREAST LUMPECTOMY Right      SECTION      CHOLECYSTECTOMY      EYE SURGERY      strabismus,     HIP REPLACEMENT ARTHROPLASTY Right     dr fischer    HYSTERECTOMY      JOINT REPLACEMENT      hip    L3-5 Laminectomy  2017    Dr. Mendoza    Left LAMINECTOMY-MINIMALLY INVASIVE L3-4 and L4-5 Right 2017    [facility] Dave Sullivan [facility] at [facility] OR    SPLENECTOMY, TOTAL  2001    from ITP    STRABISMUS SURGERY  8.30.06     OU    THYROIDECTOMY  09/2016    THYROIDECTOMY N/A 9/9/2016    [facility]    TONSILLECTOMY         Time Tracking:     OT Date of Treatment: 04/12/19  OT Start Time: 1010  OT Stop Time: 1031  OT Total Time (min): 21 min    Billable Minutes:Evaluation 21   *Co-Tx with PTA    FELA Lawson  4/12/2019

## 2019-04-12 NOTE — PT/OT/SLP PROGRESS
"Physical Therapy Treatment    Patient Name:  Tanika Wayne   MRN:  [id number]    Recommendations:     Discharge Recommendations:  home health PT   Discharge Equipment Recommendations: none   Barriers to discharge: Inaccessible home    Assessment:     Tanika Wayne is a 59 y.o. female admitted with a medical diagnosis of OA (osteoarthritis) of knee.  She presents with the following impairments/functional limitations:  weakness, impaired endurance, impaired functional mobilty, gait instability patient was limited in p.m. Session secondary to patient becoming diaphoretic and HR in the 160's. Ortho RN, Christine present and witnessed.     Rehab Prognosis: Good; patient would benefit from acute skilled PT services to address these deficits and reach maximum level of function.    Recent Surgery: Procedure(s) (LRB):  ARTHROPLASTY, KNEE (ADD ON ) (Right) 1 Day Post-Op    Plan:     During this hospitalization, patient to be seen BID to address the identified rehab impairments via gait training, therapeutic activities, therapeutic exercises and progress toward the following goals:    · Plan of Care Expires:  04/18/19    Subjective     Chief Complaint: patient stated " I need to use the bathroom"   Patient/Family Comments/goals: I want to go home but I just don't want to get dizzy  Pain/Comfort:  · Pain Rating 1: 0/10  · Pain Rating Post-Intervention 1: 0/10  · Pain Addressed 2: Pre-medicate for activity      Objective:     Communicated with nurse prior to session.  Patient found seated with cryotherapy, peripheral IV upon PT entry to room.     General Precautions: Standard, fall   Orthopedic Precautions:RLE weight bearing as tolerated   Braces: N/A     Functional Mobility:  · Sit to stand from bedside chair with RW with SBA   · Patient gait trained 20 feet X 2 trials with RW with CGA for safety.    AM-PAC 6 CLICK MOBILITY  Turning over in bed (including adjusting bedclothes, sheets and blankets)?: 3  Sitting down on and " standing up from a chair with arms (e.g., wheelchair, bedside commode, etc.): 3  Moving from lying on back to sitting on the side of the bed?: 3  Moving to and from a bed to a chair (including a wheelchair)?: 3  Need to walk in hospital room?: 3  Climbing 3-5 steps with a railing?: 3  Basic Mobility Total Score: 18       Therapeutic Activities and Exercises:  Patient performed therex X 15 reps AROM/AAROM R LE per TKA protocol   Ascend/descend 4 steps with R HR and SC with Min A; patient becoming diaphoretic and HR in the 160's. Ortho RN, Christine present and witnessed. Oxygen saturation was 95% and BP was 172/72.       Patient left up in chair with all lines intact, call button in reach and nurse notified..    GOALS:   Multidisciplinary Problems     Physical Therapy Goals        Problem: Physical Therapy Goal    Goal Priority Disciplines Outcome Goal Variances Interventions   Physical Therapy Goal     PT, PT/OT Ongoing (interventions implemented as appropriate)     Description:  Goals to be met by: 19     Patient will increase functional independence with mobility by performin. Supine to sit with supervision. MET 19  2. Sit to supine with supervision.   3. Sit<>stand transfer with supervision using rolling walker.   4. Gait > 150 feet with SBA using rolling walker.   5. Ascend/descend 4 stairs with R handrails with CGA with or without AD.                         Time Tracking:     PT Received On: 19  PT Start Time: 1134     PT Stop Time: 1215  PT Total Time (min): 41 min     Billable Minutes: Gait Training 15, Therapeutic Activity 13 and Therapeutic Exercise 13    Treatment Type: Treatment  PT/PTA: PTA     PTA Visit Number: 1     Josefa Cervantes PTA  2019

## 2019-04-12 NOTE — PLAN OF CARE
Problem: Adult Inpatient Plan of Care  Goal: Plan of Care Review  Outcome: Outcome(s) achieved Date Met: 04/12/19  Patient in agreement with discharge. IV removed from hand, catheter intact, placed gauze coban dressing. Voiding spontaneously post patel catheter removal, no signs of urinary retention. Patient tolerates PO intake without nausea or vomiting. Pain is well controlled. Patient is ambulating with walker. Dressing to right knee [facility] sent home with patient. Reviewed all discharge instructions and patient verbalized understanding.

## 2019-04-12 NOTE — PLAN OF CARE
Problem: Adult Inpatient Plan of Care  Goal: Plan of Care Review  Outcome: Ongoing (interventions implemented as appropriate)  VSS and afebrile, aaox4. Right knee dressing CDI. continuous cooling in place. Neurovascular checks WNL. Pain mildly controlled with IV/ PO pain medication. Regular diet tolerating well. Donohue to gravity draining clear yellow urine.  Plan of care reviewed with patient. Purposeful hourly rounding done. Call light at bedside, bed at lowest position, brakes on, non skid socks on. Will continue to monitor.

## 2019-04-12 NOTE — PLAN OF CARE
Problem: Physical Therapy Goal  Goal: Physical Therapy Goal  Goals to be met by: 19     Patient will increase functional independence with mobility by performin. Supine to sit with supervision. MET 19  2. Sit to supine with supervision.   3. Sit<>stand transfer with supervision using rolling walker.   4. Gait > 150 feet with SBA using rolling walker.   5. Ascend/descend 4 stairs with R handrails with CGA with or without AD.       Outcome: Ongoing (interventions implemented as appropriate)    Patient tolerated treatment session well and progressing well towards goals. Patient required CGA for functional mobility. Min A of 2 people for stair training secondary to patient being diaphoretic and 's 160's

## 2019-04-12 NOTE — PT/OT/SLP PROGRESS
"Physical Therapy Treatment    Patient Name:  Tanika Wayne   MRN:  [id number]    Recommendations:     Discharge Recommendations:  home health PT   Discharge Equipment Recommendations: none   Barriers to discharge: Inaccessible home HR in the 160's with stair training     Assessment:     Tanika Wayne is a 59 y.o. female admitted with a medical diagnosis of OA (osteoarthritis) of knee.  She presents with the following impairments/functional limitations:  weakness, impaired endurance, impaired functional mobilty, gait instability, impaired balance patient improved with mobility and required CGA for functional mobility. Patient HR was 127 at rest and patient performed stair training became diaphoretic 's /57, SPO2 95%. Nurse was notified immediately. Also spoke to Dr. Fischer. Per Dr. Fischer re access patient in p.m. And keep her if needed.       Rehab Prognosis: Good; patient would benefit from acute skilled PT services to address these deficits and reach maximum level of function.    Recent Surgery: Procedure(s) (LRB):  ARTHROPLASTY, KNEE (ADD ON ) (Right) 1 Day Post-Op    Plan:     During this hospitalization, patient to be seen BID to address the identified rehab impairments via gait training, therapeutic activities, therapeutic exercises and progress toward the following goals:    · Plan of Care Expires:  04/18/19    Subjective     Chief Complaint: patient stated " I just feel dizzy"   Patient/Family Comments/goals: I don't want to go home if I feel like this   Pain/Comfort:  · Pain Rating 1: 0/10  · Pain Rating Post-Intervention 1: 0/10  · Pain Addressed 2: Pre-medicate for activity      Objective:     Communicated with nurse prior to session.  Patient found supine with cryotherapy, peripheral IV, oxygen upon PT entry to room.     General Precautions: Standard, fall   Orthopedic Precautions:RLE weight bearing as tolerated   Braces: N/A     Functional Mobility:  · Supine to sit with Mod I "   · Sit to stand from bed, from bedside chair with RW with CGA vc's for hand placement   · Patient gait trained 20 feet with Rolling walker with CGA patient demo decrease step length, slow edmond.     AM-PAC 6 CLICK MOBILITY  Turning over in bed (including adjusting bedclothes, sheets and blankets)?: 3  Sitting down on and standing up from a chair with arms (e.g., wheelchair, bedside commode, etc.): 3  Moving from lying on back to sitting on the side of the bed?: 3  Moving to and from a bed to a chair (including a wheelchair)?: 3  Need to walk in hospital room?: 3  Climbing 3-5 steps with a railing?: 3  Basic Mobility Total Score: 18       Therapeutic Activities and Exercises:  Ascend/descend 4 steps with R HR and SC with Min A of 2 people patient became diaphoretic and needed to sit in bedside chair Patient HR was 127 at rest and patient performed stair training became diaphoretic 's /57, SPO2 95%. Nurse was notified immediately. Also spoke to Dr. Fischer. Per Dr. Fischer re access patient in p.m. And keep her if needed.      Patient left up in chair with all lines intact, call button in reach and nurse and friends present  notified..    GOALS:   Multidisciplinary Problems     Physical Therapy Goals        Problem: Physical Therapy Goal    Goal Priority Disciplines Outcome Goal Variances Interventions   Physical Therapy Goal     PT, PT/OT Ongoing (interventions implemented as appropriate)     Description:  Goals to be met by: 19     Patient will increase functional independence with mobility by performin. Supine to sit with supervision. MET 19  2. Sit to supine with supervision.   3. Sit<>stand transfer with supervision using rolling walker.   4. Gait > 150 feet with SBA using rolling walker.   5. Ascend/descend 4 stairs with R handrails with CGA with or without AD.                         Time Tracking:     PT Received On: 19  PT Start Time: 1010     PT Stop Time: 1106  PT Total  Time (min): 56 min     Billable Minutes: Gait Training 15, Therapeutic Activity 30 and Therapeutic Exercise 11    Treatment Type: Treatment  PT/PTA: PTA     PTA Visit Number: 1     Josefa Cervantes, LOUANN  04/12/2019

## 2019-04-12 NOTE — PROGRESS NOTES
9:58am - SW called Ana [phone] at [facility] workers comp., left message, waiting for  to assign HH agency.    10:34am - SW called Ana, no answer, voice mail.    10:53am - SW received call from Mary at [facility] and assigned pt to [facility].  Mary will send the approved 1010 form and SW has to forward to [facility], along with  orders and clinicals.  SW will fax to  once 1010 form is received.    12:25pm - VAISHALI called Ana and to informed her that SW still waiting on the approved 1010; Ana stated she would email it to .    12:42pm - VAISHALI faxed  approved 1010 form, along with clinicals and orders to [facility] [phone]. Unable to sent via [facility], pt is outpt recovery.    2:16pm - VAISHALI called [facility] [phone], spoke to Alexus, received fax and accepted pt.

## 2019-04-12 NOTE — PROGRESS NOTES
"Medicine  Progress Note    Admit Date: 4/11/2019   LOS: 0 days     SUBJECTIVE:     Follow-up For:  OA (osteoarthritis) of knee    Interval History:     Pain control issues this am.  C/o knee/back pain but denies CP/SOB/Calf pain.      Review of Systems:  Constitutional: No fever or chills  Respiratory: No cough or shortness of breath  Cardiovascular: No chest pain or palpitations  Gastrointestinal: No nausea or vomiting  Neurological: No confusion or weakness    OBJECTIVE:     Vital Signs Range (Last 24H):  BP (!) 145/76 (Patient Position: Lying)   Pulse (!) 118   Temp 98.7 °F (37.1 °C) (Oral)   Resp 16   Ht 5' 1" (1.549 m)   Wt 113.4 kg (250 lb)   SpO2 97%   Breastfeeding? No   BMI 47.24 kg/m²     Temp:  [97.4 °F (36.3 °C)-98.7 °F (37.1 °C)]   Pulse:  []   Resp:  [16]   BP: (120-177)/(59-82)   SpO2:  [92 %-100 %]     I & O (Last 24H):    Intake/Output Summary (Last 24 hours) at 4/12/2019 0833  Last data filed at 4/12/2019 0000  Gross per 24 hour   Intake 3068.75 ml   Output 2475 ml   Net 593.75 ml       Physical Exam:  General appearance: Well developed, well nourished  Eyes:  Conjunctivae/corneas clear. PERRL.  Lungs: Normal respiratory effort,   clear to auscultation bilaterally   Heart: Regular rate and rhythm, S1, S2 normal, no murmur, rub or karely.  Abdomen: Soft, non-tender non-distended; bowel sounds normal; no masses,  no organomegaly, obese  Extremities: No cyanosis or clubbing. No edema.    Skin: Skin color, texture, turgor normal. No rashes or lesions  Neurologic: Normal strength and tone. No focal numbness or weakness   Right knee:  CDI with polar ice.     Laboratory Data:  Recent Labs   Lab 04/12/19  0505   WBC 13.23*   RBC 4.25   HGB 11.5*   HCT 36.7*   *   MCV 86   MCH 27.1   MCHC 31.3*       BMP:   Recent Labs   Lab 04/12/19  0505   *      K 4.3      CO2 27   BUN 11   CREATININE 0.8   CALCIUM 8.7     Lab Results   Component Value Date    CALCIUM 8.7 " 04/12/2019       Lab Results   Component Value Date    CALCIUM 8.7 04/12/2019       No results found for: URICACID    BNP  No results for input(s): BNP, BNPTRIAGEBLO in the last 168 hours.    Medications:  Medication list was reviewed and changes noted under Assessment/Plan.    Diagnostic Results:        ASSESSMENT/PLAN:     1. Right knee repair (M17.11): per therapy and ortho teams  2. Depression (F32.9): continue home med  3.   Hx of PONV (R11.2): anti-emetics PRN  4.   Hypothyroidism, surgically induced (E07.9): continue home med  5.   DM T2 (E11.9): diet controlled.    6.   HTN (I10): meds with hold parameters  7.   CAD (I25.10): defer  8.   DVT prophy: ASA BID.   9.   Obesity (E44.0):  Needs weight mgmt.  Consider GLP as outpt.  Defer.   10.  Pain control per ortho team.   11. Hx of ITP-splenectomy.        Dc home per ortho.

## 2019-04-12 NOTE — PLAN OF CARE
Problem: Occupational Therapy Goal  Goal: Occupational Therapy Goal  Goals to be met by: 4/19/2019     Patient will increase functional independence with ADLs by performing:    LE Dressing with Stand-by Assistance.  Grooming while standing with Supervision.  Toileting from bedside commode with Supervision for hygiene and clothing management.   Toilet transfer to bedside commode with Supervision.      OT evaluation complete and POC established.  Home health is recommended upon d/c from acute care to further address deficits and help pt improve overall functional independence.     FELA Lawson  4/12/2019

## 2019-04-13 NOTE — PT/OT/SLP DISCHARGE
Occupational Therapy Discharge Summary    Tanika Wayne  MRN: [id number]   Principal Problem: OA (osteoarthritis) of knee      Patient Discharged from acute Occupational Therapy on 5/12/19.  Please refer to prior OT note dated 5/12/19 for functional status.    Assessment:      Patient appropriate for care in another setting. Patient has not met goals. Evaluation only    Objective:     GOALS:   Multidisciplinary Problems     Occupational Therapy Goals        Problem: Occupational Therapy Goal    Goal Priority Disciplines Outcome Interventions   Occupational Therapy Goal     OT, PT/OT     Description:  Goals to be met by: 4/19/2019     Patient will increase functional independence with ADLs by performing:    LE Dressing with Stand-by Assistance.  Grooming while standing with Supervision.  Toileting from bedside commode with Supervision for hygiene and clothing management.   Toilet transfer to bedside commode with Supervision.                      Reasons for Discontinuation of Therapy Services  Transfer to alternate level of care.      Plan:     Patient Discharged to: Home with Home Health Service    FELA Li  4/13/2019

## 2019-04-13 NOTE — PROGRESS NOTES
Physical Therapy Discharge Summary    Name: Tanika Wayne  MRN: [id number]   Principal Problem: OA (osteoarthritis) of knee     Patient Discharged from acute Physical Therapy on 19.  Please refer to prior PT noted date on 19 for functional status.     Assessment:     Patient appropriate for care in another setting.    Objective:     GOALS:   Multidisciplinary Problems     Physical Therapy Goals        Problem: Physical Therapy Goal    Goal Priority Disciplines Outcome Goal Variances Interventions   Physical Therapy Goal     PT, PT/OT Ongoing (interventions implemented as appropriate)     Description:  Goals to be met by: 19     Patient will increase functional independence with mobility by performin. Supine to sit with supervision. MET 19  2. Sit to supine with supervision.   3. Sit<>stand transfer with supervision using rolling walker.   4. Gait > 150 feet with SBA using rolling walker.   5. Ascend/descend 4 stairs with R handrails with CGA with or without AD.                         Reasons for Discontinuation of Therapy Services  Transfer to alternate level of care.      Plan:     Patient Discharged to: Home with Home Health Service.    Arpit Moya, PT  2019

## 2019-05-16 ENCOUNTER — OFFICE VISIT (OUTPATIENT)
Dept: PHYSICAL MEDICINE AND REHAB | Facility: CLINIC | Age: 59
End: 2019-05-16
Payer: MEDICARE

## 2019-05-16 VITALS
BODY MASS INDEX: 47.23 KG/M2 | HEIGHT: 61 IN | WEIGHT: 250.13 LBS | SYSTOLIC BLOOD PRESSURE: 142 MMHG | HEART RATE: 94 BPM | DIASTOLIC BLOOD PRESSURE: 77 MMHG

## 2019-05-16 DIAGNOSIS — F11.90 UNCOMPLICATED OPIOID USE: ICD-10-CM

## 2019-05-16 DIAGNOSIS — M43.16 SPONDYLOLISTHESIS AT L3-L4 LEVEL: ICD-10-CM

## 2019-05-16 DIAGNOSIS — Z96.641 STATUS POST RIGHT HIP REPLACEMENT: ICD-10-CM

## 2019-05-16 DIAGNOSIS — Z98.890 S/P LUMBAR LAMINECTOMY: ICD-10-CM

## 2019-05-16 DIAGNOSIS — K59.00 CONSTIPATION, UNSPECIFIED CONSTIPATION TYPE: Primary | ICD-10-CM

## 2019-05-16 DIAGNOSIS — G89.29 CHRONIC BILATERAL LOW BACK PAIN WITH RIGHT-SIDED SCIATICA: ICD-10-CM

## 2019-05-16 DIAGNOSIS — M51.36 DDD (DEGENERATIVE DISC DISEASE), LUMBAR: ICD-10-CM

## 2019-05-16 DIAGNOSIS — M25.561 CHRONIC PAIN OF RIGHT KNEE: ICD-10-CM

## 2019-05-16 DIAGNOSIS — M62.838 NIGHT MUSCLE SPASMS: ICD-10-CM

## 2019-05-16 DIAGNOSIS — M48.061 SPINAL STENOSIS OF LUMBAR REGION, UNSPECIFIED WHETHER NEUROGENIC CLAUDICATION PRESENT: ICD-10-CM

## 2019-05-16 DIAGNOSIS — Z98.890 HISTORY OF LUMBAR LAMINECTOMY: ICD-10-CM

## 2019-05-16 DIAGNOSIS — M48.062 SPINAL STENOSIS OF LUMBAR REGION WITH NEUROGENIC CLAUDICATION: ICD-10-CM

## 2019-05-16 DIAGNOSIS — Z96.651 STATUS POST TOTAL RIGHT KNEE REPLACEMENT: ICD-10-CM

## 2019-05-16 DIAGNOSIS — Z79.891 CHRONIC USE OF OPIATE FOR THERAPEUTIC PURPOSE: ICD-10-CM

## 2019-05-16 DIAGNOSIS — M54.41 CHRONIC BILATERAL LOW BACK PAIN WITH RIGHT-SIDED SCIATICA: ICD-10-CM

## 2019-05-16 DIAGNOSIS — M48.07 STENOSIS OF LUMBOSACRAL SPINE: ICD-10-CM

## 2019-05-16 DIAGNOSIS — M25.551 PAIN OF RIGHT HIP JOINT: ICD-10-CM

## 2019-05-16 DIAGNOSIS — G89.29 CHRONIC PAIN OF RIGHT KNEE: ICD-10-CM

## 2019-05-16 PROCEDURE — 99999 PR PBB SHADOW E&M-EST. PATIENT-LVL III: CPT | Mod: PBBFAC,HCNC,, | Performed by: PHYSICAL MEDICINE & REHABILITATION

## 2019-05-16 PROCEDURE — 3078F PR MOST RECENT DIASTOLIC BLOOD PRESSURE < 80 MM HG: ICD-10-PCS | Mod: HCNC,CPTII,S$GLB, | Performed by: PHYSICAL MEDICINE & REHABILITATION

## 2019-05-16 PROCEDURE — 99999 PR PBB SHADOW E&M-EST. PATIENT-LVL III: ICD-10-PCS | Mod: PBBFAC,HCNC,, | Performed by: PHYSICAL MEDICINE & REHABILITATION

## 2019-05-16 PROCEDURE — 3077F PR MOST RECENT SYSTOLIC BLOOD PRESSURE >= 140 MM HG: ICD-10-PCS | Mod: HCNC,CPTII,S$GLB, | Performed by: PHYSICAL MEDICINE & REHABILITATION

## 2019-05-16 PROCEDURE — 3008F BODY MASS INDEX DOCD: CPT | Mod: HCNC,CPTII,S$GLB, | Performed by: PHYSICAL MEDICINE & REHABILITATION

## 2019-05-16 PROCEDURE — 3008F PR BODY MASS INDEX (BMI) DOCUMENTED: ICD-10-PCS | Mod: HCNC,CPTII,S$GLB, | Performed by: PHYSICAL MEDICINE & REHABILITATION

## 2019-05-16 PROCEDURE — 99214 OFFICE O/P EST MOD 30 MIN: CPT | Mod: HCNC,S$GLB,, | Performed by: PHYSICAL MEDICINE & REHABILITATION

## 2019-05-16 PROCEDURE — 3078F DIAST BP <80 MM HG: CPT | Mod: HCNC,CPTII,S$GLB, | Performed by: PHYSICAL MEDICINE & REHABILITATION

## 2019-05-16 PROCEDURE — 3077F SYST BP >= 140 MM HG: CPT | Mod: HCNC,CPTII,S$GLB, | Performed by: PHYSICAL MEDICINE & REHABILITATION

## 2019-05-16 PROCEDURE — 99214 PR OFFICE/OUTPT VISIT, EST, LEVL IV, 30-39 MIN: ICD-10-PCS | Mod: HCNC,S$GLB,, | Performed by: PHYSICAL MEDICINE & REHABILITATION

## 2019-05-16 RX ORDER — OXYCODONE AND ACETAMINOPHEN 5; 325 MG/1; MG/1
1 TABLET ORAL EVERY 8 HOURS PRN
Qty: 90 TABLET | Refills: 0 | Status: SHIPPED | OUTPATIENT
Start: 2019-05-16 | End: 2019-06-15

## 2019-05-16 RX ORDER — OXYCODONE AND ACETAMINOPHEN 5; 325 MG/1; MG/1
1 TABLET ORAL EVERY 12 HOURS PRN
Qty: 60 TABLET | Refills: 0 | Status: SHIPPED | OUTPATIENT
Start: 2019-07-16 | End: 2019-08-15

## 2019-05-16 RX ORDER — BISACODYL 5 MG
5 TABLET, DELAYED RELEASE (ENTERIC COATED) ORAL 2 TIMES DAILY PRN
Qty: 60 TABLET | Refills: 5 | Status: SHIPPED | OUTPATIENT
Start: 2019-05-16 | End: 2019-06-15

## 2019-05-16 RX ORDER — OXYCODONE AND ACETAMINOPHEN 5; 325 MG/1; MG/1
1 TABLET ORAL EVERY 12 HOURS PRN
Qty: 60 TABLET | Refills: 0 | Status: SHIPPED | OUTPATIENT
Start: 2019-06-16 | End: 2019-07-16

## 2019-05-16 RX ORDER — CYCLOBENZAPRINE HCL 10 MG
10 TABLET ORAL 3 TIMES DAILY PRN
Qty: 90 TABLET | Refills: 5 | Status: SHIPPED | OUTPATIENT
Start: 2019-05-16 | End: 2019-06-15

## 2019-05-16 NOTE — PROGRESS NOTES
Subjective:       Patient ID: Tanika Wayne is a 59 y.o. female.    Chief Complaint: Back Pain    Back Pain   Associated symptoms include leg pain. Pertinent negatives include no abdominal pain, chest pain, fever, numbness or weakness. Headaches:     Leg Pain    Pertinent negatives include no numbness.   Hip Pain    Pertinent negatives include no numbness.   Knee Pain    Pertinent negatives include no numbness.      Mrs. Wayne is 60 y/o female, who returns to clinic for worsening of chronic back pain, and chronic pain management with opioids.  She is s/p L3-5 laminectomy on 2/22/17 by dr. Sullivan, cx with superficial wound infection.  Referred by Dr. Sullivan   [facility] 01/16/19.  Today,, she reports improvement of RT hip pain, she underwent RT BRYNN on 10/18/18  By dr. Fischer ( who is with [employer]).  Since [facility], she is s/p Rt TKA on 4/11/19, with dr. Fischer ( who is with [employer]).  Until now he was giving her pain meds after knee surgery, and he told her that he is not giving her any longer opioids.  She will start PT for  RT TKA, soon, and she would need pain meds before and after therapy, to improve her ROM.   Nevertheless , she seems to be satisfied with her surgery and hopes for the best.  She states that she treats her back with me and other pain ( hip and knee ) with dr. Fischer, but she is aware that she can get opioids only with one doctor.    Today, she complains   1.Back pain     She reports some improvement of back pain, since her RT hip surgery.  Her main pain is postoperative RT hip pain, that is improving on daily basis.  Percocet decreases her pain down to 3-4.   She takes it BID, early in am,a nd 7-8 pm.  She also reports that Valium helps with leg spasms, takes it in am/pm.   Takes also Flexeril at bedtime, that helps with sleep.   She sates that she was taking Tramadol before surgery and was making her constipated.  Also , reported that  did not want her to have PT until  October 2017.  She is on long term disability, [employer] after back surgery.  She is also on [employer] Comp for Rt knee and Rt hip pain since her injury at work,   (she fell at work ,in January 7/2016).    Back Pain Description:  Lenght: pain is a chronic pain. Length > many, more than 10 yrs, with worsening last year.   Any past, recent injury, falls.  Intensity:  CURRENT  3/10,  AVERAGE  Pain  3-4/10. at BEST /10 , At WORST 7-8 /10 on the WORST day.   Location: pain is localized at  Rt side of back and buttock, running to Rt hip and leg.    It is more Back at right side >> leg pain.   Radiation: Rt  Buttocks all the way to Rt legs, to mid calf   Timing :it is  constant day/yamila at night pain ,cannot sleep on RT side,  worse with activity, in evening  Cannot stand 2-5 minutes, needs tos it down, cannot walk a distance of one room, secondary to back pain , has to sit down for 5-10 minutes,   and can walk again.   Uses SC  ( borrowed from her ). She does noot have any AD at home.   She states that she ordered RW with seat through [employer].   QUALITY: Back pain is more dull ache.  No Sharp/shooting/ neuropathic : burning, tingling, numbness.  She reports muscle spasm in RT calf.   She has  RT weakness, she reports a couple of times inside house, no injury.   Worsening factors:  Standing, walking.   Alleviating factors: sitting and laying down.   Symptoms interfere with daily activity, sleeping and work.   Current medications: Tramadol , Aleve   Failed medications: Gabapentin ( makes bruises on skin, bluish nails) Lyrica ( makes ankle swelling) .   Prior procedures.  Minimally invasive laminectomy.   PT/OT: None. Per patient, Dr Sullivan told her not to have PT until October 2017.   Patient denies night fever/night sweats, bowel incontinence, significant weight loss and significant motor weakness (red flags).  Patient denies any suicidal or homicidal ideations.  She is here for chronic pain management  with opiates.    Past Medical History:   Diagnosis Date    Abnormal echocardiogram 2012    Arthritis     Breast cancer 1998    Breast cancer, right breast     Cardiomyopathy due to systemic disease     Cataract     Clotting disorder     Coronary artery disease     DM (diabetes mellitus) 2012    Encounter for blood transfusion     HTN (hypertension) 2012    Increased glucose level 2012    ITP (idiopathic thrombocytopenic purpura) 2012    Kidney stones 2012    PONV (postoperative nausea and vomiting)     Stenosis of lumbosacral spine 2012    Thyroid disease     Tympanic membrane perforation 9/15/2014    right 2014 Dr. Jeffries       Past Surgical History:   Procedure Laterality Date    ARTHROPLASTY, HIP Right 10/18/2018    [facility]    ARTHROPLASTY, KNEE (ADD ON ) Right 2019    [facility]    BREAST LUMPECTOMY Right      SECTION      CHOLECYSTECTOMY      EYE SURGERY      strabismus, 2006    HIP REPLACEMENT ARTHROPLASTY Right     dr fischer    HYSTERECTOMY      JOINT REPLACEMENT      hip    L3-5 Laminectomy  2017    Dr. Mendoza    Left LAMINECTOMY-MINIMALLY INVASIVE L3-4 and L4-5 Right 2017    [facility] Dave [facility]    SPLENECTOMY, TOTAL  2001    from ITP    STRABISMUS SURGERY  8.30.06    OU    THYROIDECTOMY  2016    THYROIDECTOMY N/A 2016    [facility]ish Travis [facility]    TONSILLECTOMY         Family History   Problem Relation Age of Onset    Heart disease Mother 69        MI    Diabetes Mother     Hypertension Mother     Diabetes Father     Hypertension Father     Stroke Father     Diabetes Sister     Diabetes Brother        Social History     Socioeconomic History    Marital status:      Spouse name: Not on file    Number of children: 1    Years of education: Not on file    Highest  education level: Not on file   Occupational History    Occupation: registration     Comment[place] [employer] [place]   Social Needs    Financial resource strain: Not on file    Food insecurity:     Worry: Not on file     Inability: Not on file    Transportation needs:     Medical: Not on file     Non-medical: Not on file   Tobacco Use    Smoking status: Never Smoker    Smokeless tobacco: Never Used   Substance and Sexual Activity    Alcohol use: No    Drug use: No    Sexual activity: Never   Lifestyle    Physical activity:     Days per week: Not on file     Minutes per session: Not on file    Stress: Not on file   Relationships    Social connections:     Talks on phone: Not on file     Gets together: Not on file     Attends Gnosticism service: Not on file     Active member of club or organization: Not on file     Attends meetings of clubs or organizations: Not on file     Relationship status: Not on file   Other Topics Concern    Not on file   Social History Narrative    2017 - Using a walker    The patient is not getting much exercise but is able to get about with the aid of a cane or walker.     (has copd, arthritis), 2 kids, dtr 24, son 14 (healthy kids)       Current Outpatient Medications   Medication Sig Dispense Refill    ALPRAZolam (XANAX) 0.25 MG tablet Take 1 tablet (0.25 mg total) by mouth daily as needed. 30 tablet 2    aspirin 81 MG Chew Take 1 tablet (81 mg total) by mouth 2 (two) times daily. (Patient taking differently: Take 81 mg by mouth 2 (two) times daily. Stopped 11/2018)  0    bisacodyl (DULCOLAX) 5 mg EC tablet Take 1 tablet (5 mg total) by mouth 2 (two) times daily as needed for Constipation. 60 tablet 5    carvedilol (COREG) 3.125 MG tablet Take 1 tablet (3.125 mg total) by mouth 2 (two) times daily. 180 tablet 11    clotrimazole (LOTRIMIN) 1 % cream Apply topically 2 (two) times daily. 113 g 11    clotrimazole-betamethasone 1-0.05% (LOTRISONE) cream Apply topically 2 (two) times daily  as needed. 45 g 1    cyclobenzaprine (FLEXERIL) 10 MG tablet Take 1 tablet (10 mg total) by mouth 3 (three) times daily as needed for Muscle spasms. 90 tablet 5    fluticasone (FLONASE) 50 mcg/actuation nasal spray USE 2 SPRAYS IN EACH NOSTRIL ONCE A DAY 48 mL 11    levothyroxine (SYNTHROID) 125 MCG tablet Take 1 tablet (125 mcg total) by mouth once daily. 90 tablet 11    losartan-hydrochlorothiazide 100-12.5 mg (HYZAAR) 100-12.5 mg Tab Take 1 tablet by mouth every morning. 90 tablet 11    multivitamin (THERAGRAN) per tablet Take 1 tablet by mouth once daily.      NIFEdipine (PROCARDIA-XL) 90 MG (OSM) 24 hr tablet Take 1 tablet (90 mg total) by mouth once daily. 90 tablet 11    oxyCODONE-acetaminophen (PERCOCET) 5-325 mg per tablet Take 1 tablet by mouth every 8 (eight) hours as needed for Pain. 90 tablet 0    [START ON 6/16/2019] oxyCODONE-acetaminophen (PERCOCET) 5-325 mg per tablet Take 1 tablet by mouth every 12 (twelve) hours as needed for Pain. 60 tablet 0    [START ON 7/16/2019] oxyCODONE-acetaminophen (PERCOCET) 5-325 mg per tablet Take 1 tablet by mouth every 12 (twelve) hours as needed for Pain. 60 tablet 0    potassium chloride SA (K-DUR,KLOR-CON) 20 MEQ tablet Take 2 tablets (40 mEq total) by mouth 2 (two) times daily. 360 tablet 11    venlafaxine (EFFEXOR-XR) 75 MG 24 hr capsule TAKE 1 CAPSULE EVERY NIGHT 90 capsule 3     No current facility-administered medications for this visit.      Facility-Administered Medications Ordered in Other Visits   Medication Dose Route Frequency Provider Last Rate Last Dose    sodium chloride 0.9% flush 3 mL  3 mL Intravenous PRN Baldomero Fischer MD           Review of patient's allergies indicates:  No Known Allergies      Review of Systems   Constitutional: Negative for appetite change, chills, fatigue, fever and unexpected weight change.   HENT: Negative for drooling, trouble swallowing and voice change.    Eyes: Negative for pain and visual disturbance.    Respiratory: Negative for shortness of breath and wheezing.    Cardiovascular: Negative for chest pain and palpitations.   Gastrointestinal: Negative for abdominal distention, abdominal pain, constipation and diarrhea.   Genitourinary: Negative for difficulty urinating.   Musculoskeletal: Positive for back pain and gait problem. Negative for arthralgias, joint swelling, myalgias and neck stiffness.               Skin: Negative for color change and rash.   Neurological: Negative for dizziness, facial asymmetry, speech difficulty, weakness, light-headedness and numbness. Headaches:     Hematological: Negative for adenopathy.   Psychiatric/Behavioral: Negative for behavioral problems, confusion and sleep disturbance. The patient is not nervous/anxious.        Objective:      Physical Exam      GENERAL: The patient is alert, oriented, pleasant.   HEENT; PERRLA  NECK: supple,    MUSCULOSKELETAL:   Gait- NT, she is in manual WC, pushed by her friend.  Cervical spine: full  AROM in cervical spine.  Lumbar spine, NT, in WC.  Straight leg raising Negative bilaterally ( sitting).  Full range of motion in all joints x4 extremities ( tested sitting).  Muscle strength 5/5 throughout x4 extremities.   No  joint laxity throughout x4 extremities.   NEUROLOGIC: Cranial nerves II through XII intact.   Deep tendon reflexes is normal, +2 in the upper and lower extremities bilaterally.   Muscle tone is normal.   Sensory is intact to light touch and pinprick throughout x4 extremities.       MRI of Lumbar spine ( 01/2017) showed:   Continued grade 1 anterolisthesis of L3 on L4.   The lumbar vertebral body height, contour and bone marrow signal is relatively stable without evidence for acute fracture.  The distal spinal cord and conus is normal in signal and contour the tip of the conus approximates the mid [id number].  T12/L1 No significant disc bulge, central canal or neural foraminal stenosis.   L1/L2: Small disc bulge with ligamentum  flavum hypertrophy and facet joint arthropathy with mild central canal and bilateral neural foraminal stenosis  L2/L3: Trace disc bulge with ligamentum flavum hypertrophy and facet joint arthropathy with moderate-to-severe central canal stenosis.   There is mild resulting neuroforaminal stenosis.  L3/L4: Bulging disc with ligament flavum hypertrophy and facet arthropathy with severe central canal stenosis and mild bilateral neuroforaminal stenosis.  L4/L5: Bulging disc ligamentum flavum hypertrophy and facet joint arthropathy without significant central canal stenosis   with mild/moderate neuroforaminal stenosis bilaterally  L5/S1: Bulging disc with left paracentral disc protrusion and annular fissure. No significant central canal stenosis with mild right and   moderate left neuroforaminal stenosis.  Impression:   Continued multilevel degenerative change of the lumbar spine which remains most prominent at L3/L4 with small disc bulge with   exuberant ligamentum flavum hypertrophy and facet joint arthropathy   with severe central canal stenosis and mild neuroforaminal stenosis.  Continued bulging disc with left paracentral disc protrusion L5/S1.    Assessment:       1. Constipation, unspecified constipation type    2. Spinal stenosis of lumbar region with neurogenic claudication    3. History of lumbar laminectomy    4. Chronic bilateral low back pain with right-sided sciatica    5. Spondylolisthesis at L3-L4 level    6. Chronic pain of right knee    7. Status post right hip replacement    8. Status post total right knee replacement    9. Night muscle spasms    10. S/P lumbar laminectomy    11. Pain of right hip joint    12. DDD (degenerative disc disease), lumbar    13. Chronic use of opiate for therapeutic purpose    14. Spinal stenosis of lumbar region, unspecified whether neurogenic claudication present    15. Stenosis of lumbosacral spine    16. Uncomplicated opioid use        Plan:       Constipation,  unspecified constipation type  -     bisacodyl (DULCOLAX) 5 mg EC tablet; Take 1 tablet (5 mg total) by mouth 2 (two) times daily as needed for Constipation.  Dispense: 60 tablet; Refill: 5    Spinal stenosis of lumbar region with neurogenic claudication  -     oxyCODONE-acetaminophen (PERCOCET) 5-325 mg per tablet; Take 1 tablet by mouth every 8 (eight) hours as needed for Pain.  Dispense: 90 tablet; Refill: 0  -     oxyCODONE-acetaminophen (PERCOCET) 5-325 mg per tablet; Take 1 tablet by mouth every 12 (twelve) hours as needed for Pain.  Dispense: 60 tablet; Refill: 0  -     oxyCODONE-acetaminophen (PERCOCET) 5-325 mg per tablet; Take 1 tablet by mouth every 12 (twelve) hours as needed for Pain.  Dispense: 60 tablet; Refill: 0  -     cyclobenzaprine (FLEXERIL) 10 MG tablet; Take 1 tablet (10 mg total) by mouth 3 (three) times daily as needed for Muscle spasms.  Dispense: 90 tablet; Refill: 5    History of lumbar laminectomy  -     oxyCODONE-acetaminophen (PERCOCET) 5-325 mg per tablet; Take 1 tablet by mouth every 8 (eight) hours as needed for Pain.  Dispense: 90 tablet; Refill: 0  -     oxyCODONE-acetaminophen (PERCOCET) 5-325 mg per tablet; Take 1 tablet by mouth every 12 (twelve) hours as needed for Pain.  Dispense: 60 tablet; Refill: 0  -     oxyCODONE-acetaminophen (PERCOCET) 5-325 mg per tablet; Take 1 tablet by mouth every 12 (twelve) hours as needed for Pain.  Dispense: 60 tablet; Refill: 0  -     cyclobenzaprine (FLEXERIL) 10 MG tablet; Take 1 tablet (10 mg total) by mouth 3 (three) times daily as needed for Muscle spasms.  Dispense: 90 tablet; Refill: 5    Chronic bilateral low back pain with right-sided sciatica  -     oxyCODONE-acetaminophen (PERCOCET) 5-325 mg per tablet; Take 1 tablet by mouth every 8 (eight) hours as needed for Pain.  Dispense: 90 tablet; Refill: 0  -     oxyCODONE-acetaminophen (PERCOCET) 5-325 mg per tablet; Take 1 tablet by mouth every 12 (twelve) hours as needed for Pain.   Dispense: 60 tablet; Refill: 0  -     oxyCODONE-acetaminophen (PERCOCET) 5-325 mg per tablet; Take 1 tablet by mouth every 12 (twelve) hours as needed for Pain.  Dispense: 60 tablet; Refill: 0  -     cyclobenzaprine (FLEXERIL) 10 MG tablet; Take 1 tablet (10 mg total) by mouth 3 (three) times daily as needed for Muscle spasms.  Dispense: 90 tablet; Refill: 5    Spondylolisthesis at L3-L4 level  -     oxyCODONE-acetaminophen (PERCOCET) 5-325 mg per tablet; Take 1 tablet by mouth every 8 (eight) hours as needed for Pain.  Dispense: 90 tablet; Refill: 0  -     oxyCODONE-acetaminophen (PERCOCET) 5-325 mg per tablet; Take 1 tablet by mouth every 12 (twelve) hours as needed for Pain.  Dispense: 60 tablet; Refill: 0  -     oxyCODONE-acetaminophen (PERCOCET) 5-325 mg per tablet; Take 1 tablet by mouth every 12 (twelve) hours as needed for Pain.  Dispense: 60 tablet; Refill: 0  -     cyclobenzaprine (FLEXERIL) 10 MG tablet; Take 1 tablet (10 mg total) by mouth 3 (three) times daily as needed for Muscle spasms.  Dispense: 90 tablet; Refill: 5    Chronic pain of right knee  -     oxyCODONE-acetaminophen (PERCOCET) 5-325 mg per tablet; Take 1 tablet by mouth every 8 (eight) hours as needed for Pain.  Dispense: 90 tablet; Refill: 0  -     oxyCODONE-acetaminophen (PERCOCET) 5-325 mg per tablet; Take 1 tablet by mouth every 12 (twelve) hours as needed for Pain.  Dispense: 60 tablet; Refill: 0  -     oxyCODONE-acetaminophen (PERCOCET) 5-325 mg per tablet; Take 1 tablet by mouth every 12 (twelve) hours as needed for Pain.  Dispense: 60 tablet; Refill: 0  -     cyclobenzaprine (FLEXERIL) 10 MG tablet; Take 1 tablet (10 mg total) by mouth 3 (three) times daily as needed for Muscle spasms.  Dispense: 90 tablet; Refill: 5    Status post right hip replacement  -     oxyCODONE-acetaminophen (PERCOCET) 5-325 mg per tablet; Take 1 tablet by mouth every 8 (eight) hours as needed for Pain.  Dispense: 90 tablet; Refill: 0  -      oxyCODONE-acetaminophen (PERCOCET) 5-325 mg per tablet; Take 1 tablet by mouth every 12 (twelve) hours as needed for Pain.  Dispense: 60 tablet; Refill: 0  -     oxyCODONE-acetaminophen (PERCOCET) 5-325 mg per tablet; Take 1 tablet by mouth every 12 (twelve) hours as needed for Pain.  Dispense: 60 tablet; Refill: 0    Status post total right knee replacement  -     oxyCODONE-acetaminophen (PERCOCET) 5-325 mg per tablet; Take 1 tablet by mouth every 8 (eight) hours as needed for Pain.  Dispense: 90 tablet; Refill: 0  -     oxyCODONE-acetaminophen (PERCOCET) 5-325 mg per tablet; Take 1 tablet by mouth every 12 (twelve) hours as needed for Pain.  Dispense: 60 tablet; Refill: 0  -     oxyCODONE-acetaminophen (PERCOCET) 5-325 mg per tablet; Take 1 tablet by mouth every 12 (twelve) hours as needed for Pain.  Dispense: 60 tablet; Refill: 0    Night muscle spasms  -     cyclobenzaprine (FLEXERIL) 10 MG tablet; Take 1 tablet (10 mg total) by mouth 3 (three) times daily as needed for Muscle spasms.  Dispense: 90 tablet; Refill: 5    S/P lumbar laminectomy  -     cyclobenzaprine (FLEXERIL) 10 MG tablet; Take 1 tablet (10 mg total) by mouth 3 (three) times daily as needed for Muscle spasms.  Dispense: 90 tablet; Refill: 5    Pain of right hip joint  -     cyclobenzaprine (FLEXERIL) 10 MG tablet; Take 1 tablet (10 mg total) by mouth 3 (three) times daily as needed for Muscle spasms.  Dispense: 90 tablet; Refill: 5    DDD (degenerative disc disease), lumbar  -     cyclobenzaprine (FLEXERIL) 10 MG tablet; Take 1 tablet (10 mg total) by mouth 3 (three) times daily as needed for Muscle spasms.  Dispense: 90 tablet; Refill: 5    Chronic use of opiate for therapeutic purpose  -     cyclobenzaprine (FLEXERIL) 10 MG tablet; Take 1 tablet (10 mg total) by mouth 3 (three) times daily as needed for Muscle spasms.  Dispense: 90 tablet; Refill: 5    Spinal stenosis of lumbar region, unspecified whether neurogenic claudication present  -      cyclobenzaprine (FLEXERIL) 10 MG tablet; Take 1 tablet (10 mg total) by mouth 3 (three) times daily as needed for Muscle spasms.  Dispense: 90 tablet; Refill: 5    Stenosis of lumbosacral spine  -     cyclobenzaprine (FLEXERIL) 10 MG tablet; Take 1 tablet (10 mg total) by mouth 3 (three) times daily as needed for Muscle spasms.  Dispense: 90 tablet; Refill: 5    Uncomplicated opioid use  -     cyclobenzaprine (FLEXERIL) 10 MG tablet; Take 1 tablet (10 mg total) by mouth 3 (three) times daily as needed for Muscle spasms.  Dispense: 90 tablet; Refill: 5    Patient with chronic low back pain, with neurogenic claudication secondary to multi level Lumbar spinal stenosis, moderate-to-severe at L2-3, and   Severe at L3/L4, secondary to bulging disc with ligament flavum hypertrophy and facet arthropathy,  s/p L3-5 laminectomy on 2/22/17.    -- Pain mgm,  She will resume Percocet 5/325 mg po BID  Prn, #60 tabs,   Failed Gabapentin.  And Valium prn muscle spasms.   reviewed and appropriate.  Post surgically, she was given Hydrocodone #30, refills on 5/6, 4/25, 4/12.    Opioid Risk Score       Value Time User    Opioid Risk Score  1 5/15/2018  9:46 AM Rosario Cruz MD          Percocet refills ( my prescriptions) : 3/19, 2/19, 1/17/19.  She is also on Effexor 75 mg po QHS.  Urine drug test, done on  5/15/18, appropriately pos for opiates.  Pain contract signed.    RTC in 3-4 months.    Total time spent face to face with patient was 25 minutes.   More than 50% of that time was spent in counseling on diagnosis , prognosis and treatment options.   I also caunsel patient  on common and most usual side effect of prescribed medications.   Risk and benefits of opiates, possible risk of developing opiate dependence and tolerance, need of strict compliance with prescribed medications.  Pain contract, rules and obligations were discussed with patient in details.  He is aware that I would be the only doctor prescribing him pain  medications and ED in a case of emergency.I explained her that she is allowed to get pain medication from   Surgeon post operatively but she has to take only one pain medication at time given by one doctor, and every doctor has to be inform about her existing pain medications.  I reviewed Primary care , and other specialty's notes to better coordinate patient's  care.   All questions were answered, and patient voiced understanding.

## 2019-07-10 DIAGNOSIS — F32.A DEPRESSION, UNSPECIFIED DEPRESSION TYPE: ICD-10-CM

## 2019-07-10 RX ORDER — VENLAFAXINE HYDROCHLORIDE 75 MG/1
CAPSULE, EXTENDED RELEASE ORAL
Qty: 90 CAPSULE | Refills: 3 | Status: SHIPPED | OUTPATIENT
Start: 2019-07-10 | End: 2020-08-10

## 2019-07-16 ENCOUNTER — TELEPHONE (OUTPATIENT)
Dept: INTERNAL MEDICINE | Facility: CLINIC | Age: 59
End: 2019-07-16

## 2019-07-16 NOTE — TELEPHONE ENCOUNTER
Hi, please call patient and let the patient know that at [facility] we have developed a diabetes registry of patients and I see that this patient is due to see me back and have the diabetes evaluated along with overall health. Please offer an appointment.  Thank you, Rayo Hernandez

## 2019-07-22 ENCOUNTER — TELEPHONE (OUTPATIENT)
Dept: SPINE | Facility: CLINIC | Age: 59
End: 2019-07-22

## 2019-07-22 DIAGNOSIS — E11.9 TYPE 2 DIABETES MELLITUS WITHOUT COMPLICATION, UNSPECIFIED WHETHER LONG TERM INSULIN USE: ICD-10-CM

## 2019-07-22 NOTE — TELEPHONE ENCOUNTER
----- Message from Xena Guerin sent at 7/22/2019  3:37 PM CDT -----  Contact: Pt    Name of Who is Calling:WALTER CABA [[id number]]    What is the request in detail: patient would like a call back regarding a sooner appointment  Please contact to further discuss and advise     Can the clinic reply by [facility]: No    [facility] [phone]

## 2019-08-13 ENCOUNTER — LAB VISIT (OUTPATIENT)
Dept: LAB | Facility: HOSPITAL | Age: 59
End: 2019-08-13
Attending: INTERNAL MEDICINE
Payer: MEDICARE

## 2019-08-13 ENCOUNTER — OFFICE VISIT (OUTPATIENT)
Dept: INTERNAL MEDICINE | Facility: CLINIC | Age: 59
End: 2019-08-13
Payer: MEDICARE

## 2019-08-13 VITALS
SYSTOLIC BLOOD PRESSURE: 126 MMHG | OXYGEN SATURATION: 98 % | HEIGHT: 61 IN | WEIGHT: 257.94 LBS | HEART RATE: 98 BPM | DIASTOLIC BLOOD PRESSURE: 80 MMHG | BODY MASS INDEX: 48.7 KG/M2

## 2019-08-13 DIAGNOSIS — E89.0 POSTOPERATIVE HYPOTHYROIDISM: ICD-10-CM

## 2019-08-13 DIAGNOSIS — E11.9 TYPE 2 DIABETES MELLITUS WITHOUT COMPLICATION, WITHOUT LONG-TERM CURRENT USE OF INSULIN: Primary | ICD-10-CM

## 2019-08-13 DIAGNOSIS — I10 ESSENTIAL HYPERTENSION: ICD-10-CM

## 2019-08-13 DIAGNOSIS — E89.0 HISTORY OF SUBTOTAL THYROIDECTOMY: ICD-10-CM

## 2019-08-13 DIAGNOSIS — E11.9 TYPE 2 DIABETES MELLITUS WITHOUT COMPLICATION, WITHOUT LONG-TERM CURRENT USE OF INSULIN: ICD-10-CM

## 2019-08-13 LAB
ALBUMIN SERPL BCP-MCNC: 3.9 G/DL (ref 3.5–5.2)
ALP SERPL-CCNC: 78 U/L (ref 55–135)
ALT SERPL W/O P-5'-P-CCNC: 21 U/L (ref 10–44)
ANION GAP SERPL CALC-SCNC: 12 MMOL/L (ref 8–16)
AST SERPL-CCNC: 18 U/L (ref 10–40)
BASOPHILS # BLD AUTO: 0.03 K/UL (ref 0–0.2)
BASOPHILS NFR BLD: 0.4 % (ref 0–1.9)
BILIRUB SERPL-MCNC: 0.3 MG/DL (ref 0.1–1)
BUN SERPL-MCNC: 22 MG/DL (ref 6–20)
CALCIUM SERPL-MCNC: 9.8 MG/DL (ref 8.7–10.5)
CHLORIDE SERPL-SCNC: 99 MMOL/L (ref 95–110)
CHOLEST SERPL-MCNC: 228 MG/DL (ref 120–199)
CHOLEST/HDLC SERPL: 4.7 {RATIO} (ref 2–5)
CO2 SERPL-SCNC: 29 MMOL/L (ref 23–29)
CREAT SERPL-MCNC: 0.9 MG/DL (ref 0.5–1.4)
DIFFERENTIAL METHOD: ABNORMAL
EOSINOPHIL # BLD AUTO: 0.2 K/UL (ref 0–0.5)
EOSINOPHIL NFR BLD: 2.3 % (ref 0–8)
ERYTHROCYTE [DISTWIDTH] IN BLOOD BY AUTOMATED COUNT: 14.6 % (ref 11.5–14.5)
EST. GFR  (AFRICAN AMERICAN): >60 ML/MIN/1.73 M^2
EST. GFR  (NON AFRICAN AMERICAN): >60 ML/MIN/1.73 M^2
ESTIMATED AVG GLUCOSE: 189 MG/DL (ref 68–131)
GLUCOSE SERPL-MCNC: 185 MG/DL (ref 70–110)
HBA1C MFR BLD HPLC: 8.2 % (ref 4–5.6)
HCT VFR BLD AUTO: 42 % (ref 37–48.5)
HDLC SERPL-MCNC: 49 MG/DL (ref 40–75)
HDLC SERPL: 21.5 % (ref 20–50)
HGB BLD-MCNC: 13.3 G/DL (ref 12–16)
LDLC SERPL CALC-MCNC: 135.8 MG/DL (ref 63–159)
LYMPHOCYTES # BLD AUTO: 4.6 K/UL (ref 1–4.8)
LYMPHOCYTES NFR BLD: 62.8 % (ref 18–48)
MCH RBC QN AUTO: 27.1 PG (ref 27–31)
MCHC RBC AUTO-ENTMCNC: 31.7 G/DL (ref 32–36)
MCV RBC AUTO: 86 FL (ref 82–98)
MONOCYTES # BLD AUTO: 0.5 K/UL (ref 0.3–1)
MONOCYTES NFR BLD: 7.1 % (ref 4–15)
NEUTROPHILS # BLD AUTO: 2 K/UL (ref 1.8–7.7)
NEUTROPHILS NFR BLD: 27.4 % (ref 38–73)
NONHDLC SERPL-MCNC: 179 MG/DL
PLATELET # BLD AUTO: 337 K/UL (ref 150–350)
PMV BLD AUTO: 10.1 FL (ref 9.2–12.9)
POTASSIUM SERPL-SCNC: 4.3 MMOL/L (ref 3.5–5.1)
PROT SERPL-MCNC: 8 G/DL (ref 6–8.4)
RBC # BLD AUTO: 4.9 M/UL (ref 4–5.4)
SODIUM SERPL-SCNC: 140 MMOL/L (ref 136–145)
T4 FREE SERPL-MCNC: 0.95 NG/DL (ref 0.71–1.51)
TRIGL SERPL-MCNC: 216 MG/DL (ref 30–150)
TSH SERPL DL<=0.005 MIU/L-ACNC: 7.84 UIU/ML (ref 0.4–4)
WBC # BLD AUTO: 7.29 K/UL (ref 3.9–12.7)

## 2019-08-13 PROCEDURE — 99214 OFFICE O/P EST MOD 30 MIN: CPT | Mod: HCNC,S$GLB,, | Performed by: INTERNAL MEDICINE

## 2019-08-13 PROCEDURE — 3045F PR MOST RECENT HEMOGLOBIN A1C LEVEL 7.0-9.0%: CPT | Mod: HCNC,CPTII,S$GLB, | Performed by: INTERNAL MEDICINE

## 2019-08-13 PROCEDURE — 85025 COMPLETE CBC W/AUTO DIFF WBC: CPT | Mod: HCNC

## 2019-08-13 PROCEDURE — 83036 HEMOGLOBIN GLYCOSYLATED A1C: CPT | Mod: HCNC

## 2019-08-13 PROCEDURE — 99999 PR PBB SHADOW E&M-EST. PATIENT-LVL III: CPT | Mod: PBBFAC,HCNC,, | Performed by: INTERNAL MEDICINE

## 2019-08-13 PROCEDURE — 80053 COMPREHEN METABOLIC PANEL: CPT | Mod: HCNC

## 2019-08-13 PROCEDURE — 36415 COLL VENOUS BLD VENIPUNCTURE: CPT | Mod: HCNC

## 2019-08-13 PROCEDURE — 3008F PR BODY MASS INDEX (BMI) DOCUMENTED: ICD-10-PCS | Mod: HCNC,CPTII,S$GLB, | Performed by: INTERNAL MEDICINE

## 2019-08-13 PROCEDURE — 99999 PR PBB SHADOW E&M-EST. PATIENT-LVL III: ICD-10-PCS | Mod: PBBFAC,HCNC,, | Performed by: INTERNAL MEDICINE

## 2019-08-13 PROCEDURE — 3045F PR MOST RECENT HEMOGLOBIN A1C LEVEL 7.0-9.0%: ICD-10-PCS | Mod: HCNC,CPTII,S$GLB, | Performed by: INTERNAL MEDICINE

## 2019-08-13 PROCEDURE — 99214 PR OFFICE/OUTPT VISIT, EST, LEVL IV, 30-39 MIN: ICD-10-PCS | Mod: HCNC,S$GLB,, | Performed by: INTERNAL MEDICINE

## 2019-08-13 PROCEDURE — 3079F DIAST BP 80-89 MM HG: CPT | Mod: HCNC,CPTII,S$GLB, | Performed by: INTERNAL MEDICINE

## 2019-08-13 PROCEDURE — 3074F SYST BP LT 130 MM HG: CPT | Mod: HCNC,CPTII,S$GLB, | Performed by: INTERNAL MEDICINE

## 2019-08-13 PROCEDURE — 84443 ASSAY THYROID STIM HORMONE: CPT | Mod: HCNC

## 2019-08-13 PROCEDURE — 84439 ASSAY OF FREE THYROXINE: CPT | Mod: HCNC

## 2019-08-13 PROCEDURE — 3008F BODY MASS INDEX DOCD: CPT | Mod: HCNC,CPTII,S$GLB, | Performed by: INTERNAL MEDICINE

## 2019-08-13 PROCEDURE — 3079F PR MOST RECENT DIASTOLIC BLOOD PRESSURE 80-89 MM HG: ICD-10-PCS | Mod: HCNC,CPTII,S$GLB, | Performed by: INTERNAL MEDICINE

## 2019-08-13 PROCEDURE — 3074F PR MOST RECENT SYSTOLIC BLOOD PRESSURE < 130 MM HG: ICD-10-PCS | Mod: HCNC,CPTII,S$GLB, | Performed by: INTERNAL MEDICINE

## 2019-08-13 PROCEDURE — 80061 LIPID PANEL: CPT | Mod: HCNC

## 2019-08-13 RX ORDER — CYCLOBENZAPRINE HCL 10 MG
TABLET ORAL
Refills: 5 | COMMUNITY
Start: 2019-07-18 | End: 2020-04-01 | Stop reason: SDUPTHER

## 2019-08-13 RX ORDER — DOCUSATE SODIUM 100 MG/1
CAPSULE, LIQUID FILLED ORAL
Refills: 5 | COMMUNITY
Start: 2019-07-17 | End: 2020-02-04

## 2019-08-13 RX ORDER — ATORVASTATIN CALCIUM 10 MG/1
10 TABLET, FILM COATED ORAL DAILY
Qty: 90 TABLET | Refills: 11 | Status: SHIPPED | OUTPATIENT
Start: 2019-08-13 | End: 2020-09-11

## 2019-08-13 NOTE — PROGRESS NOTES
Subjective:       Patient ID: Tanika Wayne is a 59 y.o. female.    Chief Complaint: Follow-up    Patient is here for followup for chronic conditions.    DM2:  good med adherence  no changed Diet  n/a 140 AM sugars  n/a Post-prandial sugars  no Numbness in feet  no sores in feet  no Visual changes  no Polyuria/polydipsia.    Had R TKA w/o any postop issues. Doing rehab.    Chronic pains, sees Dr Cruz and on pain meds chronically.    Requests refill of xanax. Has been w/o though w/o severe anxiety symptoms.    Review of Systems   Constitutional: Negative for fatigue, fever and unexpected weight change.   HENT: Negative for congestion, hearing loss and sore throat.    Eyes: Negative for visual disturbance.   Respiratory: Negative for cough, shortness of breath and wheezing.    Cardiovascular: Negative for chest pain and palpitations.   Gastrointestinal: Negative for abdominal distention, abdominal pain, blood in stool, diarrhea, nausea and vomiting.   Genitourinary: Negative for difficulty urinating, dysuria, frequency and hematuria.   Musculoskeletal: Positive for arthralgias and gait problem. Negative for joint swelling.   Skin: Negative for color change and rash.        Intertrigo has resolved   Neurological: Negative for dizziness, syncope, weakness and headaches.   Hematological: Negative for adenopathy.   Psychiatric/Behavioral: Negative for confusion, decreased concentration and suicidal ideas.       Objective:      Physical Exam   Constitutional: She is oriented to person, place, and time. She appears well-developed and well-nourished. No distress.   HENT:   Head: Normocephalic and atraumatic.   Mouth/Throat: No oropharyngeal exudate.   TMs clear, sinuses nontender, nasal mucosa w/o purulence.   Eyes: Pupils are equal, round, and reactive to light. EOM are normal. No scleral icterus.   Neck: Normal range of motion. Neck supple. No thyromegaly present.   Thyroid surg scar present   Cardiovascular: Normal  rate, regular rhythm and normal heart sounds. Exam reveals no gallop and no friction rub.   No murmur heard.  Pulmonary/Chest: Effort normal and breath sounds normal. No respiratory distress. She has no wheezes. She has no rales.   Abdominal: Soft. Bowel sounds are normal. She exhibits no distension and no mass. There is no tenderness. There is no rebound and no guarding.   Musculoskeletal: Normal range of motion. She exhibits no edema or tenderness.   R knee improved rom. No significant swelling or warmth.    Protective Sensation (w/ 10 gram monofilament):  Right: Intact  Left: Intact    Visual Inspection:  Bunions R>L -  Bilateral    Pedal Pulses:   Right: Present  Left: Present    Posterior tibialis:   Right:Present  Left: Present     Lymphadenopathy:     She has no cervical adenopathy.   Neurological: She is alert and oriented to person, place, and time.   Skin: She is not diaphoretic.   Psychiatric: She has a normal mood and affect. Her speech is normal and behavior is normal. Cognition and memory are normal.       Assessment:       1. Type 2 diabetes mellitus without complication, without long-term current use of insulin    2. Essential hypertension    3. History of subtotal thyroidectomy    4. Postoperative hypothyroidism        Plan:       Tanika was seen today for follow-up.    Diagnoses and all orders for this visit:    Type 2 diabetes mellitus without complication, without long-term current use of insulin  -     CBC auto differential; Future  -     Comprehensive metabolic panel; Future  -     Lipid panel; Future  -     Hemoglobin A1c; Future  -     atorvastatin (LIPITOR) 10 MG tablet; Take 1 tablet (10 mg total) by mouth once daily.  -     Ambulatory Referral to Optometry    Essential hypertension  controlled    History of subtotal thyroidectomy  Postoperative hypothyroidism  -     TSH; Future    Discussed stopping xanax since on opoids, she agrees to try.  Medications Discontinued During This Encounter    Medication Reason    ALPRAZolam (XANAX) 0.25 MG tablet          Health Maintenance       Date Due Completion Date    Low Dose Statin 01/01/1982 ---    Shingles Vaccine (1 of 2) 01/01/2011 ---    Pneumococcal Vaccine (Highest Risk) (3 of 3 - PPSV23) 11/05/2016 9/10/2016    Eye Exam 05/29/2019 5/29/2018    Lipid Panel 07/16/2019 7/16/2018    Influenza Vaccine (1) 08/01/2019 10/19/2018    Hemoglobin A1c 09/14/2019 3/14/2019    Mammogram 02/05/2020 2/5/2019    Foot Exam 08/13/2020 8/13/2019 (Done)    Override on 8/13/2019: Done    Override on 7/23/2018: Done    TETANUS VACCINE 07/01/2021 7/1/2011    Colonoscopy 12/10/2022 12/10/2012          Follow up in about 6 months (around 2/13/2020).    Future Appointments   Date Time Provider Department Center   9/16/2019 10:20 AM Janee Hernandez OD [facility] [place] [facility]   9/19/2019  9:20 AM Rosario Cruz MD [facility]

## 2019-08-14 ENCOUNTER — TELEPHONE (OUTPATIENT)
Dept: SPINE | Facility: CLINIC | Age: 59
End: 2019-08-14

## 2019-08-14 NOTE — TELEPHONE ENCOUNTER
----- Message from Flor Mohr RN sent at 8/14/2019  4:24 PM CDT -----  She is not a surgical candidate. On last visit he discharged her from his care. If she has any issues she will need to follow up with a PA.  ----- Message -----  From: Nica Davis MA  Sent: 8/14/2019   3:38 PM  To: Flor Mohr RN    Staff contacted patient and offered an with [facility] she declined she stated she would like to see  and she would wait until his next availability.   ----- Message -----  From: Flor Mohr RN  Sent: 8/14/2019   2:58 PM  To: Nica Davis MA    Yes. She does not need to be seen by Nate.  ----- Message -----  From: Nica Davis MA  Sent: 8/14/2019   2:02 PM  To: Flor Mohr RN    Can this patient be scheduled with one of the APCs? She was last seen by  01/29/19. Please advise

## 2019-08-14 NOTE — TELEPHONE ENCOUNTER
Staff contacted patient and offered an with [facility] she declined she stated she would like to see  and she would wait until his next availability.

## 2019-08-14 NOTE — TELEPHONE ENCOUNTER
Staff contacted patient and informed her that she was discharged from  and if she was having any other issues to follow up with the APC. Staff offered patient an appt with [facility] and she accepted.

## 2019-08-14 NOTE — TELEPHONE ENCOUNTER
----- Message from Flor Mohr RN sent at 8/14/2019  2:58 PM CDT -----  Yes. She does not need to be seen by Nate.  ----- Message -----  From: Nica Davis MA  Sent: 8/14/2019   2:02 PM  To: Flor Mohr RN    Can this patient be scheduled with one of the APCs? She was last seen by  01/29/19. Please advise

## 2019-08-15 ENCOUNTER — TELEPHONE (OUTPATIENT)
Dept: INTERNAL MEDICINE | Facility: CLINIC | Age: 59
End: 2019-08-15

## 2019-08-15 DIAGNOSIS — E89.0 POSTOPERATIVE HYPOTHYROIDISM: Primary | ICD-10-CM

## 2019-08-15 NOTE — TELEPHONE ENCOUNTER
Hi,    Please call her -- her diabetes is not at goal. I recommend she start taking metformin for the diabetes.  Also, I recommend a diabetes nutritionist, please ask if she would be interested.    Also, her thyroid level was a bit low, I recommend that we increase her thyroid dose to

## 2019-08-18 ENCOUNTER — PATIENT MESSAGE (OUTPATIENT)
Dept: PHYSICAL MEDICINE AND REHAB | Facility: CLINIC | Age: 59
End: 2019-08-18

## 2019-08-19 ENCOUNTER — PATIENT OUTREACH (OUTPATIENT)
Dept: ADMINISTRATIVE | Facility: OTHER | Age: 59
End: 2019-08-19

## 2019-08-19 ENCOUNTER — TELEPHONE (OUTPATIENT)
Dept: SPINE | Facility: CLINIC | Age: 59
End: 2019-08-19

## 2019-08-19 DIAGNOSIS — M43.16 SPONDYLOLISTHESIS AT L3-L4 LEVEL: ICD-10-CM

## 2019-08-19 DIAGNOSIS — Z96.651 STATUS POST TOTAL RIGHT KNEE REPLACEMENT: ICD-10-CM

## 2019-08-19 DIAGNOSIS — M54.5 LOW BACK PAIN, UNSPECIFIED BACK PAIN LATERALITY, UNSPECIFIED CHRONICITY, WITH SCIATICA PRESENCE UNSPECIFIED: Primary | ICD-10-CM

## 2019-08-19 DIAGNOSIS — M25.561 CHRONIC PAIN OF RIGHT KNEE: ICD-10-CM

## 2019-08-19 DIAGNOSIS — E89.0 HISTORY OF SUBTOTAL THYROIDECTOMY: ICD-10-CM

## 2019-08-19 DIAGNOSIS — Z96.641 STATUS POST RIGHT HIP REPLACEMENT: ICD-10-CM

## 2019-08-19 DIAGNOSIS — E89.0 POSTOPERATIVE HYPOTHYROIDISM: ICD-10-CM

## 2019-08-19 DIAGNOSIS — G89.29 CHRONIC PAIN OF RIGHT KNEE: ICD-10-CM

## 2019-08-19 DIAGNOSIS — G89.29 CHRONIC BILATERAL LOW BACK PAIN WITH RIGHT-SIDED SCIATICA: ICD-10-CM

## 2019-08-19 DIAGNOSIS — M54.41 CHRONIC BILATERAL LOW BACK PAIN WITH RIGHT-SIDED SCIATICA: ICD-10-CM

## 2019-08-19 DIAGNOSIS — Z98.890 HISTORY OF LUMBAR LAMINECTOMY: ICD-10-CM

## 2019-08-19 DIAGNOSIS — E11.9 TYPE 2 DIABETES MELLITUS WITHOUT COMPLICATION, WITHOUT LONG-TERM CURRENT USE OF INSULIN: Primary | ICD-10-CM

## 2019-08-19 DIAGNOSIS — M48.062 SPINAL STENOSIS OF LUMBAR REGION WITH NEUROGENIC CLAUDICATION: ICD-10-CM

## 2019-08-20 ENCOUNTER — HOSPITAL ENCOUNTER (OUTPATIENT)
Dept: RADIOLOGY | Facility: OTHER | Age: 59
Discharge: HOME | End: 2019-08-20
Attending: PHYSICIAN ASSISTANT
Payer: MEDICARE

## 2019-08-20 ENCOUNTER — OFFICE VISIT (OUTPATIENT)
Dept: SPINE | Facility: CLINIC | Age: 59
End: 2019-08-20
Payer: OTHER MISCELLANEOUS

## 2019-08-20 VITALS
SYSTOLIC BLOOD PRESSURE: 117 MMHG | TEMPERATURE: 99 F | WEIGHT: 257 LBS | HEIGHT: 61 IN | HEART RATE: 114 BPM | DIASTOLIC BLOOD PRESSURE: 65 MMHG | BODY MASS INDEX: 48.52 KG/M2

## 2019-08-20 DIAGNOSIS — M51.36 DDD (DEGENERATIVE DISC DISEASE), LUMBAR: ICD-10-CM

## 2019-08-20 DIAGNOSIS — M43.16 SPONDYLOLISTHESIS, LUMBAR REGION: ICD-10-CM

## 2019-08-20 DIAGNOSIS — M54.50 CHRONIC RIGHT-SIDED LOW BACK PAIN WITHOUT SCIATICA: Primary | ICD-10-CM

## 2019-08-20 DIAGNOSIS — M47.816 SPONDYLOSIS OF LUMBAR REGION WITHOUT MYELOPATHY OR RADICULOPATHY: ICD-10-CM

## 2019-08-20 DIAGNOSIS — G89.29 CHRONIC RIGHT-SIDED LOW BACK PAIN WITHOUT SCIATICA: Primary | ICD-10-CM

## 2019-08-20 DIAGNOSIS — M54.5 LOW BACK PAIN, UNSPECIFIED BACK PAIN LATERALITY, UNSPECIFIED CHRONICITY, WITH SCIATICA PRESENCE UNSPECIFIED: ICD-10-CM

## 2019-08-20 PROCEDURE — 72120 X-RAY BEND ONLY L-S SPINE: CPT | Mod: TC,HCNC,FY

## 2019-08-20 PROCEDURE — 3078F PR MOST RECENT DIASTOLIC BLOOD PRESSURE < 80 MM HG: ICD-10-PCS | Mod: HCNC,CPTII,S$GLB, | Performed by: PHYSICIAN ASSISTANT

## 2019-08-20 PROCEDURE — 72100 XR LUMBAR SPINE AP AND LAT WITH FLEX/EXT: ICD-10-PCS | Mod: 26,HCNC,, | Performed by: RADIOLOGY

## 2019-08-20 PROCEDURE — 99999 PR PBB SHADOW E&M-EST. PATIENT-LVL V: CPT | Mod: PBBFAC,HCNC,, | Performed by: PHYSICIAN ASSISTANT

## 2019-08-20 PROCEDURE — 3008F PR BODY MASS INDEX (BMI) DOCUMENTED: ICD-10-PCS | Mod: HCNC,CPTII,S$GLB, | Performed by: PHYSICIAN ASSISTANT

## 2019-08-20 PROCEDURE — 99214 OFFICE O/P EST MOD 30 MIN: CPT | Mod: HCNC,S$GLB,, | Performed by: PHYSICIAN ASSISTANT

## 2019-08-20 PROCEDURE — 3074F SYST BP LT 130 MM HG: CPT | Mod: HCNC,CPTII,S$GLB, | Performed by: PHYSICIAN ASSISTANT

## 2019-08-20 PROCEDURE — 3078F DIAST BP <80 MM HG: CPT | Mod: HCNC,CPTII,S$GLB, | Performed by: PHYSICIAN ASSISTANT

## 2019-08-20 PROCEDURE — 72100 X-RAY EXAM L-S SPINE 2/3 VWS: CPT | Mod: 26,HCNC,, | Performed by: RADIOLOGY

## 2019-08-20 PROCEDURE — 72120 X-RAY BEND ONLY L-S SPINE: CPT | Mod: 26,HCNC,, | Performed by: RADIOLOGY

## 2019-08-20 PROCEDURE — 3008F BODY MASS INDEX DOCD: CPT | Mod: HCNC,CPTII,S$GLB, | Performed by: PHYSICIAN ASSISTANT

## 2019-08-20 PROCEDURE — 3074F PR MOST RECENT SYSTOLIC BLOOD PRESSURE < 130 MM HG: ICD-10-PCS | Mod: HCNC,CPTII,S$GLB, | Performed by: PHYSICIAN ASSISTANT

## 2019-08-20 PROCEDURE — 99999 PR PBB SHADOW E&M-EST. PATIENT-LVL V: ICD-10-PCS | Mod: PBBFAC,HCNC,, | Performed by: PHYSICIAN ASSISTANT

## 2019-08-20 PROCEDURE — 99214 PR OFFICE/OUTPT VISIT, EST, LEVL IV, 30-39 MIN: ICD-10-PCS | Mod: HCNC,S$GLB,, | Performed by: PHYSICIAN ASSISTANT

## 2019-08-20 PROCEDURE — 72120 XR LUMBAR SPINE AP AND LAT WITH FLEX/EXT: ICD-10-PCS | Mod: 26,HCNC,, | Performed by: RADIOLOGY

## 2019-08-20 RX ORDER — LEVOTHYROXINE SODIUM 137 UG/1
137 TABLET ORAL DAILY
Qty: 90 TABLET | Refills: 11 | Status: SHIPPED | OUTPATIENT
Start: 2019-08-20 | End: 2020-09-11

## 2019-08-20 RX ORDER — OXYCODONE AND ACETAMINOPHEN 5; 325 MG/1; MG/1
1 TABLET ORAL EVERY 4 HOURS PRN
COMMUNITY
End: 2019-08-21 | Stop reason: SDUPTHER

## 2019-08-20 RX ORDER — METFORMIN HYDROCHLORIDE 500 MG/1
500 TABLET ORAL 2 TIMES DAILY WITH MEALS
Qty: 180 TABLET | Refills: 11 | Status: SHIPPED | OUTPATIENT
Start: 2019-08-20 | End: 2020-09-11

## 2019-08-20 RX ORDER — LEVOTHYROXINE SODIUM 137 UG/1
137 TABLET ORAL
Qty: 30 TABLET | Refills: 11 | Status: CANCELLED | OUTPATIENT
Start: 2019-08-20

## 2019-08-20 NOTE — TELEPHONE ENCOUNTER
Hi, please call to set her up for --  Orders Placed This Encounter    Ambulatory Referral to Diabetes Education             Also 3 month followup with me.  Thank you, Rayo Hernandez      I called and discussed labs, she wonders if thyroid causing fatigue. She sleeps mostly during day and not at noc. Discussed that she may have YUMI. Will discuss more next time sleep and fatigue.  ?s answered.

## 2019-08-20 NOTE — PROGRESS NOTES
"Subjective:     Patient ID:  Tanika Wayne is a 59 y.o. female.    Nate    Chief Complaint: Low back pain and right leg spasms    HPI    Tanika Wayne is a 59 y.o. female who presents for follow up.  Patient had a MIS L3-4, L4-5 laminectomy with Dr. Sullivan on 02/22/2017.  She states she has been able to walk straighter and no more right leg pain.  Since that surgery she has had right hip and right knee surgery which she is currently in PT for.    Over the past year she has had more low back pain which gets worse as she stands and walks and better with sitting and laying down.  She can walk about 1/2 block before she has to sit down.  No leg pain.  She gets spasms and cramps in the right posterior leg to the calf that has come on in the last few months.    No recent PT or MILLY in the low back.  She takes flexeril and percocet.    Patient denies any recent accidents or trauma, no saddle anesthesias, and no bowel or bladder incontinence.      Review of Systems:  Constitution: Negative for chills, fever, night sweats and weight loss.   Musculoskeletal: Negative for falls.   Gastrointestinal: Negative for bowel incontinence, nausea and vomiting.   Genitourinary: Negative for bladder incontinence.   Neurological: Negative for disturbances in coordination and loss of balance.       Objective:      Vitals:    08/20/19 1403   BP: 117/65   Pulse: (!) 114   Temp: 98.8 °F (37.1 °C)   Weight: 116.6 kg (257 lb)   Height: 5' 1" (1.549 m)   PainSc:   4   PainLoc: Back         Physical Exam:    General:  Tanika Wayne is well-developed, well-nourished, appears stated age, in no acute distress, alert and oriented to person, place, and time.    Pulmonary/Chest:  Respiratory effort normal  Abdominal: Exhibits no distension  Psychiatric:  Normal mood and affect.  Behavior is normal.  Judgement and thought content normal    Musculoskeletal:    Patient arises from a sitting to standing position without difficulty.  " Patient walks to the door without evidence of limp, pain, or abnormality of gait.     Lumbar ROM:   No pain in lumbar flexion, extension, and left lateral bending.  Pain in right lateral bending.    Lumbar Spine Inspection:  Healed surgical scars with no visible rashes.    Lumbar Spine Palpation:  Mild tenderness to low back palpation.    SI Joint Palpation:  No tenderness to SI Joint palpation.    Straight Leg Raise:  Negative right and left SLR.    Neurological:  Alert and oriented to person, place, and time    Muscle strength against resistance:     Right Left   Hip flexion  5 / 5 5 / 5   Hip extension 5 / 5 5 / 5   Hip abduction 5 / 5 5 / 5   Hip adduction  5 / 5 5 / 5   Knee extension  5 / 5 5 / 5   Knee flexion 5 / 5 5 / 5   Dorsiflexion  5 / 5 5 / 5   EHL  5 / 5 5 / 5   Plantar flexion  5 / 5 5 / 5   Inversion of the feet 5 / 5 5 / 5   Eversion of the feet  5 / 5 5 / 5     Reflexes:     Right Left   Patellar 1+ 2+   Achilles 2+ 2+     Clonus:  Negative bilaterally    On gross examination of the bilateral upper extremities, patient has full painfree ROM with no signs of clubbing, cyanosis, edema, or weakness.     No current imaging to review    Assessment:          1. Chronic right-sided low back pain without sciatica    2. DDD (degenerative disc disease), lumbar    3. Spondylosis of lumbar region without myelopathy or radiculopathy    4. Spondylolisthesis, lumbar region            Plan:          Orders Placed This Encounter    Ambulatory Referral to Physical/Occupational Therapy         Patient with known L3-4 grade one spondylolisthesis s/p MIS L3/4, L4-5 laminectomy with Dr. Sullivan in 2017    -Healthy Back program  -Continue Flexeril and Percocet from another provider  -Fu in three months and if symptoms are persistent will get MRI lumbar spine to assess for injections    Follow-Up:  Follow up in about 3 months (around 11/20/2019). If there are any questions prior to this, the patient was instructed to  contact the office.       FANNIE Nevarez, PA-C  Neurosurgery  [facility]  [facility]

## 2019-08-21 RX ORDER — OXYCODONE AND ACETAMINOPHEN 5; 325 MG/1; MG/1
1 TABLET ORAL EVERY 12 HOURS PRN
Qty: 60 TABLET | Refills: 0 | OUTPATIENT
Start: 2019-08-21 | End: 2019-09-20

## 2019-08-25 RX ORDER — OXYCODONE AND ACETAMINOPHEN 5; 325 MG/1; MG/1
1 TABLET ORAL EVERY 12 HOURS PRN
Qty: 60 TABLET | Refills: 0 | Status: SHIPPED | OUTPATIENT
Start: 2019-08-25 | End: 2019-09-23 | Stop reason: SDUPTHER

## 2019-09-11 ENCOUNTER — PATIENT OUTREACH (OUTPATIENT)
Dept: ADMINISTRATIVE | Facility: OTHER | Age: 59
End: 2019-09-11

## 2019-09-13 DIAGNOSIS — I10 ESSENTIAL HYPERTENSION, MALIGNANT: ICD-10-CM

## 2019-09-13 RX ORDER — NIFEDIPINE 90 MG/1
90 TABLET, EXTENDED RELEASE ORAL DAILY
Qty: 90 TABLET | Refills: 11 | Status: SHIPPED | OUTPATIENT
Start: 2019-09-13 | End: 2020-09-14 | Stop reason: SDUPTHER

## 2019-09-16 ENCOUNTER — OFFICE VISIT (OUTPATIENT)
Dept: OPTOMETRY | Facility: CLINIC | Age: 59
End: 2019-09-16
Payer: MEDICARE

## 2019-09-16 DIAGNOSIS — H52.203 MYOPIA WITH ASTIGMATISM AND PRESBYOPIA, BILATERAL: ICD-10-CM

## 2019-09-16 DIAGNOSIS — H52.4 MYOPIA WITH ASTIGMATISM AND PRESBYOPIA, BILATERAL: ICD-10-CM

## 2019-09-16 DIAGNOSIS — H52.13 MYOPIA WITH ASTIGMATISM AND PRESBYOPIA, BILATERAL: ICD-10-CM

## 2019-09-16 DIAGNOSIS — H43.393 VISUAL FLOATERS, BILATERAL: Primary | ICD-10-CM

## 2019-09-16 DIAGNOSIS — H02.402 PTOSIS OF EYELID, LEFT: ICD-10-CM

## 2019-09-16 DIAGNOSIS — H04.123 DRY EYE SYNDROME OF BOTH EYES: ICD-10-CM

## 2019-09-16 DIAGNOSIS — H10.13 ALLERGIC CONJUNCTIVITIS OF BOTH EYES: ICD-10-CM

## 2019-09-16 DIAGNOSIS — H25.13 NUCLEAR SCLEROSIS OF BOTH EYES: ICD-10-CM

## 2019-09-16 PROCEDURE — 99999 PR PBB SHADOW E&M-EST. PATIENT-LVL II: ICD-10-PCS | Mod: PBBFAC,HCNC,, | Performed by: OPTOMETRIST

## 2019-09-16 PROCEDURE — 92015 DETERMINE REFRACTIVE STATE: CPT | Mod: HCNC,S$GLB,, | Performed by: OPTOMETRIST

## 2019-09-16 PROCEDURE — 99999 PR PBB SHADOW E&M-EST. PATIENT-LVL II: CPT | Mod: PBBFAC,HCNC,, | Performed by: OPTOMETRIST

## 2019-09-16 PROCEDURE — 92014 PR EYE EXAM, EST PATIENT,COMPREHESV: ICD-10-PCS | Mod: HCNC,S$GLB,, | Performed by: OPTOMETRIST

## 2019-09-16 PROCEDURE — 92015 PR REFRACTION: ICD-10-PCS | Mod: HCNC,S$GLB,, | Performed by: OPTOMETRIST

## 2019-09-16 PROCEDURE — 92014 COMPRE OPH EXAM EST PT 1/>: CPT | Mod: HCNC,S$GLB,, | Performed by: OPTOMETRIST

## 2019-09-16 NOTE — PROGRESS NOTES
HPI     JAMES:1yr   Glasses? yes  Contacts? no  H/o eye surgery, injections or laser: yes Lazy eye OU   H/o eye injury: Left lid hit on pavement   Known eye conditions? Lazy eye   Family h/o eye conditions? no  Eye gtts?no    (-) Flashes (+) Floaters (-) Mucous   (-) Tearing (+) Itching OU due to allergies  (-) Burning   (-) Headaches (-) Eye Pain/discomfort (-) Irritation   (-) Redness (-) Double vision (-) Blurry vision    Diabetic? (+)  A1c?  (Hemoglobin A1C       Date                     Value               Ref Range             Status                08/13/2019               8.2 (H)             4.0 - 5.6 %           Final              Comment:    ADA Screening Guidelines:  5.7-6.4%  Consistent with   prediabetes  >or=6.5%  Consistent with diabetes  High levels of fetal   hemoglobin interfere with the HbA1C  assay. Heterozygous hemoglobin   variants (HbS, HgC, etc)do  not significantly interfere with this assay.     However, presence of multiple variants may affect accuracy.         03/14/2019               7.3 (H)             4.0 - 5.6 %           Final              Comment:    ADA Screening Guidelines:  5.7-6.4%  Consistent with   prediabetes  >or=6.5%  Consistent with diabetes  High levels of fetal   hemoglobin interfere with the HbA1C  assay. Heterozygous hemoglobin   variants (HbS, HgC, etc)do  not significantly interfere with this assay.     However, presence of multiple variants may affect accuracy.         07/16/2018               7.2 (H)             4.0 - 5.6 %           Final              Comment:    ADA Screening Guidelines:  5.7-6.4%  Consistent with   prediabetes  >or=6.5%  Consistent with diabetes  High levels of fetal   hemoglobin interfere with the HbA1C  assay. Heterozygous hemoglobin   variants (HbS, HgC, etc)do  not significantly interfere with this assay.     However, presence of multiple variants may affect accuracy.    ----------)        Last edited by Darleen Jj on 9/16/2019 10:51 AM.  (History)        ROS     Negative for: Constitutional, Gastrointestinal, Neurological, Skin,   Genitourinary, Musculoskeletal, HENT, Endocrine, Cardiovascular, Eyes,   Respiratory, Psychiatric, Allergic/Imm, Heme/Lymph    Last edited by Janee Hernandez, OD on 9/16/2019 11:12 AM. (History)        Assessment /Plan     For exam results, see Encounter Report.    Visual floaters, bilateral    Allergic conjunctivitis of both eyes    Ptosis of eyelid, left    Myopia with astigmatism and presbyopia, bilateral    Dry eye syndrome of both eyes    Nuclear sclerosis of both eyes    1. No e/o h/b/t 360 degrees OU. Monitor for worsening of symptoms or S/Sx of RD.   2. Recommend Zaditor or Alaway bid OU and cool compresses to help soothe itching. Patient advised to RTC if condition gets worse.   3. Longstanding since injury 20+ years ago.   4. Hold Srx. RTC 1 mo refraction after tx initiation for VINCENT  5. Recommend Systane Ultra or Refresh Optive BID-TID OU to aid with symptoms of dry eyes.  6. Nuclear sclerotic cataract - not visually significant. Observe.

## 2019-09-16 NOTE — LETTER
September 16, 2019      Rayo Hernandez MD  [address]  [facility] [address]           [facility] [address]  [place] [address]  Phone: [phone]  Fax: [phone]          Patient: Tanika Wayne   MR Number: [id number]   YOB: 1960   Date of Visit: 9/16/2019       Dear Dr. Rayo Hernandez:    Thank you for referring Tanika Wayne to me for evaluation. Attached you will find relevant portions of my assessment and plan of care.    If you have questions, please do not hesitate to call me. I look forward to following Tanika Wayne along with you.    Sincerely,    Janee Hernandez, OD    Enclosure  CC:  No Recipients    If you would like to receive this communication electronically, please contact [email or web address] or [phone] to request more information on [employer] access.    For providers and/or their staff who would like to refer a patient to [facility], please contact us through our one-stop-shop provider referral line, [facility], at [phone].    If you feel you have received this communication in error or would no longer like to receive these types of communications, please e-mail [email or web address]

## 2019-09-20 ENCOUNTER — PATIENT MESSAGE (OUTPATIENT)
Dept: PHYSICAL MEDICINE AND REHAB | Facility: CLINIC | Age: 59
End: 2019-09-20

## 2019-09-28 RX ORDER — OXYCODONE AND ACETAMINOPHEN 5; 325 MG/1; MG/1
1 TABLET ORAL EVERY 12 HOURS PRN
Qty: 60 TABLET | Refills: 0 | Status: SHIPPED | OUTPATIENT
Start: 2019-09-28 | End: 2019-10-29 | Stop reason: SDUPTHER

## 2019-10-01 ENCOUNTER — TELEPHONE (OUTPATIENT)
Dept: PHYSICAL MEDICINE AND REHAB | Facility: CLINIC | Age: 59
End: 2019-10-01

## 2019-10-07 DIAGNOSIS — I10 ESSENTIAL HYPERTENSION, MALIGNANT: ICD-10-CM

## 2019-10-07 RX ORDER — LOSARTAN POTASSIUM AND HYDROCHLOROTHIAZIDE 12.5; 1 MG/1; MG/1
TABLET ORAL
Qty: 90 TABLET | Refills: 11 | Status: SHIPPED | OUTPATIENT
Start: 2019-10-07 | End: 2020-09-14 | Stop reason: SDUPTHER

## 2019-10-09 ENCOUNTER — PATIENT OUTREACH (OUTPATIENT)
Dept: ADMINISTRATIVE | Facility: OTHER | Age: 59
End: 2019-10-09

## 2019-10-13 DIAGNOSIS — I10 ESSENTIAL HYPERTENSION, MALIGNANT: ICD-10-CM

## 2019-10-13 RX ORDER — CARVEDILOL 3.12 MG/1
3.12 TABLET ORAL 2 TIMES DAILY
Qty: 180 TABLET | Refills: 11 | Status: SHIPPED | OUTPATIENT
Start: 2019-10-13 | End: 2020-09-14 | Stop reason: SDUPTHER

## 2019-10-13 RX ORDER — POTASSIUM CHLORIDE 20 MEQ/1
40 TABLET, EXTENDED RELEASE ORAL 2 TIMES DAILY
Qty: 360 TABLET | Refills: 11 | Status: SHIPPED | OUTPATIENT
Start: 2019-10-13 | End: 2020-09-14 | Stop reason: SDUPTHER

## 2019-10-14 ENCOUNTER — OFFICE VISIT (OUTPATIENT)
Dept: OPTOMETRY | Facility: CLINIC | Age: 59
End: 2019-10-14
Payer: MEDICARE

## 2019-10-14 DIAGNOSIS — H52.4 MYOPIA WITH ASTIGMATISM AND PRESBYOPIA, BILATERAL: ICD-10-CM

## 2019-10-14 DIAGNOSIS — H04.123 DRY EYE SYNDROME OF BOTH EYES: ICD-10-CM

## 2019-10-14 DIAGNOSIS — H52.203 MYOPIA WITH ASTIGMATISM AND PRESBYOPIA, BILATERAL: ICD-10-CM

## 2019-10-14 DIAGNOSIS — H25.13 NUCLEAR SCLEROSIS OF BOTH EYES: Primary | ICD-10-CM

## 2019-10-14 DIAGNOSIS — H52.13 MYOPIA WITH ASTIGMATISM AND PRESBYOPIA, BILATERAL: ICD-10-CM

## 2019-10-14 DIAGNOSIS — H10.13 ALLERGIC CONJUNCTIVITIS OF BOTH EYES: ICD-10-CM

## 2019-10-14 PROCEDURE — 99999 PR PBB SHADOW E&M-EST. PATIENT-LVL II: ICD-10-PCS | Mod: PBBFAC,HCNC,, | Performed by: OPTOMETRIST

## 2019-10-14 PROCEDURE — 92012 INTRM OPH EXAM EST PATIENT: CPT | Mod: HCNC,S$GLB,, | Performed by: OPTOMETRIST

## 2019-10-14 PROCEDURE — 99999 PR PBB SHADOW E&M-EST. PATIENT-LVL II: CPT | Mod: PBBFAC,HCNC,, | Performed by: OPTOMETRIST

## 2019-10-14 PROCEDURE — 92012 PR EYE EXAM, EST PATIENT,INTERMED: ICD-10-PCS | Mod: HCNC,S$GLB,, | Performed by: OPTOMETRIST

## 2019-10-14 NOTE — PROGRESS NOTES
HPI     Pt is in for VINCENT f/u and refraction   Pt is using OTC tears TID OU   Allergy drops BID OU     Last edited by Darleen Jj on 10/14/2019  9:20 AM. (History)        ROS     Negative for: Constitutional, Gastrointestinal, Neurological, Skin,   Genitourinary, Musculoskeletal, HENT, Endocrine, Cardiovascular, Eyes,   Respiratory, Psychiatric, Allergic/Imm, Heme/Lymph    Last edited by Janee Hernandez, OD on 10/14/2019  9:28 AM. (History)        Assessment /Plan     For exam results, see Encounter Report.    Nuclear sclerosis of both eyes    Myopia with astigmatism and presbyopia, bilateral    Dry eye syndrome of both eyes    Allergic conjunctivitis of both eyes    1-2. SRx updated. Pt notes difference in OD vs OS but states she is not interested in cataract surgery at this time due to other surgeries recently. Pt also does not take her daily blood sugars. Question if flux in BS could be causing hyperopic shift OD and increased cyl OS. Pt advised to start taking daily BSL. RTC 1 year.   3. Cont ATs TID OU.   4. Cont Zaditor or Alaway BiD OU

## 2019-10-28 ENCOUNTER — PATIENT MESSAGE (OUTPATIENT)
Dept: PHYSICAL MEDICINE AND REHAB | Facility: CLINIC | Age: 59
End: 2019-10-28

## 2019-10-28 NOTE — TELEPHONE ENCOUNTER
Spoke with the patient and informed her that her e-mail was sent over to Dr. Cruz so that she could change the medication to something her insurance will cover.   Subjective   Patient ID: Jocelyn is a 83 year old female.    Chief Complaint   Patient presents with   • Follow-up Hypertension     Pt here for BP follow up.  Lisinopril 2.5mg d/c'd last visit for possible SE's (dizziness), started on Avapro 75mg daily instead.  Home BP's average 130/70s, and dizziness she felt from Lisinopril is resolved.  No CP, no SoB or palpitations, no h/a or change in vision; overall feeling well.      Patient's medications, allergies, past medical, surgical, social and family histories were reviewed and updated as appropriate.    Current Outpatient Medications   Medication Sig   • RESTASIS 0.05 % ophthalmic emulsion INSTILL 1 GTT INTO BOTH EYES D   • irbesartan (AVAPRO) 75 MG tablet Take 1 tablet by mouth nightly.   • minocycline (MINOCIN,DYNACIN) 50 MG capsule TAKE 1 CAPSULE BY MOUTH EVERY NIGHT AT BEDTIME   • colchicine (COLCRYS) 0.6 MG tablet TAKE 1 TABLET BY MOUTH DAILY   • omeprazole (PRILOSEC) 40 MG capsule TAKE 1 CAPSULE BY MOUTH EVERY DAY   • allopurinol (ZYLOPRIM) 100 MG tablet Take 1 tablet by mouth daily.   • aspirin 81 MG tablet Take 81 mg by mouth daily.     No current facility-administered medications for this visit.        Review of Systems   All other systems reviewed and are negative.      Objective   Visit Vitals  /76 (BP Location: LUE - Left upper extremity, Patient Position: Sitting, Cuff Size: Large Adult)   Pulse 71   Temp 96.7 °F (35.9 °C) (Temporal)   Resp 20   Wt 89.1 kg (196 lb 8.6 oz)   SpO2 99%   BMI 30.78 kg/m²     Physical Exam  Vitals signs and nursing note reviewed.   Constitutional:       Appearance: She is well-developed.   HENT:      Head: Normocephalic and atraumatic.   Eyes:      Conjunctiva/sclera: Conjunctivae normal.   Neck:      Musculoskeletal: Normal range of motion.   Cardiovascular:      Rate and Rhythm: Normal rate and regular rhythm.      Heart sounds: Normal heart sounds.   Pulmonary:      Effort: Pulmonary effort is normal.      Breath  sounds: Normal breath sounds.   Musculoskeletal: Normal range of motion.   Skin:     General: Skin is warm and dry.   Neurological:      Mental Status: She is alert and oriented to person, place, and time.         Assessment   Problem List Items Addressed This Visit        Circulatory    HTN (hypertension), benign - Primary     Dizziness resolved without Lisinopril, BP well controlled on Avapro 75mg daily.  CPM with warning signs, RTC 3 months.         Relevant Medications    irbesartan (AVAPRO) 75 MG tablet      Other Visit Diagnoses     Postmenopausal status (age-related) (natural)        Relevant Orders    BD DEXA AXIAL SKELETON

## 2019-10-29 DIAGNOSIS — M54.9 BACK PAIN, UNSPECIFIED BACK LOCATION, UNSPECIFIED BACK PAIN LATERALITY, UNSPECIFIED CHRONICITY: Primary | ICD-10-CM

## 2019-11-03 ENCOUNTER — PATIENT MESSAGE (OUTPATIENT)
Dept: PHYSICAL MEDICINE AND REHAB | Facility: CLINIC | Age: 59
End: 2019-11-03

## 2019-11-03 RX ORDER — OXYCODONE AND ACETAMINOPHEN 5; 325 MG/1; MG/1
1 TABLET ORAL EVERY 12 HOURS PRN
Qty: 60 TABLET | Refills: 0 | Status: SHIPPED | OUTPATIENT
Start: 2019-11-03 | End: 2019-12-02 | Stop reason: SDUPTHER

## 2019-11-13 ENCOUNTER — HOSPITAL ENCOUNTER (OUTPATIENT)
Dept: DIABETES | Facility: OTHER | Age: 59
Discharge: HOME | End: 2019-11-13
Attending: INTERNAL MEDICINE
Payer: MEDICARE

## 2019-11-13 VITALS — BODY MASS INDEX: 48.29 KG/M2 | HEIGHT: 61 IN | WEIGHT: 255.75 LBS

## 2019-11-13 DIAGNOSIS — E11.9 TYPE 2 DIABETES MELLITUS WITHOUT COMPLICATION, WITHOUT LONG-TERM CURRENT USE OF INSULIN: Primary | ICD-10-CM

## 2019-11-13 PROCEDURE — G0108 DIAB MANAGE TRN  PER INDIV: HCPCS | Mod: HCNC | Performed by: DIETITIAN, REGISTERED

## 2019-11-13 NOTE — PROGRESS NOTES
Diabetes Education  Author: Caryn Wynn RD  Date: 11/13/2019    Diabetes Care Management Summary  Diabetes Education Record Assessment/Progress: Initial         Diabetes Type  Diabetes Type : Type II    Diabetes History  Diabetes Diagnosis: 5-10 years  Current Treatment: Oral Medication(metformin)    Health Maintenance was reviewed today with patient. Discussed with patient importance of routine eye exams, foot exams/foot care, blood work (i.e.: A1c, microalbumin, and lipid), dental visits, yearly flu vaccine, and pneumonia vaccine as indicated by PCP. Patient verbalized understanding.     Health Maintenance Topics with due status: Not Due       Topic Last Completion Date    TETANUS VACCINE 07/01/2011    Colonoscopy 12/10/2012    Mammogram 02/05/2019    Foot Exam 08/13/2019    Lipid Panel 08/13/2019    Low Dose Statin 10/14/2019    Eye Exam 10/14/2019     Health Maintenance Due   Topic Date Due    Pneumococcal Vaccine (Highest Risk) (3 of 3 - PPSV23) 11/05/2016    Hemoglobin A1c  11/13/2019       Nutrition  Meal Planning: water, skipping meals, diet drinks, artificial sweeteners, eats out seldom  What type of sweetener do you use?: Splenda  What type of beverages do you drink?: water, diet soda/tea(diet coke, crystal light and water, craves sweets(honey buns, baked goods))  Meal Plan 24 Hour Recall - Breakfast: 1 cup of homemade veg soup, 1 strawberry yogurt with water breakfast between 6-9  Meal Plan 24 Hour Recall - Lunch: skipps lunch  Meal Plan 24 Hour Recall - Dinner: dinner at 5-6pm, baked chicken and mashed potatoes with crystal light  Meal Plan 24 Hour Recall - Snack: low kaiser ice cream, trail mix, pie    Monitoring   Self Monitoring : no SMBG  Blood Glucose Logs: No  Do you use a personal continuous glucose monitor?: No  In the last month, how often have you had a low blood sugar reaction?: never  Can you tell when your blood sugar is too high?: no    Exercise   Exercise Type: (PT one month  "ago)  Frequency: Never    Current Diabetes Treatment   Current Treatment: Oral Medication(metformin)    Social History  Preferred Learning Method: Hands On  Primary Support: Spouse, Daughter, Son  Educational Level: Some College  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  Smoking Status: Never a Smoker  Alcohol Use: Rarely                                Barriers to Change  Barriers to Change: None  Learning Challenges : None    Readiness to Learn   Readiness to Learn : Acceptance    Cultural Influences  Cultural Influences: No    Diabetes Education Assessment/Progress  Diabetes Disease Process (diabetes disease process and treatment options): Discussion, Instructed, Needs Instruction, Individual Session, Written Materials Provided(refered to herself as "prediabetic for years"  Ed on A1c and diagnosis criteria for Pre dm vs dm)    Nutrition (Incorporating nutritional management into one's lifestyle): Discussion, Demonstration, Return Demonstration, Instructed, Needs Instruction, Individual Session, Written Materials Provided(Diet is high in added sugar. Admits to daily afternoon honeybuns or baked goods. Ed on carb counting and label reading. She agrees to begin to reduced added sugar in diet and limit carbs to 30 g per meal. Ed on low sat/low Na diet. )    Physical Activity (incorporating physical activity into one's lifestyle): Discussion, Instructed, Needs Instruction, Individual Session, Written Materials Provided(Was enrolled in PT which she liked and she states helped her physically and with weight loss. She did not follow up with at home exercsises. She will start [facility] [employer] program next week. Ed on benefits of regular physical acitivity . )    Medications (states correct name, dose, onset, peak, duration, side effects & timing of meds): Discussion, Instructed, Needs Instruction, Individual Session, Written Materials Provided(Is taking metformin as ordered. No longer having SE. )    Monitoring (monitoring " "blood glucose/other parameters & using results): Discussion, Individual Session, Needs Instruction(Offered to provide her with a meter and training, She declined saying that she does not "want to prick." She was provide a meter years ago and never used it. She states that it would "be a waste bc I wont use it.")    Acute Complications (preventing, detecting, and treating acute complications): Discussion, Individual Session, Needs Review(No hx of hypo or hyperglycemic events)    Chronic Complications (preventing, detecting, and treating chronic complications): Discussion, Individual Session, Needs Instruction    Clinical (diabetes, other pertinent medical history, and relevant comorbidities reviewed during visit): Discussion, Instructed, Needs Instruction, Individual Session, Written Materials Provided(Reviewed A1c with goal range)    Cognitive (knowledge of self-management skills, functional health literacy): Discussion, Instructed, Needs Review, Individual Session    Psychosocial (emotional response to diabetes): Discussion, Instructed, Needs Instruction, Individual Session, Written Materials Provided(refered to herself as "prediabetic for years"  Ed on A1c and diagnosis criteria for Pre dm vs dm)    Diabetes Distress and Support Systems: Discussion, Individual Session, Needs Review(She has a supportive  and family)    Behavioral (readiness for change, lifestyle practices, self-care behaviors): Discussion, Instructed, Needs Instruction, Individual Session, Written Materials Provided    Goals  Patient has selected/evaluated goals during today's session: Yes, selected  Healthy Eating: Set(limit carbs to 30 g per meal. Omit desserts from diet)  Start Date: 11/13/19  Target Date: 12/17/19         Diabetes Care Plan/Intervention  Education Plan/Intervention: Individual Follow-Up DSMT    Diabetes Meal Plan  Restrictions: Restricted Carbohydrate, Low Sodium  Calories: 1600  Carbohydrate Per Meal: " 30-45g  Carbohydrate Per Snack : 15-20g  Fat: 64-71g  Protein: 64-72g    Today's Self-Management Care Plan was developed with the patient's input and is based on barriers identified during today's assessment.    The long and short-term goals in the care plan were written with the patient/caregiver's input. The patient has agreed to work toward these goals to improve her overall diabetes control.      The patient received a copy of today's self-management plan and verbalized understanding of the care plan, goals, and all of today's instructions.      The patient was encouraged to communicate with her physician and care team regarding her condition(s) and treatment.  I provided the patient with my contact information today and encouraged her to contact me via phone or patient portal as needed.     Education Units of Time   Time Spent: 60 min

## 2019-11-19 ENCOUNTER — PATIENT OUTREACH (OUTPATIENT)
Dept: ADMINISTRATIVE | Facility: OTHER | Age: 59
End: 2019-11-19

## 2019-11-20 ENCOUNTER — OFFICE VISIT (OUTPATIENT)
Dept: INTERNAL MEDICINE | Facility: CLINIC | Age: 59
End: 2019-11-20
Payer: MEDICARE

## 2019-11-20 ENCOUNTER — LAB VISIT (OUTPATIENT)
Dept: LAB | Facility: HOSPITAL | Age: 59
End: 2019-11-20
Attending: INTERNAL MEDICINE
Payer: MEDICARE

## 2019-11-20 ENCOUNTER — IMMUNIZATION (OUTPATIENT)
Dept: PHARMACY | Facility: CLINIC | Age: 59
End: 2019-11-20
Payer: MEDICARE

## 2019-11-20 VITALS
OXYGEN SATURATION: 94 % | HEART RATE: 116 BPM | DIASTOLIC BLOOD PRESSURE: 86 MMHG | BODY MASS INDEX: 48.53 KG/M2 | HEIGHT: 61 IN | WEIGHT: 257.06 LBS | SYSTOLIC BLOOD PRESSURE: 136 MMHG

## 2019-11-20 DIAGNOSIS — T75.3XXA SEA SICKNESS, INITIAL ENCOUNTER: ICD-10-CM

## 2019-11-20 DIAGNOSIS — L30.4 INTERTRIGO: ICD-10-CM

## 2019-11-20 DIAGNOSIS — E11.9 TYPE 2 DIABETES MELLITUS WITHOUT COMPLICATION, WITHOUT LONG-TERM CURRENT USE OF INSULIN: ICD-10-CM

## 2019-11-20 DIAGNOSIS — E89.0 POSTOPERATIVE HYPOTHYROIDISM: ICD-10-CM

## 2019-11-20 DIAGNOSIS — K11.20 PAROTITIS: Primary | ICD-10-CM

## 2019-11-20 LAB
ESTIMATED AVG GLUCOSE: 146 MG/DL (ref 68–131)
HBA1C MFR BLD HPLC: 6.7 % (ref 4–5.6)
TSH SERPL DL<=0.005 MIU/L-ACNC: 1.93 UIU/ML (ref 0.4–4)

## 2019-11-20 PROCEDURE — 83036 HEMOGLOBIN GLYCOSYLATED A1C: CPT | Mod: HCNC

## 2019-11-20 PROCEDURE — 3079F DIAST BP 80-89 MM HG: CPT | Mod: HCNC,CPTII,S$GLB, | Performed by: INTERNAL MEDICINE

## 2019-11-20 PROCEDURE — 3079F PR MOST RECENT DIASTOLIC BLOOD PRESSURE 80-89 MM HG: ICD-10-PCS | Mod: HCNC,CPTII,S$GLB, | Performed by: INTERNAL MEDICINE

## 2019-11-20 PROCEDURE — 99499 RISK ADDL DX/OHS AUDIT: ICD-10-PCS | Mod: HCNC,S$GLB,, | Performed by: INTERNAL MEDICINE

## 2019-11-20 PROCEDURE — 3044F PR MOST RECENT HEMOGLOBIN A1C LEVEL <7.0%: ICD-10-PCS | Mod: HCNC,CPTII,S$GLB, | Performed by: INTERNAL MEDICINE

## 2019-11-20 PROCEDURE — 36415 COLL VENOUS BLD VENIPUNCTURE: CPT | Mod: HCNC

## 2019-11-20 PROCEDURE — 3008F PR BODY MASS INDEX (BMI) DOCUMENTED: ICD-10-PCS | Mod: HCNC,CPTII,S$GLB, | Performed by: INTERNAL MEDICINE

## 2019-11-20 PROCEDURE — 3075F PR MOST RECENT SYSTOLIC BLOOD PRESS GE 130-139MM HG: ICD-10-PCS | Mod: HCNC,CPTII,S$GLB, | Performed by: INTERNAL MEDICINE

## 2019-11-20 PROCEDURE — 99999 PR PBB SHADOW E&M-EST. PATIENT-LVL III: ICD-10-PCS | Mod: PBBFAC,HCNC,, | Performed by: INTERNAL MEDICINE

## 2019-11-20 PROCEDURE — 84443 ASSAY THYROID STIM HORMONE: CPT | Mod: HCNC

## 2019-11-20 PROCEDURE — 3075F SYST BP GE 130 - 139MM HG: CPT | Mod: HCNC,CPTII,S$GLB, | Performed by: INTERNAL MEDICINE

## 2019-11-20 PROCEDURE — 3008F BODY MASS INDEX DOCD: CPT | Mod: HCNC,CPTII,S$GLB, | Performed by: INTERNAL MEDICINE

## 2019-11-20 PROCEDURE — 99499 UNLISTED E&M SERVICE: CPT | Mod: HCNC,S$GLB,, | Performed by: INTERNAL MEDICINE

## 2019-11-20 PROCEDURE — 99214 PR OFFICE/OUTPT VISIT, EST, LEVL IV, 30-39 MIN: ICD-10-PCS | Mod: HCNC,S$GLB,, | Performed by: INTERNAL MEDICINE

## 2019-11-20 PROCEDURE — 99999 PR PBB SHADOW E&M-EST. PATIENT-LVL III: CPT | Mod: PBBFAC,HCNC,, | Performed by: INTERNAL MEDICINE

## 2019-11-20 PROCEDURE — 99214 OFFICE O/P EST MOD 30 MIN: CPT | Mod: HCNC,S$GLB,, | Performed by: INTERNAL MEDICINE

## 2019-11-20 PROCEDURE — 3044F HG A1C LEVEL LT 7.0%: CPT | Mod: HCNC,CPTII,S$GLB, | Performed by: INTERNAL MEDICINE

## 2019-11-20 RX ORDER — AMOXICILLIN AND CLAVULANATE POTASSIUM 875; 125 MG/1; MG/1
1 TABLET, FILM COATED ORAL EVERY 12 HOURS
Qty: 20 TABLET | Refills: 0 | Status: SHIPPED | OUTPATIENT
Start: 2019-11-20 | End: 2019-11-30

## 2019-11-20 RX ORDER — NYSTATIN 100000 [USP'U]/G
POWDER TOPICAL 2 TIMES DAILY
Qty: 60 G | Refills: 11 | Status: SHIPPED | OUTPATIENT
Start: 2019-11-20 | End: 2020-04-24

## 2019-11-20 RX ORDER — MECLIZINE HYDROCHLORIDE 25 MG/1
25 TABLET ORAL 3 TIMES DAILY PRN
Qty: 20 TABLET | Refills: 1 | Status: SHIPPED | OUTPATIENT
Start: 2019-11-20 | End: 2023-01-15

## 2019-11-20 NOTE — PROGRESS NOTES
Subjective:       Patient ID: Tanika Wayne is a 59 y.o. female.    Chief Complaint: Follow-up    Patient is here for followup for chronic conditions.    Has a knot L side of face near parotid.    Feet always dry.    DM2:  good med adherence  Has been working on changed Diet  n/a 140 AM sugars  n/a Post-prandial sugars  no Numbness in feet  no sores in feet  no Visual changes  no Polyuria/polydipsia.    Did not feel like lotrimin cream has been helpful for intertrigo.    Review of Systems   Constitutional: Negative for fatigue, fever and unexpected weight change.   HENT: Positive for facial swelling (L side). Negative for congestion, hearing loss and sore throat.    Eyes: Negative for visual disturbance.   Respiratory: Negative for cough, shortness of breath and wheezing.    Cardiovascular: Negative for chest pain and palpitations.   Gastrointestinal: Negative for abdominal distention, abdominal pain, blood in stool, diarrhea, nausea and vomiting.   Genitourinary: Negative for difficulty urinating, dysuria, frequency and hematuria.   Musculoskeletal: Positive for arthralgias and gait problem. Negative for joint swelling.   Skin: Positive for rash. Negative for color change.        Intertrigo   Neurological: Negative for dizziness, syncope, weakness and headaches.   Hematological: Negative for adenopathy.   Psychiatric/Behavioral: Negative for confusion, decreased concentration and suicidal ideas.       Objective:      Physical Exam   Constitutional: She is oriented to person, place, and time. She appears well-developed and well-nourished. No distress.   HENT:   Head: Normocephalic and atraumatic.   Mouth/Throat: No oropharyngeal exudate.   Focal swelling L parotid, + tender, not warm, no redness.  Normal jaw opening and closure   Eyes: Pupils are equal, round, and reactive to light. EOM are normal. No scleral icterus.   Neck: Normal range of motion. Neck supple. No thyromegaly present.   Thyroid surg scar  "present   Cardiovascular: Normal rate, regular rhythm and normal heart sounds. Exam reveals no gallop and no friction rub.   No murmur heard.  Pulmonary/Chest: Effort normal and breath sounds normal. No respiratory distress. She has no wheezes. She has no rales.   Abdominal: Soft. Bowel sounds are normal. She exhibits no distension and no mass. There is no tenderness. There is no rebound and no guarding.   Musculoskeletal: Normal range of motion. She exhibits no edema or tenderness.   R knee improved rom. No significant swelling or warmth.    Protective Sensation (w/ 10 gram monofilament):  Right: Intact  Left: Intact    Visual Inspection:  Bunions R>L -  Bilateral  Skin dry on the feet bilat    Pedal Pulses:   Right: Present  Left: Present    Posterior tibialis:   Right:Present  Left: Present     Lymphadenopathy:     She has no cervical adenopathy.   Neurological: She is alert and oriented to person, place, and time.   Skin: She is not diaphoretic.   Intertrigo under pannus and knees bilat   Psychiatric: She has a normal mood and affect. Her speech is normal and behavior is normal. Cognition and memory are normal.       Assessment:       1. Parotitis    2. Intertrigo    3. Type 2 diabetes mellitus without complication, without long-term current use of insulin    4. Postoperative hypothyroidism    5. Sea sickness, initial encounter        Plan:       Tanika was seen today for follow-up.    Diagnoses and all orders for this visit:    Parotitis  -     amoxicillin-clavulanate 875-125mg (AUGMENTIN) 875-125 mg per tablet; Take 1 tablet by mouth every 12 (twelve) hours. for 10 days  Patient Instructions   Suck on lemon heads 4-5 times per day.  Explained that if not better in 1-2 weeks, pt should rtc/call PCP        Take an over the counter "super" probiotic with multiple bacteria strains daily while on antibiotic and continue for 7 days after the antibiotic has completed. Examples include:  [facility] Super Probiotic " Digestive Support Capsules  Nature Made Digestive Probiotics Advanced            Intertrigo  -     nystatin (MYCOSTATIN) powder; Apply topically 2 (two) times daily.  Explained that if not better in 1-2 weeks, pt should rtc/call PCP        Type 2 diabetes mellitus without complication, without long-term current use of insulin  -     Hemoglobin A1c; Future    Postoperative hypothyroidism  -     TSH; Future    Sea sickness, initial encounter  -     meclizine (ANTIVERT) 25 mg tablet; Take 1 tablet (25 mg total) by mouth 3 (three) times daily as needed.  For upcoming cruise      Health Maintenance       Date Due Completion Date    Shingles Vaccine (1 of 2) 01/01/2011 ---    Mammogram 02/05/2020 2/5/2019    Hemoglobin A1c 02/20/2020 11/20/2019    Foot Exam 08/13/2020 8/13/2019    Override on 8/13/2019: Done    Override on 7/23/2018: Done    Lipid Panel 08/13/2020 8/13/2019    Eye Exam 10/14/2020 10/14/2019    Low Dose Statin 11/20/2020 11/20/2019    TETANUS VACCINE 07/01/2021 7/1/2011    Colonoscopy 12/10/2022 12/10/2012          Follow up in about 3 months (around 2/20/2020) for pneumovax 23 please.    Future Appointments   Date Time Provider Department Center   11/22/2019  2:00 PM Jaime Carmona, PT [facility] [place]   12/17/2019  2:00 PM Tenriism, NUTRITIONIST [facility] [place]   1/31/2020  4:00 PM Natividad Tafoya PA-C [facility]   2/19/2020  2:00 PM Rayo Hernandez MD [facility]

## 2019-11-20 NOTE — PATIENT INSTRUCTIONS
"Suck on lemon heads 4-5 times per day.    Take an over the counter "super" probiotic with multiple bacteria strains daily while on antibiotic and continue for 7 days after the antibiotic has completed. Examples include:  [employer] Super Probiotic Digestive Support Capsules  Nature Made Digestive Probiotics Advanced      "

## 2019-11-22 ENCOUNTER — CLINICAL SUPPORT (OUTPATIENT)
Dept: REHABILITATION | Facility: OTHER | Age: 59
End: 2019-11-22
Attending: INTERNAL MEDICINE
Payer: MEDICARE

## 2019-11-22 DIAGNOSIS — G89.29 CHRONIC BILATERAL LOW BACK PAIN WITHOUT SCIATICA: ICD-10-CM

## 2019-11-22 DIAGNOSIS — M54.50 CHRONIC BILATERAL LOW BACK PAIN WITHOUT SCIATICA: ICD-10-CM

## 2019-11-22 DIAGNOSIS — R29.898 WEAKNESS OF BACK: ICD-10-CM

## 2019-11-22 PROBLEM — Z98.890 HISTORY OF LUMBAR LAMINECTOMY: Status: RESOLVED | Noted: 2017-11-24 | Resolved: 2019-11-22

## 2019-11-22 PROCEDURE — 97163 PT EVAL HIGH COMPLEX 45 MIN: CPT | Mod: HCNC

## 2019-11-22 PROCEDURE — 97110 THERAPEUTIC EXERCISES: CPT | Mod: HCNC

## 2019-11-22 NOTE — PROGRESS NOTES
[facility] - PHYSICAL THERAPY EVALUATION     Name: Tanika Wayne  Clinic Number: [id number]    Therapy Diagnosis:   Encounter Diagnoses   Name Primary?    Chronic bilateral low back pain without sciatica     Weakness of back      Physician: Natividad Tafoya, *    Physician Orders: PT Eval and Treat   Medical Diagnosis from Referral: Chronic right-sided low back pain without sciatica [M54.5, G89.29]  DDD (degenerative disc disease), lumbar [M51.36]  Spondylosis of lumbar region without myelopathy or radiculopathy [M47.816]  Spondylolisthesis, lumbar region [M43.16]  Evaluation Date: 11/22/2019  Authorization Period Expiration: 12/31/2019  Plan of Care Expiration: 12/31/2019  Reassessment Due: 11/22/2019  Visit # / Visits authorized: 1/ 20    Time In: 2:00  Time Out: 3:20  Total Billable Time: 70 minutes    Precautions: Standard, L3-4, L4-5 laminectomy on 02/22/2017, Breast CA 1998, R hip (October 2018) and R knee (April 2019) surgery    Pattern of pain determined: 1 PEN      Subjective   Date of onset/History of current condition - Tanika reports: Was doing PT for her knee, they were trying to get her to walk with a cane but her back started bothering her more. She feels she has a problem standing straight up because within a few min the back would start hurting. Pain in the center and R side of the back. The pain will go away once she sits down within a few minuets. The pain comes back when she is walking, she can only walk for a few min.    PD Visit Note: Tanika Wayne is a 59 y.o. female who presents for follow up.  Patient had a MIS L3-4, L4-5 laminectomy with Dr. Sullivan on 02/22/2017.  She states she has been able to walk straighter and no more right leg pain.  Since that surgery she has had right hip and right knee surgery which she is currently in PT for.     Over the past year she has had more low back pain which gets worse as she stands and walks and better with sitting and laying  down.  She can walk about 1/2 block before she has to sit down.  No leg pain.  She gets spasms and cramps in the right posterior leg to the calf that has come on in the last few months.     No recent PT or MILLY in the low back.  She takes flexeril and percocet.     Patient denies any recent accidents or trauma, no saddle anesthesias, and no bowel or bladder incontinence.     Referral NOTE: 2-3 times a week/ 6-8 weeks  Lumbar protocol with home exercises.  ROM, stretching lumbar and abdominal musculature. Core muscle strengthening exercises and gait training. Aerobic conditioning, aqua therapy, with modalities including tens, ultrasound and massage PRN.  Light weight (<30 lbs    Medical History:   Past Medical History:   Diagnosis Date    Abnormal echocardiogram 2012    Arthritis     Breast cancer     Breast cancer, right breast     Breast cancer, right breast 2012    S/P Lumpectomy / XRT '98 Dr. Bartholomew     Cardiomyopathy due to systemic disease     Cataract     Clotting disorder     Coronary artery disease     DM (diabetes mellitus) 2012    Encounter for blood transfusion     HTN (hypertension) 2012    Increased glucose level 2012    ITP (idiopathic thrombocytopenic purpura) 2012    Kidney stones 2012    PONV (postoperative nausea and vomiting)     Stenosis of lumbosacral spine 2012    Thyroid disease     Tympanic membrane perforation 9/15/2014    right  Dr. Jeffries       Surgical History:   Tanika Wayne  has a past surgical history that includes Strabismus surgery (8.30.06); Cholecystectomy; Hysterectomy; Splenectomy, total (); Thyroidectomy (2016);  section; L3-5 Laminectomy (2017); Breast lumpectomy (Right, ); Eye surgery; Hip Arthroplasty (Right, 10/18/2018); Hip replacement arthroplasty (Right); Joint replacement; Tonsillectomy; and Knee Arthroplasty (Right, 2019).    Medications:   Tanika has a current  medication list which includes the following prescription(s): amoxicillin-clavulanate 875-125mg, atorvastatin, carvedilol, clotrimazole, cyclobenzaprine, docusate sodium, fluticasone propionate, levothyroxine, losartan-hydrochlorothiazide 100-12.5 mg, meclizine, metformin, multivitamin, nifedipine, nystatin, oxycodone-acetaminophen, potassium chloride sa, and venlafaxine, and the following Facility-Administered Medications: sodium chloride 0.9%.    Allergies:   Review of patient's allergies indicates:  No Known Allergies     Imaging, bone scan films: 8/2019: FINDINGS:  Mild DJD.  There is a grade 1 L3/L4 anterolisthesis.  The disc spaces are narrowed between L3 and S1 vertebral segment.  No fracture or dislocation.  No bone destruction identified.  Right hip arthroplasty noted.    Prior Therapy: [facility] in 2665-4247 10 visits. 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 Has shower chair and elevated toilet seat. Makes sure someone is in the house when she is bathing  DME owned/used: Cane, rollator        Pain:  Current 6/10, worst 8/10, best 4/10   Location: Center and R side low back   Description: Aching spasms in the R leg  Aggravating Factors: Walking, bending, reaching up, quick movements  Easing Factors: Sitting, resting  Disturbed Sleep: sometimes, needs to change positions, able to go back to sleep. Sleeps on the R side the majority of the time.        Pattern of pain questions:  1.  Where is your pain the worst? back  2.  Is  your pain constant or intermittent? constant  3.  Does bending forward make your typical pain worse? yes  4.  Since the start of your back pain, has there been a change in your bowel or bladder? no  5.  What can't you do now that you use to be able to do? Walking a long distance      Pts goals: Walking a longer distance, and to lose weight that the picked up not doing therapy      Red Flag Screening:   Cough  Sneeze  Strain: (--)  Bladder/ bowel: (--)  Falls: (--)  Night pain: (+)  Unexplained weight loss: (--)  General health: Poor    OBJECTIVE     Postural examination/scapula alignment: Rounded shoulder and Affected scapula elevated  Joint integrity: Hard end feel, tenderness with palpation of R SI region  Skin integrity: Intact  Edema: Moderate  Sitting: Poor  Standing: Poor  Correction of posture: better with lumbar roll    MOVEMENT LOSS     Pt in standing with bilateral support with hands   reached down with LUE = knee pain  Reached down with RUE = back pain   ROM Loss   Flexion within functional limits   Extension moderate loss pain   Side bending Right major loss   Side bending Left major loss Pain on R   Rotation Right moderate loss   Rotation Left moderate loss     Lower Extremity Strength  Right LE  Left LE    Hip flexion: 4/5 Hip flexion: 4+/5   Hip extension:  3/5 Hip extension: 4/5   Hip abduction: 5/5 Hip abduction: 5/5   Hip adduction:  5/5 Hip adduction:  5/5   Hip Internal rotation   5/5 Hip Internal rotation 5/5   Knee Flexion 4/5 Knee Flexion 5/5   Knee Extension 4/5 Knee Extension 5/5   Ankle dorsiflexion: 5/5 Ankle dorsiflexion: 5/5   Ankle plantarflexion: 5/5 Ankle plantarflexion: 5/5       GAIT:  Assistive Device used: Rollator  Level of Assistance: supervision  Patient displays the following gait deviations:  unsteady gait, increased base of support and decreased weight shift.     Special Tests:   Test Name  Test Result   Prone Instability Test NT   SI Joint Provocation Test NT   Straight  Leg Raise (--)   Neural Tension Test (--)   Crossed Straight Leg Raise (--)   Walking on toes NT   Walking on heels  NT       NEUROLOGICAL SCREENING     Sensory deficit: Intact to light touch    Reflexes:    Left Right   Patella Tendon 2+ absent   Achilles Tendon 2+ 1+   Clonus (--) (--)     REPEATED TEST MOVEMENTS:  Repeated Flexion in Standing NT   Repeated Extension in Standing NT   Repeated Flexion in lying no effect   Repeated Extension in lying  NT       STATIC TESTS   Sitting slouched  no effect   Sitting erect no effect       Baseline Isometric Testing on Med X equipment: Testing administered by PT  Date of testin2019    ROM 0-42 deg   Max Peak Torque 94    Min Peak Torque 43    Flex/Ext Ratio 2.18:1   % below normative data 53         CMS Impairment/Limitation/Restriction for FOTO Survey    Therapist reviewed FOTO scores for Tanika Wayne on 2019.   FOTO documents entered into [employer] - see Media section.    Limitation Score: 64%  Category: Mobility    Current : CL = least 60% but < 80% impaired, limited or restricted  Goal: CK = at least 40% but < 60% impaired, limited or restricted  Discharge:              Treatment   Treatment Time In: 2:40  Treatment Time Out: 3:10  Total Treatment time separate from Evaluation: 30 minutes      Tanika received therapeutic exercises to develop/improve posture, lumbar/cervical ROM, strength and muscular endurance for 30 minutes including the following exercises:     Med x dynamic exercise and baseline IM test    [facility] 2019   Visit Number 1   VAS Pain Rating 5   Recumbent Bike Seat Pos. -   Time -   Extension in Lying 10   Extension in Standing -   Flexion in Lying -   Flexion in Sitting -   Lumbar Extension Seat Pad 1   Femur Restraint 6   Top Dead Center 24   Counterweight 169   Lumbar Flexion 42   Lumbar Extension 0   Lumbar Peak Torque 94   Min Torque 43   Test Percent Below Normative Data 53   Test Percent Gain in Strength from  Initial  -   Lumbar Weight 30   Repetitions -   Rating of Perceived Exertion -   Ice - Z Lie (in min.) -   Ice - Sitting 10     Single knee to chest 10x  Supine Lower trunk rotation 10x  Posterior pelvic tilt 10x      Written Home Exercises Provided: yes.  Exercises were reviewed and Tanika was able to demonstrate them prior to the end of the session.  Tanika demonstrated good  understanding of the education provided.     See EMR under Patient Instructions for exercises provided 11/22/2019.      Education provided:   - Patient received education regarding proper posture and body mechanics.  Patient was given top 10 tips handout which discusses posture seated, standing, lifting correctly, components of exercise, importance of nutrition and hydration, and importance of sleep.  - Chen roll tried, recommended, and purchase information was provided.    - Patient received a handout regarding anticipated muscular soreness following the isometric test and strategies for management were reviewed with patient including stretching, using ice and scheduled rest.   - Patient received education on the [facility] program, purpose of the isometric test, progression of back strengthening as well as wellness approach and systemic strengthening.  Details of the program were discussed.  Reviewed that patient should feel support/pressure from med ex restraints but no pain or discomfort and patient expressed understanding.    Tanika received cold pack for 10 minutes to low back in sitting.    Assessment   Tanika is a 59 y.o. female referred to [facility] with a medical diagnosis of chronic midline to low back pain without sciatica and weakness of the low back. Pt presents with pain with cardinal plane movements, difficulty walking or standing for short periods of time. PT felt some relief with Supine Lower trunk rotation and bridges, will continue with mobility and strengthening as tolerated. Pt was able to test on the lumbar  medx and tested 53% below age related averages.    Pain Pattern: 1 PEN    Pt prognosis is Fair.   Pt will benefit from skilled outpatient Physical Therapy to address the deficits stated above and in the chart below, provide pt/family education, and to maximize pt's level of independence. Based on the above history and physical examination an active physical therapy program is recommended.  Pt will continue to benefit from skilled outpatient physical therapy to address the deficits listed below in the chart, provide pt/family education and to maximize pt's level of independence in the home and community environment. .       Plan of care discussed with patient: Yes  Pt's spiritual, cultural and educational needs considered and patient is agreeable to the plan of care and goals as stated below:     Anticipated Barriers for therapy: Inactivity    PT Evaluation Completed? Yes    Medical necessity is demonstrated by the following problem list.    Pt presents with the following impairments:     History  Co-morbidities and personal factors that may impact the plan of care Co-morbidities:   advanced age, diabetes, high BMI, history of cancer, prior hip surgery and prior lumbar surgery    Personal Factors:   lifestyle     high   Examination  Body Structures and Functions, activity limitations and participation restrictions that may impact the plan of care Body Regions:   back  lower extremities    Body Systems:    gross symmetry  ROM  strength  gross coordinated movement  balance  gait  transfers  transitions  motor control  motor learning    Participation Restrictions:   Inactivity    Activity limitations:   Learning and applying knowledge  no deficits    General Tasks and Commands  no deficits    Communication  no deficits    Mobility  lifting and carrying objects  walking    Self care  no deficits    Domestic Life  shopping  cooking  doing house work (cleaning house, washing dishes,  laundry)    Interactions/Relationships  no deficits    Life Areas  no deficits    Community and Social Life  no deficits         high   Clinical Presentation evolving clinical presentation with changing clinical characteristics moderate   Decision Making/ Complexity Score: high       GOALS: Pt is in agreement with the following goals.    Short term goals:  6 weeks or 10 visits   1.  Pt will demonstrate increased lumbar ROM by at least 3 degrees from the initial ROM value with improvements noted in functional ROM and ability to perform ADLs.  2.  Pt will demonstrate increased maximum isometric torque value by 25% when compared to the initial value resulting in improved ability to perform bending, lifting, and carrying activities safely, confidently.    3.  Patient report a reduction in worst pain score by 1-2 points for improved tolerance for static standing, washing dishes.  4.  Pt able to perform HEP correctly with minimal cueing or supervision from therapist to encourage independent management of symptoms.       Long term goals: 10 weeks or 20 visits   1. Pt will demonstrate increased lumbar ROM by at least 6 degrees from initial ROM value, resulting in improved ability to perform functional fwd bending while standing and sitting.   2. Pt will demonstrate increased maximum isometric torque value by 50% when compared to the initial value resulting in improved ability to perform bending, lifting, and carrying activities safely, confidently.  3. Pt to demonstrate ability to independently control and reduce their pain through posture positioning and mechanical movements throughout a typical day.  4.  Pt will demonstrate reduced pain and improved functional outcomes as reported on the FOTO by reaching a score of CK = at least 40% but < 60% impaired, limited or restricted or less in order to demonstrate subjective improvement in pt's condition.    5. Pt will demonstrate independence with the HEP at discharge  6.  Pt  "will be able to walk at self selected speed for 10 min without needing to sit to rest      Plan   Outpatient physical therapy 2x week for 10 weeks or 20 visits to include the following:   - Patient education  - Therapeutic exercise  - Manual therapy  - Performance testing   - Neuromuscular Re-education  - Therapeutic activity   - Modalities    Pt may be seen by PTA as part of the rehabilitation team.     Therapist: Jaime Carmona, PT  11/22/2019    "I certify the need for these services furnished under this plan of treatment and while under my care."    ____________________________________  Physician/Referring Practitioner    _______________  Date of Signature        "

## 2019-11-25 NOTE — PLAN OF CARE
[facility] BACK - PHYSICAL THERAPY EVALUATION     Name: Tanika Wayne  Clinic Number: [id number]    Therapy Diagnosis:   Encounter Diagnoses   Name Primary?    Chronic bilateral low back pain without sciatica     Weakness of back      Physician: Natividad Tafoya, *    Physician Orders: PT Eval and Treat   Medical Diagnosis from Referral: Chronic right-sided low back pain without sciatica [M54.5, G89.29]  DDD (degenerative disc disease), lumbar [M51.36]  Spondylosis of lumbar region without myelopathy or radiculopathy [M47.816]  Spondylolisthesis, lumbar region [M43.16]  Evaluation Date: 11/22/2019  Authorization Period Expiration: 12/31/2019  Plan of Care Expiration: 12/31/2019  Reassessment Due: 11/22/2019  Visit # / Visits authorized: 1/ 20    Time In: 2:00  Time Out: 3:20  Total Billable Time: 70 minutes    Precautions: Standard, L3-4, L4-5 laminectomy on 02/22/2017, Breast CA 1998, R hip (October 2018) and R knee (April 2019) surgery    Pattern of pain determined: 1 PEN      Subjective   Date of onset/History of current condition - Tanika reports: Was doing PT for her knee, they were trying to get her to walk with a cane but her back started bothering her more. She feels she has a problem standing straight up because within a few min the back would start hurting. Pain in the center and R side of the back. The pain will go away once she sits down within a few minuets. The pain comes back when she is walking, she can only walk for a few min.    PD Visit Note: Tanika Wayne is a 59 y.o. female who presents for follow up.  Patient had a MIS L3-4, L4-5 laminectomy with Dr. Sullivan on 02/22/2017.  She states she has been able to walk straighter and no more right leg pain.  Since that surgery she has had right hip and right knee surgery which she is currently in PT for.     Over the past year she has had more low back pain which gets worse as she stands and walks and better with sitting and laying  down.  She can walk about 1/2 block before she has to sit down.  No leg pain.  She gets spasms and cramps in the right posterior leg to the calf that has come on in the last few months.     No recent PT or MILLY in the low back.  She takes flexeril and percocet.     Patient denies any recent accidents or trauma, no saddle anesthesias, and no bowel or bladder incontinence.     Referral NOTE: 2-3 times a week/ 6-8 weeks  Lumbar protocol with home exercises.  ROM, stretching lumbar and abdominal musculature. Core muscle strengthening exercises and gait training. Aerobic conditioning, aqua therapy, with modalities including tens, ultrasound and massage PRN.  Light weight (<30 lbs    Medical History:   Past Medical History:   Diagnosis Date    Abnormal echocardiogram 2012    Arthritis     Breast cancer     Breast cancer, right breast     Breast cancer, right breast 2012    S/P Lumpectomy / XRT '98 Dr. Bartholomew     Cardiomyopathy due to systemic disease     Cataract     Clotting disorder     Coronary artery disease     DM (diabetes mellitus) 2012    Encounter for blood transfusion     HTN (hypertension) 2012    Increased glucose level 2012    ITP (idiopathic thrombocytopenic purpura) 2012    Kidney stones 2012    PONV (postoperative nausea and vomiting)     Stenosis of lumbosacral spine 2012    Thyroid disease     Tympanic membrane perforation 9/15/2014    right  Dr. Jeffries       Surgical History:   Tanika Wayne  has a past surgical history that includes Strabismus surgery (8.30.06); Cholecystectomy; Hysterectomy; Splenectomy, total (); Thyroidectomy (2016);  section; L3-5 Laminectomy (2017); Breast lumpectomy (Right, ); Eye surgery; Hip Arthroplasty (Right, 10/18/2018); Hip replacement arthroplasty (Right); Joint replacement; Tonsillectomy; and Knee Arthroplasty (Right, 2019).    Medications:   Tanika has a current  medication list which includes the following prescription(s): amoxicillin-clavulanate 875-125mg, atorvastatin, carvedilol, clotrimazole, cyclobenzaprine, docusate sodium, fluticasone propionate, levothyroxine, losartan-hydrochlorothiazide 100-12.5 mg, meclizine, metformin, multivitamin, nifedipine, nystatin, oxycodone-acetaminophen, potassium chloride sa, and venlafaxine, and the following Facility-Administered Medications: sodium chloride 0.9%.    Allergies:   Review of patient's allergies indicates:  No Known Allergies     Imaging, bone scan films: 8/2019: FINDINGS:  Mild DJD.  There is a grade 1 L3/L4 anterolisthesis.  The disc spaces are narrowed between L3 and S1 vertebral segment.  No fracture or dislocation.  No bone destruction identified.  Right hip arthroplasty noted.    Prior Therapy: [facility] in 3956-3206 10 visits. 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 Has shower chair and elevated toilet seat. Makes sure someone is in the house when she is bathing  DME owned/used: Cane, rollator        Pain:  Current 6/10, worst 8/10, best 4/10   Location: Center and R side low back   Description: Aching spasms in the R leg  Aggravating Factors: Walking, bending, reaching up, quick movements  Easing Factors: Sitting, resting  Disturbed Sleep: sometimes, needs to change positions, able to go back to sleep. Sleeps on the R side the majority of the time.        Pattern of pain questions:  1.  Where is your pain the worst? back  2.  Is  your pain constant or intermittent? constant  3.  Does bending forward make your typical pain worse? yes  4.  Since the start of your back pain, has there been a change in your bowel or bladder? no  5.  What can't you do now that you use to be able to do? Walking a long distance      Pts goals: Walking a longer distance, and to lose weight that the picked up not doing therapy      Red Flag Screening:   Cough  Sneeze  Strain: (--)  Bladder/ bowel: (--)  Falls: (--)  Night pain: (+)  Unexplained weight loss: (--)  General health: Poor    OBJECTIVE     Postural examination/scapula alignment: Rounded shoulder and Affected scapula elevated  Joint integrity: Hard end feel, tenderness with palpation of R SI region  Skin integrity: Intact  Edema: Moderate  Sitting: Poor  Standing: Poor  Correction of posture: better with lumbar roll    MOVEMENT LOSS     Pt in standing with bilateral support with hands   reached down with LUE = knee pain  Reached down with RUE = back pain   ROM Loss   Flexion within functional limits   Extension moderate loss pain   Side bending Right major loss   Side bending Left major loss Pain on R   Rotation Right moderate loss   Rotation Left moderate loss     Lower Extremity Strength  Right LE  Left LE    Hip flexion: 4/5 Hip flexion: 4+/5   Hip extension:  3/5 Hip extension: 4/5   Hip abduction: 5/5 Hip abduction: 5/5   Hip adduction:  5/5 Hip adduction:  5/5   Hip Internal rotation   5/5 Hip Internal rotation 5/5   Knee Flexion 4/5 Knee Flexion 5/5   Knee Extension 4/5 Knee Extension 5/5   Ankle dorsiflexion: 5/5 Ankle dorsiflexion: 5/5   Ankle plantarflexion: 5/5 Ankle plantarflexion: 5/5       GAIT:  Assistive Device used: Rollator  Level of Assistance: supervision  Patient displays the following gait deviations:  unsteady gait, increased base of support and decreased weight shift.     Special Tests:   Test Name  Test Result   Prone Instability Test NT   SI Joint Provocation Test NT   Straight  Leg Raise (--)   Neural Tension Test (--)   Crossed Straight Leg Raise (--)   Walking on toes NT   Walking on heels  NT       NEUROLOGICAL SCREENING     Sensory deficit: Intact to light touch    Reflexes:    Left Right   Patella Tendon 2+ absent   Achilles Tendon 2+ 1+   Clonus (--) (--)     REPEATED TEST MOVEMENTS:  Repeated Flexion in Standing NT   Repeated Extension in Standing NT   Repeated Flexion in lying no effect   Repeated Extension in lying  NT       STATIC TESTS   Sitting slouched  no effect   Sitting erect no effect       Baseline Isometric Testing on Med X equipment: Testing administered by PT  Date of testin2019    ROM 0-42 deg   Max Peak Torque 94    Min Peak Torque 43    Flex/Ext Ratio 2.18:1   % below normative data 53         CMS Impairment/Limitation/Restriction for FOTO Survey    Therapist reviewed FOTO scores for Tanika Wayne on 2019.   FOTO documents entered into [employer] - see Media section.    Limitation Score: 64%  Category: Mobility    Current : CL = least 60% but < 80% impaired, limited or restricted  Goal: CK = at least 40% but < 60% impaired, limited or restricted  Discharge:              Treatment   Treatment Time In: 2:40  Treatment Time Out: 3:10  Total Treatment time separate from Evaluation: 30 minutes      Tanika received therapeutic exercises to develop/improve posture, lumbar/cervical ROM, strength and muscular endurance for 30 minutes including the following exercises:     Med x dynamic exercise and baseline IM test    [facility] 2019   Visit Number 1   VAS Pain Rating 5   Recumbent Bike Seat Pos. -   Time -   Extension in Lying 10   Extension in Standing -   Flexion in Lying -   Flexion in Sitting -   Lumbar Extension Seat Pad 1   Femur Restraint 6   Top Dead Center 24   Counterweight 169   Lumbar Flexion 42   Lumbar Extension 0   Lumbar Peak Torque 94   Min Torque 43   Test Percent Below Normative Data 53   Test Percent Gain in Strength from  Initial  -   Lumbar Weight 30   Repetitions -   Rating of Perceived Exertion -   Ice - Z Lie (in min.) -   Ice - Sitting 10     Single knee to chest 10x  Supine Lower trunk rotation 10x  Posterior pelvic tilt 10x      Written Home Exercises Provided: yes.  Exercises were reviewed and Tanika was able to demonstrate them prior to the end of the session.  Tanika demonstrated good  understanding of the education provided.     See EMR under Patient Instructions for exercises provided 11/22/2019.      Education provided:   - Patient received education regarding proper posture and body mechanics.  Patient was given top 10 tips handout which discusses posture seated, standing, lifting correctly, components of exercise, importance of nutrition and hydration, and importance of sleep.  - Chen roll tried, recommended, and purchase information was provided.    - Patient received a handout regarding anticipated muscular soreness following the isometric test and strategies for management were reviewed with patient including stretching, using ice and scheduled rest.   - Patient received education on the [facility] program, purpose of the isometric test, progression of back strengthening as well as wellness approach and systemic strengthening.  Details of the program were discussed.  Reviewed that patient should feel support/pressure from med ex restraints but no pain or discomfort and patient expressed understanding.    Tanika received cold pack for 10 minutes to low back in sitting.    Assessment   Tanika is a 59 y.o. female referred to [facility] with a medical diagnosis of chronic midline to low back pain without sciatica and weakness of the low back. Pt presents with pain with cardinal plane movements, difficulty walking or standing for short periods of time. PT felt some relief with Supine Lower trunk rotation and bridges, will continue with mobility and strengthening as tolerated. Pt was able to test on the lumbar  medx and tested 53% below age related averages.    Pain Pattern: 1 PEN    Pt prognosis is Fair.   Pt will benefit from skilled outpatient Physical Therapy to address the deficits stated above and in the chart below, provide pt/family education, and to maximize pt's level of independence. Based on the above history and physical examination an active physical therapy program is recommended.  Pt will continue to benefit from skilled outpatient physical therapy to address the deficits listed below in the chart, provide pt/family education and to maximize pt's level of independence in the home and community environment. .       Plan of care discussed with patient: Yes  Pt's spiritual, cultural and educational needs considered and patient is agreeable to the plan of care and goals as stated below:     Anticipated Barriers for therapy: Inactivity    PT Evaluation Completed? Yes    Medical necessity is demonstrated by the following problem list.    Pt presents with the following impairments:     History  Co-morbidities and personal factors that may impact the plan of care Co-morbidities:   advanced age, diabetes, high BMI, history of cancer, prior hip surgery and prior lumbar surgery    Personal Factors:   lifestyle     high   Examination  Body Structures and Functions, activity limitations and participation restrictions that may impact the plan of care Body Regions:   back  lower extremities    Body Systems:    gross symmetry  ROM  strength  gross coordinated movement  balance  gait  transfers  transitions  motor control  motor learning    Participation Restrictions:   Inactivity    Activity limitations:   Learning and applying knowledge  no deficits    General Tasks and Commands  no deficits    Communication  no deficits    Mobility  lifting and carrying objects  walking    Self care  no deficits    Domestic Life  shopping  cooking  doing house work (cleaning house, washing dishes,  laundry)    Interactions/Relationships  no deficits    Life Areas  no deficits    Community and Social Life  no deficits         high   Clinical Presentation evolving clinical presentation with changing clinical characteristics moderate   Decision Making/ Complexity Score: high       GOALS: Pt is in agreement with the following goals.    Short term goals:  6 weeks or 10 visits   1.  Pt will demonstrate increased lumbar ROM by at least 3 degrees from the initial ROM value with improvements noted in functional ROM and ability to perform ADLs.  2.  Pt will demonstrate increased maximum isometric torque value by 25% when compared to the initial value resulting in improved ability to perform bending, lifting, and carrying activities safely, confidently.    3.  Patient report a reduction in worst pain score by 1-2 points for improved tolerance for static standing, washing dishes.  4.  Pt able to perform HEP correctly with minimal cueing or supervision from therapist to encourage independent management of symptoms.       Long term goals: 10 weeks or 20 visits   1. Pt will demonstrate increased lumbar ROM by at least 6 degrees from initial ROM value, resulting in improved ability to perform functional fwd bending while standing and sitting.   2. Pt will demonstrate increased maximum isometric torque value by 50% when compared to the initial value resulting in improved ability to perform bending, lifting, and carrying activities safely, confidently.  3. Pt to demonstrate ability to independently control and reduce their pain through posture positioning and mechanical movements throughout a typical day.  4.  Pt will demonstrate reduced pain and improved functional outcomes as reported on the FOTO by reaching a score of CK = at least 40% but < 60% impaired, limited or restricted or less in order to demonstrate subjective improvement in pt's condition.    5. Pt will demonstrate independence with the HEP at discharge  6.  Pt  "will be able to walk at self selected speed for 10 min without needing to sit to rest      Plan   Outpatient physical therapy 2x week for 10 weeks or 20 visits to include the following:   - Patient education  - Therapeutic exercise  - Manual therapy  - Performance testing   - Neuromuscular Re-education  - Therapeutic activity   - Modalities    Pt may be seen by PTA as part of the rehabilitation team.     Therapist: Jaime Carmona, PT  11/22/2019    "I certify the need for these services furnished under this plan of treatment and while under my care."    ____________________________________  Physician/Referring Practitioner    _______________  Date of Signature        "

## 2019-11-25 NOTE — PATIENT INSTRUCTIONS
.    Top 10 tips for back and neck pain    The spine is the pillar of the body, providing the foundation for the upper and lower extremities to attach.  Our spines withstand significant forces all day long.  There are many ways in which we can take care of our backs.  Here are a couple tips to help you keep your back in action.    1. Watch your posture in sitting.     Sit in chairs with back supports, and use a lumbar roll to maintain the normal curve of your low back. Ensure the height of your chair is such that your feet rest flat on the floor with your knees and hips level.  The average American sits 9 hours a day.  Do not sit longer than 1 hour without getting up to stretch or move.     2. Watch your posture in standing.   Maintain the normal curves of your spine in standing.   When standing tall, you should be able to draw a line down through your ear, shoulder, hip, and ankle.  Wear good shoes and consider using a standing desk mat if you stand a lot during work.  Take breaks from standing.    3. Lift correctly   Lift objects by using the strong muscles in your legs.  Get close to the object, bend your knees and your hips, and maintain the normal curve of your low back. Do not twist when lifting or carrying items. Think about the tasks you perform daily at work or home, and minimize lifting and carrying objects.  Use rolling carts or other strategies to reduce back strain.    4. Exercise regularly  Individuals who exercise regularly generally experience better health, reduced back pain, and less stress.  A good exercise program has a stretching component, a strengthening component, and an aerobic component.   Maintain the mobility of your spine by stretching daily. Strengthen your core and extremities several times a week.   Get regular cardiovascular exercise, 3-5/week.  Choose activities you like such as walking, swimming, dancing, or riding a bike.     5. Quit Smoking  Smoking increases the likelihood of  back pain.  It is thought that smoking reduces the blood supply to the discs between the vertebrae and this may lead to degeneration of these discs.  Talk to your Physician about quitting.  There are many smoking cessation options that may work for you.    6. Keep moving even when you have pain  Motion is lotion.   The majority of back pain is mechanical in nature, and will likely reduce with gentle movements, stretching, and walking.  As tempting as it may be to stay in bed when you are hurting, remember that you will likely feel better by getting up and gently moving and walking.  Limit bed rest.      7. Maintain a healthy diet  Try to maintain a healthy diet and weight.        8. Stay hydrated  The average adult is approximately 60 % water.  Staying hydrated is beneficial for all aspects of health.  In general, an adult should drink half of their body weight in ounces.  For example, if you weight 180 lbs, you should drink 90 ounces of water daily.     9. Get regular sleep   Ensure that you get a good nights rest on a regular basis. The discs in your spine hydrate when you lie down to sleep. Your spine needs the rest too.     10. See Your Physician    Make an appointment to see your Physician for back pain that is progressively worsening, and for back pain that is no better or worse with changing positions and activities.        Z LIE POSITION  Z Lie is a position that you can use to unload your back and assist with pain reduction.  Lie on your back and rest your calves on the seat on a chair or a bench.  Viewed from the side, you should resemble a Z.  Your therapist may suggest sliding the chair closer to you, so your knees are over your stomach.   Your therapist may also suggest a pillow under your buttock if needed.  Follow the directions from your therapist.  The goal of this position is to reduce your symptoms.    Z lie can be done in a variety of ways.  It can be done on a bed resting your legs on a  light and easy to lift chair

## 2019-11-26 ENCOUNTER — CLINICAL SUPPORT (OUTPATIENT)
Dept: REHABILITATION | Facility: OTHER | Age: 59
End: 2019-11-26
Attending: INTERNAL MEDICINE
Payer: MEDICARE

## 2019-11-26 DIAGNOSIS — R29.898 WEAKNESS OF BACK: ICD-10-CM

## 2019-11-26 DIAGNOSIS — M54.50 CHRONIC BILATERAL LOW BACK PAIN WITHOUT SCIATICA: Primary | ICD-10-CM

## 2019-11-26 DIAGNOSIS — G89.29 CHRONIC BILATERAL LOW BACK PAIN WITHOUT SCIATICA: Primary | ICD-10-CM

## 2019-11-26 PROCEDURE — 97110 THERAPEUTIC EXERCISES: CPT | Mod: HCNC

## 2019-11-26 NOTE — PROGRESS NOTES
[facility] Back Physical Therapy Treatment      Name: Tanika Wayne  Clinic Number: [id number]    Therapy Diagnosis:   Encounter Diagnoses   Name Primary?    Chronic bilateral low back pain without sciatica Yes    Weakness of back      Physician: Natividad Tafoya, *    Visit Date: 2019    Medical Diagnosis from Referral: Chronic right-sided low back pain without sciatica [M54.5, G89.29]  DDD (degenerative disc disease), lumbar [M51.36]  Spondylosis of lumbar region without myelopathy or radiculopathy [M47.816]  Spondylolisthesis, lumbar region [M43.16]  Evaluation Date: 2019  Authorization Period Expiration: 2019  Plan of Care Expiration: 2019  Reassessment Due: 2019  Visit # / Visits authorized:      Time In: 2:00  Time Out: 3:00  Total Billable Time: 40 minutes     Precautions: Standard, L3-4, L4-5 laminectomy on 2017, Breast CA 1998, R hip (2018) and R knee (2019) surgery     Pattern of pain determined: 1 PEN    Subjective   Tanika reports improvement of symptoms.      Patient reports tolerating previous visit well with decreased symptoms following   Patient reports their pain to be 3/10 on a 0-10 scale with 0 being no pain and 10 being the worst pain imaginable.  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   FOTO documents entered into [employer] -  see Media section.     Limitation Score: 64%  Category: Mobility     Current : CL = least 60% but < 80% impaired, limited or restricted  Goal: CK = at least 40% but < 60% impaired, limited or restricted  Discharge:              Treatment    Pt was instructed in and performed the following:     Tanika received therapeutic exercises to develop/improved posture, cardiovascular endurance, muscular endurance, lumbar/cervical ROM, strength and muscular endurance for 60 minutes including the following exercises:   [facility] 11/26/2019   Visit Number 2   VAS Pain Rating 3   Recumbent Bike Seat Pos. -   Time -   Extension in Lying 10   Extension in Standing -   Flexion in Lying -   Flexion in Sitting -   Lumbar Extension Seat Pad -   Femur Restraint -   Top Dead Center -   Counterweight -   Lumbar Flexion -   Lumbar Extension -   Lumbar Peak Torque -   Min Torque -   Test Percent Below Normative Data -   Test Percent Gain in Strength from Initial  -   Lumbar Weight 45   Repetitions 15   Rating of Perceived Exertion 3   Ice - Z Lie (in min.) -   Ice - Sitting 10     LTR 10x  PPT 10x  SKTC 10x          Peripheral muscle strengthening which included 1 set of 15-20 repetitions at a slow, controlled 10-13 second per rep pace focused on strengthening supporting musculature for improved body mechanics and functional mobility.  Pt and therapist focused on proper form during treatment to ensure optimal strengthening of each targeted muscle group.  Machines were utilized including torso rotation, leg extension, leg curl, chest press, upright row. Tricep extension, bicep curl, leg press, and hip abduction added visit 3    Tanika received the following manual therapy techniques: NA were applied to the: NA for NA minutes.         Home Exercises Provided and Patient Education Provided     Education provided:   - RPE   - HEP    Written Home Exercises Provided: Patient instructed to cont prior HEP.  Exercises were reviewed and Tanika  was able to demonstrate them prior to the end of the session.  Tanika demonstrated good  understanding of the education provided.     See EMR under Patient Instructions for exercises provided prior visit.          Assessment     Pt able to complete all components of session with no adverse effects. Initiated resistance at 50% peak torque with ability to complete 15 repetitions with appropriate RPE. Will continue to progress as tolerated. Able to complete HEP with moderate verbal cueing for pacing provided.   Patient is making good progress towards established goals.  Pt will continue to benefit from skilled outpatient physical therapy to address the deficits stated in the impairment chart, provide pt/family education and to maximize pt's level of independence in the home and community environment.     Anticipated Barriers for therapy: None  Pt's spiritual, cultural and educational needs considered and pt agreeable to plan of care and goals as stated below:             GOALS: Pt is in agreement with the following goals.     Short term goals:  6 weeks or 10 visits   1.  Pt will demonstrate increased lumbar ROM by at least 3 degrees from the initial ROM value with improvements noted in functional ROM and ability to perform ADLs.  2.  Pt will demonstrate increased maximum isometric torque value by 25% when compared to the initial value resulting in improved ability to perform bending, lifting, and carrying activities safely, confidently.     3.  Patient report a reduction in worst pain score by 1-2 points for improved tolerance for static standing, washing dishes.  4.  Pt able to perform HEP correctly with minimal cueing or supervision from therapist to encourage independent management of symptoms.         Long term goals: 10 weeks or 20 visits   1. Pt will demonstrate increased lumbar ROM by at least 6 degrees from initial ROM value, resulting in improved ability to perform functional fwd bending while standing and  sitting.   2. Pt will demonstrate increased maximum isometric torque value by 50% when compared to the initial value resulting in improved ability to perform bending, lifting, and carrying activities safely, confidently.  3. Pt to demonstrate ability to independently control and reduce their pain through posture positioning and mechanical movements throughout a typical day.  4.  Pt will demonstrate reduced pain and improved functional outcomes as reported on the FOTO by reaching a score of CK = at least 40% but < 60% impaired, limited or restricted or less in order to demonstrate subjective improvement in pt's condition.    5. Pt will demonstrate independence with the HEP at discharge  6.  Pt will be able to walk at self selected speed for 10 min without needing to sit to rest      Plan   Continue with established Plan of Care towards established PT goals.

## 2019-12-01 ENCOUNTER — PATIENT MESSAGE (OUTPATIENT)
Dept: PHYSICAL MEDICINE AND REHAB | Facility: CLINIC | Age: 59
End: 2019-12-01

## 2019-12-01 DIAGNOSIS — J32.9 SINUSITIS, UNSPECIFIED CHRONICITY, UNSPECIFIED LOCATION: ICD-10-CM

## 2019-12-01 RX ORDER — FLUTICASONE PROPIONATE 50 MCG
SPRAY, SUSPENSION (ML) NASAL
Qty: 16 ML | Refills: 35 | Status: SHIPPED | OUTPATIENT
Start: 2019-12-01 | End: 2020-12-29

## 2019-12-02 DIAGNOSIS — M54.9 BACK PAIN, UNSPECIFIED BACK LOCATION, UNSPECIFIED BACK PAIN LATERALITY, UNSPECIFIED CHRONICITY: ICD-10-CM

## 2019-12-02 RX ORDER — OXYCODONE AND ACETAMINOPHEN 5; 325 MG/1; MG/1
1 TABLET ORAL EVERY 12 HOURS PRN
Qty: 60 TABLET | Refills: 0 | Status: SHIPPED | OUTPATIENT
Start: 2019-12-02 | End: 2020-01-03 | Stop reason: SDUPTHER

## 2019-12-03 ENCOUNTER — CLINICAL SUPPORT (OUTPATIENT)
Dept: REHABILITATION | Facility: OTHER | Age: 59
End: 2019-12-03
Attending: INTERNAL MEDICINE
Payer: MEDICARE

## 2019-12-03 DIAGNOSIS — R29.898 WEAKNESS OF BACK: ICD-10-CM

## 2019-12-03 DIAGNOSIS — M54.50 CHRONIC BILATERAL LOW BACK PAIN WITHOUT SCIATICA: Primary | ICD-10-CM

## 2019-12-03 DIAGNOSIS — G89.29 CHRONIC BILATERAL LOW BACK PAIN WITHOUT SCIATICA: Primary | ICD-10-CM

## 2019-12-03 PROCEDURE — 97110 THERAPEUTIC EXERCISES: CPT | Mod: HCNC

## 2019-12-03 NOTE — PROGRESS NOTES
[facility] Back Physical Therapy Treatment      Name: Tanika Wayne  Clinic Number: [id number]    Therapy Diagnosis:   Encounter Diagnoses   Name Primary?    Chronic bilateral low back pain without sciatica Yes    Weakness of back      Physician: Natividad Tafoya, *    Visit Date: 12/3/2019    Medical Diagnosis from Referral: Chronic right-sided low back pain without sciatica [M54.5, G89.29]  DDD (degenerative disc disease), lumbar [M51.36]  Spondylosis of lumbar region without myelopathy or radiculopathy [M47.816]  Spondylolisthesis, lumbar region [M43.16]  Evaluation Date: 2019  Authorization Period Expiration: 2019  Plan of Care Expiration: 2019  Reassessment Due: 1/3/20  Visit # / Visits authorized: 3/ 20  Inc 10% visit 4, remove toggle on Med X if needed     Time In: 2:30   Time Out: 3:30  Total Billable Time: 50 minutes     Precautions: Standard, L3-4, L4-5 laminectomy on 2017, Breast CA , R hip (2018) and R knee (2019) surgery     Pattern of pain determined: 1 PEN    Subjective   Tanika reports slight decrease in overall pain     Patient reports tolerating previous visit well with decreased symptoms following   Patient reports their pain to be 4/10on a 0-10 scale with 0 being no pain and 10 being the worst pain imaginable.  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   FOTO documents entered into [employer] - see Media section.     Limitation Score: 64%  Category: Mobility     Current : CL = least 60% but < 80% impaired, limited or restricted  Goal: CK = at least 40% but < 60% impaired, limited or restricted  Discharge:              Treatment    Pt was instructed in and performed the following:     Tanika received therapeutic exercises to develop/improved posture, cardiovascular endurance, muscular endurance, lumbar/cervical ROM, strength and muscular endurance for 60 minutes including the following exercises:   [facility] 12/3/2019   Visit Number 3   VAS Pain Rating 4   Recumbent Bike Seat Pos. -   Time 7   Extension in Lying -   Extension in Standing -   Flexion in Lying 10   Flexion in Sitting -   Lumbar Extension Seat Pad -   Femur Restraint -   Top Dead Center -   Counterweight -   Lumbar Flexion -   Lumbar Extension -   Lumbar Peak Torque -   Min Torque -   Test Percent Below Normative Data -   Test Percent Gain in Strength from Initial  -   Lumbar Weight 45   Repetitions 20   Rating of Perceived Exertion 3   Ice - Z Lie (in min.) -   Ice - Sitting 10         LTR 10x  PPT 10x  SKTC 10x  bridging 10x        Peripheral muscle strengthening which included 1 set of 15-20 repetitions at a slow, controlled 10-13 second per rep pace focused on strengthening supporting musculature for improved body mechanics and functional mobility.  Pt and therapist focused on proper form during treatment to ensure optimal strengthening of each targeted muscle group.  Machines were utilized including torso rotation, leg extension, leg curl, chest press, upright row. Tricep extension, bicep curl, leg press, and hip abduction added visit 3    Tanika received the following manual therapy techniques: NA were applied to the: NA for NA minutes.         Home Exercises Provided and Patient Education Provided     Education provided:   - RPE   - HEP    Written Home Exercises Provided: Patient  instructed to cont prior HEP.  Exercises were reviewed and Tanika was able to demonstrate them prior to the end of the session.  Tanika demonstrated good  understanding of the education provided.     See EMR under Patient Instructions for exercises provided prior visit.          Assessment     Pt able to complete all components of session with no adverse effects. Removed toggle today secondary to difficulty maintaining pace and completing reps.  Pt instructed to go to end range in both directions to have rep counted during exercise, which became easier without toggle..  Pt completed 20 reps today at 45ft/lbs with 3/10 exertion level.  Inc 10% next visit  Patient is making good progress towards established goals.  Pt will continue to benefit from skilled outpatient physical therapy to address the deficits stated in the impairment chart, provide pt/family education and to maximize pt's level of independence in the home and community environment.     Anticipated Barriers for therapy: None  Pt's spiritual, cultural and educational needs considered and pt agreeable to plan of care and goals as stated below:             GOALS: Pt is in agreement with the following goals.     Short term goals:  6 weeks or 10 visits   1.  Pt will demonstrate increased lumbar ROM by at least 3 degrees from the initial ROM value with improvements noted in functional ROM and ability to perform ADLs.  2.  Pt will demonstrate increased maximum isometric torque value by 25% when compared to the initial value resulting in improved ability to perform bending, lifting, and carrying activities safely, confidently.     3.  Patient report a reduction in worst pain score by 1-2 points for improved tolerance for static standing, washing dishes.  4.  Pt able to perform HEP correctly with minimal cueing or supervision from therapist to encourage independent management of symptoms.         Long term goals: 10 weeks or 20 visits   1. Pt will demonstrate  increased lumbar ROM by at least 6 degrees from initial ROM value, resulting in improved ability to perform functional fwd bending while standing and sitting.   2. Pt will demonstrate increased maximum isometric torque value by 50% when compared to the initial value resulting in improved ability to perform bending, lifting, and carrying activities safely, confidently.  3. Pt to demonstrate ability to independently control and reduce their pain through posture positioning and mechanical movements throughout a typical day.  4.  Pt will demonstrate reduced pain and improved functional outcomes as reported on the FOTO by reaching a score of CK = at least 40% but < 60% impaired, limited or restricted or less in order to demonstrate subjective improvement in pt's condition.    5. Pt will demonstrate independence with the HEP at discharge  6.  Pt will be able to walk at self selected speed for 10 min without needing to sit to rest      Plan   Continue with established Plan of Care towards established PT goals.

## 2019-12-17 ENCOUNTER — CLINICAL SUPPORT (OUTPATIENT)
Dept: REHABILITATION | Facility: OTHER | Age: 59
End: 2019-12-17
Attending: INTERNAL MEDICINE
Payer: MEDICARE

## 2019-12-17 ENCOUNTER — HOSPITAL ENCOUNTER (OUTPATIENT)
Dept: DIABETES | Facility: OTHER | Age: 59
Discharge: HOME | End: 2019-12-17
Attending: INTERNAL MEDICINE
Payer: MEDICARE

## 2019-12-17 VITALS — WEIGHT: 255.31 LBS | BODY MASS INDEX: 48.24 KG/M2

## 2019-12-17 DIAGNOSIS — M54.50 CHRONIC BILATERAL LOW BACK PAIN WITHOUT SCIATICA: Primary | ICD-10-CM

## 2019-12-17 DIAGNOSIS — E11.9 TYPE 2 DIABETES MELLITUS WITHOUT COMPLICATION, WITHOUT LONG-TERM CURRENT USE OF INSULIN: Primary | ICD-10-CM

## 2019-12-17 DIAGNOSIS — R29.898 WEAKNESS OF BACK: ICD-10-CM

## 2019-12-17 DIAGNOSIS — G89.29 CHRONIC BILATERAL LOW BACK PAIN WITHOUT SCIATICA: Primary | ICD-10-CM

## 2019-12-17 PROCEDURE — G0108 DIAB MANAGE TRN  PER INDIV: HCPCS | Mod: HCNC | Performed by: DIETITIAN, REGISTERED

## 2019-12-17 PROCEDURE — 97110 THERAPEUTIC EXERCISES: CPT | Mod: HCNC

## 2019-12-17 NOTE — PROGRESS NOTES
[facility] Back Physical Therapy Treatment      Name: Tanika Wayne  Clinic Number: [id number]    Therapy Diagnosis:   Encounter Diagnoses   Name Primary?    Chronic bilateral low back pain without sciatica Yes    Weakness of back      Physician: Natividad Tafoya, *    Visit Date: 2019    Medical Diagnosis from Referral: Chronic right-sided low back pain without sciatica [M54.5, G89.29]  DDD (degenerative disc disease), lumbar [M51.36]  Spondylosis of lumbar region without myelopathy or radiculopathy [M47.816]  Spondylolisthesis, lumbar region [M43.16]  Evaluation Date: 2019  Authorization Period Expiration: 2019  Plan of Care Expiration: 2019  Reassessment Due: 1/3/20  Visit # / Visits authorized:   Inc 10%, remove toggle on Med X if needed     Time In: 3:00  Time Out: 4:00  Total Billable Time: 50 minutes     Precautions: Standard, L3-4, L4-5 laminectomy on 2017, Breast CA 1998, R hip (2018) and R knee (2019) surgery     Pattern of pain determined: 1 PEN    Subjective   Tanika reports slight decrease in overall pain.    Patient reports tolerating previous visit well with decreased symptoms following   Patient reports their pain to be 4/10 on a 0-10 scale with 0 being no pain and 10 being the worst pain imaginable.  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   FOTO documents entered into [employer] - see Media section.     Limitation Score: 64%  Category: Mobility     Current : CL = least 60% but < 80% impaired, limited or restricted  Goal: CK = at least 40% but < 60% impaired, limited or restricted  Discharge:              Treatment    Pt was instructed in and performed the following:     Tanika received therapeutic exercises to develop/improved posture, cardiovascular endurance, muscular endurance, lumbar/cervical ROM, strength and muscular endurance for 60 minutes including the following exercises:       [facility] 12/17/2019   Visit Number 4   VAS Pain Rating 4   Recumbent Bike Seat Pos. -   Time 10   Lumbar Weight 48   Repetitions 15   Rating of Perceived Exertion 3   Ice - Z Lie (in min.) -   Ice - Sitting 10       LTR 10x  PPT 10x  SKTC 10x  bridging 10x        Peripheral muscle strengthening which included 1 set of 15-20 repetitions at a slow, controlled 10-13 second per rep pace focused on strengthening supporting musculature for improved body mechanics and functional mobility.  Pt and therapist focused on proper form during treatment to ensure optimal strengthening of each targeted muscle group.  Machines were utilized including torso rotation, leg extension, leg curl, chest press, upright row. Tricep extension, bicep curl, leg press, and hip abduction added visit 3    Tanika received the following manual therapy techniques: NA were applied to the: NA for NA minutes.         Home Exercises Provided and Patient Education Provided     Education provided:   - RPE   - HEP    Written Home Exercises Provided: Patient instructed to cont prior HEP.  Exercises were reviewed and Tanika was able to demonstrate them prior to the end of the session.  Tanika demonstrated good  understanding of the education provided.     See EMR under Patient Instructions for exercises provided prior visit.          Assessment     Pt able to complete session with  decrease in  LBP.  Removed toggle today secondary to difficulty maintaining pace and completing reps. Pt with a weight increase and completed 15 reps with a weight increase.     Patient is making good progress towards established goals.  Pt will continue to benefit from skilled outpatient physical therapy to address the deficits stated in the impairment chart, provide pt/family education and to maximize pt's level of independence in the home and community environment.     Anticipated Barriers for therapy: None  Pt's spiritual, cultural and educational needs considered and pt agreeable to plan of care and goals as stated below:             GOALS: Pt is in agreement with the following goals.     Short term goals:  6 weeks or 10 visits   1.  Pt will demonstrate increased lumbar ROM by at least 3 degrees from the initial ROM value with improvements noted in functional ROM and ability to perform ADLs.  2.  Pt will demonstrate increased maximum isometric torque value by 25% when compared to the initial value resulting in improved ability to perform bending, lifting, and carrying activities safely, confidently.     3.  Patient report a reduction in worst pain score by 1-2 points for improved tolerance for static standing, washing dishes.  4.  Pt able to perform HEP correctly with minimal cueing or supervision from therapist to encourage independent management of symptoms.         Long term goals: 10 weeks or 20 visits   1. Pt will demonstrate increased lumbar ROM by at least 6 degrees from initial ROM value, resulting in improved ability to perform functional fwd bending while standing and sitting.   2. Pt will demonstrate increased maximum isometric torque value by 50% when compared to the initial value resulting in improved ability to perform bending, lifting, and carrying activities safely, confidently.  3. Pt to demonstrate ability to independently control and reduce their pain through posture positioning and mechanical movements  throughout a typical day.  4.  Pt will demonstrate reduced pain and improved functional outcomes as reported on the FOTO by reaching a score of CK = at least 40% but < 60% impaired, limited or restricted or less in order to demonstrate subjective improvement in pt's condition.    5. Pt will demonstrate independence with the HEP at discharge  6.  Pt will be able to walk at self selected speed for 10 min without needing to sit to rest      Plan   Continue with established Plan of Care towards established PT goals.

## 2019-12-18 NOTE — PROGRESS NOTES
"Diabetes Education  Author: Caryn Wynn RD  Date: 12/18/2019    Diabetes Care Management Summary  Diabetes Education Record Assessment/Progress: Comprehensive/Group  Current Diabetes Risk Level: Low         Diabetes Type  Diabetes Type : Type II    Diabetes History  Current Treatment: Oral Medication    Health Maintenance was reviewed today with patient. Discussed with patient importance of routine eye exams, foot exams/foot care, blood work (i.e.: A1c, microalbumin, and lipid), dental visits, yearly flu vaccine, and pneumonia vaccine as indicated by PCP. Patient verbalized understanding.     Health Maintenance Topics with due status: Not Due       Topic Last Completion Date    TETANUS VACCINE 07/01/2011    Colonoscopy 12/10/2012    Foot Exam 08/13/2019    Lipid Panel 08/13/2019    Eye Exam 10/14/2019    Hemoglobin A1c 11/20/2019    Low Dose Statin 11/21/2019     Health Maintenance Due   Topic Date Due    Mammogram  02/05/2020       Nutrition  What type of beverages do you drink?: water, diet soda/tea  Meal Plan 24 Hour Recall - Breakfast: fresh pineapple, strawberrys and apples with water  Meal Plan 24 Hour Recall - Lunch: skipps lunch  Meal Plan 24 Hour Recall - Dinner: 1 hot dog plain with water  Meal Plan 24 Hour Recall - Snack: fresh fruit with string cheese or grapes and string cheese              Current Diabetes Treatment   Current Treatment: Oral Medication                                     Barriers to Change  Barriers to Change: None  Learning Challenges : None              Diabetes Education Assessment/Progress  Nutrition (Incorporating nutritional management into one's lifestyle): Discussion, Instructed, Needs Review, Individual Session, Written Materials Provided(She is eating fresh fruit instead of honey buns and other sweets. Eats breakfast and dinner only and has lost 2 lbs. She is not counting carbs but is "eating smaller portions." Has increased her in take of free vegetables and avoids " fried food. )    Physical Activity (incorporating physical activity into one's lifestyle): Discussion, Instructed, Needs Review, Individual Session(Paricipating in the healthy back program once a week and will increase to twice a week. Is feeling stronger and able to move more at home. She is going on a cruise in February and is motivated to get stronger. )    Medications (states correct name, dose, onset, peak, duration, side effects & timing of meds): Discussion, Instructed, Individual Session, Comprehends Key Points(Taking metformin as ordered)    Monitoring (monitoring blood glucose/other parameters & using results): Not Applicable    Acute Complications (preventing, detecting, and treating acute complications): Discussion, Individual Session, Needs Review(No recent hx of hypo or hyperglycemia)    Chronic Complications (preventing, detecting, and treating chronic complications): Discussion, Instructed, Needs Instruction, Individual Session, Written Materials Provided(Ed on ADA SOC and skin and foot precautions. )    Clinical (diabetes, other pertinent medical history, and relevant comorbidities reviewed during visit): Discussion, Instructed, Needs Review, Individual Session, Written Materials Provided(2lb weight loss since last visit. Ed on benefits of weight loss on BG, BP and back pain. She is eagar to cont to lose weight. )    Cognitive (knowledge of self-management skills, functional health literacy): Discussion, Instructed, Individual Session, Needs Review, Written Materials Provided    Psychosocial (emotional response to diabetes): Discussion, Instructed, Needs Review, Individual Session, Written Materials Provided(She is accepting of DM diagnosis)    Behavioral (readiness for change, lifestyle practices, self-care behaviors): Discussion, Instructed, Needs Instruction, Individual Session, Written Materials Provided    Goals  Patient has selected/evaluated goals during today's session: Yes,  evaluated  Healthy Eating: In Progress         Diabetes Care Plan/Intervention  Education Plan/Intervention: Individual Follow-Up DSMT    Diabetes Meal Plan  Restrictions: Restricted Carbohydrate, Low Sodium  Calories: 1600  Carbohydrate Per Meal: 30-45g  Carbohydrate Per Snack : 15-20g  Fat: 64-71g  Protein: 64-72g    Today's Self-Management Care Plan was developed with the patient's input and is based on barriers identified during today's assessment.    The long and short-term goals in the care plan were written with the patient/caregiver's input. The patient has agreed to work toward these goals to improve her overall diabetes control.      The patient received a copy of today's self-management plan and verbalized understanding of the care plan, goals, and all of today's instructions.      The patient was encouraged to communicate with her physician and care team regarding her condition(s) and treatment.  I provided the patient with my contact information today and encouraged her to contact me via phone or patient portal as needed.     Education Units of Time   Time Spent: 60 min

## 2019-12-26 ENCOUNTER — CLINICAL SUPPORT (OUTPATIENT)
Dept: REHABILITATION | Facility: OTHER | Age: 59
End: 2019-12-26
Attending: INTERNAL MEDICINE
Payer: MEDICARE

## 2019-12-26 DIAGNOSIS — G89.29 CHRONIC BILATERAL LOW BACK PAIN WITHOUT SCIATICA: Primary | ICD-10-CM

## 2019-12-26 DIAGNOSIS — M54.50 CHRONIC BILATERAL LOW BACK PAIN WITHOUT SCIATICA: Primary | ICD-10-CM

## 2019-12-26 DIAGNOSIS — R29.898 WEAKNESS OF BACK: ICD-10-CM

## 2019-12-26 PROCEDURE — 97110 THERAPEUTIC EXERCISES: CPT | Mod: HCNC

## 2019-12-26 NOTE — PROGRESS NOTES
[facility]      Name: Tanika Wayne  Clinic Number: [id number]    Therapy Diagnosis:   Encounter Diagnoses   Name Primary?    Chronic bilateral low back pain without sciatica Yes    Weakness of back      Physician: Natividad Tafoya, *    Visit Date: 2019    Medical Diagnosis from Referral: Chronic right-sided low back pain without sciatica [M54.5, G89.29]  DDD (degenerative disc disease), lumbar [M51.36]  Spondylosis of lumbar region without myelopathy or radiculopathy [M47.816]  Spondylolisthesis, lumbar region [M43.16]  Evaluation Date: 2019  Authorization Period Expiration: 2019  Plan of Care Expiration: 2019  Reassessment Due: 1/3/20  Visit # / Visits authorized:    remove toggle on Med X if needed     Time In: 3:00  Time Out: 4:00  Total Billable Time: 50 minutes     Precautions: Standard, L3-4, L4-5 laminectomy on 2017, Breast CA 1998, R hip (2018) and R knee (2019) surgery     Pattern of pain determined: 1 PEN    Subjective   Tanika reports slight decrease in overall pain. She is complaint with her HEP.     Patient reports tolerating previous visit well with decreased symptoms following   Patient reports their pain to be 4/10 on a 0-10 scale with 0 being no pain and 10 being the worst pain imaginable.  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   FOTO documents entered into [employer] - see Media section.     Limitation Score: 64%  Category: Mobility     Current : CL = least 60% but < 80% impaired, limited or restricted  Goal: CK = at least 40% but < 60% impaired, limited or restricted  Discharge:              Treatment    Pt was instructed in and performed the following:     Tanika received therapeutic exercises to develop/improved posture, cardiovascular endurance, muscular endurance, lumbar/cervical ROM, strength and muscular endurance for 60 minutes including the following exercises:       LTR 10x  PPT 10x  SKTC 10x  bridging 12x        Peripheral muscle strengthening which included 1 set of 15-20 repetitions at a slow, controlled 10-13 second per rep pace focused on strengthening supporting musculature for improved body mechanics and functional mobility.  Pt and therapist focused on proper form during treatment to ensure optimal strengthening of each targeted muscle group.  Machines were utilized including torso rotation, leg extension, leg curl, chest press, upright row. Tricep extension, bicep curl, leg press, and hip abduction added visit 3    Tanika received the following manual therapy techniques: NA were applied to the: NA for NA minutes.         Home Exercises Provided and Patient Education Provided     Education provided:   - RPE   - HEP    Written Home Exercises Provided: Patient instructed to cont prior HEP.  Exercises were reviewed and Tanika was able to demonstrate them prior to the end of the session.  Tanika demonstrated good  understanding of the education provided.     See EMR under Patient Instructions for exercises provided prior visit.          Assessment     Pt able to complete session with decrease in LBP.  Removed toggle today secondary to difficulty maintaining pace and completing reps. Pt with the same weight and 18 reps with REP a 4.    Patient is making good progress towards established goals.  Pt will  continue to benefit from skilled outpatient physical therapy to address the deficits stated in the impairment chart, provide pt/family education and to maximize pt's level of independence in the home and community environment.     Anticipated Barriers for therapy: None  Pt's spiritual, cultural and educational needs considered and pt agreeable to plan of care and goals as stated below:             GOALS: Pt is in agreement with the following goals.     Short term goals:  6 weeks or 10 visits   1.  Pt will demonstrate increased lumbar ROM by at least 3 degrees from the initial ROM value with improvements noted in functional ROM and ability to perform ADLs.  2.  Pt will demonstrate increased maximum isometric torque value by 25% when compared to the initial value resulting in improved ability to perform bending, lifting, and carrying activities safely, confidently.     3.  Patient report a reduction in worst pain score by 1-2 points for improved tolerance for static standing, washing dishes.  4.  Pt able to perform HEP correctly with minimal cueing or supervision from therapist to encourage independent management of symptoms.         Long term goals: 10 weeks or 20 visits   1. Pt will demonstrate increased lumbar ROM by at least 6 degrees from initial ROM value, resulting in improved ability to perform functional fwd bending while standing and sitting.   2. Pt will demonstrate increased maximum isometric torque value by 50% when compared to the initial value resulting in improved ability to perform bending, lifting, and carrying activities safely, confidently.  3. Pt to demonstrate ability to independently control and reduce their pain through posture positioning and mechanical movements throughout a typical day.  4.  Pt will demonstrate reduced pain and improved functional outcomes as reported on the FOTO by reaching a score of CK = at least 40% but < 60% impaired, limited or restricted or less in order to  demonstrate subjective improvement in pt's condition.    5. Pt will demonstrate independence with the HEP at discharge  6.  Pt will be able to walk at self selected speed for 10 min without needing to sit to rest      Plan   Continue with established Plan of Care towards established PT goals.

## 2020-01-02 ENCOUNTER — PATIENT MESSAGE (OUTPATIENT)
Dept: PHYSICAL MEDICINE AND REHAB | Facility: CLINIC | Age: 60
End: 2020-01-02

## 2020-01-03 DIAGNOSIS — M54.9 BACK PAIN, UNSPECIFIED BACK LOCATION, UNSPECIFIED BACK PAIN LATERALITY, UNSPECIFIED CHRONICITY: ICD-10-CM

## 2020-01-03 RX ORDER — OXYCODONE AND ACETAMINOPHEN 5; 325 MG/1; MG/1
1 TABLET ORAL EVERY 12 HOURS PRN
Qty: 60 TABLET | Refills: 0 | Status: SHIPPED | OUTPATIENT
Start: 2020-01-03 | End: 2020-02-20 | Stop reason: SDUPTHER

## 2020-01-07 ENCOUNTER — CLINICAL SUPPORT (OUTPATIENT)
Dept: REHABILITATION | Facility: OTHER | Age: 60
End: 2020-01-07
Attending: INTERNAL MEDICINE
Payer: MEDICARE

## 2020-01-07 DIAGNOSIS — M54.50 CHRONIC BILATERAL LOW BACK PAIN WITHOUT SCIATICA: Primary | ICD-10-CM

## 2020-01-07 DIAGNOSIS — R29.898 WEAKNESS OF BACK: ICD-10-CM

## 2020-01-07 DIAGNOSIS — G89.29 CHRONIC BILATERAL LOW BACK PAIN WITHOUT SCIATICA: Primary | ICD-10-CM

## 2020-01-07 PROCEDURE — 97110 THERAPEUTIC EXERCISES: CPT | Mod: HCNC

## 2020-01-07 NOTE — PROGRESS NOTES
[facility]      Name: Tanika Wayne  Clinic Number: [id number]    Therapy Diagnosis:   Encounter Diagnoses   Name Primary?    Chronic bilateral low back pain without sciatica Yes    Weakness of back      Physician: Natividad Tafoya, *    Visit Date: 2020    Medical Diagnosis from Referral: Chronic right-sided low back pain without sciatica [M54.5, G89.29]  DDD (degenerative disc disease), lumbar [M51.36]  Spondylosis of lumbar region without myelopathy or radiculopathy [M47.816]  Spondylolisthesis, lumbar region [M43.16]  Evaluation Date: 2019  Authorization Period Expiration: 2020  Plan of Care Expiration: 20  Reassessment Due: 20  Visit # / Visits authorized:    remove toggle on Med X if needed     Time In: 245  Time Out: 345pm  Total Billable Time: 50 minutes     Precautions: Standard, L3-4, L4-5 laminectomy on 2017, Breast CA 1998, R hip (2018) and R knee (2019) surgery     Pattern of pain determined: 1 PEN    Subjective   Tanika reports slight decrease in overall pain. Pt reports feeling better .     Patient reports tolerating previous visit well with decreased symptoms following   Patient reports their pain to be 4/10 on a 0-10 scale with 0 being no pain and 10 being the worst pain imaginable.  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   FOTO documents entered into [employer] - see Media section.     Limitation Score: 64%  Category: Mobility     Current : CL = least 60% but < 80% impaired, limited or restricted  Goal: CK = at least 40% but < 60% impaired, limited or restricted  Discharge:              Treatment    Pt was instructed in and performed the following:     Tanika received therapeutic exercises to develop/improved posture, cardiovascular endurance, muscular endurance, lumbar/cervical ROM, strength and muscular endurance for 60 minutes including the following exercises:   [facility] 1/7/2020   Visit Number 6   VAS Pain Rating 4   Recumbent Bike Seat Pos. -   Time 6   Extension in Lying -   Extension in Standing -   Flexion in Lying 10   Flexion in Sitting -   Lumbar Extension Seat Pad -   Femur Restraint -   Top Dead Center -   Counterweight -   Lumbar Flexion -   Lumbar Extension -   Lumbar Peak Torque -   Min Torque -   Test Percent Below Normative Data -   Test Percent Gain in Strength from Initial  -   Lumbar Weight 48   Repetitions 20   Rating of Perceived Exertion 3   Ice - Z Lie (in min.) -   Ice - Sitting 10         LTR 10x  PPT 10x  SKTC 10x  bridging 12x        Peripheral muscle strengthening which included 1 set of 15-20 repetitions at a slow, controlled 10-13 second per rep pace focused on strengthening supporting musculature for improved body mechanics and functional mobility.  Pt and therapist focused on proper form during treatment to ensure optimal strengthening of each targeted muscle group.  Machines were utilized including torso rotation, leg extension, leg curl, chest press, upright row. Tricep extension, bicep curl, leg press, and hip abduction added visit 3    Tanika received the following manual therapy techniques: NA were applied to the: NA for NA minutes.         Home Exercises Provided and Patient Education Provided     Education provided:   - RPE   - HEP    Written Home Exercises  Provided: Patient instructed to cont prior HEP.  Exercises were reviewed and Tanika was able to demonstrate them prior to the end of the session.  Tanika demonstrated good  understanding of the education provided.     See EMR under Patient Instructions for exercises provided prior visit.          Assessment     Pt able to complete session with decrease in LBP.  Removed toggle today secondary to difficulty maintaining pace and completing reps..  Pt completed 20 reps today at 48ft/lbs with 4/10 exertion level.  Pt was able to maintain a 10 second pace per rep and rec'd verbal cues to push into back pad on machine.  Inc 10% next visit  Patient is making good progress towards established goals.  Pt will continue to benefit from skilled outpatient physical therapy to address the deficits stated in the impairment chart, provide pt/family education and to maximize pt's level of independence in the home and community environment.     Anticipated Barriers for therapy: None  Pt's spiritual, cultural and educational needs considered and pt agreeable to plan of care and goals as stated below:             GOALS: Pt is in agreement with the following goals.     Short term goals:  6 weeks or 10 visits   1.  Pt will demonstrate increased lumbar ROM by at least 3 degrees from the initial ROM value with improvements noted in functional ROM and ability to perform ADLs.  2.  Pt will demonstrate increased maximum isometric torque value by 25% when compared to the initial value resulting in improved ability to perform bending, lifting, and carrying activities safely, confidently.     3.  Patient report a reduction in worst pain score by 1-2 points for improved tolerance for static standing, washing dishes.  4.  Pt able to perform HEP correctly with minimal cueing or supervision from therapist to encourage independent management of symptoms.         Long term goals: 10 weeks or 20 visits   1. Pt will demonstrate increased lumbar ROM by at  least 6 degrees from initial ROM value, resulting in improved ability to perform functional fwd bending while standing and sitting.   2. Pt will demonstrate increased maximum isometric torque value by 50% when compared to the initial value resulting in improved ability to perform bending, lifting, and carrying activities safely, confidently.  3. Pt to demonstrate ability to independently control and reduce their pain through posture positioning and mechanical movements throughout a typical day.  4.  Pt will demonstrate reduced pain and improved functional outcomes as reported on the FOTO by reaching a score of CK = at least 40% but < 60% impaired, limited or restricted or less in order to demonstrate subjective improvement in pt's condition.    5. Pt will demonstrate independence with the HEP at discharge  6.  Pt will be able to walk at self selected speed for 10 min without needing to sit to rest      Plan   Continue with established Plan of Care towards established PT goals.

## 2020-01-14 ENCOUNTER — CLINICAL SUPPORT (OUTPATIENT)
Dept: REHABILITATION | Facility: OTHER | Age: 60
End: 2020-01-14
Attending: INTERNAL MEDICINE
Payer: MEDICARE

## 2020-01-14 DIAGNOSIS — M54.50 CHRONIC BILATERAL LOW BACK PAIN WITHOUT SCIATICA: Primary | ICD-10-CM

## 2020-01-14 DIAGNOSIS — R29.898 WEAKNESS OF BACK: ICD-10-CM

## 2020-01-14 DIAGNOSIS — G89.29 CHRONIC BILATERAL LOW BACK PAIN WITHOUT SCIATICA: Primary | ICD-10-CM

## 2020-01-14 PROCEDURE — 97110 THERAPEUTIC EXERCISES: CPT | Mod: HCNC

## 2020-01-14 NOTE — PROGRESS NOTES
[facility]      Name: Tanika Wayne  Clinic Number: [id number]    Therapy Diagnosis:   Encounter Diagnoses   Name Primary?   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 Pt reports feeling better .     Patient reports tolerating previous visit well with decreased symptoms following   Patient reports their pain to be 3/10 on a 0-10 scale with 0 being no pain and 10 being the worst pain imaginable.  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   FOTO documents entered into [employer] - see Media section.     Limitation Score: 64%  Category: Mobility     Current : CL = least 60% but < 80% impaired, limited or restricted  Goal: CK = at least 40% but < 60% impaired, limited or restricted  Discharge:              Treatment    Pt was instructed in and performed the following:     Tanika received therapeutic exercises to develop/improved posture, cardiovascular endurance, muscular endurance, lumbar/cervical ROM, strength and muscular endurance for 60 minutes including the following exercises:     [facility] 1/14/2020   Visit Number 7   VAS Pain Rating 3   Recumbent Bike Seat Pos. -   Time 5   Extension in Lying -   Extension in Standing -   Flexion in Lying 10   Flexion in Sitting -   Lumbar Extension Seat Pad -   Femur Restraint -   Top Dead Center -   Counterweight -   Lumbar Flexion -   Lumbar Extension -   Lumbar Peak Torque -   Min Torque -   Test Percent Below Normative Data -   Test Percent Gain in Strength from Initial  -   Lumbar Weight 52   Repetitions 15   Rating of Perceived Exertion 4   Ice - Z Lie (in min.) -   Ice - Sitting 20       LTR 10x  PPT 10x  SKTC 10x  bridging 12x        Peripheral muscle strengthening which included 1 set of 15-20 repetitions at a slow, controlled 10-13 second per rep pace focused on strengthening supporting musculature for improved body mechanics and functional mobility.  Pt and therapist focused on proper form during treatment to ensure optimal strengthening of each targeted muscle group.  Machines were utilized including torso rotation, leg extension, leg curl, chest press, upright row. Tricep extension, bicep curl, leg press, and hip abduction added visit 3    Tanika received the following manual therapy techniques: NA were applied to the: NA for NA minutes.         Home Exercises Provided and Patient Education Provided     Education provided:   - RPE   - HEP    Written Home Exercises  Provided: Patient instructed to cont prior HEP.  Exercises were reviewed and Tanika was able to demonstrate them prior to the end of the session.  Tanika demonstrated good  understanding of the education provided.     See EMR under Patient Instructions for exercises provided prior visit.          Assessment     Pt able to complete 15 reps at 10% increased weight on Med X with 4/10 exertion level.  Pt is demonstrating improved mobility during sessions with transitional movements from supine to sit and sit to stand.  Patient is making good progress towards established goals.  Pt will continue to benefit from skilled outpatient physical therapy to address the deficits stated in the impairment chart, provide pt/family education and to maximize pt's level of independence in the home and community environment.     Anticipated Barriers for therapy: None  Pt's spiritual, cultural and educational needs considered and pt agreeable to plan of care and goals as stated below:             GOALS: Pt is in agreement with the following goals.     Short term goals:  6 weeks or 10 visits   1.  Pt will demonstrate increased lumbar ROM by at least 3 degrees from the initial ROM value with improvements noted in functional ROM and ability to perform ADLs.  2.  Pt will demonstrate increased maximum isometric torque value by 25% when compared to the initial value resulting in improved ability to perform bending, lifting, and carrying activities safely, confidently.     3.  Patient report a reduction in worst pain score by 1-2 points for improved tolerance for static standing, washing dishes.  4.  Pt able to perform HEP correctly with minimal cueing or supervision from therapist to encourage independent management of symptoms.         Long term goals: 10 weeks or 20 visits   1. Pt will demonstrate increased lumbar ROM by at least 6 degrees from initial ROM value, resulting in improved ability to perform functional fwd bending while standing  and sitting.   2. Pt will demonstrate increased maximum isometric torque value by 50% when compared to the initial value resulting in improved ability to perform bending, lifting, and carrying activities safely, confidently.  3. Pt to demonstrate ability to independently control and reduce their pain through posture positioning and mechanical movements throughout a typical day.  4.  Pt will demonstrate reduced pain and improved functional outcomes as reported on the FOTO by reaching a score of CK = at least 40% but < 60% impaired, limited or restricted or less in order to demonstrate subjective improvement in pt's condition.    5. Pt will demonstrate independence with the HEP at discharge  6.  Pt will be able to walk at self selected speed for 10 min without needing to sit to rest      Plan   Continue with established Plan of Care towards established PT goals.

## 2020-01-16 ENCOUNTER — TELEPHONE (OUTPATIENT)
Dept: INTERNAL MEDICINE | Facility: CLINIC | Age: 60
End: 2020-01-16

## 2020-01-16 NOTE — TELEPHONE ENCOUNTER
----- Message from Corky Castro sent at 1/16/2020  4:15 PM CST -----  Contact: Dr Judi Loya [phone]  Patient would like to get medical advice.    Comments: Calling requesting a peer to peer with pcp, Pain Management  Dr Judi Loya [phone]    Please call an advise  Thank you

## 2020-01-20 ENCOUNTER — TELEPHONE (OUTPATIENT)
Dept: INTERNAL MEDICINE | Facility: CLINIC | Age: 60
End: 2020-01-20

## 2020-01-20 NOTE — TELEPHONE ENCOUNTER
I called, the doctor explained that this was a review to determine patient's fitness to work. She had knee replacement 2019    I explained that I have not seen an authorization from her to speak with peer to peer and will need to wait to get authorization

## 2020-01-20 NOTE — TELEPHONE ENCOUNTER
----- Message from Corky Castro sent at 1/20/2020 11:50 AM CST -----  Contact: Dr Loya [facility] [phone]  Patient would like to get medical advice.     Comments: calling stating is needing to do a Peer to peer requesting [phone] Dr Loya. From [facility].    Please call an advise  Thank you

## 2020-01-23 NOTE — TELEPHONE ENCOUNTER
I have since viewed patient's release and discussed the patient's impairments with Dr Loya, impairments mostly related to ortho issues from spine and jt arthritis and severe obesity causing diff with ambulation.

## 2020-01-28 ENCOUNTER — CLINICAL SUPPORT (OUTPATIENT)
Dept: REHABILITATION | Facility: OTHER | Age: 60
End: 2020-01-28
Attending: INTERNAL MEDICINE
Payer: MEDICARE

## 2020-01-28 DIAGNOSIS — M54.50 CHRONIC BILATERAL LOW BACK PAIN WITHOUT SCIATICA: Primary | ICD-10-CM

## 2020-01-28 DIAGNOSIS — G89.29 CHRONIC BILATERAL LOW BACK PAIN WITHOUT SCIATICA: Primary | ICD-10-CM

## 2020-01-28 DIAGNOSIS — R29.898 WEAKNESS OF BACK: ICD-10-CM

## 2020-01-28 PROCEDURE — 97110 THERAPEUTIC EXERCISES: CPT | Mod: HCNC,CQ

## 2020-01-28 NOTE — PROGRESS NOTES
[facility]      Name: Tanika Wayne  Clinic Number: [id number]    Therapy Diagnosis:   Encounter Diagnoses   Name Primary?    Chronic bilateral low back pain without sciatica Yes    Weakness of back      Physician: Natividad Tafoya, *    Visit Date: 2020    Medical Diagnosis from Referral: Chronic right-sided low back pain without sciatica [M54.5, G89.29]  DDD (degenerative disc disease), lumbar [M51.36]  Spondylosis of lumbar region without myelopathy or radiculopathy [M47.816]  Spondylolisthesis, lumbar region [M43.16]  Evaluation Date: 2019  Authorization Period Expiration: 2020  Plan of Care Expiration: 20  Reassessment Due: 20  Visit # / Visits authorized:    remove toggle on Med X if needed     Time In: 200  Time Out: 300pm  Total Billable Time: 50 minutes     Precautions: Standard, L3-4, L4-5 laminectomy on 2017, Breast CA 1998, R hip (2018) and R knee (2019) surgery     Pattern of pain determined: 1 PEN    Subjective   Tanika reports slight decrease in overall pain, but  feeling a little more pain today due to not present for therapy last week.    Patient reports tolerating previous visit well with decreased symptoms following   Patient reports their pain to be 3/10 on a 0-10 scale with 0 being no pain and 10 being the worst pain imaginable.  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   FOTO documents entered into [facility] - see Media section.     Limitation Score: 64%  Category: Mobility     Current : CL = least 60% but < 80% impaired, limited or restricted  Goal: CK = at least 40% but < 60% impaired, limited or restricted  Discharge:              Treatment    Pt was instructed in and performed the following:     Tanika received therapeutic exercises to develop/improved posture, cardiovascular endurance, muscular endurance, lumbar/cervical ROM, strength and muscular endurance for 60 minutes including the following exercises:       [facility] 1/28/2020   Visit Number 8   VAS Pain Rating 3   Recumbent Bike Seat Pos. -   Time 10   Lumbar Weight 52   Repetitions 18   Rating of Perceived Exertion 4   Ice - Z Lie (in min.) -   Ice - Sitting 10   LTR 10x  PPT 10x  SKTC 10x  bridging 15x        Peripheral muscle strengthening which included 1 set of 15-20 repetitions at a slow, controlled 10-13 second per rep pace focused on strengthening supporting musculature for improved body mechanics and functional mobility.  Pt and therapist focused on proper form during treatment to ensure optimal strengthening of each targeted muscle group.  Machines were utilized including torso rotation, leg extension, leg curl, chest press, upright row. Tricep extension, bicep curl, leg press, and hip abduction added visit 3    Tanika received the following manual therapy techniques: NA were applied to the: NA for NA minutes.         Home Exercises Provided and Patient Education Provided     Education provided:   - RPE   - HEP    Written Home Exercises Provided: Patient instructed to cont prior HEP.  Exercises were reviewed and Tanika was able to demonstrate them prior to the end of the session.  Tanika demonstrated good  understanding of the education provided.     See EMR under Patient Instructions for exercises provided prior  visit.          Assessment     Pt able to complete 18 reps with the same weight on Med X with 4/10 exertion level.  Pt feels better when doing HEP and comes to PT.  Patient is making good progress towards established goals.  Pt will continue to benefit from skilled outpatient physical therapy to address the deficits stated in the impairment chart, provide pt/family education and to maximize pt's level of independence in the home and community environment.     Anticipated Barriers for therapy: None  Pt's spiritual, cultural and educational needs considered and pt agreeable to plan of care and goals as stated below:             GOALS: Pt is in agreement with the following goals.     Short term goals:  6 weeks or 10 visits   1.  Pt will demonstrate increased lumbar ROM by at least 3 degrees from the initial ROM value with improvements noted in functional ROM and ability to perform ADLs.  2.  Pt will demonstrate increased maximum isometric torque value by 25% when compared to the initial value resulting in improved ability to perform bending, lifting, and carrying activities safely, confidently.     3.  Patient report a reduction in worst pain score by 1-2 points for improved tolerance for static standing, washing dishes.  4.  Pt able to perform HEP correctly with minimal cueing or supervision from therapist to encourage independent management of symptoms.         Long term goals: 10 weeks or 20 visits   1. Pt will demonstrate increased lumbar ROM by at least 6 degrees from initial ROM value, resulting in improved ability to perform functional fwd bending while standing and sitting.   2. Pt will demonstrate increased maximum isometric torque value by 50% when compared to the initial value resulting in improved ability to perform bending, lifting, and carrying activities safely, confidently.  3. Pt to demonstrate ability to independently control and reduce their pain through posture positioning and mechanical movements  throughout a typical day.  4.  Pt will demonstrate reduced pain and improved functional outcomes as reported on the FOTO by reaching a score of CK = at least 40% but < 60% impaired, limited or restricted or less in order to demonstrate subjective improvement in pt's condition.    5. Pt will demonstrate independence with the HEP at discharge  6.  Pt will be able to walk at self selected speed for 10 min without needing to sit to rest      Plan   Continue with established Plan of Care towards established PT goals.

## 2020-01-31 ENCOUNTER — OFFICE VISIT (OUTPATIENT)
Dept: SPINE | Facility: CLINIC | Age: 60
End: 2020-01-31
Payer: MEDICARE

## 2020-01-31 VITALS
SYSTOLIC BLOOD PRESSURE: 126 MMHG | DIASTOLIC BLOOD PRESSURE: 60 MMHG | BODY MASS INDEX: 48.2 KG/M2 | HEART RATE: 123 BPM | TEMPERATURE: 98 F | HEIGHT: 61 IN | WEIGHT: 255.31 LBS

## 2020-01-31 DIAGNOSIS — M43.16 SPONDYLOLISTHESIS, LUMBAR REGION: ICD-10-CM

## 2020-01-31 DIAGNOSIS — M51.36 DDD (DEGENERATIVE DISC DISEASE), LUMBAR: ICD-10-CM

## 2020-01-31 DIAGNOSIS — M47.816 SPONDYLOSIS OF LUMBAR REGION WITHOUT MYELOPATHY OR RADICULOPATHY: ICD-10-CM

## 2020-01-31 DIAGNOSIS — M54.50 CHRONIC BILATERAL LOW BACK PAIN WITHOUT SCIATICA: Primary | ICD-10-CM

## 2020-01-31 DIAGNOSIS — G89.29 CHRONIC BILATERAL LOW BACK PAIN WITHOUT SCIATICA: Primary | ICD-10-CM

## 2020-01-31 PROCEDURE — 99999 PR PBB SHADOW E&M-EST. PATIENT-LVL IV: CPT | Mod: PBBFAC,HCNC,, | Performed by: PHYSICIAN ASSISTANT

## 2020-01-31 PROCEDURE — 99214 OFFICE O/P EST MOD 30 MIN: CPT | Mod: HCNC,S$GLB,, | Performed by: PHYSICIAN ASSISTANT

## 2020-01-31 PROCEDURE — 3008F PR BODY MASS INDEX (BMI) DOCUMENTED: ICD-10-PCS | Mod: HCNC,CPTII,S$GLB, | Performed by: PHYSICIAN ASSISTANT

## 2020-01-31 PROCEDURE — 3074F PR MOST RECENT SYSTOLIC BLOOD PRESSURE < 130 MM HG: ICD-10-PCS | Mod: HCNC,CPTII,S$GLB, | Performed by: PHYSICIAN ASSISTANT

## 2020-01-31 PROCEDURE — 99999 PR PBB SHADOW E&M-EST. PATIENT-LVL IV: ICD-10-PCS | Mod: PBBFAC,HCNC,, | Performed by: PHYSICIAN ASSISTANT

## 2020-01-31 PROCEDURE — 3008F BODY MASS INDEX DOCD: CPT | Mod: HCNC,CPTII,S$GLB, | Performed by: PHYSICIAN ASSISTANT

## 2020-01-31 PROCEDURE — 3078F DIAST BP <80 MM HG: CPT | Mod: HCNC,CPTII,S$GLB, | Performed by: PHYSICIAN ASSISTANT

## 2020-01-31 PROCEDURE — 3074F SYST BP LT 130 MM HG: CPT | Mod: HCNC,CPTII,S$GLB, | Performed by: PHYSICIAN ASSISTANT

## 2020-01-31 PROCEDURE — 3078F PR MOST RECENT DIASTOLIC BLOOD PRESSURE < 80 MM HG: ICD-10-PCS | Mod: HCNC,CPTII,S$GLB, | Performed by: PHYSICIAN ASSISTANT

## 2020-01-31 PROCEDURE — 99214 PR OFFICE/OUTPT VISIT, EST, LEVL IV, 30-39 MIN: ICD-10-PCS | Mod: HCNC,S$GLB,, | Performed by: PHYSICIAN ASSISTANT

## 2020-01-31 NOTE — PROGRESS NOTES
"Subjective:     Patient ID:  Tanika Wayne is a 60 y.o. female.    [facility]    Chief Complaint: Low back pain and leg spasms    HPI    Tanika Wayne is a 60 y.o. female who presents for follow up.  [facility] has been helping.  The flexeril has helped the leg spasms and she take percocet once a day mainly at night.    If she sits or stands too long she gets low back pain and has to switch positions.    Patient denies any recent accidents or trauma, no saddle anesthesias, and no bowel or bladder incontinence.      Review of Systems:  Constitution: Negative for chills, fever, night sweats and weight loss.   Musculoskeletal: Negative for falls.   Gastrointestinal: Negative for bowel incontinence, nausea and vomiting.   Genitourinary: Negative for bladder incontinence.   Neurological: Negative for disturbances in coordination and loss of balance.       Objective:      Vitals:    01/31/20 1535   BP: 126/60   Pulse: (!) 123   Temp: 98.2 °F (36.8 °C)   TempSrc: Oral   Weight: 115.8 kg (255 lb 4.7 oz)   Height: 5' 1" (1.549 m)   PainSc:   5   PainLoc: Back         Physical Exam:    General:  Tanika Wayne is well-developed, well-nourished, appears stated age, in no acute distress, alert and oriented to person, place, and time.    Pulmonary/Chest:  Respiratory effort normal  Abdominal: Exhibits no distension  Psychiatric:  Normal mood and affect.  Behavior is normal.  Judgement and thought content normal    Musculoskeletal:    Patient arises from a sitting to standing position without difficulty.  Patient walks to the door without evidence of limp, pain, or abnormality of gait.     Lumbar ROM:   No pain in lumbar flexion, right lateral bending, and left lateral bending.  Mild pain in extension.    Neurological:  Alert and oriented to person, place, and time    Muscle strength against resistance:     Right Left   Hip flexion  5 / 5 5 / 5   Hip extension 5 / 5 5 / 5   Hip abduction 5 / 5 5 / 5   Hip " adduction  5 / 5 5 / 5   Knee extension  5 / 5 5 / 5   Knee flexion 5 / 5 5 / 5   Dorsiflexion  5 / 5 5 / 5   EHL  5 / 5 5 / 5   Plantar flexion  5 / 5 5 / 5   Inversion of the feet 5 / 5 5 / 5   Eversion of the feet  5 / 5 5 / 5       On gross examination of the bilateral upper extremities, patient has full painfree ROM with no signs of clubbing, cyanosis, edema, or weakness.         Lumbar spine ap/lateral/flexion/extension xrays were personally reviewed today.  No fractures.  No movement on flexion and extension.  L3-4 grade one spondylolisthesis    Assessment:          1. Chronic bilateral low back pain without sciatica    2. DDD (degenerative disc disease), lumbar    3. Spondylosis of lumbar region without myelopathy or radiculopathy    4. Spondylolisthesis, lumbar region            Plan:          Orders Placed This Encounter    WALKER FOR HOME USE     L3-4 grade one spondylolisthesis s/p MID L3-4, L4-5 laminectomy with Dr. Sullivan in 2017    -Continue [facility] program  -Continue Flexeril and Percocet from another provider  -FU in three months.  Could consider MRI and injections if symptoms persist after PT      Follow-Up:  Follow up in about 3 months (around 4/30/2020). If there are any questions prior to this, the patient was instructed to contact the office.       FANNIE Nevarez PA-C  Ne[facility]surgery  [facility]  [facility]    Addendum:  05/28/20    Rollator ordered from [facility].      FANNIE Nevarez PA-C  Neurosurgery  [facility]  [facility]

## 2020-02-03 ENCOUNTER — PATIENT MESSAGE (OUTPATIENT)
Dept: PHYSICAL MEDICINE AND REHAB | Facility: CLINIC | Age: 60
End: 2020-02-03

## 2020-02-04 ENCOUNTER — CLINICAL SUPPORT (OUTPATIENT)
Dept: REHABILITATION | Facility: OTHER | Age: 60
End: 2020-02-04
Attending: INTERNAL MEDICINE
Payer: MEDICARE

## 2020-02-04 DIAGNOSIS — M54.50 CHRONIC BILATERAL LOW BACK PAIN WITHOUT SCIATICA: Primary | ICD-10-CM

## 2020-02-04 DIAGNOSIS — R29.898 WEAKNESS OF BACK: ICD-10-CM

## 2020-02-04 DIAGNOSIS — G89.29 CHRONIC BILATERAL LOW BACK PAIN WITHOUT SCIATICA: Primary | ICD-10-CM

## 2020-02-04 PROCEDURE — 97110 THERAPEUTIC EXERCISES: CPT | Mod: HCNC,CQ

## 2020-02-04 RX ORDER — DOCUSATE SODIUM 100 MG/1
CAPSULE, LIQUID FILLED ORAL
Qty: 60 CAPSULE | Refills: 17 | Status: SHIPPED | OUTPATIENT
Start: 2020-02-04 | End: 2021-03-25 | Stop reason: SDUPTHER

## 2020-02-04 NOTE — PROGRESS NOTES
[facility]      Name: Tanika Wayne  Clinic Number: [id number]    Therapy Diagnosis:   Encounter Diagnoses   Name Primary?    Chronic bilateral low back pain without sciatica Yes    Weakness of back      Physician: Natividad Tafoya, *    Visit Date: 2020    Medical Diagnosis from Referral: Chronic right-sided low back pain without sciatica [M54.5, G89.29]  DDD (degenerative disc disease), lumbar [M51.36]  Spondylosis of lumbar region without myelopathy or radiculopathy [M47.816]  Spondylolisthesis, lumbar region [M43.16]  Evaluation Date: 2019  Authorization Period Expiration: 2020  Plan of Care Expiration: 20  Reassessment Due: 20  Visit # / Visits authorized:    remove toggle on Med X if needed     Time In: 200  Time Out: 300pm  Total Billable Time: 50 minutes     Precautions: Standard, L3-4, L4-5 laminectomy on 2017, Breast CA 1998, R hip (2018) and R knee (2019) surgery     Pattern of pain determined: 1 PEN    Subjective   Tanika reports slight decrease in overall pain today. More achy today.     Patient reports tolerating previous visit well with decreased symptoms following   Patient reports their pain to be 4/10 on a 0-10 scale with 0 being no pain and 10 being the worst pain imaginable.  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   FOTO documents entered into [employer] - see Media section.     Limitation Score: 64%  Category: Mobility     Current : CL = least 60% but < 80% impaired, limited or restricted  Goal: CK = at least 40% but < 60% impaired, limited or restricted  Discharge:              Treatment    Pt was instructed in and performed the following:     Tanika received therapeutic exercises to develop/improved posture, cardiovascular endurance, muscular endurance, lumbar/cervical ROM, strength and muscular endurance for 60 minutes including the following exercises:     [facility] 2/4/2020   Visit Number 9   VAS Pain Rating 3   Recumbent Bike Seat Pos. -   Time 10   Extension in Lying -   Extension in Standing -   Flexion in Lying -   Flexion in Sitting -   Lumbar Extension Seat Pad -   Femur Restraint -   Top Dead Center -   Counterweight -   Lumbar Flexion -   Lumbar Extension -   Lumbar Peak Torque -   Min Torque -   Test Percent Below Normative Data -   Test Percent Gain in Strength from Initial  -   Lumbar Weight 52   Repetitions 20   Rating of Perceived Exertion 4   Ice - Z Lie (in min.) -   Ice - Sitting 10       LTR 10x  PPT 10x  SKTC 10x  bridging 15x        Peripheral muscle strengthening which included 1 set of 15-20 repetitions at a slow, controlled 10-13 second per rep pace focused on strengthening supporting musculature for improved body mechanics and functional mobility.  Pt and therapist focused on proper form during treatment to ensure optimal strengthening of each targeted muscle group.  Machines were utilized including torso rotation, leg extension, leg curl, chest press, upright row. Tricep extension, bicep curl, leg press, and hip abduction added visit 3    Tanika received the following manual therapy techniques: NA were applied to the: NA for NA minutes.         Home Exercises Provided and Patient Education Provided     Education provided:   - RPE   - HEP    Written Home Exercises  Provided: Patient instructed to cont prior HEP.  Exercises were reviewed and Tanika was able to demonstrate them prior to the end of the session.  Tanika demonstrated good  understanding of the education provided.     See EMR under Patient Instructions for exercises provided prior visit.          Assessment     Pt able to complete 20 reps with a weight ^ on Med X with 4/10 exertion level.  Pt feels better when doing HEP and comes to PT. Educated pt on the importance of daily stretch to increase the benefit of program and positive results.   Patient is making good progress towards established goals.  Pt will continue to benefit from skilled outpatient physical therapy to address the deficits stated in the impairment chart, provide pt/family education and to maximize pt's level of independence in the home and community environment.     Anticipated Barriers for therapy: None  Pt's spiritual, cultural and educational needs considered and pt agreeable to plan of care and goals as stated below:             GOALS: Pt is in agreement with the following goals.     Short term goals:  6 weeks or 10 visits   1.  Pt will demonstrate increased lumbar ROM by at least 3 degrees from the initial ROM value with improvements noted in functional ROM and ability to perform ADLs.  2.  Pt will demonstrate increased maximum isometric torque value by 25% when compared to the initial value resulting in improved ability to perform bending, lifting, and carrying activities safely, confidently.     3.  Patient report a reduction in worst pain score by 1-2 points for improved tolerance for static standing, washing dishes.  4.  Pt able to perform HEP correctly with minimal cueing or supervision from therapist to encourage independent management of symptoms.         Long term goals: 10 weeks or 20 visits   1. Pt will demonstrate increased lumbar ROM by at least 6 degrees from initial ROM value, resulting in improved ability to perform functional fwd  bending while standing and sitting.   2. Pt will demonstrate increased maximum isometric torque value by 50% when compared to the initial value resulting in improved ability to perform bending, lifting, and carrying activities safely, confidently.  3. Pt to demonstrate ability to independently control and reduce their pain through posture positioning and mechanical movements throughout a typical day.  4.  Pt will demonstrate reduced pain and improved functional outcomes as reported on the FOTO by reaching a score of CK = at least 40% but < 60% impaired, limited or restricted or less in order to demonstrate subjective improvement in pt's condition.    5. Pt will demonstrate independence with the HEP at discharge  6.  Pt will be able to walk at self selected speed for 10 min without needing to sit to rest      Plan   Continue with established Plan of Care towards established PT goals.

## 2020-02-05 ENCOUNTER — PATIENT OUTREACH (OUTPATIENT)
Dept: ADMINISTRATIVE | Facility: HOSPITAL | Age: 60
End: 2020-02-05

## 2020-02-05 ENCOUNTER — TELEPHONE (OUTPATIENT)
Dept: INTERNAL MEDICINE | Facility: CLINIC | Age: 60
End: 2020-02-05

## 2020-02-05 DIAGNOSIS — Z12.39 ENCOUNTER FOR SCREENING FOR MALIGNANT NEOPLASM OF BREAST: Primary | ICD-10-CM

## 2020-02-05 DIAGNOSIS — Z12.31 ENCOUNTER FOR SCREENING MAMMOGRAM FOR MALIGNANT NEOPLASM OF BREAST: ICD-10-CM

## 2020-02-05 DIAGNOSIS — Z12.31 ENCOUNTER FOR SCREENING MAMMOGRAM FOR BREAST CANCER: Primary | ICD-10-CM

## 2020-02-05 NOTE — PROGRESS NOTES
Chart review completed. Pt due for mammogram, order placed. [facility] message sent w/ mammo scheduling link. Request sent to Dr. Du for most recent colonoscopy results.

## 2020-02-05 NOTE — TELEPHONE ENCOUNTER
----- Message from Nazanin Becker sent at 2/5/2020  9:01 AM CST -----  Contact: Self call  [phone]  Caller is requesting to schedule their annual screening mammogram appointment. Order is not listed in Epic.  Please enter order and contact patient to schedule. Mammo  Where would they like the mammogram performed?:  [facility]  Would the patient rather receive a phone call back or a response via [facility]?:   Phone   Additional information:

## 2020-02-05 NOTE — TELEPHONE ENCOUNTER
Hi, please contact the patient to assist in scheduling    Orders Placed This Encounter    Mammo Digital Screening Bilateral With CAD       Thank you, Rayo Hernandez

## 2020-02-05 NOTE — LETTER
AUTHORIZATION FOR RELEASE OF   CONFIDENTIAL INFORMATION    Dear Dr. Du,    We are seeing Tanika Wayne, date of birth 1960, in the clinic at [facility]. Rayo Hernandez MD is the patient's PCP. Tanika Wayne has an outstanding lab/procedure at the time we reviewed her chart. In order to help keep her health information updated, she has authorized us to request the following medical record(s):        (  )  MAMMOGRAM                                      ( x )  COLONOSCOPY      (  )  PAP SMEAR                                          (  )  OUTSIDE LAB RESULTS     (  )  DEXA SCAN                                          (  )  EYE EXAM            (  )  FOOT EXAM                                          (  )  ENTIRE RECORD     (  )  OUTSIDE IMMUNIZATIONS                 (  )  _______________    Please fax records to [phone]  Attn: GLENNY Castellano    If you have any questions, please do not hesitate to contact me at the number below. Thank you for your help!    Gray Anguiano LPN  Clinical Care Coordinator  [facility]  [facility]  [phone]  [email or web address]            Patient Name: Tanika Wayne  : 1960  Patient Phone #: [phone]

## 2020-02-05 NOTE — PROGRESS NOTES
Rcvd colonoscopy record from Dr Du/ [facility] Viera. Record imported to chart, linked to health maintenance.

## 2020-02-11 ENCOUNTER — HOSPITAL ENCOUNTER (OUTPATIENT)
Dept: RADIOLOGY | Facility: OTHER | Age: 60
Discharge: HOME | End: 2020-02-11
Payer: MEDICARE

## 2020-02-11 ENCOUNTER — CLINICAL SUPPORT (OUTPATIENT)
Dept: REHABILITATION | Facility: OTHER | Age: 60
End: 2020-02-11
Attending: INTERNAL MEDICINE
Payer: MEDICARE

## 2020-02-11 DIAGNOSIS — R29.898 WEAKNESS OF BACK: ICD-10-CM

## 2020-02-11 DIAGNOSIS — Z12.31 ENCOUNTER FOR SCREENING MAMMOGRAM FOR BREAST CANCER: ICD-10-CM

## 2020-02-11 DIAGNOSIS — G89.29 CHRONIC BILATERAL LOW BACK PAIN WITHOUT SCIATICA: Primary | ICD-10-CM

## 2020-02-11 DIAGNOSIS — M54.50 CHRONIC BILATERAL LOW BACK PAIN WITHOUT SCIATICA: Primary | ICD-10-CM

## 2020-02-11 PROCEDURE — 77067 SCR MAMMO BI INCL CAD: CPT | Mod: TC,HCNC

## 2020-02-11 PROCEDURE — 77067 SCR MAMMO BI INCL CAD: CPT | Mod: 26,HCNC,, | Performed by: RADIOLOGY

## 2020-02-11 PROCEDURE — 97110 THERAPEUTIC EXERCISES: CPT | Mod: HCNC

## 2020-02-11 PROCEDURE — 77067 MAMMO DIGITAL SCREENING BILAT WITH CAD: ICD-10-PCS | Mod: 26,HCNC,, | Performed by: RADIOLOGY

## 2020-02-11 NOTE — PROGRESS NOTES
[facility]      Name: Tanika Wayne  Clinic Number: [id number]    Therapy Diagnosis:   Encounter Diagnoses   Name Primary?    Chronic bilateral low back pain without sciatica Yes    Weakness of back      Physician: Natividad Tafoya, *    Visit Date: 2020    Medical Diagnosis from Referral: Chronic right-sided low back pain without sciatica [M54.5, G89.29]  DDD (degenerative disc disease), lumbar [M51.36]  Spondylosis of lumbar region without myelopathy or radiculopathy [M47.816]  Spondylolisthesis, lumbar region [M43.16]  Evaluation Date: 2019  Authorization Period Expiration: 2020  Plan of Care Expiration: 20  Reassessment Due: 20  Visit # / Visits authorized:    remove toggle on Med X if needed     Time In: 200  Time Out: 300pm  Total Billable Time: 30 minutes     Precautions: Standard, L3-4, L4-5 laminectomy on 2017, Breast CA 1998, R hip (2018) and R knee (2019) surgery     Pattern of pain determined: 1 PEN    Subjective   Tanika reports she is not having any pain today     Patient reports tolerating previous visit well with decreased symptoms following   Patient reports their pain to be 0/10 on a 0-10 scale with 0 being no pain and 10 being the worst pain imaginable.  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    FOTO documents entered into [employer] - see Media section.     Limitation Score: 64%  Category: Mobility     Current : CL = least 60% but < 80% impaired, limited or restricted  Goal: CK = at least 40% but < 60% impaired, limited or restricted  Discharge:              Treatment    Pt was instructed in and performed the following:     Tanika received therapeutic exercises to develop/improved posture, cardiovascular endurance, muscular endurance, lumbar/cervical ROM, strength and muscular endurance for 60 minutes including the following exercises:   [facility] 2/11/2020   Visit Number 10   VAS Pain Rating 3   Recumbent Bike Seat Pos. -   Time 10   Extension in Lying -   Extension in Standing -   Flexion in Lying 10   Flexion in Sitting -   Lumbar Extension Seat Pad -   Femur Restraint -   Top Dead Center -   Counterweight -   Lumbar Flexion 42   Lumbar Extension 0   Lumbar Peak Torque 125   Min Torque 61   Test Percent Below Normative Data 34   Test Percent Gain in Strength from Initial  38   Lumbar Weight -   Repetitions -   Rating of Perceived Exertion -   Ice - Z Lie (in min.) -   Ice - Sitting 10         LTR 10x  PPT 10x  SKTC 10x  bridging 15x        Peripheral muscle strengthening which included 1 set of 15-20 repetitions at a slow, controlled 10-13 second per rep pace focused on strengthening supporting musculature for improved body mechanics and functional mobility.  Pt and therapist focused on proper form during treatment to ensure optimal strengthening of each targeted muscle group.  Machines were utilized including torso rotation, leg extension, leg curl, chest press, upright row. Tricep extension, bicep curl, leg press, and hip abduction added visit 3    Tanika received the following manual therapy techniques: NA were applied to the: NA for NA minutes.         Home Exercises Provided and Patient Education Provided     Education provided:   - RPE   - HEP    Written Home Exercises Provided: Patient  instructed to cont prior HEP.  Exercises were reviewed and Tanika was able to demonstrate them prior to the end of the session.  Tanika demonstrated good  understanding of the education provided.     See EMR under Patient Instructions for exercises provided prior visit.          Assessment     Patient has attended 10 visits at [facility] which included MD evaluation, PT evaluation with isometric testing, and physical therapy treatment including HEP instruction, education, aerobic work, dynamic strengthening on med ex equipment for the spine, and whole body strengthening on med ex equipment with increasing weight loads.  Patient  is demonstrating increased ability to reduce symptoms, improved posture, improved   ROM, and improved   strength on med ex test by  38% average.  Patient is making good progress towards established goals.  Pt will continue to benefit from skilled outpatient physical therapy to address the deficits stated in the impairment chart, provide pt/family education and to maximize pt's level of independence in the home and community environment.     Anticipated Barriers for therapy: None  Pt's spiritual, cultural and educational needs considered and pt agreeable to plan of care and goals as stated below:             GOALS: Pt is in agreement with the following goals.     Short term goals:  6 weeks or 10 visits   1.  Pt will demonstrate increased lumbar ROM by at least 3 degrees from the initial ROM value with improvements noted in functional ROM and ability to perform ADLs.  2.  Pt will demonstrate increased maximum isometric torque value by 25% when compared to the initial value resulting in improved ability to perform bending, lifting, and carrying activities safely, confidently.     3.  Patient report a reduction in worst pain score by 1-2 points for improved tolerance for static standing, washing dishes.  4.  Pt able to perform HEP correctly with minimal cueing or supervision from  therapist to encourage independent management of symptoms.         Long term goals: 10 weeks or 20 visits   1. Pt will demonstrate increased lumbar ROM by at least 6 degrees from initial ROM value, resulting in improved ability to perform functional fwd bending while standing and sitting.   2. Pt will demonstrate increased maximum isometric torque value by 50% when compared to the initial value resulting in improved ability to perform bending, lifting, and carrying activities safely, confidently.  3. Pt to demonstrate ability to independently control and reduce their pain through posture positioning and mechanical movements throughout a typical day.  4.  Pt will demonstrate reduced pain and improved functional outcomes as reported on the FOTO by reaching a score of CK = at least 40% but < 60% impaired, limited or restricted or less in order to demonstrate subjective improvement in pt's condition.    5. Pt will demonstrate independence with the HEP at discharge  6.  Pt will be able to walk at self selected speed for 10 min without needing to sit to rest      Plan   Continue with established Plan of Care towards established PT goals.

## 2020-02-16 ENCOUNTER — PATIENT MESSAGE (OUTPATIENT)
Dept: PHYSICAL MEDICINE AND REHAB | Facility: CLINIC | Age: 60
End: 2020-02-16

## 2020-02-20 ENCOUNTER — PATIENT MESSAGE (OUTPATIENT)
Dept: PHYSICAL MEDICINE AND REHAB | Facility: CLINIC | Age: 60
End: 2020-02-20

## 2020-02-20 DIAGNOSIS — M54.9 BACK PAIN, UNSPECIFIED BACK LOCATION, UNSPECIFIED BACK PAIN LATERALITY, UNSPECIFIED CHRONICITY: ICD-10-CM

## 2020-02-22 RX ORDER — OXYCODONE AND ACETAMINOPHEN 5; 325 MG/1; MG/1
1 TABLET ORAL EVERY 12 HOURS PRN
Qty: 60 TABLET | Refills: 0 | Status: SHIPPED | OUTPATIENT
Start: 2020-02-22 | End: 2020-03-24 | Stop reason: SDUPTHER

## 2020-02-26 ENCOUNTER — CLINICAL SUPPORT (OUTPATIENT)
Dept: REHABILITATION | Facility: OTHER | Age: 60
End: 2020-02-26
Attending: INTERNAL MEDICINE
Payer: MEDICARE

## 2020-02-26 DIAGNOSIS — R29.898 WEAKNESS OF BACK: ICD-10-CM

## 2020-02-26 DIAGNOSIS — M54.50 CHRONIC BILATERAL LOW BACK PAIN WITHOUT SCIATICA: Primary | ICD-10-CM

## 2020-02-26 DIAGNOSIS — G89.29 CHRONIC BILATERAL LOW BACK PAIN WITHOUT SCIATICA: Primary | ICD-10-CM

## 2020-02-26 PROCEDURE — 97110 THERAPEUTIC EXERCISES: CPT | Mod: HCNC

## 2020-02-26 NOTE — PROGRESS NOTES
[facility]      Name: Tanika Wayne  Clinic Number: [id number]    Therapy Diagnosis:   Encounter Diagnoses   Name Primary?    Chronic bilateral low back pain without sciatica Yes    Weakness of back      Physician: Natividad Tafoya, *    Visit Date: 2020    Medical Diagnosis from Referral: Chronic right-sided low back pain without sciatica [M54.5, G89.29]  DDD (degenerative disc disease), lumbar [M51.36]  Spondylosis of lumbar region without myelopathy or radiculopathy [M47.816]  Spondylolisthesis, lumbar region [M43.16]  Evaluation Date: 2019  Authorization Period Expiration: 2020  Plan of Care Expiration: 3/26/2020  Reassessment Due: 3/26/3030  Visit # / Visits authorized:    remove toggle on Med X if needed     Time In: 1425  Time Out: 1520pm  Total Billable Time: 45 minutes     Precautions: Standard, L3-4, L4-5 laminectomy on 2017, Breast CA 1998, R hip (2018) and R knee (2019) surgery     Pattern of pain determined: 1 PEN    Subjective   Tanika reports she is having some soreness and stiffness at this time    Patient reports tolerating previous visit well with decreased symptoms following   Patient reports their pain to be 0/10 on a 0-10 scale with 0 being no pain and 10 being the worst pain imaginable.  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   FOTO documents entered into [employer] - see Media section.     Limitation Score: 64%  Category: Mobility     Current : CL = least 60% but < 80% impaired, limited or restricted  Goal: CK = at least 40% but < 60% impaired, limited or restricted  Discharge:              Treatment    Pt was instructed in and performed the following:     Tanika received therapeutic exercises to develop/improved posture, cardiovascular endurance, muscular endurance, lumbar/cervical ROM, strength and muscular endurance for 60 minutes including the following exercises:       [facility] 2/26/2020   Visit Number 11   VAS Pain Rating 5   Recumbent Bike Seat Pos. -   Time 10   Extension in Lying -   Extension in Standing -   Flexion in Lying 10   Flexion in Sitting -   Lumbar Weight 54   Repetitions 15   Rating of Perceived Exertion 5   Ice - Z Lie (in min.) -   Ice - Sitting 10         Supine Lower trunk rotation 10x  Posterior pelvic tilt 10x  Single knee to chest 10x  bridging 15x  Hip abduction with green thera band 10x  Straight leg-raise with TA isometric 10x      Peripheral muscle strengthening which included 1 set of 15-20 repetitions at a slow, controlled 10-13 second per rep pace focused on strengthening supporting musculature for improved body mechanics and functional mobility.  Pt and therapist focused on proper form during treatment to ensure optimal strengthening of each targeted muscle group.  Machines were utilized including torso rotation, leg extension, leg curl, chest press, upright row. Tricep extension, bicep curl, leg press, and hip abduction added visit 3    Tanika received the following manual therapy techniques: NA were applied to the: NA for NA minutes.         Home Exercises Provided and Patient Education Provided     Education provided:   - RPE   - HEP    Written Home Exercises Provided: Patient instructed to cont prior HEP.  Exercises were reviewed and Tanika was able to demonstrate them  prior to the end of the session.  Tanika demonstrated good  understanding of the education provided.     See EMR under Patient Instructions for exercises provided prior visit.          Assessment     Patient was able to complete exercises this visit without increase in low back pain added strengthening exercises this visit with SLR and hip abd in hook lying. Increased resistance on the lumbar medx by 5% and she was able to complete 15 repetitions, plan to progress next visit as tolerated.  Patient is making good progress towards established goals.  Pt will continue to benefit from skilled outpatient physical therapy to address the deficits stated in the impairment chart, provide pt/family education and to maximize pt's level of independence in the home and community environment.     Anticipated Barriers for therapy: None  Pt's spiritual, cultural and educational needs considered and pt agreeable to plan of care and goals as stated below:       GOALS: Pt is in agreement with the following goals.     Short term goals:  6 weeks or 10 visits   1.  Pt will demonstrate increased lumbar ROM by at least 3 degrees from the initial ROM value with improvements noted in functional ROM and ability to perform ADLs. (Progressing)  2.  Pt will demonstrate increased maximum isometric torque value by 25% when compared to the initial value resulting in improved ability to perform bending, lifting, and carrying activities safely, confidently. (Progressing)  3.  Patient report a reduction in worst pain score by 1-2 points for improved tolerance for static standing, washing dishes. (Progressing)  4.  Pt able to perform HEP correctly with minimal cueing or supervision from therapist to encourage independent management of symptoms. (Progressing)        Long term goals: 10 weeks or 20 visits   1. Pt will demonstrate increased lumbar ROM by at least 6 degrees from initial ROM value, resulting in improved ability to perform functional fwd  bending while standing and sitting.  (Progressing)  2. Pt will demonstrate increased maximum isometric torque value by 50% when compared to the initial value resulting in improved ability to perform bending, lifting, and carrying activities safely, confidently. (Progressing)  3. Pt to demonstrate ability to independently control and reduce their pain through posture positioning and mechanical movements throughout a typical day. (Progressing)  4.  Pt will demonstrate reduced pain and improved functional outcomes as reported on the FOTO by reaching a score of CK = at least 40% but < 60% impaired, limited or restricted or less in order to demonstrate subjective improvement in pt's condition.  (Progressing)  5. Pt will demonstrate independence with the HEP at discharge (Progressing)  6.  Pt will be able to walk at self selected speed for 10 min without needing to sit to rest (Progressing)      Plan   Continue with established Plan of Care towards established PT goals.

## 2020-03-23 ENCOUNTER — DOCUMENTATION ONLY (OUTPATIENT)
Dept: REHABILITATION | Facility: OTHER | Age: 60
End: 2020-03-23

## 2020-03-23 ENCOUNTER — TELEPHONE (OUTPATIENT)
Dept: INTERNAL MEDICINE | Facility: CLINIC | Age: 60
End: 2020-03-23

## 2020-03-23 ENCOUNTER — PATIENT MESSAGE (OUTPATIENT)
Dept: PHYSICAL MEDICINE AND REHAB | Facility: CLINIC | Age: 60
End: 2020-03-23

## 2020-03-23 NOTE — TELEPHONE ENCOUNTER
Hi,  I recommend that she self isolate as much as possible and that she closely watch for developing fevers, and flu like symptoms.  We do not have a medicine to help prevent coronavirus.  Let me know if patient has any more questions.  Thank you, Rayo Hernandez

## 2020-03-23 NOTE — PROGRESS NOTES
PHYSICAL THERAPY DISCHARGE SUMMARY     Name: Tanika Wayne  Clinic Number: [id number]    Diagnosis: Chronic right-sided low back pain without sciatica [M54.5, G89.29]  DDD (degenerative disc disease), lumbar [M51.36]  Spondylosis of lumbar region without myelopathy or radiculopathy [M47.816]  Spondylolisthesis, lumbar region [M43.16]  Physician: Nativdiad Tafoya, *  Treatment Orders: PT Eval and Treat  Past Medical History:   Diagnosis Date    Abnormal echocardiogram 11/26/2012    Arthritis     Breast cancer 1998    Breast cancer, right breast     Breast cancer, right breast 11/26/2012    S/P Lumpectomy / XRT '98 Dr. Bartholomew     Cardiomyopathy due to systemic disease     Cataract     Clotting disorder     Coronary artery disease     DM (diabetes mellitus) 11/26/2012    Encounter for blood transfusion     HTN (hypertension) 11/26/2012    Increased glucose level 11/26/2012    ITP (idiopathic thrombocytopenic purpura) 11/26/2012    Kidney stones 11/26/2012    PONV (postoperative nausea and vomiting)     Stenosis of lumbosacral spine 11/26/2012    Thyroid disease     Tympanic membrane perforation 9/15/2014    right 2014 Dr. Jeffries       Initial visit: 11/22/2019  Date of Last visit: 2/26/2020  Date of Discharge Note:  03/23/2020  Total Visits Received: 11    ASSESSMENT   Status Towards Goals Met:  Goals not met    Goals Not achieved and why:  Pt has not scheduled any additional visits and has not returned to therapy.     Discharge reason : Non-Compliance with attendance and Patient self discharge      PLAN   This patient is discharged from Physical Therapy Services.

## 2020-03-23 NOTE — TELEPHONE ENCOUNTER
----- Message from Marie Artie sent at 3/23/2020 10:59 AM CDT -----  Contact: [phone]  Patient states she has been exposed to the Covid-19. Her daughter test came back positive and she needs advice on what she should do. Please call and advice.

## 2020-03-24 DIAGNOSIS — M54.9 BACK PAIN, UNSPECIFIED BACK LOCATION, UNSPECIFIED BACK PAIN LATERALITY, UNSPECIFIED CHRONICITY: ICD-10-CM

## 2020-03-25 RX ORDER — OXYCODONE AND ACETAMINOPHEN 5; 325 MG/1; MG/1
1 TABLET ORAL EVERY 12 HOURS PRN
Qty: 60 TABLET | Refills: 0 | Status: SHIPPED | OUTPATIENT
Start: 2020-03-25 | End: 2020-04-27 | Stop reason: SDUPTHER

## 2020-03-30 ENCOUNTER — DOCUMENTATION ONLY (OUTPATIENT)
Dept: REHABILITATION | Facility: OTHER | Age: 60
End: 2020-03-30

## 2020-03-30 DIAGNOSIS — R29.898 WEAKNESS OF BACK: ICD-10-CM

## 2020-03-30 DIAGNOSIS — G89.29 CHRONIC BILATERAL LOW BACK PAIN WITHOUT SCIATICA: Primary | ICD-10-CM

## 2020-03-30 DIAGNOSIS — M54.50 CHRONIC BILATERAL LOW BACK PAIN WITHOUT SCIATICA: Primary | ICD-10-CM

## 2020-03-30 NOTE — PROGRESS NOTES
Patient: Tanika Wayne  Date: 3/30/2020  Diagnosis:   1. Chronic bilateral low back pain without sciatica     2. Weakness of back       MRN: [id number]    Left voicemail with patient due to discontinuing therapy following updates regarding COVID-19 closely and taking every precaution to ensure the safety of our patients, staff and community. Left voicemail with patient to discuss and review plan of care, home exercise program and answer any questions they might have. Informed them that we are exploring virtual methods of providing care, but in the meantime we will be in touch with them weekly. Pt was made aware of appropriate contact information and to call back with any questions.    3/30/2020

## 2020-04-01 ENCOUNTER — PATIENT MESSAGE (OUTPATIENT)
Dept: PHYSICAL MEDICINE AND REHAB | Facility: CLINIC | Age: 60
End: 2020-04-01

## 2020-04-01 DIAGNOSIS — M62.838 NIGHT MUSCLE SPASMS: Primary | ICD-10-CM

## 2020-04-02 RX ORDER — CYCLOBENZAPRINE HCL 10 MG
TABLET ORAL
Qty: 90 TABLET | Refills: 5 | Status: SHIPPED | OUTPATIENT
Start: 2020-04-02 | End: 2021-03-25 | Stop reason: SDUPTHER

## 2020-04-09 ENCOUNTER — DOCUMENTATION ONLY (OUTPATIENT)
Dept: REHABILITATION | Facility: OTHER | Age: 60
End: 2020-04-09

## 2020-04-09 DIAGNOSIS — G89.29 CHRONIC BILATERAL LOW BACK PAIN WITHOUT SCIATICA: Primary | ICD-10-CM

## 2020-04-09 DIAGNOSIS — R29.898 WEAKNESS OF BACK: ICD-10-CM

## 2020-04-09 DIAGNOSIS — M54.50 CHRONIC BILATERAL LOW BACK PAIN WITHOUT SCIATICA: Primary | ICD-10-CM

## 2020-04-09 NOTE — PROGRESS NOTES
Patient: Tanika Wayne  Date: 4/9/2020  Diagnosis:   1. Chronic bilateral low back pain without sciatica     2. Weakness of back       MRN: [id number]    Left voicemail with patient informing her that we are starting virtual methods of providing care where she can meet with the therapy one on one through her smart phone or tablet. In the meantime we will be in touch with them weekly. Pt was made aware of appropriate contact information and to call back with any questions.    4/9/2020

## 2020-04-24 ENCOUNTER — OFFICE VISIT (OUTPATIENT)
Dept: INTERNAL MEDICINE | Facility: CLINIC | Age: 60
End: 2020-04-24
Payer: MEDICARE

## 2020-04-24 VITALS
TEMPERATURE: 98 F | DIASTOLIC BLOOD PRESSURE: 80 MMHG | HEART RATE: 115 BPM | BODY MASS INDEX: 48.15 KG/M2 | OXYGEN SATURATION: 97 % | HEIGHT: 61 IN | SYSTOLIC BLOOD PRESSURE: 128 MMHG | WEIGHT: 255 LBS

## 2020-04-24 DIAGNOSIS — E66.01 CLASS 3 SEVERE OBESITY DUE TO EXCESS CALORIES WITH SERIOUS COMORBIDITY AND BODY MASS INDEX (BMI) OF 45.0 TO 49.9 IN ADULT: ICD-10-CM

## 2020-04-24 DIAGNOSIS — E11.9 TYPE 2 DIABETES MELLITUS WITHOUT COMPLICATION, WITHOUT LONG-TERM CURRENT USE OF INSULIN: ICD-10-CM

## 2020-04-24 DIAGNOSIS — F43.10 PTSD (POST-TRAUMATIC STRESS DISORDER): ICD-10-CM

## 2020-04-24 DIAGNOSIS — G47.09 OTHER INSOMNIA: ICD-10-CM

## 2020-04-24 DIAGNOSIS — R21 RASH: ICD-10-CM

## 2020-04-24 DIAGNOSIS — E66.01 OBESITY, MORBID, BMI 50 OR HIGHER: Primary | ICD-10-CM

## 2020-04-24 PROCEDURE — 3008F PR BODY MASS INDEX (BMI) DOCUMENTED: ICD-10-PCS | Mod: HCNC,CPTII,S$GLB, | Performed by: INTERNAL MEDICINE

## 2020-04-24 PROCEDURE — 99214 PR OFFICE/OUTPT VISIT, EST, LEVL IV, 30-39 MIN: ICD-10-PCS | Mod: HCNC,S$GLB,, | Performed by: INTERNAL MEDICINE

## 2020-04-24 PROCEDURE — 3074F SYST BP LT 130 MM HG: CPT | Mod: HCNC,CPTII,S$GLB, | Performed by: INTERNAL MEDICINE

## 2020-04-24 PROCEDURE — 3008F BODY MASS INDEX DOCD: CPT | Mod: HCNC,CPTII,S$GLB, | Performed by: INTERNAL MEDICINE

## 2020-04-24 PROCEDURE — 3044F PR MOST RECENT HEMOGLOBIN A1C LEVEL <7.0%: ICD-10-PCS | Mod: HCNC,CPTII,S$GLB, | Performed by: INTERNAL MEDICINE

## 2020-04-24 PROCEDURE — 3079F PR MOST RECENT DIASTOLIC BLOOD PRESSURE 80-89 MM HG: ICD-10-PCS | Mod: HCNC,CPTII,S$GLB, | Performed by: INTERNAL MEDICINE

## 2020-04-24 PROCEDURE — 99999 PR PBB SHADOW E&M-EST. PATIENT-LVL IV: ICD-10-PCS | Mod: PBBFAC,HCNC,, | Performed by: INTERNAL MEDICINE

## 2020-04-24 PROCEDURE — 99499 RISK ADDL DX/OHS AUDIT: ICD-10-PCS | Mod: HCNC,S$GLB,, | Performed by: INTERNAL MEDICINE

## 2020-04-24 PROCEDURE — 3074F PR MOST RECENT SYSTOLIC BLOOD PRESSURE < 130 MM HG: ICD-10-PCS | Mod: HCNC,CPTII,S$GLB, | Performed by: INTERNAL MEDICINE

## 2020-04-24 PROCEDURE — 99999 PR PBB SHADOW E&M-EST. PATIENT-LVL IV: CPT | Mod: PBBFAC,HCNC,, | Performed by: INTERNAL MEDICINE

## 2020-04-24 PROCEDURE — 99214 OFFICE O/P EST MOD 30 MIN: CPT | Mod: HCNC,S$GLB,, | Performed by: INTERNAL MEDICINE

## 2020-04-24 PROCEDURE — 99499 UNLISTED E&M SERVICE: CPT | Mod: HCNC,S$GLB,, | Performed by: INTERNAL MEDICINE

## 2020-04-24 PROCEDURE — 3044F HG A1C LEVEL LT 7.0%: CPT | Mod: HCNC,CPTII,S$GLB, | Performed by: INTERNAL MEDICINE

## 2020-04-24 PROCEDURE — 3079F DIAST BP 80-89 MM HG: CPT | Mod: HCNC,CPTII,S$GLB, | Performed by: INTERNAL MEDICINE

## 2020-04-24 RX ORDER — TRAZODONE HYDROCHLORIDE 50 MG/1
50 TABLET ORAL NIGHTLY PRN
Qty: 30 TABLET | Refills: 3 | Status: SHIPPED | OUTPATIENT
Start: 2020-04-24 | End: 2020-07-17

## 2020-04-24 RX ORDER — CLOTRIMAZOLE AND BETAMETHASONE DIPROPIONATE 10; .64 MG/G; MG/G
CREAM TOPICAL 2 TIMES DAILY PRN
Qty: 45 G | Refills: 1 | Status: SHIPPED | OUTPATIENT
Start: 2020-04-24 | End: 2022-07-20 | Stop reason: SDUPTHER

## 2020-04-24 NOTE — PROGRESS NOTES
Subjective:       Patient ID: Tanika Wayne is a 60 y.o. female.    Chief Complaint: Follow-up    Patient is here for followup for chronic conditions.    Her dtr was + for covid19 and she went to [place] with her.  dtr six 3/16, sounds like hosp acquired, was quarantined when coming   and son ok.    Having anxiety attacks with covid19 as well.    Has ptsd from  Juli[place].    Rash in mocassin pattern R foot and tinea between toes L foot as well.    Review of Systems   Constitutional: Negative for fatigue, fever and unexpected weight change.   HENT: Negative for congestion, facial swelling (resolved), hearing loss and sore throat.    Eyes: Negative for visual disturbance.   Respiratory: Negative for cough, shortness of breath and wheezing.    Cardiovascular: Negative for chest pain and palpitations.   Gastrointestinal: Negative for abdominal distention, abdominal pain, blood in stool, diarrhea, nausea and vomiting.   Genitourinary: Negative for difficulty urinating, dysuria, frequency and hematuria.   Musculoskeletal: Positive for arthralgias and gait problem. Negative for joint swelling.   Skin: Positive for rash. Negative for color change.        Intertrigo   Neurological: Negative for dizziness, syncope, weakness and headaches.   Hematological: Negative for adenopathy.   Psychiatric/Behavioral: Positive for sleep disturbance. Negative for confusion, decreased concentration and suicidal ideas. The patient is nervous/anxious.        Objective:      Physical Exam   Constitutional: She is oriented to person, place, and time. She appears well-developed and well-nourished. No distress.   HENT:   Head: Normocephalic and atraumatic.   Mouth/Throat: No oropharyngeal exudate.   Eyes: Pupils are equal, round, and reactive to light. EOM are normal. No scleral icterus.   Neck: Normal range of motion. Neck supple. No thyromegaly present.   Thyroid surg scar present   Cardiovascular: Normal rate, regular rhythm and  normal heart sounds. Exam reveals no gallop and no friction rub.   No murmur heard.  Pulmonary/Chest: Effort normal and breath sounds normal. No respiratory distress. She has no wheezes. She has no rales.   Abdominal: Soft. Bowel sounds are normal. She exhibits no distension and no mass. There is no tenderness. There is no rebound and no guarding.   Musculoskeletal: Normal range of motion. She exhibits no edema or tenderness.   R knee improved rom. No significant swelling or warmth.     Lymphadenopathy:     She has no cervical adenopathy.   Neurological: She is alert and oriented to person, place, and time.   Skin: She is not diaphoretic.   [place] rash just R foot  Tinea between lateral toe webspaces on the L foot    No intertrigo seen right now   Psychiatric: She has a normal mood and affect. Her speech is normal and behavior is normal. Cognition and memory are normal.       Assessment:       1. Obesity, morbid, BMI 50 or higher    2. Type 2 diabetes mellitus without complication, without long-term current use of insulin    3. Class 3 severe obesity due to excess calories with serious comorbidity and body mass index (BMI) of 45.0 to 49.9 in adult    4. Rash    5. PTSD (post-traumatic stress disorder)    6. Other insomnia        Plan:       Tanika was seen today for follow-up.    Diagnoses and all orders for this visit:    Obesity, morbid, BMI 50 or higher  After covid discuss more wt loss referrals    Type 2 diabetes mellitus without complication, without long-term current use of insulin  Has been controlled    Class 3 severe obesity due to excess calories with serious comorbidity and body mass index (BMI) of 45.0 to 49.9 in adult    Rash  -     clotrimazole-betamethasone 1-0.05% (LOTRISONE) cream; Apply topically 2 (two) times daily as needed.  For intertrigo, tinea as well.  Previous lotrimin has not help tinea on the foot and nystatin powder just caked the intertrigo area and did not help much    PTSD  (post-traumatic stress disorder)  Other insomnia  -     traZODone (DESYREL) 50 MG tablet; Take 1 tablet (50 mg total) by mouth nightly as needed for Insomnia.        Health Maintenance       Date Due Completion Date    HIV Screening 01/01/1975 ---    Shingles Vaccine (1 of 2) 01/01/2011 ---    Hemoglobin A1c 05/20/2020 11/20/2019    Foot Exam 08/13/2020 8/13/2019    Lipid Panel 08/13/2020 8/13/2019    Eye Exam 10/14/2020 10/14/2019    Low Dose Statin 01/31/2021 1/31/2020    Mammogram 02/11/2021 2/11/2020    TETANUS VACCINE 07/01/2021 7/1/2011    Colonoscopy 10/25/2022 10/25/2017          Follow up in about 4 months (around 8/24/2020).    Future Appointments   Date Time Provider Department Center   5/6/2020  9:40 AM Rosario Cruz MD [facility]   8/24/2020  1:40 PM Rayo Hernandez MD [facility]

## 2020-04-26 ENCOUNTER — PATIENT MESSAGE (OUTPATIENT)
Dept: PHYSICAL MEDICINE AND REHAB | Facility: CLINIC | Age: 60
End: 2020-04-26

## 2020-04-27 DIAGNOSIS — M54.9 BACK PAIN, UNSPECIFIED BACK LOCATION, UNSPECIFIED BACK PAIN LATERALITY, UNSPECIFIED CHRONICITY: ICD-10-CM

## 2020-05-01 ENCOUNTER — PATIENT MESSAGE (OUTPATIENT)
Dept: PHYSICAL MEDICINE AND REHAB | Facility: CLINIC | Age: 60
End: 2020-05-01

## 2020-05-02 RX ORDER — OXYCODONE AND ACETAMINOPHEN 5; 325 MG/1; MG/1
1 TABLET ORAL EVERY 12 HOURS PRN
Qty: 60 TABLET | Refills: 0 | Status: SHIPPED | OUTPATIENT
Start: 2020-05-02 | End: 2020-05-06 | Stop reason: SDUPTHER

## 2020-05-06 ENCOUNTER — OFFICE VISIT (OUTPATIENT)
Dept: PHYSICAL MEDICINE AND REHAB | Facility: CLINIC | Age: 60
End: 2020-05-06
Payer: MEDICARE

## 2020-05-06 DIAGNOSIS — M48.062 SPINAL STENOSIS OF LUMBAR REGION WITH NEUROGENIC CLAUDICATION: Primary | ICD-10-CM

## 2020-05-06 DIAGNOSIS — G89.29 CHRONIC BILATERAL LOW BACK PAIN WITH RIGHT-SIDED SCIATICA: ICD-10-CM

## 2020-05-06 DIAGNOSIS — M43.16 SPONDYLOLISTHESIS AT L3-L4 LEVEL: ICD-10-CM

## 2020-05-06 DIAGNOSIS — Z96.651 STATUS POST TOTAL RIGHT KNEE REPLACEMENT: ICD-10-CM

## 2020-05-06 DIAGNOSIS — M54.41 CHRONIC BILATERAL LOW BACK PAIN WITH RIGHT-SIDED SCIATICA: ICD-10-CM

## 2020-05-06 DIAGNOSIS — F11.90 UNCOMPLICATED OPIOID USE: ICD-10-CM

## 2020-05-06 DIAGNOSIS — Z96.641 STATUS POST RIGHT HIP REPLACEMENT: ICD-10-CM

## 2020-05-06 DIAGNOSIS — M54.9 BACK PAIN, UNSPECIFIED BACK LOCATION, UNSPECIFIED BACK PAIN LATERALITY, UNSPECIFIED CHRONICITY: ICD-10-CM

## 2020-05-06 PROCEDURE — 99443 PR PHYSICIAN TELEPHONE EVALUATION 21-30 MIN: CPT | Mod: HCNC,95,, | Performed by: PHYSICAL MEDICINE & REHABILITATION

## 2020-05-06 PROCEDURE — 99443 PR PHYSICIAN TELEPHONE EVALUATION 21-30 MIN: ICD-10-PCS | Mod: HCNC,95,, | Performed by: PHYSICAL MEDICINE & REHABILITATION

## 2020-05-06 RX ORDER — OXYCODONE AND ACETAMINOPHEN 5; 325 MG/1; MG/1
1 TABLET ORAL EVERY 12 HOURS PRN
Qty: 60 TABLET | Refills: 0 | Status: SHIPPED | OUTPATIENT
Start: 2020-06-02 | End: 2020-07-02 | Stop reason: SDUPTHER

## 2020-05-06 NOTE — PROGRESS NOTES
Established Patient - Audio Only Telehealth Visit     The patient location is: Home.  The chief complaint leading to consultation is: back pain.  Visit type: Virtual visit with audio only (telephone)  Total time spent with patient: 25 min.       The reason for the audio only service rather than synchronous audio and video virtual visit was related to technical difficulties or patient preference/necessity.     Each patient to whom I provide medical services by telemedicine is:  (1) informed of the relationship between the physician and patient and the respective role of any other health care provider with respect to management of the patient; and (2) notified that they may decline to receive medical services by telemedicine and may withdraw from such care at any time. Patient verbally consented to receive this service via voice-only telephone call.       This service was not originating from a related E/M service provided within the previous 7 days nor will  to an E/M service or procedure within the next 24 hours or my soonest available appointment.    Prevailing standard of care was able to be met in this audio-only visit.      Subjective:       Patient ID: Tanika Wayne is a 60 y.o. female.    Chief Complaint: Back Pain    Mrs. Wayne is 61 y/o female, who follows with [facility] for chronic back pain, and chronic pain management with opioids.  She is s/p L3-5 laminectomy on 2/22/17 by dr. Sullivan.  V 05/16/19.    She is  s/p Rt TKA on 4/11/19, with dr. Fischer, and   reports improvement of RT knee and  hip pain, since she underwent  Both surgeries,   She is s/p RT BRYNN on 10/18/18 by dr. Fischer (who is with [facility]).  She completed PT for  RT TKA,.     Today, she complains   1.Back pain     She reports some improvement of back pain, since her RT hip surgery.  Her main pain is postoperative RT hip pain, that is improving on daily basis.  Percocet decreases her pain down to  3-4.   She takes it BID, early in am,a nd 7-8 pm.  She also reports that Valium helps with leg spasms, takes it in am/pm.   Takes also Flexeril at bedtime, that helps with sleep.   She sates that she was taking Tramadol before surgery and was making her constipated.  Also , reported that  did not want her to have PT until October 2017.  She is on long term disability, [employer], after back surgery.  She is also on [employer] for Rt knee and Rt hip pain since her injury at work,   (she fell at work ,in January 7/2016).    Back Pain Description:  Lenght: pain is a chronic pain. Length > many, more than 10 yrs, with worsening last year.   Any past, recent injury, falls.  Intensity:  CURRENT  3/10,  AVERAGE  Pain  3-4/10. at BEST /10 , At WORST 7-8 /10 on the WORST day.   Location: pain is localized at  Rt side of back and buttock, running to Rt hip and leg.    It is more Back at right side >> leg pain.   Radiation: Rt  Buttocks all the way to Rt legs, to mid calf   Timing :it is  constant day/yamila at night pain ,cannot sleep on RT side,  worse with activity, in evening  Cannot stand 2-5 minutes, needs tos it down, cannot walk a distance of one room, secondary to back pain , has to sit down for 5-10 minutes,   and can walk again.   Uses SC  ( borrowed from her ). She does noot have any AD at home.   She states that she ordered RW with seat through [employer] comp.   QUALITY: Back pain is more dull ache.  No Sharp/shooting/ neuropathic : burning, tingling, numbness.  She reports muscle spasm in RT calf.   She has  RT weakness, she reports a couple of times inside house, no injury.   Worsening factors:  Standing, walking.   Alleviating factors: sitting and laying down.   Symptoms interfere with daily activity, sleeping and work.   Current medications: Tramadol , Aleve   Failed medications: Gabapentin ( makes bruises on skin, bluish nails) Lyrica ( makes ankle swelling) .   Prior procedures.   Minimally invasive laminectomy.   Patient denies night fever/night sweats, bowel incontinence, significant weight loss and significant motor weakness (red flags).  Patient denies any suicidal or homicidal ideations.  She follows for chronic pain management with opiates.    Past Medical History:   Diagnosis Date    Abnormal echocardiogram 2012    Arthritis     Breast cancer 1998    Breast cancer, right breast     Breast cancer, right breast 2012    S/P Lumpectomy / XRT '98 Dr. Bartholomew     Cardiomyopathy due to systemic disease     Cataract     Clotting disorder     Coronary artery disease     DM (diabetes mellitus) 2012    Encounter for blood transfusion     HTN (hypertension) 2012    Increased glucose level 2012    ITP (idiopathic thrombocytopenic purpura) 2012    Kidney stones 2012    PONV (postoperative nausea and vomiting)     Stenosis of lumbosacral spine 2012    Thyroid disease     Tympanic membrane perforation 9/15/2014    right 2014 Dr. Jeffries       Past Surgical History:   Procedure Laterality Date    BREAST LUMPECTOMY Right 1998     SECTION      CHOLECYSTECTOMY      EYE SURGERY      strabismus, 2006    HIP ARTHROPLASTY Right 10/18/2018    Procedure: ARTHROPLASTY, HIP;  Surgeon: Baldomero Fischer MD;  Location: [facility];  Service: Orthopedics;  Laterality: Right;    HIP REPLACEMENT ARTHROPLASTY Right     dr fischer    HYSTERECTOMY      JOINT REPLACEMENT      hip    KNEE ARTHROPLASTY Right 2019    Procedure: ARTHROPLASTY, KNEE (ADD ON );  Surgeon: Baldomero Fischer MD;  Location: [facility];  Service: Orthopedics;  Laterality: Right;  (ADD ON )    L3-5 Laminectomy  2017    Dr. Mendoza    SPLENECTOMY, TOTAL  2001    from ITP    STRABISMUS SURGERY  8.30.06    OU    THYROIDECTOMY  2016    TONSILLECTOMY         Family History   Problem Relation Age of Onset    Heart disease Mother 69        MI    Diabetes Mother      Hypertension Mother     Diabetes Father     Hypertension Father     Stroke Father     Diabetes Sister     Diabetes Brother        Social History     Socioeconomic History    Marital status:      Spouse name: Not on file    Number of children: 1    Years of education: Not on file    Highest education level: Not on file   Occupational History    Occupation: registration     Comment[place] [employer] [place]   Social Needs    Financial resource strain: Not on file    Food insecurity:     Worry: Not on file     Inability: Not on file    Transportation needs:     Medical: Not on file     Non-medical: Not on file   Tobacco Use    Smoking status: Never Smoker    Smokeless tobacco: Never Used   Substance and Sexual Activity    Alcohol use: No    Drug use: No    Sexual activity: Never   Lifestyle    Physical activity:     Days per week: Not on file     Minutes per session: Not on file    Stress: Not on file   Relationships    Social connections:     Talks on phone: Not on file     Gets together: Not on file     Attends Denominational service: Not on file     Active member of club or organization: Not on file     Attends meetings of clubs or organizations: Not on file     Relationship status: Not on file   Other Topics Concern    Not on file   Social History Narrative    2017 - Using a walker    The patient is not getting much exercise but is able to get about with the aid of a cane or walker.     (has copd, arthritis), 2 kids, dtr 24, son 14 (healthy kids)       Current Outpatient Medications   Medication Sig Dispense Refill    atorvastatin (LIPITOR) 10 MG tablet Take 1 tablet (10 mg total) by mouth once daily. 90 tablet 11    carvedilol (COREG) 3.125 MG tablet TAKE 1 TABLET (3.125 MG TOTAL) BY MOUTH 2 (TWO) TIMES DAILY. 180 tablet 11    clotrimazole (LOTRIMIN) 1 % cream Apply topically 2 (two) times daily. 113 g 11    clotrimazole-betamethasone 1-0.05% (LOTRISONE) cream Apply topically 2 (two) times daily  as needed. 45 g 1    cyclobenzaprine (FLEXERIL) 10 MG tablet TAKE 1 TABLET BY MOUTH THREE TIMES A DAY AS NEEDED FOR MUSCLE SPASMS 90 tablet 5    docusate sodium (COLACE) 100 MG capsule TAKE 1 CAPSULE (100 MG TOTAL) BY MOUTH 2 (TWO) TIMES DAILY AS NEEDED FOR CONSTIPATION. 60 capsule 17    fluticasone propionate (FLONASE) 50 mcg/actuation nasal spray USE 2 SPRAYS IN EACH NOSTRIL ONCE A DAY 16 mL 35    levothyroxine (SYNTHROID) 137 MCG Tab tablet Take 1 tablet (137 mcg total) by mouth once daily. 90 tablet 11    losartan-hydrochlorothiazide 100-12.5 mg (HYZAAR) 100-12.5 mg Tab TAKE 1 TABLET BY MOUTH EVERY DAY IN THE MORNING 90 tablet 11    meclizine (ANTIVERT) 25 mg tablet Take 1 tablet (25 mg total) by mouth 3 (three) times daily as needed. 20 tablet 1    metFORMIN (GLUCOPHAGE) 500 MG tablet Take 1 tablet (500 mg total) by mouth 2 (two) times daily with meals. 180 tablet 11    multivitamin (THERAGRAN) per tablet Take 1 tablet by mouth once daily.      NIFEdipine (PROCARDIA-XL) 90 MG (OSM) 24 hr tablet TAKE 1 TABLET (90 MG TOTAL) BY MOUTH ONCE DAILY. 90 tablet 11    [START ON 6/2/2020] oxyCODONE-acetaminophen (PERCOCET) 5-325 mg per tablet Take 1 tablet by mouth every 12 (twelve) hours as needed for Pain. Greater than 7 day quantity Medically Necessary 60 tablet 0    potassium chloride SA (K-DUR,KLOR-CON) 20 MEQ tablet TAKE 2 TABLETS (40 MEQ TOTAL) BY MOUTH 2 (TWO) TIMES DAILY. 360 tablet 11    traZODone (DESYREL) 50 MG tablet Take 1 tablet (50 mg total) by mouth nightly as needed for Insomnia. 30 tablet 3    venlafaxine (EFFEXOR-XR) 75 MG 24 hr capsule TAKE 1 CAPSULE BY MOUTH EVERY DAY AT NIGHT 90 capsule 3     No current facility-administered medications for this visit.      Facility-Administered Medications Ordered in Other Visits   Medication Dose Route Frequency Provider Last Rate Last Dose    sodium chloride 0.9% flush 3 mL  3 mL Intravenous PRN Baldomero Fischer MD           Review of patient's  allergies indicates:  No Known Allergies      Review of Systems   Constitutional: Negative for appetite change, chills, fatigue, fever and unexpected weight change.   HENT: Negative for drooling, trouble swallowing and voice change.    Eyes: Negative for pain and visual disturbance.   Respiratory: Negative for shortness of breath and wheezing.    Cardiovascular: Negative for chest pain and palpitations.   Gastrointestinal: Negative for abdominal distention, abdominal pain, constipation and diarrhea.   Genitourinary: Negative for difficulty urinating.   Musculoskeletal: Positive for back pain and gait problem. Negative for arthralgias, joint swelling, myalgias and neck stiffness.               Skin: Negative for color change and rash.   Neurological: Negative for dizziness, facial asymmetry, speech difficulty, weakness, light-headedness and numbness. Headaches:     Hematological: Negative for adenopathy.   Psychiatric/Behavioral: Negative for behavioral problems, confusion and sleep disturbance. The patient is not nervous/anxious.        Objective:      Physical Exam    PE : not done secondary to virtual visit.    IMAGING : reviewed  MRI of Lumbar spine ( 01/2017) showed:   Continued grade 1 anterolisthesis of L3 on L4.   The lumbar vertebral body height, contour and bone marrow signal is relatively stable without evidence for acute fracture.  The distal spinal cord and conus is normal in signal and contour the tip of the conus approximates the mid [id number].  T12/L1 No significant disc bulge, central canal or neural foraminal stenosis.   L1/L2: Small disc bulge with ligamentum flavum hypertrophy and facet joint arthropathy with mild central canal and bilateral neural foraminal stenosis  L2/L3: Trace disc bulge with ligamentum flavum hypertrophy and facet joint arthropathy with moderate-to-severe central canal stenosis.   There is mild resulting neuroforaminal stenosis.  L3/L4: Bulging disc with ligament flavum  hypertrophy and facet arthropathy with severe central canal stenosis and mild bilateral neuroforaminal stenosis.  L4/L5: Bulging disc ligamentum flavum hypertrophy and facet joint arthropathy without significant central canal stenosis   with mild/moderate neuroforaminal stenosis bilaterally  L5/S1: Bulging disc with left paracentral disc protrusion and annular fissure. No significant central canal stenosis with mild right and   moderate left neuroforaminal stenosis.  Impression:   Continued multilevel degenerative change of the lumbar spine which remains most prominent at L3/L4 with small disc bulge with   exuberant ligamentum flavum hypertrophy and facet joint arthropathy   with severe central canal stenosis and mild neuroforaminal stenosis.  Continued bulging disc with left paracentral disc protrusion L5/S1.    Assessment:       1. Spinal stenosis of lumbar region with neurogenic claudication    2. Chronic bilateral low back pain with right-sided sciatica    3. Spondylolisthesis at L3-L4 level    4. Status post right hip replacement    5. Status post total right knee replacement    6. Uncomplicated opioid use    7. Back pain, unspecified back location, unspecified back pain laterality, unspecified chronicity        Plan:       Spinal stenosis of lumbar region with neurogenic claudication  -     oxyCODONE-acetaminophen (PERCOCET) 5-325 mg per tablet; Take 1 tablet by mouth every 12 (twelve) hours as needed for Pain. Greater than 7 day quantity Medically Necessary  Dispense: 60 tablet; Refill: 0    Chronic bilateral low back pain with right-sided sciatica  -     oxyCODONE-acetaminophen (PERCOCET) 5-325 mg per tablet; Take 1 tablet by mouth every 12 (twelve) hours as needed for Pain. Greater than 7 day quantity Medically Necessary  Dispense: 60 tablet; Refill: 0    Spondylolisthesis at L3-L4 level  -     oxyCODONE-acetaminophen (PERCOCET) 5-325 mg per tablet; Take 1 tablet by mouth every 12 (twelve) hours as needed  for Pain. Greater than 7 day quantity Medically Necessary  Dispense: 60 tablet; Refill: 0    Status post right hip replacement  -     oxyCODONE-acetaminophen (PERCOCET) 5-325 mg per tablet; Take 1 tablet by mouth every 12 (twelve) hours as needed for Pain. Greater than 7 day quantity Medically Necessary  Dispense: 60 tablet; Refill: 0    Status post total right knee replacement  -     oxyCODONE-acetaminophen (PERCOCET) 5-325 mg per tablet; Take 1 tablet by mouth every 12 (twelve) hours as needed for Pain. Greater than 7 day quantity Medically Necessary  Dispense: 60 tablet; Refill: 0    Uncomplicated opioid use  -     oxyCODONE-acetaminophen (PERCOCET) 5-325 mg per tablet; Take 1 tablet by mouth every 12 (twelve) hours as needed for Pain. Greater than 7 day quantity Medically Necessary  Dispense: 60 tablet; Refill: 0    Back pain, unspecified back location, unspecified back pain laterality, unspecified chronicity  -     oxyCODONE-acetaminophen (PERCOCET) 5-325 mg per tablet; Take 1 tablet by mouth every 12 (twelve) hours as needed for Pain. Greater than 7 day quantity Medically Necessary  Dispense: 60 tablet; Refill: 0    Patient with chronic low back pain, with neurogenic claudication secondary to multi level Lumbar spinal stenosis, moderate-to-severe at L2-3, and   Severe at L3/L4, secondary to bulging disc with ligament flavum hypertrophy and facet arthropathy,  s/p L3-5 laminectomy on 2/22/17.    -- Pain mgm,  She will resume Percocet 5/325 mg po BID  Prn, #60 tabs,   Failed Gabapentin.  And Valium prn muscle spasms.   reviewed and appropriate.  Post surgically, she was given Hydrocodone #30.    Opioid Risk Score       Value Time User    Opioid Risk Score  1 5/15/2018  9:46 AM Rosario Cruz MD          Percocet refills ( my prescriptions) :  5/02, 3/25, 2/22, 1/03/2020.  She is also on Effexor 75 mg po QHS.  Urine drug test, done on  5/15/18, appropriately pos for opiates.  Pain contract signed.    RTC in  3-4 months.    Total time spent face to face with patient was 25 minutes.   More than 50% of that time was spent in counseling on diagnosis , prognosis and treatment options.   I also caunsel patient  on common and most usual side effect of prescribed medications.   Risk and benefits of opiates, possible risk of developing opiate dependence and tolerance, need of strict compliance with prescribed medications.  Pain contract, rules and obligations were discussed with patient in details.  He is aware that I would be the only doctor prescribing him pain medications and ED in a case of emergency.I explained her that she is allowed to get pain medication from   I reviewed Primary care , and other specialty's notes to better coordinate patient's  care.   All questions were answered, and patient voiced understanding.

## 2020-05-28 ENCOUNTER — PATIENT MESSAGE (OUTPATIENT)
Dept: PHYSICAL MEDICINE AND REHAB | Facility: CLINIC | Age: 60
End: 2020-05-28

## 2020-05-28 ENCOUNTER — PATIENT MESSAGE (OUTPATIENT)
Dept: SPINE | Facility: CLINIC | Age: 60
End: 2020-05-28

## 2020-05-28 NOTE — TELEPHONE ENCOUNTER
Rollator ordered from [facility].  Please fax orders.    Natividad Tafoya, FANNIE, PA-C  [facility]  [facility]

## 2020-06-09 DIAGNOSIS — E11.9 TYPE 2 DIABETES MELLITUS WITHOUT COMPLICATION: ICD-10-CM

## 2020-06-11 ENCOUNTER — DOCUMENTATION ONLY (OUTPATIENT)
Dept: REHABILITATION | Facility: HOSPITAL | Age: 60
End: 2020-06-11

## 2020-06-11 NOTE — PROGRESS NOTES
PHYSICAL THERAPY DISCHARGE:    This patient was originally evaluated at our facility on: 11/22/19         Pt attended PT for a total of 11 Visits receiving: Manual Therapy, Therex, Postural Education, Body Mechanics Training, Home Exercise Instruction     This patient's last visit occurred on: 2/26/20      Short term goals were achieved: yes    Long term goals were achieved: no    Pt was DC'd from our care secondary to: Pt wishes to self discharge       Therapist: Nataliya Murray, PT  6/11/2020

## 2020-07-02 DIAGNOSIS — M48.062 SPINAL STENOSIS OF LUMBAR REGION WITH NEUROGENIC CLAUDICATION: ICD-10-CM

## 2020-07-02 DIAGNOSIS — M43.16 SPONDYLOLISTHESIS AT L3-L4 LEVEL: ICD-10-CM

## 2020-07-02 DIAGNOSIS — M54.9 BACK PAIN, UNSPECIFIED BACK LOCATION, UNSPECIFIED BACK PAIN LATERALITY, UNSPECIFIED CHRONICITY: ICD-10-CM

## 2020-07-02 DIAGNOSIS — Z96.651 STATUS POST TOTAL RIGHT KNEE REPLACEMENT: ICD-10-CM

## 2020-07-02 DIAGNOSIS — M54.41 CHRONIC BILATERAL LOW BACK PAIN WITH RIGHT-SIDED SCIATICA: ICD-10-CM

## 2020-07-02 DIAGNOSIS — F11.90 UNCOMPLICATED OPIOID USE: ICD-10-CM

## 2020-07-02 DIAGNOSIS — Z96.641 STATUS POST RIGHT HIP REPLACEMENT: ICD-10-CM

## 2020-07-02 DIAGNOSIS — G89.29 CHRONIC BILATERAL LOW BACK PAIN WITH RIGHT-SIDED SCIATICA: ICD-10-CM

## 2020-07-02 RX ORDER — OXYCODONE AND ACETAMINOPHEN 5; 325 MG/1; MG/1
1 TABLET ORAL EVERY 12 HOURS PRN
Qty: 60 TABLET | Refills: 0 | Status: SHIPPED | OUTPATIENT
Start: 2020-07-02 | End: 2020-08-03 | Stop reason: SDUPTHER

## 2020-07-26 ENCOUNTER — HOSPITAL ENCOUNTER (EMERGENCY)
Facility: OTHER | Age: 60
Discharge: HOME | End: 2020-07-26
Attending: EMERGENCY MEDICINE
Payer: MEDICARE

## 2020-07-26 ENCOUNTER — NURSE TRIAGE (OUTPATIENT)
Dept: ADMINISTRATIVE | Facility: CLINIC | Age: 60
End: 2020-07-26

## 2020-07-26 VITALS
RESPIRATION RATE: 17 BRPM | OXYGEN SATURATION: 98 % | DIASTOLIC BLOOD PRESSURE: 89 MMHG | HEART RATE: 108 BPM | SYSTOLIC BLOOD PRESSURE: 166 MMHG | TEMPERATURE: 98 F | BODY MASS INDEX: 48.15 KG/M2 | WEIGHT: 255 LBS | HEIGHT: 61 IN

## 2020-07-26 DIAGNOSIS — K11.20 PAROTITIS: Primary | ICD-10-CM

## 2020-07-26 DIAGNOSIS — R00.0 TACHYCARDIA: ICD-10-CM

## 2020-07-26 LAB
ALBUMIN SERPL BCP-MCNC: 3.5 G/DL (ref 3.5–5.2)
ALP SERPL-CCNC: 68 U/L (ref 55–135)
ALT SERPL W/O P-5'-P-CCNC: 22 U/L (ref 10–44)
ANION GAP SERPL CALC-SCNC: 14 MMOL/L (ref 8–16)
AST SERPL-CCNC: 19 U/L (ref 10–40)
BASOPHILS # BLD AUTO: 0.05 K/UL (ref 0–0.2)
BASOPHILS NFR BLD: 0.5 % (ref 0–1.9)
BILIRUB SERPL-MCNC: 0.3 MG/DL (ref 0.1–1)
BUN SERPL-MCNC: 16 MG/DL (ref 6–20)
CALCIUM SERPL-MCNC: 9.3 MG/DL (ref 8.7–10.5)
CHLORIDE SERPL-SCNC: 100 MMOL/L (ref 95–110)
CO2 SERPL-SCNC: 25 MMOL/L (ref 23–29)
CREAT SERPL-MCNC: 0.8 MG/DL (ref 0.5–1.4)
DIFFERENTIAL METHOD: ABNORMAL
EOSINOPHIL # BLD AUTO: 0.1 K/UL (ref 0–0.5)
EOSINOPHIL NFR BLD: 1.4 % (ref 0–8)
ERYTHROCYTE [DISTWIDTH] IN BLOOD BY AUTOMATED COUNT: 14.1 % (ref 11.5–14.5)
EST. GFR  (AFRICAN AMERICAN): >60 ML/MIN/1.73 M^2
EST. GFR  (NON AFRICAN AMERICAN): >60 ML/MIN/1.73 M^2
GLUCOSE SERPL-MCNC: 159 MG/DL (ref 70–110)
GROUP A STREP, MOLECULAR: NEGATIVE
HCT VFR BLD AUTO: 39.6 % (ref 37–48.5)
HGB BLD-MCNC: 12.3 G/DL (ref 12–16)
IMM GRANULOCYTES # BLD AUTO: 0.02 K/UL (ref 0–0.04)
IMM GRANULOCYTES NFR BLD AUTO: 0.2 % (ref 0–0.5)
LACTATE SERPL-SCNC: 1.5 MMOL/L (ref 0.5–2.2)
LYMPHOCYTES # BLD AUTO: 5 K/UL (ref 1–4.8)
LYMPHOCYTES NFR BLD: 48.5 % (ref 18–48)
MCH RBC QN AUTO: 26.9 PG (ref 27–31)
MCHC RBC AUTO-ENTMCNC: 31.1 G/DL (ref 32–36)
MCV RBC AUTO: 87 FL (ref 82–98)
MONOCYTES # BLD AUTO: 0.7 K/UL (ref 0.3–1)
MONOCYTES NFR BLD: 6.6 % (ref 4–15)
NEUTROPHILS # BLD AUTO: 4.4 K/UL (ref 1.8–7.7)
NEUTROPHILS NFR BLD: 42.8 % (ref 38–73)
NRBC BLD-RTO: 0 /100 WBC
PLATELET # BLD AUTO: 280 K/UL (ref 150–350)
PMV BLD AUTO: 9.7 FL (ref 9.2–12.9)
POTASSIUM SERPL-SCNC: 4.4 MMOL/L (ref 3.5–5.1)
PROT SERPL-MCNC: 7.6 G/DL (ref 6–8.4)
RBC # BLD AUTO: 4.57 M/UL (ref 4–5.4)
SODIUM SERPL-SCNC: 139 MMOL/L (ref 136–145)
WBC # BLD AUTO: 10.34 K/UL (ref 3.9–12.7)

## 2020-07-26 PROCEDURE — 25500020 PHARM REV CODE 255: Mod: HCNC | Performed by: EMERGENCY MEDICINE

## 2020-07-26 PROCEDURE — 87651 STREP A DNA AMP PROBE: CPT | Mod: HCNC

## 2020-07-26 PROCEDURE — 25000003 PHARM REV CODE 250: Mod: HCNC | Performed by: PHYSICIAN ASSISTANT

## 2020-07-26 PROCEDURE — 93010 EKG 12-LEAD: ICD-10-PCS | Mod: HCNC,,, | Performed by: INTERNAL MEDICINE

## 2020-07-26 PROCEDURE — 96360 HYDRATION IV INFUSION INIT: CPT | Mod: HCNC,59

## 2020-07-26 PROCEDURE — 93005 ELECTROCARDIOGRAM TRACING: CPT | Mod: HCNC

## 2020-07-26 PROCEDURE — 83605 ASSAY OF LACTIC ACID: CPT | Mod: HCNC

## 2020-07-26 PROCEDURE — 80053 COMPREHEN METABOLIC PANEL: CPT | Mod: HCNC

## 2020-07-26 PROCEDURE — 99285 EMERGENCY DEPT VISIT HI MDM: CPT | Mod: 25,HCNC

## 2020-07-26 PROCEDURE — 93010 ELECTROCARDIOGRAM REPORT: CPT | Mod: HCNC,,, | Performed by: INTERNAL MEDICINE

## 2020-07-26 PROCEDURE — 96361 HYDRATE IV INFUSION ADD-ON: CPT | Mod: HCNC

## 2020-07-26 PROCEDURE — 85025 COMPLETE CBC W/AUTO DIFF WBC: CPT | Mod: HCNC

## 2020-07-26 RX ORDER — ALPRAZOLAM 0.5 MG/1
0.5 TABLET ORAL
Status: COMPLETED | OUTPATIENT
Start: 2020-07-26 | End: 2020-07-26

## 2020-07-26 RX ORDER — BENZOCAINE AND MENTHOL 15; 3.6 MG/1; MG/1
1 LOZENGE ORAL 2 TIMES DAILY PRN
Qty: 18 LOZENGE | Refills: 0 | Status: SHIPPED | OUTPATIENT
Start: 2020-07-26 | End: 2021-02-03

## 2020-07-26 RX ORDER — AMOXICILLIN AND CLAVULANATE POTASSIUM 875; 125 MG/1; MG/1
1 TABLET, FILM COATED ORAL 2 TIMES DAILY
Qty: 20 TABLET | Refills: 0 | Status: SHIPPED | OUTPATIENT
Start: 2020-07-26 | End: 2020-08-05

## 2020-07-26 RX ORDER — SODIUM CHLORIDE 9 MG/ML
1000 INJECTION, SOLUTION INTRAVENOUS
Status: COMPLETED | OUTPATIENT
Start: 2020-07-26 | End: 2020-07-26

## 2020-07-26 RX ADMIN — SODIUM CHLORIDE 1000 ML: 0.9 INJECTION, SOLUTION INTRAVENOUS at 11:07

## 2020-07-26 RX ADMIN — ALPRAZOLAM 0.5 MG: 0.5 TABLET ORAL at 11:07

## 2020-07-26 RX ADMIN — IOHEXOL 100 ML: 350 INJECTION, SOLUTION INTRAVENOUS at 12:07

## 2020-07-26 NOTE — TELEPHONE ENCOUNTER
Reason for Disposition   [1] Symptoms of COVID-19 (e.g., cough, fever, SOB, or others) AND [2] lives in an area with community spread   [1] COVID-19 infection suspected by caller or triager AND [2] mild symptoms (cough, fever, or others) AND [3] no complications or SOB   [1] Drooling or spitting out saliva (because can't swallow) AND [2] normal breathing    Additional Information   Negative: COVID-19 has been diagnosed by a healthcare provider (HCP)   Negative: COVID-19 lab test positive   Negative: SEVERE difficulty breathing (e.g., struggling for each breath, speaks in single words)   Negative: Difficult to awaken or acting confused (e.g., disoriented, slurred speech)   Negative: Bluish (or gray) lips or face now   Negative: Shock suspected (e.g., cold/pale/clammy skin, too weak to stand, low BP, rapid pulse)   Negative: Sounds like a life-threatening emergency to the triager   Negative: SEVERE or constant chest pain or pressure (Exception: mild central chest pain, present only when coughing)   Negative: MODERATE difficulty breathing (e.g., speaks in phrases, SOB even at rest, pulse 100-120)   Negative: Patient sounds very sick or weak to the triager   Negative: MILD difficulty breathing (e.g., minimal/no SOB at rest, SOB with walking, pulse <100)   Negative: Chest pain or pressure   Negative: Fever > 103 F (39.4 C)   Negative: [1] Fever > 101 F (38.3 C) AND [2] age > 60   Negative: [1] Fever > 100.0 F (37.8 C) AND [2] bedridden (e.g., nursing home patient, CVA, chronic illness, recovering from surgery)   Negative: HIGH RISK patient (e.g., age > 64 years, diabetes, heart or lung disease, weak immune system)   Negative: Fever present > 3 days (72 hours)   Negative: [1] Fever returns after gone for over 24 hours AND [2] symptoms worse or not improved   Negative: [1] Continuous (nonstop) coughing interferes with work or school AND [2] no improvement using cough treatment per protocol    Negative: Severe difficulty breathing (e.g., struggling for each breath, speaks in single words, stridor)   Negative: Sounds like a life-threatening emergency to the triager    Protocols used: CORONAVIRUS (COVID-19) EXPOSURE-A-AH, CORONAVIRUS (COVID-19) DIAGNOSED OR [id number], SORE THROAT-A-AH    Pt stated she has temp of 99.9, headache, blood when she blows her nose, and a sore throat. Stated the gland is her neck is swollen, and she cannot swallow. Per triage protocol, advised to go to ED now for evaluation. Pt verbalized understanding.

## 2020-07-26 NOTE — ED PROVIDER NOTES
"Encounter Date: 7/26/2020       History   No chief complaint on file.    Patient is 60 year old female who presents with complaints of left-sided throat in facial pain that is been present for 2 days prior to arrival.  Patient reports that about 2 months ago she had a diagnosis of "infected salivary gland "for which she took a course of antibiotics and was told to suck on limit heads to help with symptoms.  She reports symptoms resolved after this course of medication and she has had no issues until Friday when she developed a left-sided sore throat and swelling over the same salivary gland.  She reports that she has not been taking any medications apart from using over-the-counter sore throat lozenges - not limited flavor.  She denies taking any other medications.  She reports painful swallowing eating and drinking but has no difficulty speaking or breathing.  She reports subjective fevers at home with no measured temperature.  She reports slight cough with yellow mucous with streaks of blood.  She reports bloody mucus coming from her nose bilaterally.  She reports congestion and nose irritation.  She denies associated sick contacts or recent travel.  She tested negative for COVID on 6/23/2020.  She has been taking her routine medications regularly.  She also reports that she has a history of PTSD and is currently feeling significant anxiety which she contributes her elevated heart rate to.  She is currently unaccompanied in the ER.        Review of patient's allergies indicates:  No Known Allergies  Past Medical History:   Diagnosis Date    Abnormal echocardiogram 11/26/2012    Arthritis     Breast cancer 1998    Breast cancer, right breast     Breast cancer, right breast 11/26/2012    S/P Lumpectomy / XRT '98 Dr. Bartholomew     Cardiomyopathy due to systemic disease     Cataract     Clotting disorder     Coronary artery disease     DM (diabetes mellitus) 11/26/2012    Encounter for blood transfusion     " HTN (hypertension) 2012    Increased glucose level 2012    ITP (idiopathic thrombocytopenic purpura) 2012    Kidney stones 2012    PONV (postoperative nausea and vomiting)     Stenosis of lumbosacral spine 2012    Thyroid disease     Tympanic membrane perforation 9/15/2014    right 2014 Dr. Jeffries     Past Surgical History:   Procedure Laterality Date    BREAST LUMPECTOMY Right 1998     SECTION      CHOLECYSTECTOMY      EYE SURGERY      strabismus, 2006    HIP ARTHROPLASTY Right 10/18/2018    Procedure: ARTHROPLASTY, HIP;  Surgeon: Baldomero Fischer MD;  Location: [facility];  Service: Orthopedics;  Laterality: Right;    HIP REPLACEMENT ARTHROPLASTY Right     dr fischer    HYSTERECTOMY      JOINT REPLACEMENT      hip    KNEE ARTHROPLASTY Right 2019    Procedure: ARTHROPLASTY, KNEE (ADD ON );  Surgeon: Baldomero Fischer MD;  Location: [facility];  Service: Orthopedics;  Laterality: Right;  (ADD ON )    L3-5 Laminectomy  2017    Dr. Mendoza    SPLENECTOMY, TOTAL  2001    from ITP    STRABISMUS SURGERY  8.30.06    OU    THYROIDECTOMY  2016    TONSILLECTOMY       Family History   Problem Relation Age of Onset    Heart disease Mother 69        MI    Diabetes Mother     Hypertension Mother     Diabetes Father     Hypertension Father     Stroke Father     Diabetes Sister     Diabetes Brother      Social History     Tobacco Use    Smoking status: Never Smoker    Smokeless tobacco: Never Used   Substance Use Topics    Alcohol use: No    Drug use: No     Review of Systems   Constitutional: Negative for fever.   HENT: Positive for congestion, facial swelling, nosebleeds, postnasal drip and sore throat. Negative for voice change.    Respiratory: Positive for cough. Negative for shortness of breath.    Cardiovascular: Negative for chest pain.   Gastrointestinal: Negative for nausea.   Genitourinary: Negative for dysuria.   Musculoskeletal: Negative for  back pain.   Skin: Negative for rash.   Neurological: Negative for weakness.   Hematological: Does not bruise/bleed easily.       Physical Exam     Initial Vitals [07/26/20 1041]   BP Pulse Resp Temp SpO2   (!) 150/90 (!) 125 19 98.3 °F (36.8 °C) 97 %      MAP       --         Physical Exam    Nursing note and vitals reviewed.  Constitutional: She appears well-developed and well-nourished. She is not diaphoretic. No distress.   Morbidly obese female in NAD or apparent pain. She does appear quite anxious during interview and exam. She makes good eye contact, speaks in clear full sentences and manages oral secretions with ease. She is cooperative.    HENT:   Head: Normocephalic and atraumatic.   Normal intraoral exam with no tenderness to palpation swelling or erythema at [place] or [place]ensen ducts bilaterally.     TMs clear bilaterally  No tragus or mastoid tenderness bilaterally    Posterior oropharynx has posterior cobblestoning without uvula deviation.  Tonsils are absent.  There is no trismus or lingual elevation     Eyes: Conjunctivae and EOM are normal. Pupils are equal, round, and reactive to light. Right eye exhibits no discharge. Left eye exhibits no discharge. No scleral icterus.   Neck: Normal range of motion.   No meningismus  Palpable mass to the left submandibular gland that is tender to palpation.   Cardiovascular: Regular rhythm and normal heart sounds. Exam reveals no gallop and no friction rub.    No murmur heard.  tachycardic   Pulmonary/Chest: Breath sounds normal. She has no wheezes. She has no rhonchi. She has no rales.   CTA   Abdominal: Soft. Bowel sounds are normal. There is no abdominal tenderness. There is no rebound and no guarding.   Musculoskeletal: Normal range of motion. No tenderness or edema.   Lymphadenopathy:     She has no cervical adenopathy.   Neurological: She is alert and oriented to person, place, and time. She has normal strength. No cranial nerve deficit or sensory deficit.  GCS score is 15. GCS eye subscore is 4. GCS verbal subscore is 5. GCS motor subscore is 6.   Skin: Skin is warm. Capillary refill takes less than 2 seconds. No rash and no abscess noted. No erythema.   Psychiatric: She has a normal mood and affect. Her behavior is normal. Thought content normal.         ED Course   Procedures  Labs Reviewed - No data to display        Labs Reviewed   CBC W/ AUTO DIFFERENTIAL - Abnormal; Notable for the following components:       Result Value    Mean Corpuscular Hemoglobin 26.9 (*)     Mean Corpuscular Hemoglobin Conc 31.1 (*)     Lymph # 5.0 (*)     Lymph% 48.5 (*)     All other components within normal limits   COMPREHENSIVE METABOLIC PANEL - Abnormal; Notable for the following components:    Glucose 159 (*)     All other components within normal limits   GROUP A STREP, MOLECULAR   LACTIC ACID, PLASMA     Imaging Results          CT Soft Tissue Neck With Contrast (Final result)  Result time 07/26/20 13:01:57    Final result by Tyshawn Gallo MD (07/26/20 13:01:57)                 Impression:      Two well-defined solid lesions within the left parotid gland.  Differential considerations Warthin tumor, pleomorphic adenoma, and lymphoma among other causes.      Electronically signed by: Tyshawn Gallo MD  Date:    07/26/2020  Time:    13:01             Narrative:    EXAMINATION:  CT SOFT TISSUE NECK WITH CONTRAST    CLINICAL HISTORY:  Neck mass, history of malignancy (Age => 16y);    TECHNIQUE:  Low dose axial images as well as sagittal and coronal reconstructions were performed from the skull base to the clavicles following the intravenous administration of 100 mL of Omnipaque 350.    FINDINGS:  The visualized intracranial content is unremarkable.  The paranasal sinuses are well aerated.  There is a mild left-sided mastoid effusion.  The visualized portion of the lungs is unremarkable.  There is degenerative change throughout the spine.  The visualized extracranial soft tissues and  "vascular structures of the head neck and upper chest are significant for 2 masses within the left parotid gland the largest measuring 3.1 x 1.8 cm.  There are scattered nonenlarged cervical lymph nodes.                                   Medical Decision Making:   History:   Old Records Summarized: records from clinic visits.       <> Summary of Records: Parotitis 11/2019 treated with 10 days of Augmentin and sialogogues by PCP.   ED Management:  Urgent evaluation of 60 year old female who presents with complaints of left sided sore throat and "swollen gland" with clinical concern for parotitis -found to have solid lesions to parotid gland. She is afebrile, non-toxic appearing and tachycardic up to 130 as low as 116 on my exam. She has a tender and swollen submandibular salivary gland verses superior anterior cervical chain lymphadenopathy.  She has no posterior oropharyngeal asymmetry.  Tonsils are absent.  Uvula is midline.  There is slight erythema with no edema.  There is posterior cobblestoning.  Normal phonation. Bilateral nares are quite irritated over the nasal septum bilaterally with scant active bleeding and scabbing in place.  No septal perforation appreciated.  Clear lungs auscultation.  Labs and imaging pending.  Will give Xanax for patient's reported anxiety which I expect will improve her tachycardia.    Update:  She has no leukocytosis or acute anemia.  Rapid strep is negative.  No acute electrolyte abnormalities.  Lactic acid normal.  CT soft tissue neck reveals to well-defined solid lesions in the left parotid gland with radiologist reported differential including were thin tumor, pleomorphic adenoma, lymphoma. There is also evidence of mastoid effusion without TTP over the left mastoid process -no clinical concern for acute mastoiditis at this time.  No evidence of abscess to parotid gland however will cover with Augmentin in case infectious etiologies culprit.  No urgent ENT consult or transfer " for urgent ENT evaluation felt warranted at this time.  No IV antibiotics felt warranted at this time.  Patient educated on all findings and encouraged to follow up with ENT in the outpatient setting.  I also sent a staff message to her [facility] primary care provider to inform him of ED visit today on patient's request.  She is prescribed Augmentin as previously stated as well as topical Bactroban to placed intranasally to bilateral nares to prevent infection of septal abrasions.  She is educated on ED return precautions as well as follow-up plan and verbalizes understanding.  She is stable for discharge.  Of note, heart rate improved to 108 beats per minute.  Over extensive chart check it is noted that she has always significantly tachycardic at medical appointments as well as ER visits.  I feel that 108 beats per minute is an appropriate discharge heart rate considering patient's baseline.  Case discussed with attending physician who agrees with plan.  Other:   I have discussed this case with another health care provider.       <> Summary of the Discussion: Bobby                                 Clinical Impression:       ICD-10-CM ICD-9-CM   1. Parotitis  K11.20 527.2   2. Tachycardia  R00.0 785.0

## 2020-07-26 NOTE — ED NOTES
Pt here today from home with c/o swelling to left side of neck and sore throat onset Friday, also c/o nasal irritation as well. Denies other symptoms at this time. Pt alert and oriented x4, arrived with walker. PA already in room with pt when RN arrived to room. Will await orders and continue to monitor pt. Pt already on monitor with elevated HR, addressed by PA.   Locked bed in lowest position for safety, call light within reach and advised to use for assistance.

## 2020-07-26 NOTE — ED NOTES
PT RETURNED FROM CT, BACK ON STRETCHER AND RETURNED TO MONITOR, VS OBTAINED, NEEDS ASSESSED. WILL CONT TO MONITOR CLOSELY.

## 2020-07-28 ENCOUNTER — TELEPHONE (OUTPATIENT)
Dept: OTOLARYNGOLOGY | Facility: CLINIC | Age: 60
End: 2020-07-28

## 2020-08-03 ENCOUNTER — PATIENT OUTREACH (OUTPATIENT)
Dept: ADMINISTRATIVE | Facility: OTHER | Age: 60
End: 2020-08-03

## 2020-08-04 ENCOUNTER — OFFICE VISIT (OUTPATIENT)
Dept: OTOLARYNGOLOGY | Facility: CLINIC | Age: 60
End: 2020-08-04
Payer: MEDICARE

## 2020-08-04 VITALS — BODY MASS INDEX: 48.83 KG/M2 | WEIGHT: 258.63 LBS | TEMPERATURE: 99 F | HEIGHT: 61 IN

## 2020-08-04 DIAGNOSIS — K11.21 PAROTITIS, ACUTE: Primary | ICD-10-CM

## 2020-08-04 DIAGNOSIS — J30.9 ALLERGIC RHINITIS, UNSPECIFIED SEASONALITY, UNSPECIFIED TRIGGER: ICD-10-CM

## 2020-08-04 DIAGNOSIS — K11.20 PAROTITIS: ICD-10-CM

## 2020-08-04 DIAGNOSIS — J34.2 NASAL SEPTAL DEVIATION: ICD-10-CM

## 2020-08-04 PROCEDURE — 3008F BODY MASS INDEX DOCD: CPT | Mod: CPTII,S$GLB,, | Performed by: SPECIALIST

## 2020-08-04 PROCEDURE — 3008F PR BODY MASS INDEX (BMI) DOCUMENTED: ICD-10-PCS | Mod: CPTII,S$GLB,, | Performed by: SPECIALIST

## 2020-08-04 PROCEDURE — 99204 OFFICE O/P NEW MOD 45 MIN: CPT | Mod: S$GLB,,, | Performed by: SPECIALIST

## 2020-08-04 PROCEDURE — 99204 PR OFFICE/OUTPT VISIT, NEW, LEVL IV, 45-59 MIN: ICD-10-PCS | Mod: S$GLB,,, | Performed by: SPECIALIST

## 2020-08-04 NOTE — PROGRESS NOTES
Subjective:       Patient ID: Tanika Wayne is a 60 y.o. female.    Chief Complaint: Sore Throat and swollen glands    The patient is coming in on last day of a prescription of Augmentin taken for swelling and tenderness of the left parotid gland.  In November of 2019 she had a similar episode which was treated with antibiotics and sialagogues.   The swelling in that episode subsided after 3-4 days.  The swelling in the current episodes subsided after 3-4 days.  She has not had this issue before November of last year.  She does not have a bad taste in her mouth nor does she have tenderness in her neck or fever.    She does have periodic problems with nasal allergies consisting of congestion, rhinorrhea or postnasal drip.  She typically uses Flonase to control of symptoms.   When she was examined by the PCP regarding the parotitis her nose was noted to be inflamed and she was placed on topical antibiotic ointment applications.    Review of Systems   Constitutional: Positive for fatigue. Negative for activity change, appetite change, chills, fever and unexpected weight change.   HENT: Positive for nasal congestion, ear pain and facial swelling. Negative for ear discharge, hearing loss, mouth sores, postnasal drip, rhinorrhea, sinus pressure/congestion, sneezing, sore throat, tinnitus, trouble swallowing and voice change.    Eyes: Negative for photophobia, pain, discharge, redness, itching and visual disturbance.   Respiratory: Positive for cough. Negative for apnea, choking, shortness of breath and wheezing.    Cardiovascular: Negative for chest pain and palpitations.   Gastrointestinal: Negative for abdominal distention, abdominal pain, nausea and vomiting.   Musculoskeletal: Positive for arthralgias, back pain, gait problem (Uses a walker for ambulation), joint swelling, leg pain, neck pain and neck stiffness. Negative for myalgias.        Right knee and right hip total joint replacements  L4-5 laminectomy    Integumentary:  Negative for color change, pallor and rash. Negative.   Allergic/Immunologic: Positive for environmental allergies. Negative for food allergies and immunocompromised state.   Neurological: Positive for headaches. Negative for dizziness, facial asymmetry, speech difficulty, weakness, light-headedness and numbness.   Hematological: Negative for adenopathy. Does not bruise/bleed easily.   Psychiatric/Behavioral: Negative for confusion, decreased concentration and sleep disturbance.         Objective:      Physical Exam  Constitutional:       General: She is not in acute distress.     Appearance: She is well-developed.   HENT:      Head: Normocephalic.      Salivary Glands: Right salivary gland is not diffusely enlarged or tender. Left salivary gland is not diffusely enlarged or tender.      Right Ear: Ear canal and external ear normal. Tympanic membrane is retracted.      Left Ear: Ear canal and external ear normal. Tympanic membrane is retracted.      Nose: Septal deviation (To the right), mucosal edema (cyanotic, boggy inferior turbinates bilaterally) and rhinorrhea (clear mucus bilaterally) present.      Right Turbinates: Enlarged and pale.      Left Turbinates: Enlarged and pale.      Mouth/Throat:      Lips: No lesions.      Mouth: Mucous membranes are moist. No oral lesions.      Dentition: Abnormal dentition. No gum lesions.      Tongue: No lesions.      Palate: No mass and lesions.      Pharynx: Uvula midline.      Tonsils: 0 on the right. 0 on the left.   Eyes:      General: Lids are normal.         Right eye: No discharge.         Left eye: No discharge.      Conjunctiva/sclera:      Right eye: Right conjunctiva is injected. No exudate.     Left eye: Left conjunctiva is injected. No exudate.     Pupils: Pupils are equal, round, and reactive to light.   Neck:      Musculoskeletal: Normal range of motion. No muscular tenderness.      Thyroid: No thyroid mass or thyromegaly.      Trachea:  Trachea normal. No tracheal deviation.   Cardiovascular:      Rate and Rhythm: Normal rate and regular rhythm.      Pulses: Normal pulses.      Heart sounds: Normal heart sounds.   Pulmonary:      Effort: Pulmonary effort is normal.      Breath sounds: Normal breath sounds. No stridor. No decreased breath sounds, wheezing, rhonchi or rales.   Abdominal:      General: Bowel sounds are normal.      Palpations: Abdomen is soft.      Tenderness: There is no abdominal tenderness.   Musculoskeletal: Normal range of motion.   Lymphadenopathy:      Head:      Right side of head: No submental, submandibular, preauricular, posterior auricular or occipital adenopathy.      Left side of head: No submental, submandibular, preauricular, posterior auricular or occipital adenopathy.      Cervical: No cervical adenopathy.   Skin:     General: Skin is warm and dry.      Findings: No petechiae or rash.      Nails: There is no clubbing.     Neurological:      Mental Status: She is alert and oriented to person, place, and time.      Cranial Nerves: No cranial nerve deficit.      Sensory: No sensory deficit.      Gait: Gait normal.   Psychiatric:         Speech: Speech normal.         Behavior: Behavior normal. Behavior is cooperative.         Thought Content: Thought content normal.         Judgment: Judgment normal.         Assessment:       1. Parotitis, acute    2. Allergic rhinitis, unspecified seasonality, unspecified trigger    3. Nasal septal deviation        Plan:       I will have the patient drink a minimum 6-8 glasses of water per day.  If there is any parotid swelling she will use of sialagogues heat and massage in addition to extra water.  I will recheck her on an as-needed basis.  I am advising that she use Claritin as her primary allergy drug and Flonase to be added when she is having incomplete relief of symptoms

## 2020-08-04 NOTE — PROGRESS NOTES
Chart reviewed.   Immunizations: Triggered Imm Registry     Orders placed: n/a  Upcoming appts to satisfy PAZ topics: n/a

## 2020-08-10 ENCOUNTER — PATIENT OUTREACH (OUTPATIENT)
Dept: ADMINISTRATIVE | Facility: HOSPITAL | Age: 60
End: 2020-08-10

## 2020-08-10 NOTE — PROGRESS NOTES
Health Maintenance Due   Topic Date Due    HIV Screening  01/01/1975    Shingles Vaccine (1 of 2) 01/01/2011    Hemoglobin A1c  05/20/2020    Foot Exam  08/13/2020     Portal message has been sent to patient regarding overdue hemoglobin A1c.  Chart review completed.

## 2020-08-24 ENCOUNTER — TELEPHONE (OUTPATIENT)
Dept: INTERNAL MEDICINE | Facility: CLINIC | Age: 60
End: 2020-08-24

## 2020-08-24 DIAGNOSIS — F32.A DEPRESSION, UNSPECIFIED DEPRESSION TYPE: ICD-10-CM

## 2020-08-24 RX ORDER — VENLAFAXINE HYDROCHLORIDE 150 MG/1
150 CAPSULE, EXTENDED RELEASE ORAL DAILY
Qty: 90 CAPSULE | Refills: 1 | Status: SHIPPED | OUTPATIENT
Start: 2020-08-24 | End: 2021-02-17

## 2020-08-24 NOTE — TELEPHONE ENCOUNTER
Hi, please call her back --  She is being prescribed Effexor for her anxiety.  If she wants I can increase her effexor heredia from 75mg cpaulse to 150mg and see if that can help her anxiety.  Please ask her.  Let me know if patient has any questions.  Thank you, Rayo Hernandez

## 2020-08-24 NOTE — TELEPHONE ENCOUNTER
----- Message from Nazanin Becker sent at 8/24/2020  7:40 AM CDT -----  Contact: WALTER CABA @ [phone]  Would like to get medical advice.  Symptoms (please be specific):  Anxieties  How long has patient had these symptoms:  Since the pandemic  Pharmacy name and phone #:  [facility]  [phone]  Any drug allergies (copy from chart):    NKDA  Would the patient rather a call back or a response via [facility]?:  Phone   Comments:

## 2020-08-31 ENCOUNTER — PATIENT OUTREACH (OUTPATIENT)
Dept: ADMINISTRATIVE | Facility: HOSPITAL | Age: 60
End: 2020-08-31

## 2020-08-31 DIAGNOSIS — I10 ESSENTIAL HYPERTENSION: Primary | ICD-10-CM

## 2020-08-31 NOTE — PROGRESS NOTES
Health Maintenance Due   Topic Date Due    HIV Screening  01/01/1975    Shingles Vaccine (1 of 2) 01/01/2011    Hemoglobin A1c  05/20/2020    Foot Exam  08/13/2020    Lipid Panel  08/13/2020     Order placed for lipid panel.  Portal message has been sent to patient regarding overdue health maintenance.   Chart review completed.

## 2020-09-08 DIAGNOSIS — G89.29 CHRONIC BILATERAL LOW BACK PAIN WITH RIGHT-SIDED SCIATICA: ICD-10-CM

## 2020-09-08 DIAGNOSIS — M48.062 SPINAL STENOSIS OF LUMBAR REGION WITH NEUROGENIC CLAUDICATION: ICD-10-CM

## 2020-09-08 DIAGNOSIS — Z96.651 STATUS POST TOTAL RIGHT KNEE REPLACEMENT: ICD-10-CM

## 2020-09-08 DIAGNOSIS — M43.16 SPONDYLOLISTHESIS AT L3-L4 LEVEL: ICD-10-CM

## 2020-09-08 DIAGNOSIS — M54.41 CHRONIC BILATERAL LOW BACK PAIN WITH RIGHT-SIDED SCIATICA: ICD-10-CM

## 2020-09-08 DIAGNOSIS — M54.9 BACK PAIN, UNSPECIFIED BACK LOCATION, UNSPECIFIED BACK PAIN LATERALITY, UNSPECIFIED CHRONICITY: ICD-10-CM

## 2020-09-08 DIAGNOSIS — F11.90 UNCOMPLICATED OPIOID USE: ICD-10-CM

## 2020-09-08 DIAGNOSIS — Z96.641 STATUS POST RIGHT HIP REPLACEMENT: ICD-10-CM

## 2020-09-12 RX ORDER — OXYCODONE AND ACETAMINOPHEN 5; 325 MG/1; MG/1
1 TABLET ORAL EVERY 12 HOURS PRN
Qty: 60 TABLET | Refills: 0 | Status: SHIPPED | OUTPATIENT
Start: 2020-09-12 | End: 2020-10-08 | Stop reason: SDUPTHER

## 2020-09-14 ENCOUNTER — LAB VISIT (OUTPATIENT)
Dept: LAB | Facility: HOSPITAL | Age: 60
End: 2020-09-14
Attending: INTERNAL MEDICINE
Payer: MEDICARE

## 2020-09-14 ENCOUNTER — IMMUNIZATION (OUTPATIENT)
Dept: PHARMACY | Facility: CLINIC | Age: 60
End: 2020-09-14
Payer: MEDICARE

## 2020-09-14 ENCOUNTER — OFFICE VISIT (OUTPATIENT)
Dept: INTERNAL MEDICINE | Facility: CLINIC | Age: 60
End: 2020-09-14
Payer: MEDICARE

## 2020-09-14 ENCOUNTER — IMMUNIZATION (OUTPATIENT)
Dept: PHARMACY | Facility: CLINIC | Age: 60
End: 2020-09-14

## 2020-09-14 VITALS
SYSTOLIC BLOOD PRESSURE: 132 MMHG | OXYGEN SATURATION: 96 % | HEIGHT: 61 IN | DIASTOLIC BLOOD PRESSURE: 76 MMHG | WEIGHT: 260.13 LBS | HEART RATE: 126 BPM | BODY MASS INDEX: 49.11 KG/M2

## 2020-09-14 DIAGNOSIS — E89.0 HISTORY OF SUBTOTAL THYROIDECTOMY: ICD-10-CM

## 2020-09-14 DIAGNOSIS — I10 ESSENTIAL HYPERTENSION: ICD-10-CM

## 2020-09-14 DIAGNOSIS — E66.01 CLASS 3 SEVERE OBESITY DUE TO EXCESS CALORIES WITH SERIOUS COMORBIDITY AND BODY MASS INDEX (BMI) OF 45.0 TO 49.9 IN ADULT: ICD-10-CM

## 2020-09-14 DIAGNOSIS — Z90.81 HISTORY OF PARTIAL SPLENECTOMY: ICD-10-CM

## 2020-09-14 DIAGNOSIS — I10 ESSENTIAL HYPERTENSION, MALIGNANT: ICD-10-CM

## 2020-09-14 DIAGNOSIS — E11.9 TYPE 2 DIABETES MELLITUS WITHOUT COMPLICATION, WITHOUT LONG-TERM CURRENT USE OF INSULIN: Primary | ICD-10-CM

## 2020-09-14 DIAGNOSIS — E11.9 TYPE 2 DIABETES MELLITUS WITHOUT COMPLICATION: ICD-10-CM

## 2020-09-14 LAB
CHOLEST SERPL-MCNC: 129 MG/DL (ref 120–199)
CHOLEST/HDLC SERPL: 2.9 {RATIO} (ref 2–5)
ESTIMATED AVG GLUCOSE: 186 MG/DL (ref 68–131)
HBA1C MFR BLD HPLC: 8.1 % (ref 4–5.6)
HDLC SERPL-MCNC: 45 MG/DL (ref 40–75)
HDLC SERPL: 34.9 % (ref 20–50)
LDLC SERPL CALC-MCNC: 49.8 MG/DL (ref 63–159)
NONHDLC SERPL-MCNC: 84 MG/DL
TRIGL SERPL-MCNC: 171 MG/DL (ref 30–150)

## 2020-09-14 PROCEDURE — 3044F HG A1C LEVEL LT 7.0%: CPT | Mod: HCNC,CPTII,S$GLB, | Performed by: INTERNAL MEDICINE

## 2020-09-14 PROCEDURE — 3078F PR MOST RECENT DIASTOLIC BLOOD PRESSURE < 80 MM HG: ICD-10-PCS | Mod: HCNC,CPTII,S$GLB, | Performed by: INTERNAL MEDICINE

## 2020-09-14 PROCEDURE — 99214 OFFICE O/P EST MOD 30 MIN: CPT | Mod: HCNC,S$GLB,, | Performed by: INTERNAL MEDICINE

## 2020-09-14 PROCEDURE — 3075F SYST BP GE 130 - 139MM HG: CPT | Mod: HCNC,CPTII,S$GLB, | Performed by: INTERNAL MEDICINE

## 2020-09-14 PROCEDURE — 80061 LIPID PANEL: CPT | Mod: HCNC

## 2020-09-14 PROCEDURE — 99999 PR PBB SHADOW E&M-EST. PATIENT-LVL V: ICD-10-PCS | Mod: PBBFAC,HCNC,, | Performed by: INTERNAL MEDICINE

## 2020-09-14 PROCEDURE — 3075F PR MOST RECENT SYSTOLIC BLOOD PRESS GE 130-139MM HG: ICD-10-PCS | Mod: HCNC,CPTII,S$GLB, | Performed by: INTERNAL MEDICINE

## 2020-09-14 PROCEDURE — 36415 COLL VENOUS BLD VENIPUNCTURE: CPT | Mod: HCNC

## 2020-09-14 PROCEDURE — 3008F BODY MASS INDEX DOCD: CPT | Mod: HCNC,CPTII,S$GLB, | Performed by: INTERNAL MEDICINE

## 2020-09-14 PROCEDURE — 83036 HEMOGLOBIN GLYCOSYLATED A1C: CPT | Mod: HCNC

## 2020-09-14 PROCEDURE — 3008F PR BODY MASS INDEX (BMI) DOCUMENTED: ICD-10-PCS | Mod: HCNC,CPTII,S$GLB, | Performed by: INTERNAL MEDICINE

## 2020-09-14 PROCEDURE — 99999 PR PBB SHADOW E&M-EST. PATIENT-LVL V: CPT | Mod: PBBFAC,HCNC,, | Performed by: INTERNAL MEDICINE

## 2020-09-14 PROCEDURE — 3078F DIAST BP <80 MM HG: CPT | Mod: HCNC,CPTII,S$GLB, | Performed by: INTERNAL MEDICINE

## 2020-09-14 PROCEDURE — 3044F PR MOST RECENT HEMOGLOBIN A1C LEVEL <7.0%: ICD-10-PCS | Mod: HCNC,CPTII,S$GLB, | Performed by: INTERNAL MEDICINE

## 2020-09-14 PROCEDURE — 99214 PR OFFICE/OUTPT VISIT, EST, LEVL IV, 30-39 MIN: ICD-10-PCS | Mod: HCNC,S$GLB,, | Performed by: INTERNAL MEDICINE

## 2020-09-14 RX ORDER — CARVEDILOL 3.12 MG/1
3.12 TABLET ORAL 2 TIMES DAILY
Qty: 180 TABLET | Refills: 11 | Status: SHIPPED | OUTPATIENT
Start: 2020-09-14 | End: 2021-11-01

## 2020-09-14 RX ORDER — LOSARTAN POTASSIUM AND HYDROCHLOROTHIAZIDE 12.5; 1 MG/1; MG/1
1 TABLET ORAL DAILY
Qty: 90 TABLET | Refills: 11 | Status: SHIPPED | OUTPATIENT
Start: 2020-09-14 | End: 2021-09-19

## 2020-09-14 RX ORDER — POTASSIUM CHLORIDE 20 MEQ/1
40 TABLET, EXTENDED RELEASE ORAL 2 TIMES DAILY
Qty: 360 TABLET | Refills: 11 | Status: SHIPPED | OUTPATIENT
Start: 2020-09-14 | End: 2021-10-29

## 2020-09-14 RX ORDER — AMOXICILLIN AND CLAVULANATE POTASSIUM 875; 125 MG/1; MG/1
1 TABLET, FILM COATED ORAL EVERY 12 HOURS
Qty: 14 TABLET | Refills: 0 | Status: SHIPPED | OUTPATIENT
Start: 2020-09-14 | End: 2020-09-21

## 2020-09-14 RX ORDER — NIFEDIPINE 90 MG/1
90 TABLET, EXTENDED RELEASE ORAL DAILY
Qty: 90 TABLET | Refills: 11 | Status: SHIPPED | OUTPATIENT
Start: 2020-09-14 | End: 2021-11-01

## 2020-09-14 NOTE — PROGRESS NOTES
Subjective:       Patient ID: Tanika Wayne is a 60 y.o. female.    Chief Complaint: Follow-up    Patient is here for followup for chronic conditions.    No new issues since last visit.    Chronic anxiety, abt stable.    Review of Systems   Constitutional: Negative for fatigue, fever and unexpected weight change.   HENT: Negative for congestion, facial swelling (resolved), hearing loss and sore throat.    Eyes: Negative for visual disturbance.   Respiratory: Negative for cough, shortness of breath and wheezing.    Cardiovascular: Negative for chest pain and palpitations.   Gastrointestinal: Negative for abdominal distention, abdominal pain, blood in stool, diarrhea, nausea and vomiting.   Genitourinary: Negative for difficulty urinating, dysuria, frequency and hematuria.   Musculoskeletal: Positive for arthralgias and gait problem. Negative for joint swelling.   Skin: Negative for color change and rash (resolved intetrigo).        Intertrigo   Neurological: Negative for dizziness, syncope, weakness and headaches.   Hematological: Negative for adenopathy.   Psychiatric/Behavioral: Positive for sleep disturbance. Negative for confusion, decreased concentration and suicidal ideas. The patient is nervous/anxious.            Objective:      Physical Exam  Constitutional:       General: She is not in acute distress.     Appearance: She is well-developed. She is not diaphoretic.   HENT:      Head: Normocephalic and atraumatic.      Mouth/Throat:      Pharynx: No oropharyngeal exudate.   Eyes:      General: No scleral icterus.     Pupils: Pupils are equal, round, and reactive to light.   Neck:      Musculoskeletal: Normal range of motion and neck supple.      Thyroid: No thyromegaly.      Comments: Thyroid surg scar present  Cardiovascular:      Rate and Rhythm: Normal rate and regular rhythm.      Heart sounds: Normal heart sounds. No murmur. No friction rub. No gallop.    Pulmonary:      Effort: Pulmonary effort is  normal. No respiratory distress.      Breath sounds: Normal breath sounds. No wheezing or rales.   Abdominal:      General: Bowel sounds are normal. There is no distension.      Palpations: Abdomen is soft. There is no mass.      Tenderness: There is no abdominal tenderness. There is no guarding or rebound.   Musculoskeletal: Normal range of motion.         General: No tenderness.      Comments: R knee improved rom. No significant swelling or warmth.    Protective Sensation (w/ 10 gram monofilament):  Right: Intact  Left: Intact    Visual Inspection:  Normal -  Bilateral    Pedal Pulses:   Right: Present  Left: Present    Posterior tibialis:   Right:Present  Left: Present        Lymphadenopathy:      Cervical: No cervical adenopathy.   Skin:     Comments: Ismay rash just R foot  Tinea between lateral toe webspaces on the L foot    No intertrigo seen right now   Neurological:      Mental Status: She is alert and oriented to person, place, and time.   Psychiatric:         Mood and Affect: Mood normal.         Speech: Speech normal.         Behavior: Behavior normal.         Thought Content: Thought content normal.         Judgment: Judgment normal.         Assessment:       1. Type 2 diabetes mellitus without complication, without long-term current use of insulin    2. Class 3 severe obesity due to excess calories with serious comorbidity and body mass index (BMI) of 45.0 to 49.9 in adult    3. History of subtotal thyroidectomy    4. Essential hypertension    5. History of partial splenectomy    6. Essential hypertension, malignant        Plan:       Tanika was seen today for follow-up.    Diagnoses and all orders for this visit:    Type 2 diabetes mellitus without complication, without long-term current use of insulin  Has been controlled    Class 3 severe obesity due to excess calories with serious comorbidity and body mass index (BMI) of 45.0 to 49.9 in adult  -     Ambulatory referral/consult to Bariatric  Surgery; Future    History of subtotal thyroidectomy  On stable thyroid dose, next time chk TSH    Essential hypertension  controlled    History of partial splenectomy  -     amoxicillin-clavulanate 875-125mg (AUGMENTIN) 875-125 mg per tablet; Take 1 tablet by mouth every 12 (twelve) hours. for 7 days  To take if febrile resp illness, she will also call if needed    Essential hypertension, malignant  -     NIFEdipine (PROCARDIA-XL) 90 MG (OSM) 24 hr tablet; Take 1 tablet (90 mg total) by mouth once daily.  -     losartan-hydrochlorothiazide 100-12.5 mg (HYZAAR) 100-12.5 mg Tab; Take 1 tablet by mouth once daily.  -     potassium chloride SA (K-DUR,KLOR-CON) 20 MEQ tablet; Take 2 tablets (40 mEq total) by mouth 2 (two) times daily.  -     carvediloL (COREG) 3.125 MG tablet; Take 1 tablet (3.125 mg total) by mouth 2 (two) times daily.        Health Maintenance       Date Due Completion Date    HIV Screening 01/01/1976 ---    Shingles Vaccine (1 of 2) 01/01/2011 ---    Hemoglobin A1c 05/20/2020 11/20/2019    Influenza Vaccine (1) 08/01/2020 9/26/2019    Lipid Panel 08/13/2020 8/13/2019    Eye Exam 10/14/2020 10/14/2019    Mammogram 02/11/2021 2/11/2020    TETANUS VACCINE 07/01/2021 7/1/2011    Low Dose Statin 09/14/2021 9/14/2020    Foot Exam 09/14/2021 9/14/2020 (Done)    Override on 9/14/2020: Done    Colorectal Cancer Screening 10/25/2022 10/25/2017          Follow up in about 6 months (around 3/14/2021) for Flu and Shingles vaccines please.    Future Appointments   Date Time Provider Department Center   3/15/2021  1:40 PM Rayo Hernandez MD [facility]

## 2020-09-29 ENCOUNTER — PATIENT MESSAGE (OUTPATIENT)
Dept: OTHER | Facility: OTHER | Age: 60
End: 2020-09-29

## 2020-10-02 ENCOUNTER — PATIENT MESSAGE (OUTPATIENT)
Dept: INTERNAL MEDICINE | Facility: CLINIC | Age: 60
End: 2020-10-02

## 2020-10-02 DIAGNOSIS — E11.9 TYPE 2 DIABETES MELLITUS WITHOUT COMPLICATION, WITHOUT LONG-TERM CURRENT USE OF INSULIN: ICD-10-CM

## 2020-10-04 RX ORDER — METFORMIN HYDROCHLORIDE 1000 MG/1
1000 TABLET ORAL 2 TIMES DAILY WITH MEALS
Qty: 180 TABLET | Refills: 11 | Status: SHIPPED | OUTPATIENT
Start: 2020-10-04 | End: 2020-12-21

## 2020-10-04 NOTE — TELEPHONE ENCOUNTER
Hi, please contact the patient to assist in scheduling    Orders Placed This Encounter    Ambulatory referral/consult to Diabetes Education           Thank you, Rayo Hernandez      Future Appointments   Date Time Provider Department Center   10/5/2020  2:00 PM Caryn Wynn RD [facility]   11/11/2020  2:20 PM Destiny Terry PA-C [facility]   11/11/2020  3:30 PM Dayana Ibarra RD [facility]   3/15/2021  1:40 PM Rayo [facility] MD [facility]

## 2020-10-07 ENCOUNTER — PATIENT MESSAGE (OUTPATIENT)
Dept: PHYSICAL MEDICINE AND REHAB | Facility: CLINIC | Age: 60
End: 2020-10-07

## 2020-10-08 DIAGNOSIS — Z96.651 STATUS POST TOTAL RIGHT KNEE REPLACEMENT: ICD-10-CM

## 2020-10-08 DIAGNOSIS — Z96.641 STATUS POST RIGHT HIP REPLACEMENT: ICD-10-CM

## 2020-10-08 DIAGNOSIS — G89.29 CHRONIC BILATERAL LOW BACK PAIN WITH RIGHT-SIDED SCIATICA: ICD-10-CM

## 2020-10-08 DIAGNOSIS — M43.16 SPONDYLOLISTHESIS AT L3-L4 LEVEL: ICD-10-CM

## 2020-10-08 DIAGNOSIS — M54.41 CHRONIC BILATERAL LOW BACK PAIN WITH RIGHT-SIDED SCIATICA: ICD-10-CM

## 2020-10-08 DIAGNOSIS — F11.90 UNCOMPLICATED OPIOID USE: ICD-10-CM

## 2020-10-08 DIAGNOSIS — M54.9 BACK PAIN, UNSPECIFIED BACK LOCATION, UNSPECIFIED BACK PAIN LATERALITY, UNSPECIFIED CHRONICITY: ICD-10-CM

## 2020-10-08 DIAGNOSIS — M48.062 SPINAL STENOSIS OF LUMBAR REGION WITH NEUROGENIC CLAUDICATION: ICD-10-CM

## 2020-10-10 RX ORDER — OXYCODONE AND ACETAMINOPHEN 5; 325 MG/1; MG/1
1 TABLET ORAL EVERY 12 HOURS PRN
Qty: 60 TABLET | Refills: 0 | Status: SHIPPED | OUTPATIENT
Start: 2020-10-10 | End: 2020-11-10 | Stop reason: SDUPTHER

## 2020-10-14 ENCOUNTER — CLINICAL SUPPORT (OUTPATIENT)
Dept: DIABETES | Facility: CLINIC | Age: 60
End: 2020-10-14
Payer: MEDICARE

## 2020-10-14 VITALS — WEIGHT: 255.75 LBS | BODY MASS INDEX: 48.32 KG/M2

## 2020-10-14 DIAGNOSIS — E11.9 TYPE 2 DIABETES MELLITUS WITHOUT COMPLICATION, WITHOUT LONG-TERM CURRENT USE OF INSULIN: ICD-10-CM

## 2020-10-14 PROCEDURE — G0108 DIAB MANAGE TRN  PER INDIV: HCPCS | Mod: ,,, | Performed by: DIETITIAN, REGISTERED

## 2020-10-14 PROCEDURE — 99212 OFFICE O/P EST SF 10 MIN: CPT | Performed by: DIETITIAN, REGISTERED

## 2020-10-14 PROCEDURE — G0108 PR DIAB MANAGE TRN  PER INDIV: ICD-10-PCS | Mod: ,,, | Performed by: DIETITIAN, REGISTERED

## 2020-10-14 NOTE — PROGRESS NOTES
Diabetes Education  Author: Caryn Wynn RD  Date: 10/14/2020    Diabetes Care Management Summary  Diabetes Education Record Assessment/Progress: Initial  Current Diabetes Risk Level: Moderate     Last A1c:   Lab Results   Component Value Date    HGBA1C 8.1 (H) 09/14/2020     Last visit with Diabetes Educator: : 12/17/2019      Diabetes Type  Diabetes Type : Type II    Diabetes History  Diabetes Diagnosis: 1-3 years  Current Treatment: Oral Medication(metformin)  Reviewed Problem List with Patient: Yes    Health Maintenance was reviewed today with patient. Discussed with patient importance of routine eye exams, foot exams/foot care, blood work (i.e.: A1c, microalbumin, and lipid), dental visits, yearly flu vaccine, and pneumonia vaccine as indicated by PCP. Patient verbalized understanding.     Health Maintenance Topics with due status: Not Due       Topic Last Completion Date    TETANUS VACCINE 07/01/2011    Colorectal Cancer Screening 10/25/2017    Mammogram 02/11/2020    Low Dose Statin 09/14/2020    Shingles Vaccine 09/14/2020    Foot Exam 09/14/2020    Lipid Panel 09/14/2020    Hemoglobin A1c 09/14/2020     Health Maintenance Due   Topic Date Due    HIV Screening  01[birth year]01/1975    Eye Exam  10/14/2020       Nutrition  Meal Planning: diet drinks  What type of sweetener do you use?: Equal(Equal in coffee)  What type of beverages do you drink?: diet soda/tea, water(diet soda, water and crystal light)  Meal Plan 24 Hour Recall - Breakfast: yogurt and grapes and crackers  Meal Plan 24 Hour Recall - Lunch: nothing today, usually a turkey sandwich  Meal Plan 24 Hour Recall - Dinner: turkey sandwich and chips with a diet coke, usually a meat, veg and starch  Meal Plan 24 Hour Recall - Snack: freeze pop, fruit, trying to stay away from sweets for the last few weeks, was eating honey buns and cookies    Monitoring   Self Monitoring : none, no meter  Blood Glucose Logs: No  Do you use a personal continuous glucose  monitor?: No  In the last month, how often have you had a low blood sugar reaction?: never    Exercise   Frequency: Never    Current Diabetes Treatment   Current Treatment: Oral Medication(metformin)    Social History  Preferred Learning Method: Face to Face  Primary Support: Daughter, Spouse  Educational Level: Some College(associates degree)  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, now out on disability  Smoking Status: Never a Smoker  Alcohol Use: Never            DDS-2 Score  ( > 3 = SIGNIFICANT DISTRESS): 2                   Barriers to Change  Barriers to Change: Functional limitation(ambulates with a rolling walker)  Learning Challenges : None    Readiness to Learn   Readiness to Learn : Acceptance    Cultural Influences  Cultural Influences: No    Diabetes Education Assessment/Progress  Diabetes Disease Process (diabetes disease process and treatment options): Discussion, Needs Instruction, Individual Session    Nutrition (Incorporating nutritional management into one's lifestyle): Discussion, Demonstration, Needs Instruction, Individual Session, Return Demonstration, Instructed, Written Materials Provided(Remembers some label reading. Ed on carb counting and reviewed label reading. Suggested limiting carbs to 30-45 g per meal(30 to promote additional weight loss)  & 15-20g per snack.)    Physical Activity (incorporating physical activity into one's lifestyle): Discussion, Needs Instruction, Individual Session    Medications (states correct name, dose, onset, peak, duration, side effects & timing of meds): Discussion, Needs Instruction, Individual Session, Instructed, Written Materials Provided(PCP recently increased metformin from 1,000 to 2,000. She is having diarreah several times per day. Ed on SE and titration. She will try taking the med in the middle of her meal and 7-13g of metamucil per day. Disucussed injectible options if SE cont LT)    Monitoring  (monitoring blood glucose/other parameters & using results): Discussion, Demonstration, Needs Instruction, Individual Session, Instructed, Written Materials Provided(NO meter, Demonstrated SMBG tecnique. Ed on target ranges, pattern recognition and sharps dispose)    Acute Complications (preventing, detecting, and treating acute complications): Discussion, Needs Instruction, Individual Session(No s/s of hypo or hyperglycemia)    Chronic Complications (preventing, detecting, and treating chronic complications): Discussion, Needs Instruction, Individual Session    Clinical (diabetes, other pertinent medical history, and relevant comorbidities reviewed during visit): Discussion, Needs Instruction, Individual Session, Instructed, Written Materials Provided(lost 5lb in one month, REviewed recent A1c with goal range)    Cognitive (knowledge of self-management skills, functional health literacy): Discussion, Needs Review, Individual Session    Psychosocial (emotional response to diabetes): Discussion, Needs Review, Individual Session(she is accepting of DM diagnosis)    Diabetes Distress and Support Systems: Demonstration, Needs Review, Individual Session(She has a supportive family, Her daughter is here with her today)    Behavioral (readiness for change, lifestyle practices, self-care behaviors): Discussion, Needs Instruction, Individual Session, Instructed, Written Materials Provided    Goals  Patient has selected/evaluated goals during today's session: Yes, selected  Healthy Eating: Set(limit carbs to 30-45 g per meal)  Start Date: 10/14/20  Target Date: 01/11/21  Monitoring: Set(SMBG 1-2 times per day and bring logs to all clinic visits)  Start Date: 10/14/20  Target Date: 01/11/21         Diabetes Care Plan/Intervention  Education Plan/Intervention: Individual Follow-Up DSMT    Diabetes Meal Plan  Restrictions: Restricted Carbohydrate, Low Sodium, Low Fat  Calories: 1500, 1600  Carbohydrate Per Meal:  30-45g  Carbohydrate Per Snack : 15-20g  Fat: 64-71g  Protein: 64-72g    Today's Self-Management Care Plan was developed with the patient's input and is based on barriers identified during today's assessment.    The long and short-term goals in the care plan were written with the patient/caregiver's input. The patient has agreed to work toward these goals to improve her overall diabetes control.      The patient received a copy of today's self-management plan and verbalized understanding of the care plan, goals, and all of today's instructions.      The patient was encouraged to communicate with her physician and care team regarding her condition(s) and treatment.  I provided the patient with my contact information today and encouraged her to contact me via phone or patient portal as needed.     Education Units of Time   Time Spent: 60 min

## 2020-11-10 ENCOUNTER — PATIENT MESSAGE (OUTPATIENT)
Dept: PHYSICAL MEDICINE AND REHAB | Facility: CLINIC | Age: 60
End: 2020-11-10

## 2020-11-10 ENCOUNTER — PATIENT OUTREACH (OUTPATIENT)
Dept: ADMINISTRATIVE | Facility: OTHER | Age: 60
End: 2020-11-10

## 2020-11-10 DIAGNOSIS — Z96.641 STATUS POST RIGHT HIP REPLACEMENT: ICD-10-CM

## 2020-11-10 DIAGNOSIS — M54.9 BACK PAIN, UNSPECIFIED BACK LOCATION, UNSPECIFIED BACK PAIN LATERALITY, UNSPECIFIED CHRONICITY: ICD-10-CM

## 2020-11-10 DIAGNOSIS — M43.16 SPONDYLOLISTHESIS AT L3-L4 LEVEL: ICD-10-CM

## 2020-11-10 DIAGNOSIS — G89.29 CHRONIC BILATERAL LOW BACK PAIN WITH RIGHT-SIDED SCIATICA: ICD-10-CM

## 2020-11-10 DIAGNOSIS — Z96.651 STATUS POST TOTAL RIGHT KNEE REPLACEMENT: ICD-10-CM

## 2020-11-10 DIAGNOSIS — M54.41 CHRONIC BILATERAL LOW BACK PAIN WITH RIGHT-SIDED SCIATICA: ICD-10-CM

## 2020-11-10 DIAGNOSIS — M48.062 SPINAL STENOSIS OF LUMBAR REGION WITH NEUROGENIC CLAUDICATION: ICD-10-CM

## 2020-11-10 DIAGNOSIS — F11.90 UNCOMPLICATED OPIOID USE: ICD-10-CM

## 2020-11-10 NOTE — PROGRESS NOTES
LINKS immunization registry not responding  Care Everywhere updated  Health Maintenance updated  Chart reviewed for overdue Proactive [facility] Encounters (PAZ) health maintenance testing (CRS, Breast Ca, Diabetic Eye Exam)   Orders entered:N/A

## 2020-11-11 ENCOUNTER — OFFICE VISIT (OUTPATIENT)
Dept: BARIATRICS | Facility: CLINIC | Age: 60
End: 2020-11-11
Payer: MEDICARE

## 2020-11-11 ENCOUNTER — CLINICAL SUPPORT (OUTPATIENT)
Dept: BARIATRICS | Facility: CLINIC | Age: 60
End: 2020-11-11
Payer: MEDICARE

## 2020-11-11 VITALS
HEIGHT: 61 IN | HEART RATE: 106 BPM | WEIGHT: 254 LBS | OXYGEN SATURATION: 95 % | DIASTOLIC BLOOD PRESSURE: 82 MMHG | SYSTOLIC BLOOD PRESSURE: 128 MMHG | BODY MASS INDEX: 47.95 KG/M2

## 2020-11-11 DIAGNOSIS — I10 ESSENTIAL HYPERTENSION: Primary | ICD-10-CM

## 2020-11-11 DIAGNOSIS — E11.9 TYPE 2 DIABETES MELLITUS WITHOUT COMPLICATION, WITHOUT LONG-TERM CURRENT USE OF INSULIN: ICD-10-CM

## 2020-11-11 DIAGNOSIS — Z01.811 PRE-OP CHEST EXAM: ICD-10-CM

## 2020-11-11 DIAGNOSIS — E66.01 CLASS 3 SEVERE OBESITY DUE TO EXCESS CALORIES WITH SERIOUS COMORBIDITY AND BODY MASS INDEX (BMI) OF 45.0 TO 49.9 IN ADULT: ICD-10-CM

## 2020-11-11 DIAGNOSIS — M51.36 DDD (DEGENERATIVE DISC DISEASE), LUMBAR: ICD-10-CM

## 2020-11-11 PROCEDURE — 99999 PR PBB SHADOW E&M-EST. PATIENT-LVL II: CPT | Mod: PBBFAC,,, | Performed by: DIETITIAN, REGISTERED

## 2020-11-11 PROCEDURE — 3079F PR MOST RECENT DIASTOLIC BLOOD PRESSURE 80-89 MM HG: ICD-10-PCS | Mod: CPTII,S$GLB,, | Performed by: PHYSICIAN ASSISTANT

## 2020-11-11 PROCEDURE — 99205 OFFICE O/P NEW HI 60 MIN: CPT | Mod: S$GLB,,, | Performed by: PHYSICIAN ASSISTANT

## 2020-11-11 PROCEDURE — 3008F BODY MASS INDEX DOCD: CPT | Mod: CPTII,S$GLB,, | Performed by: PHYSICIAN ASSISTANT

## 2020-11-11 PROCEDURE — 99999 PR PBB SHADOW E&M-EST. PATIENT-LVL V: CPT | Mod: PBBFAC,,, | Performed by: PHYSICIAN ASSISTANT

## 2020-11-11 PROCEDURE — 3074F PR MOST RECENT SYSTOLIC BLOOD PRESSURE < 130 MM HG: ICD-10-PCS | Mod: CPTII,S$GLB,, | Performed by: PHYSICIAN ASSISTANT

## 2020-11-11 PROCEDURE — 3052F HG A1C>EQUAL 8.0%<EQUAL 9.0%: CPT | Mod: CPTII,S$GLB,, | Performed by: PHYSICIAN ASSISTANT

## 2020-11-11 PROCEDURE — 99205 PR OFFICE/OUTPT VISIT, NEW, LEVL V, 60-74 MIN: ICD-10-PCS | Mod: S$GLB,,, | Performed by: PHYSICIAN ASSISTANT

## 2020-11-11 PROCEDURE — 3079F DIAST BP 80-89 MM HG: CPT | Mod: CPTII,S$GLB,, | Performed by: PHYSICIAN ASSISTANT

## 2020-11-11 PROCEDURE — 99999 PR PBB SHADOW E&M-EST. PATIENT-LVL II: ICD-10-PCS | Mod: PBBFAC,,, | Performed by: DIETITIAN, REGISTERED

## 2020-11-11 PROCEDURE — 99499 NO LOS: ICD-10-PCS | Mod: S$GLB,,, | Performed by: DIETITIAN, REGISTERED

## 2020-11-11 PROCEDURE — 99499 UNLISTED E&M SERVICE: CPT | Mod: S$GLB,,, | Performed by: DIETITIAN, REGISTERED

## 2020-11-11 PROCEDURE — 99999 PR PBB SHADOW E&M-EST. PATIENT-LVL V: ICD-10-PCS | Mod: PBBFAC,,, | Performed by: PHYSICIAN ASSISTANT

## 2020-11-11 PROCEDURE — 3008F PR BODY MASS INDEX (BMI) DOCUMENTED: ICD-10-PCS | Mod: CPTII,S$GLB,, | Performed by: PHYSICIAN ASSISTANT

## 2020-11-11 PROCEDURE — 3052F PR MOST RECENT HEMOGLOBIN A1C LEVEL 8.0 - < 9.0%: ICD-10-PCS | Mod: CPTII,S$GLB,, | Performed by: PHYSICIAN ASSISTANT

## 2020-11-11 PROCEDURE — 3074F SYST BP LT 130 MM HG: CPT | Mod: CPTII,S$GLB,, | Performed by: PHYSICIAN ASSISTANT

## 2020-11-11 NOTE — PATIENT INSTRUCTIONS
Prior to surgery you will need to complete:  - Dietitian consult and follow up appointments as needed  - PCP clearance  - Labs  - Chest X-ray  - EKG  - Psychological evaluation, Please call psychiatry [phone] to schedule  - Stress test    In preparation for bariatric surgery, please complete the following:   · Discuss your current medications with your primary care provider, remember your medications will need to be crushed, chewable, or in liquid form for the first 2-4 weeks after a gastric bypass or sleeve.  For a gastric band, your medications will need to be crushed indefinitely.    · If you take a blood thinner such as: Coumadin (warfarin), Pradaxa (dabigatran), or Plavix (clopidogrel), you will need to speak with your prescribing provider on how or if this medication can be stopped before surgery.   · If you take a medication for depression or anxiety, you will need to begin crushing or opening the capsule 1-3 months prior to surgery.  Remember to discuss this with the psychologist or psychiatrist that you see.   · If you take medication for arthritis on a daily basis that is considered a non-steroidal anti-inflammatory (NSAID), please discuss with your prescribing physician an alternative medication.  After having gastric bypass or gastric sleeve, this group of medications is not appropriate to take due to increased risk of bleeding stomach ulcers.      DEFINITIONS  Appointments: Pre-scheduled meetings or consultations with any physician, advanced practice provider, dietitian, or psychologist, and labs, imaging studies, sleep studies, etc.   Late cancellation: Cancelling an appointment 24-48 hours prior to scheduled time.  No-Show appointment:  is when    You do NOT arrive to your appointment at the time its scheduled.   You call to cancel or cancel via [facility] less than 24 hours in advance of your scheduled appointment   You show up 15 minutes AFTER your scheduled appointment time without any  notification of being late.     POLICY  1. You are allowed up to 3 cancellations for appointments.    After 3 cancellations your case will be placed on hold for 2 months and after that time you can resume the program.   2. You are allowed only 1 no-show for an appointment.    You will be re-scheduled one time and if there is a 2nd no-show at any point, your case will be placed on hold for 3 months.  After 3 months you can resume the program.     3. Upon resuming the program after being placed on hold for either above mentioned reasons, if you have a late cancel or no show for any appointment, the bariatric team will review if youre an appropriate candidate for surgery at the monthly meeting.

## 2020-11-11 NOTE — PROGRESS NOTES
BARIATRIC NEW PATIENT EVALUATION    CHIEF COMPLAINT:   Morbid obesity, body mass index is 47.99 kg/m². and inability to lose weight.    HPI:  Tanika Wayne is a 60 y.o. morbidly obese female. Her current body mass index is 47.99 kg/m². She has multiple associated comorbidities including diabetes mellitus and hypertension.    Her highest adult weight was 300 lbs at age 58, and her lowest adult weight was 135-140 lbs at age 21 yr old.      The patient has tried calorie counting, watching her food choices, drink a lot of water, decrease white foods, drinks diet sodas.  She states that her biggest weakness is sweets.  The patient was most successful with Eva Ignacio's and Exercise with a weight loss of 115 lbs in 1980.  Her current exercise includes none 0 times a week. She denies any history of eating disorder such as anorexia, bulimia, or taking laxatives for weight loss, and denies any addiction including illicit substances, alcohol, or gambling.  Patient states she has a excellent  support system ( and children).  She lives with her family.  She is retired  at [employer]: tripped on a rug and had to retire/ go on disability.  She  denies a history of GERD.  The patient's goal is get off of medications and be more ambulatory/ active.    PAST MEDICAL HISTORY:  Past Medical History:   Diagnosis Date    Abnormal echocardiogram 11/26/2012    Arthritis     Breast cancer 1998    Breast cancer, right breast     Breast cancer, right breast 11/26/2012    S/P Lumpectomy / XRT '98 Dr. Bartholomew     Cardiomyopathy due to systemic disease     Cataract     Clotting disorder     Coronary artery disease     DM (diabetes mellitus) 11/26/2012    Encounter for blood transfusion     HTN (hypertension) 11/26/2012    Increased glucose level 11/26/2012    ITP (idiopathic thrombocytopenic purpura) 11/26/2012    Kidney stones 11/26/2012    PONV (postoperative nausea and vomiting)     Stenosis of  lumbosacral spine 2012    Thyroid disease     Tympanic membrane perforation 9/15/2014    right 2014 Dr. Jeffries       PAST SURGICAL HISTORY:  Past Surgical History:   Procedure Laterality Date    BREAST LUMPECTOMY Right 1998     SECTION      CHOLECYSTECTOMY      EYE SURGERY      strabismus, 2006    HIP ARTHROPLASTY Right 10/18/2018    Procedure: ARTHROPLASTY, HIP;  Surgeon: Baldomero Fischer MD;  Location: [facility];  Service: Orthopedics;  Laterality: Right;    HIP REPLACEMENT ARTHROPLASTY Right     dr fischer    HYSTERECTOMY      JOINT REPLACEMENT      hip    KNEE ARTHROPLASTY Right 2019    Procedure: ARTHROPLASTY, KNEE (ADD ON );  Surgeon: Baldomero Fischer MD;  Location: [facility];  Service: Orthopedics;  Laterality: Right;  (ADD ON )    L3-5 Laminectomy  2017    Dr. Mendoza    [facility]ENECT[facility] TOTAL  2001    [facility]    STRABISMUS SURGERY  8    OU    THYROIDECTOMY  2016    TONSILLECTOMY         FAMILY HISTORY:  Family History   Problem Relation Age of Onset    Heart disease Mother 69        MI    Diabetes Mother     Hypertension Mother     Diabetes Father     Hypertension Father     Stroke Father     Diabetes Sister     Diabetes Brother         SOCIAL HISTORY:  Social History     Socioeconomic History    Marital status:      Spouse name: Not on file    Number of children: 1    Years of education: Not on file    Highest education level: Not on file   Occupational History    Occupation: registration     Comment: [employer]   Social Needs    Financial resource strain: Not on file    Food insecurity     Worry: Not on file     Inability: Not on file    Transportation needs     Medical: Not on file     Non-medical: Not on file   Tobacco Use    Smoking status: Never Smoker    Smokeless tobacco: Never Used   Substance and Sexual Activity    Alcohol use: No    Drug use: No    Sexual activity: Never   Lifestyle    Physical activity     Days per week: Not on  "file     Minutes per session: Not on file    Stress: Not on file   Relationships    Social connections     Talks on phone: Not on file     Gets together: Not on file     Attends [employer] service: Not on file     Active member of club or organization: Not on file     Attends meetings of clubs or organizations: Not on file     Relationship status: Not on file   Other Topics Concern    Not on file   Social History Narrative    2017 - Using a walker    The patient is not getting much exercise but is able to get about with the aid of a cane or walker.     (has copd, arthritis), 2 kids, dtr 24, son 14 (healthy kids)       MEDICATIONS:  Medications have been reviewed.    ALLERGIES:  Allergies have been reviewed.    Review of Systems   Constitutional: Negative for chills, fever and weight loss.   Eyes: Negative for blurred vision, double vision and pain.   Respiratory: Negative for cough, shortness of breath and wheezing.    Cardiovascular: Negative for chest pain, palpitations and leg swelling.   Gastrointestinal: Negative for abdominal pain, blood in stool, constipation, diarrhea, heartburn, melena, nausea and vomiting.   Genitourinary: Negative for dysuria, frequency and hematuria.   Skin: Negative for itching and rash.   Neurological: Negative for headaches.   Psychiatric/Behavioral: Negative for depression and suicidal ideas.       Vitals:    11/11/20 1424   BP: 128/82   Pulse: 106   SpO2: 95%   Weight: 115.2 kg (253 lb 15.5 oz)   Height: 5' 1" (1.549 m)   PainSc:   3   PainLoc: Knee     Physical Exam  Vitals signs and nursing note reviewed.   Constitutional:       General: She is not in acute distress.     Appearance: Normal appearance. She is well-developed. She is obese. She is not toxic-appearing or diaphoretic.      Comments: Seated in a wheelchair.  Accompanied by her daughter, Carmen.   HENT:      Head: Normocephalic and atraumatic.   Eyes:      General: No scleral icterus.        Right eye: No " discharge.         Left eye: No discharge.      Conjunctiva/sclera: Conjunctivae normal.      Comments: Right eye ptosis   Neck:      Musculoskeletal: Neck supple.   Cardiovascular:      Rate and Rhythm: Normal rate and regular rhythm.      Heart sounds: Normal heart sounds. No murmur. No friction rub. No gallop.    Pulmonary:      Effort: Pulmonary effort is normal. No respiratory distress.      Breath sounds: Normal breath sounds. No wheezing or rales.   Abdominal:      General: Bowel sounds are normal. There is no distension.      Palpations: Abdomen is soft. There is no mass.      Tenderness: There is no abdominal tenderness. There is no guarding or rebound.      Hernia: No hernia is present.   Musculoskeletal: Normal range of motion.   Skin:     General: Skin is warm and dry.      Coloration: Skin is not pale.      Findings: No erythema or rash.   Neurological:      Mental Status: She is alert and oriented to person, place, and time.   Psychiatric:         Mood and Affect: Mood normal.         Speech: Speech normal.         Behavior: Behavior normal.         Thought Content: Thought content normal.         Judgment: Judgment normal.         DIAGNOSIS:  1. Morbid obesity, body mass index is 47.99 kg/m². and inability to lose weight.  2. Co-morbidities: diabetes mellitus and hypertension    PLAN:  The patient is a good candidate for Bariatric Surgery. The patient is interested in laparoscopic amanda-en-y gastric bypass. The surgery and post-op care was discussed in detail with the patient. All questions were answered.    The patient understands that bariatric surgery is a tool to aid in weight loss and that they need to be committed to the diet and exercise post-operatively for successful weight loss. Discussed with patient that bariatric surgery is not the easy way out and that it will take plenty of dedication on the patient's part to be successful. Also discussed the possibility of weight regain if the patient  strays from the diet guidelines or exercise requirements. Patient verbalized understanding and wishes to proceed with the work-up.    Estimated Goal weight is 190 lbs.    ORDERS:  1. Stress Test, Chest X-Ray and EKG  2. Psychological Consult, Bariatric Dietician Consult and PCP Clearance  3. Bariatric Labs: Per orders.    PCP: Rayo Hernandez MD  RTC: As scheduled.

## 2020-11-11 NOTE — PROGRESS NOTES
"NUTRITIONAL CONSULT    Referring Physician: Unsure of surgeon  Reason for MNT Referral: Initial assessment for laparoscopic Guerda-en-Y work-up    PAST MEDICAL HISTORY:   60 y.o. female  Body mass index is 47.99 kg/m²..  Weight history includes   The patient was most successful with Eva Ignacio's and Exercise with a weight loss of 115 lbs in 1980.   Dieting attempts include The patient has tried calorie counting, watching her food choices, drink a lot of water, decrease white foods, drinks diet sodas.  She states that her biggest weakness is sweets.    Past Medical History:   Diagnosis Date    Abnormal echocardiogram 11/26/2012    Arthritis     Breast cancer 1998    Breast cancer, right breast     Breast cancer, right breast 11/26/2012    S/P Lumpectomy / XRT '98 Dr. Bartholomew     Cardiomyopathy due to systemic disease     Cataract     Clotting disorder     Coronary artery disease     DM (diabetes mellitus) 11/26/2012    Encounter for blood transfusion     HTN (hypertension) 11/26/2012    Increased glucose level 11/26/2012    ITP (idiopathic thrombocytopenic purpura) 11/26/2012    Kidney stones 11/26/2012    PONV (postoperative nausea and vomiting)     Stenosis of lumbosacral spine 11/26/2012    Thyroid disease     Tympanic membrane perforation 9/15/2014    right 2014 Dr. Jeffries       CLINICAL DATA:  60 y.o.-year-old Black or  female.  Height: 5'1"  Weight: 254 lbs  IBW: 129 lbs  BMI: 47.99  The patient's goal weight (50% EBW): 191.5 lbs  Personal goal weight: 140-150 lbs Eva Pierre     Goal for Bariatric Surgery: to improve health, to improve quality of life and get off her medications    DAILY NUTRITIONAL NEEDS: pre-op nutritional bariatric guidelines to promote weight loss  4268-4689 Calories    Grams Protein    NUTRITION & HEALTH HISTORY:  Greatest challenge: sweets    Current diet recall:   B: eat yogurt and fruit or toast and eggs  L may skip if not hungry- meat and " veggies or turkey sandwich with chips and diet coke  D Meat and veggies corn, peas or mashed potatoes  Snack: Grapes     Current Diet:  Meal pattern: 2-3 meals per day and fruit as a snack  Protein supplements:Slimfast to do hip surgery   Snackin / day usually fruit  Vegetables: Likes a variety. Eats almost daily.  Fruits: Likes a variety. Eats daily.  Beverages: water, diet soda and no milk  Dining out: Weekly. 1-2 times per week Mostly take-out.  Cooking at home: Daily. 3-4 times per week Mostly air fried or  baked meat, fish, starchy CHO and vegetables. Small amount of rice    Exercise:  Past exercise: Fair Injured at work unable to exercise    Current exercise: None  Restrictions to exercise: knee pain    Vitamins / Minerals / Herbs:   Centrum silver mv      Labs:   reviewed    Food Allergies:   Milk makes her gassy  None reported    Social:  Retired.  Lives with  and 2 children 16 and 26 year old.  Grocery shopping and food prep daughter and   Patient believes the household will be supportive after surgery.  Alcohol: None. On special occasion  Smoking: None.    ASSESSMENT:  · Patient reports attempts at weight loss, only to regain lost weight.  · Patient demonstrated knowledge of healthy eating behaviors and exercise patterns; admits to not eating healthy and not exercising at this point.  · Patient states willingness to change lifestyle and make behavior modifications as evidenced by baking and using a air fryer.        Barriers to Education: none    Stage of change: determination    NUTRITION DIAGNOSIS:     Morbid Obesity related to Excessive carbohydrate intake, Excessive calorie intake, Inadequate protein intake and Physical inactivity as evidence by BMI.    BARIATRIC DIET DISCUSSION/PLAN:  Discussed diet after surgery and related to patient's food record.  Reviewed nutrition guidelines for before and after surgery.  Answered all questions.  Work on Bariatric Nutrition Checklist.  Work on  expanding variety of vegetables.  Work on gradually cutting back on starchy CHO in the diet.  1200-calorie diet.  1500-calorie diet.  5-6 meals per day.  Start including protein supplements in the diet plan daily.  Reduce frequency of dining out.  More grocery shopping and meal preparation at home.  Return to clinic.    RECOMMENDATIONS:  Patient is a potential candidate for bariatric surgery.    Needs additional visit(s) with RD.    Patient and Daughter verbalized understanding.    Expect good  compliance after surgery at this time.    Communicated nutrition plan with bariatric team.    SESSION TIME:  30 minutes

## 2020-11-11 NOTE — LETTER
[facility]nd [facility]  [address]  [facility] [address]  Phone: [phone]  Fax: [phone] November 11, 2020      Rayo Hernandez MD  [address]  [facility] [address]    Patient: Tanika Wayne   MR Number: [id number]   YOB: 1960   Date of Visit: 11/11/2020     Dear Dr. Rayo Hernandez:    Thank you for referring Tanika Wayne to me for evaluation. Attached you will find relevant portions of my assessment and plan of care.    DIAGNOSIS:  1. Morbid obesity, body mass index is 47.99 kg/m². and inability to lose weight.  2. Co-morbidities: diabetes mellitus and hypertension     PLAN:  The patient is a good candidate for Bariatric Surgery. The patient is interested in laparoscopic Guerda-en-Y gastric bypass. The surgery and post-op care was discussed in detail with the patient. All questions were answered.     The patient understands that bariatric surgery is a tool to aid in weight loss and that they need to be committed to the diet and exercise post-operatively for successful weight loss. Discussed with patient that bariatric surgery is not the easy way out and that it will take plenty of dedication on the patient's part to be successful. Also discussed the possibility of weight regain if the patient strays from the diet guidelines or exercise requirements. Patient verbalized understanding and wishes to proceed with the work-up.     Estimated Goal weight is 190 lbs.     ORDERS:  1. Stress Test, Chest X-Ray and EKG  2. Psychological Consult, Bariatric Dietician Consult and PCP Clearance  3. Bariatric Labs: Per orders.    If you have questions, please do not hesitate to call me. I look forward to following Tanika Wayne along with you.    Sincerely,        Destiny Terry PA-C    APW/afw

## 2020-11-14 RX ORDER — OXYCODONE AND ACETAMINOPHEN 5; 325 MG/1; MG/1
1 TABLET ORAL EVERY 12 HOURS PRN
Qty: 60 TABLET | Refills: 0 | Status: SHIPPED | OUTPATIENT
Start: 2020-11-14 | End: 2020-12-21 | Stop reason: SDUPTHER

## 2020-11-18 ENCOUNTER — HOSPITAL ENCOUNTER (OUTPATIENT)
Dept: RADIOLOGY | Facility: OTHER | Age: 60
Discharge: HOME | End: 2020-11-18
Attending: PHYSICIAN ASSISTANT
Payer: MEDICARE

## 2020-11-18 ENCOUNTER — HOSPITAL ENCOUNTER (OUTPATIENT)
Dept: CARDIOLOGY | Facility: OTHER | Age: 60
Discharge: HOME | End: 2020-11-18
Attending: PHYSICIAN ASSISTANT
Payer: MEDICARE

## 2020-11-18 VITALS — BODY MASS INDEX: 47.95 KG/M2 | WEIGHT: 254 LBS | HEIGHT: 61 IN

## 2020-11-18 DIAGNOSIS — E66.01 CLASS 3 SEVERE OBESITY DUE TO EXCESS CALORIES WITH SERIOUS COMORBIDITY AND BODY MASS INDEX (BMI) OF 45.0 TO 49.9 IN ADULT: ICD-10-CM

## 2020-11-18 DIAGNOSIS — Z01.811 PRE-OP CHEST EXAM: ICD-10-CM

## 2020-11-18 DIAGNOSIS — E11.9 TYPE 2 DIABETES MELLITUS WITHOUT COMPLICATION, WITHOUT LONG-TERM CURRENT USE OF INSULIN: ICD-10-CM

## 2020-11-18 DIAGNOSIS — I10 ESSENTIAL HYPERTENSION: ICD-10-CM

## 2020-11-18 DIAGNOSIS — M51.36 DDD (DEGENERATIVE DISC DISEASE), LUMBAR: ICD-10-CM

## 2020-11-18 PROCEDURE — 93005 ELECTROCARDIOGRAM TRACING: CPT

## 2020-11-18 PROCEDURE — 71046 X-RAY EXAM CHEST 2 VIEWS: CPT | Mod: 26,,, | Performed by: INTERNAL MEDICINE

## 2020-11-18 PROCEDURE — 71046 XR CHEST PA AND LATERAL: ICD-10-PCS | Mod: 26,,, | Performed by: INTERNAL MEDICINE

## 2020-11-18 PROCEDURE — 71046 X-RAY EXAM CHEST 2 VIEWS: CPT | Mod: TC,FY

## 2020-11-18 PROCEDURE — 93010 EKG 12-LEAD: ICD-10-PCS | Mod: ,,, | Performed by: INTERNAL MEDICINE

## 2020-11-18 PROCEDURE — 93010 ELECTROCARDIOGRAM REPORT: CPT | Mod: ,,, | Performed by: INTERNAL MEDICINE

## 2020-11-30 ENCOUNTER — OFFICE VISIT (OUTPATIENT)
Dept: OPTOMETRY | Facility: CLINIC | Age: 60
End: 2020-11-30
Payer: MEDICARE

## 2020-11-30 DIAGNOSIS — H25.13 SENILE NUCLEAR SCLEROSIS, BILATERAL: ICD-10-CM

## 2020-11-30 DIAGNOSIS — H04.123 DRY EYE SYNDROME OF BOTH EYES: ICD-10-CM

## 2020-11-30 DIAGNOSIS — H52.4 MYOPIA WITH ASTIGMATISM AND PRESBYOPIA, BILATERAL: ICD-10-CM

## 2020-11-30 DIAGNOSIS — H43.393 VISUAL FLOATERS, BILATERAL: Primary | ICD-10-CM

## 2020-11-30 DIAGNOSIS — H52.203 MYOPIA WITH ASTIGMATISM AND PRESBYOPIA, BILATERAL: ICD-10-CM

## 2020-11-30 DIAGNOSIS — H02.402 PTOSIS OF EYELID, LEFT: ICD-10-CM

## 2020-11-30 DIAGNOSIS — E11.9 TYPE 2 DIABETES MELLITUS WITHOUT RETINOPATHY: ICD-10-CM

## 2020-11-30 DIAGNOSIS — H52.13 MYOPIA WITH ASTIGMATISM AND PRESBYOPIA, BILATERAL: ICD-10-CM

## 2020-11-30 DIAGNOSIS — E11.36 TYPE 2 DIABETES MELLITUS WITH CATARACT: ICD-10-CM

## 2020-11-30 DIAGNOSIS — H10.13 ALLERGIC CONJUNCTIVITIS OF BOTH EYES: ICD-10-CM

## 2020-11-30 PROCEDURE — 2023F PR DILATED RETINAL EXAM W/O EVID OF RETINOPATHY: ICD-10-PCS | Mod: S$GLB,,, | Performed by: OPTOMETRIST

## 2020-11-30 PROCEDURE — 99499 RISK ADDL DX/OHS AUDIT: ICD-10-PCS | Mod: S$GLB,,, | Performed by: OPTOMETRIST

## 2020-11-30 PROCEDURE — 92014 PR EYE EXAM, EST PATIENT,COMPREHESV: ICD-10-PCS | Mod: S$GLB,,, | Performed by: OPTOMETRIST

## 2020-11-30 PROCEDURE — 1126F PR PAIN SEVERITY QUANTIFIED, NO PAIN PRESENT: ICD-10-PCS | Mod: S$GLB,,, | Performed by: OPTOMETRIST

## 2020-11-30 PROCEDURE — 1126F AMNT PAIN NOTED NONE PRSNT: CPT | Mod: S$GLB,,, | Performed by: OPTOMETRIST

## 2020-11-30 PROCEDURE — 2023F DILAT RTA XM W/O RTNOPTHY: CPT | Mod: S$GLB,,, | Performed by: OPTOMETRIST

## 2020-11-30 PROCEDURE — 99999 PR PBB SHADOW E&M-EST. PATIENT-LVL III: CPT | Mod: PBBFAC,,, | Performed by: OPTOMETRIST

## 2020-11-30 PROCEDURE — 99499 UNLISTED E&M SERVICE: CPT | Mod: S$GLB,,, | Performed by: OPTOMETRIST

## 2020-11-30 PROCEDURE — 92014 COMPRE OPH EXAM EST PT 1/>: CPT | Mod: S$GLB,,, | Performed by: OPTOMETRIST

## 2020-11-30 PROCEDURE — 99999 PR PBB SHADOW E&M-EST. PATIENT-LVL III: ICD-10-PCS | Mod: PBBFAC,,, | Performed by: OPTOMETRIST

## 2020-11-30 NOTE — PROGRESS NOTES
HPI     JAMES:10/14/2019  Glasses? Yes   Contacts? no  H/o eye surgery, injections or laser: muscle sx 10+yrs OU   H/o eye injury: damage to left eyelid after hitting the curb   Known eye conditions? Cataracts   Family h/o eye conditions? no  Eye gtts?OTC tears     (-) Flashes (+) Floaters OU occasional  (-) Mucous   (-) Tearing (+) Itching OU occasional  (-) Burning   (-) Headaches (-) Eye Pain/discomfort (-) Irritation   (-) Redness (-) Double vision (+) Blurry vision OU intermittent with   current prescription     Diabetic? (+)  A1c?  (Hemoglobin A1C       Date                     Value               Ref Range             Status                11/18/2020               7.3 (H)             4.0 - 5.6 %           Final              Comment:    ADA Screening Guidelines:  5.7-6.4%  Consistent with   prediabetes  >or=6.5%  Consistent with diabetes  High levels of fetal   hemoglobin interfere with the HbA1C  assay. Heterozygous hemoglobin   variants (HbS, HgC, etc)do  not significantly interfere with this assay.     However, presence of multiple variants may affect accuracy.         09/14/2020               8.1 (H)             4.0 - 5.6 %           Final              Comment:    ADA Screening Guidelines:  5.7-6.4%  Consistent with   prediabetes  >or=6.5%  Consistent with diabetes  High levels of fetal   hemoglobin interfere with the HbA1C  assay. Heterozygous hemoglobin   variants (HbS, HgC, etc)do  not significantly interfere with this assay.     However, presence of multiple variants may affect accuracy.         11/20/2019               6.7 (H)             4.0 - 5.6 %           Final              Comment:    ADA Screening Guidelines:  5.7-6.4%  Consistent with   prediabetes  >or=6.5%  Consistent with diabetes  High levels of fetal   hemoglobin interfere with the HbA1C  assay. Heterozygous hemoglobin   variants (HbS, HgC, etc)do  not significantly interfere with this assay.     However, presence of multiple variants may  affect accuracy.    ----------)        Last edited by Darleen Jj on 11/30/2020  4:02 PM. (History)            Assessment /Plan     For exam results, see Encounter Report.    Visual floaters, bilateral    Allergic conjunctivitis of both eyes    Ptosis of eyelid, left    Senile nuclear sclerosis, bilateral    Dry eye syndrome of both eyes    Myopia with astigmatism and presbyopia, bilateral    Type 2 diabetes mellitus without retinopathy    Type 2 diabetes mellitus with cataract    1. No e/o h/b/t 360 degrees OU. Monitor for worsening of symptoms or S/Sx of RD.   2. Recommend Zaditor or Alaway bid OU and cool compresses to help soothe itching. Patient advised to RTC if condition gets worse.   3. Longstanding secondary to injury 20+ years ago. Monitor.   4, 8. Visually significant OS. Vision decreased from 20/40 to 20/60. Pt notes difference and closes her OS. VA does not correlate with appearance of cataract. Educated pt on potential for surgery. Referral to Dr. Tolliver.   5. Recommend Systane Ultra or Refresh Optive BID-TID OU to aid with symptoms of dry eyes.  6. Hold SRx.  7. BS control. No signs of diabetic retinopathy. Monitor with annual exam.    OCT of macula normal OD. OS not centered (too high) and unable to open Ulysses juliann on all computers to see macula.

## 2020-12-08 ENCOUNTER — TELEPHONE (OUTPATIENT)
Dept: OPTOMETRY | Facility: CLINIC | Age: 60
End: 2020-12-08

## 2020-12-10 ENCOUNTER — CLINICAL SUPPORT (OUTPATIENT)
Dept: BARIATRICS | Facility: CLINIC | Age: 60
End: 2020-12-10
Payer: MEDICARE

## 2020-12-10 DIAGNOSIS — Z71.3 DIETARY COUNSELING AND SURVEILLANCE: ICD-10-CM

## 2020-12-10 DIAGNOSIS — E11.9 TYPE 2 DIABETES MELLITUS WITHOUT COMPLICATION, WITHOUT LONG-TERM CURRENT USE OF INSULIN: ICD-10-CM

## 2020-12-10 DIAGNOSIS — E66.01 MORBID OBESITY WITH BODY MASS INDEX (BMI) OF 40.0 TO 49.9: ICD-10-CM

## 2020-12-10 DIAGNOSIS — I10 ESSENTIAL HYPERTENSION: ICD-10-CM

## 2020-12-10 PROCEDURE — 97803 PR MED NUTR THER, SUBSQ, INDIV, EA 15 MIN: ICD-10-PCS | Mod: 95,,, | Performed by: DIETITIAN, REGISTERED

## 2020-12-10 PROCEDURE — 99499 NO LOS: ICD-10-PCS | Mod: 95,,, | Performed by: DIETITIAN, REGISTERED

## 2020-12-10 PROCEDURE — 97803 MED NUTRITION INDIV SUBSEQ: CPT | Mod: 95,,, | Performed by: DIETITIAN, REGISTERED

## 2020-12-10 PROCEDURE — 99499 UNLISTED E&M SERVICE: CPT | Mod: 95,,, | Performed by: DIETITIAN, REGISTERED

## 2020-12-10 NOTE — PROGRESS NOTES
The patient location is:  Patient Home   The chief complaint leading to consultation is: morbid obesity in work up for bariatric surgery  Visit type: Virtual visit with synchronous audio and video  Total time spent with patient: 30 minutes  Each patient to whom he or she provides medical services by telemedicine is:  (1) informed of the relationship between the physician and patient and the respective role of any other health care provider with respect to management of the patient; and (2) notified that he or she may decline to receive medical services by telemedicine and may withdraw from such care at any time.  Nutrition Handout located in AVS section of pt's [facility] and/or sent as a message via [facility] portal.  Pt informed of handout and how to access this information in [employer] juliann.    NUTRITION NOTE    Referring Physician: unsure  Reason for MNT Referral: Medically Supervised Diet pending Gastric Bypass    Patient presents for 2nd visit for MSD with weight pt did not weigh over the past month; total weight loss by making numerous dietary and lifestyle changes.    CLINICAL DATA:  60 y.o. female.    Current Weight:  lbs no new weight  Weight Change Since Initial Visit: unknown due to no weight  Ideal Body Weight: 129 lbs  There is no height or weight on file to calculate BMI.   Last and Initial Weight: 254 lbs            DAILY NUTRITIONAL NEEDS: pre-op nutritional bariatric guidelines to promote weight loss  [id number] Calories    Grams Protein   CURRENT DIET:  Reduced-calorie diet.  Diet Recall: Food records are not present.    Current diet recall:   B: meat and eggs  L Skips  D Meat and veggies decreased amt corn, peas or mashed potatoes  Snack: Grapes or plums or yogurt   Slim Fast and metformin made her go to bathroom    Diet Includes: 2 meals and fruit as snacks  Meal Pattern: Improved.  Protein Supplements: 0-1per day.  Snacking: Adequate. Snacks include fruits.  Vegetables: Likes a variety.  Eats 2-3 times a week .  Fruits: Likes a variety. Eats daily.  Beverages: water, diet soda and no milk  Dining out: Weekly. 1-2 times per week Mostly fast food.  Cooking at home: Daily. 3-4 times per week Mostly air fried or  baked meat, fish,  and vegetables. Small amount of rice at times     Exercise:  Current exercise: Walking around house  Restrictions to exercise: knee pain     Vitamins / Minerals / Herbs:   Centrum silver mv      Labs:   reviewed     Food Allergies:   Milk makes her gassy  None reported     Social:  Retired.  Alcohol: None. On special occasion  Smoking: None.    ASSESSMENT:  Patient demonstrates some willingness to change lifestyle habits as evidenced by dietary changes, increased fruits, increased vegetables, more food preparation at stone, healthier cooking at home and healthier snacking at home.    Doing well with working on greatest challenges (sweets).    Barriers to Education:  none  Stage of Change:  action    NUTRITION DIAGNOSIS:  Morbid Obesity related to Physical inactivity as evidence by BMI.  Status: Unknown due to pt     PLAN:  Pt is potential candidate for surgery.    Diet: Adjust diet plan.  1200-calorie diet.  1500-calorie diet.  5-6 meals per day.  Start including protein supplements in the diet plan daily.  Return to clinic.    Exercise: As tolerated.    Behavior Modification: Begin to document food & activity logs daily.        Return to clinic in one month.  Needs additional visit(s) with URIEL.    Communicated nutrition plan with bariatric team.    SESSION TIME:  30 minutes

## 2020-12-11 ENCOUNTER — PATIENT MESSAGE (OUTPATIENT)
Dept: OTHER | Facility: OTHER | Age: 60
End: 2020-12-11

## 2020-12-20 ENCOUNTER — PATIENT MESSAGE (OUTPATIENT)
Dept: PHYSICAL MEDICINE AND REHAB | Facility: CLINIC | Age: 60
End: 2020-12-20

## 2020-12-21 ENCOUNTER — IMMUNIZATION (OUTPATIENT)
Dept: PHARMACY | Facility: CLINIC | Age: 60
End: 2020-12-21
Payer: MEDICARE

## 2020-12-21 ENCOUNTER — OFFICE VISIT (OUTPATIENT)
Dept: INTERNAL MEDICINE | Facility: CLINIC | Age: 60
End: 2020-12-21
Payer: MEDICARE

## 2020-12-21 VITALS
HEIGHT: 61 IN | SYSTOLIC BLOOD PRESSURE: 140 MMHG | HEART RATE: 100 BPM | DIASTOLIC BLOOD PRESSURE: 90 MMHG | WEIGHT: 253.5 LBS | BODY MASS INDEX: 47.86 KG/M2 | OXYGEN SATURATION: 99 %

## 2020-12-21 DIAGNOSIS — E11.9 TYPE 2 DIABETES MELLITUS WITHOUT COMPLICATION, WITHOUT LONG-TERM CURRENT USE OF INSULIN: Primary | ICD-10-CM

## 2020-12-21 DIAGNOSIS — Z96.651 STATUS POST TOTAL RIGHT KNEE REPLACEMENT: ICD-10-CM

## 2020-12-21 DIAGNOSIS — M43.16 SPONDYLOLISTHESIS AT L3-L4 LEVEL: ICD-10-CM

## 2020-12-21 DIAGNOSIS — M48.062 SPINAL STENOSIS OF LUMBAR REGION WITH NEUROGENIC CLAUDICATION: ICD-10-CM

## 2020-12-21 DIAGNOSIS — F11.90 UNCOMPLICATED OPIOID USE: ICD-10-CM

## 2020-12-21 DIAGNOSIS — G89.29 CHRONIC BILATERAL LOW BACK PAIN WITH RIGHT-SIDED SCIATICA: ICD-10-CM

## 2020-12-21 DIAGNOSIS — M54.41 CHRONIC BILATERAL LOW BACK PAIN WITH RIGHT-SIDED SCIATICA: ICD-10-CM

## 2020-12-21 DIAGNOSIS — M54.9 BACK PAIN, UNSPECIFIED BACK LOCATION, UNSPECIFIED BACK PAIN LATERALITY, UNSPECIFIED CHRONICITY: ICD-10-CM

## 2020-12-21 DIAGNOSIS — Z96.641 STATUS POST RIGHT HIP REPLACEMENT: ICD-10-CM

## 2020-12-21 PROCEDURE — 3077F SYST BP >= 140 MM HG: CPT | Mod: CPTII,S$GLB,, | Performed by: INTERNAL MEDICINE

## 2020-12-21 PROCEDURE — 1125F AMNT PAIN NOTED PAIN PRSNT: CPT | Mod: S$GLB,,, | Performed by: INTERNAL MEDICINE

## 2020-12-21 PROCEDURE — 99999 PR PBB SHADOW E&M-EST. PATIENT-LVL V: ICD-10-PCS | Mod: PBBFAC,,, | Performed by: INTERNAL MEDICINE

## 2020-12-21 PROCEDURE — 99214 OFFICE O/P EST MOD 30 MIN: CPT | Mod: S$GLB,,, | Performed by: INTERNAL MEDICINE

## 2020-12-21 PROCEDURE — 3051F PR MOST RECENT HEMOGLOBIN A1C LEVEL 7.0 - < 8.0%: ICD-10-PCS | Mod: CPTII,S$GLB,, | Performed by: INTERNAL MEDICINE

## 2020-12-21 PROCEDURE — 3077F PR MOST RECENT SYSTOLIC BLOOD PRESSURE >= 140 MM HG: ICD-10-PCS | Mod: CPTII,S$GLB,, | Performed by: INTERNAL MEDICINE

## 2020-12-21 PROCEDURE — 99214 PR OFFICE/OUTPT VISIT, EST, LEVL IV, 30-39 MIN: ICD-10-PCS | Mod: S$GLB,,, | Performed by: INTERNAL MEDICINE

## 2020-12-21 PROCEDURE — 3008F BODY MASS INDEX DOCD: CPT | Mod: CPTII,S$GLB,, | Performed by: INTERNAL MEDICINE

## 2020-12-21 PROCEDURE — 1125F PR PAIN SEVERITY QUANTIFIED, PAIN PRESENT: ICD-10-PCS | Mod: S$GLB,,, | Performed by: INTERNAL MEDICINE

## 2020-12-21 PROCEDURE — 3051F HG A1C>EQUAL 7.0%<8.0%: CPT | Mod: CPTII,S$GLB,, | Performed by: INTERNAL MEDICINE

## 2020-12-21 PROCEDURE — 3080F DIAST BP >= 90 MM HG: CPT | Mod: CPTII,S$GLB,, | Performed by: INTERNAL MEDICINE

## 2020-12-21 PROCEDURE — 99999 PR PBB SHADOW E&M-EST. PATIENT-LVL V: CPT | Mod: PBBFAC,,, | Performed by: INTERNAL MEDICINE

## 2020-12-21 PROCEDURE — 3008F PR BODY MASS INDEX (BMI) DOCUMENTED: ICD-10-PCS | Mod: CPTII,S$GLB,, | Performed by: INTERNAL MEDICINE

## 2020-12-21 PROCEDURE — 3080F PR MOST RECENT DIASTOLIC BLOOD PRESSURE >= 90 MM HG: ICD-10-PCS | Mod: CPTII,S$GLB,, | Performed by: INTERNAL MEDICINE

## 2020-12-21 RX ORDER — METFORMIN HYDROCHLORIDE 500 MG/1
1000 TABLET, EXTENDED RELEASE ORAL 2 TIMES DAILY WITH MEALS
Qty: 360 TABLET | Refills: 11 | Status: SHIPPED | OUTPATIENT
Start: 2020-12-21 | End: 2021-05-06

## 2020-12-21 NOTE — PROGRESS NOTES
Subjective:       Patient ID: Tanika Wayne is a 60 y.o. female.    Chief Complaint: Follow-up    Patient is here for followup for chronic conditions.    No new health issues.    She is hoping she can have WLS.    Review of Systems   Constitutional: Negative for fatigue, fever and unexpected weight change.   HENT: Negative for congestion, facial swelling (resolved), hearing loss and sore throat.    Eyes: Negative for visual disturbance.   Respiratory: Negative for cough, shortness of breath and wheezing.    Cardiovascular: Negative for chest pain and palpitations.   Gastrointestinal: Negative for abdominal distention, abdominal pain, blood in stool, diarrhea, nausea and vomiting.   Genitourinary: Negative for difficulty urinating, dysuria, frequency and hematuria.   Musculoskeletal: Positive for arthralgias and gait problem. Negative for joint swelling.   Skin: Negative for color change and rash.   Neurological: Negative for dizziness, syncope, weakness and headaches.   Hematological: Negative for adenopathy.   Psychiatric/Behavioral: Positive for sleep disturbance. Negative for confusion, decreased concentration and suicidal ideas. The patient is nervous/anxious.            Objective:      Physical Exam  Constitutional:       General: She is not in acute distress.     Appearance: She is well-developed. She is not diaphoretic.   HENT:      Head: Normocephalic and atraumatic.      Mouth/Throat:      Pharynx: No oropharyngeal exudate.   Eyes:      General: No scleral icterus.     Pupils: Pupils are equal, round, and reactive to light.   Neck:      Musculoskeletal: Normal range of motion and neck supple.      Thyroid: No thyromegaly.      Comments: Thyroid surg scar present  Cardiovascular:      Rate and Rhythm: Normal rate and regular rhythm.      Heart sounds: Normal heart sounds. No murmur. No friction rub. No gallop.    Pulmonary:      Effort: Pulmonary effort is normal. No respiratory distress.      Breath  sounds: Normal breath sounds. No wheezing or rales.   Abdominal:      General: Bowel sounds are normal. There is no distension.      Palpations: Abdomen is soft. There is no mass.      Tenderness: There is no abdominal tenderness. There is no guarding or rebound.   Musculoskeletal: Normal range of motion.         General: No tenderness.   Lymphadenopathy:      Cervical: No cervical adenopathy.   Neurological:      Mental Status: She is alert and oriented to person, place, and time.   Psychiatric:         Mood and Affect: Mood normal.         Speech: Speech normal.         Behavior: Behavior normal.         Thought Content: Thought content normal.         Judgment: Judgment normal.         Assessment:       1. Type 2 diabetes mellitus without complication, without long-term current use of insulin        Plan:       Tanika was seen today for follow-up.    Diagnoses and all orders for this visit:    Type 2 diabetes mellitus without complication, without long-term current use of insulin  -     metFORMIN (GLUCOPHAGE-XR) 500 MG ER 24hr tablet; Take 2 tablets (1,000 mg total) by mouth 2 (two) times daily with meals.  She feels like nml metformin has caused loose stools and fecal accidents, will try ER    WLS -- I think she is a good candidate as well, reviewed recent test results with her    HTN -- needs to lose wt. Most recent numbers are OK, if continues to be elevated then inc hydrochlorothiazide component    Health Maintenance       Date Due Completion Date    HIV Screening 01/[birth year]1976 ---    Shingles Vaccine (2 of 2) 11/09/2020 9/14/2020    Mammogram 02/11/2021 2/11/2020    Hemoglobin A1c 05/18/2021 11/18/2020    TETANUS VACCINE 07/01/2021 7/1/2011    Foot Exam 09/14/2021 9/14/2020    Override on 9/14/2020: Done    Lipid Panel 11/18/2021 11/18/2020    Eye Exam 11/30/2021 11/30/2020    Low Dose Statin 12/21/2021 12/21/2020    Colorectal Cancer Screening 10/25/2022 10/25/2017          Follow up for Shingles vaccine  please.    Future Appointments   Date Time Provider Department Center   1/11/2021  2:00 PM Caryn Wynn RD [facility]   1/12/2021 10:45 AM Jammie Silva [facility]   1/19/2021 10:30 AM Klever Tolliver MD [facility]   3/15/2021  1:40 PM Rayo Hernandez MD [facility]

## 2020-12-21 NOTE — PATIENT INSTRUCTIONS
Here is some information regarding the vaccine, this information is updated frequently. I recommend that you check these websites for more vaccine information.  https://blog.[email or web address]/articles/covid-[email or web address]-vaccine-jyv-jdye-ma-first  [email or web address]://www.[email or web address].[email or web address]/coronavirus/vaccine-faqs

## 2020-12-24 RX ORDER — OXYCODONE AND ACETAMINOPHEN 5; 325 MG/1; MG/1
1 TABLET ORAL EVERY 12 HOURS PRN
Qty: 60 TABLET | Refills: 0 | Status: SHIPPED | OUTPATIENT
Start: 2020-12-24 | End: 2021-01-21 | Stop reason: SDUPTHER

## 2021-01-12 ENCOUNTER — CLINICAL SUPPORT (OUTPATIENT)
Dept: BARIATRICS | Facility: CLINIC | Age: 61
End: 2021-01-12
Payer: MEDICARE

## 2021-01-12 PROCEDURE — 99499 NO LOS: ICD-10-PCS | Mod: 95,,, | Performed by: DIETITIAN, REGISTERED

## 2021-01-12 PROCEDURE — 99499 UNLISTED E&M SERVICE: CPT | Mod: 95,,, | Performed by: DIETITIAN, REGISTERED

## 2021-01-19 ENCOUNTER — NUTRITION (OUTPATIENT)
Dept: DIABETES | Facility: CLINIC | Age: 61
End: 2021-01-19
Payer: MEDICARE

## 2021-01-19 ENCOUNTER — OFFICE VISIT (OUTPATIENT)
Dept: OPHTHALMOLOGY | Facility: CLINIC | Age: 61
End: 2021-01-19
Attending: OPHTHALMOLOGY
Payer: MEDICARE

## 2021-01-19 VITALS — BODY MASS INDEX: 48.15 KG/M2 | WEIGHT: 254.88 LBS

## 2021-01-19 DIAGNOSIS — Z01.818 PRE-OP TESTING: ICD-10-CM

## 2021-01-19 DIAGNOSIS — E11.9 TYPE 2 DIABETES MELLITUS WITHOUT COMPLICATION, WITHOUT LONG-TERM CURRENT USE OF INSULIN: Primary | ICD-10-CM

## 2021-01-19 DIAGNOSIS — H52.223 REGULAR ASTIGMATISM OF BOTH EYES: ICD-10-CM

## 2021-01-19 DIAGNOSIS — H25.13 NUCLEAR SCLEROSIS, BILATERAL: Primary | ICD-10-CM

## 2021-01-19 PROCEDURE — 92014 COMPRE OPH EXAM EST PT 1/>: CPT | Mod: HCNC,S$GLB,, | Performed by: OPHTHALMOLOGY

## 2021-01-19 PROCEDURE — G0108 DIAB MANAGE TRN  PER INDIV: HCPCS | Mod: HCNC,S$GLB,, | Performed by: DIETITIAN, REGISTERED

## 2021-01-19 PROCEDURE — G0108 PR DIAB MANAGE TRN  PER INDIV: ICD-10-PCS | Mod: HCNC,S$GLB,, | Performed by: DIETITIAN, REGISTERED

## 2021-01-19 PROCEDURE — 2023F PR DILATED RETINAL EXAM W/O EVID OF RETINOPATHY: ICD-10-PCS | Mod: HCNC,S$GLB,, | Performed by: OPHTHALMOLOGY

## 2021-01-19 PROCEDURE — 1126F PR PAIN SEVERITY QUANTIFIED, NO PAIN PRESENT: ICD-10-PCS | Mod: HCNC,S$GLB,, | Performed by: OPHTHALMOLOGY

## 2021-01-19 PROCEDURE — 99999 PR PBB SHADOW E&M-EST. PATIENT-LVL III: ICD-10-PCS | Mod: PBBFAC,HCNC,, | Performed by: OPHTHALMOLOGY

## 2021-01-19 PROCEDURE — 92136 OPHTHALMIC BIOMETRY: CPT | Mod: HCNC,LT,S$GLB, | Performed by: OPHTHALMOLOGY

## 2021-01-19 PROCEDURE — 99999 PR PBB SHADOW E&M-EST. PATIENT-LVL III: CPT | Mod: PBBFAC,HCNC,, | Performed by: OPHTHALMOLOGY

## 2021-01-19 PROCEDURE — 92014 PR EYE EXAM, EST PATIENT,COMPREHESV: ICD-10-PCS | Mod: HCNC,S$GLB,, | Performed by: OPHTHALMOLOGY

## 2021-01-19 PROCEDURE — 2023F DILAT RTA XM W/O RTNOPTHY: CPT | Mod: HCNC,S$GLB,, | Performed by: OPHTHALMOLOGY

## 2021-01-19 PROCEDURE — 1126F AMNT PAIN NOTED NONE PRSNT: CPT | Mod: HCNC,S$GLB,, | Performed by: OPHTHALMOLOGY

## 2021-01-19 PROCEDURE — 92136 IOL MASTER - OU - BOTH EYES: ICD-10-PCS | Mod: HCNC,LT,S$GLB, | Performed by: OPHTHALMOLOGY

## 2021-01-19 RX ORDER — PREDNISOLONE ACETATE-GATIFLOXACIN-BROMFENAC .75; 5; 1 MG/ML; MG/ML; MG/ML
1 SUSPENSION/ DROPS OPHTHALMIC 3 TIMES DAILY
Qty: 5 ML | Refills: 3 | Status: SHIPPED | OUTPATIENT
Start: 2021-01-19 | End: 2021-03-18

## 2021-01-19 RX ORDER — MOXIFLOXACIN 5 MG/ML
1 SOLUTION/ DROPS OPHTHALMIC
Status: CANCELLED | OUTPATIENT
Start: 2021-01-19

## 2021-01-19 RX ORDER — PHENYLEPHRINE HYDROCHLORIDE 25 MG/ML
1 SOLUTION/ DROPS OPHTHALMIC
Status: CANCELLED | OUTPATIENT
Start: 2021-01-19

## 2021-01-19 RX ORDER — TROPICAMIDE 10 MG/ML
1 SOLUTION/ DROPS OPHTHALMIC
Status: CANCELLED | OUTPATIENT
Start: 2021-01-19

## 2021-01-19 RX ORDER — SODIUM CHLORIDE 0.9 % (FLUSH) 0.9 %
10 SYRINGE (ML) INJECTION
Status: SHIPPED | OUTPATIENT
Start: 2021-01-19

## 2021-01-19 RX ORDER — TETRACAINE HYDROCHLORIDE 5 MG/ML
1 SOLUTION OPHTHALMIC
Status: CANCELLED | OUTPATIENT
Start: 2021-01-19

## 2021-01-21 ENCOUNTER — PATIENT MESSAGE (OUTPATIENT)
Dept: PHYSICAL MEDICINE AND REHAB | Facility: CLINIC | Age: 61
End: 2021-01-21

## 2021-01-21 DIAGNOSIS — Z96.651 STATUS POST TOTAL RIGHT KNEE REPLACEMENT: ICD-10-CM

## 2021-01-21 DIAGNOSIS — G89.29 CHRONIC BILATERAL LOW BACK PAIN WITH RIGHT-SIDED SCIATICA: ICD-10-CM

## 2021-01-21 DIAGNOSIS — M48.062 SPINAL STENOSIS OF LUMBAR REGION WITH NEUROGENIC CLAUDICATION: ICD-10-CM

## 2021-01-21 DIAGNOSIS — M54.9 BACK PAIN, UNSPECIFIED BACK LOCATION, UNSPECIFIED BACK PAIN LATERALITY, UNSPECIFIED CHRONICITY: ICD-10-CM

## 2021-01-21 DIAGNOSIS — M54.41 CHRONIC BILATERAL LOW BACK PAIN WITH RIGHT-SIDED SCIATICA: ICD-10-CM

## 2021-01-21 DIAGNOSIS — F11.90 UNCOMPLICATED OPIOID USE: ICD-10-CM

## 2021-01-21 DIAGNOSIS — Z96.641 STATUS POST RIGHT HIP REPLACEMENT: ICD-10-CM

## 2021-01-21 DIAGNOSIS — M43.16 SPONDYLOLISTHESIS AT L3-L4 LEVEL: ICD-10-CM

## 2021-01-21 DIAGNOSIS — H25.12 NUCLEAR SCLEROTIC CATARACT OF LEFT EYE: Primary | ICD-10-CM

## 2021-01-24 RX ORDER — OXYCODONE AND ACETAMINOPHEN 5; 325 MG/1; MG/1
1 TABLET ORAL EVERY 12 HOURS PRN
Qty: 60 TABLET | Refills: 0 | Status: SHIPPED | OUTPATIENT
Start: 2021-01-24 | End: 2021-02-22 | Stop reason: SDUPTHER

## 2021-02-01 ENCOUNTER — HOSPITAL ENCOUNTER (OUTPATIENT)
Dept: PREADMISSION TESTING | Facility: OTHER | Age: 61
Discharge: HOME | End: 2021-02-01
Attending: OPHTHALMOLOGY
Payer: MEDICARE

## 2021-02-01 DIAGNOSIS — Z01.818 PRE-OP TESTING: ICD-10-CM

## 2021-02-01 PROCEDURE — U0003 INFECTIOUS AGENT DETECTION BY NUCLEIC ACID (DNA OR RNA); SEVERE ACUTE RESPIRATORY SYNDROME CORONAVIRUS 2 (SARS-COV-2) (CORONAVIRUS DISEASE [COVID-19]), AMPLIFIED PROBE TECHNIQUE, MAKING USE OF HIGH THROUGHPUT TECHNOLOGIES AS DESCRIBED BY CMS-2020-01-R: HCPCS | Mod: HCNC

## 2021-02-02 ENCOUNTER — TELEPHONE (OUTPATIENT)
Dept: OPHTHALMOLOGY | Facility: CLINIC | Age: 61
End: 2021-02-02

## 2021-02-02 DIAGNOSIS — G47.09 OTHER INSOMNIA: ICD-10-CM

## 2021-02-02 DIAGNOSIS — H25.11 NUCLEAR SCLEROTIC CATARACT OF RIGHT EYE: Primary | ICD-10-CM

## 2021-02-02 LAB — SARS-COV-2 RNA RESP QL NAA+PROBE: NOT DETECTED

## 2021-02-03 RX ORDER — TRAZODONE HYDROCHLORIDE 50 MG/1
50 TABLET ORAL NIGHTLY PRN
Qty: 90 TABLET | Refills: 1 | Status: SHIPPED | OUTPATIENT
Start: 2021-02-03 | End: 2021-07-29

## 2021-02-04 ENCOUNTER — ANESTHESIA (OUTPATIENT)
Dept: SURGERY | Facility: OTHER | Age: 61
End: 2021-02-04
Payer: MEDICARE

## 2021-02-04 ENCOUNTER — HOSPITAL ENCOUNTER (OUTPATIENT)
Facility: OTHER | Age: 61
Discharge: HOME | End: 2021-02-04
Attending: OPHTHALMOLOGY | Admitting: OPHTHALMOLOGY
Payer: MEDICARE

## 2021-02-04 ENCOUNTER — ANESTHESIA EVENT (OUTPATIENT)
Dept: SURGERY | Facility: OTHER | Age: 61
End: 2021-02-04
Payer: MEDICARE

## 2021-02-04 VITALS
HEIGHT: 61 IN | SYSTOLIC BLOOD PRESSURE: 154 MMHG | WEIGHT: 254 LBS | BODY MASS INDEX: 47.95 KG/M2 | OXYGEN SATURATION: 100 % | RESPIRATION RATE: 18 BRPM | HEART RATE: 102 BPM | DIASTOLIC BLOOD PRESSURE: 90 MMHG | TEMPERATURE: 97 F

## 2021-02-04 DIAGNOSIS — H25.13 NUCLEAR SCLEROSIS, BILATERAL: ICD-10-CM

## 2021-02-04 LAB — POCT GLUCOSE: 107 MG/DL (ref 70–110)

## 2021-02-04 PROCEDURE — 63600175 PHARM REV CODE 636 W HCPCS: Mod: HCNC | Performed by: NURSE ANESTHETIST, CERTIFIED REGISTERED

## 2021-02-04 PROCEDURE — 37000009 HC ANESTHESIA EA ADD 15 MINS: Mod: HCNC | Performed by: OPHTHALMOLOGY

## 2021-02-04 PROCEDURE — 36000706: Mod: HCNC | Performed by: OPHTHALMOLOGY

## 2021-02-04 PROCEDURE — 66984 XCAPSL CTRC RMVL W/O ECP: CPT | Mod: HCNC,LT,, | Performed by: OPHTHALMOLOGY

## 2021-02-04 PROCEDURE — 71000015 HC POSTOP RECOV 1ST HR: Mod: HCNC | Performed by: OPHTHALMOLOGY

## 2021-02-04 PROCEDURE — 37000008 HC ANESTHESIA 1ST 15 MINUTES: Mod: HCNC | Performed by: OPHTHALMOLOGY

## 2021-02-04 PROCEDURE — 25000003 PHARM REV CODE 250: Mod: HCNC | Performed by: OPHTHALMOLOGY

## 2021-02-04 PROCEDURE — 36000707: Mod: HCNC | Performed by: OPHTHALMOLOGY

## 2021-02-04 PROCEDURE — V2632 POST CHMBR INTRAOCULAR LENS: HCPCS | Mod: HCNC | Performed by: OPHTHALMOLOGY

## 2021-02-04 PROCEDURE — 66984 PR REMOVAL, CATARACT, W/INSRT INTRAOC LENS, W/O ENDO CYCLO: ICD-10-PCS | Mod: HCNC,LT,, | Performed by: OPHTHALMOLOGY

## 2021-02-04 DEVICE — IMPLANTABLE DEVICE: Type: IMPLANTABLE DEVICE | Site: EYE | Status: FUNCTIONAL

## 2021-02-04 RX ORDER — LIDOCAINE HYDROCHLORIDE 40 MG/ML
INJECTION, SOLUTION RETROBULBAR
Status: DISCONTINUED | OUTPATIENT
Start: 2021-02-04 | End: 2021-02-04 | Stop reason: HOSPADM

## 2021-02-04 RX ORDER — MIDAZOLAM HYDROCHLORIDE 1 MG/ML
INJECTION INTRAMUSCULAR; INTRAVENOUS
Status: DISCONTINUED | OUTPATIENT
Start: 2021-02-04 | End: 2021-02-04

## 2021-02-04 RX ORDER — PHENYLEPHRINE HYDROCHLORIDE 25 MG/ML
1 SOLUTION/ DROPS OPHTHALMIC
Status: COMPLETED | OUTPATIENT
Start: 2021-02-04 | End: 2021-02-04

## 2021-02-04 RX ORDER — TETRACAINE HYDROCHLORIDE 5 MG/ML
1 SOLUTION OPHTHALMIC
Status: COMPLETED | OUTPATIENT
Start: 2021-02-04 | End: 2021-02-04

## 2021-02-04 RX ORDER — ACETAMINOPHEN 325 MG/1
650 TABLET ORAL EVERY 4 HOURS PRN
Status: DISCONTINUED | OUTPATIENT
Start: 2021-02-04 | End: 2021-02-04 | Stop reason: HOSPADM

## 2021-02-04 RX ORDER — PROPARACAINE HYDROCHLORIDE 5 MG/ML
1 SOLUTION/ DROPS OPHTHALMIC
Status: DISCONTINUED | OUTPATIENT
Start: 2021-02-04 | End: 2021-02-04 | Stop reason: HOSPADM

## 2021-02-04 RX ORDER — MOXIFLOXACIN 5 MG/ML
SOLUTION/ DROPS OPHTHALMIC
Status: DISCONTINUED | OUTPATIENT
Start: 2021-02-04 | End: 2021-02-04 | Stop reason: HOSPADM

## 2021-02-04 RX ORDER — TROPICAMIDE 10 MG/ML
1 SOLUTION/ DROPS OPHTHALMIC
Status: COMPLETED | OUTPATIENT
Start: 2021-02-04 | End: 2021-02-04

## 2021-02-04 RX ORDER — MOXIFLOXACIN 5 MG/ML
1 SOLUTION/ DROPS OPHTHALMIC
Status: COMPLETED | OUTPATIENT
Start: 2021-02-04 | End: 2021-02-04

## 2021-02-04 RX ORDER — TETRACAINE HYDROCHLORIDE 5 MG/ML
SOLUTION OPHTHALMIC
Status: DISCONTINUED | OUTPATIENT
Start: 2021-02-04 | End: 2021-02-04 | Stop reason: HOSPADM

## 2021-02-04 RX ADMIN — MOXIFLOXACIN 1 DROP: 5 SOLUTION/ DROPS OPHTHALMIC at 09:02

## 2021-02-04 RX ADMIN — MOXIFLOXACIN 1 DROP: 5 SOLUTION/ DROPS OPHTHALMIC at 07:02

## 2021-02-04 RX ADMIN — PHENYLEPHRINE HYDROCHLORIDE 1 DROP: 25 SOLUTION/ DROPS OPHTHALMIC at 07:02

## 2021-02-04 RX ADMIN — TETRACAINE HYDROCHLORIDE 1 DROP: 5 SOLUTION OPHTHALMIC at 07:02

## 2021-02-04 RX ADMIN — TROPICAMIDE 1 DROP: 10 SOLUTION/ DROPS OPHTHALMIC at 07:02

## 2021-02-04 RX ADMIN — MIDAZOLAM HYDROCHLORIDE 2 MG: 1 INJECTION, SOLUTION INTRAMUSCULAR; INTRAVENOUS at 08:02

## 2021-02-05 ENCOUNTER — OFFICE VISIT (OUTPATIENT)
Dept: OPHTHALMOLOGY | Facility: CLINIC | Age: 61
End: 2021-02-05
Attending: OPHTHALMOLOGY
Payer: MEDICARE

## 2021-02-05 DIAGNOSIS — H25.13 NUCLEAR SCLEROSIS, BILATERAL: Primary | ICD-10-CM

## 2021-02-05 DIAGNOSIS — Z98.890 POST-OPERATIVE STATE: ICD-10-CM

## 2021-02-05 PROCEDURE — 99024 POSTOP FOLLOW-UP VISIT: CPT | Mod: HCNC,S$GLB,, | Performed by: OPHTHALMOLOGY

## 2021-02-05 PROCEDURE — 1126F PR PAIN SEVERITY QUANTIFIED, NO PAIN PRESENT: ICD-10-PCS | Mod: HCNC,S$GLB,, | Performed by: OPHTHALMOLOGY

## 2021-02-05 PROCEDURE — 99024 PR POST-OP FOLLOW-UP VISIT: ICD-10-PCS | Mod: HCNC,S$GLB,, | Performed by: OPHTHALMOLOGY

## 2021-02-05 PROCEDURE — 99999 PR PBB SHADOW E&M-EST. PATIENT-LVL III: ICD-10-PCS | Mod: PBBFAC,HCNC,, | Performed by: OPHTHALMOLOGY

## 2021-02-05 PROCEDURE — 99999 PR PBB SHADOW E&M-EST. PATIENT-LVL III: CPT | Mod: PBBFAC,HCNC,, | Performed by: OPHTHALMOLOGY

## 2021-02-05 PROCEDURE — 1126F AMNT PAIN NOTED NONE PRSNT: CPT | Mod: HCNC,S$GLB,, | Performed by: OPHTHALMOLOGY

## 2021-02-08 ENCOUNTER — PATIENT MESSAGE (OUTPATIENT)
Dept: BARIATRICS | Facility: CLINIC | Age: 61
End: 2021-02-08

## 2021-02-08 ENCOUNTER — CLINICAL SUPPORT (OUTPATIENT)
Dept: BARIATRICS | Facility: CLINIC | Age: 61
End: 2021-02-08
Payer: MEDICARE

## 2021-02-08 PROCEDURE — 99499 NO LOS: ICD-10-PCS | Mod: HCNC,95,, | Performed by: DIETITIAN, REGISTERED

## 2021-02-08 PROCEDURE — 99499 UNLISTED E&M SERVICE: CPT | Mod: HCNC,95,, | Performed by: DIETITIAN, REGISTERED

## 2021-02-12 ENCOUNTER — OFFICE VISIT (OUTPATIENT)
Dept: OPHTHALMOLOGY | Facility: CLINIC | Age: 61
End: 2021-02-12
Attending: OPHTHALMOLOGY
Payer: MEDICARE

## 2021-02-12 DIAGNOSIS — H25.13 NUCLEAR SCLEROSIS, BILATERAL: Primary | ICD-10-CM

## 2021-02-12 DIAGNOSIS — Z98.890 POST-OPERATIVE STATE: ICD-10-CM

## 2021-02-12 PROCEDURE — 1126F AMNT PAIN NOTED NONE PRSNT: CPT | Mod: HCNC,S$GLB,, | Performed by: OPHTHALMOLOGY

## 2021-02-12 PROCEDURE — 99999 PR PBB SHADOW E&M-EST. PATIENT-LVL III: CPT | Mod: PBBFAC,HCNC,, | Performed by: OPHTHALMOLOGY

## 2021-02-12 PROCEDURE — 92136 OPHTHALMIC BIOMETRY: CPT | Mod: 26,HCNC,RT,S$GLB | Performed by: OPHTHALMOLOGY

## 2021-02-12 PROCEDURE — 99024 PR POST-OP FOLLOW-UP VISIT: ICD-10-PCS | Mod: HCNC,S$GLB,, | Performed by: OPHTHALMOLOGY

## 2021-02-12 PROCEDURE — 92136 IOL MASTER - OU - BOTH EYES: ICD-10-PCS | Mod: 26,HCNC,RT,S$GLB | Performed by: OPHTHALMOLOGY

## 2021-02-12 PROCEDURE — 1126F PR PAIN SEVERITY QUANTIFIED, NO PAIN PRESENT: ICD-10-PCS | Mod: HCNC,S$GLB,, | Performed by: OPHTHALMOLOGY

## 2021-02-12 PROCEDURE — 99024 POSTOP FOLLOW-UP VISIT: CPT | Mod: HCNC,S$GLB,, | Performed by: OPHTHALMOLOGY

## 2021-02-12 PROCEDURE — 99999 PR PBB SHADOW E&M-EST. PATIENT-LVL III: ICD-10-PCS | Mod: PBBFAC,HCNC,, | Performed by: OPHTHALMOLOGY

## 2021-02-12 RX ORDER — TETRACAINE HYDROCHLORIDE 5 MG/ML
1 SOLUTION OPHTHALMIC
Status: CANCELLED | OUTPATIENT
Start: 2021-02-12

## 2021-02-12 RX ORDER — SODIUM CHLORIDE 0.9 % (FLUSH) 0.9 %
10 SYRINGE (ML) INJECTION
Status: SHIPPED | OUTPATIENT
Start: 2021-02-12

## 2021-02-12 RX ORDER — MOXIFLOXACIN 5 MG/ML
1 SOLUTION/ DROPS OPHTHALMIC
Status: CANCELLED | OUTPATIENT
Start: 2021-02-12

## 2021-02-12 RX ORDER — TROPICAMIDE 10 MG/ML
1 SOLUTION/ DROPS OPHTHALMIC
Status: CANCELLED | OUTPATIENT
Start: 2021-02-12

## 2021-02-12 RX ORDER — PHENYLEPHRINE HYDROCHLORIDE 25 MG/ML
1 SOLUTION/ DROPS OPHTHALMIC
Status: CANCELLED | OUTPATIENT
Start: 2021-02-12

## 2021-02-15 ENCOUNTER — HOSPITAL ENCOUNTER (OUTPATIENT)
Dept: PREADMISSION TESTING | Facility: OTHER | Age: 61
Discharge: HOME | End: 2021-02-15
Attending: OPHTHALMOLOGY
Payer: MEDICARE

## 2021-02-15 DIAGNOSIS — Z01.818 PRE-OP TESTING: ICD-10-CM

## 2021-02-15 PROCEDURE — U0005 INFEC AGEN DETEC AMPLI PROBE: HCPCS

## 2021-02-15 PROCEDURE — U0003 INFECTIOUS AGENT DETECTION BY NUCLEIC ACID (DNA OR RNA); SEVERE ACUTE RESPIRATORY SYNDROME CORONAVIRUS 2 (SARS-COV-2) (CORONAVIRUS DISEASE [COVID-19]), AMPLIFIED PROBE TECHNIQUE, MAKING USE OF HIGH THROUGHPUT TECHNOLOGIES AS DESCRIBED BY CMS-2020-01-R: HCPCS | Mod: HCNC

## 2021-02-16 LAB — SARS-COV-2 RNA RESP QL NAA+PROBE: NOT DETECTED

## 2021-02-18 ENCOUNTER — ANESTHESIA EVENT (OUTPATIENT)
Dept: SURGERY | Facility: OTHER | Age: 61
End: 2021-02-18
Payer: MEDICARE

## 2021-02-18 ENCOUNTER — HOSPITAL ENCOUNTER (OUTPATIENT)
Facility: OTHER | Age: 61
Discharge: HOME | End: 2021-02-18
Attending: OPHTHALMOLOGY | Admitting: OPHTHALMOLOGY
Payer: MEDICARE

## 2021-02-18 ENCOUNTER — ANESTHESIA (OUTPATIENT)
Dept: SURGERY | Facility: OTHER | Age: 61
End: 2021-02-18
Payer: MEDICARE

## 2021-02-18 VITALS
RESPIRATION RATE: 16 BRPM | HEART RATE: 102 BPM | TEMPERATURE: 98 F | DIASTOLIC BLOOD PRESSURE: 81 MMHG | WEIGHT: 254 LBS | OXYGEN SATURATION: 95 % | HEIGHT: 61 IN | SYSTOLIC BLOOD PRESSURE: 141 MMHG | BODY MASS INDEX: 47.95 KG/M2

## 2021-02-18 DIAGNOSIS — H25.13 NUCLEAR SCLEROSIS, BILATERAL: Primary | ICD-10-CM

## 2021-02-18 LAB — POCT GLUCOSE: 109 MG/DL (ref 70–110)

## 2021-02-18 PROCEDURE — 36000706: Mod: HCNC | Performed by: OPHTHALMOLOGY

## 2021-02-18 PROCEDURE — 66984 XCAPSL CTRC RMVL W/O ECP: CPT | Mod: 79,HCNC,RT, | Performed by: OPHTHALMOLOGY

## 2021-02-18 PROCEDURE — 36000707: Mod: HCNC | Performed by: OPHTHALMOLOGY

## 2021-02-18 PROCEDURE — 66984 PR REMOVAL, CATARACT, W/INSRT INTRAOC LENS, W/O ENDO CYCLO: ICD-10-PCS | Mod: 79,HCNC,RT, | Performed by: OPHTHALMOLOGY

## 2021-02-18 PROCEDURE — 25000003 PHARM REV CODE 250: Mod: HCNC | Performed by: NURSE ANESTHETIST, CERTIFIED REGISTERED

## 2021-02-18 PROCEDURE — 37000008 HC ANESTHESIA 1ST 15 MINUTES: Mod: HCNC | Performed by: OPHTHALMOLOGY

## 2021-02-18 PROCEDURE — V2632 POST CHMBR INTRAOCULAR LENS: HCPCS | Mod: HCNC | Performed by: OPHTHALMOLOGY

## 2021-02-18 PROCEDURE — 63600175 PHARM REV CODE 636 W HCPCS: Mod: HCNC | Performed by: NURSE ANESTHETIST, CERTIFIED REGISTERED

## 2021-02-18 PROCEDURE — 37000009 HC ANESTHESIA EA ADD 15 MINS: Mod: HCNC | Performed by: OPHTHALMOLOGY

## 2021-02-18 PROCEDURE — 25000003 PHARM REV CODE 250: Mod: HCNC | Performed by: OPHTHALMOLOGY

## 2021-02-18 PROCEDURE — 71000015 HC POSTOP RECOV 1ST HR: Mod: HCNC | Performed by: OPHTHALMOLOGY

## 2021-02-18 DEVICE — LENS IOL ITEC PRELOAD 13.0D: Type: IMPLANTABLE DEVICE | Site: EYE | Status: FUNCTIONAL

## 2021-02-18 RX ORDER — TETRACAINE HYDROCHLORIDE 5 MG/ML
1 SOLUTION OPHTHALMIC
Status: COMPLETED | OUTPATIENT
Start: 2021-02-18 | End: 2021-02-18

## 2021-02-18 RX ORDER — TROPICAMIDE 10 MG/ML
1 SOLUTION/ DROPS OPHTHALMIC
Status: COMPLETED | OUTPATIENT
Start: 2021-02-18 | End: 2021-02-18

## 2021-02-18 RX ORDER — MOXIFLOXACIN 5 MG/ML
SOLUTION/ DROPS OPHTHALMIC
Status: DISCONTINUED | OUTPATIENT
Start: 2021-02-18 | End: 2021-02-18 | Stop reason: HOSPADM

## 2021-02-18 RX ORDER — MIDAZOLAM HYDROCHLORIDE 1 MG/ML
INJECTION INTRAMUSCULAR; INTRAVENOUS
Status: DISCONTINUED | OUTPATIENT
Start: 2021-02-18 | End: 2021-02-18

## 2021-02-18 RX ORDER — PROPARACAINE HYDROCHLORIDE 5 MG/ML
1 SOLUTION/ DROPS OPHTHALMIC
Status: DISCONTINUED | OUTPATIENT
Start: 2021-02-18 | End: 2021-02-18 | Stop reason: HOSPADM

## 2021-02-18 RX ORDER — MOXIFLOXACIN 5 MG/ML
1 SOLUTION/ DROPS OPHTHALMIC
Status: COMPLETED | OUTPATIENT
Start: 2021-02-18 | End: 2021-02-18

## 2021-02-18 RX ORDER — LIDOCAINE HYDROCHLORIDE 40 MG/ML
INJECTION, SOLUTION RETROBULBAR
Status: DISCONTINUED | OUTPATIENT
Start: 2021-02-18 | End: 2021-02-18 | Stop reason: HOSPADM

## 2021-02-18 RX ORDER — PHENYLEPHRINE HYDROCHLORIDE 25 MG/ML
1 SOLUTION/ DROPS OPHTHALMIC
Status: COMPLETED | OUTPATIENT
Start: 2021-02-18 | End: 2021-02-18

## 2021-02-18 RX ORDER — ACETAMINOPHEN 325 MG/1
650 TABLET ORAL EVERY 4 HOURS PRN
Status: DISCONTINUED | OUTPATIENT
Start: 2021-02-18 | End: 2021-02-18 | Stop reason: HOSPADM

## 2021-02-18 RX ADMIN — TETRACAINE HYDROCHLORIDE 1 DROP: 5 SOLUTION OPHTHALMIC at 11:02

## 2021-02-18 RX ADMIN — MOXIFLOXACIN 1 DROP: 5 SOLUTION/ DROPS OPHTHALMIC at 01:02

## 2021-02-18 RX ADMIN — MOXIFLOXACIN 1 DROP: 5 SOLUTION/ DROPS OPHTHALMIC at 11:02

## 2021-02-18 RX ADMIN — MIDAZOLAM HYDROCHLORIDE 2 MG: 1 INJECTION, SOLUTION INTRAMUSCULAR; INTRAVENOUS at 12:02

## 2021-02-18 RX ADMIN — TROPICAMIDE 1 DROP: 10 SOLUTION/ DROPS OPHTHALMIC at 11:02

## 2021-02-18 RX ADMIN — MOXIFLOXACIN 1 DROP: 5 SOLUTION/ DROPS OPHTHALMIC at 12:02

## 2021-02-18 RX ADMIN — PHENYLEPHRINE HYDROCHLORIDE 1 DROP: 25 SOLUTION/ DROPS OPHTHALMIC at 11:02

## 2021-02-18 RX ADMIN — ESMOLOL HYDROCHLORIDE 15 MCG: 10 INJECTION INTRAVENOUS at 12:02

## 2021-02-19 ENCOUNTER — OFFICE VISIT (OUTPATIENT)
Dept: OPHTHALMOLOGY | Facility: CLINIC | Age: 61
End: 2021-02-19
Attending: OPHTHALMOLOGY
Payer: MEDICARE

## 2021-02-19 DIAGNOSIS — Z98.890 POST-OPERATIVE STATE: Primary | ICD-10-CM

## 2021-02-19 DIAGNOSIS — H25.11 NUCLEAR SCLEROTIC CATARACT OF RIGHT EYE: ICD-10-CM

## 2021-02-19 PROCEDURE — 1126F PR PAIN SEVERITY QUANTIFIED, NO PAIN PRESENT: ICD-10-PCS | Mod: HCNC,S$GLB,, | Performed by: OPHTHALMOLOGY

## 2021-02-19 PROCEDURE — 99999 PR PBB SHADOW E&M-EST. PATIENT-LVL III: CPT | Mod: PBBFAC,HCNC,, | Performed by: OPHTHALMOLOGY

## 2021-02-19 PROCEDURE — 99024 PR POST-OP FOLLOW-UP VISIT: ICD-10-PCS | Mod: HCNC,S$GLB,, | Performed by: OPHTHALMOLOGY

## 2021-02-19 PROCEDURE — 99999 PR PBB SHADOW E&M-EST. PATIENT-LVL III: ICD-10-PCS | Mod: PBBFAC,HCNC,, | Performed by: OPHTHALMOLOGY

## 2021-02-19 PROCEDURE — 99024 POSTOP FOLLOW-UP VISIT: CPT | Mod: HCNC,S$GLB,, | Performed by: OPHTHALMOLOGY

## 2021-02-19 PROCEDURE — 1126F AMNT PAIN NOTED NONE PRSNT: CPT | Mod: HCNC,S$GLB,, | Performed by: OPHTHALMOLOGY

## 2021-02-22 ENCOUNTER — PATIENT MESSAGE (OUTPATIENT)
Dept: PHYSICAL MEDICINE AND REHAB | Facility: CLINIC | Age: 61
End: 2021-02-22

## 2021-02-22 DIAGNOSIS — M54.9 BACK PAIN, UNSPECIFIED BACK LOCATION, UNSPECIFIED BACK PAIN LATERALITY, UNSPECIFIED CHRONICITY: ICD-10-CM

## 2021-02-22 DIAGNOSIS — Z96.651 STATUS POST TOTAL RIGHT KNEE REPLACEMENT: ICD-10-CM

## 2021-02-22 DIAGNOSIS — M43.16 SPONDYLOLISTHESIS AT L3-L4 LEVEL: ICD-10-CM

## 2021-02-22 DIAGNOSIS — M54.41 CHRONIC BILATERAL LOW BACK PAIN WITH RIGHT-SIDED SCIATICA: ICD-10-CM

## 2021-02-22 DIAGNOSIS — Z96.641 STATUS POST RIGHT HIP REPLACEMENT: ICD-10-CM

## 2021-02-22 DIAGNOSIS — F11.90 UNCOMPLICATED OPIOID USE: ICD-10-CM

## 2021-02-22 DIAGNOSIS — M48.062 SPINAL STENOSIS OF LUMBAR REGION WITH NEUROGENIC CLAUDICATION: ICD-10-CM

## 2021-02-22 DIAGNOSIS — G89.29 CHRONIC BILATERAL LOW BACK PAIN WITH RIGHT-SIDED SCIATICA: ICD-10-CM

## 2021-02-27 RX ORDER — OXYCODONE AND ACETAMINOPHEN 5; 325 MG/1; MG/1
1 TABLET ORAL EVERY 12 HOURS PRN
Qty: 60 TABLET | Refills: 0 | Status: SHIPPED | OUTPATIENT
Start: 2021-02-27 | End: 2021-03-25 | Stop reason: SDUPTHER

## 2021-03-01 ENCOUNTER — PATIENT MESSAGE (OUTPATIENT)
Dept: INTERNAL MEDICINE | Facility: CLINIC | Age: 61
End: 2021-03-01

## 2021-03-01 DIAGNOSIS — Z12.31 ENCOUNTER FOR SCREENING MAMMOGRAM FOR MALIGNANT NEOPLASM OF BREAST: Primary | ICD-10-CM

## 2021-03-04 ENCOUNTER — PATIENT OUTREACH (OUTPATIENT)
Dept: ADMINISTRATIVE | Facility: OTHER | Age: 61
End: 2021-03-04

## 2021-03-05 ENCOUNTER — OFFICE VISIT (OUTPATIENT)
Dept: PSYCHIATRY | Facility: CLINIC | Age: 61
End: 2021-03-05
Payer: MEDICARE

## 2021-03-05 DIAGNOSIS — I10 ESSENTIAL HYPERTENSION: ICD-10-CM

## 2021-03-05 DIAGNOSIS — Z01.818 PREOPERATIVE EVALUATION TO RULE OUT SURGICAL CONTRAINDICATION: Primary | ICD-10-CM

## 2021-03-05 DIAGNOSIS — F32.A DEPRESSION, UNSPECIFIED DEPRESSION TYPE: ICD-10-CM

## 2021-03-05 DIAGNOSIS — E66.01 MORBID OBESITY: ICD-10-CM

## 2021-03-05 DIAGNOSIS — E11.9 TYPE 2 DIABETES MELLITUS WITHOUT COMPLICATION, UNSPECIFIED WHETHER LONG TERM INSULIN USE: ICD-10-CM

## 2021-03-05 PROCEDURE — 96138 PR PSYCH/NEUROPSYCH TEST ADMIN/SCORING, BY TECH, 2+ TESTS, 1ST 30 MIN: ICD-10-PCS | Mod: S$GLB,,, | Performed by: PSYCHOLOGIST

## 2021-03-05 PROCEDURE — 96139 PR PSYCH/NEUROPSYCH TEST ADMIN/SCORING, BY TECH, 2+ TESTS, EA ADDTL 30 MIN: ICD-10-PCS | Mod: S$GLB,,, | Performed by: PSYCHOLOGIST

## 2021-03-05 PROCEDURE — 96139 PSYCL/NRPSYC TST TECH EA: CPT | Mod: S$GLB,,, | Performed by: PSYCHOLOGIST

## 2021-03-05 PROCEDURE — 96130 PR PSYCHOLOGIC TEST EVAL SVCS, 1ST HR: ICD-10-PCS | Mod: S$GLB,,, | Performed by: PSYCHOLOGIST

## 2021-03-05 PROCEDURE — 96130 PSYCL TST EVAL PHYS/QHP 1ST: CPT | Mod: S$GLB,,, | Performed by: PSYCHOLOGIST

## 2021-03-05 PROCEDURE — 90791 PR PSYCHIATRIC DIAGNOSTIC EVALUATION: ICD-10-PCS | Mod: S$GLB,,, | Performed by: PSYCHOLOGIST

## 2021-03-05 PROCEDURE — 3072F PR LOW RISK FOR RETINOPATHY: ICD-10-PCS | Mod: S$GLB,,, | Performed by: PSYCHOLOGIST

## 2021-03-05 PROCEDURE — 90791 PSYCH DIAGNOSTIC EVALUATION: CPT | Mod: S$GLB,,, | Performed by: PSYCHOLOGIST

## 2021-03-05 PROCEDURE — 96131 PSYCL TST EVAL PHYS/QHP EA: CPT | Mod: S$GLB,,, | Performed by: PSYCHOLOGIST

## 2021-03-05 PROCEDURE — 3072F LOW RISK FOR RETINOPATHY: CPT | Mod: S$GLB,,, | Performed by: PSYCHOLOGIST

## 2021-03-05 PROCEDURE — 96138 PSYCL/NRPSYC TECH 1ST: CPT | Mod: S$GLB,,, | Performed by: PSYCHOLOGIST

## 2021-03-05 PROCEDURE — 96131 PR PSYCHOLOGIC TEST EVAL SVCS, EA ADDTL HR: ICD-10-PCS | Mod: S$GLB,,, | Performed by: PSYCHOLOGIST

## 2021-03-08 ENCOUNTER — CLINICAL SUPPORT (OUTPATIENT)
Dept: BARIATRICS | Facility: CLINIC | Age: 61
End: 2021-03-08
Payer: MEDICARE

## 2021-03-08 PROCEDURE — 99499 NO LOS: ICD-10-PCS | Mod: 95,,, | Performed by: DIETITIAN, REGISTERED

## 2021-03-08 PROCEDURE — 99499 UNLISTED E&M SERVICE: CPT | Mod: 95,,, | Performed by: DIETITIAN, REGISTERED

## 2021-03-12 ENCOUNTER — HOSPITAL ENCOUNTER (OUTPATIENT)
Dept: RADIOLOGY | Facility: OTHER | Age: 61
Discharge: HOME | End: 2021-03-12
Attending: INTERNAL MEDICINE
Payer: MEDICARE

## 2021-03-12 DIAGNOSIS — Z12.31 ENCOUNTER FOR SCREENING MAMMOGRAM FOR MALIGNANT NEOPLASM OF BREAST: ICD-10-CM

## 2021-03-12 PROCEDURE — 77067 SCR MAMMO BI INCL CAD: CPT | Mod: 26,,, | Performed by: INTERNAL MEDICINE

## 2021-03-12 PROCEDURE — 77067 MAMMO DIGITAL SCREENING BILAT WITH TOMO: ICD-10-PCS | Mod: 26,,, | Performed by: INTERNAL MEDICINE

## 2021-03-12 PROCEDURE — 77063 MAMMO DIGITAL SCREENING BILAT WITH TOMO: ICD-10-PCS | Mod: 26,,, | Performed by: INTERNAL MEDICINE

## 2021-03-12 PROCEDURE — 77063 BREAST TOMOSYNTHESIS BI: CPT | Mod: 26,,, | Performed by: INTERNAL MEDICINE

## 2021-03-12 PROCEDURE — 77067 SCR MAMMO BI INCL CAD: CPT | Mod: TC

## 2021-03-15 ENCOUNTER — OFFICE VISIT (OUTPATIENT)
Dept: INTERNAL MEDICINE | Facility: CLINIC | Age: 61
End: 2021-03-15
Payer: MEDICARE

## 2021-03-15 VITALS
WEIGHT: 246.69 LBS | OXYGEN SATURATION: 95 % | SYSTOLIC BLOOD PRESSURE: 132 MMHG | DIASTOLIC BLOOD PRESSURE: 80 MMHG | HEART RATE: 120 BPM | HEIGHT: 61 IN | BODY MASS INDEX: 46.58 KG/M2

## 2021-03-15 DIAGNOSIS — E11.36 TYPE 2 DIABETES MELLITUS WITH CATARACT: ICD-10-CM

## 2021-03-15 DIAGNOSIS — E11.9 TYPE 2 DIABETES MELLITUS WITHOUT COMPLICATION, WITHOUT LONG-TERM CURRENT USE OF INSULIN: Primary | ICD-10-CM

## 2021-03-15 DIAGNOSIS — I10 ESSENTIAL HYPERTENSION: ICD-10-CM

## 2021-03-15 DIAGNOSIS — E66.01 CLASS 3 SEVERE OBESITY DUE TO EXCESS CALORIES WITH SERIOUS COMORBIDITY AND BODY MASS INDEX (BMI) OF 45.0 TO 49.9 IN ADULT: ICD-10-CM

## 2021-03-15 PROCEDURE — 99999 PR PBB SHADOW E&M-EST. PATIENT-LVL V: ICD-10-PCS | Mod: PBBFAC,,, | Performed by: INTERNAL MEDICINE

## 2021-03-15 PROCEDURE — 3072F LOW RISK FOR RETINOPATHY: CPT | Mod: S$GLB,,, | Performed by: INTERNAL MEDICINE

## 2021-03-15 PROCEDURE — 3079F PR MOST RECENT DIASTOLIC BLOOD PRESSURE 80-89 MM HG: ICD-10-PCS | Mod: CPTII,S$GLB,, | Performed by: INTERNAL MEDICINE

## 2021-03-15 PROCEDURE — 3008F PR BODY MASS INDEX (BMI) DOCUMENTED: ICD-10-PCS | Mod: CPTII,S$GLB,, | Performed by: INTERNAL MEDICINE

## 2021-03-15 PROCEDURE — 3051F HG A1C>EQUAL 7.0%<8.0%: CPT | Mod: CPTII,S$GLB,, | Performed by: INTERNAL MEDICINE

## 2021-03-15 PROCEDURE — 1126F PR PAIN SEVERITY QUANTIFIED, NO PAIN PRESENT: ICD-10-PCS | Mod: S$GLB,,, | Performed by: INTERNAL MEDICINE

## 2021-03-15 PROCEDURE — 3079F DIAST BP 80-89 MM HG: CPT | Mod: CPTII,S$GLB,, | Performed by: INTERNAL MEDICINE

## 2021-03-15 PROCEDURE — 3075F SYST BP GE 130 - 139MM HG: CPT | Mod: CPTII,S$GLB,, | Performed by: INTERNAL MEDICINE

## 2021-03-15 PROCEDURE — 99499 RISK ADDL DX/OHS AUDIT: ICD-10-PCS | Mod: HCNC,S$GLB,, | Performed by: INTERNAL MEDICINE

## 2021-03-15 PROCEDURE — 3072F PR LOW RISK FOR RETINOPATHY: ICD-10-PCS | Mod: S$GLB,,, | Performed by: INTERNAL MEDICINE

## 2021-03-15 PROCEDURE — 3075F PR MOST RECENT SYSTOLIC BLOOD PRESS GE 130-139MM HG: ICD-10-PCS | Mod: CPTII,S$GLB,, | Performed by: INTERNAL MEDICINE

## 2021-03-15 PROCEDURE — 3008F BODY MASS INDEX DOCD: CPT | Mod: CPTII,S$GLB,, | Performed by: INTERNAL MEDICINE

## 2021-03-15 PROCEDURE — 99214 PR OFFICE/OUTPT VISIT, EST, LEVL IV, 30-39 MIN: ICD-10-PCS | Mod: S$GLB,,, | Performed by: INTERNAL MEDICINE

## 2021-03-15 PROCEDURE — 1126F AMNT PAIN NOTED NONE PRSNT: CPT | Mod: S$GLB,,, | Performed by: INTERNAL MEDICINE

## 2021-03-15 PROCEDURE — 99999 PR PBB SHADOW E&M-EST. PATIENT-LVL V: CPT | Mod: PBBFAC,,, | Performed by: INTERNAL MEDICINE

## 2021-03-15 PROCEDURE — 3051F PR MOST RECENT HEMOGLOBIN A1C LEVEL 7.0 - < 8.0%: ICD-10-PCS | Mod: CPTII,S$GLB,, | Performed by: INTERNAL MEDICINE

## 2021-03-15 PROCEDURE — 99499 UNLISTED E&M SERVICE: CPT | Mod: HCNC,S$GLB,, | Performed by: INTERNAL MEDICINE

## 2021-03-15 PROCEDURE — 99214 OFFICE O/P EST MOD 30 MIN: CPT | Mod: S$GLB,,, | Performed by: INTERNAL MEDICINE

## 2021-03-18 ENCOUNTER — OFFICE VISIT (OUTPATIENT)
Dept: OPTOMETRY | Facility: CLINIC | Age: 61
End: 2021-03-18
Payer: MEDICARE

## 2021-03-18 DIAGNOSIS — Z98.42 S/P BILATERAL CATARACT EXTRACTION: Primary | ICD-10-CM

## 2021-03-18 DIAGNOSIS — Z98.41 S/P BILATERAL CATARACT EXTRACTION: Primary | ICD-10-CM

## 2021-03-18 PROCEDURE — 99024 POSTOP FOLLOW-UP VISIT: CPT | Mod: S$GLB,,, | Performed by: OPTOMETRIST

## 2021-03-18 PROCEDURE — 99999 PR PBB SHADOW E&M-EST. PATIENT-LVL III: ICD-10-PCS | Mod: PBBFAC,,, | Performed by: OPTOMETRIST

## 2021-03-18 PROCEDURE — 1126F AMNT PAIN NOTED NONE PRSNT: CPT | Mod: S$GLB,,, | Performed by: OPTOMETRIST

## 2021-03-18 PROCEDURE — 1126F PR PAIN SEVERITY QUANTIFIED, NO PAIN PRESENT: ICD-10-PCS | Mod: S$GLB,,, | Performed by: OPTOMETRIST

## 2021-03-18 PROCEDURE — 99024 PR POST-OP FOLLOW-UP VISIT: ICD-10-PCS | Mod: S$GLB,,, | Performed by: OPTOMETRIST

## 2021-03-18 PROCEDURE — 99999 PR PBB SHADOW E&M-EST. PATIENT-LVL III: CPT | Mod: PBBFAC,,, | Performed by: OPTOMETRIST

## 2021-03-18 RX ORDER — VENLAFAXINE HYDROCHLORIDE 75 MG/1
CAPSULE, EXTENDED RELEASE ORAL
COMMUNITY
Start: 2021-02-03 | End: 2021-05-06

## 2021-03-24 ENCOUNTER — PATIENT MESSAGE (OUTPATIENT)
Dept: PHYSICAL MEDICINE AND REHAB | Facility: CLINIC | Age: 61
End: 2021-03-24

## 2021-03-25 DIAGNOSIS — F11.90 UNCOMPLICATED OPIOID USE: ICD-10-CM

## 2021-03-25 DIAGNOSIS — M43.16 SPONDYLOLISTHESIS AT L3-L4 LEVEL: ICD-10-CM

## 2021-03-25 DIAGNOSIS — M54.41 CHRONIC BILATERAL LOW BACK PAIN WITH RIGHT-SIDED SCIATICA: ICD-10-CM

## 2021-03-25 DIAGNOSIS — Z96.641 STATUS POST RIGHT HIP REPLACEMENT: ICD-10-CM

## 2021-03-25 DIAGNOSIS — Z96.651 STATUS POST TOTAL RIGHT KNEE REPLACEMENT: ICD-10-CM

## 2021-03-25 DIAGNOSIS — M62.838 NIGHT MUSCLE SPASMS: ICD-10-CM

## 2021-03-25 DIAGNOSIS — M54.9 BACK PAIN, UNSPECIFIED BACK LOCATION, UNSPECIFIED BACK PAIN LATERALITY, UNSPECIFIED CHRONICITY: ICD-10-CM

## 2021-03-25 DIAGNOSIS — G89.29 CHRONIC BILATERAL LOW BACK PAIN WITH RIGHT-SIDED SCIATICA: ICD-10-CM

## 2021-03-25 DIAGNOSIS — M48.062 SPINAL STENOSIS OF LUMBAR REGION WITH NEUROGENIC CLAUDICATION: ICD-10-CM

## 2021-03-27 RX ORDER — DOCUSATE SODIUM 100 MG/1
CAPSULE, LIQUID FILLED ORAL
Qty: 60 CAPSULE | Refills: 17 | Status: SHIPPED | OUTPATIENT
Start: 2021-03-27 | End: 2022-04-25

## 2021-03-27 RX ORDER — OXYCODONE AND ACETAMINOPHEN 5; 325 MG/1; MG/1
1 TABLET ORAL EVERY 12 HOURS PRN
Qty: 60 TABLET | Refills: 0 | Status: SHIPPED | OUTPATIENT
Start: 2021-03-29 | End: 2021-05-05 | Stop reason: SDUPTHER

## 2021-03-27 RX ORDER — CYCLOBENZAPRINE HCL 10 MG
TABLET ORAL
Qty: 90 TABLET | Refills: 5 | Status: SHIPPED | OUTPATIENT
Start: 2021-03-27 | End: 2021-09-19

## 2021-04-01 ENCOUNTER — TELEPHONE (OUTPATIENT)
Dept: BARIATRICS | Facility: CLINIC | Age: 61
End: 2021-04-01

## 2021-04-01 ENCOUNTER — CLINICAL SUPPORT (OUTPATIENT)
Dept: BARIATRICS | Facility: CLINIC | Age: 61
End: 2021-04-01
Payer: MEDICARE

## 2021-04-01 VITALS — WEIGHT: 245.56 LBS | BODY MASS INDEX: 46.4 KG/M2

## 2021-04-01 DIAGNOSIS — Z71.3 DIETARY COUNSELING: ICD-10-CM

## 2021-04-01 DIAGNOSIS — E11.36 TYPE 2 DIABETES MELLITUS WITH CATARACT: ICD-10-CM

## 2021-04-01 DIAGNOSIS — E66.01 MORBID OBESITY WITH BMI OF 45.0-49.9, ADULT: ICD-10-CM

## 2021-04-01 DIAGNOSIS — I10 ESSENTIAL HYPERTENSION: ICD-10-CM

## 2021-04-01 PROCEDURE — 97803 PR MED NUTR THER, SUBSQ, INDIV, EA 15 MIN: ICD-10-PCS | Mod: S$GLB,,, | Performed by: DIETITIAN, REGISTERED

## 2021-04-01 PROCEDURE — 97803 MED NUTRITION INDIV SUBSEQ: CPT | Mod: S$GLB,,, | Performed by: DIETITIAN, REGISTERED

## 2021-04-01 PROCEDURE — 99499 NO LOS: ICD-10-PCS | Mod: S$GLB,,, | Performed by: DIETITIAN, REGISTERED

## 2021-04-01 PROCEDURE — 99499 UNLISTED E&M SERVICE: CPT | Mod: S$GLB,,, | Performed by: DIETITIAN, REGISTERED

## 2021-04-01 PROCEDURE — 99999 PR PBB SHADOW E&M-EST. PATIENT-LVL I: CPT | Mod: PBBFAC,,, | Performed by: DIETITIAN, REGISTERED

## 2021-04-01 PROCEDURE — 99999 PR PBB SHADOW E&M-EST. PATIENT-LVL I: ICD-10-PCS | Mod: PBBFAC,,, | Performed by: DIETITIAN, REGISTERED

## 2021-04-08 ENCOUNTER — OFFICE VISIT (OUTPATIENT)
Dept: OPTOMETRY | Facility: CLINIC | Age: 61
End: 2021-04-08
Payer: MEDICARE

## 2021-04-08 DIAGNOSIS — Z98.42 S/P BILATERAL CATARACT EXTRACTION: Primary | ICD-10-CM

## 2021-04-08 DIAGNOSIS — Z98.41 S/P BILATERAL CATARACT EXTRACTION: Primary | ICD-10-CM

## 2021-04-08 PROCEDURE — 99024 PR POST-OP FOLLOW-UP VISIT: ICD-10-PCS | Mod: S$GLB,,, | Performed by: OPTOMETRIST

## 2021-04-08 PROCEDURE — 1126F AMNT PAIN NOTED NONE PRSNT: CPT | Mod: S$GLB,,, | Performed by: OPTOMETRIST

## 2021-04-08 PROCEDURE — 99999 PR PBB SHADOW E&M-EST. PATIENT-LVL II: CPT | Mod: PBBFAC,,, | Performed by: OPTOMETRIST

## 2021-04-08 PROCEDURE — 1126F PR PAIN SEVERITY QUANTIFIED, NO PAIN PRESENT: ICD-10-PCS | Mod: S$GLB,,, | Performed by: OPTOMETRIST

## 2021-04-08 PROCEDURE — 99999 PR PBB SHADOW E&M-EST. PATIENT-LVL II: ICD-10-PCS | Mod: PBBFAC,,, | Performed by: OPTOMETRIST

## 2021-04-08 PROCEDURE — 99024 POSTOP FOLLOW-UP VISIT: CPT | Mod: S$GLB,,, | Performed by: OPTOMETRIST

## 2021-04-26 ENCOUNTER — TELEPHONE (OUTPATIENT)
Dept: BARIATRICS | Facility: CLINIC | Age: 61
End: 2021-04-26

## 2021-04-26 DIAGNOSIS — I10 ESSENTIAL HYPERTENSION: ICD-10-CM

## 2021-04-26 DIAGNOSIS — R93.1 ABNORMAL ECHOCARDIOGRAM: ICD-10-CM

## 2021-04-26 DIAGNOSIS — E11.9 TYPE 2 DIABETES MELLITUS WITHOUT COMPLICATION, WITHOUT LONG-TERM CURRENT USE OF INSULIN: ICD-10-CM

## 2021-04-26 DIAGNOSIS — E11.36 TYPE 2 DIABETES MELLITUS WITH CATARACT: ICD-10-CM

## 2021-04-26 DIAGNOSIS — Z01.818 PREOPERATIVE EVALUATION TO RULE OUT SURGICAL CONTRAINDICATION: ICD-10-CM

## 2021-04-26 DIAGNOSIS — E66.01 MORBID OBESITY: ICD-10-CM

## 2021-04-26 DIAGNOSIS — Z01.810 ENCOUNTER FOR PREPROCEDURAL CARDIOVASCULAR EXAMINATION: ICD-10-CM

## 2021-05-03 ENCOUNTER — HOSPITAL ENCOUNTER (OUTPATIENT)
Dept: CARDIOLOGY | Facility: HOSPITAL | Age: 61
Discharge: HOME | End: 2021-05-03
Attending: PHYSICIAN ASSISTANT
Payer: MEDICARE

## 2021-05-03 VITALS
HEIGHT: 61 IN | SYSTOLIC BLOOD PRESSURE: 108 MMHG | WEIGHT: 245 LBS | DIASTOLIC BLOOD PRESSURE: 62 MMHG | BODY MASS INDEX: 46.26 KG/M2

## 2021-05-03 DIAGNOSIS — E11.9 TYPE 2 DIABETES MELLITUS WITHOUT COMPLICATION, WITHOUT LONG-TERM CURRENT USE OF INSULIN: ICD-10-CM

## 2021-05-03 DIAGNOSIS — R93.1 ABNORMAL ECHOCARDIOGRAM: ICD-10-CM

## 2021-05-03 DIAGNOSIS — E11.36 TYPE 2 DIABETES MELLITUS WITH CATARACT: ICD-10-CM

## 2021-05-03 DIAGNOSIS — E66.01 MORBID OBESITY: ICD-10-CM

## 2021-05-03 DIAGNOSIS — I10 ESSENTIAL HYPERTENSION: ICD-10-CM

## 2021-05-03 LAB
ASCENDING AORTA: 2.78 CM
BSA FOR ECHO PROCEDURE: 2.19 M2
CV ECHO LV RWT: 0.53 CM
CV STRESS BASE HR: 104 BPM
DIASTOLIC BLOOD PRESSURE: 80 MMHG
DOP CALC LVOT AREA: 3.2 CM2
DOP CALC LVOT DIAMETER: 2.01 CM
DOP CALC LVOT PEAK VEL: 0.91 M/S
DOP CALC LVOT STROKE VOLUME: 38.34 CM3
DOP CALCLVOT PEAK VEL VTI: 12.09 CM
E WAVE DECELERATION TIME: 99.31 MSEC
E/A RATIO: 0.63
E/E' RATIO: 10.91 M/S
ECHO LV POSTERIOR WALL: 1.23 CM (ref 0.6–1.1)
EJECTION FRACTION: 55 %
FRACTIONAL SHORTENING: 27 % (ref 28–44)
INTERVENTRICULAR SEPTUM: 1.13 CM (ref 0.6–1.1)
IVRT: 93.24 MSEC
LA MAJOR: 4.55 CM
LA MINOR: 4.9 CM
LA WIDTH: 2.85 CM
LEFT ATRIUM SIZE: 3.62 CM
LEFT ATRIUM VOLUME INDEX: 20.1 ML/M2
LEFT ATRIUM VOLUME: 41.38 CM3
LEFT INTERNAL DIMENSION IN SYSTOLE: 3.34 CM (ref 2.1–4)
LEFT VENTRICLE DIASTOLIC VOLUME INDEX: 47.17 ML/M2
LEFT VENTRICLE DIASTOLIC VOLUME: 97.18 ML
LEFT VENTRICLE MASS INDEX: 97 G/M2
LEFT VENTRICLE SYSTOLIC VOLUME INDEX: 22 ML/M2
LEFT VENTRICLE SYSTOLIC VOLUME: 45.39 ML
LEFT VENTRICULAR INTERNAL DIMENSION IN DIASTOLE: 4.6 CM (ref 3.5–6)
LEFT VENTRICULAR MASS: 200.13 G
LV LATERAL E/E' RATIO: 12 M/S
LV SEPTAL E/E' RATIO: 10 M/S
MV A" WAVE DURATION": 7.99 MSEC
MV PEAK A VEL: 0.96 M/S
MV PEAK E VEL: 0.6 M/S
MV STENOSIS PRESSURE HALF TIME: 28.8 MS
MV VALVE AREA P 1/2 METHOD: 7.64 CM2
OHS CV CPX 1 MINUTE RECOVERY HEART RATE: 127 BPM
OHS CV CPX 85 PERCENT MAX PREDICTED HEART RATE MALE: 130
OHS CV CPX MAX PREDICTED HEART RATE: 153
OHS CV CPX PATIENT IS FEMALE: 1
OHS CV CPX PATIENT IS MALE: 0
OHS CV CPX PEAK DIASTOLIC BLOOD PRESSURE: 56 MMHG
OHS CV CPX PEAK HEAR RATE: 142 BPM
OHS CV CPX PEAK RATE PRESSURE PRODUCT: ABNORMAL
OHS CV CPX PEAK SYSTOLIC BLOOD PRESSURE: 112 MMHG
OHS CV CPX PERCENT MAX PREDICTED HEART RATE ACHIEVED: 93
OHS CV CPX RATE PRESSURE PRODUCT PRESENTING: ABNORMAL
PISA TR MAX VEL: 2.31 M/S
PULM VEIN S/D RATIO: 1.11
PV PEAK D VEL: 0.35 M/S
PV PEAK S VEL: 0.39 M/S
RA MAJOR: 3.67 CM
RA PRESSURE: 3 MMHG
RA WIDTH: 3.25 CM
RIGHT VENTRICULAR END-DIASTOLIC DIMENSION: 2.75 CM
RV TISSUE DOPPLER FREE WALL SYSTOLIC VELOCITY 1 (APICAL 4 CHAMBER VIEW): 10.29 CM/S
SINUS: 2.7 CM
STJ: 2.29 CM
SYSTOLIC BLOOD PRESSURE: 128 MMHG
TDI LATERAL: 0.05 M/S
TDI SEPTAL: 0.06 M/S
TDI: 0.06 M/S
TR MAX PG: 21 MMHG
TRICUSPID ANNULAR PLANE SYSTOLIC EXCURSION: 2.08 CM
TV REST PULMONARY ARTERY PRESSURE: 24 MMHG

## 2021-05-03 PROCEDURE — 93351 STRESS TTE COMPLETE: CPT

## 2021-05-03 PROCEDURE — 93351 STRESS ECHO (CUPID ONLY): ICD-10-PCS | Mod: 26,,, | Performed by: INTERNAL MEDICINE

## 2021-05-03 PROCEDURE — 63600175 PHARM REV CODE 636 W HCPCS: Performed by: PHYSICIAN ASSISTANT

## 2021-05-03 PROCEDURE — 93351 STRESS TTE COMPLETE: CPT | Mod: 26,,, | Performed by: INTERNAL MEDICINE

## 2021-05-03 RX ORDER — DOBUTAMINE HYDROCHLORIDE 400 MG/100ML
10 INJECTION INTRAVENOUS CONTINUOUS
Status: DISCONTINUED | OUTPATIENT
Start: 2021-05-03 | End: 2021-05-04 | Stop reason: HOSPADM

## 2021-05-03 RX ORDER — ATROPINE SULFATE 0.1 MG/ML
1 INJECTION INTRAVENOUS
Status: DISCONTINUED | OUTPATIENT
Start: 2021-05-03 | End: 2021-05-03

## 2021-05-03 RX ADMIN — DOBUTAMINE HYDROCHLORIDE 10 MCG/KG/MIN: 400 INJECTION INTRAVENOUS at 03:05

## 2021-05-04 ENCOUNTER — PATIENT MESSAGE (OUTPATIENT)
Dept: INTERNAL MEDICINE | Facility: CLINIC | Age: 61
End: 2021-05-04

## 2021-05-04 ENCOUNTER — PATIENT MESSAGE (OUTPATIENT)
Dept: PHYSICAL MEDICINE AND REHAB | Facility: CLINIC | Age: 61
End: 2021-05-04

## 2021-05-04 ENCOUNTER — TELEPHONE (OUTPATIENT)
Dept: BARIATRICS | Facility: CLINIC | Age: 61
End: 2021-05-04

## 2021-05-04 DIAGNOSIS — Z96.641 STATUS POST RIGHT HIP REPLACEMENT: ICD-10-CM

## 2021-05-04 DIAGNOSIS — Z01.818 PREOPERATIVE EVALUATION TO RULE OUT SURGICAL CONTRAINDICATION: ICD-10-CM

## 2021-05-04 DIAGNOSIS — I10 ESSENTIAL HYPERTENSION: ICD-10-CM

## 2021-05-04 DIAGNOSIS — M43.16 SPONDYLOLISTHESIS AT L3-L4 LEVEL: ICD-10-CM

## 2021-05-04 DIAGNOSIS — E11.36 TYPE 2 DIABETES MELLITUS WITH CATARACT: Primary | ICD-10-CM

## 2021-05-04 DIAGNOSIS — M54.9 BACK PAIN, UNSPECIFIED BACK LOCATION, UNSPECIFIED BACK PAIN LATERALITY, UNSPECIFIED CHRONICITY: ICD-10-CM

## 2021-05-04 DIAGNOSIS — M54.41 CHRONIC BILATERAL LOW BACK PAIN WITH RIGHT-SIDED SCIATICA: ICD-10-CM

## 2021-05-04 DIAGNOSIS — M48.062 SPINAL STENOSIS OF LUMBAR REGION WITH NEUROGENIC CLAUDICATION: ICD-10-CM

## 2021-05-04 DIAGNOSIS — Z98.84 S/P GASTRIC BYPASS: ICD-10-CM

## 2021-05-04 DIAGNOSIS — E66.01 MORBID OBESITY: ICD-10-CM

## 2021-05-04 DIAGNOSIS — E46 PROTEIN-CALORIE MALNUTRITION, UNSPECIFIED SEVERITY: ICD-10-CM

## 2021-05-04 DIAGNOSIS — E11.9 TYPE 2 DIABETES MELLITUS WITHOUT COMPLICATION, WITHOUT LONG-TERM CURRENT USE OF INSULIN: Primary | ICD-10-CM

## 2021-05-04 DIAGNOSIS — F11.90 UNCOMPLICATED OPIOID USE: ICD-10-CM

## 2021-05-04 DIAGNOSIS — G89.29 CHRONIC BILATERAL LOW BACK PAIN WITH RIGHT-SIDED SCIATICA: ICD-10-CM

## 2021-05-04 DIAGNOSIS — Z96.651 STATUS POST TOTAL RIGHT KNEE REPLACEMENT: ICD-10-CM

## 2021-05-04 DIAGNOSIS — M48.061 SPINAL STENOSIS OF LUMBAR REGION, UNSPECIFIED WHETHER NEUROGENIC CLAUDICATION PRESENT: ICD-10-CM

## 2021-05-05 ENCOUNTER — TELEPHONE (OUTPATIENT)
Dept: INTERNAL MEDICINE | Facility: CLINIC | Age: 61
End: 2021-05-05

## 2021-05-05 DIAGNOSIS — Z96.651 STATUS POST TOTAL RIGHT KNEE REPLACEMENT: ICD-10-CM

## 2021-05-05 DIAGNOSIS — M54.9 BACK PAIN, UNSPECIFIED BACK LOCATION, UNSPECIFIED BACK PAIN LATERALITY, UNSPECIFIED CHRONICITY: ICD-10-CM

## 2021-05-05 DIAGNOSIS — M54.41 CHRONIC BILATERAL LOW BACK PAIN WITH RIGHT-SIDED SCIATICA: ICD-10-CM

## 2021-05-05 DIAGNOSIS — M43.16 SPONDYLOLISTHESIS AT L3-L4 LEVEL: ICD-10-CM

## 2021-05-05 DIAGNOSIS — G89.29 CHRONIC BILATERAL LOW BACK PAIN WITH RIGHT-SIDED SCIATICA: ICD-10-CM

## 2021-05-05 DIAGNOSIS — Z96.641 STATUS POST RIGHT HIP REPLACEMENT: ICD-10-CM

## 2021-05-05 DIAGNOSIS — E11.9 TYPE 2 DIABETES MELLITUS WITHOUT COMPLICATION: ICD-10-CM

## 2021-05-05 DIAGNOSIS — M48.062 SPINAL STENOSIS OF LUMBAR REGION WITH NEUROGENIC CLAUDICATION: ICD-10-CM

## 2021-05-05 DIAGNOSIS — F11.90 UNCOMPLICATED OPIOID USE: ICD-10-CM

## 2021-05-05 RX ORDER — OXYCODONE AND ACETAMINOPHEN 5; 325 MG/1; MG/1
1 TABLET ORAL EVERY 12 HOURS PRN
Qty: 60 TABLET | Refills: 0 | OUTPATIENT
Start: 2021-05-05 | End: 2021-06-04

## 2021-05-06 RX ORDER — VENLAFAXINE 100 MG/1
100 TABLET ORAL 2 TIMES DAILY
Qty: 60 TABLET | Refills: 11 | Status: SHIPPED | OUTPATIENT
Start: 2021-05-06 | End: 2021-09-20

## 2021-05-06 RX ORDER — METFORMIN HYDROCHLORIDE 1000 MG/1
1000 TABLET ORAL 2 TIMES DAILY WITH MEALS
Qty: 60 TABLET | Refills: 11 | Status: SHIPPED | OUTPATIENT
Start: 2021-05-06 | End: 2021-09-20

## 2021-05-07 DIAGNOSIS — E11.9 TYPE 2 DIABETES MELLITUS WITHOUT COMPLICATION: ICD-10-CM

## 2021-05-09 RX ORDER — OXYCODONE AND ACETAMINOPHEN 5; 325 MG/1; MG/1
1 TABLET ORAL EVERY 12 HOURS PRN
Qty: 60 TABLET | Refills: 0 | Status: SHIPPED | OUTPATIENT
Start: 2021-05-09 | End: 2021-08-06 | Stop reason: SDUPTHER

## 2021-05-25 ENCOUNTER — TELEPHONE (OUTPATIENT)
Dept: BARIATRICS | Facility: CLINIC | Age: 61
End: 2021-05-25

## 2021-05-26 ENCOUNTER — OFFICE VISIT (OUTPATIENT)
Dept: BARIATRICS | Facility: CLINIC | Age: 61
End: 2021-05-26
Payer: MEDICARE

## 2021-05-26 ENCOUNTER — LAB VISIT (OUTPATIENT)
Dept: LAB | Facility: HOSPITAL | Age: 61
End: 2021-05-26
Attending: SURGERY
Payer: MEDICARE

## 2021-05-26 VITALS
BODY MASS INDEX: 44.45 KG/M2 | OXYGEN SATURATION: 98 % | WEIGHT: 235.25 LBS | HEART RATE: 110 BPM | DIASTOLIC BLOOD PRESSURE: 64 MMHG | SYSTOLIC BLOOD PRESSURE: 136 MMHG

## 2021-05-26 DIAGNOSIS — E11.36 TYPE 2 DIABETES MELLITUS WITH CATARACT: ICD-10-CM

## 2021-05-26 DIAGNOSIS — E66.01 MORBID OBESITY: ICD-10-CM

## 2021-05-26 DIAGNOSIS — Z01.818 PREOPERATIVE EVALUATION TO RULE OUT SURGICAL CONTRAINDICATION: ICD-10-CM

## 2021-05-26 DIAGNOSIS — M48.061 SPINAL STENOSIS OF LUMBAR REGION, UNSPECIFIED WHETHER NEUROGENIC CLAUDICATION PRESENT: ICD-10-CM

## 2021-05-26 DIAGNOSIS — I10 ESSENTIAL HYPERTENSION: ICD-10-CM

## 2021-05-26 LAB
ALBUMIN SERPL BCP-MCNC: 3.8 G/DL (ref 3.5–5.2)
ALP SERPL-CCNC: 59 U/L (ref 55–135)
ALT SERPL W/O P-5'-P-CCNC: 27 U/L (ref 10–44)
ANION GAP SERPL CALC-SCNC: 10 MMOL/L (ref 8–16)
AST SERPL-CCNC: 23 U/L (ref 10–40)
BASOPHILS # BLD AUTO: 0.05 K/UL (ref 0–0.2)
BASOPHILS NFR BLD: 0.7 % (ref 0–1.9)
BILIRUB SERPL-MCNC: 0.4 MG/DL (ref 0.1–1)
BUN SERPL-MCNC: 18 MG/DL (ref 6–20)
CALCIUM SERPL-MCNC: 10.1 MG/DL (ref 8.7–10.5)
CHLORIDE SERPL-SCNC: 100 MMOL/L (ref 95–110)
CO2 SERPL-SCNC: 30 MMOL/L (ref 23–29)
CREAT SERPL-MCNC: 0.9 MG/DL (ref 0.5–1.4)
DIFFERENTIAL METHOD: ABNORMAL
EOSINOPHIL # BLD AUTO: 0.1 K/UL (ref 0–0.5)
EOSINOPHIL NFR BLD: 1.5 % (ref 0–8)
ERYTHROCYTE [DISTWIDTH] IN BLOOD BY AUTOMATED COUNT: 14.4 % (ref 11.5–14.5)
EST. GFR  (AFRICAN AMERICAN): >60 ML/MIN/1.73 M^2
EST. GFR  (NON AFRICAN AMERICAN): >60 ML/MIN/1.73 M^2
ESTIMATED AVG GLUCOSE: 128 MG/DL (ref 68–131)
GLUCOSE SERPL-MCNC: 125 MG/DL (ref 70–110)
HBA1C MFR BLD: 6.1 % (ref 4–5.6)
HCT VFR BLD AUTO: 41.1 % (ref 37–48.5)
HGB BLD-MCNC: 13 G/DL (ref 12–16)
IMM GRANULOCYTES # BLD AUTO: 0.01 K/UL (ref 0–0.04)
IMM GRANULOCYTES NFR BLD AUTO: 0.1 % (ref 0–0.5)
LYMPHOCYTES # BLD AUTO: 4 K/UL (ref 1–4.8)
LYMPHOCYTES NFR BLD: 53.2 % (ref 18–48)
MCH RBC QN AUTO: 27.6 PG (ref 27–31)
MCHC RBC AUTO-ENTMCNC: 31.6 G/DL (ref 32–36)
MCV RBC AUTO: 87 FL (ref 82–98)
MONOCYTES # BLD AUTO: 0.4 K/UL (ref 0.3–1)
MONOCYTES NFR BLD: 5.1 % (ref 4–15)
NEUTROPHILS # BLD AUTO: 3 K/UL (ref 1.8–7.7)
NEUTROPHILS NFR BLD: 39.4 % (ref 38–73)
NRBC BLD-RTO: 0 /100 WBC
PLATELET # BLD AUTO: 257 K/UL (ref 150–450)
PMV BLD AUTO: 10.5 FL (ref 9.2–12.9)
POTASSIUM SERPL-SCNC: 4.4 MMOL/L (ref 3.5–5.1)
PROT SERPL-MCNC: 7.5 G/DL (ref 6–8.4)
RBC # BLD AUTO: 4.71 M/UL (ref 4–5.4)
SODIUM SERPL-SCNC: 140 MMOL/L (ref 136–145)
WBC # BLD AUTO: 7.52 K/UL (ref 3.9–12.7)

## 2021-05-26 PROCEDURE — 3072F LOW RISK FOR RETINOPATHY: CPT | Mod: S$GLB,,, | Performed by: SURGERY

## 2021-05-26 PROCEDURE — 3072F PR LOW RISK FOR RETINOPATHY: ICD-10-PCS | Mod: S$GLB,,, | Performed by: SURGERY

## 2021-05-26 PROCEDURE — 99499 RISK ADDL DX/OHS AUDIT: ICD-10-PCS | Mod: HCNC,S$GLB,, | Performed by: SURGERY

## 2021-05-26 PROCEDURE — 1126F AMNT PAIN NOTED NONE PRSNT: CPT | Mod: S$GLB,,, | Performed by: SURGERY

## 2021-05-26 PROCEDURE — 1126F PR PAIN SEVERITY QUANTIFIED, NO PAIN PRESENT: ICD-10-PCS | Mod: S$GLB,,, | Performed by: SURGERY

## 2021-05-26 PROCEDURE — 83036 HEMOGLOBIN GLYCOSYLATED A1C: CPT | Performed by: SURGERY

## 2021-05-26 PROCEDURE — 99999 PR PBB SHADOW E&M-EST. PATIENT-LVL IV: ICD-10-PCS | Mod: PBBFAC,,, | Performed by: SURGERY

## 2021-05-26 PROCEDURE — 3008F PR BODY MASS INDEX (BMI) DOCUMENTED: ICD-10-PCS | Mod: CPTII,S$GLB,, | Performed by: SURGERY

## 2021-05-26 PROCEDURE — 36415 COLL VENOUS BLD VENIPUNCTURE: CPT | Performed by: SURGERY

## 2021-05-26 PROCEDURE — 3008F BODY MASS INDEX DOCD: CPT | Mod: CPTII,S$GLB,, | Performed by: SURGERY

## 2021-05-26 PROCEDURE — 99213 PR OFFICE/OUTPT VISIT, EST, LEVL III, 20-29 MIN: ICD-10-PCS | Mod: S$GLB,,, | Performed by: SURGERY

## 2021-05-26 PROCEDURE — 80053 COMPREHEN METABOLIC PANEL: CPT | Performed by: SURGERY

## 2021-05-26 PROCEDURE — 99499 UNLISTED E&M SERVICE: CPT | Mod: HCNC,S$GLB,, | Performed by: SURGERY

## 2021-05-26 PROCEDURE — 85025 COMPLETE CBC W/AUTO DIFF WBC: CPT | Performed by: SURGERY

## 2021-05-26 PROCEDURE — 99213 OFFICE O/P EST LOW 20 MIN: CPT | Mod: S$GLB,,, | Performed by: SURGERY

## 2021-05-26 PROCEDURE — 99999 PR PBB SHADOW E&M-EST. PATIENT-LVL IV: CPT | Mod: PBBFAC,,, | Performed by: SURGERY

## 2021-05-26 RX ORDER — SODIUM CITRATE AND CITRIC ACID MONOHYDRATE 334; 500 MG/5ML; MG/5ML
30 SOLUTION ORAL ONCE
Status: CANCELLED | OUTPATIENT
Start: 2021-05-26 | End: 2021-05-26

## 2021-05-26 RX ORDER — LIDOCAINE HYDROCHLORIDE 10 MG/ML
1 INJECTION, SOLUTION EPIDURAL; INFILTRATION; INTRACAUDAL; PERINEURAL ONCE
Status: CANCELLED | OUTPATIENT
Start: 2021-05-26 | End: 2021-05-26

## 2021-05-26 RX ORDER — MUPIROCIN 20 MG/G
OINTMENT TOPICAL
Status: CANCELLED | OUTPATIENT
Start: 2021-05-26

## 2021-05-26 RX ORDER — OMEPRAZOLE 40 MG/1
40 CAPSULE, DELAYED RELEASE ORAL EVERY MORNING
Qty: 30 CAPSULE | Refills: 2 | Status: SHIPPED | OUTPATIENT
Start: 2021-05-26 | End: 2021-08-19

## 2021-05-26 RX ORDER — HYDROCODONE BITARTRATE AND ACETAMINOPHEN 7.5; 325 MG/15ML; MG/15ML
15 SOLUTION ORAL EVERY 4 HOURS PRN
Status: CANCELLED | OUTPATIENT
Start: 2021-05-27

## 2021-05-26 RX ORDER — ACETAMINOPHEN 650 MG/20.3ML
500 LIQUID ORAL EVERY 8 HOURS
Status: CANCELLED | OUTPATIENT
Start: 2021-05-26 | End: 2021-05-27

## 2021-05-26 RX ORDER — ONDANSETRON 2 MG/ML
8 INJECTION INTRAMUSCULAR; INTRAVENOUS EVERY 6 HOURS PRN
Status: CANCELLED | OUTPATIENT
Start: 2021-05-26

## 2021-05-26 RX ORDER — SODIUM CHLORIDE 9 MG/ML
INJECTION, SOLUTION INTRAVENOUS CONTINUOUS
Status: CANCELLED | OUTPATIENT
Start: 2021-05-26

## 2021-05-26 RX ORDER — POLYETHYLENE GLYCOL 3350 17 G/17G
17 POWDER, FOR SOLUTION ORAL DAILY
Qty: 510 G | Refills: 3 | Status: SHIPPED | OUTPATIENT
Start: 2021-05-26 | End: 2023-01-15

## 2021-05-26 RX ORDER — HEPARIN SODIUM 5000 [USP'U]/ML
5000 INJECTION, SOLUTION INTRAVENOUS; SUBCUTANEOUS ONCE
Status: CANCELLED | OUTPATIENT
Start: 2021-05-26 | End: 2021-05-26

## 2021-05-26 RX ORDER — PANTOPRAZOLE SODIUM 40 MG/10ML
40 INJECTION, POWDER, LYOPHILIZED, FOR SOLUTION INTRAVENOUS 2 TIMES DAILY
Status: CANCELLED | OUTPATIENT
Start: 2021-05-26

## 2021-05-26 RX ORDER — METOCLOPRAMIDE HYDROCHLORIDE 5 MG/ML
10 INJECTION INTRAMUSCULAR; INTRAVENOUS ONCE
Status: CANCELLED | OUTPATIENT
Start: 2021-05-26 | End: 2021-05-26

## 2021-05-26 RX ORDER — SODIUM CHLORIDE, SODIUM LACTATE, POTASSIUM CHLORIDE, CALCIUM CHLORIDE 600; 310; 30; 20 MG/100ML; MG/100ML; MG/100ML; MG/100ML
INJECTION, SOLUTION INTRAVENOUS CONTINUOUS
Status: CANCELLED | OUTPATIENT
Start: 2021-05-26

## 2021-05-26 RX ORDER — ONDANSETRON 8 MG/1
8 TABLET, ORALLY DISINTEGRATING ORAL EVERY 6 HOURS PRN
Qty: 30 TABLET | Refills: 0 | Status: SHIPPED | OUTPATIENT
Start: 2021-05-26 | End: 2021-07-29 | Stop reason: SDUPTHER

## 2021-05-26 RX ORDER — GABAPENTIN 250 MG/5ML
300 SOLUTION ORAL 3 TIMES DAILY
Status: CANCELLED | OUTPATIENT
Start: 2021-05-26

## 2021-05-26 RX ORDER — DEXTROMETHORPHAN/PSEUDOEPHED 2.5-7.5/.8
40 DROPS ORAL 4 TIMES DAILY PRN
Status: CANCELLED | OUTPATIENT
Start: 2021-05-26

## 2021-05-26 RX ORDER — HYDROMORPHONE HYDROCHLORIDE 1 MG/ML
0.5 INJECTION, SOLUTION INTRAMUSCULAR; INTRAVENOUS; SUBCUTANEOUS
Status: CANCELLED | OUTPATIENT
Start: 2021-05-26

## 2021-05-26 RX ORDER — ENOXAPARIN SODIUM 100 MG/ML
40 INJECTION SUBCUTANEOUS EVERY 12 HOURS
Status: CANCELLED | OUTPATIENT
Start: 2021-05-26

## 2021-05-26 RX ORDER — PROCHLORPERAZINE EDISYLATE 5 MG/ML
5 INJECTION INTRAMUSCULAR; INTRAVENOUS EVERY 6 HOURS PRN
Status: CANCELLED | OUTPATIENT
Start: 2021-05-26

## 2021-05-26 RX ORDER — FAMOTIDINE 10 MG/ML
20 INJECTION INTRAVENOUS ONCE
Status: CANCELLED | OUTPATIENT
Start: 2021-05-26 | End: 2021-05-26

## 2021-05-26 RX ORDER — METRONIDAZOLE 500 MG/100ML
500 INJECTION, SOLUTION INTRAVENOUS
Status: CANCELLED | OUTPATIENT
Start: 2021-05-26

## 2021-05-26 RX ORDER — HYDROCODONE BITARTRATE AND ACETAMINOPHEN 7.5; 325 MG/15ML; MG/15ML
15 SOLUTION ORAL 4 TIMES DAILY PRN
Qty: 118 ML | Refills: 0 | Status: SHIPPED | OUTPATIENT
Start: 2021-05-26 | End: 2023-01-15

## 2021-05-26 RX ORDER — PROMETHAZINE HYDROCHLORIDE 25 MG/1
25 TABLET ORAL EVERY 6 HOURS PRN
Qty: 15 TABLET | Refills: 0 | Status: SHIPPED | OUTPATIENT
Start: 2021-05-26 | End: 2021-08-02

## 2021-05-27 ENCOUNTER — TELEPHONE (OUTPATIENT)
Dept: BARIATRICS | Facility: CLINIC | Age: 61
End: 2021-05-27

## 2021-05-31 ENCOUNTER — TELEPHONE (OUTPATIENT)
Dept: BARIATRICS | Facility: CLINIC | Age: 61
End: 2021-05-31

## 2021-06-01 ENCOUNTER — PATIENT MESSAGE (OUTPATIENT)
Dept: SURGERY | Facility: HOSPITAL | Age: 61
End: 2021-06-01

## 2021-06-02 ENCOUNTER — TELEPHONE (OUTPATIENT)
Dept: BARIATRICS | Facility: CLINIC | Age: 61
End: 2021-06-02

## 2021-06-04 ENCOUNTER — PATIENT MESSAGE (OUTPATIENT)
Dept: PHYSICAL MEDICINE AND REHAB | Facility: CLINIC | Age: 61
End: 2021-06-04

## 2021-06-04 DIAGNOSIS — M54.41 CHRONIC BILATERAL LOW BACK PAIN WITH RIGHT-SIDED SCIATICA: ICD-10-CM

## 2021-06-04 DIAGNOSIS — Z96.641 STATUS POST RIGHT HIP REPLACEMENT: ICD-10-CM

## 2021-06-04 DIAGNOSIS — M54.9 BACK PAIN, UNSPECIFIED BACK LOCATION, UNSPECIFIED BACK PAIN LATERALITY, UNSPECIFIED CHRONICITY: ICD-10-CM

## 2021-06-04 DIAGNOSIS — M48.062 SPINAL STENOSIS OF LUMBAR REGION WITH NEUROGENIC CLAUDICATION: ICD-10-CM

## 2021-06-04 DIAGNOSIS — Z96.651 STATUS POST TOTAL RIGHT KNEE REPLACEMENT: ICD-10-CM

## 2021-06-04 DIAGNOSIS — F11.90 UNCOMPLICATED OPIOID USE: ICD-10-CM

## 2021-06-04 DIAGNOSIS — G89.29 CHRONIC BILATERAL LOW BACK PAIN WITH RIGHT-SIDED SCIATICA: ICD-10-CM

## 2021-06-04 DIAGNOSIS — M43.16 SPONDYLOLISTHESIS AT L3-L4 LEVEL: ICD-10-CM

## 2021-06-05 ENCOUNTER — LAB VISIT (OUTPATIENT)
Dept: SPORTS MEDICINE | Facility: CLINIC | Age: 61
DRG: 620 | End: 2021-06-05
Payer: MEDICARE

## 2021-06-05 DIAGNOSIS — Z01.818 PREOPERATIVE EVALUATION TO RULE OUT SURGICAL CONTRAINDICATION: ICD-10-CM

## 2021-06-05 LAB — SARS-COV-2 RNA RESP QL NAA+PROBE: NOT DETECTED

## 2021-06-05 PROCEDURE — U0003 INFECTIOUS AGENT DETECTION BY NUCLEIC ACID (DNA OR RNA); SEVERE ACUTE RESPIRATORY SYNDROME CORONAVIRUS 2 (SARS-COV-2) (CORONAVIRUS DISEASE [COVID-19]), AMPLIFIED PROBE TECHNIQUE, MAKING USE OF HIGH THROUGHPUT TECHNOLOGIES AS DESCRIBED BY CMS-2020-01-R: HCPCS | Performed by: SURGERY

## 2021-06-05 PROCEDURE — U0005 INFEC AGEN DETEC AMPLI PROBE: HCPCS | Performed by: SURGERY

## 2021-06-07 ENCOUNTER — ANESTHESIA EVENT (OUTPATIENT)
Dept: SURGERY | Facility: HOSPITAL | Age: 61
DRG: 620 | End: 2021-06-07
Payer: MEDICARE

## 2021-06-07 ENCOUNTER — TELEPHONE (OUTPATIENT)
Dept: BARIATRICS | Facility: CLINIC | Age: 61
End: 2021-06-07

## 2021-06-08 ENCOUNTER — ANESTHESIA (OUTPATIENT)
Dept: SURGERY | Facility: HOSPITAL | Age: 61
DRG: 620 | End: 2021-06-08
Payer: MEDICARE

## 2021-06-08 ENCOUNTER — HOSPITAL ENCOUNTER (INPATIENT)
Facility: HOSPITAL | Age: 61
LOS: 2 days | Discharge: HOME | DRG: 620 | End: 2021-06-10
Attending: SURGERY | Admitting: SURGERY
Payer: MEDICARE

## 2021-06-08 DIAGNOSIS — E66.9 OBESITY, UNSPECIFIED CLASSIFICATION, UNSPECIFIED OBESITY TYPE, UNSPECIFIED WHETHER SERIOUS COMORBIDITY PRESENT: Primary | ICD-10-CM

## 2021-06-08 LAB — POCT GLUCOSE: 148 MG/DL (ref 70–110)

## 2021-06-08 PROCEDURE — 88342 CHG IMMUNOCYTOCHEMISTRY: ICD-10-PCS | Mod: 26,,, | Performed by: PATHOLOGY

## 2021-06-08 PROCEDURE — 88307 TISSUE EXAM BY PATHOLOGIST: CPT | Mod: 26,,, | Performed by: PATHOLOGY

## 2021-06-08 PROCEDURE — 27800903 OPTIME MED/SURG SUP & DEVICES OTHER IMPLANTS: Performed by: SURGERY

## 2021-06-08 PROCEDURE — 25000003 PHARM REV CODE 250: Performed by: SURGERY

## 2021-06-08 PROCEDURE — 88342 IMHCHEM/IMCYTCHM 1ST ANTB: CPT | Performed by: PATHOLOGY

## 2021-06-08 PROCEDURE — D9220A PRA ANESTHESIA: Mod: ,,, | Performed by: SURGERY

## 2021-06-08 PROCEDURE — S0030 INJECTION, METRONIDAZOLE: HCPCS | Performed by: SURGERY

## 2021-06-08 PROCEDURE — 43775 LAP SLEEVE GASTRECTOMY: CPT | Mod: ,,, | Performed by: SURGERY

## 2021-06-08 PROCEDURE — 37000009 HC ANESTHESIA EA ADD 15 MINS: Performed by: SURGERY

## 2021-06-08 PROCEDURE — 63600175 PHARM REV CODE 636 W HCPCS: Performed by: NURSE ANESTHETIST, CERTIFIED REGISTERED

## 2021-06-08 PROCEDURE — 63600175 PHARM REV CODE 636 W HCPCS: Performed by: SURGERY

## 2021-06-08 PROCEDURE — 27201423 OPTIME MED/SURG SUP & DEVICES STERILE SUPPLY: Performed by: SURGERY

## 2021-06-08 PROCEDURE — D9220A PRA ANESTHESIA: ICD-10-PCS | Mod: ,,, | Performed by: SURGERY

## 2021-06-08 PROCEDURE — C9113 INJ PANTOPRAZOLE SODIUM, VIA: HCPCS | Performed by: SURGERY

## 2021-06-08 PROCEDURE — 11000001 HC ACUTE MED/SURG PRIVATE ROOM

## 2021-06-08 PROCEDURE — 82962 GLUCOSE BLOOD TEST: CPT | Performed by: SURGERY

## 2021-06-08 PROCEDURE — 88342 IMHCHEM/IMCYTCHM 1ST ANTB: CPT | Mod: 26,,, | Performed by: PATHOLOGY

## 2021-06-08 PROCEDURE — 71000033 HC RECOVERY, INTIAL HOUR: Performed by: SURGERY

## 2021-06-08 PROCEDURE — 36000710: Performed by: SURGERY

## 2021-06-08 PROCEDURE — 63600175 PHARM REV CODE 636 W HCPCS: Performed by: ANESTHESIOLOGY

## 2021-06-08 PROCEDURE — 25000003 PHARM REV CODE 250: Performed by: NURSE ANESTHETIST, CERTIFIED REGISTERED

## 2021-06-08 PROCEDURE — 43775 PR LAP, GAST RESTRICT PROC, LONGITUDINAL GASTRECTOMY: ICD-10-PCS | Mod: ,,, | Performed by: SURGERY

## 2021-06-08 PROCEDURE — 88307 TISSUE EXAM BY PATHOLOGIST: CPT | Performed by: PATHOLOGY

## 2021-06-08 PROCEDURE — 37000008 HC ANESTHESIA 1ST 15 MINUTES: Performed by: SURGERY

## 2021-06-08 PROCEDURE — 36000711: Performed by: SURGERY

## 2021-06-08 PROCEDURE — 88307 PR  SURG PATH,LEVEL V: ICD-10-PCS | Mod: 26,,, | Performed by: PATHOLOGY

## 2021-06-08 DEVICE — SEAMGUARD ESCHELON 60 MM.: Type: IMPLANTABLE DEVICE | Site: ABDOMEN | Status: FUNCTIONAL

## 2021-06-08 RX ORDER — HYDROCODONE BITARTRATE AND ACETAMINOPHEN 7.5; 325 MG/15ML; MG/15ML
15 SOLUTION ORAL EVERY 4 HOURS PRN
Status: DISCONTINUED | OUTPATIENT
Start: 2021-06-08 | End: 2021-06-10 | Stop reason: HOSPADM

## 2021-06-08 RX ORDER — GLUCAGON 1 MG
1 KIT INJECTION
Status: DISCONTINUED | OUTPATIENT
Start: 2021-06-08 | End: 2021-06-10 | Stop reason: HOSPADM

## 2021-06-08 RX ORDER — FENTANYL CITRATE 50 UG/ML
INJECTION, SOLUTION INTRAMUSCULAR; INTRAVENOUS
Status: DISCONTINUED | OUTPATIENT
Start: 2021-06-08 | End: 2021-06-08

## 2021-06-08 RX ORDER — FAMOTIDINE 10 MG/ML
20 INJECTION INTRAVENOUS ONCE
Status: COMPLETED | OUTPATIENT
Start: 2021-06-08 | End: 2021-06-08

## 2021-06-08 RX ORDER — LIDOCAINE HCL/PF 100 MG/5ML
SYRINGE (ML) INTRAVENOUS
Status: DISCONTINUED | OUTPATIENT
Start: 2021-06-08 | End: 2021-06-08

## 2021-06-08 RX ORDER — PROCHLORPERAZINE EDISYLATE 5 MG/ML
5 INJECTION INTRAMUSCULAR; INTRAVENOUS EVERY 6 HOURS PRN
Status: DISCONTINUED | OUTPATIENT
Start: 2021-06-08 | End: 2021-06-10 | Stop reason: HOSPADM

## 2021-06-08 RX ORDER — SODIUM CHLORIDE, SODIUM LACTATE, POTASSIUM CHLORIDE, CALCIUM CHLORIDE 600; 310; 30; 20 MG/100ML; MG/100ML; MG/100ML; MG/100ML
INJECTION, SOLUTION INTRAVENOUS CONTINUOUS
Status: DISCONTINUED | OUTPATIENT
Start: 2021-06-08 | End: 2021-06-10 | Stop reason: HOSPADM

## 2021-06-08 RX ORDER — FENTANYL CITRATE 50 UG/ML
25 INJECTION, SOLUTION INTRAMUSCULAR; INTRAVENOUS EVERY 5 MIN PRN
Status: DISCONTINUED | OUTPATIENT
Start: 2021-06-08 | End: 2021-06-08 | Stop reason: HOSPADM

## 2021-06-08 RX ORDER — DEXAMETHASONE SODIUM PHOSPHATE 4 MG/ML
INJECTION, SOLUTION INTRA-ARTICULAR; INTRALESIONAL; INTRAMUSCULAR; INTRAVENOUS; SOFT TISSUE
Status: DISCONTINUED | OUTPATIENT
Start: 2021-06-08 | End: 2021-06-08

## 2021-06-08 RX ORDER — VENLAFAXINE 100 MG/1
100 TABLET ORAL 2 TIMES DAILY
Status: DISCONTINUED | OUTPATIENT
Start: 2021-06-09 | End: 2021-06-10 | Stop reason: HOSPADM

## 2021-06-08 RX ORDER — PHENYLEPHRINE HYDROCHLORIDE 10 MG/ML
INJECTION INTRAVENOUS
Status: DISCONTINUED | OUTPATIENT
Start: 2021-06-08 | End: 2021-06-08

## 2021-06-08 RX ORDER — SODIUM CITRATE AND CITRIC ACID MONOHYDRATE 334; 500 MG/5ML; MG/5ML
30 SOLUTION ORAL ONCE
Status: COMPLETED | OUTPATIENT
Start: 2021-06-08 | End: 2021-06-08

## 2021-06-08 RX ORDER — GABAPENTIN 250 MG/5ML
300 SOLUTION ORAL 3 TIMES DAILY
Status: DISCONTINUED | OUTPATIENT
Start: 2021-06-08 | End: 2021-06-10 | Stop reason: HOSPADM

## 2021-06-08 RX ORDER — MUPIROCIN 20 MG/G
OINTMENT TOPICAL
Status: DISCONTINUED | OUTPATIENT
Start: 2021-06-08 | End: 2021-06-08 | Stop reason: HOSPADM

## 2021-06-08 RX ORDER — HALOPERIDOL 5 MG/ML
INJECTION INTRAMUSCULAR
Status: COMPLETED
Start: 2021-06-08 | End: 2021-06-08

## 2021-06-08 RX ORDER — HEPARIN SODIUM 5000 [USP'U]/ML
5000 INJECTION, SOLUTION INTRAVENOUS; SUBCUTANEOUS ONCE
Status: COMPLETED | OUTPATIENT
Start: 2021-06-08 | End: 2021-06-08

## 2021-06-08 RX ORDER — METRONIDAZOLE 500 MG/100ML
500 INJECTION, SOLUTION INTRAVENOUS
Status: COMPLETED | OUTPATIENT
Start: 2021-06-08 | End: 2021-06-08

## 2021-06-08 RX ORDER — ONDANSETRON 2 MG/ML
INJECTION INTRAMUSCULAR; INTRAVENOUS
Status: DISCONTINUED | OUTPATIENT
Start: 2021-06-08 | End: 2021-06-08

## 2021-06-08 RX ORDER — HYDROMORPHONE HYDROCHLORIDE 1 MG/ML
0.2 INJECTION, SOLUTION INTRAMUSCULAR; INTRAVENOUS; SUBCUTANEOUS EVERY 5 MIN PRN
Status: DISCONTINUED | OUTPATIENT
Start: 2021-06-08 | End: 2021-06-08 | Stop reason: HOSPADM

## 2021-06-08 RX ORDER — NIFEDIPINE 30 MG/1
90 TABLET, EXTENDED RELEASE ORAL DAILY
Status: DISCONTINUED | OUTPATIENT
Start: 2021-06-09 | End: 2021-06-10 | Stop reason: HOSPADM

## 2021-06-08 RX ORDER — ONDANSETRON 2 MG/ML
4 INJECTION INTRAMUSCULAR; INTRAVENOUS ONCE AS NEEDED
Status: COMPLETED | OUTPATIENT
Start: 2021-06-08 | End: 2021-06-08

## 2021-06-08 RX ORDER — SODIUM CHLORIDE 9 MG/ML
INJECTION, SOLUTION INTRAVENOUS CONTINUOUS PRN
Status: DISCONTINUED | OUTPATIENT
Start: 2021-06-08 | End: 2021-06-08

## 2021-06-08 RX ORDER — HYDROMORPHONE HYDROCHLORIDE 1 MG/ML
0.5 INJECTION, SOLUTION INTRAMUSCULAR; INTRAVENOUS; SUBCUTANEOUS
Status: DISCONTINUED | OUTPATIENT
Start: 2021-06-08 | End: 2021-06-10 | Stop reason: HOSPADM

## 2021-06-08 RX ORDER — SODIUM CHLORIDE 9 MG/ML
INJECTION, SOLUTION INTRAVENOUS CONTINUOUS
Status: DISCONTINUED | OUTPATIENT
Start: 2021-06-08 | End: 2021-06-10 | Stop reason: HOSPADM

## 2021-06-08 RX ORDER — LIDOCAINE HYDROCHLORIDE AND EPINEPHRINE 10; 10 MG/ML; UG/ML
INJECTION, SOLUTION INFILTRATION; PERINEURAL
Status: DISCONTINUED | OUTPATIENT
Start: 2021-06-08 | End: 2021-06-08 | Stop reason: HOSPADM

## 2021-06-08 RX ORDER — ROCURONIUM BROMIDE 10 MG/ML
INJECTION, SOLUTION INTRAVENOUS
Status: DISCONTINUED | OUTPATIENT
Start: 2021-06-08 | End: 2021-06-08

## 2021-06-08 RX ORDER — ONDANSETRON 2 MG/ML
8 INJECTION INTRAMUSCULAR; INTRAVENOUS EVERY 6 HOURS PRN
Status: DISCONTINUED | OUTPATIENT
Start: 2021-06-08 | End: 2021-06-10 | Stop reason: HOSPADM

## 2021-06-08 RX ORDER — INSULIN ASPART 100 [IU]/ML
0-5 INJECTION, SOLUTION INTRAVENOUS; SUBCUTANEOUS EVERY 6 HOURS PRN
Status: DISCONTINUED | OUTPATIENT
Start: 2021-06-08 | End: 2021-06-10 | Stop reason: HOSPADM

## 2021-06-08 RX ORDER — ENOXAPARIN SODIUM 100 MG/ML
40 INJECTION SUBCUTANEOUS EVERY 12 HOURS
Status: DISCONTINUED | OUTPATIENT
Start: 2021-06-08 | End: 2021-06-09

## 2021-06-08 RX ORDER — NEOSTIGMINE METHYLSULFATE 0.5 MG/ML
INJECTION, SOLUTION INTRAVENOUS
Status: DISCONTINUED | OUTPATIENT
Start: 2021-06-08 | End: 2021-06-08

## 2021-06-08 RX ORDER — PROPOFOL 10 MG/ML
VIAL (ML) INTRAVENOUS
Status: DISCONTINUED | OUTPATIENT
Start: 2021-06-08 | End: 2021-06-08

## 2021-06-08 RX ORDER — CARVEDILOL 3.12 MG/1
3.12 TABLET ORAL 2 TIMES DAILY
Status: DISCONTINUED | OUTPATIENT
Start: 2021-06-09 | End: 2021-06-10 | Stop reason: HOSPADM

## 2021-06-08 RX ORDER — BUPIVACAINE HYDROCHLORIDE 2.5 MG/ML
INJECTION, SOLUTION EPIDURAL; INFILTRATION; INTRACAUDAL
Status: DISCONTINUED | OUTPATIENT
Start: 2021-06-08 | End: 2021-06-08 | Stop reason: HOSPADM

## 2021-06-08 RX ORDER — LIDOCAINE HYDROCHLORIDE 10 MG/ML
1 INJECTION, SOLUTION EPIDURAL; INFILTRATION; INTRACAUDAL; PERINEURAL ONCE
Status: COMPLETED | OUTPATIENT
Start: 2021-06-08 | End: 2021-06-08

## 2021-06-08 RX ORDER — SCOLOPAMINE TRANSDERMAL SYSTEM 1 MG/1
1 PATCH, EXTENDED RELEASE TRANSDERMAL
Status: DISCONTINUED | OUTPATIENT
Start: 2021-06-08 | End: 2021-06-08 | Stop reason: HOSPADM

## 2021-06-08 RX ORDER — MIDAZOLAM HYDROCHLORIDE 1 MG/ML
INJECTION INTRAMUSCULAR; INTRAVENOUS
Status: DISCONTINUED | OUTPATIENT
Start: 2021-06-08 | End: 2021-06-08

## 2021-06-08 RX ORDER — KETAMINE HCL IN 0.9 % NACL 50 MG/5 ML
SYRINGE (ML) INTRAVENOUS
Status: DISCONTINUED | OUTPATIENT
Start: 2021-06-08 | End: 2021-06-08

## 2021-06-08 RX ORDER — PANTOPRAZOLE SODIUM 40 MG/10ML
40 INJECTION, POWDER, LYOPHILIZED, FOR SOLUTION INTRAVENOUS 2 TIMES DAILY
Status: DISCONTINUED | OUTPATIENT
Start: 2021-06-08 | End: 2021-06-10 | Stop reason: HOSPADM

## 2021-06-08 RX ORDER — ACETAMINOPHEN 650 MG/20.3ML
500 LIQUID ORAL EVERY 8 HOURS
Status: COMPLETED | OUTPATIENT
Start: 2021-06-08 | End: 2021-06-09

## 2021-06-08 RX ORDER — DEXTROMETHORPHAN/PSEUDOEPHED 2.5-7.5/.8
40 DROPS ORAL 4 TIMES DAILY PRN
Status: DISCONTINUED | OUTPATIENT
Start: 2021-06-08 | End: 2021-06-10 | Stop reason: HOSPADM

## 2021-06-08 RX ORDER — ACETAMINOPHEN 10 MG/ML
INJECTION, SOLUTION INTRAVENOUS
Status: DISCONTINUED | OUTPATIENT
Start: 2021-06-08 | End: 2021-06-08

## 2021-06-08 RX ORDER — METOCLOPRAMIDE HYDROCHLORIDE 5 MG/ML
10 INJECTION INTRAMUSCULAR; INTRAVENOUS ONCE
Status: COMPLETED | OUTPATIENT
Start: 2021-06-08 | End: 2021-06-08

## 2021-06-08 RX ADMIN — SODIUM CHLORIDE, SODIUM GLUCONATE, SODIUM ACETATE, POTASSIUM CHLORIDE, MAGNESIUM CHLORIDE, SODIUM PHOSPHATE, DIBASIC, AND POTASSIUM PHOSPHATE: .53; .5; .37; .037; .03; .012; .00082 INJECTION, SOLUTION INTRAVENOUS at 03:06

## 2021-06-08 RX ADMIN — ACETAMINOPHEN 499.51 MG: 160 SOLUTION ORAL at 10:06

## 2021-06-08 RX ADMIN — DEXAMETHASONE SODIUM PHOSPHATE 4 MG: 4 INJECTION, SOLUTION INTRAMUSCULAR; INTRAVENOUS at 03:06

## 2021-06-08 RX ADMIN — PHENYLEPHRINE HYDROCHLORIDE 200 MCG: 10 INJECTION INTRAVENOUS at 03:06

## 2021-06-08 RX ADMIN — METRONIDAZOLE 500 MG: 500 INJECTION, SOLUTION INTRAVENOUS at 03:06

## 2021-06-08 RX ADMIN — LIDOCAINE HYDROCHLORIDE 0.3 MG: 10 INJECTION, SOLUTION EPIDURAL; INFILTRATION; INTRACAUDAL at 02:06

## 2021-06-08 RX ADMIN — ROCURONIUM BROMIDE 50 MG: 10 INJECTION, SOLUTION INTRAVENOUS at 02:06

## 2021-06-08 RX ADMIN — PROCHLORPERAZINE EDISYLATE 5 MG: 5 INJECTION INTRAMUSCULAR; INTRAVENOUS at 10:06

## 2021-06-08 RX ADMIN — HYDROMORPHONE HYDROCHLORIDE 0.2 MG: 1 INJECTION, SOLUTION INTRAMUSCULAR; INTRAVENOUS; SUBCUTANEOUS at 04:06

## 2021-06-08 RX ADMIN — MIDAZOLAM HYDROCHLORIDE 2 MG: 1 INJECTION, SOLUTION INTRAMUSCULAR; INTRAVENOUS at 02:06

## 2021-06-08 RX ADMIN — FENTANYL CITRATE 100 MCG: 50 INJECTION, SOLUTION INTRAMUSCULAR; INTRAVENOUS at 02:06

## 2021-06-08 RX ADMIN — MUPIROCIN: 20 OINTMENT TOPICAL at 02:06

## 2021-06-08 RX ADMIN — SODIUM CITRATE AND CITRIC ACID MONOHYDRATE 30 ML: 500; 334 SOLUTION ORAL at 01:06

## 2021-06-08 RX ADMIN — METOCLOPRAMIDE 10 MG: 5 INJECTION, SOLUTION INTRAMUSCULAR; INTRAVENOUS at 02:06

## 2021-06-08 RX ADMIN — Medication 20 MG: at 02:06

## 2021-06-08 RX ADMIN — SODIUM CHLORIDE, SODIUM LACTATE, POTASSIUM CHLORIDE, AND CALCIUM CHLORIDE: .6; .31; .03; .02 INJECTION, SOLUTION INTRAVENOUS at 04:06

## 2021-06-08 RX ADMIN — Medication 10 MG: at 03:06

## 2021-06-08 RX ADMIN — LIDOCAINE HYDROCHLORIDE 100 MG: 20 INJECTION, SOLUTION INTRAVENOUS at 02:06

## 2021-06-08 RX ADMIN — ONDANSETRON 4 MG: 2 INJECTION INTRAMUSCULAR; INTRAVENOUS at 03:06

## 2021-06-08 RX ADMIN — HEPARIN SODIUM 5000 UNITS: 5000 INJECTION INTRAVENOUS; SUBCUTANEOUS at 02:06

## 2021-06-08 RX ADMIN — SODIUM CHLORIDE: 0.9 INJECTION, SOLUTION INTRAVENOUS at 02:06

## 2021-06-08 RX ADMIN — PANTOPRAZOLE SODIUM 40 MG: 40 INJECTION, POWDER, FOR SOLUTION INTRAVENOUS at 09:06

## 2021-06-08 RX ADMIN — ONDANSETRON 4 MG: 2 INJECTION INTRAMUSCULAR; INTRAVENOUS at 04:06

## 2021-06-08 RX ADMIN — FAMOTIDINE 20 MG: 10 INJECTION INTRAVENOUS at 02:06

## 2021-06-08 RX ADMIN — PROPOFOL 130 MG: 10 INJECTION, EMULSION INTRAVENOUS at 02:06

## 2021-06-08 RX ADMIN — NEOSTIGMINE METHYLSULFATE 5 MG: 0.5 INJECTION INTRAVENOUS at 04:06

## 2021-06-08 RX ADMIN — HYDROMORPHONE HYDROCHLORIDE 0.2 MG: 1 INJECTION, SOLUTION INTRAMUSCULAR; INTRAVENOUS; SUBCUTANEOUS at 05:06

## 2021-06-08 RX ADMIN — SODIUM CHLORIDE: 9 INJECTION, SOLUTION INTRAVENOUS at 02:06

## 2021-06-08 RX ADMIN — CEFAZOLIN 50 ML: 1 INJECTION, POWDER, FOR SOLUTION INTRAVENOUS at 03:06

## 2021-06-08 RX ADMIN — HYDROCODONE BITARTRATE AND ACETAMINOPHEN 15 ML: 7.5; 325 SOLUTION ORAL at 04:06

## 2021-06-08 RX ADMIN — HYDROMORPHONE HYDROCHLORIDE 0.5 MG: 1 INJECTION, SOLUTION INTRAMUSCULAR; INTRAVENOUS; SUBCUTANEOUS at 10:06

## 2021-06-08 RX ADMIN — ENOXAPARIN SODIUM 40 MG: 40 INJECTION SUBCUTANEOUS at 10:06

## 2021-06-08 RX ADMIN — ACETAMINOPHEN 1000 MG: 10 INJECTION, SOLUTION INTRAVENOUS at 03:06

## 2021-06-08 RX ADMIN — PROCHLORPERAZINE EDISYLATE 5 MG: 5 INJECTION INTRAMUSCULAR; INTRAVENOUS at 04:06

## 2021-06-08 RX ADMIN — GLYCOPYRROLATE 0.8 MG: 0.2 INJECTION, SOLUTION INTRAMUSCULAR; INTRAVITREAL at 04:06

## 2021-06-08 RX ADMIN — PHENYLEPHRINE HYDROCHLORIDE 100 MCG: 10 INJECTION INTRAVENOUS at 03:06

## 2021-06-09 LAB
ANION GAP SERPL CALC-SCNC: 13 MMOL/L (ref 8–16)
BASOPHILS # BLD AUTO: 0.03 K/UL (ref 0–0.2)
BASOPHILS NFR BLD: 0.4 % (ref 0–1.9)
BUN SERPL-MCNC: 7 MG/DL (ref 6–20)
CALCIUM SERPL-MCNC: 9.4 MG/DL (ref 8.7–10.5)
CHLORIDE SERPL-SCNC: 95 MMOL/L (ref 95–110)
CO2 SERPL-SCNC: 26 MMOL/L (ref 23–29)
CREAT SERPL-MCNC: 0.7 MG/DL (ref 0.5–1.4)
DIFFERENTIAL METHOD: ABNORMAL
EOSINOPHIL # BLD AUTO: 0 K/UL (ref 0–0.5)
EOSINOPHIL NFR BLD: 0 % (ref 0–8)
ERYTHROCYTE [DISTWIDTH] IN BLOOD BY AUTOMATED COUNT: 13.9 % (ref 11.5–14.5)
EST. GFR  (AFRICAN AMERICAN): >60 ML/MIN/1.73 M^2
EST. GFR  (NON AFRICAN AMERICAN): >60 ML/MIN/1.73 M^2
GLUCOSE SERPL-MCNC: 109 MG/DL (ref 70–110)
HCT VFR BLD AUTO: 38.6 % (ref 37–48.5)
HGB BLD-MCNC: 12.3 G/DL (ref 12–16)
IMM GRANULOCYTES # BLD AUTO: 0.03 K/UL (ref 0–0.04)
IMM GRANULOCYTES NFR BLD AUTO: 0.4 % (ref 0–0.5)
LYMPHOCYTES # BLD AUTO: 3.8 K/UL (ref 1–4.8)
LYMPHOCYTES NFR BLD: 45.6 % (ref 18–48)
MAGNESIUM SERPL-MCNC: 1.3 MG/DL (ref 1.6–2.6)
MCH RBC QN AUTO: 26.9 PG (ref 27–31)
MCHC RBC AUTO-ENTMCNC: 31.9 G/DL (ref 32–36)
MCV RBC AUTO: 84 FL (ref 82–98)
MONOCYTES # BLD AUTO: 0.6 K/UL (ref 0.3–1)
MONOCYTES NFR BLD: 7.6 % (ref 4–15)
NEUTROPHILS # BLD AUTO: 3.8 K/UL (ref 1.8–7.7)
NEUTROPHILS NFR BLD: 46 % (ref 38–73)
NRBC BLD-RTO: 0 /100 WBC
PF4 HEPARIN CMPLX AB SER QL: 0.11 OD (ref 0–0.4)
PHOSPHATE SERPL-MCNC: 2.6 MG/DL (ref 2.7–4.5)
PLATELET # BLD AUTO: 90 K/UL (ref 150–450)
PMV BLD AUTO: 12.6 FL (ref 9.2–12.9)
POCT GLUCOSE: 154 MG/DL (ref 70–110)
POCT GLUCOSE: 175 MG/DL (ref 70–110)
POTASSIUM SERPL-SCNC: 3.5 MMOL/L (ref 3.5–5.1)
RBC # BLD AUTO: 4.58 M/UL (ref 4–5.4)
SODIUM SERPL-SCNC: 134 MMOL/L (ref 136–145)
WBC # BLD AUTO: 8.24 K/UL (ref 3.9–12.7)

## 2021-06-09 PROCEDURE — 85025 COMPLETE CBC W/AUTO DIFF WBC: CPT | Performed by: SURGERY

## 2021-06-09 PROCEDURE — 36415 COLL VENOUS BLD VENIPUNCTURE: CPT | Performed by: STUDENT IN AN ORGANIZED HEALTH CARE EDUCATION/TRAINING PROGRAM

## 2021-06-09 PROCEDURE — 11000001 HC ACUTE MED/SURG PRIVATE ROOM

## 2021-06-09 PROCEDURE — C9113 INJ PANTOPRAZOLE SODIUM, VIA: HCPCS | Performed by: SURGERY

## 2021-06-09 PROCEDURE — 80048 BASIC METABOLIC PNL TOTAL CA: CPT | Performed by: SURGERY

## 2021-06-09 PROCEDURE — 86022 PLATELET ANTIBODIES: CPT | Performed by: STUDENT IN AN ORGANIZED HEALTH CARE EDUCATION/TRAINING PROGRAM

## 2021-06-09 PROCEDURE — 36415 COLL VENOUS BLD VENIPUNCTURE: CPT | Performed by: SURGERY

## 2021-06-09 PROCEDURE — 63600175 PHARM REV CODE 636 W HCPCS: Performed by: SURGERY

## 2021-06-09 PROCEDURE — 84100 ASSAY OF PHOSPHORUS: CPT | Performed by: SURGERY

## 2021-06-09 PROCEDURE — 25000003 PHARM REV CODE 250: Performed by: SURGERY

## 2021-06-09 PROCEDURE — 83735 ASSAY OF MAGNESIUM: CPT | Performed by: SURGERY

## 2021-06-09 PROCEDURE — 25000003 PHARM REV CODE 250: Performed by: STUDENT IN AN ORGANIZED HEALTH CARE EDUCATION/TRAINING PROGRAM

## 2021-06-09 RX ADMIN — ACETAMINOPHEN 499.51 MG: 160 SOLUTION ORAL at 05:06

## 2021-06-09 RX ADMIN — HYDROMORPHONE HYDROCHLORIDE 0.5 MG: 1 INJECTION, SOLUTION INTRAMUSCULAR; INTRAVENOUS; SUBCUTANEOUS at 02:06

## 2021-06-09 RX ADMIN — VENLAFAXINE 100 MG: 100 TABLET ORAL at 09:06

## 2021-06-09 RX ADMIN — SODIUM CHLORIDE, SODIUM LACTATE, POTASSIUM CHLORIDE, AND CALCIUM CHLORIDE: .6; .31; .03; .02 INJECTION, SOLUTION INTRAVENOUS at 07:06

## 2021-06-09 RX ADMIN — ENOXAPARIN SODIUM 40 MG: 40 INJECTION SUBCUTANEOUS at 08:06

## 2021-06-09 RX ADMIN — PANTOPRAZOLE SODIUM 40 MG: 40 INJECTION, POWDER, FOR SOLUTION INTRAVENOUS at 08:06

## 2021-06-09 RX ADMIN — HYDROCODONE BITARTRATE AND ACETAMINOPHEN 15 ML: 7.5; 325 SOLUTION ORAL at 08:06

## 2021-06-09 RX ADMIN — ACETAMINOPHEN 499.51 MG: 160 SOLUTION ORAL at 02:06

## 2021-06-09 RX ADMIN — HYDROCODONE BITARTRATE AND ACETAMINOPHEN 15 ML: 7.5; 325 SOLUTION ORAL at 12:06

## 2021-06-09 RX ADMIN — HYDROMORPHONE HYDROCHLORIDE 0.5 MG: 1 INJECTION, SOLUTION INTRAMUSCULAR; INTRAVENOUS; SUBCUTANEOUS at 05:06

## 2021-06-09 RX ADMIN — ONDANSETRON 8 MG: 2 INJECTION INTRAMUSCULAR; INTRAVENOUS at 08:06

## 2021-06-09 RX ADMIN — LEVOTHYROXINE SODIUM 137 MCG: 25 TABLET ORAL at 05:06

## 2021-06-09 RX ADMIN — NIFEDIPINE 90 MG: 30 TABLET, FILM COATED, EXTENDED RELEASE ORAL at 08:06

## 2021-06-09 RX ADMIN — SODIUM CHLORIDE, SODIUM LACTATE, POTASSIUM CHLORIDE, AND CALCIUM CHLORIDE: .6; .31; .03; .02 INJECTION, SOLUTION INTRAVENOUS at 12:06

## 2021-06-09 RX ADMIN — CARVEDILOL 3.12 MG: 3.12 TABLET, FILM COATED ORAL at 08:06

## 2021-06-09 RX ADMIN — VENLAFAXINE 100 MG: 100 TABLET ORAL at 12:06

## 2021-06-10 VITALS
SYSTOLIC BLOOD PRESSURE: 152 MMHG | RESPIRATION RATE: 18 BRPM | HEIGHT: 61 IN | WEIGHT: 235 LBS | HEART RATE: 138 BPM | BODY MASS INDEX: 44.37 KG/M2 | TEMPERATURE: 98 F | DIASTOLIC BLOOD PRESSURE: 78 MMHG | OXYGEN SATURATION: 92 %

## 2021-06-10 LAB
ANION GAP SERPL CALC-SCNC: 16 MMOL/L (ref 8–16)
BASOPHILS # BLD AUTO: 0.02 K/UL (ref 0–0.2)
BASOPHILS NFR BLD: 0.3 % (ref 0–1.9)
BUN SERPL-MCNC: 6 MG/DL (ref 6–20)
CALCIUM SERPL-MCNC: 9.8 MG/DL (ref 8.7–10.5)
CHLORIDE SERPL-SCNC: 93 MMOL/L (ref 95–110)
CO2 SERPL-SCNC: 28 MMOL/L (ref 23–29)
CREAT SERPL-MCNC: 0.9 MG/DL (ref 0.5–1.4)
DIFFERENTIAL METHOD: ABNORMAL
EOSINOPHIL # BLD AUTO: 0.1 K/UL (ref 0–0.5)
EOSINOPHIL NFR BLD: 1.4 % (ref 0–8)
ERYTHROCYTE [DISTWIDTH] IN BLOOD BY AUTOMATED COUNT: 13.8 % (ref 11.5–14.5)
EST. GFR  (AFRICAN AMERICAN): >60 ML/MIN/1.73 M^2
EST. GFR  (NON AFRICAN AMERICAN): >60 ML/MIN/1.73 M^2
GLUCOSE SERPL-MCNC: 138 MG/DL (ref 70–110)
HCT VFR BLD AUTO: 39.9 % (ref 37–48.5)
HGB BLD-MCNC: 13 G/DL (ref 12–16)
IMM GRANULOCYTES # BLD AUTO: 0 K/UL (ref 0–0.04)
IMM GRANULOCYTES NFR BLD AUTO: 0 % (ref 0–0.5)
LYMPHOCYTES # BLD AUTO: 3.8 K/UL (ref 1–4.8)
LYMPHOCYTES NFR BLD: 60 % (ref 18–48)
MAGNESIUM SERPL-MCNC: 1.4 MG/DL (ref 1.6–2.6)
MCH RBC QN AUTO: 27.6 PG (ref 27–31)
MCHC RBC AUTO-ENTMCNC: 32.6 G/DL (ref 32–36)
MCV RBC AUTO: 85 FL (ref 82–98)
MONOCYTES # BLD AUTO: 0.4 K/UL (ref 0.3–1)
MONOCYTES NFR BLD: 6.6 % (ref 4–15)
NEUTROPHILS # BLD AUTO: 2 K/UL (ref 1.8–7.7)
NEUTROPHILS NFR BLD: 31.7 % (ref 38–73)
NRBC BLD-RTO: 0 /100 WBC
PHOSPHATE SERPL-MCNC: 2.9 MG/DL (ref 2.7–4.5)
PLATELET # BLD AUTO: 96 K/UL (ref 150–450)
PMV BLD AUTO: 12.4 FL (ref 9.2–12.9)
POCT GLUCOSE: 139 MG/DL (ref 70–110)
POTASSIUM SERPL-SCNC: 2.9 MMOL/L (ref 3.5–5.1)
RBC # BLD AUTO: 4.71 M/UL (ref 4–5.4)
SODIUM SERPL-SCNC: 137 MMOL/L (ref 136–145)
WBC # BLD AUTO: 6.33 K/UL (ref 3.9–12.7)

## 2021-06-10 PROCEDURE — 84100 ASSAY OF PHOSPHORUS: CPT | Performed by: SURGERY

## 2021-06-10 PROCEDURE — 83735 ASSAY OF MAGNESIUM: CPT | Performed by: SURGERY

## 2021-06-10 PROCEDURE — 80048 BASIC METABOLIC PNL TOTAL CA: CPT | Performed by: SURGERY

## 2021-06-10 PROCEDURE — 25000003 PHARM REV CODE 250: Performed by: SURGERY

## 2021-06-10 PROCEDURE — 63600175 PHARM REV CODE 636 W HCPCS: Performed by: SURGERY

## 2021-06-10 PROCEDURE — C9113 INJ PANTOPRAZOLE SODIUM, VIA: HCPCS | Performed by: SURGERY

## 2021-06-10 PROCEDURE — 36415 COLL VENOUS BLD VENIPUNCTURE: CPT | Performed by: SURGERY

## 2021-06-10 PROCEDURE — 25000003 PHARM REV CODE 250: Performed by: STUDENT IN AN ORGANIZED HEALTH CARE EDUCATION/TRAINING PROGRAM

## 2021-06-10 PROCEDURE — 85025 COMPLETE CBC W/AUTO DIFF WBC: CPT | Performed by: SURGERY

## 2021-06-10 RX ADMIN — PANTOPRAZOLE SODIUM 40 MG: 40 INJECTION, POWDER, FOR SOLUTION INTRAVENOUS at 09:06

## 2021-06-10 RX ADMIN — LEVOTHYROXINE SODIUM 137 MCG: 25 TABLET ORAL at 05:06

## 2021-06-10 RX ADMIN — CARVEDILOL 3.12 MG: 3.12 TABLET, FILM COATED ORAL at 09:06

## 2021-06-10 RX ADMIN — HYDROCODONE BITARTRATE AND ACETAMINOPHEN 15 ML: 7.5; 325 SOLUTION ORAL at 12:06

## 2021-06-10 RX ADMIN — HYDROCODONE BITARTRATE AND ACETAMINOPHEN 15 ML: 7.5; 325 SOLUTION ORAL at 04:06

## 2021-06-10 RX ADMIN — HYDROCODONE BITARTRATE AND ACETAMINOPHEN 15 ML: 7.5; 325 SOLUTION ORAL at 10:06

## 2021-06-10 RX ADMIN — VENLAFAXINE 100 MG: 100 TABLET ORAL at 09:06

## 2021-06-10 RX ADMIN — NIFEDIPINE 90 MG: 30 TABLET, FILM COATED, EXTENDED RELEASE ORAL at 09:06

## 2021-06-11 ENCOUNTER — TELEPHONE (OUTPATIENT)
Dept: BARIATRICS | Facility: CLINIC | Age: 61
End: 2021-06-11

## 2021-06-11 DIAGNOSIS — Z98.84 S/P GASTRIC BYPASS: ICD-10-CM

## 2021-06-11 DIAGNOSIS — E66.01 MORBID OBESITY: Primary | ICD-10-CM

## 2021-06-13 RX ORDER — OXYCODONE AND ACETAMINOPHEN 5; 325 MG/1; MG/1
1 TABLET ORAL EVERY 12 HOURS PRN
Qty: 60 TABLET | Refills: 0 | OUTPATIENT
Start: 2021-06-13 | End: 2021-07-13

## 2021-06-15 ENCOUNTER — CLINICAL SUPPORT (OUTPATIENT)
Dept: BARIATRICS | Facility: CLINIC | Age: 61
End: 2021-06-15
Payer: MEDICARE

## 2021-06-15 ENCOUNTER — LAB VISIT (OUTPATIENT)
Dept: LAB | Facility: OTHER | Age: 61
End: 2021-06-15
Payer: MEDICARE

## 2021-06-15 ENCOUNTER — TELEPHONE (OUTPATIENT)
Dept: BARIATRICS | Facility: CLINIC | Age: 61
End: 2021-06-15

## 2021-06-15 DIAGNOSIS — Z71.3 DIETARY COUNSELING: ICD-10-CM

## 2021-06-15 DIAGNOSIS — E11.36 TYPE 2 DIABETES MELLITUS WITH CATARACT: Primary | ICD-10-CM

## 2021-06-15 DIAGNOSIS — I10 ESSENTIAL HYPERTENSION: ICD-10-CM

## 2021-06-15 DIAGNOSIS — E66.01 MORBID OBESITY WITH BODY MASS INDEX (BMI) OF 40.0 TO 49.9: ICD-10-CM

## 2021-06-15 DIAGNOSIS — M48.061 SPINAL STENOSIS OF LUMBAR REGION, UNSPECIFIED WHETHER NEUROGENIC CLAUDICATION PRESENT: ICD-10-CM

## 2021-06-15 DIAGNOSIS — E66.01 MORBID OBESITY: ICD-10-CM

## 2021-06-15 DIAGNOSIS — Z98.84 S/P LAPAROSCOPIC SLEEVE GASTRECTOMY: ICD-10-CM

## 2021-06-15 DIAGNOSIS — Z98.84 S/P GASTRIC BYPASS: ICD-10-CM

## 2021-06-15 DIAGNOSIS — E11.36 TYPE 2 DIABETES MELLITUS WITH CATARACT: ICD-10-CM

## 2021-06-15 LAB
ALBUMIN SERPL BCP-MCNC: 3.6 G/DL (ref 3.5–5.2)
ALP SERPL-CCNC: 70 U/L (ref 55–135)
ALT SERPL W/O P-5'-P-CCNC: 32 U/L (ref 10–44)
ANION GAP SERPL CALC-SCNC: 12 MMOL/L (ref 8–16)
AST SERPL-CCNC: 34 U/L (ref 10–40)
BASOPHILS # BLD AUTO: 0.06 K/UL (ref 0–0.2)
BASOPHILS NFR BLD: 0.7 % (ref 0–1.9)
BILIRUB SERPL-MCNC: 0.7 MG/DL (ref 0.1–1)
BUN SERPL-MCNC: 21 MG/DL (ref 6–20)
CALCIUM SERPL-MCNC: 10.4 MG/DL (ref 8.7–10.5)
CHLORIDE SERPL-SCNC: 99 MMOL/L (ref 95–110)
CO2 SERPL-SCNC: 27 MMOL/L (ref 23–29)
CREAT SERPL-MCNC: 1.3 MG/DL (ref 0.5–1.4)
DIFFERENTIAL METHOD: ABNORMAL
EOSINOPHIL # BLD AUTO: 0.3 K/UL (ref 0–0.5)
EOSINOPHIL NFR BLD: 3 % (ref 0–8)
ERYTHROCYTE [DISTWIDTH] IN BLOOD BY AUTOMATED COUNT: 14 % (ref 11.5–14.5)
EST. GFR  (AFRICAN AMERICAN): 52 ML/MIN/1.73 M^2
EST. GFR  (NON AFRICAN AMERICAN): 45 ML/MIN/1.73 M^2
FINAL PATHOLOGIC DIAGNOSIS: NORMAL
GLUCOSE SERPL-MCNC: 108 MG/DL (ref 70–110)
HCT VFR BLD AUTO: 37.6 % (ref 37–48.5)
HGB BLD-MCNC: 12.1 G/DL (ref 12–16)
IMM GRANULOCYTES # BLD AUTO: 0.03 K/UL (ref 0–0.04)
IMM GRANULOCYTES NFR BLD AUTO: 0.3 % (ref 0–0.5)
LYMPHOCYTES # BLD AUTO: 4.8 K/UL (ref 1–4.8)
LYMPHOCYTES NFR BLD: 55.9 % (ref 18–48)
Lab: NORMAL
MCH RBC QN AUTO: 27.7 PG (ref 27–31)
MCHC RBC AUTO-ENTMCNC: 32.2 G/DL (ref 32–36)
MCV RBC AUTO: 86 FL (ref 82–98)
MONOCYTES # BLD AUTO: 0.7 K/UL (ref 0.3–1)
MONOCYTES NFR BLD: 7.5 % (ref 4–15)
NEUTROPHILS # BLD AUTO: 2.8 K/UL (ref 1.8–7.7)
NEUTROPHILS NFR BLD: 32.6 % (ref 38–73)
NRBC BLD-RTO: 0 /100 WBC
PLATELET # BLD AUTO: 219 K/UL (ref 150–450)
PMV BLD AUTO: 10.3 FL (ref 9.2–12.9)
POTASSIUM SERPL-SCNC: 4.2 MMOL/L (ref 3.5–5.1)
PROT SERPL-MCNC: 7.5 G/DL (ref 6–8.4)
RBC # BLD AUTO: 4.37 M/UL (ref 4–5.4)
SODIUM SERPL-SCNC: 138 MMOL/L (ref 136–145)
WBC # BLD AUTO: 8.64 K/UL (ref 3.9–12.7)

## 2021-06-15 PROCEDURE — 85025 COMPLETE CBC W/AUTO DIFF WBC: CPT | Performed by: PHYSICIAN ASSISTANT

## 2021-06-15 PROCEDURE — 36415 COLL VENOUS BLD VENIPUNCTURE: CPT | Performed by: SURGERY

## 2021-06-15 PROCEDURE — 99499 UNLISTED E&M SERVICE: CPT | Mod: 95,,, | Performed by: DIETITIAN, REGISTERED

## 2021-06-15 PROCEDURE — 80053 COMPREHEN METABOLIC PANEL: CPT | Performed by: SURGERY

## 2021-06-15 PROCEDURE — 99499 NO LOS: ICD-10-PCS | Mod: 95,,, | Performed by: DIETITIAN, REGISTERED

## 2021-06-23 ENCOUNTER — CLINICAL SUPPORT (OUTPATIENT)
Dept: BARIATRICS | Facility: CLINIC | Age: 61
End: 2021-06-23
Payer: MEDICARE

## 2021-06-23 ENCOUNTER — OFFICE VISIT (OUTPATIENT)
Dept: BARIATRICS | Facility: CLINIC | Age: 61
End: 2021-06-23
Payer: MEDICARE

## 2021-06-23 ENCOUNTER — LAB VISIT (OUTPATIENT)
Dept: LAB | Facility: HOSPITAL | Age: 61
End: 2021-06-23
Payer: MEDICARE

## 2021-06-23 VITALS
HEIGHT: 61 IN | WEIGHT: 220.69 LBS | HEART RATE: 90 BPM | BODY MASS INDEX: 41.66 KG/M2 | OXYGEN SATURATION: 97 % | DIASTOLIC BLOOD PRESSURE: 77 MMHG | SYSTOLIC BLOOD PRESSURE: 140 MMHG

## 2021-06-23 DIAGNOSIS — Z98.890 POST-OPERATIVE STATE: Primary | ICD-10-CM

## 2021-06-23 DIAGNOSIS — E66.01 MORBID OBESITY WITH BMI OF 40.0-44.9, ADULT: ICD-10-CM

## 2021-06-23 DIAGNOSIS — I10 ESSENTIAL HYPERTENSION: ICD-10-CM

## 2021-06-23 DIAGNOSIS — Z71.3 DIETARY COUNSELING AND SURVEILLANCE: ICD-10-CM

## 2021-06-23 DIAGNOSIS — E66.01 MORBID OBESITY: ICD-10-CM

## 2021-06-23 DIAGNOSIS — E46 PROTEIN-CALORIE MALNUTRITION, UNSPECIFIED SEVERITY: ICD-10-CM

## 2021-06-23 DIAGNOSIS — R63.4 WEIGHT LOSS: ICD-10-CM

## 2021-06-23 DIAGNOSIS — E11.36 TYPE 2 DIABETES MELLITUS WITH CATARACT: Primary | ICD-10-CM

## 2021-06-23 DIAGNOSIS — E11.36 TYPE 2 DIABETES MELLITUS WITH CATARACT: ICD-10-CM

## 2021-06-23 DIAGNOSIS — Z98.84 S/P LAPAROSCOPIC SLEEVE GASTRECTOMY: ICD-10-CM

## 2021-06-23 DIAGNOSIS — Z98.84 S/P BARIATRIC SURGERY: ICD-10-CM

## 2021-06-23 DIAGNOSIS — M48.061 SPINAL STENOSIS OF LUMBAR REGION, UNSPECIFIED WHETHER NEUROGENIC CLAUDICATION PRESENT: ICD-10-CM

## 2021-06-23 DIAGNOSIS — Z98.84 S/P GASTRIC BYPASS: ICD-10-CM

## 2021-06-23 LAB
ALBUMIN SERPL BCP-MCNC: 4 G/DL (ref 3.5–5.2)
ALP SERPL-CCNC: 76 U/L (ref 55–135)
ALT SERPL W/O P-5'-P-CCNC: 34 U/L (ref 10–44)
ANION GAP SERPL CALC-SCNC: 13 MMOL/L (ref 8–16)
AST SERPL-CCNC: 34 U/L (ref 10–40)
BASOPHILS # BLD AUTO: 0.07 K/UL (ref 0–0.2)
BASOPHILS NFR BLD: 0.9 % (ref 0–1.9)
BILIRUB SERPL-MCNC: 0.6 MG/DL (ref 0.1–1)
BUN SERPL-MCNC: 16 MG/DL (ref 6–20)
CALCIUM SERPL-MCNC: 10.3 MG/DL (ref 8.7–10.5)
CHLORIDE SERPL-SCNC: 102 MMOL/L (ref 95–110)
CO2 SERPL-SCNC: 22 MMOL/L (ref 23–29)
CREAT SERPL-MCNC: 1.1 MG/DL (ref 0.5–1.4)
DIFFERENTIAL METHOD: ABNORMAL
EOSINOPHIL # BLD AUTO: 0.2 K/UL (ref 0–0.5)
EOSINOPHIL NFR BLD: 2.9 % (ref 0–8)
ERYTHROCYTE [DISTWIDTH] IN BLOOD BY AUTOMATED COUNT: 15 % (ref 11.5–14.5)
EST. GFR  (AFRICAN AMERICAN): >60 ML/MIN/1.73 M^2
EST. GFR  (NON AFRICAN AMERICAN): 54.7 ML/MIN/1.73 M^2
GLUCOSE SERPL-MCNC: 84 MG/DL (ref 70–110)
HCT VFR BLD AUTO: 36.9 % (ref 37–48.5)
HGB BLD-MCNC: 11.7 G/DL (ref 12–16)
IMM GRANULOCYTES # BLD AUTO: 0.02 K/UL (ref 0–0.04)
IMM GRANULOCYTES NFR BLD AUTO: 0.2 % (ref 0–0.5)
LYMPHOCYTES # BLD AUTO: 4.6 K/UL (ref 1–4.8)
LYMPHOCYTES NFR BLD: 56.9 % (ref 18–48)
MCH RBC QN AUTO: 28 PG (ref 27–31)
MCHC RBC AUTO-ENTMCNC: 31.7 G/DL (ref 32–36)
MCV RBC AUTO: 88 FL (ref 82–98)
MONOCYTES # BLD AUTO: 0.4 K/UL (ref 0.3–1)
MONOCYTES NFR BLD: 5.3 % (ref 4–15)
NEUTROPHILS # BLD AUTO: 2.7 K/UL (ref 1.8–7.7)
NEUTROPHILS NFR BLD: 33.8 % (ref 38–73)
NRBC BLD-RTO: 0 /100 WBC
PLATELET # BLD AUTO: 362 K/UL (ref 150–450)
PMV BLD AUTO: 10.2 FL (ref 9.2–12.9)
POTASSIUM SERPL-SCNC: 4.5 MMOL/L (ref 3.5–5.1)
PROT SERPL-MCNC: 7.7 G/DL (ref 6–8.4)
RBC # BLD AUTO: 4.18 M/UL (ref 4–5.4)
SODIUM SERPL-SCNC: 137 MMOL/L (ref 136–145)
VIT B12 SERPL-MCNC: 691 PG/ML (ref 210–950)
WBC # BLD AUTO: 8.07 K/UL (ref 3.9–12.7)

## 2021-06-23 PROCEDURE — 82607 VITAMIN B-12: CPT | Performed by: PHYSICIAN ASSISTANT

## 2021-06-23 PROCEDURE — 80053 COMPREHEN METABOLIC PANEL: CPT | Performed by: PHYSICIAN ASSISTANT

## 2021-06-23 PROCEDURE — 85025 COMPLETE CBC W/AUTO DIFF WBC: CPT | Performed by: SURGERY

## 2021-06-23 PROCEDURE — 99024 POSTOP FOLLOW-UP VISIT: CPT | Mod: S$GLB,,, | Performed by: PHYSICIAN ASSISTANT

## 2021-06-23 PROCEDURE — 99499 UNLISTED E&M SERVICE: CPT | Mod: HCNC,S$GLB,, | Performed by: PHYSICIAN ASSISTANT

## 2021-06-23 PROCEDURE — 99499 NO LOS: ICD-10-PCS | Mod: S$GLB,,, | Performed by: DIETITIAN, REGISTERED

## 2021-06-23 PROCEDURE — 3008F BODY MASS INDEX DOCD: CPT | Mod: CPTII,S$GLB,, | Performed by: PHYSICIAN ASSISTANT

## 2021-06-23 PROCEDURE — 84425 ASSAY OF VITAMIN B-1: CPT | Performed by: PHYSICIAN ASSISTANT

## 2021-06-23 PROCEDURE — 3072F PR LOW RISK FOR RETINOPATHY: ICD-10-PCS | Mod: S$GLB,,, | Performed by: PHYSICIAN ASSISTANT

## 2021-06-23 PROCEDURE — 99024 PR POST-OP FOLLOW-UP VISIT: ICD-10-PCS | Mod: S$GLB,,, | Performed by: PHYSICIAN ASSISTANT

## 2021-06-23 PROCEDURE — 1126F AMNT PAIN NOTED NONE PRSNT: CPT | Mod: S$GLB,,, | Performed by: PHYSICIAN ASSISTANT

## 2021-06-23 PROCEDURE — 99499 RISK ADDL DX/OHS AUDIT: ICD-10-PCS | Mod: HCNC,S$GLB,, | Performed by: PHYSICIAN ASSISTANT

## 2021-06-23 PROCEDURE — 36415 COLL VENOUS BLD VENIPUNCTURE: CPT | Performed by: PHYSICIAN ASSISTANT

## 2021-06-23 PROCEDURE — 3044F HG A1C LEVEL LT 7.0%: CPT | Mod: CPTII,S$GLB,, | Performed by: PHYSICIAN ASSISTANT

## 2021-06-23 PROCEDURE — 99499 UNLISTED E&M SERVICE: CPT | Mod: S$GLB,,, | Performed by: DIETITIAN, REGISTERED

## 2021-06-23 PROCEDURE — 99999 PR PBB SHADOW E&M-EST. PATIENT-LVL V: CPT | Mod: PBBFAC,,, | Performed by: PHYSICIAN ASSISTANT

## 2021-06-23 PROCEDURE — 99999 PR PBB SHADOW E&M-EST. PATIENT-LVL V: ICD-10-PCS | Mod: PBBFAC,,, | Performed by: PHYSICIAN ASSISTANT

## 2021-06-23 PROCEDURE — 3072F LOW RISK FOR RETINOPATHY: CPT | Mod: S$GLB,,, | Performed by: PHYSICIAN ASSISTANT

## 2021-06-23 PROCEDURE — 1126F PR PAIN SEVERITY QUANTIFIED, NO PAIN PRESENT: ICD-10-PCS | Mod: S$GLB,,, | Performed by: PHYSICIAN ASSISTANT

## 2021-06-23 PROCEDURE — 3008F PR BODY MASS INDEX (BMI) DOCUMENTED: ICD-10-PCS | Mod: CPTII,S$GLB,, | Performed by: PHYSICIAN ASSISTANT

## 2021-06-23 PROCEDURE — 3044F PR MOST RECENT HEMOGLOBIN A1C LEVEL <7.0%: ICD-10-PCS | Mod: CPTII,S$GLB,, | Performed by: PHYSICIAN ASSISTANT

## 2021-06-28 LAB — VIT B1 BLD-MCNC: 74 UG/L (ref 38–122)

## 2021-07-06 ENCOUNTER — PATIENT MESSAGE (OUTPATIENT)
Dept: ADMINISTRATIVE | Facility: HOSPITAL | Age: 61
End: 2021-07-06

## 2021-07-26 ENCOUNTER — PATIENT MESSAGE (OUTPATIENT)
Dept: BARIATRICS | Facility: CLINIC | Age: 61
End: 2021-07-26

## 2021-07-29 ENCOUNTER — PES CALL (OUTPATIENT)
Dept: ADMINISTRATIVE | Facility: CLINIC | Age: 61
End: 2021-07-29

## 2021-07-29 RX ORDER — ONDANSETRON 8 MG/1
8 TABLET, ORALLY DISINTEGRATING ORAL EVERY 6 HOURS PRN
Qty: 30 TABLET | Refills: 0 | Status: SHIPPED | OUTPATIENT
Start: 2021-07-29 | End: 2021-08-02 | Stop reason: SDUPTHER

## 2021-08-02 ENCOUNTER — CLINICAL SUPPORT (OUTPATIENT)
Dept: BARIATRICS | Facility: CLINIC | Age: 61
End: 2021-08-02
Payer: MEDICARE

## 2021-08-02 ENCOUNTER — LAB VISIT (OUTPATIENT)
Dept: LAB | Facility: HOSPITAL | Age: 61
End: 2021-08-02
Payer: MEDICARE

## 2021-08-02 ENCOUNTER — OFFICE VISIT (OUTPATIENT)
Dept: BARIATRICS | Facility: CLINIC | Age: 61
End: 2021-08-02
Payer: MEDICARE

## 2021-08-02 VITALS
BODY MASS INDEX: 38.91 KG/M2 | OXYGEN SATURATION: 98 % | HEART RATE: 90 BPM | HEIGHT: 61 IN | WEIGHT: 205.94 LBS | RESPIRATION RATE: 16 BRPM | SYSTOLIC BLOOD PRESSURE: 130 MMHG | DIASTOLIC BLOOD PRESSURE: 70 MMHG | BODY MASS INDEX: 38.91 KG/M2

## 2021-08-02 DIAGNOSIS — E66.01 MORBID OBESITY: ICD-10-CM

## 2021-08-02 DIAGNOSIS — Z98.84 S/P GASTRIC BYPASS: ICD-10-CM

## 2021-08-02 DIAGNOSIS — E11.36 TYPE 2 DIABETES MELLITUS WITH CATARACT: Primary | ICD-10-CM

## 2021-08-02 DIAGNOSIS — I10 ESSENTIAL HYPERTENSION: ICD-10-CM

## 2021-08-02 DIAGNOSIS — R11.0 NAUSEA: Primary | ICD-10-CM

## 2021-08-02 DIAGNOSIS — Z71.3 DIETARY COUNSELING AND SURVEILLANCE: ICD-10-CM

## 2021-08-02 DIAGNOSIS — E11.36 TYPE 2 DIABETES MELLITUS WITH CATARACT: ICD-10-CM

## 2021-08-02 DIAGNOSIS — E46 PROTEIN-CALORIE MALNUTRITION, UNSPECIFIED SEVERITY: ICD-10-CM

## 2021-08-02 DIAGNOSIS — Z98.84 S/P LAPAROSCOPIC SLEEVE GASTRECTOMY: ICD-10-CM

## 2021-08-02 DIAGNOSIS — E66.01 SEVERE OBESITY (BMI 35.0-39.9) WITH COMORBIDITY: ICD-10-CM

## 2021-08-02 DIAGNOSIS — M48.061 SPINAL STENOSIS OF LUMBAR REGION, UNSPECIFIED WHETHER NEUROGENIC CLAUDICATION PRESENT: ICD-10-CM

## 2021-08-02 LAB
25(OH)D3+25(OH)D2 SERPL-MCNC: 48 NG/ML (ref 30–96)
ALBUMIN SERPL BCP-MCNC: 3.8 G/DL (ref 3.5–5.2)
ALP SERPL-CCNC: 75 U/L (ref 55–135)
ALT SERPL W/O P-5'-P-CCNC: 48 U/L (ref 10–44)
ANION GAP SERPL CALC-SCNC: 11 MMOL/L (ref 8–16)
AST SERPL-CCNC: 25 U/L (ref 10–40)
BASOPHILS # BLD AUTO: 0.05 K/UL (ref 0–0.2)
BASOPHILS NFR BLD: 0.7 % (ref 0–1.9)
BILIRUB SERPL-MCNC: 0.4 MG/DL (ref 0.1–1)
BUN SERPL-MCNC: 23 MG/DL (ref 6–20)
CALCIUM SERPL-MCNC: 9.7 MG/DL (ref 8.7–10.5)
CHLORIDE SERPL-SCNC: 108 MMOL/L (ref 95–110)
CHOLEST SERPL-MCNC: 109 MG/DL (ref 120–199)
CHOLEST/HDLC SERPL: 3.1 {RATIO} (ref 2–5)
CO2 SERPL-SCNC: 22 MMOL/L (ref 23–29)
CREAT SERPL-MCNC: 1.2 MG/DL (ref 0.5–1.4)
DIFFERENTIAL METHOD: ABNORMAL
EOSINOPHIL # BLD AUTO: 0.3 K/UL (ref 0–0.5)
EOSINOPHIL NFR BLD: 3.6 % (ref 0–8)
ERYTHROCYTE [DISTWIDTH] IN BLOOD BY AUTOMATED COUNT: 15 % (ref 11.5–14.5)
EST. GFR  (AFRICAN AMERICAN): 56.8 ML/MIN/1.73 M^2
EST. GFR  (NON AFRICAN AMERICAN): 49.2 ML/MIN/1.73 M^2
GLUCOSE SERPL-MCNC: 93 MG/DL (ref 70–110)
HCT VFR BLD AUTO: 37.4 % (ref 37–48.5)
HDLC SERPL-MCNC: 35 MG/DL (ref 40–75)
HDLC SERPL: 32.1 % (ref 20–50)
HGB BLD-MCNC: 11.9 G/DL (ref 12–16)
IMM GRANULOCYTES # BLD AUTO: 0.01 K/UL (ref 0–0.04)
IMM GRANULOCYTES NFR BLD AUTO: 0.1 % (ref 0–0.5)
IRON SERPL-MCNC: 73 UG/DL (ref 30–160)
LDLC SERPL CALC-MCNC: 43.4 MG/DL (ref 63–159)
LYMPHOCYTES # BLD AUTO: 4.8 K/UL (ref 1–4.8)
LYMPHOCYTES NFR BLD: 66.3 % (ref 18–48)
MCH RBC QN AUTO: 27.9 PG (ref 27–31)
MCHC RBC AUTO-ENTMCNC: 31.8 G/DL (ref 32–36)
MCV RBC AUTO: 88 FL (ref 82–98)
MONOCYTES # BLD AUTO: 0.4 K/UL (ref 0.3–1)
MONOCYTES NFR BLD: 4.8 % (ref 4–15)
NEUTROPHILS # BLD AUTO: 1.8 K/UL (ref 1.8–7.7)
NEUTROPHILS NFR BLD: 24.5 % (ref 38–73)
NONHDLC SERPL-MCNC: 74 MG/DL
NRBC BLD-RTO: 0 /100 WBC
PLATELET # BLD AUTO: 261 K/UL (ref 150–450)
PMV BLD AUTO: 10.9 FL (ref 9.2–12.9)
POTASSIUM SERPL-SCNC: 4.2 MMOL/L (ref 3.5–5.1)
PROT SERPL-MCNC: 7.4 G/DL (ref 6–8.4)
RBC # BLD AUTO: 4.26 M/UL (ref 4–5.4)
SATURATED IRON: 21 % (ref 20–50)
SODIUM SERPL-SCNC: 141 MMOL/L (ref 136–145)
TOTAL IRON BINDING CAPACITY: 340 UG/DL (ref 250–450)
TRANSFERRIN SERPL-MCNC: 230 MG/DL (ref 200–375)
TRIGL SERPL-MCNC: 153 MG/DL (ref 30–150)
VIT B12 SERPL-MCNC: 833 PG/ML (ref 210–950)
WBC # BLD AUTO: 7.27 K/UL (ref 3.9–12.7)

## 2021-08-02 PROCEDURE — 83540 ASSAY OF IRON: CPT | Performed by: PHYSICIAN ASSISTANT

## 2021-08-02 PROCEDURE — 99999 PR PBB SHADOW E&M-EST. PATIENT-LVL III: CPT | Mod: PBBFAC,,, | Performed by: PHYSICIAN ASSISTANT

## 2021-08-02 PROCEDURE — 84425 ASSAY OF VITAMIN B-1: CPT | Performed by: PHYSICIAN ASSISTANT

## 2021-08-02 PROCEDURE — 80061 LIPID PANEL: CPT | Performed by: PHYSICIAN ASSISTANT

## 2021-08-02 PROCEDURE — 82607 VITAMIN B-12: CPT | Performed by: PHYSICIAN ASSISTANT

## 2021-08-02 PROCEDURE — 3072F LOW RISK FOR RETINOPATHY: CPT | Mod: CPTII,S$GLB,, | Performed by: PHYSICIAN ASSISTANT

## 2021-08-02 PROCEDURE — 99999 PR PBB SHADOW E&M-EST. PATIENT-LVL III: ICD-10-PCS | Mod: PBBFAC,,, | Performed by: PHYSICIAN ASSISTANT

## 2021-08-02 PROCEDURE — 1159F PR MEDICATION LIST DOCUMENTED IN MEDICAL RECORD: ICD-10-PCS | Mod: CPTII,S$GLB,, | Performed by: PHYSICIAN ASSISTANT

## 2021-08-02 PROCEDURE — 99024 PR POST-OP FOLLOW-UP VISIT: ICD-10-PCS | Mod: S$GLB,,, | Performed by: PHYSICIAN ASSISTANT

## 2021-08-02 PROCEDURE — 3044F HG A1C LEVEL LT 7.0%: CPT | Mod: CPTII,S$GLB,, | Performed by: PHYSICIAN ASSISTANT

## 2021-08-02 PROCEDURE — 3078F PR MOST RECENT DIASTOLIC BLOOD PRESSURE < 80 MM HG: ICD-10-PCS | Mod: CPTII,S$GLB,, | Performed by: PHYSICIAN ASSISTANT

## 2021-08-02 PROCEDURE — 36415 COLL VENOUS BLD VENIPUNCTURE: CPT | Performed by: PHYSICIAN ASSISTANT

## 2021-08-02 PROCEDURE — 1160F PR REVIEW ALL MEDS BY PRESCRIBER/CLIN PHARMACIST DOCUMENTED: ICD-10-PCS | Mod: CPTII,S$GLB,, | Performed by: PHYSICIAN ASSISTANT

## 2021-08-02 PROCEDURE — 3078F DIAST BP <80 MM HG: CPT | Mod: CPTII,S$GLB,, | Performed by: PHYSICIAN ASSISTANT

## 2021-08-02 PROCEDURE — 85025 COMPLETE CBC W/AUTO DIFF WBC: CPT | Performed by: PHYSICIAN ASSISTANT

## 2021-08-02 PROCEDURE — 99499 RISK ADDL DX/OHS AUDIT: ICD-10-PCS | Mod: HCNC,S$GLB,, | Performed by: PHYSICIAN ASSISTANT

## 2021-08-02 PROCEDURE — 3044F PR MOST RECENT HEMOGLOBIN A1C LEVEL <7.0%: ICD-10-PCS | Mod: CPTII,S$GLB,, | Performed by: PHYSICIAN ASSISTANT

## 2021-08-02 PROCEDURE — 1126F PR PAIN SEVERITY QUANTIFIED, NO PAIN PRESENT: ICD-10-PCS | Mod: CPTII,S$GLB,, | Performed by: PHYSICIAN ASSISTANT

## 2021-08-02 PROCEDURE — 3075F SYST BP GE 130 - 139MM HG: CPT | Mod: CPTII,S$GLB,, | Performed by: PHYSICIAN ASSISTANT

## 2021-08-02 PROCEDURE — 99499 NO LOS: ICD-10-PCS | Mod: S$GLB,,, | Performed by: DIETITIAN, REGISTERED

## 2021-08-02 PROCEDURE — 1126F AMNT PAIN NOTED NONE PRSNT: CPT | Mod: CPTII,S$GLB,, | Performed by: PHYSICIAN ASSISTANT

## 2021-08-02 PROCEDURE — 3075F PR MOST RECENT SYSTOLIC BLOOD PRESS GE 130-139MM HG: ICD-10-PCS | Mod: CPTII,S$GLB,, | Performed by: PHYSICIAN ASSISTANT

## 2021-08-02 PROCEDURE — 3008F BODY MASS INDEX DOCD: CPT | Mod: CPTII,S$GLB,, | Performed by: PHYSICIAN ASSISTANT

## 2021-08-02 PROCEDURE — 99024 POSTOP FOLLOW-UP VISIT: CPT | Mod: S$GLB,,, | Performed by: PHYSICIAN ASSISTANT

## 2021-08-02 PROCEDURE — 82306 VITAMIN D 25 HYDROXY: CPT | Performed by: PHYSICIAN ASSISTANT

## 2021-08-02 PROCEDURE — 99499 UNLISTED E&M SERVICE: CPT | Mod: HCNC,S$GLB,, | Performed by: PHYSICIAN ASSISTANT

## 2021-08-02 PROCEDURE — 1160F RVW MEDS BY RX/DR IN RCRD: CPT | Mod: CPTII,S$GLB,, | Performed by: PHYSICIAN ASSISTANT

## 2021-08-02 PROCEDURE — 3072F PR LOW RISK FOR RETINOPATHY: ICD-10-PCS | Mod: CPTII,S$GLB,, | Performed by: PHYSICIAN ASSISTANT

## 2021-08-02 PROCEDURE — 1159F MED LIST DOCD IN RCRD: CPT | Mod: CPTII,S$GLB,, | Performed by: PHYSICIAN ASSISTANT

## 2021-08-02 PROCEDURE — 99499 UNLISTED E&M SERVICE: CPT | Mod: S$GLB,,, | Performed by: DIETITIAN, REGISTERED

## 2021-08-02 PROCEDURE — 80053 COMPREHEN METABOLIC PANEL: CPT | Performed by: PHYSICIAN ASSISTANT

## 2021-08-02 PROCEDURE — 3008F PR BODY MASS INDEX (BMI) DOCUMENTED: ICD-10-PCS | Mod: CPTII,S$GLB,, | Performed by: PHYSICIAN ASSISTANT

## 2021-08-02 RX ORDER — PROMETHAZINE HYDROCHLORIDE 25 MG/1
25 TABLET ORAL EVERY 6 HOURS PRN
Qty: 30 TABLET | Refills: 0 | Status: SHIPPED | OUTPATIENT
Start: 2021-08-02 | End: 2022-10-27

## 2021-08-02 RX ORDER — ONDANSETRON 8 MG/1
8 TABLET, ORALLY DISINTEGRATING ORAL EVERY 6 HOURS PRN
Qty: 30 TABLET | Refills: 0 | Status: SHIPPED | OUTPATIENT
Start: 2021-08-02 | End: 2022-04-06 | Stop reason: SDUPTHER

## 2021-08-05 LAB — VIT B1 BLD-MCNC: 101 UG/L (ref 38–122)

## 2021-08-06 ENCOUNTER — PATIENT MESSAGE (OUTPATIENT)
Dept: PHYSICAL MEDICINE AND REHAB | Facility: CLINIC | Age: 61
End: 2021-08-06

## 2021-08-06 DIAGNOSIS — M54.9 BACK PAIN, UNSPECIFIED BACK LOCATION, UNSPECIFIED BACK PAIN LATERALITY, UNSPECIFIED CHRONICITY: ICD-10-CM

## 2021-08-06 DIAGNOSIS — M48.062 SPINAL STENOSIS OF LUMBAR REGION WITH NEUROGENIC CLAUDICATION: ICD-10-CM

## 2021-08-06 DIAGNOSIS — Z96.641 STATUS POST RIGHT HIP REPLACEMENT: ICD-10-CM

## 2021-08-06 DIAGNOSIS — Z96.651 STATUS POST TOTAL RIGHT KNEE REPLACEMENT: ICD-10-CM

## 2021-08-06 DIAGNOSIS — G89.29 CHRONIC BILATERAL LOW BACK PAIN WITH RIGHT-SIDED SCIATICA: ICD-10-CM

## 2021-08-06 DIAGNOSIS — M43.16 SPONDYLOLISTHESIS AT L3-L4 LEVEL: ICD-10-CM

## 2021-08-06 DIAGNOSIS — F11.90 UNCOMPLICATED OPIOID USE: ICD-10-CM

## 2021-08-06 DIAGNOSIS — M54.41 CHRONIC BILATERAL LOW BACK PAIN WITH RIGHT-SIDED SCIATICA: ICD-10-CM

## 2021-08-06 RX ORDER — OXYCODONE AND ACETAMINOPHEN 5; 325 MG/1; MG/1
1 TABLET ORAL EVERY 12 HOURS PRN
Qty: 60 TABLET | Refills: 0 | Status: SHIPPED | OUTPATIENT
Start: 2021-08-06 | End: 2021-09-13 | Stop reason: SDUPTHER

## 2021-08-09 ENCOUNTER — PATIENT OUTREACH (OUTPATIENT)
Dept: ADMINISTRATIVE | Facility: HOSPITAL | Age: 61
End: 2021-08-09

## 2021-08-21 DIAGNOSIS — F32.A DEPRESSION, UNSPECIFIED DEPRESSION TYPE: ICD-10-CM

## 2021-08-23 RX ORDER — VENLAFAXINE HYDROCHLORIDE 150 MG/1
150 CAPSULE, EXTENDED RELEASE ORAL DAILY
Qty: 90 CAPSULE | Refills: 1 | OUTPATIENT
Start: 2021-08-23

## 2021-09-12 ENCOUNTER — PATIENT MESSAGE (OUTPATIENT)
Dept: PHYSICAL MEDICINE AND REHAB | Facility: CLINIC | Age: 61
End: 2021-09-12

## 2021-09-13 DIAGNOSIS — Z96.651 STATUS POST TOTAL RIGHT KNEE REPLACEMENT: ICD-10-CM

## 2021-09-13 DIAGNOSIS — Z96.641 STATUS POST RIGHT HIP REPLACEMENT: ICD-10-CM

## 2021-09-13 DIAGNOSIS — M43.16 SPONDYLOLISTHESIS AT L3-L4 LEVEL: ICD-10-CM

## 2021-09-13 DIAGNOSIS — G89.29 CHRONIC BILATERAL LOW BACK PAIN WITH RIGHT-SIDED SCIATICA: ICD-10-CM

## 2021-09-13 DIAGNOSIS — M54.41 CHRONIC BILATERAL LOW BACK PAIN WITH RIGHT-SIDED SCIATICA: ICD-10-CM

## 2021-09-13 DIAGNOSIS — M54.9 BACK PAIN, UNSPECIFIED BACK LOCATION, UNSPECIFIED BACK PAIN LATERALITY, UNSPECIFIED CHRONICITY: ICD-10-CM

## 2021-09-13 DIAGNOSIS — F11.90 UNCOMPLICATED OPIOID USE: ICD-10-CM

## 2021-09-13 DIAGNOSIS — M48.062 SPINAL STENOSIS OF LUMBAR REGION WITH NEUROGENIC CLAUDICATION: ICD-10-CM

## 2021-09-14 RX ORDER — OXYCODONE AND ACETAMINOPHEN 5; 325 MG/1; MG/1
1 TABLET ORAL EVERY 12 HOURS PRN
Qty: 60 TABLET | Refills: 0 | Status: SHIPPED | OUTPATIENT
Start: 2021-09-14 | End: 2021-10-13 | Stop reason: SDUPTHER

## 2021-09-20 ENCOUNTER — OFFICE VISIT (OUTPATIENT)
Dept: INTERNAL MEDICINE | Facility: CLINIC | Age: 61
End: 2021-09-20
Payer: MEDICARE

## 2021-09-20 VITALS
BODY MASS INDEX: 37.75 KG/M2 | SYSTOLIC BLOOD PRESSURE: 120 MMHG | OXYGEN SATURATION: 98 % | WEIGHT: 199.94 LBS | DIASTOLIC BLOOD PRESSURE: 70 MMHG | RESPIRATION RATE: 18 BRPM | HEIGHT: 61 IN | HEART RATE: 120 BPM

## 2021-09-20 DIAGNOSIS — E11.9 TYPE 2 DIABETES MELLITUS WITHOUT COMPLICATION, WITHOUT LONG-TERM CURRENT USE OF INSULIN: ICD-10-CM

## 2021-09-20 DIAGNOSIS — F32.A DEPRESSION, UNSPECIFIED DEPRESSION TYPE: ICD-10-CM

## 2021-09-20 DIAGNOSIS — I10 ESSENTIAL HYPERTENSION: Primary | ICD-10-CM

## 2021-09-20 PROCEDURE — 99999 PR PBB SHADOW E&M-EST. PATIENT-LVL V: ICD-10-PCS | Mod: PBBFAC,HCNC,, | Performed by: INTERNAL MEDICINE

## 2021-09-20 PROCEDURE — 3078F PR MOST RECENT DIASTOLIC BLOOD PRESSURE < 80 MM HG: ICD-10-PCS | Mod: HCNC,CPTII,S$GLB, | Performed by: INTERNAL MEDICINE

## 2021-09-20 PROCEDURE — 99999 PR PBB SHADOW E&M-EST. PATIENT-LVL V: CPT | Mod: PBBFAC,HCNC,, | Performed by: INTERNAL MEDICINE

## 2021-09-20 PROCEDURE — 1160F PR REVIEW ALL MEDS BY PRESCRIBER/CLIN PHARMACIST DOCUMENTED: ICD-10-PCS | Mod: HCNC,CPTII,S$GLB, | Performed by: INTERNAL MEDICINE

## 2021-09-20 PROCEDURE — 3072F LOW RISK FOR RETINOPATHY: CPT | Mod: HCNC,CPTII,S$GLB, | Performed by: INTERNAL MEDICINE

## 2021-09-20 PROCEDURE — 3044F HG A1C LEVEL LT 7.0%: CPT | Mod: HCNC,CPTII,S$GLB, | Performed by: INTERNAL MEDICINE

## 2021-09-20 PROCEDURE — 1159F PR MEDICATION LIST DOCUMENTED IN MEDICAL RECORD: ICD-10-PCS | Mod: HCNC,CPTII,S$GLB, | Performed by: INTERNAL MEDICINE

## 2021-09-20 PROCEDURE — 1160F RVW MEDS BY RX/DR IN RCRD: CPT | Mod: HCNC,CPTII,S$GLB, | Performed by: INTERNAL MEDICINE

## 2021-09-20 PROCEDURE — 3008F PR BODY MASS INDEX (BMI) DOCUMENTED: ICD-10-PCS | Mod: HCNC,CPTII,S$GLB, | Performed by: INTERNAL MEDICINE

## 2021-09-20 PROCEDURE — 99214 OFFICE O/P EST MOD 30 MIN: CPT | Mod: HCNC,S$GLB,, | Performed by: INTERNAL MEDICINE

## 2021-09-20 PROCEDURE — 3008F BODY MASS INDEX DOCD: CPT | Mod: HCNC,CPTII,S$GLB, | Performed by: INTERNAL MEDICINE

## 2021-09-20 PROCEDURE — 3044F PR MOST RECENT HEMOGLOBIN A1C LEVEL <7.0%: ICD-10-PCS | Mod: HCNC,CPTII,S$GLB, | Performed by: INTERNAL MEDICINE

## 2021-09-20 PROCEDURE — 3074F PR MOST RECENT SYSTOLIC BLOOD PRESSURE < 130 MM HG: ICD-10-PCS | Mod: HCNC,CPTII,S$GLB, | Performed by: INTERNAL MEDICINE

## 2021-09-20 PROCEDURE — 3078F DIAST BP <80 MM HG: CPT | Mod: HCNC,CPTII,S$GLB, | Performed by: INTERNAL MEDICINE

## 2021-09-20 PROCEDURE — 3072F PR LOW RISK FOR RETINOPATHY: ICD-10-PCS | Mod: HCNC,CPTII,S$GLB, | Performed by: INTERNAL MEDICINE

## 2021-09-20 PROCEDURE — 1159F MED LIST DOCD IN RCRD: CPT | Mod: HCNC,CPTII,S$GLB, | Performed by: INTERNAL MEDICINE

## 2021-09-20 PROCEDURE — 99214 PR OFFICE/OUTPT VISIT, EST, LEVL IV, 30-39 MIN: ICD-10-PCS | Mod: HCNC,S$GLB,, | Performed by: INTERNAL MEDICINE

## 2021-09-20 PROCEDURE — 3074F SYST BP LT 130 MM HG: CPT | Mod: HCNC,CPTII,S$GLB, | Performed by: INTERNAL MEDICINE

## 2021-09-20 RX ORDER — METFORMIN HYDROCHLORIDE 500 MG/1
500 TABLET, EXTENDED RELEASE ORAL 2 TIMES DAILY WITH MEALS
Qty: 360 TABLET | Refills: 11
Start: 2021-09-20 | End: 2021-12-29

## 2021-09-20 RX ORDER — VENLAFAXINE HYDROCHLORIDE 150 MG/1
150 CAPSULE, EXTENDED RELEASE ORAL DAILY
Qty: 90 CAPSULE | Refills: 1
Start: 2021-09-20 | End: 2022-01-19 | Stop reason: SDUPTHER

## 2021-10-04 ENCOUNTER — PATIENT MESSAGE (OUTPATIENT)
Dept: ADMINISTRATIVE | Facility: HOSPITAL | Age: 61
End: 2021-10-04

## 2021-10-07 ENCOUNTER — PATIENT MESSAGE (OUTPATIENT)
Dept: ADMINISTRATIVE | Facility: OTHER | Age: 61
End: 2021-10-07

## 2021-10-13 ENCOUNTER — PATIENT MESSAGE (OUTPATIENT)
Dept: PHYSICAL MEDICINE AND REHAB | Facility: CLINIC | Age: 61
End: 2021-10-13
Payer: MEDICARE

## 2021-10-13 DIAGNOSIS — M48.062 SPINAL STENOSIS OF LUMBAR REGION WITH NEUROGENIC CLAUDICATION: ICD-10-CM

## 2021-10-13 DIAGNOSIS — Z96.641 STATUS POST RIGHT HIP REPLACEMENT: ICD-10-CM

## 2021-10-13 DIAGNOSIS — Z96.651 STATUS POST TOTAL RIGHT KNEE REPLACEMENT: ICD-10-CM

## 2021-10-13 DIAGNOSIS — M54.41 CHRONIC BILATERAL LOW BACK PAIN WITH RIGHT-SIDED SCIATICA: ICD-10-CM

## 2021-10-13 DIAGNOSIS — M43.16 SPONDYLOLISTHESIS AT L3-L4 LEVEL: ICD-10-CM

## 2021-10-13 DIAGNOSIS — M54.9 BACK PAIN, UNSPECIFIED BACK LOCATION, UNSPECIFIED BACK PAIN LATERALITY, UNSPECIFIED CHRONICITY: ICD-10-CM

## 2021-10-13 DIAGNOSIS — F11.90 UNCOMPLICATED OPIOID USE: ICD-10-CM

## 2021-10-13 DIAGNOSIS — G89.29 CHRONIC BILATERAL LOW BACK PAIN WITH RIGHT-SIDED SCIATICA: ICD-10-CM

## 2021-10-16 RX ORDER — OXYCODONE AND ACETAMINOPHEN 5; 325 MG/1; MG/1
1 TABLET ORAL EVERY 12 HOURS PRN
Qty: 60 TABLET | Refills: 0 | Status: SHIPPED | OUTPATIENT
Start: 2021-10-16 | End: 2021-11-12 | Stop reason: SDUPTHER

## 2021-10-21 NOTE — TELEPHONE ENCOUNTER
Timing/Frequency of Administration Recommended Verifying with pharmacist what is covered, [facility] has denied Procardia.

## 2021-10-29 DIAGNOSIS — E89.0 POSTOPERATIVE HYPOTHYROIDISM: ICD-10-CM

## 2021-10-29 DIAGNOSIS — E89.0 HISTORY OF SUBTOTAL THYROIDECTOMY: ICD-10-CM

## 2021-10-29 DIAGNOSIS — I10 ESSENTIAL HYPERTENSION, MALIGNANT: ICD-10-CM

## 2021-10-29 RX ORDER — LEVOTHYROXINE SODIUM 137 UG/1
137 TABLET ORAL DAILY
Qty: 90 TABLET | Refills: 11 | Status: SHIPPED | OUTPATIENT
Start: 2021-10-29 | End: 2022-07-21

## 2021-10-29 RX ORDER — POTASSIUM CHLORIDE 20 MEQ/1
40 TABLET, EXTENDED RELEASE ORAL 2 TIMES DAILY
Qty: 360 TABLET | Refills: 11 | Status: SHIPPED | OUTPATIENT
Start: 2021-10-29 | End: 2022-11-03

## 2021-11-01 ENCOUNTER — IMMUNIZATION (OUTPATIENT)
Dept: PHARMACY | Facility: CLINIC | Age: 61
End: 2021-11-01
Payer: MEDICARE

## 2021-11-01 ENCOUNTER — IMMUNIZATION (OUTPATIENT)
Dept: INTERNAL MEDICINE | Facility: CLINIC | Age: 61
End: 2021-11-01
Payer: MEDICARE

## 2021-11-01 DIAGNOSIS — Z23 NEED FOR VACCINATION: Primary | ICD-10-CM

## 2021-11-01 PROCEDURE — 0004A COVID-19, MRNA, LNP-S, PF, 30 MCG/0.3 ML DOSE VACCINE: ICD-10-PCS | Mod: HCNC,S$GLB,, | Performed by: INTERNAL MEDICINE

## 2021-11-01 PROCEDURE — 0003A COVID-19, MRNA, LNP-S, PF, 30 MCG/0.3 ML DOSE VACCINE: CPT | Mod: HCNC,PBBFAC | Performed by: INTERNAL MEDICINE

## 2021-11-01 PROCEDURE — 91300 COVID-19, MRNA, LNP-S, PF, 30 MCG/0.3 ML DOSE VACCINE: CPT | Mod: HCNC,PBBFAC | Performed by: INTERNAL MEDICINE

## 2021-11-01 PROCEDURE — 0004A COVID-19, MRNA, LNP-S, PF, 30 MCG/0.3 ML DOSE VACCINE: CPT | Mod: HCNC,S$GLB,, | Performed by: INTERNAL MEDICINE

## 2021-11-03 ENCOUNTER — PES CALL (OUTPATIENT)
Dept: ADMINISTRATIVE | Facility: CLINIC | Age: 61
End: 2021-11-03
Payer: MEDICARE

## 2021-11-05 ENCOUNTER — TELEPHONE (OUTPATIENT)
Dept: INTERNAL MEDICINE | Facility: CLINIC | Age: 61
End: 2021-11-05
Payer: MEDICARE

## 2021-11-09 ENCOUNTER — TELEPHONE (OUTPATIENT)
Dept: INTERNAL MEDICINE | Facility: CLINIC | Age: 61
End: 2021-11-09
Payer: MEDICARE

## 2021-11-10 ENCOUNTER — OFFICE VISIT (OUTPATIENT)
Dept: INTERNAL MEDICINE | Facility: CLINIC | Age: 61
End: 2021-11-10
Payer: MEDICARE

## 2021-11-10 VITALS
DIASTOLIC BLOOD PRESSURE: 78 MMHG | BODY MASS INDEX: 35.21 KG/M2 | SYSTOLIC BLOOD PRESSURE: 128 MMHG | HEIGHT: 61 IN | OXYGEN SATURATION: 98 % | HEART RATE: 105 BPM | WEIGHT: 186.5 LBS

## 2021-11-10 DIAGNOSIS — G89.29 CHRONIC BILATERAL LOW BACK PAIN WITH RIGHT-SIDED SCIATICA: ICD-10-CM

## 2021-11-10 DIAGNOSIS — Z00.00 ENCOUNTER FOR PREVENTIVE HEALTH EXAMINATION: Primary | ICD-10-CM

## 2021-11-10 DIAGNOSIS — R26.9 ABNORMALITY OF GAIT AND MOBILITY: ICD-10-CM

## 2021-11-10 DIAGNOSIS — M51.36 DDD (DEGENERATIVE DISC DISEASE), LUMBAR: ICD-10-CM

## 2021-11-10 DIAGNOSIS — I70.0 AORTIC CALCIFICATION: ICD-10-CM

## 2021-11-10 DIAGNOSIS — F33.42 RECURRENT MAJOR DEPRESSIVE DISORDER, IN FULL REMISSION: ICD-10-CM

## 2021-11-10 DIAGNOSIS — E66.9 TYPE 2 DIABETES MELLITUS WITH OBESITY: ICD-10-CM

## 2021-11-10 DIAGNOSIS — M48.061 SPINAL STENOSIS OF LUMBAR REGION, UNSPECIFIED WHETHER NEUROGENIC CLAUDICATION PRESENT: ICD-10-CM

## 2021-11-10 DIAGNOSIS — M43.16 SPONDYLOLISTHESIS AT L3-L4 LEVEL: ICD-10-CM

## 2021-11-10 DIAGNOSIS — E89.0 HISTORY OF SUBTOTAL THYROIDECTOMY: ICD-10-CM

## 2021-11-10 DIAGNOSIS — N18.30 STAGE 3 CHRONIC KIDNEY DISEASE, UNSPECIFIED WHETHER STAGE 3A OR 3B CKD: ICD-10-CM

## 2021-11-10 DIAGNOSIS — E11.69 TYPE 2 DIABETES MELLITUS WITH OBESITY: ICD-10-CM

## 2021-11-10 DIAGNOSIS — M54.41 CHRONIC BILATERAL LOW BACK PAIN WITH RIGHT-SIDED SCIATICA: ICD-10-CM

## 2021-11-10 DIAGNOSIS — E11.21 TYPE 2 DIABETES MELLITUS WITH DIABETIC NEPHROPATHY, WITHOUT LONG-TERM CURRENT USE OF INSULIN: ICD-10-CM

## 2021-11-10 DIAGNOSIS — Z79.891 CHRONIC USE OF OPIATE FOR THERAPEUTIC PURPOSE: ICD-10-CM

## 2021-11-10 DIAGNOSIS — I10 ESSENTIAL HYPERTENSION: ICD-10-CM

## 2021-11-10 PROBLEM — M54.50 CHRONIC BILATERAL LOW BACK PAIN WITHOUT SCIATICA: Status: RESOLVED | Noted: 2019-11-22 | Resolved: 2021-11-10

## 2021-11-10 PROBLEM — E66.813 OBESITY, CLASS III, BMI 40-49.9 (MORBID OBESITY): Status: RESOLVED | Noted: 2017-10-24 | Resolved: 2021-11-10

## 2021-11-10 PROBLEM — E66.01 OBESITY, MORBID, BMI 50 OR HIGHER: Status: RESOLVED | Noted: 2017-10-10 | Resolved: 2021-11-10

## 2021-11-10 PROBLEM — E66.813 CLASS 3 SEVERE OBESITY DUE TO EXCESS CALORIES WITH SERIOUS COMORBIDITY AND BODY MASS INDEX (BMI) OF 45.0 TO 49.9 IN ADULT: Status: RESOLVED | Noted: 2018-07-23 | Resolved: 2021-11-10

## 2021-11-10 PROBLEM — N18.9 CKD (CHRONIC KIDNEY DISEASE): Status: ACTIVE | Noted: 2021-11-10

## 2021-11-10 PROBLEM — E66.813 OBESITY, CLASS III, BMI 40-49.9 (MORBID OBESITY): Status: ACTIVE | Noted: 2017-10-24

## 2021-11-10 PROBLEM — R29.898 WEAKNESS OF BACK: Status: RESOLVED | Noted: 2019-11-22 | Resolved: 2021-11-10

## 2021-11-10 PROBLEM — E66.01 OBESITY, CLASS III, BMI 40-49.9 (MORBID OBESITY): Status: RESOLVED | Noted: 2017-10-24 | Resolved: 2021-11-10

## 2021-11-10 PROBLEM — E66.01 CLASS 3 SEVERE OBESITY DUE TO EXCESS CALORIES WITH SERIOUS COMORBIDITY AND BODY MASS INDEX (BMI) OF 45.0 TO 49.9 IN ADULT: Status: RESOLVED | Noted: 2018-07-23 | Resolved: 2021-11-10

## 2021-11-10 PROBLEM — E11.29 TYPE 2 DIABETES MELLITUS WITH RENAL MANIFESTATIONS: Status: ACTIVE | Noted: 2021-11-10

## 2021-11-10 PROCEDURE — 3008F PR BODY MASS INDEX (BMI) DOCUMENTED: ICD-10-PCS | Mod: HCNC,CPTII,S$GLB, | Performed by: NURSE PRACTITIONER

## 2021-11-10 PROCEDURE — 99999 PR PBB SHADOW E&M-EST. PATIENT-LVL V: CPT | Mod: PBBFAC,HCNC,, | Performed by: NURSE PRACTITIONER

## 2021-11-10 PROCEDURE — 1159F PR MEDICATION LIST DOCUMENTED IN MEDICAL RECORD: ICD-10-PCS | Mod: HCNC,CPTII,S$GLB, | Performed by: NURSE PRACTITIONER

## 2021-11-10 PROCEDURE — 1160F RVW MEDS BY RX/DR IN RCRD: CPT | Mod: HCNC,CPTII,S$GLB, | Performed by: NURSE PRACTITIONER

## 2021-11-10 PROCEDURE — 3008F BODY MASS INDEX DOCD: CPT | Mod: HCNC,CPTII,S$GLB, | Performed by: NURSE PRACTITIONER

## 2021-11-10 PROCEDURE — 99999 PR PBB SHADOW E&M-EST. PATIENT-LVL V: ICD-10-PCS | Mod: PBBFAC,HCNC,, | Performed by: NURSE PRACTITIONER

## 2021-11-10 PROCEDURE — G0439 PPPS, SUBSEQ VISIT: HCPCS | Mod: HCNC,S$GLB,, | Performed by: NURSE PRACTITIONER

## 2021-11-10 PROCEDURE — G0439 PR MEDICARE ANNUAL WELLNESS SUBSEQUENT VISIT: ICD-10-PCS | Mod: HCNC,S$GLB,, | Performed by: NURSE PRACTITIONER

## 2021-11-10 PROCEDURE — 3074F SYST BP LT 130 MM HG: CPT | Mod: HCNC,CPTII,S$GLB, | Performed by: NURSE PRACTITIONER

## 2021-11-10 PROCEDURE — 99499 RISK ADDL DX/OHS AUDIT: ICD-10-PCS | Mod: S$GLB,,, | Performed by: NURSE PRACTITIONER

## 2021-11-10 PROCEDURE — 3044F PR MOST RECENT HEMOGLOBIN A1C LEVEL <7.0%: ICD-10-PCS | Mod: HCNC,CPTII,S$GLB, | Performed by: NURSE PRACTITIONER

## 2021-11-10 PROCEDURE — 3078F PR MOST RECENT DIASTOLIC BLOOD PRESSURE < 80 MM HG: ICD-10-PCS | Mod: HCNC,CPTII,S$GLB, | Performed by: NURSE PRACTITIONER

## 2021-11-10 PROCEDURE — 3074F PR MOST RECENT SYSTOLIC BLOOD PRESSURE < 130 MM HG: ICD-10-PCS | Mod: HCNC,CPTII,S$GLB, | Performed by: NURSE PRACTITIONER

## 2021-11-10 PROCEDURE — 1160F PR REVIEW ALL MEDS BY PRESCRIBER/CLIN PHARMACIST DOCUMENTED: ICD-10-PCS | Mod: HCNC,CPTII,S$GLB, | Performed by: NURSE PRACTITIONER

## 2021-11-10 PROCEDURE — 3072F PR LOW RISK FOR RETINOPATHY: ICD-10-PCS | Mod: HCNC,CPTII,S$GLB, | Performed by: NURSE PRACTITIONER

## 2021-11-10 PROCEDURE — 3078F DIAST BP <80 MM HG: CPT | Mod: HCNC,CPTII,S$GLB, | Performed by: NURSE PRACTITIONER

## 2021-11-10 PROCEDURE — 99499 UNLISTED E&M SERVICE: CPT | Mod: S$GLB,,, | Performed by: NURSE PRACTITIONER

## 2021-11-10 PROCEDURE — 3072F LOW RISK FOR RETINOPATHY: CPT | Mod: HCNC,CPTII,S$GLB, | Performed by: NURSE PRACTITIONER

## 2021-11-10 PROCEDURE — 1159F MED LIST DOCD IN RCRD: CPT | Mod: HCNC,CPTII,S$GLB, | Performed by: NURSE PRACTITIONER

## 2021-11-10 PROCEDURE — 3044F HG A1C LEVEL LT 7.0%: CPT | Mod: HCNC,CPTII,S$GLB, | Performed by: NURSE PRACTITIONER

## 2021-11-10 RX ORDER — PNV NO.95/FERROUS FUM/FOLIC AC 28MG-0.8MG
100 TABLET ORAL DAILY
COMMUNITY

## 2021-11-11 ENCOUNTER — PATIENT MESSAGE (OUTPATIENT)
Dept: PHYSICAL MEDICINE AND REHAB | Facility: CLINIC | Age: 61
End: 2021-11-11
Payer: MEDICARE

## 2021-11-11 DIAGNOSIS — M48.062 SPINAL STENOSIS OF LUMBAR REGION WITH NEUROGENIC CLAUDICATION: ICD-10-CM

## 2021-11-11 DIAGNOSIS — Z96.641 STATUS POST RIGHT HIP REPLACEMENT: ICD-10-CM

## 2021-11-11 DIAGNOSIS — M54.41 CHRONIC BILATERAL LOW BACK PAIN WITH RIGHT-SIDED SCIATICA: ICD-10-CM

## 2021-11-11 DIAGNOSIS — G89.29 CHRONIC BILATERAL LOW BACK PAIN WITH RIGHT-SIDED SCIATICA: ICD-10-CM

## 2021-11-11 DIAGNOSIS — Z96.651 STATUS POST TOTAL RIGHT KNEE REPLACEMENT: ICD-10-CM

## 2021-11-11 DIAGNOSIS — M43.16 SPONDYLOLISTHESIS AT L3-L4 LEVEL: ICD-10-CM

## 2021-11-11 DIAGNOSIS — M54.9 BACK PAIN, UNSPECIFIED BACK LOCATION, UNSPECIFIED BACK PAIN LATERALITY, UNSPECIFIED CHRONICITY: ICD-10-CM

## 2021-11-11 DIAGNOSIS — F11.90 UNCOMPLICATED OPIOID USE: ICD-10-CM

## 2021-11-13 RX ORDER — OXYCODONE AND ACETAMINOPHEN 5; 325 MG/1; MG/1
1 TABLET ORAL EVERY 12 HOURS PRN
Qty: 60 TABLET | Refills: 0 | Status: SHIPPED | OUTPATIENT
Start: 2021-11-15 | End: 2021-12-16 | Stop reason: SDUPTHER

## 2021-12-16 ENCOUNTER — PATIENT MESSAGE (OUTPATIENT)
Dept: PHYSICAL MEDICINE AND REHAB | Facility: CLINIC | Age: 61
End: 2021-12-16
Payer: MEDICARE

## 2021-12-16 DIAGNOSIS — Z96.641 STATUS POST RIGHT HIP REPLACEMENT: ICD-10-CM

## 2021-12-16 DIAGNOSIS — Z96.651 STATUS POST TOTAL RIGHT KNEE REPLACEMENT: ICD-10-CM

## 2021-12-16 DIAGNOSIS — M54.9 BACK PAIN, UNSPECIFIED BACK LOCATION, UNSPECIFIED BACK PAIN LATERALITY, UNSPECIFIED CHRONICITY: ICD-10-CM

## 2021-12-16 DIAGNOSIS — M43.16 SPONDYLOLISTHESIS AT L3-L4 LEVEL: ICD-10-CM

## 2021-12-16 DIAGNOSIS — M48.062 SPINAL STENOSIS OF LUMBAR REGION WITH NEUROGENIC CLAUDICATION: ICD-10-CM

## 2021-12-16 DIAGNOSIS — G89.29 CHRONIC BILATERAL LOW BACK PAIN WITH RIGHT-SIDED SCIATICA: ICD-10-CM

## 2021-12-16 DIAGNOSIS — M54.41 CHRONIC BILATERAL LOW BACK PAIN WITH RIGHT-SIDED SCIATICA: ICD-10-CM

## 2021-12-16 DIAGNOSIS — F11.90 UNCOMPLICATED OPIOID USE: ICD-10-CM

## 2021-12-18 RX ORDER — OXYCODONE AND ACETAMINOPHEN 5; 325 MG/1; MG/1
1 TABLET ORAL EVERY 12 HOURS PRN
Qty: 60 TABLET | Refills: 0 | Status: SHIPPED | OUTPATIENT
Start: 2021-12-18 | End: 2022-01-24 | Stop reason: SDUPTHER

## 2021-12-20 DIAGNOSIS — G47.09 OTHER INSOMNIA: ICD-10-CM

## 2021-12-22 DIAGNOSIS — E11.9 TYPE 2 DIABETES MELLITUS WITHOUT COMPLICATION: ICD-10-CM

## 2021-12-22 RX ORDER — TRAZODONE HYDROCHLORIDE 50 MG/1
50 TABLET ORAL NIGHTLY PRN
Qty: 90 TABLET | Refills: 1 | Status: SHIPPED | OUTPATIENT
Start: 2021-12-22 | End: 2022-05-01

## 2022-01-05 ENCOUNTER — PATIENT MESSAGE (OUTPATIENT)
Dept: INTERNAL MEDICINE | Facility: CLINIC | Age: 62
End: 2022-01-05
Payer: MEDICARE

## 2022-01-05 DIAGNOSIS — E11.9 TYPE 2 DIABETES MELLITUS WITHOUT COMPLICATION, WITHOUT LONG-TERM CURRENT USE OF INSULIN: ICD-10-CM

## 2022-01-05 RX ORDER — METFORMIN HYDROCHLORIDE 500 MG/1
500 TABLET, EXTENDED RELEASE ORAL 2 TIMES DAILY WITH MEALS
Qty: 180 TABLET | Refills: 11 | Status: SHIPPED | OUTPATIENT
Start: 2022-01-05 | End: 2023-05-17 | Stop reason: SDUPTHER

## 2022-01-05 RX ORDER — METFORMIN HYDROCHLORIDE 500 MG/1
1000 TABLET, EXTENDED RELEASE ORAL 2 TIMES DAILY WITH MEALS
Qty: 360 TABLET | Refills: 11 | Status: CANCELLED | OUTPATIENT
Start: 2022-01-05

## 2022-01-18 ENCOUNTER — PATIENT MESSAGE (OUTPATIENT)
Dept: ADMINISTRATIVE | Facility: HOSPITAL | Age: 62
End: 2022-01-18
Payer: MEDICARE

## 2022-01-19 ENCOUNTER — LAB VISIT (OUTPATIENT)
Dept: LAB | Facility: HOSPITAL | Age: 62
End: 2022-01-19
Attending: INTERNAL MEDICINE
Payer: MEDICARE

## 2022-01-19 ENCOUNTER — OFFICE VISIT (OUTPATIENT)
Dept: INTERNAL MEDICINE | Facility: CLINIC | Age: 62
End: 2022-01-19
Payer: MEDICARE

## 2022-01-19 VITALS
HEIGHT: 61 IN | DIASTOLIC BLOOD PRESSURE: 82 MMHG | BODY MASS INDEX: 36.05 KG/M2 | OXYGEN SATURATION: 97 % | HEART RATE: 110 BPM | RESPIRATION RATE: 18 BRPM | SYSTOLIC BLOOD PRESSURE: 118 MMHG | WEIGHT: 190.94 LBS

## 2022-01-19 DIAGNOSIS — F33.42 RECURRENT MAJOR DEPRESSIVE DISORDER, IN FULL REMISSION: ICD-10-CM

## 2022-01-19 DIAGNOSIS — E11.21 TYPE 2 DIABETES MELLITUS WITH DIABETIC NEPHROPATHY, WITHOUT LONG-TERM CURRENT USE OF INSULIN: ICD-10-CM

## 2022-01-19 DIAGNOSIS — I70.0 AORTIC CALCIFICATION: ICD-10-CM

## 2022-01-19 DIAGNOSIS — N18.30 STAGE 3 CHRONIC KIDNEY DISEASE, UNSPECIFIED WHETHER STAGE 3A OR 3B CKD: ICD-10-CM

## 2022-01-19 DIAGNOSIS — E66.01 SEVERE OBESITY WITH BODY MASS INDEX (BMI) OF 35.0 TO 35.9 AND COMORBIDITY: Primary | ICD-10-CM

## 2022-01-19 DIAGNOSIS — F32.A DEPRESSION, UNSPECIFIED DEPRESSION TYPE: ICD-10-CM

## 2022-01-19 LAB
ESTIMATED AVG GLUCOSE: 117 MG/DL (ref 68–131)
HBA1C MFR BLD: 5.7 % (ref 4–5.6)

## 2022-01-19 PROCEDURE — 99999 PR PBB SHADOW E&M-EST. PATIENT-LVL V: CPT | Mod: PBBFAC,,, | Performed by: INTERNAL MEDICINE

## 2022-01-19 PROCEDURE — 3074F PR MOST RECENT SYSTOLIC BLOOD PRESSURE < 130 MM HG: ICD-10-PCS | Mod: CPTII,S$GLB,, | Performed by: INTERNAL MEDICINE

## 2022-01-19 PROCEDURE — 99214 PR OFFICE/OUTPT VISIT, EST, LEVL IV, 30-39 MIN: ICD-10-PCS | Mod: S$GLB,,, | Performed by: INTERNAL MEDICINE

## 2022-01-19 PROCEDURE — 3074F SYST BP LT 130 MM HG: CPT | Mod: CPTII,S$GLB,, | Performed by: INTERNAL MEDICINE

## 2022-01-19 PROCEDURE — 99214 OFFICE O/P EST MOD 30 MIN: CPT | Mod: S$GLB,,, | Performed by: INTERNAL MEDICINE

## 2022-01-19 PROCEDURE — 3008F PR BODY MASS INDEX (BMI) DOCUMENTED: ICD-10-PCS | Mod: CPTII,S$GLB,, | Performed by: INTERNAL MEDICINE

## 2022-01-19 PROCEDURE — 1160F RVW MEDS BY RX/DR IN RCRD: CPT | Mod: CPTII,S$GLB,, | Performed by: INTERNAL MEDICINE

## 2022-01-19 PROCEDURE — 3044F PR MOST RECENT HEMOGLOBIN A1C LEVEL <7.0%: ICD-10-PCS | Mod: CPTII,S$GLB,, | Performed by: INTERNAL MEDICINE

## 2022-01-19 PROCEDURE — 3044F HG A1C LEVEL LT 7.0%: CPT | Mod: CPTII,S$GLB,, | Performed by: INTERNAL MEDICINE

## 2022-01-19 PROCEDURE — 3008F BODY MASS INDEX DOCD: CPT | Mod: CPTII,S$GLB,, | Performed by: INTERNAL MEDICINE

## 2022-01-19 PROCEDURE — 99499 RISK ADDL DX/OHS AUDIT: ICD-10-PCS | Mod: S$GLB,,, | Performed by: INTERNAL MEDICINE

## 2022-01-19 PROCEDURE — 1160F PR REVIEW ALL MEDS BY PRESCRIBER/CLIN PHARMACIST DOCUMENTED: ICD-10-PCS | Mod: CPTII,S$GLB,, | Performed by: INTERNAL MEDICINE

## 2022-01-19 PROCEDURE — 99999 PR PBB SHADOW E&M-EST. PATIENT-LVL V: ICD-10-PCS | Mod: PBBFAC,,, | Performed by: INTERNAL MEDICINE

## 2022-01-19 PROCEDURE — 3079F DIAST BP 80-89 MM HG: CPT | Mod: CPTII,S$GLB,, | Performed by: INTERNAL MEDICINE

## 2022-01-19 PROCEDURE — 3072F PR LOW RISK FOR RETINOPATHY: ICD-10-PCS | Mod: CPTII,S$GLB,, | Performed by: INTERNAL MEDICINE

## 2022-01-19 PROCEDURE — 36415 COLL VENOUS BLD VENIPUNCTURE: CPT | Performed by: INTERNAL MEDICINE

## 2022-01-19 PROCEDURE — 3072F LOW RISK FOR RETINOPATHY: CPT | Mod: CPTII,S$GLB,, | Performed by: INTERNAL MEDICINE

## 2022-01-19 PROCEDURE — 1159F MED LIST DOCD IN RCRD: CPT | Mod: CPTII,S$GLB,, | Performed by: INTERNAL MEDICINE

## 2022-01-19 PROCEDURE — 83036 HEMOGLOBIN GLYCOSYLATED A1C: CPT | Performed by: INTERNAL MEDICINE

## 2022-01-19 PROCEDURE — 1159F PR MEDICATION LIST DOCUMENTED IN MEDICAL RECORD: ICD-10-PCS | Mod: CPTII,S$GLB,, | Performed by: INTERNAL MEDICINE

## 2022-01-19 PROCEDURE — 99499 UNLISTED E&M SERVICE: CPT | Mod: S$GLB,,, | Performed by: INTERNAL MEDICINE

## 2022-01-19 PROCEDURE — 99215 OFFICE O/P EST HI 40 MIN: CPT | Mod: PBBFAC | Performed by: INTERNAL MEDICINE

## 2022-01-19 PROCEDURE — 3079F PR MOST RECENT DIASTOLIC BLOOD PRESSURE 80-89 MM HG: ICD-10-PCS | Mod: CPTII,S$GLB,, | Performed by: INTERNAL MEDICINE

## 2022-01-19 RX ORDER — VENLAFAXINE HYDROCHLORIDE 150 MG/1
150 CAPSULE, EXTENDED RELEASE ORAL DAILY
Qty: 90 CAPSULE | Refills: 11 | Status: SHIPPED | OUTPATIENT
Start: 2022-01-19 | End: 2023-05-22 | Stop reason: SDUPTHER

## 2022-01-19 NOTE — PROGRESS NOTES
Subjective:       Patient ID: Tanika Wayne is a 62 y.o. female.    Chief Complaint: Follow-up    Patient is here for followup for chronic conditions.    Continues to lose wt.    DM2:  good med adherence  has changed Diet  Does not usu chk 140 AM sugars  no Post-prandial sugars  no Numbness in feet  no sores in feet  no Visual changes  no Polyuria/polydipsia.    Wants R foot discolored skin examined.    Would like effexor changed back to capsules.    Review of Systems   Constitutional: Negative for fatigue, fever and unexpected weight change.   HENT: Negative for congestion, facial swelling, hearing loss and sore throat.    Eyes: Negative for visual disturbance.   Respiratory: Negative for cough, shortness of breath and wheezing.    Cardiovascular: Negative for chest pain and palpitations.   Gastrointestinal: Negative for abdominal distention, abdominal pain, blood in stool, diarrhea, nausea and vomiting.   Genitourinary: Negative for difficulty urinating, dysuria, frequency and hematuria.   Musculoskeletal: Positive for arthralgias and gait problem. Negative for joint swelling.   Skin: Negative for color change and rash.   Neurological: Negative for dizziness, syncope, weakness and headaches.   Hematological: Negative for adenopathy.   Psychiatric/Behavioral: Positive for sleep disturbance. Negative for confusion, decreased concentration and suicidal ideas. The patient is nervous/anxious.            Objective:      Physical Exam  Vitals reviewed.   Constitutional:       General: She is not in acute distress.     Appearance: Normal appearance. She is well-developed. She is obese. She is not ill-appearing, toxic-appearing or diaphoretic.   HENT:      Head: Normocephalic and atraumatic.      Mouth/Throat:      Pharynx: No oropharyngeal exudate.   Eyes:      General: No scleral icterus.     Pupils: Pupils are equal, round, and reactive to light.   Neck:      Thyroid: No thyromegaly.      Comments: Thyroid surg  scar present  Cardiovascular:      Rate and Rhythm: Normal rate and regular rhythm.      Heart sounds: Normal heart sounds. No murmur heard.  No friction rub. No gallop.    Pulmonary:      Effort: Pulmonary effort is normal. No respiratory distress.      Breath sounds: Normal breath sounds. No wheezing or rales.   Abdominal:      General: Bowel sounds are normal. There is no distension.      Palpations: Abdomen is soft. There is no mass.      Tenderness: There is no abdominal tenderness. There is no guarding or rebound.   Musculoskeletal:         General: No tenderness. Normal range of motion.      Cervical back: Normal range of motion and neck supple.      Comments: Diff rising to exam table, able to rise with walker assistance/support   Lymphadenopathy:      Cervical: No cervical adenopathy.   Skin:     Comments: Scaly rash R plantar foot, some hyperpigmented skin on plantar foot also   Neurological:      General: No focal deficit present.      Mental Status: She is alert and oriented to person, place, and time.   Psychiatric:         Mood and Affect: Mood normal.         Speech: Speech normal.         Behavior: Behavior normal.         Thought Content: Thought content normal.         Judgment: Judgment normal.         Assessment:       1. Severe obesity with body mass index (BMI) of 35.0 to 35.9 and comorbidity    2. Type 2 diabetes mellitus with diabetic nephropathy, without long-term current use of insulin    3. Recurrent major depressive disorder, in full remission    4. Aortic calcification    5. Stage 3 chronic kidney disease, unspecified whether stage 3a or 3b CKD    6. Depression, unspecified depression type        Plan:       Tanika was seen today for follow-up.    Diagnoses and all orders for this visit:    Severe obesity with body mass index (BMI) of 35.0 to 35.9 and comorbidity  Improved with WLS    Type 2 diabetes mellitus with diabetic nephropathy, without long-term current use of insulin  -      Ambulatory referral/consult to Optometry; Future  -     Hemoglobin A1C; Future    Recurrent major depressive disorder, in full remission  Change back to effexor    Aortic calcification  On statin    Stage 3 chronic kidney disease, unspecified whether stage 3a or 3b CKD  I reviewed recent blood work results with the patient.      Depression, unspecified depression type  -     venlafaxine (EFFEXOR-XR) 150 MG Cp24; Take 1 capsule (150 mg total) by mouth once daily.    scaly rash bottom R foot c/w tinea, she will continue with AF cream    Health Maintenance       Date Due Completion Date    Diabetes Urine Screening 08/18/2017 8/18/2016    Hemoglobin A1c 11/26/2021 5/26/2021    Eye Exam 01/19/2022 1/19/2021    Mammogram 03/12/2022 3/12/2021    HIV Screening 11/10/2022 (Originally 1/1/1975) ---    Lipid Panel 08/02/2022 8/2/2021    Colorectal Cancer Screening 10/25/2022 10/25/2017    Foot Exam 11/10/2022 11/10/2021 (Done)    Override on 11/10/2021: Done    Override on 9/14/2020: Done    High Dose Statin 01/19/2023 1/19/2022    Pneumococcal Vaccines (Age 0-64) (2 of 2 - PPSV23) 01/01/2026 11/20/2019    TETANUS VACCINE 01/07/2026 1/7/2016          Follow up in about 6 months (around 7/19/2022).    Future Appointments   Date Time Provider Department Center   5/2/2022  2:30 PM Apoorva Hoff OD [facility]   7/18/2022  9:20 AM Rayo Hernandez MD [facility]

## 2022-01-21 ENCOUNTER — PATIENT MESSAGE (OUTPATIENT)
Dept: PHYSICAL MEDICINE AND REHAB | Facility: CLINIC | Age: 62
End: 2022-01-21
Payer: MEDICARE

## 2022-01-21 DIAGNOSIS — Z96.641 STATUS POST RIGHT HIP REPLACEMENT: ICD-10-CM

## 2022-01-21 DIAGNOSIS — M54.9 BACK PAIN, UNSPECIFIED BACK LOCATION, UNSPECIFIED BACK PAIN LATERALITY, UNSPECIFIED CHRONICITY: ICD-10-CM

## 2022-01-21 DIAGNOSIS — M54.41 CHRONIC BILATERAL LOW BACK PAIN WITH RIGHT-SIDED SCIATICA: ICD-10-CM

## 2022-01-21 DIAGNOSIS — F11.90 UNCOMPLICATED OPIOID USE: ICD-10-CM

## 2022-01-21 DIAGNOSIS — G89.29 CHRONIC BILATERAL LOW BACK PAIN WITH RIGHT-SIDED SCIATICA: ICD-10-CM

## 2022-01-21 DIAGNOSIS — Z96.651 STATUS POST TOTAL RIGHT KNEE REPLACEMENT: ICD-10-CM

## 2022-01-21 DIAGNOSIS — M43.16 SPONDYLOLISTHESIS AT L3-L4 LEVEL: ICD-10-CM

## 2022-01-21 DIAGNOSIS — M48.062 SPINAL STENOSIS OF LUMBAR REGION WITH NEUROGENIC CLAUDICATION: ICD-10-CM

## 2022-01-26 RX ORDER — OXYCODONE AND ACETAMINOPHEN 5; 325 MG/1; MG/1
1 TABLET ORAL EVERY 12 HOURS PRN
Qty: 60 TABLET | Refills: 0 | Status: SHIPPED | OUTPATIENT
Start: 2022-01-26 | End: 2022-02-25 | Stop reason: SDUPTHER

## 2022-02-24 ENCOUNTER — PATIENT MESSAGE (OUTPATIENT)
Dept: PHYSICAL MEDICINE AND REHAB | Facility: CLINIC | Age: 62
End: 2022-02-24
Payer: MEDICARE

## 2022-02-24 DIAGNOSIS — F11.90 UNCOMPLICATED OPIOID USE: ICD-10-CM

## 2022-02-24 DIAGNOSIS — M43.16 SPONDYLOLISTHESIS AT L3-L4 LEVEL: ICD-10-CM

## 2022-02-24 DIAGNOSIS — M48.062 SPINAL STENOSIS OF LUMBAR REGION WITH NEUROGENIC CLAUDICATION: ICD-10-CM

## 2022-02-24 DIAGNOSIS — Z96.641 STATUS POST RIGHT HIP REPLACEMENT: ICD-10-CM

## 2022-02-24 DIAGNOSIS — Z96.651 STATUS POST TOTAL RIGHT KNEE REPLACEMENT: ICD-10-CM

## 2022-02-24 DIAGNOSIS — M54.9 BACK PAIN, UNSPECIFIED BACK LOCATION, UNSPECIFIED BACK PAIN LATERALITY, UNSPECIFIED CHRONICITY: ICD-10-CM

## 2022-02-24 DIAGNOSIS — M54.41 CHRONIC BILATERAL LOW BACK PAIN WITH RIGHT-SIDED SCIATICA: ICD-10-CM

## 2022-02-24 DIAGNOSIS — G89.29 CHRONIC BILATERAL LOW BACK PAIN WITH RIGHT-SIDED SCIATICA: ICD-10-CM

## 2022-02-28 RX ORDER — OXYCODONE AND ACETAMINOPHEN 5; 325 MG/1; MG/1
1 TABLET ORAL EVERY 12 HOURS PRN
Qty: 60 TABLET | Refills: 0 | Status: SHIPPED | OUTPATIENT
Start: 2022-02-28 | End: 2022-03-25 | Stop reason: SDUPTHER

## 2022-03-07 ENCOUNTER — PATIENT MESSAGE (OUTPATIENT)
Dept: BARIATRICS | Facility: CLINIC | Age: 62
End: 2022-03-07
Payer: MEDICARE

## 2022-03-16 ENCOUNTER — PATIENT MESSAGE (OUTPATIENT)
Dept: ADMINISTRATIVE | Facility: HOSPITAL | Age: 62
End: 2022-03-16
Payer: MEDICARE

## 2022-03-21 ENCOUNTER — PATIENT MESSAGE (OUTPATIENT)
Dept: INTERNAL MEDICINE | Facility: CLINIC | Age: 62
End: 2022-03-21
Payer: MEDICARE

## 2022-03-21 DIAGNOSIS — Z12.31 ENCOUNTER FOR SCREENING MAMMOGRAM FOR MALIGNANT NEOPLASM OF BREAST: Primary | ICD-10-CM

## 2022-03-21 NOTE — TELEPHONE ENCOUNTER
Hi, please contact the patient to assist in scheduling    Orders Placed This Encounter    Mammo Digital Screening [facility] [place]       Thank you, Rayo Hernandez

## 2022-03-25 ENCOUNTER — PATIENT MESSAGE (OUTPATIENT)
Dept: PHYSICAL MEDICINE AND REHAB | Facility: CLINIC | Age: 62
End: 2022-03-25
Payer: MEDICARE

## 2022-03-25 DIAGNOSIS — M48.062 SPINAL STENOSIS OF LUMBAR REGION WITH NEUROGENIC CLAUDICATION: ICD-10-CM

## 2022-03-25 DIAGNOSIS — G89.29 CHRONIC BILATERAL LOW BACK PAIN WITH RIGHT-SIDED SCIATICA: ICD-10-CM

## 2022-03-25 DIAGNOSIS — M43.16 SPONDYLOLISTHESIS AT L3-L4 LEVEL: ICD-10-CM

## 2022-03-25 DIAGNOSIS — Z96.641 STATUS POST RIGHT HIP REPLACEMENT: ICD-10-CM

## 2022-03-25 DIAGNOSIS — M54.9 BACK PAIN, UNSPECIFIED BACK LOCATION, UNSPECIFIED BACK PAIN LATERALITY, UNSPECIFIED CHRONICITY: ICD-10-CM

## 2022-03-25 DIAGNOSIS — Z96.651 STATUS POST TOTAL RIGHT KNEE REPLACEMENT: ICD-10-CM

## 2022-03-25 DIAGNOSIS — F11.90 UNCOMPLICATED OPIOID USE: ICD-10-CM

## 2022-03-25 DIAGNOSIS — M54.41 CHRONIC BILATERAL LOW BACK PAIN WITH RIGHT-SIDED SCIATICA: ICD-10-CM

## 2022-03-26 RX ORDER — OXYCODONE AND ACETAMINOPHEN 5; 325 MG/1; MG/1
1 TABLET ORAL EVERY 12 HOURS PRN
Qty: 60 TABLET | Refills: 0 | Status: SHIPPED | OUTPATIENT
Start: 2022-03-26 | End: 2022-04-29 | Stop reason: SDUPTHER

## 2022-04-04 ENCOUNTER — TELEPHONE (OUTPATIENT)
Dept: BARIATRICS | Facility: CLINIC | Age: 62
End: 2022-04-04
Payer: MEDICARE

## 2022-04-04 ENCOUNTER — PATIENT MESSAGE (OUTPATIENT)
Dept: BARIATRICS | Facility: CLINIC | Age: 62
End: 2022-04-04
Payer: MEDICARE

## 2022-04-06 ENCOUNTER — LAB VISIT (OUTPATIENT)
Dept: LAB | Facility: HOSPITAL | Age: 62
End: 2022-04-06
Payer: MEDICARE

## 2022-04-06 ENCOUNTER — OFFICE VISIT (OUTPATIENT)
Dept: BARIATRICS | Facility: CLINIC | Age: 62
End: 2022-04-06
Payer: MEDICARE

## 2022-04-06 VITALS
BODY MASS INDEX: 35.95 KG/M2 | DIASTOLIC BLOOD PRESSURE: 80 MMHG | SYSTOLIC BLOOD PRESSURE: 146 MMHG | WEIGHT: 190.25 LBS | OXYGEN SATURATION: 97 % | HEART RATE: 86 BPM

## 2022-04-06 DIAGNOSIS — E66.01 MORBID OBESITY: ICD-10-CM

## 2022-04-06 DIAGNOSIS — E11.36 TYPE 2 DIABETES MELLITUS WITH CATARACT: ICD-10-CM

## 2022-04-06 DIAGNOSIS — R11.0 NAUSEA: ICD-10-CM

## 2022-04-06 DIAGNOSIS — Z98.84 S/P GASTRIC BYPASS: ICD-10-CM

## 2022-04-06 DIAGNOSIS — M48.061 SPINAL STENOSIS OF LUMBAR REGION, UNSPECIFIED WHETHER NEUROGENIC CLAUDICATION PRESENT: ICD-10-CM

## 2022-04-06 DIAGNOSIS — R12 HEARTBURN: Primary | ICD-10-CM

## 2022-04-06 DIAGNOSIS — E46 PROTEIN-CALORIE MALNUTRITION, UNSPECIFIED SEVERITY: ICD-10-CM

## 2022-04-06 DIAGNOSIS — I10 ESSENTIAL HYPERTENSION: ICD-10-CM

## 2022-04-06 LAB
25(OH)D3+25(OH)D2 SERPL-MCNC: 51 NG/ML (ref 30–96)
ALBUMIN SERPL BCP-MCNC: 4 G/DL (ref 3.5–5.2)
ALP SERPL-CCNC: 62 U/L (ref 55–135)
ALT SERPL W/O P-5'-P-CCNC: 18 U/L (ref 10–44)
ANION GAP SERPL CALC-SCNC: 9 MMOL/L (ref 8–16)
AST SERPL-CCNC: 19 U/L (ref 10–40)
BASOPHILS # BLD AUTO: 0.03 K/UL (ref 0–0.2)
BASOPHILS NFR BLD: 0.4 % (ref 0–1.9)
BILIRUB SERPL-MCNC: 0.4 MG/DL (ref 0.1–1)
BUN SERPL-MCNC: 20 MG/DL (ref 8–23)
CALCIUM SERPL-MCNC: 10.3 MG/DL (ref 8.7–10.5)
CHLORIDE SERPL-SCNC: 102 MMOL/L (ref 95–110)
CHOLEST SERPL-MCNC: 135 MG/DL (ref 120–199)
CHOLEST/HDLC SERPL: 2.4 {RATIO} (ref 2–5)
CO2 SERPL-SCNC: 31 MMOL/L (ref 23–29)
CREAT SERPL-MCNC: 0.9 MG/DL (ref 0.5–1.4)
DIFFERENTIAL METHOD: ABNORMAL
EOSINOPHIL # BLD AUTO: 0.2 K/UL (ref 0–0.5)
EOSINOPHIL NFR BLD: 2.7 % (ref 0–8)
ERYTHROCYTE [DISTWIDTH] IN BLOOD BY AUTOMATED COUNT: 13.5 % (ref 11.5–14.5)
EST. GFR  (AFRICAN AMERICAN): >60 ML/MIN/1.73 M^2
EST. GFR  (NON AFRICAN AMERICAN): >60 ML/MIN/1.73 M^2
GLUCOSE SERPL-MCNC: 74 MG/DL (ref 70–110)
HCT VFR BLD AUTO: 38.8 % (ref 37–48.5)
HDLC SERPL-MCNC: 57 MG/DL (ref 40–75)
HDLC SERPL: 42.2 % (ref 20–50)
HGB BLD-MCNC: 12 G/DL (ref 12–16)
IMM GRANULOCYTES # BLD AUTO: 0.01 K/UL (ref 0–0.04)
IMM GRANULOCYTES NFR BLD AUTO: 0.1 % (ref 0–0.5)
IRON SERPL-MCNC: 103 UG/DL (ref 30–160)
LDLC SERPL CALC-MCNC: 62.6 MG/DL (ref 63–159)
LYMPHOCYTES # BLD AUTO: 4.9 K/UL (ref 1–4.8)
LYMPHOCYTES NFR BLD: 67.6 % (ref 18–48)
MCH RBC QN AUTO: 27.5 PG (ref 27–31)
MCHC RBC AUTO-ENTMCNC: 30.9 G/DL (ref 32–36)
MCV RBC AUTO: 89 FL (ref 82–98)
MONOCYTES # BLD AUTO: 0.4 K/UL (ref 0.3–1)
MONOCYTES NFR BLD: 4.8 % (ref 4–15)
NEUTROPHILS # BLD AUTO: 1.8 K/UL (ref 1.8–7.7)
NEUTROPHILS NFR BLD: 24.4 % (ref 38–73)
NONHDLC SERPL-MCNC: 78 MG/DL
NRBC BLD-RTO: 0 /100 WBC
PLATELET # BLD AUTO: 376 K/UL (ref 150–450)
PMV BLD AUTO: 10.1 FL (ref 9.2–12.9)
POTASSIUM SERPL-SCNC: 4.5 MMOL/L (ref 3.5–5.1)
PROT SERPL-MCNC: 7.2 G/DL (ref 6–8.4)
RBC # BLD AUTO: 4.37 M/UL (ref 4–5.4)
SATURATED IRON: 27 % (ref 20–50)
SODIUM SERPL-SCNC: 142 MMOL/L (ref 136–145)
TOTAL IRON BINDING CAPACITY: 376 UG/DL (ref 250–450)
TRANSFERRIN SERPL-MCNC: 254 MG/DL (ref 200–375)
TRIGL SERPL-MCNC: 77 MG/DL (ref 30–150)
VIT B12 SERPL-MCNC: 1049 PG/ML (ref 210–950)
WBC # BLD AUTO: 7.28 K/UL (ref 3.9–12.7)

## 2022-04-06 PROCEDURE — 3072F PR LOW RISK FOR RETINOPATHY: ICD-10-PCS | Mod: CPTII,S$GLB,, | Performed by: PHYSICIAN ASSISTANT

## 2022-04-06 PROCEDURE — 99213 OFFICE O/P EST LOW 20 MIN: CPT | Mod: S$GLB,,, | Performed by: PHYSICIAN ASSISTANT

## 2022-04-06 PROCEDURE — 82607 VITAMIN B-12: CPT | Performed by: PHYSICIAN ASSISTANT

## 2022-04-06 PROCEDURE — 99499 UNLISTED E&M SERVICE: CPT | Mod: S$GLB,,, | Performed by: PHYSICIAN ASSISTANT

## 2022-04-06 PROCEDURE — 3008F BODY MASS INDEX DOCD: CPT | Mod: CPTII,S$GLB,, | Performed by: PHYSICIAN ASSISTANT

## 2022-04-06 PROCEDURE — 1159F PR MEDICATION LIST DOCUMENTED IN MEDICAL RECORD: ICD-10-PCS | Mod: CPTII,S$GLB,, | Performed by: PHYSICIAN ASSISTANT

## 2022-04-06 PROCEDURE — 3008F PR BODY MASS INDEX (BMI) DOCUMENTED: ICD-10-PCS | Mod: CPTII,S$GLB,, | Performed by: PHYSICIAN ASSISTANT

## 2022-04-06 PROCEDURE — 84425 ASSAY OF VITAMIN B-1: CPT | Performed by: PHYSICIAN ASSISTANT

## 2022-04-06 PROCEDURE — 3044F PR MOST RECENT HEMOGLOBIN A1C LEVEL <7.0%: ICD-10-PCS | Mod: CPTII,S$GLB,, | Performed by: PHYSICIAN ASSISTANT

## 2022-04-06 PROCEDURE — 1159F MED LIST DOCD IN RCRD: CPT | Mod: CPTII,S$GLB,, | Performed by: PHYSICIAN ASSISTANT

## 2022-04-06 PROCEDURE — 3077F SYST BP >= 140 MM HG: CPT | Mod: CPTII,S$GLB,, | Performed by: PHYSICIAN ASSISTANT

## 2022-04-06 PROCEDURE — 84466 ASSAY OF TRANSFERRIN: CPT | Performed by: PHYSICIAN ASSISTANT

## 2022-04-06 PROCEDURE — 99499 RISK ADDL DX/OHS AUDIT: ICD-10-PCS | Mod: S$GLB,,, | Performed by: PHYSICIAN ASSISTANT

## 2022-04-06 PROCEDURE — 80061 LIPID PANEL: CPT | Performed by: PHYSICIAN ASSISTANT

## 2022-04-06 PROCEDURE — 3079F DIAST BP 80-89 MM HG: CPT | Mod: CPTII,S$GLB,, | Performed by: PHYSICIAN ASSISTANT

## 2022-04-06 PROCEDURE — 3044F HG A1C LEVEL LT 7.0%: CPT | Mod: CPTII,S$GLB,, | Performed by: PHYSICIAN ASSISTANT

## 2022-04-06 PROCEDURE — 99999 PR PBB SHADOW E&M-EST. PATIENT-LVL V: ICD-10-PCS | Mod: PBBFAC,,, | Performed by: PHYSICIAN ASSISTANT

## 2022-04-06 PROCEDURE — 82306 VITAMIN D 25 HYDROXY: CPT | Performed by: PHYSICIAN ASSISTANT

## 2022-04-06 PROCEDURE — 85025 COMPLETE CBC W/AUTO DIFF WBC: CPT | Performed by: PHYSICIAN ASSISTANT

## 2022-04-06 PROCEDURE — 3077F PR MOST RECENT SYSTOLIC BLOOD PRESSURE >= 140 MM HG: ICD-10-PCS | Mod: CPTII,S$GLB,, | Performed by: PHYSICIAN ASSISTANT

## 2022-04-06 PROCEDURE — 1160F RVW MEDS BY RX/DR IN RCRD: CPT | Mod: CPTII,S$GLB,, | Performed by: PHYSICIAN ASSISTANT

## 2022-04-06 PROCEDURE — 99999 PR PBB SHADOW E&M-EST. PATIENT-LVL V: CPT | Mod: PBBFAC,,, | Performed by: PHYSICIAN ASSISTANT

## 2022-04-06 PROCEDURE — 3079F PR MOST RECENT DIASTOLIC BLOOD PRESSURE 80-89 MM HG: ICD-10-PCS | Mod: CPTII,S$GLB,, | Performed by: PHYSICIAN ASSISTANT

## 2022-04-06 PROCEDURE — 1160F PR REVIEW ALL MEDS BY PRESCRIBER/CLIN PHARMACIST DOCUMENTED: ICD-10-PCS | Mod: CPTII,S$GLB,, | Performed by: PHYSICIAN ASSISTANT

## 2022-04-06 PROCEDURE — 36415 COLL VENOUS BLD VENIPUNCTURE: CPT | Performed by: PHYSICIAN ASSISTANT

## 2022-04-06 PROCEDURE — 80053 COMPREHEN METABOLIC PANEL: CPT | Performed by: PHYSICIAN ASSISTANT

## 2022-04-06 PROCEDURE — 3072F LOW RISK FOR RETINOPATHY: CPT | Mod: CPTII,S$GLB,, | Performed by: PHYSICIAN ASSISTANT

## 2022-04-06 PROCEDURE — 99213 PR OFFICE/OUTPT VISIT, EST, LEVL III, 20-29 MIN: ICD-10-PCS | Mod: S$GLB,,, | Performed by: PHYSICIAN ASSISTANT

## 2022-04-06 RX ORDER — FAMOTIDINE 20 MG/1
20 TABLET, FILM COATED ORAL 2 TIMES DAILY
Qty: 60 TABLET | Refills: 6 | Status: SHIPPED | OUTPATIENT
Start: 2022-04-06 | End: 2022-10-14 | Stop reason: SDUPTHER

## 2022-04-06 RX ORDER — ONDANSETRON 8 MG/1
8 TABLET, ORALLY DISINTEGRATING ORAL EVERY 6 HOURS PRN
Qty: 30 TABLET | Refills: 0 | Status: SHIPPED | OUTPATIENT
Start: 2022-04-06 | End: 2023-08-01

## 2022-04-06 NOTE — PATIENT INSTRUCTIONS
"Snacks: (100-200 calories; >5g protein)    - 1 low-fat cheese stick with 8 cherry tomatoes or 1 serving fresh fruit  - 4 thin slices fat-free turkey breast and 1 slice low-fat cheese  - 4 thin slices fat-free honey ham with wedge of melon  - 2 slices of turkey alvarez  - Boiled eggs (can buy at [employer] already boiled w/ shell removed)  - for convenience,  [place] read, snack, go (deli meat and cheese rolls)  - P3 packets (Protein packs w/ cheese, nuts, lean deli meat)  - MHP Fit and Lean Protein Pudding (find at [employer] - per 1 cup serving = 100 calories, 15 g protein, 0 g sugar)  - 6-8 edamame pods (equivalent to about 1/4 cup edamame without pods).   - 1/4 cup unsalted nuts with ½ cup fruit  - 6-oz container Dannon Light n Fit vanilla yogurt, topped with 1oz unsalted nuts         - apple, celery or baby carrots spread with 2 Tbsp PB2  - apple slices with 1 oz slice low-fat cheese  - Apple slices dipped in 2 Tbsp of PB2  - 2 Tbsp PB2 mixed in light or greek yogurt or sugar-free pudding  - celery, cucumber, bell pepper or baby carrots dipped in ¼ cup hummus bean spread   - celery, cucumber, baby carrots dipped in high protein greek yogurt (Mix 16 oz plain greek yogurt + 1 packet of hidden valley ranch dip mix)  - Torres Links Beef Steak - 14g protein! (similar to beef jerky but very lean)  - 2 wedges Laughing Cow - Light Herb & Garlic Cheese with sliced cucumber or green bell pepper  - 1/2 cup low-fat cottage cheese with ¼ cup fruit or ¼ cup salsa  - 1/2 cup low fat cottage cheese with 10-15 cherry tomatoes  - 8 oz glass of FAIRLIFE fat free milk (13 g protein)  - 8 oz glass of FAIRLIFE fat free milk + 1 packet of sugar-free hot cocoa  - Add Atkins advantage Cafe Caramel shake to decaf coffee. Serve hot or blend with ice for "frappaccino" like drink  - RTD Protein drinks: Atkins, Low Carb Slim Fast, EAS light, Muscle Milk Light, etc.  - Homemade Protein drinks: GNC Soy95, Isopure, Nectar, UNJURY, Whey " Gourmet, etc. Mix 1 scoop powder with 8oz skim/1% milk or light soymilk.  - Protein bars: Atkins, EAS, Pure Protein,  Quest, Think Thin, Detour, etc. Must have 0-4 grams sugar - Read the label.    ** Be CREATIVE. You can always snack on bites of grilled chicken or tuna salad made with low fat haque, if needed!

## 2022-04-06 NOTE — PROGRESS NOTES
BARIATRIC POST-OPERATIVE VISIT:    HPI:  Tanika Wayne is a 62 y.o. year old female presents for 6 post op visit following s/p sleeve.  she is doing well and tolerating the diet without difficulty.  she has no complaints.      Pt states that the nausea still occurs, states that it is intermittent, mostly associated with pork and beef    Would like to have skin removal, states that she is getting the rashes underneath her abdominal pannus. States that her PCP gave her something which helps control.     Also having diarrhea but attributes it to her metformin seeing provider on Wednesday        Denies: abdominal pain, changes in bowel movement pattern, fever, chills, dysphagia, chest pain, and shortness of breath.    Review of Systems   Constitutional: Positive for fatigue (returned since decreased protein intake). Negative for fever.   HENT: Negative for tinnitus and trouble swallowing. Sneezing: zofran.    Eyes: Negative for visual disturbance.   Respiratory: Negative for cough and shortness of breath.    Cardiovascular: Negative for chest pain, palpitations and leg swelling.   Gastrointestinal: Positive for nausea. Negative for abdominal pain, constipation, diarrhea (improving) and vomiting.   Genitourinary: Negative for decreased urine volume.   Musculoskeletal: Negative for myalgias.   Skin: Negative for rash.   Neurological: Negative for dizziness, light-headedness and headaches.   Psychiatric/Behavioral: Negative for suicidal ideas. The patient is not nervous/anxious.        EXERCISE & VITAMINS:  See Bariatric Assessment  Adherent to vitamin regimen   MEDICATIONS/ALLERGIES:  Have been reviewed.    DIET:   See Dietician note from today for a more detailed assessment.    Am: fruit   Lunch: kind bar sandwich with veggie chips and gingerale   Dinner: sometime light     Physical Exam  Vitals reviewed.   Constitutional:       Appearance: She is obese.   HENT:      Head: Normocephalic and atraumatic.   Eyes:       Extraocular Movements: Extraocular movements intact.      Conjunctiva/sclera: Conjunctivae normal.      Pupils: Pupils are equal, round, and reactive to light.   Cardiovascular:      Rate and Rhythm: Normal rate and regular rhythm.      Pulses: Normal pulses.      Heart sounds: Normal heart sounds.   Pulmonary:      Effort: Pulmonary effort is normal. No respiratory distress.      Breath sounds: Normal breath sounds.   Abdominal:      General: Bowel sounds are normal. There is no distension.      Palpations: Abdomen is soft.      Tenderness: There is no abdominal tenderness.   Musculoskeletal:         General: Normal range of motion.      Cervical back: Normal range of motion and neck supple.   Skin:     Capillary Refill: Capillary refill takes less than 2 seconds.   Neurological:      General: No focal deficit present.      Mental Status: She is alert and oriented to person, place, and time.   Psychiatric:         Mood and Affect: Mood normal.         Behavior: Behavior normal.         Thought Content: Thought content normal.         Judgment: Judgment normal.         ASSESSMENT:  - Morbid obesity s/p sleeve gastrectomy.  - Co-morbidities: diabetes mellitus, dyslipidemia and hypertension  - Good Weight loss, 15#'s  - No formal Exercise routine  - Fair Diet  - Good Vitamin regimen    PLAN:  - increase protein   - Zofran for nausea  - No lifting more than 10 lbs for 6 weeks  - Miralax daily for constipation  - Emphasized the importance of regular exercise and adherence to bariatric diet to achieve maximum weight loss.  - Encouraged patient to start/continue regular exercise.  - Follow-up with dietician to advance diet.  - Continue daily vitamins and medications.  - RTC per post op schedule  - Call the office for any issues.  - Check labs today.

## 2022-04-07 ENCOUNTER — PATIENT MESSAGE (OUTPATIENT)
Dept: BARIATRICS | Facility: CLINIC | Age: 62
End: 2022-04-07
Payer: MEDICARE

## 2022-04-12 ENCOUNTER — PATIENT MESSAGE (OUTPATIENT)
Dept: BARIATRICS | Facility: CLINIC | Age: 62
End: 2022-04-12
Payer: MEDICARE

## 2022-04-12 LAB — VIT B1 BLD-MCNC: 103 UG/L (ref 38–122)

## 2022-04-27 RX ORDER — VENLAFAXINE 100 MG/1
TABLET ORAL
Qty: 180 TABLET | Refills: 3 | Status: SHIPPED | OUTPATIENT
Start: 2022-04-27 | End: 2023-01-15

## 2022-04-27 NOTE — TELEPHONE ENCOUNTER
Refill Routing Note   Medication(s) are not appropriate for processing by [facility] for the following reason(s):      - Required vitals are abnormal    [facility] action(s):  Defer Medication-related problems identified: Requires labs        Medication reconciliation completed: No     Appointments  past 12m or future 3m with PCP    Date Provider   Last Visit   1/19/2022 Rayo Hernandez MD   Next Visit   7/18/2022 Rayo Hernandez MD   ED visits in past 90 days: 0        Note composed:11:43 AM 04/27/2022

## 2022-04-27 NOTE — TELEPHONE ENCOUNTER
Care Due:                  Date            Visit Type   Department     Provider  --------------------------------------------------------------------------------                                EP -                              PRIMARY      [facility] INTERNAL  Last Visit: 01-      CARE ([facility])   MEDICINE       Rayo Hernandez                               -                              PRIMARY      [facility] INTERNAL  Next Visit: 07-      CARE ([facility])   MEDICINE       Rayo Hernandez                                                            Last  Test          Frequency    Reason                     Performed    Due Date  --------------------------------------------------------------------------------    HBA1C.......  6 months...  metFORMIN................  01- 07-    TSH.........  12 months..  levothyroxine............  11- 11-    Powered by [employer]. Reference number: [id number].   4/27/2022 12:14:45 AM CDT

## 2022-04-28 ENCOUNTER — PATIENT MESSAGE (OUTPATIENT)
Dept: INTERNAL MEDICINE | Facility: CLINIC | Age: 62
End: 2022-04-28
Payer: MEDICARE

## 2022-04-28 ENCOUNTER — PATIENT MESSAGE (OUTPATIENT)
Dept: PHYSICAL MEDICINE AND REHAB | Facility: CLINIC | Age: 62
End: 2022-04-28
Payer: MEDICARE

## 2022-04-28 DIAGNOSIS — M43.16 SPONDYLOLISTHESIS AT L3-L4 LEVEL: ICD-10-CM

## 2022-04-28 DIAGNOSIS — M54.9 BACK PAIN, UNSPECIFIED BACK LOCATION, UNSPECIFIED BACK PAIN LATERALITY, UNSPECIFIED CHRONICITY: ICD-10-CM

## 2022-04-28 DIAGNOSIS — G89.29 CHRONIC BILATERAL LOW BACK PAIN WITH RIGHT-SIDED SCIATICA: ICD-10-CM

## 2022-04-28 DIAGNOSIS — M48.062 SPINAL STENOSIS OF LUMBAR REGION WITH NEUROGENIC CLAUDICATION: ICD-10-CM

## 2022-04-28 DIAGNOSIS — Z96.641 STATUS POST RIGHT HIP REPLACEMENT: ICD-10-CM

## 2022-04-28 DIAGNOSIS — F11.90 UNCOMPLICATED OPIOID USE: ICD-10-CM

## 2022-04-28 DIAGNOSIS — M54.41 CHRONIC BILATERAL LOW BACK PAIN WITH RIGHT-SIDED SCIATICA: ICD-10-CM

## 2022-04-28 DIAGNOSIS — Z96.651 STATUS POST TOTAL RIGHT KNEE REPLACEMENT: ICD-10-CM

## 2022-04-29 DIAGNOSIS — G47.09 OTHER INSOMNIA: ICD-10-CM

## 2022-04-29 RX ORDER — OXYCODONE AND ACETAMINOPHEN 5; 325 MG/1; MG/1
1 TABLET ORAL EVERY 12 HOURS PRN
Qty: 60 TABLET | Refills: 0 | Status: SHIPPED | OUTPATIENT
Start: 2022-04-29 | End: 2022-06-06 | Stop reason: SDUPTHER

## 2022-04-29 NOTE — TELEPHONE ENCOUNTER
No new care gaps identified.  Powered by [employer]. Reference number: [id number].   4/29/2022 12:56:42 PM CDT

## 2022-04-30 NOTE — TELEPHONE ENCOUNTER
Refill Routing Note   Medication(s) are not appropriate for processing by [facility] for the following reason(s):      - Indication is outside of scope for [facility]    [facility] action(s):  Route          Medication reconciliation completed: No     Appointments  past 12m or future 3m with PCP    Date Provider   Last Visit   1/19/2022 Rayo Hernandez MD   Next Visit   7/18/2022 Rayo Hernandez MD   ED visits in past 90 days: 0        Note composed:9:12 PM 04/29/2022

## 2022-05-01 RX ORDER — TRAZODONE HYDROCHLORIDE 50 MG/1
TABLET ORAL
Qty: 90 TABLET | Refills: 1 | Status: SHIPPED | OUTPATIENT
Start: 2022-05-01 | End: 2022-10-04

## 2022-05-02 ENCOUNTER — OFFICE VISIT (OUTPATIENT)
Dept: OPTOMETRY | Facility: CLINIC | Age: 62
End: 2022-05-02
Payer: MEDICARE

## 2022-05-02 DIAGNOSIS — Z96.1 PSEUDOPHAKIA OF BOTH EYES: ICD-10-CM

## 2022-05-02 DIAGNOSIS — H52.4 MYOPIA WITH ASTIGMATISM AND PRESBYOPIA, BILATERAL: ICD-10-CM

## 2022-05-02 DIAGNOSIS — Z87.828 HISTORY OF EYE INJURY: ICD-10-CM

## 2022-05-02 DIAGNOSIS — E11.21 TYPE 2 DIABETES MELLITUS WITH DIABETIC NEPHROPATHY, WITHOUT LONG-TERM CURRENT USE OF INSULIN: ICD-10-CM

## 2022-05-02 DIAGNOSIS — E11.9 TYPE 2 DIABETES MELLITUS WITHOUT RETINOPATHY: Primary | ICD-10-CM

## 2022-05-02 DIAGNOSIS — H52.203 MYOPIA WITH ASTIGMATISM AND PRESBYOPIA, BILATERAL: ICD-10-CM

## 2022-05-02 DIAGNOSIS — H04.123 DRY EYE SYNDROME OF BOTH EYES: ICD-10-CM

## 2022-05-02 DIAGNOSIS — H52.13 MYOPIA WITH ASTIGMATISM AND PRESBYOPIA, BILATERAL: ICD-10-CM

## 2022-05-02 PROCEDURE — 92014 COMPRE OPH EXAM EST PT 1/>: CPT | Mod: S$GLB,,, | Performed by: OPTOMETRIST

## 2022-05-02 PROCEDURE — 92015 PR REFRACTION: ICD-10-PCS | Mod: S$GLB,,, | Performed by: OPTOMETRIST

## 2022-05-02 PROCEDURE — 99999 PR PBB SHADOW E&M-EST. PATIENT-LVL II: CPT | Mod: PBBFAC,,, | Performed by: OPTOMETRIST

## 2022-05-02 PROCEDURE — 1159F MED LIST DOCD IN RCRD: CPT | Mod: CPTII,S$GLB,, | Performed by: OPTOMETRIST

## 2022-05-02 PROCEDURE — 3044F PR MOST RECENT HEMOGLOBIN A1C LEVEL <7.0%: ICD-10-PCS | Mod: CPTII,S$GLB,, | Performed by: OPTOMETRIST

## 2022-05-02 PROCEDURE — 92015 DETERMINE REFRACTIVE STATE: CPT | Mod: S$GLB,,, | Performed by: OPTOMETRIST

## 2022-05-02 PROCEDURE — 3044F HG A1C LEVEL LT 7.0%: CPT | Mod: CPTII,S$GLB,, | Performed by: OPTOMETRIST

## 2022-05-02 PROCEDURE — 92014 PR EYE EXAM, EST PATIENT,COMPREHESV: ICD-10-PCS | Mod: S$GLB,,, | Performed by: OPTOMETRIST

## 2022-05-02 PROCEDURE — 1159F PR MEDICATION LIST DOCUMENTED IN MEDICAL RECORD: ICD-10-PCS | Mod: CPTII,S$GLB,, | Performed by: OPTOMETRIST

## 2022-05-02 PROCEDURE — 2023F PR DILATED RETINAL EXAM W/O EVID OF RETINOPATHY: ICD-10-PCS | Mod: CPTII,S$GLB,, | Performed by: OPTOMETRIST

## 2022-05-02 PROCEDURE — 2023F DILAT RTA XM W/O RTNOPTHY: CPT | Mod: CPTII,S$GLB,, | Performed by: OPTOMETRIST

## 2022-05-02 PROCEDURE — 99999 PR PBB SHADOW E&M-EST. PATIENT-LVL II: ICD-10-PCS | Mod: PBBFAC,,, | Performed by: OPTOMETRIST

## 2022-05-02 NOTE — PROGRESS NOTES
HPI     Dr. Hernandez Est pt here for Diabetic eye exam   DLS- 11/30/2020 Dr. Hernandez     Patient has cataract sx with Dr. Tolliver last year OU. Pt states vision has   been good and stable since Cataract sx ou. Patient is still seeing   floaters OU since before the cataract sx. They have been there for a   while. Glasses are about 1 year old and she does have to take glasses off   to read up close.  Denies pain     (-)Flashes (+)Floaters occasionally OU (longstanding)  (+)Itch, (-)tear, (-)burn, (+)Dryness.  (+) OTC Drops Refresh PRN   (-)Photophobia  (-)Glare    Cataract sx OU Dr. Tolliver 2021   Strabismus 2006 Dr. Cobb   Hx of eye injury OS resulting in Ptosis     FamOhx: None     (+)DM  Hemoglobin A1C       Date                     Value               Ref Range             Status                01/19/2022               5.7 (H)             4.0 - 5.6 %           Final                  05/26/2021               6.1 (H)             4.0 - 5.6 %           Final                  11/18/2020               7.3 (H)             4.0 - 5.6 %           Final                Last edited by Apoorva Hoff, OD on 5/2/2022  3:49 PM. (History)            Assessment /Plan     For exam results, see Encounter Report.    Type 2 diabetes mellitus without retinopathy    Type 2 diabetes mellitus with diabetic nephropathy, without long-term current use of insulin  -     Ambulatory referral/consult to Optometry    Pseudophakia of both eyes    Dry eye syndrome of both eyes    History of eye injury    Myopia with astigmatism and presbyopia, bilateral      1-2. No retinopathy noted, OU. Continue proper BS control and annual diabetic eye exams. Monitor yearly.     3. PCIOL clear and centered OU. Monitor yearly.     4. Educated pt on findings. Recommend ATs TID-QID for added lubrication and comfort. Monitor.     5. H/o ocular injury resulting in eyelid ptosis OS. Mild optic nerve pallor noted OS as well. VA stable. Okay to monitor yearly unless any  changes noted sooner.     6. Updated SRx. Mild change from habitual. Monitor yearly.       RTC in 1 year for annual DM eye exam or sooner if needed.

## 2022-05-04 ENCOUNTER — HOSPITAL ENCOUNTER (OUTPATIENT)
Dept: RADIOLOGY | Facility: OTHER | Age: 62
Discharge: HOME | End: 2022-05-04
Attending: INTERNAL MEDICINE
Payer: MEDICARE

## 2022-05-04 DIAGNOSIS — Z12.31 ENCOUNTER FOR SCREENING MAMMOGRAM FOR MALIGNANT NEOPLASM OF BREAST: ICD-10-CM

## 2022-05-04 PROCEDURE — 77067 SCR MAMMO BI INCL CAD: CPT | Mod: TC

## 2022-05-04 PROCEDURE — 77067 MAMMO DIGITAL SCREENING BILAT WITH TOMO: ICD-10-PCS | Mod: 26,,, | Performed by: RADIOLOGY

## 2022-05-04 PROCEDURE — 77063 MAMMO DIGITAL SCREENING BILAT WITH TOMO: ICD-10-PCS | Mod: 26,,, | Performed by: RADIOLOGY

## 2022-05-04 PROCEDURE — 77067 SCR MAMMO BI INCL CAD: CPT | Mod: 26,,, | Performed by: RADIOLOGY

## 2022-05-04 PROCEDURE — 77063 BREAST TOMOSYNTHESIS BI: CPT | Mod: 26,,, | Performed by: RADIOLOGY

## 2022-05-05 ENCOUNTER — PATIENT MESSAGE (OUTPATIENT)
Dept: BARIATRICS | Facility: CLINIC | Age: 62
End: 2022-05-05
Payer: MEDICARE

## 2022-05-05 NOTE — TELEPHONE ENCOUNTER
Hi, I would like her to have a urine test soon, prior to her 3/14/19 blood work. I have ordered home collection urine tests for her.   please contact the patient to assist in scheduling    Orders Placed This Encounter    Urine culture    Urinalysis       Thank you, Rayo Hernandez    
yes
Inflammation Suggestive Of Cancer Camouflage Histology Text: There was a dense lymphocytic infiltrate which prevented adequate histologic evaluation of adjacent structures.

## 2022-05-10 ENCOUNTER — PATIENT MESSAGE (OUTPATIENT)
Dept: BARIATRICS | Facility: CLINIC | Age: 62
End: 2022-05-10
Payer: MEDICARE

## 2022-06-06 ENCOUNTER — PATIENT MESSAGE (OUTPATIENT)
Dept: PHYSICAL MEDICINE AND REHAB | Facility: CLINIC | Age: 62
End: 2022-06-06
Payer: MEDICARE

## 2022-06-06 DIAGNOSIS — M43.16 SPONDYLOLISTHESIS AT L3-L4 LEVEL: ICD-10-CM

## 2022-06-06 DIAGNOSIS — G89.29 CHRONIC BILATERAL LOW BACK PAIN WITH RIGHT-SIDED SCIATICA: ICD-10-CM

## 2022-06-06 DIAGNOSIS — Z96.641 STATUS POST RIGHT HIP REPLACEMENT: ICD-10-CM

## 2022-06-06 DIAGNOSIS — F11.90 UNCOMPLICATED OPIOID USE: ICD-10-CM

## 2022-06-06 DIAGNOSIS — M48.062 SPINAL STENOSIS OF LUMBAR REGION WITH NEUROGENIC CLAUDICATION: ICD-10-CM

## 2022-06-06 DIAGNOSIS — M54.41 CHRONIC BILATERAL LOW BACK PAIN WITH RIGHT-SIDED SCIATICA: ICD-10-CM

## 2022-06-06 DIAGNOSIS — Z96.651 STATUS POST TOTAL RIGHT KNEE REPLACEMENT: ICD-10-CM

## 2022-06-06 DIAGNOSIS — M54.9 BACK PAIN, UNSPECIFIED BACK LOCATION, UNSPECIFIED BACK PAIN LATERALITY, UNSPECIFIED CHRONICITY: ICD-10-CM

## 2022-06-07 RX ORDER — OXYCODONE AND ACETAMINOPHEN 5; 325 MG/1; MG/1
1 TABLET ORAL EVERY 12 HOURS PRN
Qty: 60 TABLET | Refills: 0 | Status: SHIPPED | OUTPATIENT
Start: 2022-06-07 | End: 2022-07-06 | Stop reason: SDUPTHER

## 2022-06-10 ENCOUNTER — PATIENT MESSAGE (OUTPATIENT)
Dept: BARIATRICS | Facility: CLINIC | Age: 62
End: 2022-06-10
Payer: MEDICARE

## 2022-06-14 ENCOUNTER — PATIENT MESSAGE (OUTPATIENT)
Dept: BARIATRICS | Facility: CLINIC | Age: 62
End: 2022-06-14
Payer: MEDICARE

## 2022-07-05 ENCOUNTER — PATIENT MESSAGE (OUTPATIENT)
Dept: BARIATRICS | Facility: CLINIC | Age: 62
End: 2022-07-05
Payer: MEDICARE

## 2022-07-06 ENCOUNTER — PATIENT MESSAGE (OUTPATIENT)
Dept: PHYSICAL MEDICINE AND REHAB | Facility: CLINIC | Age: 62
End: 2022-07-06
Payer: MEDICARE

## 2022-07-06 DIAGNOSIS — M43.16 SPONDYLOLISTHESIS AT L3-L4 LEVEL: ICD-10-CM

## 2022-07-06 DIAGNOSIS — M48.062 SPINAL STENOSIS OF LUMBAR REGION WITH NEUROGENIC CLAUDICATION: ICD-10-CM

## 2022-07-06 DIAGNOSIS — M54.41 CHRONIC BILATERAL LOW BACK PAIN WITH RIGHT-SIDED SCIATICA: ICD-10-CM

## 2022-07-06 DIAGNOSIS — Z96.641 STATUS POST RIGHT HIP REPLACEMENT: ICD-10-CM

## 2022-07-06 DIAGNOSIS — M54.9 BACK PAIN, UNSPECIFIED BACK LOCATION, UNSPECIFIED BACK PAIN LATERALITY, UNSPECIFIED CHRONICITY: ICD-10-CM

## 2022-07-06 DIAGNOSIS — Z96.651 STATUS POST TOTAL RIGHT KNEE REPLACEMENT: ICD-10-CM

## 2022-07-06 DIAGNOSIS — F11.90 UNCOMPLICATED OPIOID USE: ICD-10-CM

## 2022-07-06 DIAGNOSIS — G89.29 CHRONIC BILATERAL LOW BACK PAIN WITH RIGHT-SIDED SCIATICA: ICD-10-CM

## 2022-07-06 DIAGNOSIS — E11.9 TYPE 2 DIABETES MELLITUS WITHOUT COMPLICATION: ICD-10-CM

## 2022-07-08 RX ORDER — OXYCODONE AND ACETAMINOPHEN 5; 325 MG/1; MG/1
1 TABLET ORAL EVERY 12 HOURS PRN
Qty: 60 TABLET | Refills: 0 | Status: SHIPPED | OUTPATIENT
Start: 2022-07-08 | End: 2022-08-09 | Stop reason: SDUPTHER

## 2022-07-20 ENCOUNTER — OFFICE VISIT (OUTPATIENT)
Dept: INTERNAL MEDICINE | Facility: CLINIC | Age: 62
End: 2022-07-20
Payer: MEDICARE

## 2022-07-20 ENCOUNTER — LAB VISIT (OUTPATIENT)
Dept: LAB | Facility: HOSPITAL | Age: 62
End: 2022-07-20
Attending: INTERNAL MEDICINE
Payer: MEDICARE

## 2022-07-20 ENCOUNTER — IMMUNIZATION (OUTPATIENT)
Dept: INTERNAL MEDICINE | Facility: CLINIC | Age: 62
End: 2022-07-20
Payer: MEDICARE

## 2022-07-20 VITALS
DIASTOLIC BLOOD PRESSURE: 86 MMHG | HEART RATE: 89 BPM | WEIGHT: 193.31 LBS | HEIGHT: 61 IN | BODY MASS INDEX: 36.5 KG/M2 | SYSTOLIC BLOOD PRESSURE: 132 MMHG

## 2022-07-20 DIAGNOSIS — Z90.81 HISTORY OF PARTIAL SPLENECTOMY: ICD-10-CM

## 2022-07-20 DIAGNOSIS — Z23 NEED FOR VACCINATION: Primary | ICD-10-CM

## 2022-07-20 DIAGNOSIS — E11.21 TYPE 2 DIABETES MELLITUS WITH DIABETIC NEPHROPATHY, WITHOUT LONG-TERM CURRENT USE OF INSULIN: ICD-10-CM

## 2022-07-20 DIAGNOSIS — Z12.11 COLON CANCER SCREENING: Primary | ICD-10-CM

## 2022-07-20 DIAGNOSIS — R21 RASH: ICD-10-CM

## 2022-07-20 DIAGNOSIS — E89.0 HISTORY OF SUBTOTAL THYROIDECTOMY: ICD-10-CM

## 2022-07-20 DIAGNOSIS — E11.9 TYPE 2 DIABETES MELLITUS WITHOUT COMPLICATION: ICD-10-CM

## 2022-07-20 PROCEDURE — 82570 ASSAY OF URINE CREATININE: CPT | Performed by: INTERNAL MEDICINE

## 2022-07-20 PROCEDURE — 3079F DIAST BP 80-89 MM HG: CPT | Mod: CPTII,S$GLB,, | Performed by: INTERNAL MEDICINE

## 2022-07-20 PROCEDURE — 3079F PR MOST RECENT DIASTOLIC BLOOD PRESSURE 80-89 MM HG: ICD-10-PCS | Mod: CPTII,S$GLB,, | Performed by: INTERNAL MEDICINE

## 2022-07-20 PROCEDURE — 3008F PR BODY MASS INDEX (BMI) DOCUMENTED: ICD-10-PCS | Mod: CPTII,S$GLB,, | Performed by: INTERNAL MEDICINE

## 2022-07-20 PROCEDURE — 0054A COVID-19, MRNA, LNP-S, PF, 30 MCG/0.3 ML DOSE VACCINE (PFIZER): CPT | Mod: CV19,PBBFAC | Performed by: INTERNAL MEDICINE

## 2022-07-20 PROCEDURE — 99999 PR PBB SHADOW E&M-EST. PATIENT-LVL V: ICD-10-PCS | Mod: PBBFAC,,, | Performed by: INTERNAL MEDICINE

## 2022-07-20 PROCEDURE — 3072F PR LOW RISK FOR RETINOPATHY: ICD-10-PCS | Mod: CPTII,S$GLB,, | Performed by: INTERNAL MEDICINE

## 2022-07-20 PROCEDURE — 1160F PR REVIEW ALL MEDS BY PRESCRIBER/CLIN PHARMACIST DOCUMENTED: ICD-10-PCS | Mod: CPTII,S$GLB,, | Performed by: INTERNAL MEDICINE

## 2022-07-20 PROCEDURE — 1159F PR MEDICATION LIST DOCUMENTED IN MEDICAL RECORD: ICD-10-PCS | Mod: CPTII,S$GLB,, | Performed by: INTERNAL MEDICINE

## 2022-07-20 PROCEDURE — 3075F PR MOST RECENT SYSTOLIC BLOOD PRESS GE 130-139MM HG: ICD-10-PCS | Mod: CPTII,S$GLB,, | Performed by: INTERNAL MEDICINE

## 2022-07-20 PROCEDURE — 3072F LOW RISK FOR RETINOPATHY: CPT | Mod: CPTII,S$GLB,, | Performed by: INTERNAL MEDICINE

## 2022-07-20 PROCEDURE — 99999 PR PBB SHADOW E&M-EST. PATIENT-LVL V: CPT | Mod: PBBFAC,,, | Performed by: INTERNAL MEDICINE

## 2022-07-20 PROCEDURE — 3008F BODY MASS INDEX DOCD: CPT | Mod: CPTII,S$GLB,, | Performed by: INTERNAL MEDICINE

## 2022-07-20 PROCEDURE — 82043 UR ALBUMIN QUANTITATIVE: CPT | Performed by: INTERNAL MEDICINE

## 2022-07-20 PROCEDURE — 99214 OFFICE O/P EST MOD 30 MIN: CPT | Mod: S$GLB,,, | Performed by: INTERNAL MEDICINE

## 2022-07-20 PROCEDURE — 1159F MED LIST DOCD IN RCRD: CPT | Mod: CPTII,S$GLB,, | Performed by: INTERNAL MEDICINE

## 2022-07-20 PROCEDURE — 3044F PR MOST RECENT HEMOGLOBIN A1C LEVEL <7.0%: ICD-10-PCS | Mod: CPTII,S$GLB,, | Performed by: INTERNAL MEDICINE

## 2022-07-20 PROCEDURE — 1160F RVW MEDS BY RX/DR IN RCRD: CPT | Mod: CPTII,S$GLB,, | Performed by: INTERNAL MEDICINE

## 2022-07-20 PROCEDURE — 3075F SYST BP GE 130 - 139MM HG: CPT | Mod: CPTII,S$GLB,, | Performed by: INTERNAL MEDICINE

## 2022-07-20 PROCEDURE — 3044F HG A1C LEVEL LT 7.0%: CPT | Mod: CPTII,S$GLB,, | Performed by: INTERNAL MEDICINE

## 2022-07-20 PROCEDURE — 99214 PR OFFICE/OUTPT VISIT, EST, LEVL IV, 30-39 MIN: ICD-10-PCS | Mod: S$GLB,,, | Performed by: INTERNAL MEDICINE

## 2022-07-20 RX ORDER — CLOTRIMAZOLE AND BETAMETHASONE DIPROPIONATE 10; .64 MG/G; MG/G
CREAM TOPICAL 2 TIMES DAILY PRN
Qty: 45 G | Refills: 1 | Status: SHIPPED | OUTPATIENT
Start: 2022-07-20 | End: 2023-05-09 | Stop reason: SDUPTHER

## 2022-07-20 NOTE — PATIENT INSTRUCTIONS
You should hear from the gastro dept about scheduling sometime in the next 7-10 days, you can also call on your own to schedule -- [phone].

## 2022-07-20 NOTE — PROGRESS NOTES
Subjective:       Patient ID: Tanika Wayne is a 62 y.o. female.    Chief Complaint: Follow-up    Patient is here for followup for chronic conditions.    Ran out of ointment for intertrigo, would like RF.    Occasional numbness in both feet has moved up to ankle. Not very painful.    No new other health issues.    Recently lost her sister who was like a mother to her.    Review of Systems   Constitutional: Negative for fatigue, fever and unexpected weight change.   HENT: Negative for congestion, facial swelling, hearing loss and sore throat.    Eyes: Negative for visual disturbance.   Respiratory: Negative for cough, shortness of breath and wheezing.    Cardiovascular: Negative for chest pain and palpitations.   Gastrointestinal: Negative for abdominal distention, abdominal pain, blood in stool, diarrhea, nausea and vomiting.   Genitourinary: Negative for difficulty urinating, dysuria, frequency and hematuria.   Musculoskeletal: Positive for arthralgias and gait problem. Negative for joint swelling.   Skin: Negative for color change and rash.   Neurological: Negative for dizziness, syncope, weakness and headaches.   Hematological: Negative for adenopathy.   Psychiatric/Behavioral: Positive for sleep disturbance. Negative for confusion, decreased concentration and suicidal ideas. The patient is nervous/anxious.            Objective:      Physical Exam  Vitals reviewed.   Constitutional:       General: She is not in acute distress.     Appearance: Normal appearance. She is well-developed. She is obese. She is not ill-appearing, toxic-appearing or diaphoretic.   HENT:      Head: Normocephalic and atraumatic.      Mouth/Throat:      Pharynx: No oropharyngeal exudate.   Eyes:      General: No scleral icterus.     Pupils: Pupils are equal, round, and reactive to light.   Neck:      Thyroid: No thyromegaly.      Comments: Thyroid surg scar present  Cardiovascular:      Rate and Rhythm: Normal rate and regular rhythm.       Heart sounds: Normal heart sounds. No murmur heard.    No friction rub. No gallop.   Pulmonary:      Effort: Pulmonary effort is normal. No respiratory distress.      Breath sounds: Normal breath sounds. No wheezing or rales.   Abdominal:      General: Bowel sounds are normal. There is no distension.      Palpations: Abdomen is soft. There is no mass.      Tenderness: There is no abdominal tenderness. There is no guarding or rebound.   Musculoskeletal:         General: No tenderness. Normal range of motion.      Cervical back: Normal range of motion and neck supple.      Comments: Diff rising to exam table, able to rise with walker assistance/support   Lymphadenopathy:      Cervical: No cervical adenopathy.   Skin:     Comments: Hyperpigmented skin under abd pannus, not severe signs of infection, no abscess seen   Neurological:      General: No focal deficit present.      Mental Status: She is alert and oriented to person, place, and time.   Psychiatric:         Mood and Affect: Mood normal.         Speech: Speech normal.         Behavior: Behavior normal.         Thought Content: Thought content normal.         Judgment: Judgment normal.         Assessment:       1. Colon cancer screening    2. Type 2 diabetes mellitus with diabetic nephropathy, without long-term current use of insulin    3. History of partial splenectomy    4. Rash    5. History of subtotal thyroidectomy        Plan:       Tanika was seen today for follow-up.    Diagnoses and all orders for this visit:    Colon cancer screening  -     Case Request Endoscopy: COLONOSCOPY    Type 2 diabetes mellitus with diabetic nephropathy, without long-term current use of insulin  -     Hemoglobin A1C; Future    History of partial splenectomy  -     Ambulatory referral/consult to Infectious Disease; Future  -     (In Office Administered) Meningococcal Conjugate - MCV4P (MENACTRA); Future    Rash  -     clotrimazole-betamethasone 1-0.05% (LOTRISONE) cream;  Apply topically 2 (two) times daily as needed.    History of subtotal thyroidectomy  -     TSH; Future        Health Maintenance       Date Due Completion Date    Diabetes Urine Screening 08/18/2017 8/18/2016    HIV Screening 11/10/2022 (Originally 1/1/1975) ---    Influenza Vaccine (1) 09/01/2022 10/29/2021    Colorectal Cancer Screening 10/25/2022 10/25/2017    Foot Exam 11/10/2022 11/10/2021 (Done)    Override on 11/10/2021: Done    Override on 9/14/2020: Done    Hemoglobin A1c 01/20/2023 7/20/2022    Lipid Panel 04/06/2023 4/6/2022    Eye Exam 05/02/2023 5/2/2022    Mammogram 05/04/2023 5/4/2022    High Dose Statin 07/20/2023 7/20/2022    Pneumococcal Vaccines (Age 0-64) (3 - PPSV23 or PCV20) 01/01/2026 11/20/2019    TETANUS VACCINE 01/07/2026 1/7/2016      Next time meningo vax #2    Follow up in about 6 months (around 1/20/2023) for Pfizer booster please.    Future Appointments   Date Time Provider Department Center   1/13/2023  2:00 PM Rayo Hernandez MD [facility]

## 2022-07-21 ENCOUNTER — TELEPHONE (OUTPATIENT)
Dept: INTERNAL MEDICINE | Facility: CLINIC | Age: 62
End: 2022-07-21
Payer: MEDICARE

## 2022-07-21 DIAGNOSIS — E89.0 POSTOPERATIVE HYPOTHYROIDISM: ICD-10-CM

## 2022-07-21 DIAGNOSIS — E89.0 HISTORY OF SUBTOTAL THYROIDECTOMY: ICD-10-CM

## 2022-07-21 LAB
ALBUMIN/CREAT UR: 29.4 UG/MG (ref 0–30)
CREAT UR-MCNC: 34 MG/DL (ref 15–325)
MICROALBUMIN UR DL<=1MG/L-MCNC: 10 UG/ML

## 2022-07-21 RX ORDER — LEVOTHYROXINE SODIUM 125 UG/1
125 TABLET ORAL DAILY
Qty: 30 TABLET | Refills: 11 | Status: SHIPPED | OUTPATIENT
Start: 2022-07-21 | End: 2022-09-15

## 2022-07-21 NOTE — TELEPHONE ENCOUNTER
Hi, please call her --  Her thyroid level is too high, which means her thyroid dose is too strong.  I have sent in a lower dose:  Orders Placed This Encounter    TSH    levothyroxine (SYNTHROID) 125 MCG tablet     Also please set her up for a repeat TSH thyroid level in 6 weeks.    Her diabetes is well controlled.    Let me know if patient has any questions.  Thank you, Rayo Hernandez

## 2022-08-02 ENCOUNTER — PATIENT MESSAGE (OUTPATIENT)
Dept: BARIATRICS | Facility: CLINIC | Age: 62
End: 2022-08-02
Payer: MEDICARE

## 2022-08-04 ENCOUNTER — PATIENT MESSAGE (OUTPATIENT)
Dept: BARIATRICS | Facility: CLINIC | Age: 62
End: 2022-08-04
Payer: MEDICARE

## 2022-08-09 ENCOUNTER — PATIENT MESSAGE (OUTPATIENT)
Dept: PHYSICAL MEDICINE AND REHAB | Facility: CLINIC | Age: 62
End: 2022-08-09
Payer: MEDICARE

## 2022-08-09 DIAGNOSIS — M48.062 SPINAL STENOSIS OF LUMBAR REGION WITH NEUROGENIC CLAUDICATION: ICD-10-CM

## 2022-08-09 DIAGNOSIS — M54.9 BACK PAIN, UNSPECIFIED BACK LOCATION, UNSPECIFIED BACK PAIN LATERALITY, UNSPECIFIED CHRONICITY: ICD-10-CM

## 2022-08-09 DIAGNOSIS — M54.41 CHRONIC BILATERAL LOW BACK PAIN WITH RIGHT-SIDED SCIATICA: ICD-10-CM

## 2022-08-09 DIAGNOSIS — M43.16 SPONDYLOLISTHESIS AT L3-L4 LEVEL: ICD-10-CM

## 2022-08-09 DIAGNOSIS — Z96.641 STATUS POST RIGHT HIP REPLACEMENT: ICD-10-CM

## 2022-08-09 DIAGNOSIS — G89.29 CHRONIC BILATERAL LOW BACK PAIN WITH RIGHT-SIDED SCIATICA: ICD-10-CM

## 2022-08-09 DIAGNOSIS — F11.90 UNCOMPLICATED OPIOID USE: ICD-10-CM

## 2022-08-09 DIAGNOSIS — Z96.651 STATUS POST TOTAL RIGHT KNEE REPLACEMENT: ICD-10-CM

## 2022-08-15 RX ORDER — OXYCODONE AND ACETAMINOPHEN 5; 325 MG/1; MG/1
1 TABLET ORAL EVERY 12 HOURS PRN
Qty: 60 TABLET | Refills: 0 | Status: SHIPPED | OUTPATIENT
Start: 2022-08-15 | End: 2022-09-13 | Stop reason: SDUPTHER

## 2022-08-29 ENCOUNTER — TELEPHONE (OUTPATIENT)
Dept: ENDOSCOPY | Facility: HOSPITAL | Age: 62
End: 2022-08-29
Payer: MEDICARE

## 2022-08-29 DIAGNOSIS — Z12.11 SPECIAL SCREENING FOR MALIGNANT NEOPLASMS, COLON: Primary | ICD-10-CM

## 2022-08-29 RX ORDER — SODIUM, POTASSIUM,MAG SULFATES 17.5-3.13G
SOLUTION, RECONSTITUTED, ORAL ORAL
Qty: 1 KIT | Refills: 0 | Status: SHIPPED | OUTPATIENT
Start: 2022-08-29 | End: 2023-01-15

## 2022-09-01 ENCOUNTER — LAB VISIT (OUTPATIENT)
Dept: LAB | Facility: HOSPITAL | Age: 62
End: 2022-09-01
Attending: INTERNAL MEDICINE
Payer: MEDICARE

## 2022-09-01 DIAGNOSIS — E89.0 HISTORY OF SUBTOTAL THYROIDECTOMY: ICD-10-CM

## 2022-09-01 DIAGNOSIS — E89.0 POSTOPERATIVE HYPOTHYROIDISM: ICD-10-CM

## 2022-09-01 LAB
T4 FREE SERPL-MCNC: 1.22 NG/DL (ref 0.71–1.51)
TSH SERPL DL<=0.005 MIU/L-ACNC: <0.01 UIU/ML (ref 0.4–4)

## 2022-09-01 PROCEDURE — 84443 ASSAY THYROID STIM HORMONE: CPT | Performed by: INTERNAL MEDICINE

## 2022-09-01 PROCEDURE — 84439 ASSAY OF FREE THYROXINE: CPT | Performed by: INTERNAL MEDICINE

## 2022-09-01 PROCEDURE — 36415 COLL VENOUS BLD VENIPUNCTURE: CPT | Performed by: INTERNAL MEDICINE

## 2022-09-07 ENCOUNTER — PATIENT MESSAGE (OUTPATIENT)
Dept: BARIATRICS | Facility: CLINIC | Age: 62
End: 2022-09-07
Payer: MEDICARE

## 2022-09-12 ENCOUNTER — PATIENT MESSAGE (OUTPATIENT)
Dept: PHYSICAL MEDICINE AND REHAB | Facility: CLINIC | Age: 62
End: 2022-09-12
Payer: MEDICARE

## 2022-09-13 DIAGNOSIS — Z96.651 STATUS POST TOTAL RIGHT KNEE REPLACEMENT: ICD-10-CM

## 2022-09-13 DIAGNOSIS — M48.062 SPINAL STENOSIS OF LUMBAR REGION WITH NEUROGENIC CLAUDICATION: ICD-10-CM

## 2022-09-13 DIAGNOSIS — G89.29 CHRONIC BILATERAL LOW BACK PAIN WITH RIGHT-SIDED SCIATICA: ICD-10-CM

## 2022-09-13 DIAGNOSIS — M54.9 BACK PAIN, UNSPECIFIED BACK LOCATION, UNSPECIFIED BACK PAIN LATERALITY, UNSPECIFIED CHRONICITY: ICD-10-CM

## 2022-09-13 DIAGNOSIS — M54.41 CHRONIC BILATERAL LOW BACK PAIN WITH RIGHT-SIDED SCIATICA: ICD-10-CM

## 2022-09-13 DIAGNOSIS — M43.16 SPONDYLOLISTHESIS AT L3-L4 LEVEL: ICD-10-CM

## 2022-09-13 DIAGNOSIS — Z96.641 STATUS POST RIGHT HIP REPLACEMENT: ICD-10-CM

## 2022-09-13 DIAGNOSIS — F11.90 UNCOMPLICATED OPIOID USE: ICD-10-CM

## 2022-09-18 RX ORDER — OXYCODONE AND ACETAMINOPHEN 5; 325 MG/1; MG/1
1 TABLET ORAL EVERY 12 HOURS PRN
Qty: 60 TABLET | Refills: 0 | Status: SHIPPED | OUTPATIENT
Start: 2022-09-18 | End: 2022-10-14 | Stop reason: SDUPTHER

## 2022-09-28 ENCOUNTER — ANESTHESIA (OUTPATIENT)
Dept: ENDOSCOPY | Facility: HOSPITAL | Age: 62
End: 2022-09-28
Payer: MEDICARE

## 2022-09-28 ENCOUNTER — HOSPITAL ENCOUNTER (OUTPATIENT)
Facility: HOSPITAL | Age: 62
Discharge: HOME | End: 2022-09-28
Attending: STUDENT IN AN ORGANIZED HEALTH CARE EDUCATION/TRAINING PROGRAM | Admitting: STUDENT IN AN ORGANIZED HEALTH CARE EDUCATION/TRAINING PROGRAM
Payer: MEDICARE

## 2022-09-28 ENCOUNTER — ANESTHESIA EVENT (OUTPATIENT)
Dept: ENDOSCOPY | Facility: HOSPITAL | Age: 62
End: 2022-09-28

## 2022-09-28 VITALS
HEIGHT: 61 IN | OXYGEN SATURATION: 99 % | HEART RATE: 81 BPM | DIASTOLIC BLOOD PRESSURE: 81 MMHG | BODY MASS INDEX: 35.87 KG/M2 | WEIGHT: 190 LBS | RESPIRATION RATE: 16 BRPM | TEMPERATURE: 98 F | SYSTOLIC BLOOD PRESSURE: 167 MMHG

## 2022-09-28 DIAGNOSIS — Z12.11 COLON CANCER SCREENING: Primary | ICD-10-CM

## 2022-09-28 LAB — POCT GLUCOSE: 100 MG/DL (ref 70–110)

## 2022-09-28 PROCEDURE — 45385 COLONOSCOPY W/LESION REMOVAL: CPT | Mod: PT,,, | Performed by: STUDENT IN AN ORGANIZED HEALTH CARE EDUCATION/TRAINING PROGRAM

## 2022-09-28 PROCEDURE — E9220 PRA ENDO ANESTHESIA: HCPCS | Mod: PT,,, | Performed by: NURSE ANESTHETIST, CERTIFIED REGISTERED

## 2022-09-28 PROCEDURE — 37000009 HC ANESTHESIA EA ADD 15 MINS: Performed by: STUDENT IN AN ORGANIZED HEALTH CARE EDUCATION/TRAINING PROGRAM

## 2022-09-28 PROCEDURE — 88341 PR IHC OR ICC EACH ADD'L SINGLE ANTIBODY  STAINPR: ICD-10-PCS | Mod: 26,,, | Performed by: PATHOLOGY

## 2022-09-28 PROCEDURE — 88341 IMHCHEM/IMCYTCHM EA ADD ANTB: CPT | Mod: 26,,, | Performed by: PATHOLOGY

## 2022-09-28 PROCEDURE — 45380 COLONOSCOPY AND BIOPSY: CPT | Mod: PT,59,, | Performed by: STUDENT IN AN ORGANIZED HEALTH CARE EDUCATION/TRAINING PROGRAM

## 2022-09-28 PROCEDURE — 27201012 HC FORCEPS, HOT/COLD, DISP: Performed by: STUDENT IN AN ORGANIZED HEALTH CARE EDUCATION/TRAINING PROGRAM

## 2022-09-28 PROCEDURE — 63600175 PHARM REV CODE 636 W HCPCS: Performed by: NURSE ANESTHETIST, CERTIFIED REGISTERED

## 2022-09-28 PROCEDURE — E9220 PRA ENDO ANESTHESIA: ICD-10-PCS | Mod: PT,,, | Performed by: NURSE ANESTHETIST, CERTIFIED REGISTERED

## 2022-09-28 PROCEDURE — 88342 IMHCHEM/IMCYTCHM 1ST ANTB: CPT | Mod: 26,,, | Performed by: PATHOLOGY

## 2022-09-28 PROCEDURE — 88305 TISSUE EXAM BY PATHOLOGIST: CPT | Mod: 26,,, | Performed by: PATHOLOGY

## 2022-09-28 PROCEDURE — 88342 IMHCHEM/IMCYTCHM 1ST ANTB: CPT | Performed by: PATHOLOGY

## 2022-09-28 PROCEDURE — 45385 PR COLONOSCOPY,REMV LESN,SNARE: ICD-10-PCS | Mod: PT,,, | Performed by: STUDENT IN AN ORGANIZED HEALTH CARE EDUCATION/TRAINING PROGRAM

## 2022-09-28 PROCEDURE — 88341 IMHCHEM/IMCYTCHM EA ADD ANTB: CPT | Mod: 59 | Performed by: PATHOLOGY

## 2022-09-28 PROCEDURE — 37000008 HC ANESTHESIA 1ST 15 MINUTES: Performed by: STUDENT IN AN ORGANIZED HEALTH CARE EDUCATION/TRAINING PROGRAM

## 2022-09-28 PROCEDURE — 88305 TISSUE EXAM BY PATHOLOGIST: ICD-10-PCS | Mod: 26,,, | Performed by: PATHOLOGY

## 2022-09-28 PROCEDURE — 88342 CHG IMMUNOCYTOCHEMISTRY: ICD-10-PCS | Mod: 26,,, | Performed by: PATHOLOGY

## 2022-09-28 PROCEDURE — 45385 COLONOSCOPY W/LESION REMOVAL: CPT | Mod: PT | Performed by: STUDENT IN AN ORGANIZED HEALTH CARE EDUCATION/TRAINING PROGRAM

## 2022-09-28 PROCEDURE — 45380 PR COLONOSCOPY,BIOPSY: ICD-10-PCS | Mod: PT,59,, | Performed by: STUDENT IN AN ORGANIZED HEALTH CARE EDUCATION/TRAINING PROGRAM

## 2022-09-28 PROCEDURE — 88305 TISSUE EXAM BY PATHOLOGIST: CPT | Performed by: PATHOLOGY

## 2022-09-28 PROCEDURE — 25000003 PHARM REV CODE 250: Performed by: NURSE ANESTHETIST, CERTIFIED REGISTERED

## 2022-09-28 PROCEDURE — 45380 COLONOSCOPY AND BIOPSY: CPT | Mod: PT,59 | Performed by: STUDENT IN AN ORGANIZED HEALTH CARE EDUCATION/TRAINING PROGRAM

## 2022-09-28 PROCEDURE — 25000003 PHARM REV CODE 250: Performed by: STUDENT IN AN ORGANIZED HEALTH CARE EDUCATION/TRAINING PROGRAM

## 2022-09-28 PROCEDURE — 27201089 HC SNARE, DISP (ANY): Performed by: STUDENT IN AN ORGANIZED HEALTH CARE EDUCATION/TRAINING PROGRAM

## 2022-09-28 RX ORDER — SODIUM CHLORIDE 9 MG/ML
INJECTION, SOLUTION INTRAVENOUS CONTINUOUS
Status: DISCONTINUED | OUTPATIENT
Start: 2022-09-28 | End: 2022-09-28 | Stop reason: HOSPADM

## 2022-09-28 RX ORDER — ONDANSETRON 2 MG/ML
INJECTION INTRAMUSCULAR; INTRAVENOUS
Status: DISCONTINUED | OUTPATIENT
Start: 2022-09-28 | End: 2022-09-28

## 2022-09-28 RX ORDER — PROPOFOL 10 MG/ML
VIAL (ML) INTRAVENOUS CONTINUOUS PRN
Status: DISCONTINUED | OUTPATIENT
Start: 2022-09-28 | End: 2022-09-28

## 2022-09-28 RX ORDER — PROPOFOL 10 MG/ML
VIAL (ML) INTRAVENOUS
Status: DISCONTINUED | OUTPATIENT
Start: 2022-09-28 | End: 2022-09-28

## 2022-09-28 RX ORDER — LIDOCAINE HYDROCHLORIDE 20 MG/ML
INJECTION INTRAVENOUS
Status: DISCONTINUED | OUTPATIENT
Start: 2022-09-28 | End: 2022-09-28

## 2022-09-28 RX ADMIN — PROPOFOL 70 MG: 10 INJECTION, EMULSION INTRAVENOUS at 09:09

## 2022-09-28 RX ADMIN — LIDOCAINE HYDROCHLORIDE 50 MG: 20 INJECTION INTRAVENOUS at 09:09

## 2022-09-28 RX ADMIN — PROPOFOL 150 MCG/KG/MIN: 10 INJECTION, EMULSION INTRAVENOUS at 09:09

## 2022-09-28 RX ADMIN — SODIUM CHLORIDE: 0.9 INJECTION, SOLUTION INTRAVENOUS at 09:09

## 2022-09-28 RX ADMIN — ONDANSETRON 4 MG: 2 INJECTION INTRAMUSCULAR; INTRAVENOUS at 09:09

## 2022-09-28 NOTE — PROVATION PATIENT INSTRUCTIONS
Discharge Summary/Instructions after an Endoscopic Procedure  Patient Name: Tanika Wayne  Patient MRN: [id number]  Patient YOB: 1960 Wednesday, September 28, 2022  Anoop Escobar MD  Dear patient,  As a result of recent federal legislation (The Federal Cures Act), you may   receive lab or pathology results from your procedure in your [facility]   account before your physician is able to contact you. Your physician or   their representative will relay the results to you with their   recommendations at their soonest availability.  Thank you,  RESTRICTIONS:  During your procedure today, you received medications for sedation.  These   medications may affect your judgment, balance and coordination.  Therefore,   for 24 hours, you have the following restrictions:   - DO NOT drive a car, operate machinery, make legal/financial decisions,   sign important papers or drink alcohol.    ACTIVITY:  Today: no heavy lifting, straining or running due to procedural   sedation/anesthesia.  The following day: return to full activity including work.  DIET:  Eat and drink normally unless instructed otherwise.     TREATMENT FOR COMMON SIDE EFFECTS:  - Mild abdominal pain, nausea, belching, bloating or excessive gas:  rest,   eat lightly and use a heating pad.  - Sore Throat: treat with throat lozenges and/or gargle with warm salt   water.  - Because air was used during the procedure, expelling large amounts of air   from your rectum or belching is normal.  - If a bowel prep was taken, you may not have a bowel movement for 1-3 days.    This is normal.  SYMPTOMS TO WATCH FOR AND REPORT TO YOUR PHYSICIAN:  1. Abdominal pain or bloating, other than gas cramps.  2. Chest pain.  3. Back pain.  4. Signs of infection such as: chills or fever occurring within 24 hours   after the procedure.  5. Rectal bleeding, which would show as bright red, maroon, or black stools.   (A tablespoon of blood from the rectum is not serious,  especially if   hemorrhoids are present.)  6. Vomiting.  7. Weakness or dizziness.  GO DIRECTLY TO THE NEAREST EMERGENCY ROOM IF YOU HAVE ANY OF THE FOLLOWING:      Difficulty breathing              Chills and/or fever over 101 F   Persistent vomiting and/or vomiting blood   Severe abdominal pain   Severe chest pain   Black, tarry stools   Bleeding- more than one tablespoon   Any other symptom or condition that you feel may need urgent attention  Your doctor recommends these additional instructions:  If any biopsies were taken, your doctors clinic will contact you in 1 to 2   weeks with any results.  - Discharge patient to home.   - Await pathology results.   - The findings and recommendations were discussed with the patient's family.     - Patient has a contact number available for emergencies.  The signs and   symptoms of potential delayed complications were discussed with the   patient.  Return to normal activities tomorrow.  Written discharge   instructions were provided to the patient.   - Repeat colonoscopy in 7 years for surveillance.  For questions, problems or results please call your physician - Anoop Escobar MD at Work:  [phone].  [facility], EMERGENCY ROOM PHONE NUMBER: [phone]  IF A COMPLICATION OR EMERGENCY SITUATION ARISES AND YOU ARE UNABLE TO REACH   YOUR PHYSICIAN - GO DIRECTLY TO THE EMERGENCY ROOM.  Anoop Escobar MD  9/28/2022 9:35:50 AM  This report has been verified and signed electronically.  Dear patient,  As a result of recent federal legislation (The Federal Cures Act), you may   receive lab or pathology results from your procedure in your [facility]   account before your physician is able to contact you. Your physician or   their representative will relay the results to you with their   recommendations at their soonest availability.  Thank you,  PROVATION

## 2022-09-28 NOTE — ANESTHESIA PREPROCEDURE EVALUATION
09/28/2022  Tanika Wayne is a 62 y.o., female.  [facility]  Anesthesia Pre-Operative Evaluation         Patient Name: Tanika Wayne  YOB: 1960  MRN: [id number]    SUBJECTIVE:     Pre-operative evaluation for Procedure(s) (LRB):  COLONOSCOPY (N/A)     09/28/2022    Tanika Wayne is a 62 y.o. female w/ a significant PMHx of    Patient Active Problem List   Diagnosis    History of blurred vision - Both Eyes    Kidney stones    Essential hypertension    Type 2 diabetes mellitus with cataract    Abnormal echocardiogram    Thoracic or lumbosacral neuritis or radiculitis    Tympanic membrane perforation    Spondylolisthesis at L3-L4 level    DDD (degenerative disc disease), lumbar    History of subtotal thyroidectomy    Spinal stenosis, lumbar    S/P lumbar laminectomy    Chronic bilateral low back pain with right-sided sciatica    Chronic use of opiate for therapeutic purpose    Uncomplicated opioid use    Severe obesity with body mass index (BMI) of 35.0 to 35.9 and comorbidity    Nuclear sclerosis, bilateral    Aortic calcification    Type 2 diabetes mellitus with renal manifestations    Stage 3 chronic kidney disease, unspecified whether stage 3a or 3b CKD    Type 2 diabetes mellitus with obesity    Recurrent major depressive disorder, in full remission       Review of patient's allergies indicates:   Allergen Reactions    Gabapentin      Bruising, nails turn purple color       Current Inpatient Medications:      Current Facility-Administered Medications on File Prior to Encounter   Medication Dose Route Frequency Provider Last Rate Last Admin    sodium chloride 0.9% flush 10 mL  10 mL Intravenous PRN Klever Tolliver MD        sodium chloride 0.9% flush 10 mL  10 mL Intravenous PRN Klever Tolliver MD        sodium chloride 0.9% flush 3 mL  3  mL Intravenous PRN Baldomero Fischer MD         Current Outpatient Medications on File Prior to Encounter   Medication Sig Dispense Refill    atorvastatin (LIPITOR) 10 MG tablet TAKE 1 TABLET BY MOUTH EVERY DAY 90 tablet 11    calcium carbonate 1250 MG capsule Take 1,250 mg by mouth Daily.      carvediloL (COREG) 3.125 MG tablet TAKE 1 TABLET (3.125 MG TOTAL) BY MOUTH 2 (TWO) TIMES DAILY. 180 tablet 11    clotrimazole (LOTRIMIN) 1 % cream Apply topically 2 (two) times daily. (Patient taking differently: Apply topically 2 (two) times daily as needed.) 113 g 11    clotrimazole-betamethasone 1-0.05% (LOTRISONE) cream Apply topically 2 (two) times daily as needed. 45 g 1    cyanocobalamin (VITAMIN B-12) 100 MCG tablet Take 100 mcg by mouth once daily.      docusate sodium (COLACE) 100 MG capsule TAKE 1 CAPSULE (100 MG TOTAL) BY MOUTH 2 (TWO) TIMES DAILY AS NEEDED FOR CONSTIPATION. 60 capsule 17    famotidine (PEPCID AC) 20 MG tablet Take 1 tablet (20 mg total) by mouth 2 (two) times daily. 60 tablet 6    fluticasone propionate (FLONASE) 50 mcg/actuation nasal spray INSTILL 2 SPRAYS IN EACH NOSTRIL ONCE A DAY 48 mL 11    hydrocodone-acetaminophen (HYCET) solution 7.5-325 mg/15mL Take 15 mLs by mouth 4 (four) times daily as needed for Pain. (Patient not taking: Reported on 7/20/2022) 118 mL 0    meclizine (ANTIVERT) 25 mg tablet Take 1 tablet (25 mg total) by mouth 3 (three) times daily as needed. (Patient not taking: Reported on 7/20/2022) 20 tablet 1    metFORMIN (GLUCOPHAGE-XR) 500 MG ER 24hr tablet Take 1 tablet (500 mg total) by mouth 2 (two) times daily with meals. 180 tablet 11    multivitamin (THERAGRAN) per tablet Take 1 tablet by mouth once daily.      NIFEdipine (PROCARDIA-XL) 90 MG (OSM) 24 hr tablet TAKE 1 TABLET (90 MG TOTAL) BY MOUTH ONCE DAILY. 90 tablet 11    omeprazole (PRILOSEC) 40 MG capsule TAKE 1 CAPSULE BY MOUTH EVERY MORNING, OPEN AND TAKE WITH APPLE SAUCE. (Patient not taking:  Reported on 2022) 30 capsule 1    ondansetron (ZOFRAN-ODT) 8 MG TbDL Dissolve 1 tablet (8 mg total) by mouth every 6 (six) hours as needed. 30 tablet 0    polyethylene glycol (GLYCOLAX) 17 gram/dose powder Mix 1 capful (17 g) with a liquid and take by mouth once daily. 510 g 3    potassium chloride SA (K-DUR,KLOR-CON) 20 MEQ tablet TAKE 2 TABLETS (40 MEQ TOTAL) BY MOUTH 2 (TWO) TIMES DAILY. 360 tablet 11    promethazine (PHENERGAN) 25 MG tablet Take 1 tablet (25 mg total) by mouth every 6 (six) hours as needed for Nausea. (Patient not taking: Reported on 2022) 30 tablet 0    traZODone (DESYREL) 50 MG tablet TAKE 1 TABLET BY MOUTH NIGHTLY AS NEEDED FOR INSOMNIA. 90 tablet 1    venlafaxine (EFFEXOR) 100 MG Tab TAKE 1 TABLET BY MOUTH TWICE A DAY (Patient not taking: Reported on 2022) 180 tablet 3    venlafaxine (EFFEXOR-XR) 150 MG Cp24 Take 1 capsule (150 mg total) by mouth once daily. 90 capsule 11       Past Surgical History:   Procedure Laterality Date    BREAST LUMPECTOMY Right     CATARACT EXTRACTION W/  INTRAOCULAR LENS IMPLANT Left 2021    Procedure: EXTRACTION, CATARACT, WITH IOL INSERTION;  Surgeon: Klever Tolliver MD;  Location: [facility];  Service: Ophthalmology;  Laterality: Left;    CATARACT EXTRACTION W/  INTRAOCULAR LENS IMPLANT Right 2021    Procedure: EXTRACTION, CATARACT, WITH IOL INSERTION;  Surgeon: Klever Tolliver MD;  Location: [facility];  Service: Ophthalmology;  Laterality: Right;     SECTION      CHOLECYSTECTOMY      ESOPHAGOGASTRODUODENOSCOPY N/A 2021    Procedure: EGD (ESOPHAGOGASTRODUODENOSCOPY);  Surgeon: Silvio Singh Jr., MD;  Location: [address];  Service: General;  Laterality: N/A;    EYE SURGERY      strabismus, 2006, bilateral cataracts    HIP ARTHROPLASTY Right 10/18/2018    Procedure: ARTHROPLASTY, HIP;  Surgeon: Baldomero Fischer MD;  Location: [facility];  Service: Orthopedics;  Laterality: Right;    HIP REPLACEMENT  ARTHROPLASTY Right     dr fischer    HYSTERECTOMY      JOINT REPLACEMENT      right BRYNN, TKA    KNEE ARTHROPLASTY Right 4/11/2019    Procedure: ARTHROPLASTY, KNEE (ADD ON );  Surgeon: Baldomero Fischer MD;  [facility];  Service: Orthopedics;  Laterality: Right;  (ADD ON )    L3-5 Laminectomy  02/2017    Dr. Mendoza    LAPAROSCOPIC SLEEVE GASTRECTOMY N/A 6/8/2021    Procedure: GASTRECTOMY, SLEEVE, LAPAROSCOPIC;  Surgeon: Silvio Singh Jr., MD;  Location: [address];  Service: General;  Laterality: N/A;    [facility]PLENECT[facility]Y, TOTAL  2001    [facility]    STRABISMUS SURGERY  8.30.06    OU    THYROIDECTOMY  09/2016    TONSILLECTOMY         Social History     Socioeconomic History    Marital status:     Number of children: 1   Occupational History    Occupation: registration     Comment: [employer]   Tobacco Use    Smoking status: Never    Smokeless tobacco: Never   Substance and Sexual Activity    Alcohol use: No    Drug use: No    Sexual activity: Never   Social History Narrative    2017 - Using a walker    The patient is not getting much exercise but is able to get about with the aid of a cane or walker.     (has copd, arthritis), 2 kids, dtr 27, son 17 (healthy kids)       OBJECTIVE:     Vital Signs Range (Last 24H):         CBC:   No results for input(s): WBC, RBC, HGB, HCT, PLT, MCV, MCH, MCHC in the last 72 hours.    CMP: No results for input(s): NA, K, CL, CO2, BUN, CREATININE, GLU, MG, PHOS, CALCIUM, ALBUMIN, PROT, ALKPHOS, ALT, AST, BILITOT in the last 72 hours.    INR:  No results for input(s): PT, INR, PROTIME, APTT in the last 72 hours.    Diagnostic Studies: No relevant studies.    EKG:   Results for orders placed or performed during the hospital encounter of 11/18/20   EKG    Collection Time: 11/18/20 10:22 AM    Narrative    Test Reason : E66.01,Z68.42,I10,E11.9,M51.36,Z01.811,    Vent. Rate : 108 BPM     Atrial Rate : 108 BPM     P-R Int : 154 ms          QRS Dur : 100  ms      QT Int : 376 ms       P-R-T Axes : 087 075 084 degrees     QTc Int : 503 ms    Sinus tachycardia  Otherwise normal ECG    Confirmed by Vazquez CARRERA, Joaquin ([id number]) on 11/18/2020 4:02:13 PM    Referred By: AUBREY SWANN           Confirmed By:Joaquin Shukla MD        2D ECHO: 5/21   The patient reached the end of the protocol.   During stress, the following significant arrhythmias were observed: rare PVCs.   The ECG portion of this study is negative for myocardial ischemia.   The left ventricle is normal in size with mild concentric hypertrophy and low normal systolic function.   The estimated ejection fraction is 55%.   There is abnormal septal wall motion.   Normal left ventricular diastolic function.   Normal right ventricular size with normal right ventricular systolic function.   Normal central venous pressure (3 mmHg).   The estimated PA systolic pressure is 24 mmHg.   The stress echo portion of this study is negative for myocardial ischemia.           ASSESSMENT/PLAN:         Pre-op Assessment    I have reviewed the Patient Summary Reports.     I have reviewed the Nursing Notes. I have reviewed the NPO Status.   I have reviewed the Medications.     Review of Systems  Anesthesia Hx:  PONV History of prior surgery of interest to airway management or planning: Denies Family Hx of Anesthesia complications.   Denies Personal Hx of Anesthesia complications.   Hematology/Oncology:        Hematology Comments: H/o ITP Current/Recent Cancer. Breast   Cardiovascular:   Hypertension Denies MI.  Denies CAD.    ECG has been reviewed. cardiomyopathy   Pulmonary:  Pulmonary Normal  Denies COPD.  Denies Sleep Apnea.    Renal/:   Chronic Renal Disease, CKD renal calculi    Hepatic/GI:  Hepatic/GI Normal    Musculoskeletal:   Arthritis   Spine Disorders: lumbar Disc disease and Degenerative disease    Neurological:  Neurology Normal Denies Seizures.    Endocrine:   Diabetes    Psych:   depression           Physical Exam  General: Well nourished, Cooperative, Alert and Oriented    Airway:  Mouth Opening: Normal  TM Distance: Normal  Tongue: Normal  Neck ROM: Normal ROM    Dental:  Partial Dentures    Chest/Lungs:  Clear to auscultation, Normal Respiratory Rate    Heart:  Rate: Normal  Rhythm: Regular Rhythm  Sounds: Normal    Abdomen:  Normal        Anesthesia Plan  Type of Anesthesia, risks & benefits discussed:    Anesthesia Type: Gen Natural Airway, Gen ETT, MAC  Intra-op Monitoring Plan: Standard ASA Monitors  Post Op Pain Control Plan: multimodal analgesia and IV/PO Opioids PRN  Induction:  IV  Airway Plan: Direct, Post-Induction  Informed Consent: Informed consent signed with the Patient and all parties understand the risks and agree with anesthesia plan.  All questions answered.   ASA Score: 3  Day of Surgery Review of History & Physical: H&P Update referred to the surgeon/provider.    Ready For Surgery From Anesthesia Perspective.     .

## 2022-09-28 NOTE — H&P
Short Stay Endoscopy History and Physical    PCP - Rayo Hernandez MD  Referring Physician - Rayo Hernandez MD  [address] [place] [address]    Procedure - Colonoscopy  ASA - per anesthesia  Mallampati - per anesthesia  History of Anesthesia problems - no  Family history Anesthesia problems -  no   Plan of anesthesia - General    HPI  62 y.o. female  Reason for procedure:   Colon cancer screening [Z12.11]        ROS:  Constitutional: No fevers, chills, No weight loss  CV: No chest pain  Pulm: No cough, No shortness of breath  GI: see HPI    Medical History:  has a past medical history of Abnormal echocardiogram (2012), Arthritis, Breast cancer (), Breast cancer, right breast, Breast cancer, right breast (2012), Cardiomyopathy due to systemic disease, Cataract, Clotting disorder, Coronary artery disease, DM (diabetes mellitus) (2012), Encounter for blood transfusion, HTN (hypertension) (2012), Increased glucose level (2012), ITP (idiopathic thrombocytopenic purpura) (2012), Kidney stones (2012), PONV (postoperative nausea and vomiting), Stenosis of lumbosacral spine (2012), Thyroid disease, and Tympanic membrane perforation (9/15/2014).    Surgical History:  has a past surgical history that includes Strabismus surgery (8.30.06); Cholecystectomy; Hysterectomy; Splenectomy, total (); Thyroidectomy (2016);  section; L3-5 Laminectomy (2017); Breast lumpectomy (Right, ); Hip Arthroplasty (Right, 10/18/2018); Hip replacement arthroplasty (Right); Tonsillectomy; Knee Arthroplasty (Right, 2019); Cataract extraction w/  intraocular lens implant (Left, 2021); Cataract extraction w/  intraocular lens implant (Right, 2021); Laparoscopic sleeve gastrectomy (N/A, 2021); Esophagogastroduodenoscopy (N/A, 2021); Joint replacement; and Eye surgery.    Family History: family history includes Diabetes in her brother, father,  mother, sister, sister, and sister; Heart disease (age of onset: 69) in her mother; Hypertension in her brother, father, mother, sister, sister, and sister; Stroke in her father..    Social History:  reports that she has never smoked. She has never used smokeless tobacco. She reports that she does not drink alcohol and does not use drugs.    Review of patient's allergies indicates:   Allergen Reactions    Gabapentin      Bruising, nails turn purple color       Medications:   Facility-Administered Medications Prior to Admission   Medication Dose Route Frequency Provider Last Rate Last Admin    sodium chloride 0.9% flush 10 mL  10 mL Intravenous PRN Klever Tolliver MD        sodium chloride 0.9% flush 10 mL  10 mL Intravenous PRN Klever Tolliver MD         Medications Prior to Admission   Medication Sig Dispense Refill Last Dose    atorvastatin (LIPITOR) 10 MG tablet TAKE 1 TABLET BY MOUTH EVERY DAY 90 tablet 11     calcium carbonate 1250 MG capsule Take 1,250 mg by mouth Daily.       carvediloL (COREG) 3.125 MG tablet TAKE 1 TABLET (3.125 MG TOTAL) BY MOUTH 2 (TWO) TIMES DAILY. 180 tablet 11     clotrimazole (LOTRIMIN) 1 % cream Apply topically 2 (two) times daily. (Patient taking differently: Apply topically 2 (two) times daily as needed.) 113 g 11     clotrimazole-betamethasone 1-0.05% (LOTRISONE) cream Apply topically 2 (two) times daily as needed. 45 g 1     cyanocobalamin (VITAMIN B-12) 100 MCG tablet Take 100 mcg by mouth once daily.       cyclobenzaprine (FLEXERIL) 10 MG tablet TAKE 1 TABLET BY MOUTH THREE TIMES A DAY AS NEEDED FOR MUSCLE SPASMS 90 tablet 5     docusate sodium (COLACE) 100 MG capsule TAKE 1 CAPSULE (100 MG TOTAL) BY MOUTH 2 (TWO) TIMES DAILY AS NEEDED FOR CONSTIPATION. 60 capsule 17     famotidine (PEPCID AC) 20 MG tablet Take 1 tablet (20 mg total) by mouth 2 (two) times daily. 60 tablet 6     fluticasone propionate (FLONASE) 50 mcg/actuation nasal spray INSTILL 2 SPRAYS IN EACH NOSTRIL ONCE A  DAY 48 mL 11     hydrocodone-acetaminophen (HYCET) solution 7.5-325 mg/15mL Take 15 mLs by mouth 4 (four) times daily as needed for Pain. (Patient not taking: Reported on 7/20/2022) 118 mL 0     levothyroxine (SYNTHROID) 112 MCG tablet Take 1 tablet (112 mcg total) by mouth once daily. 90 tablet 11     losartan-hydrochlorothiazide 100-12.5 mg (HYZAAR) 100-12.5 mg Tab TAKE 1 TABLET BY MOUTH EVERY DAY 90 tablet 1     meclizine (ANTIVERT) 25 mg tablet Take 1 tablet (25 mg total) by mouth 3 (three) times daily as needed. (Patient not taking: Reported on 7/20/2022) 20 tablet 1     metFORMIN (GLUCOPHAGE-XR) 500 MG ER 24hr tablet Take 1 tablet (500 mg total) by mouth 2 (two) times daily with meals. 180 tablet 11     multivitamin (THERAGRAN) per tablet Take 1 tablet by mouth once daily.       NIFEdipine (PROCARDIA-XL) 90 MG (OSM) 24 hr tablet TAKE 1 TABLET (90 MG TOTAL) BY MOUTH ONCE DAILY. 90 tablet 11     omeprazole (PRILOSEC) 40 MG capsule TAKE 1 CAPSULE BY MOUTH EVERY MORNING, OPEN AND TAKE WITH APPLE SAUCE. (Patient not taking: Reported on 7/20/2022) 30 capsule 1     ondansetron (ZOFRAN-ODT) 8 MG TbDL Dissolve 1 tablet (8 mg total) by mouth every 6 (six) hours as needed. 30 tablet 0     oxyCODONE-acetaminophen (PERCOCET) 5-325 mg per tablet Take 1 tablet by mouth every 12 (twelve) hours as needed for Pain. Greater than 7 day quantity Medically Necessary 60 tablet 0     polyethylene glycol (GLYCOLAX) 17 gram/dose powder Mix 1 capful (17 g) with a liquid and take by mouth once daily. 510 g 3     potassium chloride SA (K-DUR,KLOR-CON) 20 MEQ tablet TAKE 2 TABLETS (40 MEQ TOTAL) BY MOUTH 2 (TWO) TIMES DAILY. 360 tablet 11     promethazine (PHENERGAN) 25 MG tablet Take 1 tablet (25 mg total) by mouth every 6 (six) hours as needed for Nausea. (Patient not taking: Reported on 7/20/2022) 30 tablet 0     sodium,potassium,mag sulfates (SUPREP BOWEL PREP KIT) 17.5-3.13-1.6 gram SolR As Directed 1 kit 0     traZODone (DESYREL) 50  MG tablet TAKE 1 TABLET BY MOUTH NIGHTLY AS NEEDED FOR INSOMNIA. 90 tablet 1     venlafaxine (EFFEXOR) 100 MG Tab TAKE 1 TABLET BY MOUTH TWICE A DAY (Patient not taking: Reported on 7/20/2022) 180 tablet 3     venlafaxine (EFFEXOR-XR) 150 MG Cp24 Take 1 capsule (150 mg total) by mouth once daily. 90 capsule 11        Physical Exam:    Vital Signs: There were no vitals filed for this visit.    General Appearance: Well appearing in no acute distress  Abdomen: Soft, non tender, non distended with normal bowel sounds, no masses    Labs:  Lab Results   Component Value Date    WBC 7.28 04/06/2022    HGB 12.0 04/06/2022    HCT 38.8 04/06/2022     04/06/2022    CHOL 135 04/06/2022    TRIG 77 04/06/2022    HDL 57 04/06/2022    ALT 18 04/06/2022    AST 19 04/06/2022     04/06/2022    K 4.5 04/06/2022     04/06/2022    CREATININE 0.9 04/06/2022    BUN 20 04/06/2022    CO2 31 (H) 04/06/2022    TSH <0.010 (L) 09/01/2022    INR 1.0 02/15/2017    HGBA1C 5.5 07/20/2022    MICROALBUR 15.3 08/18/2016       I have explained the risks and benefits of this endoscopic procedure to the patient including but not limited to bleeding, inflammation, infection, perforation, and death.      Anoop Escobar MD

## 2022-09-28 NOTE — ANESTHESIA POSTPROCEDURE EVALUATION
Anesthesia Post Evaluation    Patient: Tanika Wayne    Procedure(s) Performed: Procedure(s) (LRB):  COLONOSCOPY (N/A)    Final Anesthesia Type: general      Patient location during evaluation: PACU  Patient participation: Yes- Able to Participate  Level of consciousness: awake and alert  Post-procedure vital signs: reviewed and stable  Pain management: adequate  Airway patency: patent    PONV status at discharge: No PONV  Anesthetic complications: no      Cardiovascular status: stable  Respiratory status: unassisted and spontaneous ventilation  Hydration status: euvolemic  Follow-up not needed.          Vitals Value Taken Time   /81 09/28/22 1011   Temp ** 09/28/22 1019   Pulse 81 09/28/22 1011   Resp 16 09/28/22 1011   SpO2 99 % 09/28/22 1011         Event Time   Out of Recovery 10:18:29         Pain/Conor Score: Conor Score: 10 (9/28/2022  9:40 AM)

## 2022-09-28 NOTE — TRANSFER OF CARE
"Anesthesia Transfer of Care Note    Patient: Tanika Wayne    Procedure(s) Performed: Procedure(s) (LRB):  COLONOSCOPY (N/A)    Patient location: GI    Anesthesia Type: general    Transport from OR: Transported from OR on room air with adequate spontaneous ventilation    Post pain: adequate analgesia    Post assessment: no apparent anesthetic complications and tolerated procedure well    Post vital signs: stable    Level of consciousness: awake, alert and oriented    Nausea/Vomiting: no nausea/vomiting    Complications: none    Transfer of care protocol was followed      Last vitals:   Visit Vitals  BP (!) 198/87   Pulse 97   Temp 36.5 °C (97.7 °F)   Resp 15   Ht 5' 1" (1.549 m)   Wt 86.2 kg (190 lb)   SpO2 99%   Breastfeeding No   BMI 35.90 kg/m²     "

## 2022-09-28 NOTE — ADDENDUM NOTE
Addendum  created 09/28/22 1126 by Sakina Sr, CRNA    Intraprocedure Meds edited, Orders acknowledged in Narrator

## 2022-10-06 ENCOUNTER — PATIENT MESSAGE (OUTPATIENT)
Dept: BARIATRICS | Facility: CLINIC | Age: 62
End: 2022-10-06
Payer: MEDICARE

## 2022-10-06 LAB
COMMENT: NORMAL
FINAL PATHOLOGIC DIAGNOSIS: NORMAL
GROSS: NORMAL
Lab: NORMAL

## 2022-10-17 ENCOUNTER — PATIENT MESSAGE (OUTPATIENT)
Dept: PHYSICAL MEDICINE AND REHAB | Facility: CLINIC | Age: 62
End: 2022-10-17
Payer: MEDICARE

## 2022-10-17 ENCOUNTER — IMMUNIZATION (OUTPATIENT)
Dept: INTERNAL MEDICINE | Facility: CLINIC | Age: 62
End: 2022-10-17
Payer: MEDICARE

## 2022-10-17 ENCOUNTER — PATIENT MESSAGE (OUTPATIENT)
Dept: BARIATRICS | Facility: CLINIC | Age: 62
End: 2022-10-17
Payer: MEDICARE

## 2022-10-17 DIAGNOSIS — Z23 NEED FOR VACCINATION: Primary | ICD-10-CM

## 2022-10-17 PROCEDURE — 91312 COVID-19, MRNA, LNP-S, BIVALENT BOOSTER, PF, 30 MCG/0.3 ML DOSE: ICD-10-PCS | Mod: S$GLB,,, | Performed by: INTERNAL MEDICINE

## 2022-10-17 PROCEDURE — 0124A COVID-19, MRNA, LNP-S, BIVALENT BOOSTER, PF, 30 MCG/0.3 ML DOSE: CPT | Mod: CV19,PBBFAC | Performed by: INTERNAL MEDICINE

## 2022-10-17 PROCEDURE — 90686 IIV4 VACC NO PRSV 0.5 ML IM: CPT | Mod: S$GLB,,, | Performed by: INTERNAL MEDICINE

## 2022-10-17 PROCEDURE — 91312 COVID-19, MRNA, LNP-S, BIVALENT BOOSTER, PF, 30 MCG/0.3 ML DOSE: CPT | Mod: S$GLB,,, | Performed by: INTERNAL MEDICINE

## 2022-10-17 PROCEDURE — 90686 FLU VACCINE (QUAD) GREATER THAN OR EQUAL TO 3YO PRESERVATIVE FREE IM: ICD-10-PCS | Mod: S$GLB,,, | Performed by: INTERNAL MEDICINE

## 2022-10-17 PROCEDURE — G0008 FLU VACCINE (QUAD) GREATER THAN OR EQUAL TO 3YO PRESERVATIVE FREE IM: ICD-10-PCS | Mod: S$GLB,,, | Performed by: INTERNAL MEDICINE

## 2022-10-17 PROCEDURE — G0008 ADMIN INFLUENZA VIRUS VAC: HCPCS | Mod: S$GLB,,, | Performed by: INTERNAL MEDICINE

## 2022-10-25 ENCOUNTER — PES CALL (OUTPATIENT)
Dept: ADMINISTRATIVE | Facility: CLINIC | Age: 62
End: 2022-10-25
Payer: MEDICARE

## 2022-10-27 ENCOUNTER — OFFICE VISIT (OUTPATIENT)
Dept: INTERNAL MEDICINE | Facility: CLINIC | Age: 62
End: 2022-10-27
Payer: MEDICARE

## 2022-10-27 ENCOUNTER — TELEPHONE (OUTPATIENT)
Dept: ADMINISTRATIVE | Facility: CLINIC | Age: 62
End: 2022-10-27
Payer: MEDICARE

## 2022-10-27 DIAGNOSIS — I10 ESSENTIAL HYPERTENSION: ICD-10-CM

## 2022-10-27 DIAGNOSIS — N20.0 KIDNEY STONES: ICD-10-CM

## 2022-10-27 DIAGNOSIS — F33.42 RECURRENT MAJOR DEPRESSIVE DISORDER, IN FULL REMISSION: ICD-10-CM

## 2022-10-27 DIAGNOSIS — M54.41 CHRONIC BILATERAL LOW BACK PAIN WITH RIGHT-SIDED SCIATICA: ICD-10-CM

## 2022-10-27 DIAGNOSIS — N18.30 STAGE 3 CHRONIC KIDNEY DISEASE, UNSPECIFIED WHETHER STAGE 3A OR 3B CKD: ICD-10-CM

## 2022-10-27 DIAGNOSIS — E11.69 TYPE 2 DIABETES MELLITUS WITH OBESITY: ICD-10-CM

## 2022-10-27 DIAGNOSIS — H25.13 NUCLEAR SCLEROSIS, BILATERAL: ICD-10-CM

## 2022-10-27 DIAGNOSIS — E89.0 HISTORY OF SUBTOTAL THYROIDECTOMY: ICD-10-CM

## 2022-10-27 DIAGNOSIS — M48.061 SPINAL STENOSIS OF LUMBAR REGION, UNSPECIFIED WHETHER NEUROGENIC CLAUDICATION PRESENT: ICD-10-CM

## 2022-10-27 DIAGNOSIS — M43.16 SPONDYLOLISTHESIS AT L3-L4 LEVEL: ICD-10-CM

## 2022-10-27 DIAGNOSIS — Z98.890 S/P LUMBAR LAMINECTOMY: ICD-10-CM

## 2022-10-27 DIAGNOSIS — E11.36 TYPE 2 DIABETES MELLITUS WITH CATARACT: ICD-10-CM

## 2022-10-27 DIAGNOSIS — Z00.00 ENCOUNTER FOR PREVENTIVE HEALTH EXAMINATION: Primary | ICD-10-CM

## 2022-10-27 DIAGNOSIS — H72.91 PERFORATION OF RIGHT TYMPANIC MEMBRANE: ICD-10-CM

## 2022-10-27 DIAGNOSIS — I70.0 AORTIC CALCIFICATION: ICD-10-CM

## 2022-10-27 DIAGNOSIS — E66.9 TYPE 2 DIABETES MELLITUS WITH OBESITY: ICD-10-CM

## 2022-10-27 DIAGNOSIS — R93.1 ABNORMAL ECHOCARDIOGRAM: ICD-10-CM

## 2022-10-27 DIAGNOSIS — G89.29 CHRONIC BILATERAL LOW BACK PAIN WITH RIGHT-SIDED SCIATICA: ICD-10-CM

## 2022-10-27 DIAGNOSIS — Z79.891 CHRONIC USE OF OPIATE FOR THERAPEUTIC PURPOSE: ICD-10-CM

## 2022-10-27 DIAGNOSIS — M51.36 DDD (DEGENERATIVE DISC DISEASE), LUMBAR: ICD-10-CM

## 2022-10-27 DIAGNOSIS — I77.1 TORTUOUS AORTA: ICD-10-CM

## 2022-10-27 DIAGNOSIS — E66.01 SEVERE OBESITY WITH BODY MASS INDEX (BMI) OF 35.0 TO 35.9 AND COMORBIDITY: ICD-10-CM

## 2022-10-27 DIAGNOSIS — E11.21 TYPE 2 DIABETES MELLITUS WITH DIABETIC NEPHROPATHY, WITHOUT LONG-TERM CURRENT USE OF INSULIN: ICD-10-CM

## 2022-10-27 DIAGNOSIS — F11.90 UNCOMPLICATED OPIOID USE: ICD-10-CM

## 2022-10-27 DIAGNOSIS — Z86.69 HISTORY OF BLURRED VISION: ICD-10-CM

## 2022-10-27 PROCEDURE — 3044F HG A1C LEVEL LT 7.0%: CPT | Mod: CPTII,95,, | Performed by: NURSE PRACTITIONER

## 2022-10-27 PROCEDURE — 3072F LOW RISK FOR RETINOPATHY: CPT | Mod: CPTII,95,, | Performed by: NURSE PRACTITIONER

## 2022-10-27 PROCEDURE — 3066F PR DOCUMENTATION OF TREATMENT FOR NEPHROPATHY: ICD-10-PCS | Mod: CPTII,95,, | Performed by: NURSE PRACTITIONER

## 2022-10-27 PROCEDURE — 3066F NEPHROPATHY DOC TX: CPT | Mod: CPTII,95,, | Performed by: NURSE PRACTITIONER

## 2022-10-27 PROCEDURE — 3061F NEG MICROALBUMINURIA REV: CPT | Mod: CPTII,95,, | Performed by: NURSE PRACTITIONER

## 2022-10-27 PROCEDURE — 1160F RVW MEDS BY RX/DR IN RCRD: CPT | Mod: CPTII,95,, | Performed by: NURSE PRACTITIONER

## 2022-10-27 PROCEDURE — G0439 PPPS, SUBSEQ VISIT: HCPCS | Mod: 95,,, | Performed by: NURSE PRACTITIONER

## 2022-10-27 PROCEDURE — 3061F PR NEG MICROALBUMINURIA RESULT DOCUMENTED/REVIEW: ICD-10-PCS | Mod: CPTII,95,, | Performed by: NURSE PRACTITIONER

## 2022-10-27 PROCEDURE — 1160F PR REVIEW ALL MEDS BY PRESCRIBER/CLIN PHARMACIST DOCUMENTED: ICD-10-PCS | Mod: CPTII,95,, | Performed by: NURSE PRACTITIONER

## 2022-10-27 PROCEDURE — G0439 PR MEDICARE ANNUAL WELLNESS SUBSEQUENT VISIT: ICD-10-PCS | Mod: 95,,, | Performed by: NURSE PRACTITIONER

## 2022-10-27 PROCEDURE — 1159F PR MEDICATION LIST DOCUMENTED IN MEDICAL RECORD: ICD-10-PCS | Mod: CPTII,95,, | Performed by: NURSE PRACTITIONER

## 2022-10-27 PROCEDURE — 3044F PR MOST RECENT HEMOGLOBIN A1C LEVEL <7.0%: ICD-10-PCS | Mod: CPTII,95,, | Performed by: NURSE PRACTITIONER

## 2022-10-27 PROCEDURE — 3072F PR LOW RISK FOR RETINOPATHY: ICD-10-PCS | Mod: CPTII,95,, | Performed by: NURSE PRACTITIONER

## 2022-10-27 PROCEDURE — 1159F MED LIST DOCD IN RCRD: CPT | Mod: CPTII,95,, | Performed by: NURSE PRACTITIONER

## 2022-10-27 NOTE — PROGRESS NOTES
The patient location is: [place]  The chief complaint leading to consultation is: AWV    Visit type: audiovisual    Face to Face time with patient: Yes  30 minutes of total time spent on the encounter, which includes face to face time and non-face to face time preparing to see the patient (eg, review of tests), Obtaining and/or reviewing separately obtained history, Documenting clinical information in the electronic or other health record, Independently interpreting results (not separately reported) and communicating results to the patient/family/caregiver, or Care coordination (not separately reported).         Each patient to whom he or she provides medical services by telemedicine is:  (1) informed of the relationship between the physician and patient and the respective role of any other health care provider with respect to management of the patient; and (2) notified that he or she may decline to receive medical services by telemedicine and may withdraw from such care at any time.          Tanika Wayne presented for a  Medicare AWV and comprehensive Health Risk Assessment today. The following components were reviewed and updated:    Medical history  Family History  Social history  Allergies and Current Medications  Health Risk Assessment  Health Maintenance  Care Team         ** See Completed Assessments for Annual Wellness Visit within the encounter summary.**         The following assessments were completed:  Living Situation  CAGE  Depression Screening  Fall Risk Assessment (MACH 10)  Hearing Assessment(HHI)  Cognitive Function Screening  Nutrition Screening  ADL Screening  PAQ Screening    Constitutional: She is oriented to person, place, and time and well-developed, well-nourished, and in no distress. No distress.   HENT:   Head: Normocephalic and atraumatic.   Eyes: No scleral icterus.   Pulmonary/Chest: Effort normal. No respiratory distress.   Neurological: She is alert and oriented to person, place,  and time.   Skin: She is not diaphoretic.   Psychiatric: Mood and affect normal.           Diagnoses and health risks identified today and associated recommendations/orders:    1. Encounter for preventive health examination  Annual Health Risk Assessment (HRA) visit today.  Counseling and referral of health maintenance and preventative health measures performed.  Patient given annual wellness paperwork to take home.  Encouraged to return in 1 year for subsequent HRA visit.   Review for Opioid Screening: Patient does have rx for Opioids. She is taking Hycet.  Review for Substance Use Disorders: Patient does not use substance.    2. Tortuous aorta  Chronic. Stable. Noted on CXR from 8/18/16. Continue current treatment plan as previously prescribed by PCP.    3. DDD (degenerative disc disease), lumbar  Chronic. Stable. Continue current treatment plan as previously prescribed by PCP.    4. Uncomplicated opioid use  Chronic. Stable. Continue current treatment plan as previously prescribed by PCP.    5. Recurrent major depressive disorder, in full remission  Chronic. Stable. Continue current treatment plan as previously prescribed by PCP.    6. Chronic use of opiate for therapeutic purpose  Chronic. Stable. Continue current treatment plan as previously prescribed by PCP.    7. Nuclear sclerosis, bilateral  Chronic. Stable. Continue current treatment plan as previously prescribed by PCP.    8. History of blurred vision - Both Eyes  Chronic. Stable. Continue current treatment plan as previously prescribed by PCP.    9. Perforation of right tympanic membrane  Chronic. Stable. Continue current treatment plan as previously prescribed by PCP.    10. S/P lumbar laminectomy  Chronic. Stable. Continue current treatment plan as previously prescribed by PCP.    11. Spinal stenosis of lumbar region, unspecified whether neurogenic claudication present  Chronic. Stable. Continue current treatment plan as previously prescribed by  PCP.    12. Essential hypertension  Chronic. Stable. Controlled. Encouraged to increase exercise as tolerated (moderate-intensity aerobic activity and muscle-strengthening activities) improve diet to heart healthy, low sodium diet.  Continue current treatment plan as previously prescribed by PCP.    13. Aortic calcification  Chronic. Stable. Continue current treatment plan as previously prescribed by PCP.    14. Abnormal echocardiogram  Chronic. Stable. Continue current treatment plan as previously prescribed by PCP.    15. Stage 3 chronic kidney disease, unspecified whether stage 3a or 3b CKD  Chronic. Stable. Continue current treatment plan as previously prescribed by PCP.    16. Kidney stones  Chronic. Stable. Continue current treatment plan as previously prescribed by PCP.    17. Type 2 diabetes mellitus with diabetic nephropathy, without long-term current use of insulin  Chronic. Stable. Controlled. Last Hgb A1c=5.5 from 7/20/22. Continue current treatment plan as previously prescribed by PCP.    18. Type 2 diabetes mellitus with obesity  Chronic. Stable. Continue current treatment plan as previously prescribed by PCP.    19. Type 2 diabetes mellitus with cataract  Chronic. Stable. Continue current treatment plan as previously prescribed by PCP.    20. Severe obesity with body mass index (BMI) of 35.0 to 35.9 and comorbidity  Chronic. Stable. Encouraged to increase exercise as tolerated and improve diet to heart healthy, low sodium diet. Continue current treatment plan as previously prescribed by PCP.    21. History of subtotal thyroidectomy  Chronic. Stable. Continue current treatment plan as previously prescribed by PCP.    22. Spondylolisthesis at L3-L4 level  Chronic. Stable. Continue current treatment plan as previously prescribed by PCP.    23. Chronic bilateral low back pain with right-sided sciatica  Chronic. Stable. Continue current treatment plan as previously prescribed by PCP.        Marjorie Sagastume  with a 5-10 year written screening schedule and personal prevention plan. Recommendations were developed using the USPSTF age appropriate recommendations. Education, counseling, and referrals were provided as needed. After Visit Summary printed and given to patient which includes a list of additional screenings\tests needed.      I offered to discuss end of life issues, including information on how to make advance directives that the patient could use to name someone who would make medical decisions on their behalf if they became too ill to make themselves.    ___Patient declined  _X_Patient is interested, I provided paper work and offered to discuss.    Follow up in about 1 year (around 10/27/2023).    Sukhwinder Ballesteros NP  I offered to discuss advanced care planning, including how to pick a person who would make decisions for you if you were unable to make them for yourself, called a health care power of , and what kind of decisions you might make such as use of life sustaining treatments such as ventilators and tube feeding when faced with a life limiting illness recorded on a living will that they will need to know. (How you want to be cared for as you near the end of your natural life)     X Patient is interested in learning more about how to make advanced directives.  I provided them paperwork and offered to discuss this with them.

## 2022-10-27 NOTE — PATIENT INSTRUCTIONS
Counseling and Referral of Other Preventative  (Italic type indicates deductible and co-insurance are waived)    Patient Name: Tanika Wayne  Today's Date: 10/27/2022    Health Maintenance       Date Due Completion Date    Foot Exam 11/10/2022 11/10/2021 (Done)    Override on 11/10/2021: Done    Override on 9/14/2020: Done    HIV Screening 11/10/2022 (Originally 1/1/1975) ---    Hemoglobin A1c 01/20/2023 7/20/2022    Lipid Panel 04/06/2023 4/6/2022    Eye Exam 05/02/2023 5/2/2022    Mammogram 05/04/2023 5/4/2022    Diabetes Urine Screening 07/20/2023 7/20/2022    High Dose Statin 09/28/2023 9/28/2022    Pneumococcal Vaccines (Age 0-64) (3 - PPSV23 if available, else PCV20) 01/01/2026 11/20/2019    TETANUS VACCINE 01/07/2026 1/7/2016    Colorectal Cancer Screening 09/28/2029 9/28/2022        No orders of the defined types were placed in this encounter.    The following information is provided to all patients.  This information is to help you find resources for any of the problems found today that may be affecting your health:                Living healthy guide: www.[facility].[place].gov      Understanding Diabetes: www.diabetes.org      Eating healthy: www.cdc.gov/healthyweight      CDC home safety checklist: www.cdc.gov/steadi/patient.html      Agency on Aging: www.goea.[place].gov      Alcoholics anonymous (AA): www.aa.org      Physical Activity: www.deshawn.nih.gov/mx9utue      Tobacco use: www.quitwithusla.org

## 2022-11-04 ENCOUNTER — PATIENT MESSAGE (OUTPATIENT)
Dept: BARIATRICS | Facility: CLINIC | Age: 62
End: 2022-11-04
Payer: MEDICARE

## 2022-11-21 ENCOUNTER — PATIENT MESSAGE (OUTPATIENT)
Dept: INTERNAL MEDICINE | Facility: CLINIC | Age: 62
End: 2022-11-21
Payer: MEDICARE

## 2022-11-21 ENCOUNTER — PATIENT MESSAGE (OUTPATIENT)
Dept: PHYSICAL MEDICINE AND REHAB | Facility: CLINIC | Age: 62
End: 2022-11-21
Payer: MEDICARE

## 2022-11-21 DIAGNOSIS — Z96.641 STATUS POST RIGHT HIP REPLACEMENT: ICD-10-CM

## 2022-11-21 DIAGNOSIS — M54.9 BACK PAIN, UNSPECIFIED BACK LOCATION, UNSPECIFIED BACK PAIN LATERALITY, UNSPECIFIED CHRONICITY: ICD-10-CM

## 2022-11-21 DIAGNOSIS — M54.41 CHRONIC BILATERAL LOW BACK PAIN WITH RIGHT-SIDED SCIATICA: ICD-10-CM

## 2022-11-21 DIAGNOSIS — Z96.651 STATUS POST TOTAL RIGHT KNEE REPLACEMENT: ICD-10-CM

## 2022-11-21 DIAGNOSIS — M48.062 SPINAL STENOSIS OF LUMBAR REGION WITH NEUROGENIC CLAUDICATION: ICD-10-CM

## 2022-11-21 DIAGNOSIS — M43.16 SPONDYLOLISTHESIS AT L3-L4 LEVEL: ICD-10-CM

## 2022-11-21 DIAGNOSIS — F11.90 UNCOMPLICATED OPIOID USE: ICD-10-CM

## 2022-11-21 DIAGNOSIS — G89.29 CHRONIC BILATERAL LOW BACK PAIN WITH RIGHT-SIDED SCIATICA: ICD-10-CM

## 2022-11-25 RX ORDER — OXYCODONE AND ACETAMINOPHEN 5; 325 MG/1; MG/1
1 TABLET ORAL EVERY 12 HOURS PRN
Qty: 60 TABLET | Refills: 0 | Status: SHIPPED | OUTPATIENT
Start: 2022-11-25 | End: 2022-12-27 | Stop reason: SDUPTHER

## 2022-12-07 ENCOUNTER — PATIENT MESSAGE (OUTPATIENT)
Dept: BARIATRICS | Facility: CLINIC | Age: 62
End: 2022-12-07
Payer: MEDICARE

## 2022-12-24 ENCOUNTER — PATIENT MESSAGE (OUTPATIENT)
Dept: PHYSICAL MEDICINE AND REHAB | Facility: CLINIC | Age: 62
End: 2022-12-24
Payer: MEDICARE

## 2022-12-27 DIAGNOSIS — Z96.641 STATUS POST RIGHT HIP REPLACEMENT: ICD-10-CM

## 2022-12-27 DIAGNOSIS — M48.062 SPINAL STENOSIS OF LUMBAR REGION WITH NEUROGENIC CLAUDICATION: ICD-10-CM

## 2022-12-27 DIAGNOSIS — G89.29 CHRONIC BILATERAL LOW BACK PAIN WITH RIGHT-SIDED SCIATICA: ICD-10-CM

## 2022-12-27 DIAGNOSIS — M43.16 SPONDYLOLISTHESIS AT L3-L4 LEVEL: ICD-10-CM

## 2022-12-27 DIAGNOSIS — Z96.651 STATUS POST TOTAL RIGHT KNEE REPLACEMENT: ICD-10-CM

## 2022-12-27 DIAGNOSIS — M54.9 BACK PAIN, UNSPECIFIED BACK LOCATION, UNSPECIFIED BACK PAIN LATERALITY, UNSPECIFIED CHRONICITY: ICD-10-CM

## 2022-12-27 DIAGNOSIS — M54.41 CHRONIC BILATERAL LOW BACK PAIN WITH RIGHT-SIDED SCIATICA: ICD-10-CM

## 2022-12-27 DIAGNOSIS — F11.90 UNCOMPLICATED OPIOID USE: ICD-10-CM

## 2022-12-28 ENCOUNTER — TELEPHONE (OUTPATIENT)
Dept: BARIATRICS | Facility: CLINIC | Age: 62
End: 2022-12-28
Payer: MEDICARE

## 2022-12-28 RX ORDER — OXYCODONE AND ACETAMINOPHEN 5; 325 MG/1; MG/1
1 TABLET ORAL EVERY 12 HOURS PRN
Qty: 60 TABLET | Refills: 0 | Status: SHIPPED | OUTPATIENT
Start: 2022-12-28 | End: 2023-02-02 | Stop reason: SDUPTHER

## 2023-01-09 ENCOUNTER — PATIENT MESSAGE (OUTPATIENT)
Dept: BARIATRICS | Facility: CLINIC | Age: 63
End: 2023-01-09
Payer: MEDICARE

## 2023-01-13 ENCOUNTER — LAB VISIT (OUTPATIENT)
Dept: LAB | Facility: HOSPITAL | Age: 63
End: 2023-01-13
Attending: INTERNAL MEDICINE
Payer: MEDICARE

## 2023-01-13 ENCOUNTER — OFFICE VISIT (OUTPATIENT)
Dept: INTERNAL MEDICINE | Facility: CLINIC | Age: 63
End: 2023-01-13
Payer: MEDICARE

## 2023-01-13 VITALS
HEIGHT: 61 IN | DIASTOLIC BLOOD PRESSURE: 70 MMHG | HEART RATE: 116 BPM | SYSTOLIC BLOOD PRESSURE: 110 MMHG | RESPIRATION RATE: 18 BRPM | OXYGEN SATURATION: 98 % | WEIGHT: 198.44 LBS | BODY MASS INDEX: 37.47 KG/M2

## 2023-01-13 DIAGNOSIS — I70.0 AORTIC CALCIFICATION: ICD-10-CM

## 2023-01-13 DIAGNOSIS — F33.42 RECURRENT MAJOR DEPRESSIVE DISORDER, IN FULL REMISSION: ICD-10-CM

## 2023-01-13 DIAGNOSIS — E66.01 SEVERE OBESITY WITH BODY MASS INDEX (BMI) OF 35.0 TO 35.9 AND COMORBIDITY: ICD-10-CM

## 2023-01-13 DIAGNOSIS — E11.36 TYPE 2 DIABETES MELLITUS WITH CATARACT: ICD-10-CM

## 2023-01-13 DIAGNOSIS — N18.30 STAGE 3 CHRONIC KIDNEY DISEASE, UNSPECIFIED WHETHER STAGE 3A OR 3B CKD: ICD-10-CM

## 2023-01-13 DIAGNOSIS — E89.0 HISTORY OF SUBTOTAL THYROIDECTOMY: ICD-10-CM

## 2023-01-13 DIAGNOSIS — E89.0 HISTORY OF SUBTOTAL THYROIDECTOMY: Primary | ICD-10-CM

## 2023-01-13 LAB
ANION GAP SERPL CALC-SCNC: 13 MMOL/L (ref 8–16)
BUN SERPL-MCNC: 16 MG/DL (ref 8–23)
CALCIUM SERPL-MCNC: 10.3 MG/DL (ref 8.7–10.5)
CHLORIDE SERPL-SCNC: 102 MMOL/L (ref 95–110)
CO2 SERPL-SCNC: 26 MMOL/L (ref 23–29)
CREAT SERPL-MCNC: 1.1 MG/DL (ref 0.5–1.4)
EST. GFR  (NO RACE VARIABLE): 56.8 ML/MIN/1.73 M^2
ESTIMATED AVG GLUCOSE: 117 MG/DL (ref 68–131)
GLUCOSE SERPL-MCNC: 97 MG/DL (ref 70–110)
HBA1C MFR BLD: 5.7 % (ref 4–5.6)
POTASSIUM SERPL-SCNC: 4 MMOL/L (ref 3.5–5.1)
SODIUM SERPL-SCNC: 141 MMOL/L (ref 136–145)
T4 FREE SERPL-MCNC: 1.17 NG/DL (ref 0.71–1.51)
TSH SERPL DL<=0.005 MIU/L-ACNC: 0.03 UIU/ML (ref 0.4–4)

## 2023-01-13 PROCEDURE — 83036 HEMOGLOBIN GLYCOSYLATED A1C: CPT | Mod: HCNC | Performed by: INTERNAL MEDICINE

## 2023-01-13 PROCEDURE — 3008F BODY MASS INDEX DOCD: CPT | Mod: HCNC,CPTII,S$GLB, | Performed by: INTERNAL MEDICINE

## 2023-01-13 PROCEDURE — 99214 PR OFFICE/OUTPT VISIT, EST, LEVL IV, 30-39 MIN: ICD-10-PCS | Mod: HCNC,S$GLB,, | Performed by: INTERNAL MEDICINE

## 2023-01-13 PROCEDURE — 99999 PR PBB SHADOW E&M-EST. PATIENT-LVL V: CPT | Mod: PBBFAC,HCNC,, | Performed by: INTERNAL MEDICINE

## 2023-01-13 PROCEDURE — 3044F HG A1C LEVEL LT 7.0%: CPT | Mod: HCNC,CPTII,S$GLB, | Performed by: INTERNAL MEDICINE

## 2023-01-13 PROCEDURE — 3078F PR MOST RECENT DIASTOLIC BLOOD PRESSURE < 80 MM HG: ICD-10-PCS | Mod: HCNC,CPTII,S$GLB, | Performed by: INTERNAL MEDICINE

## 2023-01-13 PROCEDURE — 1159F MED LIST DOCD IN RCRD: CPT | Mod: HCNC,CPTII,S$GLB, | Performed by: INTERNAL MEDICINE

## 2023-01-13 PROCEDURE — 3074F PR MOST RECENT SYSTOLIC BLOOD PRESSURE < 130 MM HG: ICD-10-PCS | Mod: HCNC,CPTII,S$GLB, | Performed by: INTERNAL MEDICINE

## 2023-01-13 PROCEDURE — 3072F LOW RISK FOR RETINOPATHY: CPT | Mod: HCNC,CPTII,S$GLB, | Performed by: INTERNAL MEDICINE

## 2023-01-13 PROCEDURE — 3072F PR LOW RISK FOR RETINOPATHY: ICD-10-PCS | Mod: HCNC,CPTII,S$GLB, | Performed by: INTERNAL MEDICINE

## 2023-01-13 PROCEDURE — 1160F RVW MEDS BY RX/DR IN RCRD: CPT | Mod: HCNC,CPTII,S$GLB, | Performed by: INTERNAL MEDICINE

## 2023-01-13 PROCEDURE — 1159F PR MEDICATION LIST DOCUMENTED IN MEDICAL RECORD: ICD-10-PCS | Mod: HCNC,CPTII,S$GLB, | Performed by: INTERNAL MEDICINE

## 2023-01-13 PROCEDURE — 1160F PR REVIEW ALL MEDS BY PRESCRIBER/CLIN PHARMACIST DOCUMENTED: ICD-10-PCS | Mod: HCNC,CPTII,S$GLB, | Performed by: INTERNAL MEDICINE

## 2023-01-13 PROCEDURE — 99999 PR PBB SHADOW E&M-EST. PATIENT-LVL V: ICD-10-PCS | Mod: PBBFAC,HCNC,, | Performed by: INTERNAL MEDICINE

## 2023-01-13 PROCEDURE — 3044F PR MOST RECENT HEMOGLOBIN A1C LEVEL <7.0%: ICD-10-PCS | Mod: HCNC,CPTII,S$GLB, | Performed by: INTERNAL MEDICINE

## 2023-01-13 PROCEDURE — 84443 ASSAY THYROID STIM HORMONE: CPT | Mod: HCNC | Performed by: INTERNAL MEDICINE

## 2023-01-13 PROCEDURE — 84439 ASSAY OF FREE THYROXINE: CPT | Mod: HCNC | Performed by: INTERNAL MEDICINE

## 2023-01-13 PROCEDURE — 3078F DIAST BP <80 MM HG: CPT | Mod: HCNC,CPTII,S$GLB, | Performed by: INTERNAL MEDICINE

## 2023-01-13 PROCEDURE — 99214 OFFICE O/P EST MOD 30 MIN: CPT | Mod: HCNC,S$GLB,, | Performed by: INTERNAL MEDICINE

## 2023-01-13 PROCEDURE — 36415 COLL VENOUS BLD VENIPUNCTURE: CPT | Mod: HCNC | Performed by: INTERNAL MEDICINE

## 2023-01-13 PROCEDURE — 80048 BASIC METABOLIC PNL TOTAL CA: CPT | Mod: HCNC | Performed by: INTERNAL MEDICINE

## 2023-01-13 PROCEDURE — 3008F PR BODY MASS INDEX (BMI) DOCUMENTED: ICD-10-PCS | Mod: HCNC,CPTII,S$GLB, | Performed by: INTERNAL MEDICINE

## 2023-01-13 PROCEDURE — 3074F SYST BP LT 130 MM HG: CPT | Mod: HCNC,CPTII,S$GLB, | Performed by: INTERNAL MEDICINE

## 2023-01-13 NOTE — PROGRESS NOTES
Subjective:       Patient ID: Tanika Wayne is a 63 y.o. female.    Chief Complaint: Follow-up    Patient is here for followup for chronic conditions.    Mild neuropathy has moved from feet to ankles. Impairs her ability to drive due to decreased sensation. She had same complaint actually 6 mos ago. No recent falls.            Review of Systems   Constitutional:  Negative for fatigue, fever and unexpected weight change.   HENT:  Negative for congestion, facial swelling, hearing loss and sore throat.    Eyes:  Negative for visual disturbance.   Respiratory:  Negative for cough, shortness of breath and wheezing.    Cardiovascular:  Negative for chest pain and palpitations.   Gastrointestinal:  Negative for abdominal distention, abdominal pain, blood in stool, diarrhea, nausea and vomiting.   Genitourinary:  Negative for difficulty urinating, dysuria, frequency and hematuria.   Musculoskeletal:  Positive for arthralgias and gait problem. Negative for joint swelling.   Skin:  Negative for color change and rash.   Neurological:  Positive for numbness. Negative for dizziness, syncope, weakness and headaches.   Hematological:  Negative for adenopathy.   Psychiatric/Behavioral:  Negative for confusion, decreased concentration and suicidal ideas. The patient is nervous/anxious.          Objective:      Physical Exam  Vitals reviewed.   Constitutional:       General: She is not in acute distress.     Appearance: Normal appearance. She is well-developed. She is obese. She is not ill-appearing, toxic-appearing or diaphoretic.   HENT:      Head: Normocephalic and atraumatic.      Mouth/Throat:      Pharynx: No oropharyngeal exudate.   Eyes:      General: No scleral icterus.     Pupils: Pupils are equal, round, and reactive to light.   Neck:      Thyroid: No thyromegaly.      Comments: Thyroid surg scar present  Cardiovascular:      Rate and Rhythm: Normal rate and regular rhythm.      Heart sounds: Normal heart sounds. No  murmur heard.    No friction rub. No gallop.   Pulmonary:      Effort: Pulmonary effort is normal. No respiratory distress.      Breath sounds: Normal breath sounds. No wheezing or rales.   Abdominal:      General: Bowel sounds are normal. There is no distension.      Palpations: Abdomen is soft. There is no mass.      Tenderness: There is no abdominal tenderness. There is no guarding or rebound.   Musculoskeletal:         General: No tenderness. Normal range of motion.      Cervical back: Normal range of motion and neck supple.      Comments: Diff rising to exam table, able to rise with walker assistance/support   Lymphadenopathy:      Cervical: No cervical adenopathy.   Neurological:      General: No focal deficit present.      Mental Status: She is alert and oriented to person, place, and time.   Psychiatric:         Mood and Affect: Mood normal.         Speech: Speech normal.         Behavior: Behavior normal.         Thought Content: Thought content normal.         Judgment: Judgment normal.       Assessment:       1. History of subtotal thyroidectomy    2. Type 2 diabetes mellitus with cataract    3. Severe obesity with body mass index (BMI) of 35.0 to 35.9 and comorbidity    4. Recurrent major depressive disorder, in full remission    5. Aortic calcification    6. Stage 3 chronic kidney disease, unspecified whether stage 3a or 3b CKD        Plan:       Tanika was seen today for follow-up.    Diagnoses and all orders for this visit:    History of subtotal thyroidectomy  -     TSH; Future    Type 2 diabetes mellitus with cataract  -     Hemoglobin A1C; Future  -     BASIC METABOLIC PANEL; Future    Severe obesity with body mass index (BMI) of 35.0 to 35.9 and comorbidity  She has regained some wt after WLS, Lengthy discussion re importance of watching her wt, keep up appt with WLS    Recurrent major depressive disorder, in full remission  Symptoms stable    Aortic calcification  On statin    Stage 3 chronic  kidney disease, unspecified whether stage 3a or 3b CKD  -     BASIC METABOLIC PANEL; Future    Tachycardia -- check TSH again, consider lowering dose    Health Maintenance         Date Due Completion Date    HIV Screening Never done ---    Lipid Panel 04/06/2023 4/6/2022    Eye Exam 05/02/2023 5/2/2022    Mammogram 05/04/2023 5/4/2022    Hemoglobin A1c 07/13/2023 1/13/2023    Diabetes Urine Screening 07/20/2023 7/20/2022    Foot Exam 07/20/2023 7/20/2022 (Done)    Override on 7/20/2022: Done (PCP completed foot exam in office during annual visit)    Override on 11/10/2021: Done    Override on 9/14/2020: Done    High Dose Statin 01/13/2024 1/13/2023    Pneumococcal Vaccines (Age 0-64) (3 - PPSV23 if available, else PCV20) 01/01/2026 11/20/2019    TETANUS VACCINE 01/07/2026 1/7/2016    Colorectal Cancer Screening 09/28/2029 9/28/2022   Hx splenectomy -- get meningitis no 2         Follow up in about 1 year (around 1/13/2024).    Future Appointments   Date Time Provider Department Center   [facility]17[facility]2023  [facility]   7/13/2023  2:30 PM LUCIANA Hurst [facility]   1/17/2024  1:20 PM Rayo Hernandez MD [facility]

## 2023-01-19 DIAGNOSIS — I10 ESSENTIAL HYPERTENSION, MALIGNANT: ICD-10-CM

## 2023-01-19 DIAGNOSIS — J32.9 SINUSITIS, UNSPECIFIED CHRONICITY, UNSPECIFIED LOCATION: ICD-10-CM

## 2023-01-19 DIAGNOSIS — E89.0 POSTOPERATIVE HYPOTHYROIDISM: ICD-10-CM

## 2023-01-19 DIAGNOSIS — E89.0 HISTORY OF SUBTOTAL THYROIDECTOMY: ICD-10-CM

## 2023-01-19 DIAGNOSIS — E11.9 TYPE 2 DIABETES MELLITUS WITHOUT COMPLICATION, WITHOUT LONG-TERM CURRENT USE OF INSULIN: ICD-10-CM

## 2023-01-19 RX ORDER — FLUTICASONE PROPIONATE 50 MCG
SPRAY, SUSPENSION (ML) NASAL
Qty: 48 ML | Refills: 3 | Status: SHIPPED | OUTPATIENT
Start: 2023-01-19

## 2023-01-19 RX ORDER — NIFEDIPINE 90 MG/1
90 TABLET, EXTENDED RELEASE ORAL DAILY
Qty: 90 TABLET | Refills: 3 | Status: SHIPPED | OUTPATIENT
Start: 2023-01-19 | End: 2024-01-17 | Stop reason: SDUPTHER

## 2023-01-19 RX ORDER — ATORVASTATIN CALCIUM 10 MG/1
10 TABLET, FILM COATED ORAL DAILY
Qty: 90 TABLET | Refills: 0 | Status: SHIPPED | OUTPATIENT
Start: 2023-01-19 | End: 2023-04-24 | Stop reason: SDUPTHER

## 2023-01-19 RX ORDER — LOSARTAN POTASSIUM AND HYDROCHLOROTHIAZIDE 12.5; 1 MG/1; MG/1
1 TABLET ORAL DAILY
Qty: 90 TABLET | Refills: 3 | Status: SHIPPED | OUTPATIENT
Start: 2023-01-19 | End: 2024-01-17 | Stop reason: SDUPTHER

## 2023-01-19 NOTE — TELEPHONE ENCOUNTER
Care Due:                  Date            Visit Type   Department     Provider  --------------------------------------------------------------------------------                                EP -                              PRIMARY      [facility] INTER[facility]  Last Visit: 01-      CARE ([facility])   MEDICINE       Rayo Hernandez                              EP -                              PRIMARY      [facility]  Next Visit: 01-      CARE ([facility])   MEDICINE       Rayo Hernandez                                                            Last  Test          Frequency    Reason                     Performed    Due Date  --------------------------------------------------------------------------------    CMP.........  12 months..  atorvastatin.............  04- 04-    Lipid Panel.  12 months..  atorvastatin.............  04- 04-    [facility] Embedded Care Gaps. Reference number: [id number]. 1/19/2023   2:51:17 PM CST

## 2023-01-19 NOTE — TELEPHONE ENCOUNTER
Refill Decision Note   Tanika Wayne  is requesting a refill authorization.  Brief Assessment and Rationale for Refill:  Approve    -Medication-Related Problems Identified: Requires labs  Medication Therapy Plan:  Previous order matches. cmp, lipid panel    Medication Reconciliation Completed: No   Comments:     Provider Staff:     Action is required for this patient.   Please see care gap opportunities below in Care Due Message.     Thanks!  [facility]     Appointments      Date Provider   Last Visit   1/13/2023 Rayo Hernandez MD   Next Visit   Visit date not found Rayo Hernandez MD     Note composed:4:29 PM 01/19/2023           Note composed:4:29 PM 01/19/2023

## 2023-01-20 RX ORDER — LEVOTHYROXINE SODIUM 100 UG/1
100 TABLET ORAL DAILY
Qty: 30 TABLET | Refills: 11 | Status: SHIPPED | OUTPATIENT
Start: 2023-01-20 | End: 2024-01-17 | Stop reason: SDUPTHER

## 2023-01-20 NOTE — TELEPHONE ENCOUNTER
Called the pt to discuss test results and recommendations. I left the pt a VM asking for a call back.    Scheduled pt 6weeks labs.

## 2023-01-20 NOTE — TELEPHONE ENCOUNTER
Hi, please cakll her - her blood work from last week shows that her thyroid level may be too high.  I recommend that she take a slightly lower thyroid dose -- instead of 112 mcg lower to 100  I sent in:  Orders Placed This Encounter    TSH                    levothyroxine (SYNTHROID) 100 MCG tablet     Also, assist in scheduling a TSH blood test in 6 weeks    The rest of her blood work was OK, The diabetes is well controlled.      Let me know if patient has any questions.  Thank you, Rayo Hernandez

## 2023-01-20 NOTE — TELEPHONE ENCOUNTER
----- Message from Freya Caraballo sent at 1/20/2023  3:53 PM CST -----  Contact: Patient   Patient is returning a phone call.  Who left a message for the patient: Wanda Chan  Does patient know what this is regarding:  results  Would you like a call back, or a response through your [facility] portal?:   call back  Comments:

## 2023-01-23 ENCOUNTER — OFFICE VISIT (OUTPATIENT)
Dept: BARIATRICS | Facility: CLINIC | Age: 63
End: 2023-01-23
Payer: MEDICARE

## 2023-01-23 VITALS
HEART RATE: 85 BPM | DIASTOLIC BLOOD PRESSURE: 86 MMHG | HEIGHT: 61 IN | WEIGHT: 198.19 LBS | SYSTOLIC BLOOD PRESSURE: 144 MMHG | BODY MASS INDEX: 37.42 KG/M2 | OXYGEN SATURATION: 98 %

## 2023-01-23 DIAGNOSIS — D53.9 NUTRITIONAL ANEMIA, UNSPECIFIED: ICD-10-CM

## 2023-01-23 DIAGNOSIS — Z98.84 S/P LAPAROSCOPIC SLEEVE GASTRECTOMY: Primary | ICD-10-CM

## 2023-01-23 DIAGNOSIS — Z71.3 DIETARY COUNSELING AND SURVEILLANCE: ICD-10-CM

## 2023-01-23 DIAGNOSIS — R11.0 NAUSEA: ICD-10-CM

## 2023-01-23 DIAGNOSIS — I10 ESSENTIAL HYPERTENSION: ICD-10-CM

## 2023-01-23 DIAGNOSIS — E11.36 TYPE 2 DIABETES MELLITUS WITH CATARACT: ICD-10-CM

## 2023-01-23 PROCEDURE — 3008F BODY MASS INDEX DOCD: CPT | Mod: HCNC,CPTII,S$GLB, | Performed by: PHYSICIAN ASSISTANT

## 2023-01-23 PROCEDURE — 3077F PR MOST RECENT SYSTOLIC BLOOD PRESSURE >= 140 MM HG: ICD-10-PCS | Mod: HCNC,CPTII,S$GLB, | Performed by: PHYSICIAN ASSISTANT

## 2023-01-23 PROCEDURE — 1159F MED LIST DOCD IN RCRD: CPT | Mod: HCNC,CPTII,S$GLB, | Performed by: PHYSICIAN ASSISTANT

## 2023-01-23 PROCEDURE — 3079F DIAST BP 80-89 MM HG: CPT | Mod: HCNC,CPTII,S$GLB, | Performed by: PHYSICIAN ASSISTANT

## 2023-01-23 PROCEDURE — 3008F PR BODY MASS INDEX (BMI) DOCUMENTED: ICD-10-PCS | Mod: HCNC,CPTII,S$GLB, | Performed by: PHYSICIAN ASSISTANT

## 2023-01-23 PROCEDURE — 1160F PR REVIEW ALL MEDS BY PRESCRIBER/CLIN PHARMACIST DOCUMENTED: ICD-10-PCS | Mod: HCNC,CPTII,S$GLB, | Performed by: PHYSICIAN ASSISTANT

## 2023-01-23 PROCEDURE — 99213 OFFICE O/P EST LOW 20 MIN: CPT | Mod: HCNC,S$GLB,, | Performed by: PHYSICIAN ASSISTANT

## 2023-01-23 PROCEDURE — 1159F PR MEDICATION LIST DOCUMENTED IN MEDICAL RECORD: ICD-10-PCS | Mod: HCNC,CPTII,S$GLB, | Performed by: PHYSICIAN ASSISTANT

## 2023-01-23 PROCEDURE — 1160F RVW MEDS BY RX/DR IN RCRD: CPT | Mod: HCNC,CPTII,S$GLB, | Performed by: PHYSICIAN ASSISTANT

## 2023-01-23 PROCEDURE — 3077F SYST BP >= 140 MM HG: CPT | Mod: HCNC,CPTII,S$GLB, | Performed by: PHYSICIAN ASSISTANT

## 2023-01-23 PROCEDURE — 99999 PR PBB SHADOW E&M-EST. PATIENT-LVL V: ICD-10-PCS | Mod: PBBFAC,HCNC,, | Performed by: PHYSICIAN ASSISTANT

## 2023-01-23 PROCEDURE — 3072F PR LOW RISK FOR RETINOPATHY: ICD-10-PCS | Mod: HCNC,CPTII,S$GLB, | Performed by: PHYSICIAN ASSISTANT

## 2023-01-23 PROCEDURE — 3044F HG A1C LEVEL LT 7.0%: CPT | Mod: HCNC,CPTII,S$GLB, | Performed by: PHYSICIAN ASSISTANT

## 2023-01-23 PROCEDURE — 99999 PR PBB SHADOW E&M-EST. PATIENT-LVL V: CPT | Mod: PBBFAC,HCNC,, | Performed by: PHYSICIAN ASSISTANT

## 2023-01-23 PROCEDURE — 3044F PR MOST RECENT HEMOGLOBIN A1C LEVEL <7.0%: ICD-10-PCS | Mod: HCNC,CPTII,S$GLB, | Performed by: PHYSICIAN ASSISTANT

## 2023-01-23 PROCEDURE — 3072F LOW RISK FOR RETINOPATHY: CPT | Mod: HCNC,CPTII,S$GLB, | Performed by: PHYSICIAN ASSISTANT

## 2023-01-23 PROCEDURE — 3079F PR MOST RECENT DIASTOLIC BLOOD PRESSURE 80-89 MM HG: ICD-10-PCS | Mod: HCNC,CPTII,S$GLB, | Performed by: PHYSICIAN ASSISTANT

## 2023-01-23 PROCEDURE — 99213 PR OFFICE/OUTPT VISIT, EST, LEVL III, 20-29 MIN: ICD-10-PCS | Mod: HCNC,S$GLB,, | Performed by: PHYSICIAN ASSISTANT

## 2023-01-23 NOTE — PATIENT INSTRUCTIONS
"Snacks: (100-200 calories; >5g protein)    - 1 low-fat cheese stick with 8 cherry tomatoes or 1 serving fresh fruit  - 4 thin slices fat-free turkey breast and 1 slice low-fat cheese  - 4 thin slices fat-free honey ham with wedge of melon  - 2 slices of turkey alvarez  - Boiled eggs (can buy at [employer] already boiled w/ shell removed)  - for convenience,  [place] read, snack, go (deli meat and cheese rolls)  - P3 packets (Protein packs w/ cheese, nuts, lean deli meat)  - MHP Fit and Lean Protein Pudding (find at [employer] - per 1 cup serving = 100 calories, 15 g protein, 0 g sugar)  - 6-8 edamame pods (equivalent to about 1/4 cup edamame without pods).   - 1/4 cup unsalted nuts with ½ cup fruit  - 6-oz container Dannon Light n Fit vanilla yogurt, topped with 1oz unsalted nuts         - apple, celery or baby carrots spread with 2 Tbsp PB2  - apple slices with 1 oz slice low-fat cheese  - Apple slices dipped in 2 Tbsp of PB2  - 2 Tbsp PB2 mixed in light or greek yogurt or sugar-free pudding  - celery, cucumber, bell pepper or baby carrots dipped in ¼ cup hummus bean spread   - celery, cucumber, baby carrots dipped in high protein greek yogurt (Mix 16 oz plain greek yogurt + 1 packet of hidden valley ranch dip mix)  - [place] Links Beef Steak - 14g protein! (similar to beef jerky but very lean)  - 2 wedges L[place] Cow - Light Herb & Garlic Cheese with sliced cucumber or green bell pepper  - 1/2 cup low-fat cottage cheese with ¼ cup fruit or ¼ cup salsa  - 1/2 cup low fat cottage cheese with 10-15 cherry tomatoes  - 8 oz glass of FAIRLIFE fat free milk (13 g protein)  - 8 oz glass of FAIRLIFE fat free milk + 1 packet of sugar-free hot cocoa  - Add Atkins advantage Cafe Caramel shake to decaf coffee. Serve hot or blend with ice for "frappaccino" like drink  - RTD Protein drinks: Atkins, Low Carb Slim Fast, EAS light, Muscle Milk Light, etc.  - Homemade Protein drinks: GNC Soy95, Isopure, Nectar, UNJURY, Whey " Gourmet, etc. Mix 1 scoop powder with 8oz skim/1% milk or light soymilk.  - Protein bars: Atkins, EAS, Pure Protein,  Quest, Think Thin, Detour, etc. Must have 0-4 grams sugar - Read the label.    ** Be CREATIVE. You can always snack on bites of grilled chicken or tuna salad made with low fat haque, if needed!

## 2023-01-23 NOTE — PROGRESS NOTES
BARIATRIC POST-OPERATIVE VISIT:    HPI:  Tanika Wayne is a 63 y.o. year old female presents for 12 post op visit following s/p sleeve.  she is doing well and tolerating the diet without difficulty.  she has no complaints.      Pt states that she is taking the pepcid for the acid reflux and it works well. Taking it twice a day with the medication no break through.      Denies: abdominal pain, changes in bowel movement pattern, fever, chills, dysphagia, chest pain, and shortness of breath.  Nausea improved, stays away from fried foods because they cause the nausea to worsen   Fatigue improving working on protein intake    Review of Systems   Constitutional:  Positive for fatigue (returned since decreased protein intake). Negative for fever.   HENT:  Negative for tinnitus and trouble swallowing. Sneezing: zofran.   Eyes:  Negative for visual disturbance.   Respiratory:  Negative for cough and shortness of breath.    Cardiovascular:  Negative for chest pain, palpitations and leg swelling.   Gastrointestinal:  Negative for abdominal pain, constipation, diarrhea (improving), nausea and vomiting.   Genitourinary:  Negative for decreased urine volume.   Musculoskeletal:  Negative for myalgias.   Skin:  Negative for rash.   Neurological:  Negative for dizziness, light-headedness and headaches.   Psychiatric/Behavioral:  Negative for suicidal ideas. The patient is not nervous/anxious.      EXERCISE & VITAMINS:  See Bariatric Assessment  Adherent to vitamin regimen   MEDICATIONS/ALLERGIES:  Have been reviewed.    DIET:   See Dietician note from today for a more detailed assessment.    1 meal a day   Am: fruit yogurt   Lunch: kind bar  Dinner: large meal or sandwich with veggie chips and gingerale     Physical Exam  Vitals reviewed.   Constitutional:       Appearance: She is obese.   HENT:      Head: Normocephalic and atraumatic.   Eyes:      Extraocular Movements: Extraocular movements intact.      Conjunctiva/sclera:  Conjunctivae normal.      Pupils: Pupils are equal, round, and reactive to light.   Cardiovascular:      Rate and Rhythm: Normal rate and regular rhythm.      Pulses: Normal pulses.      Heart sounds: Normal heart sounds.   Pulmonary:      Effort: Pulmonary effort is normal. No respiratory distress.      Breath sounds: Normal breath sounds.   Abdominal:      General: Bowel sounds are normal. There is no distension.      Palpations: Abdomen is soft.      Tenderness: There is no abdominal tenderness.   Musculoskeletal:         General: Normal range of motion.      Cervical back: Normal range of motion and neck supple.   Skin:     Capillary Refill: Capillary refill takes less than 2 seconds.   Neurological:      General: No focal deficit present.      Mental Status: She is alert and oriented to person, place, and time.   Psychiatric:         Mood and Affect: Mood normal.         Behavior: Behavior normal.         Thought Content: Thought content normal.         Judgment: Judgment normal.       ASSESSMENT:  - Morbid obesity s/p sleeve gastrectomy.  - Co-morbidities: diabetes mellitus, dyslipidemia and hypertension  - Good Weight loss, 15#'s  - No formal Exercise routine  - Fair Diet  - Good Vitamin regimen    PLAN:  - increase protein   - No lifting more than 10 lbs for 6 weeks  - Miralax daily for constipation  - Emphasized the importance of regular exercise and adherence to bariatric diet to achieve maximum weight loss.  - Encouraged patient to start/continue regular exercise.  - Follow-up with dietician to advance diet.  - Continue daily vitamins and medications.  - RTC per post op schedule  - Call the office for any issues.  - Check labs today.

## 2023-01-24 ENCOUNTER — TELEPHONE (OUTPATIENT)
Dept: INTERNAL MEDICINE | Facility: CLINIC | Age: 63
End: 2023-01-24
Payer: MEDICARE

## 2023-01-24 NOTE — TELEPHONE ENCOUNTER
----- Message from Tayla Dow LPN sent at 1/24/2023  9:06 AM CST -----  Regarding: FW: Thyroid Med decrease message  Contact: [phone]  Good morning. I see this patient is presently on Synthroid 100mcg. Has this been changed?  ----- Message -----  From: Irma Hopkins MA  Sent: 1/23/2023   4:47 PM CST  To: Nurse Hernandez  Subject: Thyroid Med decrease message                     Hey the patient can't remember who called re: thyroid medication-Irma     ----- Message -----  From: Sienna Kaur  Sent: 1/23/2023   4:42 PM CST  To: Mary Cade Staff    Patient is returning a phone call.  Who left a message for the patient: Pt states she cannot remember name of who called  Does patient know what this is regarding:  missed call from Dr Hernandez re what dosage of Thyroid medication to take.   Would you like a call back, or a response through your [facility] portal?:   please call  Comments:

## 2023-01-24 NOTE — TELEPHONE ENCOUNTER
Called and spoke to patient about new dose. Patient has picked it up from the pharmacy and has started the new dose. Patient has a scheduled lab appt already set up for in 6 weeks. Educated patient to take meds as prescribed. Patient verbalized understanding with read back.

## 2023-02-02 ENCOUNTER — PATIENT MESSAGE (OUTPATIENT)
Dept: PHYSICAL MEDICINE AND REHAB | Facility: CLINIC | Age: 63
End: 2023-02-02
Payer: MEDICARE

## 2023-02-02 DIAGNOSIS — M54.9 BACK PAIN, UNSPECIFIED BACK LOCATION, UNSPECIFIED BACK PAIN LATERALITY, UNSPECIFIED CHRONICITY: ICD-10-CM

## 2023-02-02 DIAGNOSIS — M43.16 SPONDYLOLISTHESIS AT L3-L4 LEVEL: ICD-10-CM

## 2023-02-02 DIAGNOSIS — F11.90 UNCOMPLICATED OPIOID USE: ICD-10-CM

## 2023-02-02 DIAGNOSIS — M48.062 SPINAL STENOSIS OF LUMBAR REGION WITH NEUROGENIC CLAUDICATION: ICD-10-CM

## 2023-02-02 DIAGNOSIS — G89.29 CHRONIC BILATERAL LOW BACK PAIN WITH RIGHT-SIDED SCIATICA: ICD-10-CM

## 2023-02-02 DIAGNOSIS — Z96.651 STATUS POST TOTAL RIGHT KNEE REPLACEMENT: ICD-10-CM

## 2023-02-02 DIAGNOSIS — Z96.641 STATUS POST RIGHT HIP REPLACEMENT: ICD-10-CM

## 2023-02-02 DIAGNOSIS — M54.41 CHRONIC BILATERAL LOW BACK PAIN WITH RIGHT-SIDED SCIATICA: ICD-10-CM

## 2023-02-05 RX ORDER — OXYCODONE AND ACETAMINOPHEN 5; 325 MG/1; MG/1
1 TABLET ORAL EVERY 12 HOURS PRN
Qty: 60 TABLET | Refills: 0 | Status: SHIPPED | OUTPATIENT
Start: 2023-02-05 | End: 2023-03-06 | Stop reason: SDUPTHER

## 2023-02-07 DIAGNOSIS — Z00.00 ENCOUNTER FOR MEDICARE ANNUAL WELLNESS EXAM: ICD-10-CM

## 2023-02-09 ENCOUNTER — PATIENT MESSAGE (OUTPATIENT)
Dept: BARIATRICS | Facility: CLINIC | Age: 63
End: 2023-02-09
Payer: MEDICARE

## 2023-02-09 DIAGNOSIS — Z00.00 ENCOUNTER FOR MEDICARE ANNUAL WELLNESS EXAM: ICD-10-CM

## 2023-02-14 ENCOUNTER — PATIENT MESSAGE (OUTPATIENT)
Dept: BARIATRICS | Facility: CLINIC | Age: 63
End: 2023-02-14
Payer: MEDICARE

## 2023-02-22 ENCOUNTER — PATIENT MESSAGE (OUTPATIENT)
Dept: BARIATRICS | Facility: CLINIC | Age: 63
End: 2023-02-22
Payer: MEDICARE

## 2023-03-03 ENCOUNTER — LAB VISIT (OUTPATIENT)
Dept: LAB | Facility: HOSPITAL | Age: 63
End: 2023-03-03
Attending: INTERNAL MEDICINE
Payer: MEDICARE

## 2023-03-03 DIAGNOSIS — E89.0 HISTORY OF SUBTOTAL THYROIDECTOMY: ICD-10-CM

## 2023-03-03 DIAGNOSIS — E89.0 POSTOPERATIVE HYPOTHYROIDISM: ICD-10-CM

## 2023-03-03 LAB
T4 FREE SERPL-MCNC: 1.11 NG/DL (ref 0.71–1.51)
TSH SERPL DL<=0.005 MIU/L-ACNC: 0.14 UIU/ML (ref 0.4–4)

## 2023-03-03 PROCEDURE — 84443 ASSAY THYROID STIM HORMONE: CPT | Mod: HCNC | Performed by: INTERNAL MEDICINE

## 2023-03-03 PROCEDURE — 84439 ASSAY OF FREE THYROXINE: CPT | Mod: HCNC | Performed by: INTERNAL MEDICINE

## 2023-03-03 PROCEDURE — 36415 COLL VENOUS BLD VENIPUNCTURE: CPT | Mod: HCNC | Performed by: INTERNAL MEDICINE

## 2023-03-04 ENCOUNTER — PATIENT MESSAGE (OUTPATIENT)
Dept: PHYSICAL MEDICINE AND REHAB | Facility: CLINIC | Age: 63
End: 2023-03-04
Payer: MEDICARE

## 2023-03-04 DIAGNOSIS — M43.16 SPONDYLOLISTHESIS AT L3-L4 LEVEL: ICD-10-CM

## 2023-03-04 DIAGNOSIS — M48.062 SPINAL STENOSIS OF LUMBAR REGION WITH NEUROGENIC CLAUDICATION: ICD-10-CM

## 2023-03-04 DIAGNOSIS — G89.29 CHRONIC BILATERAL LOW BACK PAIN WITH RIGHT-SIDED SCIATICA: ICD-10-CM

## 2023-03-04 DIAGNOSIS — M54.9 BACK PAIN, UNSPECIFIED BACK LOCATION, UNSPECIFIED BACK PAIN LATERALITY, UNSPECIFIED CHRONICITY: ICD-10-CM

## 2023-03-04 DIAGNOSIS — Z96.651 STATUS POST TOTAL RIGHT KNEE REPLACEMENT: ICD-10-CM

## 2023-03-04 DIAGNOSIS — F11.90 UNCOMPLICATED OPIOID USE: ICD-10-CM

## 2023-03-04 DIAGNOSIS — Z96.641 STATUS POST RIGHT HIP REPLACEMENT: ICD-10-CM

## 2023-03-04 DIAGNOSIS — M54.41 CHRONIC BILATERAL LOW BACK PAIN WITH RIGHT-SIDED SCIATICA: ICD-10-CM

## 2023-03-06 RX ORDER — OXYCODONE AND ACETAMINOPHEN 5; 325 MG/1; MG/1
1 TABLET ORAL EVERY 12 HOURS PRN
Qty: 60 TABLET | Refills: 0 | Status: SHIPPED | OUTPATIENT
Start: 2023-03-06 | End: 2023-04-13 | Stop reason: SDUPTHER

## 2023-03-08 ENCOUNTER — PATIENT MESSAGE (OUTPATIENT)
Dept: BARIATRICS | Facility: CLINIC | Age: 63
End: 2023-03-08
Payer: MEDICARE

## 2023-03-13 ENCOUNTER — OFFICE VISIT (OUTPATIENT)
Dept: INTERNAL MEDICINE | Facility: CLINIC | Age: 63
End: 2023-03-13
Payer: MEDICARE

## 2023-03-13 ENCOUNTER — HOSPITAL ENCOUNTER (OUTPATIENT)
Dept: RADIOLOGY | Facility: HOSPITAL | Age: 63
Discharge: HOME | End: 2023-03-13
Attending: INTERNAL MEDICINE
Payer: MEDICARE

## 2023-03-13 VITALS
BODY MASS INDEX: 38.37 KG/M2 | SYSTOLIC BLOOD PRESSURE: 121 MMHG | HEIGHT: 61 IN | WEIGHT: 203.25 LBS | HEART RATE: 106 BPM | OXYGEN SATURATION: 97 % | DIASTOLIC BLOOD PRESSURE: 70 MMHG

## 2023-03-13 DIAGNOSIS — M25.511 ACUTE SHOULDER PAIN DUE TO TRAUMA, RIGHT: Primary | ICD-10-CM

## 2023-03-13 DIAGNOSIS — M25.511 ACUTE SHOULDER PAIN DUE TO TRAUMA, RIGHT: ICD-10-CM

## 2023-03-13 DIAGNOSIS — G89.11 ACUTE SHOULDER PAIN DUE TO TRAUMA, RIGHT: ICD-10-CM

## 2023-03-13 DIAGNOSIS — G89.11 ACUTE SHOULDER PAIN DUE TO TRAUMA, RIGHT: Primary | ICD-10-CM

## 2023-03-13 PROCEDURE — 99213 PR OFFICE/OUTPT VISIT, EST, LEVL III, 20-29 MIN: ICD-10-PCS | Mod: HCNC,S$GLB,, | Performed by: INTERNAL MEDICINE

## 2023-03-13 PROCEDURE — 1159F PR MEDICATION LIST DOCUMENTED IN MEDICAL RECORD: ICD-10-PCS | Mod: HCNC,CPTII,S$GLB, | Performed by: INTERNAL MEDICINE

## 2023-03-13 PROCEDURE — 3044F PR MOST RECENT HEMOGLOBIN A1C LEVEL <7.0%: ICD-10-PCS | Mod: HCNC,CPTII,S$GLB, | Performed by: INTERNAL MEDICINE

## 2023-03-13 PROCEDURE — 3008F BODY MASS INDEX DOCD: CPT | Mod: HCNC,CPTII,S$GLB, | Performed by: INTERNAL MEDICINE

## 2023-03-13 PROCEDURE — 3078F DIAST BP <80 MM HG: CPT | Mod: HCNC,CPTII,S$GLB, | Performed by: INTERNAL MEDICINE

## 2023-03-13 PROCEDURE — 73030 XR SHOULDER COMPLETE 2 OR MORE VIEWS RIGHT: ICD-10-PCS | Mod: 26,HCNC,RT, | Performed by: RADIOLOGY

## 2023-03-13 PROCEDURE — 3074F SYST BP LT 130 MM HG: CPT | Mod: HCNC,CPTII,S$GLB, | Performed by: INTERNAL MEDICINE

## 2023-03-13 PROCEDURE — 3044F HG A1C LEVEL LT 7.0%: CPT | Mod: HCNC,CPTII,S$GLB, | Performed by: INTERNAL MEDICINE

## 2023-03-13 PROCEDURE — 99999 PR PBB SHADOW E&M-EST. PATIENT-LVL V: ICD-10-PCS | Mod: PBBFAC,HCNC,, | Performed by: INTERNAL MEDICINE

## 2023-03-13 PROCEDURE — 1159F MED LIST DOCD IN RCRD: CPT | Mod: HCNC,CPTII,S$GLB, | Performed by: INTERNAL MEDICINE

## 2023-03-13 PROCEDURE — 99999 PR PBB SHADOW E&M-EST. PATIENT-LVL V: CPT | Mod: PBBFAC,HCNC,, | Performed by: INTERNAL MEDICINE

## 2023-03-13 PROCEDURE — 3072F LOW RISK FOR RETINOPATHY: CPT | Mod: HCNC,CPTII,S$GLB, | Performed by: INTERNAL MEDICINE

## 2023-03-13 PROCEDURE — 73030 X-RAY EXAM OF SHOULDER: CPT | Mod: TC,HCNC,RT

## 2023-03-13 PROCEDURE — 99213 OFFICE O/P EST LOW 20 MIN: CPT | Mod: HCNC,S$GLB,, | Performed by: INTERNAL MEDICINE

## 2023-03-13 PROCEDURE — 3008F PR BODY MASS INDEX (BMI) DOCUMENTED: ICD-10-PCS | Mod: HCNC,CPTII,S$GLB, | Performed by: INTERNAL MEDICINE

## 2023-03-13 PROCEDURE — 3074F PR MOST RECENT SYSTOLIC BLOOD PRESSURE < 130 MM HG: ICD-10-PCS | Mod: HCNC,CPTII,S$GLB, | Performed by: INTERNAL MEDICINE

## 2023-03-13 PROCEDURE — 3072F PR LOW RISK FOR RETINOPATHY: ICD-10-PCS | Mod: HCNC,CPTII,S$GLB, | Performed by: INTERNAL MEDICINE

## 2023-03-13 PROCEDURE — 73030 X-RAY EXAM OF SHOULDER: CPT | Mod: 26,HCNC,RT, | Performed by: RADIOLOGY

## 2023-03-13 PROCEDURE — 3078F PR MOST RECENT DIASTOLIC BLOOD PRESSURE < 80 MM HG: ICD-10-PCS | Mod: HCNC,CPTII,S$GLB, | Performed by: INTERNAL MEDICINE

## 2023-03-13 RX ORDER — OXYCODONE HYDROCHLORIDE 5 MG/1
5 TABLET ORAL EVERY 4 HOURS PRN
Qty: 16 TABLET | Refills: 0 | Status: CANCELLED | OUTPATIENT
Start: 2023-03-13

## 2023-03-13 NOTE — PROGRESS NOTES
"INTERNAL MEDICINE RESIDENT CLINIC  CLINIC NOTE    Patient Name: Tanika Wayne  YOB: 1960    PRESENTING HISTORY       History of Present Illness:  Ms. Tanika Wayne is a 63 y.o. female w/ a PMHx of partial hysterectomy 2005, breast CA (1998 - tamoxifen until 2003), gastric sleeve (2021) who presents for R shoulder pain. Pt fell on R shoulder (did not hit her head) indoors, when her R knee gave out. R knee has been giving out more since R knee and hip replacement. Intensity 8/10, exacerbated by adduction, not as bad with flexion and extension, remitted by percocet 5-325 for back, q8 instead of q12. Flexeril and lidocaine patch is not helping. Intensity down to 5/10 but makes her sleepy. Pain relief last 3-4 hours on the oxy. Described as achy, location anterior-superior shoulder, radiates to the R elbow. Constant. Unable to complete Adls. Other symptoms include R hand stiffness which remitted with use. No other history of fracture. Pt takes vitamin D, calcium, and multivitamins.      Review of Systems   Constitutional:  Negative for chills, fever and weight loss.   HENT:  Negative for congestion and sore throat.    Eyes:  Negative for blurred vision.   Respiratory:  Negative for cough and shortness of breath.    Cardiovascular:  Negative for chest pain, palpitations and leg swelling.   Gastrointestinal:  Negative for constipation, diarrhea, nausea and vomiting.   Genitourinary:  Negative for dysuria, frequency and urgency.   Musculoskeletal:  Positive for falls and joint pain. Negative for back pain.   Skin:  Negative for rash.   Neurological:  Negative for dizziness and loss of consciousness.   Psychiatric/Behavioral:  The patient is not nervous/anxious and does not have insomnia.      OBJECTIVE:   Vital Signs:  Vitals:    03/13/23 1412   BP: (!) 140/86   Pulse: 106   SpO2: 97%   Weight: 92.2 kg (203 lb 4.2 oz)   Height: 5' 1" (1.549 m)       No results found for this or any previous visit " (from the past 24 hour(s)).      Physical Exam   Constitutional: She is oriented to person, place, and time. No distress.   HENT:   Head: Normocephalic and atraumatic.   Right Ear: External ear normal.   Left Ear: External ear normal.   Nose: Nose normal.   Mouth/Throat: Mucous membranes are moist.   Eyes: Conjunctivae are normal. Right eye exhibits no discharge. Left eye exhibits no discharge.   Cardiovascular: Normal rate, regular rhythm and normal heart sounds. Pulmonary:      Effort: Pulmonary effort is normal. No respiratory distress.      Breath sounds: Normal breath sounds.     Abdominal: Soft. She exhibits no distension and no mass. There is no abdominal tenderness. There is no rebound and no guarding. No hernia.   Musculoskeletal:         General: Normal range of motion.      Right shoulder: Tenderness present. No swelling, deformity, effusion, laceration, bony tenderness or crepitus. Normal strength.      Cervical back: Normal range of motion.      Right lower leg: No edema.      Left lower leg: No edema.      Comments: (+) drop arm test  (+) PROM (-) AROM  (+) tenderness to palpation at AC joint   Neurological: She is alert and oriented to person, place, and time. She has normal motor skills and normal sensation.   5/5 R hand strength    Skin: Skin is warm and dry. No erythema.   Psychiatric: Her behavior is normal. Mood normal.      ASSESSMENT & PLAN:         Tanika was seen today for fall.    Diagnoses and all orders for this visit:    Acute shoulder pain due to trauma, right  Pt with traumatic R shoulder injury, positive drop arm test, preserved PROM but restricted AROM. Ddx: Rotator cuff tendinopathy vs fracture vs AC separation. Ordering XR to rule out fracture. In the future can consider DEXA scan given post menopausal state.    -     X-ray Shoulder 2 or More Views Right; Future  -     Ambulatory referral/consult to Orthopedics; Future      Other Orders Placed This Visit:  Orders Placed This  Encounter   Procedures    X-ray Shoulder 2 or More Views Right    Ambulatory referral/consult to Orthopedics         Discussed with Dr. Hernandez  Will contact w/ lab results and will No follow-ups on file.     Signed:    Nia Garcia,   Internal Medicine PGY 1    I have reviewed and concur with the resident's history, physical, assessment, and plan.  I have personally interviewed and examined the patient at bedside.  See below addendum for my evaluation and additional findings.  Nothing to add

## 2023-03-14 ENCOUNTER — PATIENT MESSAGE (OUTPATIENT)
Dept: BARIATRICS | Facility: CLINIC | Age: 63
End: 2023-03-14
Payer: MEDICARE

## 2023-03-22 NOTE — PROGRESS NOTES
Speech-Language Pathology Visit    Referred by: Hi Teague DO; Medical Diagnosis (from order):    Diagnosis Information      Diagnosis    315.31 (ICD-9-CM) - F80.1 (ICD-10-CM) - Expressive speech delay              Visit: 8    Visit Type: Daily Treatment Note    SUBJECTIVE                                                                                                             Present and reporting subjective information: mother  Pt has excellent session today with transitions (use of visual schedule + timer) and expressive/receptive language objectives. Mom reports pt has been interjecting w/ conversations at home using variagated babbling and/or real word approximations      OBJECTIVE                                                                                                                                            Treatment  -Pt independently producing \"open please\" in all opportutnities; requires min prompt of \"I\" for target \"I need help\" for expanded length of utterance in 60% opportunities   -Independently combining 2 words with prompt in majority of opportunities  -Pt models \"spin\" and \"jump\" with object after auditory cue from clinician in 2/3 opportunities   -continued big/small adjectives, continued shapes of square and triangle  -attempted speech routine/song of \"head shoulders knees toes\"; pt had other preferred activities       Education  - Present: mother  Educated on: modeling slower rate of speech for pt to match at home.    ASSESSMENT                                                                                                        Pt has exciting session today due to very successful transitions. Additionally, pt demonstrates some frustrations with toys falling, and pt requests help by saying \"here you go\"  Or \"help\" and placing toy in clinicians lap or hand but expands utterance with min prompt of \"I\" to \"I need help\". Furthermore, pt is approximating a new word \"score\" by stating  Subjective:       Patient ID: Tanika Wayne is a 58 y.o. female.    Chief Complaint: Follow-up ('s former patient, 4 month f/u)    Patient is here for followup for chronic conditions.    Hopes to lose wt to be elibigle for hip replacement.    Needs med refills.    Out of losartan/HCTZ one day.      Review of Systems   Constitutional: Negative for fatigue, fever and unexpected weight change.   HENT: Negative for congestion, hearing loss and sore throat.    Eyes: Negative for visual disturbance.   Respiratory: Negative for cough, shortness of breath and wheezing.    Cardiovascular: Negative for chest pain and palpitations.   Gastrointestinal: Negative for abdominal distention, abdominal pain, blood in stool, diarrhea, nausea and vomiting.   Genitourinary: Negative for difficulty urinating, dysuria, frequency and hematuria.   Musculoskeletal: Positive for arthralgias and gait problem. Negative for joint swelling.        Positive for pain in her right hip and knee.   Skin: Positive for rash (R foot and intertrigo). Negative for color change.   Neurological: Negative for dizziness, syncope, weakness and headaches.   Hematological: Negative for adenopathy.   Psychiatric/Behavioral: Negative for confusion, decreased concentration and suicidal ideas.       Objective:      Physical Exam   Constitutional: She is oriented to person, place, and time. She appears well-developed and well-nourished.   Obesity   HENT:   Head: Normocephalic.   Mouth/Throat: No oropharyngeal exudate.   Eyes: Conjunctivae and EOM are normal. Pupils are equal, round, and reactive to light. Right eye exhibits no discharge. Left eye exhibits no discharge. No scleral icterus.   Neck: No JVD present. No tracheal deviation present. No thyromegaly present.   Cardiovascular: Normal rate, regular rhythm and normal heart sounds.  Exam reveals no gallop and no friction rub.    No murmur heard.  Pulmonary/Chest: Effort normal and breath sounds  \"core\" in both immediate and delayed repetition. Pt does demonstrate W sitting behaviors but corrects with mom's direction.     Education:   - Results of above outlined education: Verbalizes understanding    PLAN                                                                                                                         Suggestions for next session as indicated: Progress per plan of care             Therapy procedure time and total treatment time can be found documented on the Time Entry flowsheet.   normal. No respiratory distress. She has no wheezes. She has no rales. She exhibits no tenderness.   Abdominal: Soft. Bowel sounds are normal. She exhibits no distension and no mass. There is no tenderness. There is no rebound and no guarding.   Musculoskeletal: Normal range of motion. She exhibits no edema or tenderness.   Protective Sensation (w/ 10 gram monofilament):  Right: Intact  Left: Intact    Visual Inspection:  R foot tinea    Pedal Pulses:   Right: Present  Left: Present    Posterior tibialis:   Right:Present  Left: Present      Lymphadenopathy:     She has no cervical adenopathy.   Neurological: She is alert and oriented to person, place, and time. She displays normal reflexes. No cranial nerve deficit. She exhibits normal muscle tone. Coordination normal.   Skin: Skin is warm and dry. No rash noted. No erythema. No pallor.   R foot tinea.    Intertrigo below bilat breasts and pannus bilat   Psychiatric: She has a normal mood and affect. Her behavior is normal. Judgment and thought content normal.       Assessment:       1. Intertrigo    2. History of subtotal thyroidectomy    3. Essential hypertension, malignant    4. Depression, unspecified depression type    5. Class 3 severe obesity due to excess calories with serious comorbidity and body mass index (BMI) of 45.0 to 49.9 in adult        Plan:       Tanika was seen today for follow-up.    Diagnoses and all orders for this visit:    Intertrigo  -     clotrimazole (LOTRIMIN) 1 % cream; Apply topically 2 (two) times daily.    History of subtotal thyroidectomy  -     levothyroxine (SYNTHROID) 125 MCG tablet; Take 1 tablet (125 mcg total) by mouth once daily.  controlled    Essential hypertension, malignant  -     carvedilol (COREG) 3.125 MG tablet; Take 1 tablet (3.125 mg total) by mouth 2 (two) times daily.  -     losartan-hydrochlorothiazide 100-12.5 mg (HYZAAR) 100-12.5 mg Tab; Take 1 tablet by mouth every morning.  -     NIFEdipine (PROCARDIA-XL) 90  MG (OSM) 24 hr tablet; Take 1 tablet (90 mg total) by mouth once daily.  -     potassium chloride SA (K-DUR,KLOR-CON) 20 MEQ tablet; Take 2 tablets (40 mEq total) by mouth 2 (two) times daily.  Not controlled, 3 week recheck by NP, consider spironolactone 25mg if still gm (with close K check)    Depression, unspecified depression type  -     venlafaxine (EFFEXOR-XR) 75 MG 24 hr capsule; TAKE 1 CAPSULE EVERY NIGHT    Class 3 severe obesity due to excess calories with serious comorbidity and body mass index (BMI) of 45.0 to 49.9 in adult  -     Ambulatory Referral to Bariatric Surgery ([place])        Health Maintenance       Date Due Completion Date    Low Dose Statin 01/01/1982 ---    Pneumococcal PPSV23 (High Risk) (2) 11/05/2016 1/2/2006    Influenza Vaccine 08/01/2018 10/10/2017    Hemoglobin A1c 01/16/2019 7/16/2018    Mammogram 01/29/2019 1/29/2018    Eye Exam 05/29/2019 5/29/2018    Lipid Panel 07/16/2019 7/16/2018    Foot Exam 07/23/2019 7/23/2018 (Done)    Override on 7/23/2018: Done    TETANUS VACCINE 07/01/2021 7/1/2011    Colonoscopy 12/10/2022 12/10/2012      Declines statin    Follow-up in about 6 months (around 1/23/2019).

## 2023-04-03 ENCOUNTER — PATIENT MESSAGE (OUTPATIENT)
Dept: ADMINISTRATIVE | Facility: HOSPITAL | Age: 63
End: 2023-04-03
Payer: MEDICARE

## 2023-04-05 ENCOUNTER — PATIENT MESSAGE (OUTPATIENT)
Dept: BARIATRICS | Facility: CLINIC | Age: 63
End: 2023-04-05
Payer: MEDICARE

## 2023-04-11 ENCOUNTER — PATIENT MESSAGE (OUTPATIENT)
Dept: BARIATRICS | Facility: CLINIC | Age: 63
End: 2023-04-11
Payer: MEDICARE

## 2023-04-12 ENCOUNTER — PATIENT MESSAGE (OUTPATIENT)
Dept: PHYSICAL MEDICINE AND REHAB | Facility: CLINIC | Age: 63
End: 2023-04-12
Payer: MEDICARE

## 2023-04-13 DIAGNOSIS — G89.29 CHRONIC BILATERAL LOW BACK PAIN WITH RIGHT-SIDED SCIATICA: ICD-10-CM

## 2023-04-13 DIAGNOSIS — Z96.651 STATUS POST TOTAL RIGHT KNEE REPLACEMENT: ICD-10-CM

## 2023-04-13 DIAGNOSIS — F11.90 UNCOMPLICATED OPIOID USE: ICD-10-CM

## 2023-04-13 DIAGNOSIS — M43.16 SPONDYLOLISTHESIS AT L3-L4 LEVEL: ICD-10-CM

## 2023-04-13 DIAGNOSIS — M54.9 BACK PAIN, UNSPECIFIED BACK LOCATION, UNSPECIFIED BACK PAIN LATERALITY, UNSPECIFIED CHRONICITY: ICD-10-CM

## 2023-04-13 DIAGNOSIS — M48.062 SPINAL STENOSIS OF LUMBAR REGION WITH NEUROGENIC CLAUDICATION: ICD-10-CM

## 2023-04-13 DIAGNOSIS — M54.41 CHRONIC BILATERAL LOW BACK PAIN WITH RIGHT-SIDED SCIATICA: ICD-10-CM

## 2023-04-13 DIAGNOSIS — Z96.641 STATUS POST RIGHT HIP REPLACEMENT: ICD-10-CM

## 2023-04-16 RX ORDER — OXYCODONE AND ACETAMINOPHEN 5; 325 MG/1; MG/1
1 TABLET ORAL EVERY 12 HOURS PRN
Qty: 60 TABLET | Refills: 0 | Status: SHIPPED | OUTPATIENT
Start: 2023-04-16 | End: 2023-05-15 | Stop reason: SDUPTHER

## 2023-04-21 DIAGNOSIS — I10 ESSENTIAL HYPERTENSION, MALIGNANT: ICD-10-CM

## 2023-04-21 DIAGNOSIS — E11.9 TYPE 2 DIABETES MELLITUS WITHOUT COMPLICATION, WITHOUT LONG-TERM CURRENT USE OF INSULIN: ICD-10-CM

## 2023-04-21 NOTE — TELEPHONE ENCOUNTER
----- Message from Annemarie Gibbons sent at 4/21/2023  4:12 PM CDT -----  Contact: Tanika   Tanika would like a call back about the status of a refill request on the Carvedilol & Atorvastatin 10 mg  the pharmacy left a message two days ago about this

## 2023-04-21 NOTE — TELEPHONE ENCOUNTER
Care Due:                  Date            Visit Type   Department     Provider  --------------------------------------------------------------------------------                                SAME DAY -                              ESTABLISHED   [facility] INTERNAL  Last Visit: 03-      PATIENT      MEDICINE       Rayo Hernandez                              EP -                              PRIMARY      [facility] INTERNAL  Next Visit: 01-      CARE ([facility])   MEDICINE       Rayo Hernandez                                                            Last  Test          Frequency    Reason                     Performed    Due Date  --------------------------------------------------------------------------------    CMP.........  12 months..  atorvastatin.............  04- 04-    HBA1C.......  6 months...  metFORMIN................  01- 07-    Lipid Panel.  12 months..  atorvastatin.............  04- 04-    [facility] Embedded Care Gaps. Reference number: [id number]. 4/21/2023   4:22:10 PM CDT

## 2023-04-24 DIAGNOSIS — E11.9 TYPE 2 DIABETES MELLITUS WITHOUT COMPLICATION, WITHOUT LONG-TERM CURRENT USE OF INSULIN: ICD-10-CM

## 2023-04-24 DIAGNOSIS — I10 ESSENTIAL HYPERTENSION, MALIGNANT: ICD-10-CM

## 2023-04-24 RX ORDER — ATORVASTATIN CALCIUM 10 MG/1
10 TABLET, FILM COATED ORAL DAILY
Qty: 90 TABLET | Refills: 11 | Status: SHIPPED | OUTPATIENT
Start: 2023-04-24 | End: 2023-04-24

## 2023-04-24 RX ORDER — CARVEDILOL 3.12 MG/1
3.12 TABLET ORAL 2 TIMES DAILY
Qty: 180 TABLET | Refills: 11 | Status: SHIPPED | OUTPATIENT
Start: 2023-04-24

## 2023-04-24 RX ORDER — CARVEDILOL 3.12 MG/1
3.12 TABLET ORAL 2 TIMES DAILY
Qty: 180 TABLET | Refills: 11 | Status: SHIPPED | OUTPATIENT
Start: 2023-04-24 | End: 2023-04-24

## 2023-04-24 RX ORDER — ATORVASTATIN CALCIUM 10 MG/1
10 TABLET, FILM COATED ORAL DAILY
Qty: 90 TABLET | Refills: 11 | Status: SHIPPED | OUTPATIENT
Start: 2023-04-24

## 2023-04-24 NOTE — TELEPHONE ENCOUNTER
----- Message from Ulises Palacios sent at 4/24/2023  2:46 PM CDT -----  Type:  Patient Returning Call    Who Called:pt  Who Left Message for Patient:  Does the patient know what this is regarding?:rx refill  Would the patient rather a call back or a response via [facility]? call  Best Call Back Number:[phone]  Additional Information: pt needs an refill on atorvastatin (LIPITOR) 10 MG tablet, carvediloL (COREG) 3.125 MG tablet sent to [facility]/pharmacy #[id number] - [place], [place] - [address]   Phone: [phone]  Please give pt an call when the prescription has been called into the pharmacy

## 2023-04-24 NOTE — TELEPHONE ENCOUNTER
No new care gaps identified.  [facility] Embedded Care Gaps. Reference number: [id number]. 4/24/2023   2:59:22 PM CDT

## 2023-05-02 ENCOUNTER — PES CALL (OUTPATIENT)
Dept: ADMINISTRATIVE | Facility: CLINIC | Age: 63
End: 2023-05-02
Payer: MEDICARE

## 2023-05-03 ENCOUNTER — PATIENT MESSAGE (OUTPATIENT)
Dept: BARIATRICS | Facility: CLINIC | Age: 63
End: 2023-05-03
Payer: MEDICARE

## 2023-05-09 ENCOUNTER — PATIENT MESSAGE (OUTPATIENT)
Dept: INTERNAL MEDICINE | Facility: CLINIC | Age: 63
End: 2023-05-09
Payer: MEDICARE

## 2023-05-09 ENCOUNTER — PATIENT MESSAGE (OUTPATIENT)
Dept: BARIATRICS | Facility: CLINIC | Age: 63
End: 2023-05-09
Payer: MEDICARE

## 2023-05-09 DIAGNOSIS — Z12.31 ENCOUNTER FOR SCREENING MAMMOGRAM FOR MALIGNANT NEOPLASM OF BREAST: Primary | ICD-10-CM

## 2023-05-09 DIAGNOSIS — R21 RASH: ICD-10-CM

## 2023-05-10 RX ORDER — CLOTRIMAZOLE AND BETAMETHASONE DIPROPIONATE 10; .64 MG/G; MG/G
CREAM TOPICAL 2 TIMES DAILY PRN
Qty: 45 G | Refills: 1 | Status: SHIPPED | OUTPATIENT
Start: 2023-05-10 | End: 2024-01-17 | Stop reason: SDUPTHER

## 2023-05-15 ENCOUNTER — PATIENT MESSAGE (OUTPATIENT)
Dept: PHYSICAL MEDICINE AND REHAB | Facility: CLINIC | Age: 63
End: 2023-05-15
Payer: MEDICARE

## 2023-05-15 DIAGNOSIS — M43.16 SPONDYLOLISTHESIS AT L3-L4 LEVEL: ICD-10-CM

## 2023-05-15 DIAGNOSIS — G89.29 CHRONIC BILATERAL LOW BACK PAIN WITH RIGHT-SIDED SCIATICA: ICD-10-CM

## 2023-05-15 DIAGNOSIS — Z96.641 STATUS POST RIGHT HIP REPLACEMENT: ICD-10-CM

## 2023-05-15 DIAGNOSIS — Z96.651 STATUS POST TOTAL RIGHT KNEE REPLACEMENT: ICD-10-CM

## 2023-05-15 DIAGNOSIS — M54.41 CHRONIC BILATERAL LOW BACK PAIN WITH RIGHT-SIDED SCIATICA: ICD-10-CM

## 2023-05-15 DIAGNOSIS — M54.9 BACK PAIN, UNSPECIFIED BACK LOCATION, UNSPECIFIED BACK PAIN LATERALITY, UNSPECIFIED CHRONICITY: ICD-10-CM

## 2023-05-15 DIAGNOSIS — M48.062 SPINAL STENOSIS OF LUMBAR REGION WITH NEUROGENIC CLAUDICATION: ICD-10-CM

## 2023-05-15 DIAGNOSIS — F11.90 UNCOMPLICATED OPIOID USE: ICD-10-CM

## 2023-05-17 ENCOUNTER — PATIENT MESSAGE (OUTPATIENT)
Dept: INTERNAL MEDICINE | Facility: CLINIC | Age: 63
End: 2023-05-17
Payer: MEDICARE

## 2023-05-17 DIAGNOSIS — E11.9 TYPE 2 DIABETES MELLITUS WITHOUT COMPLICATION, WITHOUT LONG-TERM CURRENT USE OF INSULIN: ICD-10-CM

## 2023-05-17 RX ORDER — METFORMIN HYDROCHLORIDE 500 MG/1
500 TABLET, EXTENDED RELEASE ORAL 2 TIMES DAILY WITH MEALS
Qty: 180 TABLET | Refills: 11 | Status: SHIPPED | OUTPATIENT
Start: 2023-05-17

## 2023-05-18 RX ORDER — OXYCODONE AND ACETAMINOPHEN 5; 325 MG/1; MG/1
1 TABLET ORAL EVERY 12 HOURS PRN
Qty: 60 TABLET | Refills: 0 | Status: SHIPPED | OUTPATIENT
Start: 2023-05-18 | End: 2023-06-21 | Stop reason: SDUPTHER

## 2023-05-22 DIAGNOSIS — F32.A DEPRESSION, UNSPECIFIED DEPRESSION TYPE: ICD-10-CM

## 2023-05-22 DIAGNOSIS — I10 ESSENTIAL HYPERTENSION, MALIGNANT: ICD-10-CM

## 2023-05-22 DIAGNOSIS — R12 HEARTBURN: ICD-10-CM

## 2023-05-22 RX ORDER — VENLAFAXINE HYDROCHLORIDE 150 MG/1
150 CAPSULE, EXTENDED RELEASE ORAL DAILY
Qty: 90 CAPSULE | Refills: 11 | Status: SHIPPED | OUTPATIENT
Start: 2023-05-22

## 2023-05-22 RX ORDER — POTASSIUM CHLORIDE 20 MEQ/1
40 TABLET, EXTENDED RELEASE ORAL 2 TIMES DAILY
Qty: 360 TABLET | Refills: 11 | Status: SHIPPED | OUTPATIENT
Start: 2023-05-22

## 2023-05-22 NOTE — TELEPHONE ENCOUNTER
No care due was identified.  [facility] Embedded Care Due Messages. Reference number: [id number].   5/22/2023 9:43:26 AM CDT

## 2023-05-30 NOTE — TELEPHONE ENCOUNTER
Called and spoke with the patient to schedule the patient a new patient appointment with Dr. Parra     ----- Message from Stacie Reis sent at 7/27/2020  3:02 PM CDT -----  Contact: WALTER CABA [[id number]]  Name of Who is Calling: WALTER CABA [[id number]]      What is the request in detail: Pt would like to be seen as soon as possible for Parotitis. Referral is in Epic from ER. Please call to schedule       Can the clinic reply by [facility]: no      What Number to Call Back if not [facility]: [phone] (home)                                          PROGRESS NOTE    SUBJECTIVE:   Robbi Kerr is a 62 y.o. male seen for a follow up visit regarding the following chief complaint:     Chief Complaint   Patient presents with    Skin Problem     Skin rash and ulcers on hands for 7 weeks after insect bites           HPI patient is doing a jeep restoration and does not know if he got bit by something but for the past month he has been using straight hydroperoxide over top of it antibiotic ointments etc.      Past Medical History, Past Surgical History, Family history, Social History, and Medications were all reviewed with the patient today and updated as necessary. Current Outpatient Medications   Medication Sig Dispense Refill    cephALEXin (KEFLEX) 500 MG capsule Take 1 capsule by mouth 3 times daily for 7 days 21 capsule 0    methylPREDNISolone (MEDROL, PAUL,) 4 MG tablet Day 1:  Take 1 tablet 6 times daily, Day 2:  Take 1 tablet 5 times daily, Day 3:  Take 1 tablet 4 times daily, Day 4:  Take 1 tablet 3 times daily, Day 5:  Take 1 tablet 2 times daily, Day 6:  Take 1 tablet 1 time daily then stop. 21 tablet 0    SITagliptin-metFORMIN (JANUMET XR)  MG TB24 per extended release tablet Take 1 tablet by mouth in the morning and at bedtime 180 tablet 3    Testosterone (ANDROGEL) 20.25 MG/ACT (1.62%) GEL gel Place 4 actuation onto the skin daily for 30 days.  2 each 5    rosuvastatin (CRESTOR) 40 MG tablet Take 1 tablet by mouth daily 90 tablet 3    enalapril (VASOTEC) 10 MG tablet Take 1 tablet by mouth daily 90 tablet 3    propranolol (INDERAL) 40 MG tablet Take 1 tablet by mouth 2 times daily Take 1 tablet 2 times a day 180 tablet 3    chlorthalidone (HYGROTON) 25 MG tablet Take 1 tablet by mouth daily 90 tablet 3    pioglitazone (ACTOS) 30 MG tablet Take 1 tablet by mouth daily 90 tablet 3    sildenafil (VIAGRA) 50 MG tablet Take 1 tablet by mouth as needed for Erectile Dysfunction 90 tablet 1    aspirin 81 MG EC tablet Take 1 tablet by mouth in the

## 2023-05-31 ENCOUNTER — HOSPITAL ENCOUNTER (OUTPATIENT)
Dept: RADIOLOGY | Facility: OTHER | Age: 63
Discharge: HOME | End: 2023-05-31
Attending: INTERNAL MEDICINE
Payer: MEDICARE

## 2023-05-31 DIAGNOSIS — Z12.31 ENCOUNTER FOR SCREENING MAMMOGRAM FOR MALIGNANT NEOPLASM OF BREAST: ICD-10-CM

## 2023-05-31 PROCEDURE — 77067 SCR MAMMO BI INCL CAD: CPT | Mod: 26,HCNC,, | Performed by: RADIOLOGY

## 2023-05-31 PROCEDURE — 77063 BREAST TOMOSYNTHESIS BI: CPT | Mod: 26,HCNC,, | Performed by: RADIOLOGY

## 2023-05-31 PROCEDURE — 77067 MAMMO DIGITAL SCREENING BILAT WITH TOMO: ICD-10-PCS | Mod: 26,HCNC,, | Performed by: RADIOLOGY

## 2023-05-31 PROCEDURE — 77067 SCR MAMMO BI INCL CAD: CPT | Mod: TC,HCNC

## 2023-05-31 PROCEDURE — 77063 MAMMO DIGITAL SCREENING BILAT WITH TOMO: ICD-10-PCS | Mod: 26,HCNC,, | Performed by: RADIOLOGY

## 2023-06-07 ENCOUNTER — PATIENT MESSAGE (OUTPATIENT)
Dept: BARIATRICS | Facility: CLINIC | Age: 63
End: 2023-06-07
Payer: MEDICARE

## 2023-06-13 ENCOUNTER — PATIENT MESSAGE (OUTPATIENT)
Dept: BARIATRICS | Facility: CLINIC | Age: 63
End: 2023-06-13
Payer: MEDICARE

## 2023-06-20 ENCOUNTER — PES CALL (OUTPATIENT)
Dept: ADMINISTRATIVE | Facility: CLINIC | Age: 63
End: 2023-06-20
Payer: MEDICARE

## 2023-06-21 DIAGNOSIS — Z96.641 STATUS POST RIGHT HIP REPLACEMENT: ICD-10-CM

## 2023-06-21 DIAGNOSIS — M48.062 SPINAL STENOSIS OF LUMBAR REGION WITH NEUROGENIC CLAUDICATION: ICD-10-CM

## 2023-06-21 DIAGNOSIS — F11.90 UNCOMPLICATED OPIOID USE: ICD-10-CM

## 2023-06-21 DIAGNOSIS — M54.9 BACK PAIN, UNSPECIFIED BACK LOCATION, UNSPECIFIED BACK PAIN LATERALITY, UNSPECIFIED CHRONICITY: ICD-10-CM

## 2023-06-21 DIAGNOSIS — Z96.651 STATUS POST TOTAL RIGHT KNEE REPLACEMENT: ICD-10-CM

## 2023-06-21 DIAGNOSIS — G89.29 CHRONIC BILATERAL LOW BACK PAIN WITH RIGHT-SIDED SCIATICA: ICD-10-CM

## 2023-06-21 DIAGNOSIS — M54.41 CHRONIC BILATERAL LOW BACK PAIN WITH RIGHT-SIDED SCIATICA: ICD-10-CM

## 2023-06-21 DIAGNOSIS — M43.16 SPONDYLOLISTHESIS AT L3-L4 LEVEL: ICD-10-CM

## 2023-06-21 RX ORDER — OXYCODONE AND ACETAMINOPHEN 5; 325 MG/1; MG/1
1 TABLET ORAL EVERY 12 HOURS PRN
Qty: 60 TABLET | Refills: 0 | Status: SHIPPED | OUTPATIENT
Start: 2023-06-21 | End: 2023-07-07

## 2023-06-21 NOTE — TELEPHONE ENCOUNTER
----- Message from Silvana Brower sent at 6/21/2023 10:09 AM CDT -----  Contact: [phone]  Pt calling to relay that Pain Mgmt Dr Marte is only doing inpatient services and Oxycodone has to be renewed through Pt PCP Dvorin       Please call pt and verbalize @ # [phone]

## 2023-06-21 NOTE — TELEPHONE ENCOUNTER
Care Due:                  Date            Visit Type   Department     Provider  --------------------------------------------------------------------------------                                SAME DAY -                              ESTABLISHED   [facility] INTERNAL  Last Visit: 03-      PATIENT      MEDICINE       Rayo Hernandez                              EP -                              PRIMARY      [facility] INTERNAL  Next Visit: 01-      CARE ([facility])   MEDICINE       Rayo Hernandez                                                            Last  Test          Frequency    Reason                     Performed    Due Date  --------------------------------------------------------------------------------    CMP.........  12 months..  atorvastatin.............  04- 04-    HBA1C.......  6 months...  metFORMIN................  01- 07-    Lipid Panel.  12 months..  atorvastatin.............  04- 04-    [facility] Embedded Care Due Messages. Reference number: [id number].   6/21/2023 10:22:02 AM CDT

## 2023-06-21 NOTE — TELEPHONE ENCOUNTER
Hi, please call her -- I have sent in he pain medicine, but I need to see her back in clinic to specific discuss continuing her pain meds, since I have not discussed pain meds with her previously.    Please offer her an appt with me for 2-4 weeks from now.    I have some opening 7/7 in the morning, this will need to be an in person appt (the open video visits can be turned to in person).    Let me know if patient has any questions.  Thank you, Rayo Hernandez

## 2023-06-21 NOTE — TELEPHONE ENCOUNTER
Pt stated  Pain Mgmt Dr Marte is only doing inpatient services and Oxycodone has to be renewed/refilled through Pt's PCP.

## 2023-06-26 ENCOUNTER — TELEPHONE (OUTPATIENT)
Dept: BARIATRICS | Facility: CLINIC | Age: 63
End: 2023-06-26
Payer: MEDICARE

## 2023-06-26 RX ORDER — FAMOTIDINE 40 MG/1
40 TABLET, FILM COATED ORAL DAILY
Qty: 90 TABLET | Refills: 3 | Status: SHIPPED | OUTPATIENT
Start: 2023-06-26 | End: 2023-08-01

## 2023-06-26 NOTE — TELEPHONE ENCOUNTER
----- Message from Shilpi Crooks sent at 6/26/2023  8:19 AM CDT -----  Regarding: refill  Contact: @ [phone]  Pt is calling to get a refill on the following famotidine (PEPCID AC) 20 MG tablet...Please call and adv @ [phone]

## 2023-06-26 NOTE — TELEPHONE ENCOUNTER
Returned pt's portal message. Pt states that she needs her Pepcid AC 20 mg refilled. Pt states that she gets reflux after eating certain foods, example pt gave was steak. Informed pt that I would forward message to practitioner. Understanding stated.

## 2023-07-03 RX ORDER — FAMOTIDINE 20 MG/1
20 TABLET, FILM COATED ORAL 2 TIMES DAILY
Qty: 60 TABLET | Refills: 6 | Status: SHIPPED | OUTPATIENT
Start: 2023-07-03 | End: 2024-01-17

## 2023-07-07 ENCOUNTER — OFFICE VISIT (OUTPATIENT)
Dept: INTERNAL MEDICINE | Facility: CLINIC | Age: 63
End: 2023-07-07
Payer: MEDICARE

## 2023-07-07 VITALS
DIASTOLIC BLOOD PRESSURE: 80 MMHG | BODY MASS INDEX: 39.08 KG/M2 | SYSTOLIC BLOOD PRESSURE: 130 MMHG | HEIGHT: 61 IN | WEIGHT: 207 LBS | OXYGEN SATURATION: 97 % | HEART RATE: 105 BPM

## 2023-07-07 DIAGNOSIS — M48.062 SPINAL STENOSIS OF LUMBAR REGION WITH NEUROGENIC CLAUDICATION: ICD-10-CM

## 2023-07-07 DIAGNOSIS — R30.0 DYSURIA: ICD-10-CM

## 2023-07-07 DIAGNOSIS — Z98.890 S/P LUMBAR LAMINECTOMY: ICD-10-CM

## 2023-07-07 DIAGNOSIS — E11.9 TYPE 2 DIABETES MELLITUS WITHOUT COMPLICATION, WITHOUT LONG-TERM CURRENT USE OF INSULIN: Primary | ICD-10-CM

## 2023-07-07 DIAGNOSIS — F11.90 UNCOMPLICATED OPIOID USE: ICD-10-CM

## 2023-07-07 DIAGNOSIS — M51.36 DDD (DEGENERATIVE DISC DISEASE), LUMBAR: ICD-10-CM

## 2023-07-07 DIAGNOSIS — M43.16 SPONDYLOLISTHESIS AT L3-L4 LEVEL: ICD-10-CM

## 2023-07-07 DIAGNOSIS — G89.29 CHRONIC BILATERAL LOW BACK PAIN WITH RIGHT-SIDED SCIATICA: ICD-10-CM

## 2023-07-07 DIAGNOSIS — M54.9 BACK PAIN, UNSPECIFIED BACK LOCATION, UNSPECIFIED BACK PAIN LATERALITY, UNSPECIFIED CHRONICITY: ICD-10-CM

## 2023-07-07 DIAGNOSIS — Z96.651 STATUS POST TOTAL RIGHT KNEE REPLACEMENT: ICD-10-CM

## 2023-07-07 DIAGNOSIS — Z00.00 ROUTINE GENERAL MEDICAL EXAMINATION AT A HEALTH CARE FACILITY: ICD-10-CM

## 2023-07-07 DIAGNOSIS — Z11.4 ENCOUNTER FOR SCREENING FOR HIV: ICD-10-CM

## 2023-07-07 DIAGNOSIS — M54.41 CHRONIC BILATERAL LOW BACK PAIN WITH RIGHT-SIDED SCIATICA: ICD-10-CM

## 2023-07-07 DIAGNOSIS — Z79.891 CHRONIC USE OF OPIATE FOR THERAPEUTIC PURPOSE: ICD-10-CM

## 2023-07-07 DIAGNOSIS — Z96.641 STATUS POST RIGHT HIP REPLACEMENT: ICD-10-CM

## 2023-07-07 PROCEDURE — 1160F PR REVIEW ALL MEDS BY PRESCRIBER/CLIN PHARMACIST DOCUMENTED: ICD-10-PCS | Mod: HCNC,CPTII,S$GLB, | Performed by: INTERNAL MEDICINE

## 2023-07-07 PROCEDURE — 3075F PR MOST RECENT SYSTOLIC BLOOD PRESS GE 130-139MM HG: ICD-10-PCS | Mod: HCNC,CPTII,S$GLB, | Performed by: INTERNAL MEDICINE

## 2023-07-07 PROCEDURE — 3075F SYST BP GE 130 - 139MM HG: CPT | Mod: HCNC,CPTII,S$GLB, | Performed by: INTERNAL MEDICINE

## 2023-07-07 PROCEDURE — 3008F BODY MASS INDEX DOCD: CPT | Mod: HCNC,CPTII,S$GLB, | Performed by: INTERNAL MEDICINE

## 2023-07-07 PROCEDURE — 3072F LOW RISK FOR RETINOPATHY: CPT | Mod: HCNC,CPTII,S$GLB, | Performed by: INTERNAL MEDICINE

## 2023-07-07 PROCEDURE — 3044F HG A1C LEVEL LT 7.0%: CPT | Mod: HCNC,CPTII,S$GLB, | Performed by: INTERNAL MEDICINE

## 2023-07-07 PROCEDURE — 3072F PR LOW RISK FOR RETINOPATHY: ICD-10-PCS | Mod: HCNC,CPTII,S$GLB, | Performed by: INTERNAL MEDICINE

## 2023-07-07 PROCEDURE — 99999 PR PBB SHADOW E&M-EST. PATIENT-LVL V: CPT | Mod: PBBFAC,HCNC,, | Performed by: INTERNAL MEDICINE

## 2023-07-07 PROCEDURE — 3079F DIAST BP 80-89 MM HG: CPT | Mod: HCNC,CPTII,S$GLB, | Performed by: INTERNAL MEDICINE

## 2023-07-07 PROCEDURE — 1159F MED LIST DOCD IN RCRD: CPT | Mod: HCNC,CPTII,S$GLB, | Performed by: INTERNAL MEDICINE

## 2023-07-07 PROCEDURE — 3044F PR MOST RECENT HEMOGLOBIN A1C LEVEL <7.0%: ICD-10-PCS | Mod: HCNC,CPTII,S$GLB, | Performed by: INTERNAL MEDICINE

## 2023-07-07 PROCEDURE — 3008F PR BODY MASS INDEX (BMI) DOCUMENTED: ICD-10-PCS | Mod: HCNC,CPTII,S$GLB, | Performed by: INTERNAL MEDICINE

## 2023-07-07 PROCEDURE — 99999 PR PBB SHADOW E&M-EST. PATIENT-LVL V: ICD-10-PCS | Mod: PBBFAC,HCNC,, | Performed by: INTERNAL MEDICINE

## 2023-07-07 PROCEDURE — 3079F PR MOST RECENT DIASTOLIC BLOOD PRESSURE 80-89 MM HG: ICD-10-PCS | Mod: HCNC,CPTII,S$GLB, | Performed by: INTERNAL MEDICINE

## 2023-07-07 PROCEDURE — 99214 PR OFFICE/OUTPT VISIT, EST, LEVL IV, 30-39 MIN: ICD-10-PCS | Mod: HCNC,S$GLB,, | Performed by: INTERNAL MEDICINE

## 2023-07-07 PROCEDURE — 1159F PR MEDICATION LIST DOCUMENTED IN MEDICAL RECORD: ICD-10-PCS | Mod: HCNC,CPTII,S$GLB, | Performed by: INTERNAL MEDICINE

## 2023-07-07 PROCEDURE — 99214 OFFICE O/P EST MOD 30 MIN: CPT | Mod: HCNC,S$GLB,, | Performed by: INTERNAL MEDICINE

## 2023-07-07 PROCEDURE — 1160F RVW MEDS BY RX/DR IN RCRD: CPT | Mod: HCNC,CPTII,S$GLB, | Performed by: INTERNAL MEDICINE

## 2023-07-07 RX ORDER — OXYCODONE AND ACETAMINOPHEN 5; 325 MG/1; MG/1
1 TABLET ORAL EVERY 12 HOURS PRN
Qty: 60 TABLET | Refills: 0 | Status: SHIPPED | OUTPATIENT
Start: 2023-07-07 | End: 2023-07-07

## 2023-07-07 RX ORDER — OXYCODONE AND ACETAMINOPHEN 5; 325 MG/1; MG/1
1 TABLET ORAL EVERY 12 HOURS PRN
Qty: 60 TABLET | Refills: 0 | Status: SHIPPED | OUTPATIENT
Start: 2023-07-07 | End: 2023-09-12 | Stop reason: SDUPTHER

## 2023-07-07 NOTE — PROGRESS NOTES
Subjective:       Patient ID: Tanika Wayne is a 63 y.o. female.    Chief Complaint: Follow-up    Patient is here for followup for chronic conditions.    Has chronic low back pains, also occ R shoulder pains. Pain med helps at [facility] for NP too. Also uses flexeril bid.  She is here to transfer pain med rxing from Dr Cruz to me. Dr Cruz no longer sees patients as outpt provider.  She has had to cut pills in half since pharmacy has had difficulty with percocet supply chain.    Review of Systems   Constitutional:  Negative for fatigue, fever and unexpected weight change.   HENT:  Negative for congestion, facial swelling, hearing loss and sore throat.    Eyes:  Negative for visual disturbance.   Respiratory:  Negative for cough, shortness of breath and wheezing.    Cardiovascular:  Negative for chest pain and palpitations.   Gastrointestinal:  Negative for abdominal distention, abdominal pain, blood in stool, diarrhea, nausea and vomiting.   Genitourinary:  Positive for dysuria. Negative for difficulty urinating, frequency and hematuria.        Mild dysuria when asked   Musculoskeletal:  Positive for arthralgias and gait problem. Negative for joint swelling.   Skin:  Negative for color change and rash.   Neurological:  Positive for numbness. Negative for dizziness, syncope, weakness and headaches.   Hematological:  Negative for adenopathy.   Psychiatric/Behavioral:  Negative for confusion, decreased concentration and suicidal ideas. The patient is nervous/anxious.          Objective:      Physical Exam  Vitals reviewed.   Constitutional:       General: She is not in acute distress.     Appearance: Normal appearance. She is well-developed. She is obese. She is not ill-appearing, toxic-appearing or diaphoretic.   HENT:      Head: Normocephalic and atraumatic.      Mouth/Throat:      Pharynx: No oropharyngeal exudate.   Eyes:      General: No scleral icterus.     Pupils: Pupils are equal, round, and reactive to  light.   Neck:      Thyroid: No thyromegaly.      Comments: Thyroid surg scar present  Cardiovascular:      Rate and Rhythm: Normal rate and regular rhythm.      Heart sounds: Normal heart sounds. No murmur heard.    No friction rub. No gallop.   Pulmonary:      Effort: Pulmonary effort is normal. No respiratory distress.      Breath sounds: Normal breath sounds. No wheezing or rales.   Abdominal:      General: Bowel sounds are normal. There is no distension.      Palpations: Abdomen is soft. There is no mass.      Tenderness: There is no abdominal tenderness. There is no guarding or rebound.   Musculoskeletal:         General: No tenderness. Normal range of motion.      Cervical back: Normal range of motion and neck supple.      Comments: Diff rising to exam table, able to rise with walker assistance/support  mild midline spine tenderness to deep palpation but no focal. Surgical spine scar seen.  Painful R shoulder rom, but able to perform near full AROM   Lymphadenopathy:      Cervical: No cervical adenopathy.   Neurological:      General: No focal deficit present.      Mental Status: She is alert and oriented to person, place, and time.   Psychiatric:         Mood and Affect: Mood normal.         Speech: Speech normal.         Behavior: Behavior normal.         Thought Content: Thought content normal.         Judgment: Judgment normal.       Assessment:       1. Type 2 diabetes mellitus without complication, without long-term current use of insulin    2. DDD (degenerative disc disease), lumbar    3. S/P lumbar laminectomy    4. Chronic use of opiate for therapeutic purpose    5. Dysuria    6. Routine general medical examination at a health care facility    7. Encounter for screening for HIV    8. Back pain, unspecified back location, unspecified back pain laterality, unspecified chronicity    9. Spinal stenosis of lumbar region with neurogenic claudication    10. Chronic bilateral low back pain with right-sided  sciatica    11. Spondylolisthesis at L3-L4 level    12. Status post right hip replacement    13. Status post total right knee replacement    14. Uncomplicated opioid use        Plan:       Tanika was seen today for follow-up.    Diagnoses and all orders for this visit:    Type 2 diabetes mellitus without complication, without long-term current use of insulin  -     CBC Auto Differential; Future  -     Comprehensive Metabolic Panel; Future  -     Lipid Panel; Future  -     Hemoglobin A1C; Future  -     Ambulatory referral/consult to Optometry; Future  -     Urinalysis, Reflex to Urine Culture Urine, Clean Catch; Future    DDD (degenerative disc disease), lumbar  -     Toxicology Screen, Urine; Future  -     Pain Clinic Drug Screen; Future    S/P lumbar laminectomy    Chronic use of opiate for therapeutic purpose  -     Toxicology Screen, Urine; Future  -     Pain Clinic Drug Screen; Future  -     Toxicology Screen, Urine; Future  -     Pain Clinic Drug Screen; Future  I offered full wean, she declines, feels like pain med is helpful.  Chronic opiates:  Confirmed that I only rx for patient and patient will call prior to making any changes.  Confirmed dx -- clearly spine DDD  Adjunct therapies -- I strongly recommended that patient gabapentin for NP, she declines since prev did not work well.  [place] database -- checked and confirmed Dr Cruz was sole prescriber  Ok to continue prescribing pain medicine      Dysuria  -     Urinalysis, Reflex to Urine Culture Urine, Clean Catch  Mild symptoms mentioned as an after thought    Routine general medical examination at a health care facility    Encounter for screening for HIV  -     HIV 1/2 Ag/Ab (4th Gen); Future    Back pain, unspecified back location, unspecified back pain laterality, unspecified chronicity  -     Discontinue: oxyCODONE-acetaminophen (PERCOCET) 5-325 mg per tablet; Take 1 tablet by mouth every 12 (twelve) hours as needed for Pain. Greater than 7 day quantity  Medically Necessary  -     oxyCODONE-acetaminophen (PERCOCET) 5-325 mg per tablet; Take 1 tablet by mouth every 12 (twelve) hours as needed for Pain. Greater than 7 day quantity Medically Necessary    Spinal stenosis of lumbar region with neurogenic claudication  -     Discontinue: oxyCODONE-acetaminophen (PERCOCET) 5-325 mg per tablet; Take 1 tablet by mouth every 12 (twelve) hours as needed for Pain. Greater than 7 day quantity Medically Necessary  -     oxyCODONE-acetaminophen (PERCOCET) 5-325 mg per tablet; Take 1 tablet by mouth every 12 (twelve) hours as needed for Pain. Greater than 7 day quantity Medically Necessary    Chronic bilateral low back pain with right-sided sciatica  -     Discontinue: oxyCODONE-acetaminophen (PERCOCET) 5-325 mg per tablet; Take 1 tablet by mouth every 12 (twelve) hours as needed for Pain. Greater than 7 day quantity Medically Necessary  -     oxyCODONE-acetaminophen (PERCOCET) 5-325 mg per tablet; Take 1 tablet by mouth every 12 (twelve) hours as needed for Pain. Greater than 7 day quantity Medically Necessary    Spondylolisthesis at L3-L4 level  -     Discontinue: oxyCODONE-acetaminophen (PERCOCET) 5-325 mg per tablet; Take 1 tablet by mouth every 12 (twelve) hours as needed for Pain. Greater than 7 day quantity Medically Necessary  -     oxyCODONE-acetaminophen (PERCOCET) 5-325 mg per tablet; Take 1 tablet by mouth every 12 (twelve) hours as needed for Pain. Greater than 7 day quantity Medically Necessary    Status post right hip replacement  -     Discontinue: oxyCODONE-acetaminophen (PERCOCET) 5-325 mg per tablet; Take 1 tablet by mouth every 12 (twelve) hours as needed for Pain. Greater than 7 day quantity Medically Necessary  -     oxyCODONE-acetaminophen (PERCOCET) 5-325 mg per tablet; Take 1 tablet by mouth every 12 (twelve) hours as needed for Pain. Greater than 7 day quantity Medically Necessary    Status post total right knee replacement  -     Discontinue:  oxyCODONE-acetaminophen (PERCOCET) 5-325 mg per tablet; Take 1 tablet by mouth every 12 (twelve) hours as needed for Pain. Greater than 7 day quantity Medically Necessary  -     oxyCODONE-acetaminophen (PERCOCET) 5-325 mg per tablet; Take 1 tablet by mouth every 12 (twelve) hours as needed for Pain. Greater than 7 day quantity Medically Necessary    Uncomplicated opioid use  -     Discontinue: oxyCODONE-acetaminophen (PERCOCET) 5-325 mg per tablet; Take 1 tablet by mouth every 12 (twelve) hours as needed for Pain. Greater than 7 day quantity Medically Necessary  -     oxyCODONE-acetaminophen (PERCOCET) 5-325 mg per tablet; Take 1 tablet by mouth every 12 (twelve) hours as needed for Pain. Greater than 7 day quantity Medically Necessary        Health Maintenance         Date Due Completion Date    HIV Screening Never done ---    Lipid Panel 04/06/2023 4/6/2022    Eye Exam 05/02/2023 5/2/2022    Hemoglobin A1c 07/13/2023 1/13/2023    Diabetes Urine Screening 07/20/2023 7/20/2022    Foot Exam 07/20/2023 7/20/2022 (Done)    Override on 7/20/2022: Done (PCP completed foot exam in office during annual visit)    Override on 11/10/2021: Done    Override on 9/14/2020: Done    Influenza Vaccine (1) 09/01/2023 10/17/2022    Mammogram 05/31/2024 5/31/2023    High Dose Statin 07/07/2024 7/7/2023    Pneumococcal Vaccines (Age 0-64) (3 - PPSV23 if available, else PCV20) 01/01/2026 11/20/2019    TETANUS VACCINE 01/07/2026 1/7/2016    Colorectal Cancer Screening 09/28/2029 9/28/2022            No follow-ups on file.    Future Appointments   Date Time Provider Department Center   7/12/2023  7:10 AM LAB, APPOINTMENT [facility]  [facility]   7/12/2023  7:20 AM LAB, APPOINTMENT [facility]  [place]   8/1/2023  2:00 PM David Wyman NP [facility]   1/17/2024  1:20 PM Rayo Hernandez MD [facility]

## 2023-07-12 ENCOUNTER — LAB VISIT (OUTPATIENT)
Dept: LAB | Facility: HOSPITAL | Age: 63
End: 2023-07-12
Attending: INTERNAL MEDICINE
Payer: MEDICARE

## 2023-07-12 DIAGNOSIS — Z11.4 ENCOUNTER FOR SCREENING FOR HIV: ICD-10-CM

## 2023-07-12 DIAGNOSIS — E11.9 TYPE 2 DIABETES MELLITUS WITHOUT COMPLICATION, WITHOUT LONG-TERM CURRENT USE OF INSULIN: ICD-10-CM

## 2023-07-12 LAB
ALBUMIN SERPL BCP-MCNC: 3.8 G/DL (ref 3.5–5.2)
ALP SERPL-CCNC: 68 U/L (ref 55–135)
ALT SERPL W/O P-5'-P-CCNC: 22 U/L (ref 10–44)
ANION GAP SERPL CALC-SCNC: 12 MMOL/L (ref 8–16)
AST SERPL-CCNC: 22 U/L (ref 10–40)
BASOPHILS # BLD AUTO: 0.04 K/UL (ref 0–0.2)
BASOPHILS NFR BLD: 0.5 % (ref 0–1.9)
BILIRUB SERPL-MCNC: 0.3 MG/DL (ref 0.1–1)
BUN SERPL-MCNC: 29 MG/DL (ref 8–23)
CALCIUM SERPL-MCNC: 9.9 MG/DL (ref 8.7–10.5)
CHLORIDE SERPL-SCNC: 99 MMOL/L (ref 95–110)
CHOLEST SERPL-MCNC: 165 MG/DL (ref 120–199)
CHOLEST/HDLC SERPL: 2.8 {RATIO} (ref 2–5)
CO2 SERPL-SCNC: 29 MMOL/L (ref 23–29)
CREAT SERPL-MCNC: 1 MG/DL (ref 0.5–1.4)
DIFFERENTIAL METHOD: ABNORMAL
EOSINOPHIL # BLD AUTO: 0.2 K/UL (ref 0–0.5)
EOSINOPHIL NFR BLD: 2.5 % (ref 0–8)
ERYTHROCYTE [DISTWIDTH] IN BLOOD BY AUTOMATED COUNT: 13.9 % (ref 11.5–14.5)
EST. GFR  (NO RACE VARIABLE): >60 ML/MIN/1.73 M^2
ESTIMATED AVG GLUCOSE: 117 MG/DL (ref 68–131)
GLUCOSE SERPL-MCNC: 93 MG/DL (ref 70–110)
HBA1C MFR BLD: 5.7 % (ref 4–5.6)
HCT VFR BLD AUTO: 39.5 % (ref 37–48.5)
HDLC SERPL-MCNC: 58 MG/DL (ref 40–75)
HDLC SERPL: 35.2 % (ref 20–50)
HGB BLD-MCNC: 12.1 G/DL (ref 12–16)
HIV 1+2 AB+HIV1 P24 AG SERPL QL IA: NORMAL
IMM GRANULOCYTES # BLD AUTO: 0.02 K/UL (ref 0–0.04)
IMM GRANULOCYTES NFR BLD AUTO: 0.2 % (ref 0–0.5)
LDLC SERPL CALC-MCNC: 90 MG/DL (ref 63–159)
LYMPHOCYTES # BLD AUTO: 5.4 K/UL (ref 1–4.8)
LYMPHOCYTES NFR BLD: 66.8 % (ref 18–48)
MCH RBC QN AUTO: 28.1 PG (ref 27–31)
MCHC RBC AUTO-ENTMCNC: 30.6 G/DL (ref 32–36)
MCV RBC AUTO: 92 FL (ref 82–98)
MONOCYTES # BLD AUTO: 0.4 K/UL (ref 0.3–1)
MONOCYTES NFR BLD: 5.3 % (ref 4–15)
NEUTROPHILS # BLD AUTO: 2 K/UL (ref 1.8–7.7)
NEUTROPHILS NFR BLD: 24.7 % (ref 38–73)
NONHDLC SERPL-MCNC: 107 MG/DL
NRBC BLD-RTO: 0 /100 WBC
PLATELET # BLD AUTO: 397 K/UL (ref 150–450)
PMV BLD AUTO: 10.1 FL (ref 9.2–12.9)
POTASSIUM SERPL-SCNC: 4.1 MMOL/L (ref 3.5–5.1)
PROT SERPL-MCNC: 7.5 G/DL (ref 6–8.4)
RBC # BLD AUTO: 4.31 M/UL (ref 4–5.4)
SODIUM SERPL-SCNC: 140 MMOL/L (ref 136–145)
TRIGL SERPL-MCNC: 85 MG/DL (ref 30–150)
WBC # BLD AUTO: 8.07 K/UL (ref 3.9–12.7)

## 2023-07-12 PROCEDURE — 36415 COLL VENOUS BLD VENIPUNCTURE: CPT | Mod: HCNC | Performed by: INTERNAL MEDICINE

## 2023-07-12 PROCEDURE — 83036 HEMOGLOBIN GLYCOSYLATED A1C: CPT | Mod: HCNC | Performed by: INTERNAL MEDICINE

## 2023-07-12 PROCEDURE — 87389 HIV-1 AG W/HIV-1&-2 AB AG IA: CPT | Mod: HCNC | Performed by: INTERNAL MEDICINE

## 2023-07-12 PROCEDURE — 80053 COMPREHEN METABOLIC PANEL: CPT | Mod: HCNC | Performed by: INTERNAL MEDICINE

## 2023-07-12 PROCEDURE — 85025 COMPLETE CBC W/AUTO DIFF WBC: CPT | Mod: HCNC | Performed by: INTERNAL MEDICINE

## 2023-07-12 PROCEDURE — 80061 LIPID PANEL: CPT | Mod: HCNC | Performed by: INTERNAL MEDICINE

## 2023-07-20 ENCOUNTER — PATIENT MESSAGE (OUTPATIENT)
Dept: ADMINISTRATIVE | Facility: OTHER | Age: 63
End: 2023-07-20
Payer: MEDICARE

## 2023-07-27 ENCOUNTER — PATIENT MESSAGE (OUTPATIENT)
Dept: ADMINISTRATIVE | Facility: CLINIC | Age: 63
End: 2023-07-27
Payer: MEDICARE

## 2023-07-31 ENCOUNTER — TELEPHONE (OUTPATIENT)
Dept: ADMINISTRATIVE | Facility: CLINIC | Age: 63
End: 2023-07-31
Payer: MEDICARE

## 2023-08-01 ENCOUNTER — OFFICE VISIT (OUTPATIENT)
Dept: INTERNAL MEDICINE | Facility: CLINIC | Age: 63
End: 2023-08-01
Payer: MEDICARE

## 2023-08-01 ENCOUNTER — TELEPHONE (OUTPATIENT)
Dept: ADMINISTRATIVE | Facility: CLINIC | Age: 63
End: 2023-08-01
Payer: MEDICARE

## 2023-08-01 VITALS
WEIGHT: 207 LBS | HEIGHT: 61 IN | SYSTOLIC BLOOD PRESSURE: 130 MMHG | DIASTOLIC BLOOD PRESSURE: 70 MMHG | BODY MASS INDEX: 39.08 KG/M2

## 2023-08-01 DIAGNOSIS — N18.30 CKD STAGE 3 DUE TO TYPE 2 DIABETES MELLITUS: ICD-10-CM

## 2023-08-01 DIAGNOSIS — I15.2 HYPERTENSION ASSOCIATED WITH DIABETES: ICD-10-CM

## 2023-08-01 DIAGNOSIS — E78.5 HYPERLIPIDEMIA ASSOCIATED WITH TYPE 2 DIABETES MELLITUS: ICD-10-CM

## 2023-08-01 DIAGNOSIS — Z99.89 DEPENDENCE ON OTHER ENABLING MACHINES AND DEVICES: ICD-10-CM

## 2023-08-01 DIAGNOSIS — F33.42 RECURRENT MAJOR DEPRESSIVE DISORDER, IN FULL REMISSION: ICD-10-CM

## 2023-08-01 DIAGNOSIS — I70.0 AORTIC CALCIFICATION: ICD-10-CM

## 2023-08-01 DIAGNOSIS — E11.42 DIABETIC PERIPHERAL NEUROPATHY: ICD-10-CM

## 2023-08-01 DIAGNOSIS — E11.22 CKD STAGE 3 DUE TO TYPE 2 DIABETES MELLITUS: ICD-10-CM

## 2023-08-01 DIAGNOSIS — R12 HEARTBURN: ICD-10-CM

## 2023-08-01 DIAGNOSIS — E11.59 HYPERTENSION ASSOCIATED WITH DIABETES: ICD-10-CM

## 2023-08-01 DIAGNOSIS — M43.16 SPONDYLOLISTHESIS AT L3-L4 LEVEL: ICD-10-CM

## 2023-08-01 DIAGNOSIS — R26.9 ABNORMALITY OF GAIT AND MOBILITY: ICD-10-CM

## 2023-08-01 DIAGNOSIS — Z00.00 ENCOUNTER FOR PREVENTIVE HEALTH EXAMINATION: Primary | ICD-10-CM

## 2023-08-01 DIAGNOSIS — E11.36 TYPE 2 DIABETES MELLITUS WITH CATARACT: ICD-10-CM

## 2023-08-01 DIAGNOSIS — Z00.00 ENCOUNTER FOR MEDICARE ANNUAL WELLNESS EXAM: ICD-10-CM

## 2023-08-01 DIAGNOSIS — E11.69 HYPERLIPIDEMIA ASSOCIATED WITH TYPE 2 DIABETES MELLITUS: ICD-10-CM

## 2023-08-01 DIAGNOSIS — L30.4 INTERTRIGO: ICD-10-CM

## 2023-08-01 PROBLEM — E11.29 TYPE 2 DIABETES MELLITUS WITH RENAL MANIFESTATIONS: Status: RESOLVED | Noted: 2021-11-10 | Resolved: 2023-08-01

## 2023-08-01 PROCEDURE — 3078F DIAST BP <80 MM HG: CPT | Mod: HCNC,CPTII,95, | Performed by: NURSE PRACTITIONER

## 2023-08-01 PROCEDURE — 1160F PR REVIEW ALL MEDS BY PRESCRIBER/CLIN PHARMACIST DOCUMENTED: ICD-10-PCS | Mod: HCNC,CPTII,95, | Performed by: NURSE PRACTITIONER

## 2023-08-01 PROCEDURE — 3044F PR MOST RECENT HEMOGLOBIN A1C LEVEL <7.0%: ICD-10-PCS | Mod: HCNC,CPTII,95, | Performed by: NURSE PRACTITIONER

## 2023-08-01 PROCEDURE — 3008F BODY MASS INDEX DOCD: CPT | Mod: HCNC,CPTII,95, | Performed by: NURSE PRACTITIONER

## 2023-08-01 PROCEDURE — 3008F PR BODY MASS INDEX (BMI) DOCUMENTED: ICD-10-PCS | Mod: HCNC,CPTII,95, | Performed by: NURSE PRACTITIONER

## 2023-08-01 PROCEDURE — 3075F SYST BP GE 130 - 139MM HG: CPT | Mod: HCNC,CPTII,95, | Performed by: NURSE PRACTITIONER

## 2023-08-01 PROCEDURE — 3072F LOW RISK FOR RETINOPATHY: CPT | Mod: HCNC,CPTII,95, | Performed by: NURSE PRACTITIONER

## 2023-08-01 PROCEDURE — 3078F PR MOST RECENT DIASTOLIC BLOOD PRESSURE < 80 MM HG: ICD-10-PCS | Mod: HCNC,CPTII,95, | Performed by: NURSE PRACTITIONER

## 2023-08-01 PROCEDURE — G0439 PPPS, SUBSEQ VISIT: HCPCS | Mod: HCNC,95,, | Performed by: NURSE PRACTITIONER

## 2023-08-01 PROCEDURE — 1159F MED LIST DOCD IN RCRD: CPT | Mod: HCNC,CPTII,95, | Performed by: NURSE PRACTITIONER

## 2023-08-01 PROCEDURE — 1160F RVW MEDS BY RX/DR IN RCRD: CPT | Mod: HCNC,CPTII,95, | Performed by: NURSE PRACTITIONER

## 2023-08-01 PROCEDURE — 3075F PR MOST RECENT SYSTOLIC BLOOD PRESS GE 130-139MM HG: ICD-10-PCS | Mod: HCNC,CPTII,95, | Performed by: NURSE PRACTITIONER

## 2023-08-01 PROCEDURE — 1159F PR MEDICATION LIST DOCUMENTED IN MEDICAL RECORD: ICD-10-PCS | Mod: HCNC,CPTII,95, | Performed by: NURSE PRACTITIONER

## 2023-08-01 PROCEDURE — 3072F PR LOW RISK FOR RETINOPATHY: ICD-10-PCS | Mod: HCNC,CPTII,95, | Performed by: NURSE PRACTITIONER

## 2023-08-01 PROCEDURE — 3044F HG A1C LEVEL LT 7.0%: CPT | Mod: HCNC,CPTII,95, | Performed by: NURSE PRACTITIONER

## 2023-08-01 PROCEDURE — G0439 PR MEDICARE ANNUAL WELLNESS SUBSEQUENT VISIT: ICD-10-PCS | Mod: HCNC,95,, | Performed by: NURSE PRACTITIONER

## 2023-08-01 NOTE — TELEPHONE ENCOUNTER
Called pt; informed pt I was just making a reminder call for pt's virtual visit today at 2:00pm and to see if pt needed any help; pt stated didn't need any help; pt informed to login 10 minutes prior to appt time

## 2023-08-01 NOTE — PATIENT INSTRUCTIONS
Counseling and Referral of Other Preventative  (Italic type indicates deductible and co-insurance are waived)    Patient Name: Tanika Wayne  Today's Date: 8/1/2023    Health Maintenance       Date Due Completion Date    Eye Exam 05/02/2023 5/2/2022    Foot Exam 07/20/2023 7/20/2022 (Done)    Override on 7/20/2022: Done (PCP completed foot exam in office during annual visit)    Override on 11/10/2021: Done    Override on 9/14/2020: Done    Diabetes Urine Screening 07/20/2023 7/20/2022    Influenza Vaccine (1) 09/01/2023 10/17/2022    Hemoglobin A1c 01/12/2024 7/12/2023    Mammogram 05/31/2024 5/31/2023    High Dose Statin 07/07/2024 7/7/2023    Lipid Panel 07/12/2024 7/12/2023    Pneumococcal Vaccines (Age 0-64) (3 - PPSV23 or PCV20) 01/01/2026 11/20/2019    TETANUS VACCINE 01/07/2026 1/7/2016    Colorectal Cancer Screening 09/28/2029 9/28/2022        No orders of the defined types were placed in this encounter.    The following information is provided to all patients.  This information is to help you find resources for any of the problems found today that may be affecting your health:                Living healthy guide: www.[facility].[place].gov      Understanding Diabetes: www.diabetes.org      Eating healthy: www.cdc.gov/healthyweight      CDC home safety checklist: www.cdc.gov/steadi/patient.html      Agency on Aging: www.goea.[place].gov      Alcoholics anonymous (AA): www.aa.org      Physical Activity: www.deshawn.nih.gov/ak0hkqu      Tobacco use: www.quitwithusla.org

## 2023-08-01 NOTE — PROGRESS NOTES
"The patient location is: [place]  The chief complaint leading to consultation is:  Medicare AWV    Visit type: audiovisual    Face to Face time with patient: 30   45  minutes of total time spent on the encounter, which includes face to face time and non-face to face time preparing to see the patient (eg, review of tests), Obtaining and/or reviewing separately obtained history, Documenting clinical information in the electronic or other health record, Independently interpreting results (not separately reported) and communicating results to the patient/family/caregiver, or Care coordination (not separately reported).         Each patient to whom he or she provides medical services by telemedicine is:  (1) informed of the relationship between the physician and patient and the respective role of any other health care provider with respect to management of the patient; and (2) notified that he or she may decline to receive medical services by telemedicine and may withdraw from such care at any time.    Notes:       Tanika Wayne presented for a  Medicare AWV and comprehensive Health Risk Assessment today. The following components were reviewed and updated:    Medical history  Family History  Social history  Allergies and Current Medications  Health Risk Assessment  Health Maintenance  Care Team         ** See Completed Assessments for Annual Wellness Visit within the encounter summary.**         The following assessments were completed:  Living Situation  CAGE  Depression Screening  Fall Risk Assessment (MACH 10)  Hearing Assessment(HHI)  Cognitive Function Screening  Nutrition Screening  ADL Screening  PAQ Screening      Vitals:    08/01/23 1356   BP: 130/70   Weight: 93.9 kg (207 lb)   Height: 5' 1" (1.549 m)     Body mass index is 39.11 kg/m².  Physical Exam  Constitutional:       General: She is not in acute distress.     Appearance: Normal appearance. She is obese. She is not ill-appearing, toxic-appearing or " diaphoretic.   Pulmonary:      Effort: Pulmonary effort is normal.   Neurological:      Mental Status: She is alert and oriented to person, place, and time.   Psychiatric:         Mood and Affect: Mood normal.         Behavior: Behavior normal.               Diagnoses and health risks identified today and associated recommendations/orders:    1. Encounter for preventive health examination  Screenings performed, as noted above.  Personal preventative testing needs reviewed.      2. Diabetic peripheral neuropathy  Assessed, unstable, has neuropathy of her feet, tried gabapentin, couldn't tolerate it, she will message her pcp for other recommendations    3. Hyperlipidemia associated with diabetes  Treated with atorvastatin, stable, cont tx    4. Dependence on other enabling machines and devices  Uses rolling walker as needed, follow up with pcp    5. Abnormality of gait and mobility  Uses rolling walker as needed, follow up with pcp    6. Aortic calcification  Monitored, stable, follow up with pcp    7. Hypertension associated with diabetes  Treated with meds, stable, cont tx    8. CKD stage 3 due to type 2 diabetes mellitus  Monitored, stable, last GFR 56.8, follow up with pcp    9. Type 2 diabetes mellitus with cataract  Monitored, stable, states is going to schedule her eye exam    10. Type 2 diabetes mellitus with obesity  Monitored, exercise to tolerance, heart healthy diet    11. Spondylolisthesis at L3-L4 level  Treated with percocet, stable, followed by her pcp    12. Recurrent major depressive disorder, in full remission  Treated with Effexor, stable, cont tx    13. Encounter for Medicare annual wellness exam  Screenings performed, as noted above.  Personal preventative testing needs reviewed.    - Ambulatory Referral/Consult to Enhanced Annual Wellness Visit (eAWV)    14. Intertrigo  Treated with topicals as needed, stable, cont tx    15. Heartburn  Treated with pepcid prn, stable, cont tx    Review for  Substance Use Disorders: patient does not use substances per chart    Patient on chronic (specify) opioids-.percocet  Followed by Dr Hernandez  Risk factors reviewed. Risk factors may include Sleep-disordered breathing, renal or hepatic insufficiency, increased risk for falls and fractures, risk of opioid misuse/overdose/opioid use disorder.  Pain evaluated during visit, documented under vitals.  Discussed nonpharmacologic treatments options, will follow up with prescribing provider to discuss further     Provided Tanika with a 5-10 year written screening schedule and personal prevention plan. Recommendations were developed using the USPSTF age appropriate recommendations. Education, counseling, and referrals were provided as needed. After Visit Summary printed and given to patient which includes a list of additional screenings\tests needed.    No follow-ups on file.    David Wyman NP  I offered to discuss advanced care planning, including how to pick a person who would make decisions for you if you were unable to make them for yourself, called a health care power of , and what kind of decisions you might make such as use of life sustaining treatments such as ventilators and tube feeding when faced with a life limiting illness recorded on a living will that they will need to know. (How you want to be cared for as you near the end of your natural life)     X Patient is interested in learning more about how to make advanced directives.  I provided them paperwork and offered to discuss this with them.

## 2023-08-02 ENCOUNTER — PATIENT MESSAGE (OUTPATIENT)
Dept: BARIATRICS | Facility: CLINIC | Age: 63
End: 2023-08-02
Payer: MEDICARE

## 2023-08-02 ENCOUNTER — PATIENT MESSAGE (OUTPATIENT)
Dept: INTERNAL MEDICINE | Facility: CLINIC | Age: 63
End: 2023-08-02
Payer: MEDICARE

## 2023-08-02 DIAGNOSIS — E11.42 DIABETIC POLYNEUROPATHY ASSOCIATED WITH TYPE 2 DIABETES MELLITUS: Primary | ICD-10-CM

## 2023-08-03 ENCOUNTER — PATIENT MESSAGE (OUTPATIENT)
Dept: INTERNAL MEDICINE | Facility: CLINIC | Age: 63
End: 2023-08-03
Payer: MEDICARE

## 2023-08-03 RX ORDER — PREGABALIN 25 MG/1
25 CAPSULE ORAL 2 TIMES DAILY
Qty: 60 CAPSULE | Refills: 3 | Status: SHIPPED | OUTPATIENT
Start: 2023-08-03 | End: 2023-09-13

## 2023-09-06 ENCOUNTER — PATIENT MESSAGE (OUTPATIENT)
Dept: BARIATRICS | Facility: CLINIC | Age: 63
End: 2023-09-06
Payer: MEDICARE

## 2023-09-07 ENCOUNTER — PATIENT MESSAGE (OUTPATIENT)
Dept: INTERNAL MEDICINE | Facility: CLINIC | Age: 63
End: 2023-09-07
Payer: MEDICARE

## 2023-09-11 ENCOUNTER — PATIENT MESSAGE (OUTPATIENT)
Dept: INTERNAL MEDICINE | Facility: CLINIC | Age: 63
End: 2023-09-11
Payer: MEDICARE

## 2023-09-11 DIAGNOSIS — M54.9 BACK PAIN, UNSPECIFIED BACK LOCATION, UNSPECIFIED BACK PAIN LATERALITY, UNSPECIFIED CHRONICITY: ICD-10-CM

## 2023-09-11 DIAGNOSIS — Z96.651 STATUS POST TOTAL RIGHT KNEE REPLACEMENT: ICD-10-CM

## 2023-09-11 DIAGNOSIS — M48.062 SPINAL STENOSIS OF LUMBAR REGION WITH NEUROGENIC CLAUDICATION: ICD-10-CM

## 2023-09-11 DIAGNOSIS — G89.29 CHRONIC BILATERAL LOW BACK PAIN WITH RIGHT-SIDED SCIATICA: ICD-10-CM

## 2023-09-11 DIAGNOSIS — F11.90 UNCOMPLICATED OPIOID USE: ICD-10-CM

## 2023-09-11 DIAGNOSIS — Z96.641 STATUS POST RIGHT HIP REPLACEMENT: ICD-10-CM

## 2023-09-11 DIAGNOSIS — M43.16 SPONDYLOLISTHESIS AT L3-L4 LEVEL: ICD-10-CM

## 2023-09-11 DIAGNOSIS — M54.41 CHRONIC BILATERAL LOW BACK PAIN WITH RIGHT-SIDED SCIATICA: ICD-10-CM

## 2023-09-11 DIAGNOSIS — E11.42 DIABETIC POLYNEUROPATHY ASSOCIATED WITH TYPE 2 DIABETES MELLITUS: Primary | ICD-10-CM

## 2023-09-11 NOTE — TELEPHONE ENCOUNTER
Hi, please call her -- I do not think we have prescribed gabapentin for her in the past. Please clarify that she wants to go back on it instead of taking Lyrica/pregalbin.  Please let me know.  Let me know if patient has any other questions.  Thank you, Rayo Hernandez

## 2023-09-12 ENCOUNTER — PATIENT MESSAGE (OUTPATIENT)
Dept: BARIATRICS | Facility: CLINIC | Age: 63
End: 2023-09-12
Payer: MEDICARE

## 2023-09-12 RX ORDER — OXYCODONE AND ACETAMINOPHEN 5; 325 MG/1; MG/1
1 TABLET ORAL EVERY 12 HOURS PRN
Qty: 60 TABLET | Refills: 0 | Status: SHIPPED | OUTPATIENT
Start: 2023-09-12 | End: 2023-10-17 | Stop reason: SDUPTHER

## 2023-09-12 NOTE — TELEPHONE ENCOUNTER
Called and spoke with pt, confirmed that she does want to stop the Lyrica and start gabapentin. Also needs an oxycodone refill, would like both to the preferred pharmacy on file

## 2023-09-12 NOTE — TELEPHONE ENCOUNTER
No care due was identified.  [facility] Embedded Care Due Messages. Reference number: [id number].   9/12/2023 9:01:17 AM CDT

## 2023-09-13 RX ORDER — GABAPENTIN 100 MG/1
100 CAPSULE ORAL 3 TIMES DAILY
Qty: 90 CAPSULE | Refills: 3 | Status: SHIPPED | OUTPATIENT
Start: 2023-09-13 | End: 2023-10-17

## 2023-10-04 ENCOUNTER — PATIENT MESSAGE (OUTPATIENT)
Dept: BARIATRICS | Facility: CLINIC | Age: 63
End: 2023-10-04
Payer: MEDICARE

## 2023-10-10 ENCOUNTER — PATIENT MESSAGE (OUTPATIENT)
Dept: BARIATRICS | Facility: CLINIC | Age: 63
End: 2023-10-10
Payer: MEDICARE

## 2023-10-16 ENCOUNTER — PATIENT MESSAGE (OUTPATIENT)
Dept: INTERNAL MEDICINE | Facility: CLINIC | Age: 63
End: 2023-10-16
Payer: MEDICARE

## 2023-10-16 DIAGNOSIS — M54.41 CHRONIC BILATERAL LOW BACK PAIN WITH RIGHT-SIDED SCIATICA: ICD-10-CM

## 2023-10-16 DIAGNOSIS — M48.062 SPINAL STENOSIS OF LUMBAR REGION WITH NEUROGENIC CLAUDICATION: ICD-10-CM

## 2023-10-16 DIAGNOSIS — Z96.641 STATUS POST RIGHT HIP REPLACEMENT: ICD-10-CM

## 2023-10-16 DIAGNOSIS — Z96.651 STATUS POST TOTAL RIGHT KNEE REPLACEMENT: ICD-10-CM

## 2023-10-16 DIAGNOSIS — M43.16 SPONDYLOLISTHESIS AT L3-L4 LEVEL: ICD-10-CM

## 2023-10-16 DIAGNOSIS — G89.29 CHRONIC BILATERAL LOW BACK PAIN WITH RIGHT-SIDED SCIATICA: ICD-10-CM

## 2023-10-16 DIAGNOSIS — F11.90 UNCOMPLICATED OPIOID USE: ICD-10-CM

## 2023-10-16 DIAGNOSIS — M54.9 BACK PAIN, UNSPECIFIED BACK LOCATION, UNSPECIFIED BACK PAIN LATERALITY, UNSPECIFIED CHRONICITY: ICD-10-CM

## 2023-10-16 DIAGNOSIS — E11.42 DIABETIC POLYNEUROPATHY ASSOCIATED WITH TYPE 2 DIABETES MELLITUS: ICD-10-CM

## 2023-10-17 RX ORDER — GABAPENTIN 100 MG/1
200 CAPSULE ORAL 3 TIMES DAILY
Qty: 180 CAPSULE | Refills: 3 | Status: SHIPPED | OUTPATIENT
Start: 2023-10-17 | End: 2024-03-04 | Stop reason: SDUPTHER

## 2023-10-17 RX ORDER — OXYCODONE AND ACETAMINOPHEN 5; 325 MG/1; MG/1
1 TABLET ORAL EVERY 12 HOURS PRN
Qty: 60 TABLET | Refills: 0 | Status: SHIPPED | OUTPATIENT
Start: 2023-10-17 | End: 2023-11-20 | Stop reason: SDUPTHER

## 2023-11-14 ENCOUNTER — PATIENT MESSAGE (OUTPATIENT)
Dept: BARIATRICS | Facility: CLINIC | Age: 63
End: 2023-11-14
Payer: MEDICARE

## 2023-11-19 ENCOUNTER — PATIENT MESSAGE (OUTPATIENT)
Dept: INTERNAL MEDICINE | Facility: CLINIC | Age: 63
End: 2023-11-19
Payer: MEDICARE

## 2023-11-19 DIAGNOSIS — M54.41 CHRONIC BILATERAL LOW BACK PAIN WITH RIGHT-SIDED SCIATICA: ICD-10-CM

## 2023-11-19 DIAGNOSIS — G89.29 CHRONIC BILATERAL LOW BACK PAIN WITH RIGHT-SIDED SCIATICA: ICD-10-CM

## 2023-11-19 DIAGNOSIS — Z96.651 STATUS POST TOTAL RIGHT KNEE REPLACEMENT: ICD-10-CM

## 2023-11-19 DIAGNOSIS — F11.90 UNCOMPLICATED OPIOID USE: ICD-10-CM

## 2023-11-19 DIAGNOSIS — M43.16 SPONDYLOLISTHESIS AT L3-L4 LEVEL: ICD-10-CM

## 2023-11-19 DIAGNOSIS — M48.062 SPINAL STENOSIS OF LUMBAR REGION WITH NEUROGENIC CLAUDICATION: ICD-10-CM

## 2023-11-19 DIAGNOSIS — Z96.641 STATUS POST RIGHT HIP REPLACEMENT: ICD-10-CM

## 2023-11-19 DIAGNOSIS — M54.9 BACK PAIN, UNSPECIFIED BACK LOCATION, UNSPECIFIED BACK PAIN LATERALITY, UNSPECIFIED CHRONICITY: ICD-10-CM

## 2023-11-20 RX ORDER — OXYCODONE AND ACETAMINOPHEN 5; 325 MG/1; MG/1
1 TABLET ORAL EVERY 12 HOURS PRN
Qty: 60 TABLET | Refills: 0 | Status: SHIPPED | OUTPATIENT
Start: 2023-11-20 | End: 2023-12-21 | Stop reason: SDUPTHER

## 2023-11-28 ENCOUNTER — HOSPITAL ENCOUNTER (EMERGENCY)
Facility: OTHER | Age: 63
Discharge: HOME | End: 2023-11-28
Attending: EMERGENCY MEDICINE
Payer: MEDICARE

## 2023-11-28 ENCOUNTER — OFFICE VISIT (OUTPATIENT)
Dept: OPTOMETRY | Facility: CLINIC | Age: 63
End: 2023-11-28
Payer: MEDICARE

## 2023-11-28 VITALS
BODY MASS INDEX: 38.51 KG/M2 | SYSTOLIC BLOOD PRESSURE: 163 MMHG | HEIGHT: 61 IN | WEIGHT: 204 LBS | RESPIRATION RATE: 18 BRPM | TEMPERATURE: 98 F | HEART RATE: 100 BPM | DIASTOLIC BLOOD PRESSURE: 74 MMHG | OXYGEN SATURATION: 97 %

## 2023-11-28 DIAGNOSIS — H52.223 MYOPIA OF BOTH EYES WITH REGULAR ASTIGMATISM: ICD-10-CM

## 2023-11-28 DIAGNOSIS — Z96.1 PSEUDOPHAKIA OF BOTH EYES: ICD-10-CM

## 2023-11-28 DIAGNOSIS — S80.11XA HEMATOMA OF RIGHT LOWER EXTREMITY, INITIAL ENCOUNTER: Primary | ICD-10-CM

## 2023-11-28 DIAGNOSIS — Z13.5 GLAUCOMA SCREENING: ICD-10-CM

## 2023-11-28 DIAGNOSIS — E11.9 DIABETES MELLITUS TYPE 2 WITHOUT RETINOPATHY: Primary | ICD-10-CM
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
## 2023-11-28 DIAGNOSIS — H52.13 MYOPIA OF BOTH EYES WITH REGULAR ASTIGMATISM: ICD-10-CM

## 2023-11-28 PROCEDURE — 92014 PR EYE EXAM, EST PATIENT,COMPREHESV: ICD-10-PCS | Mod: HCNC,S$GLB,, | Performed by: OPTOMETRIST

## 2023-11-28 PROCEDURE — 3044F PR MOST RECENT HEMOGLOBIN A1C LEVEL <7.0%: ICD-10-PCS | Mod: HCNC,CPTII,S$GLB, | Performed by: OPTOMETRIST

## 2023-11-28 PROCEDURE — 1159F PR MEDICATION LIST DOCUMENTED IN MEDICAL RECORD: ICD-10-PCS | Mod: HCNC,CPTII,S$GLB, | Performed by: OPTOMETRIST

## 2023-11-28 PROCEDURE — 92014 COMPRE OPH EXAM EST PT 1/>: CPT | Mod: HCNC,S$GLB,, | Performed by: OPTOMETRIST

## 2023-11-28 PROCEDURE — 99999 PR PBB SHADOW E&M-EST. PATIENT-LVL III: ICD-10-PCS | Mod: PBBFAC,HCNC,, | Performed by: OPTOMETRIST

## 2023-11-28 PROCEDURE — 3044F HG A1C LEVEL LT 7.0%: CPT | Mod: HCNC,CPTII,S$GLB, | Performed by: OPTOMETRIST

## 2023-11-28 PROCEDURE — 1159F MED LIST DOCD IN RCRD: CPT | Mod: HCNC,CPTII,S$GLB, | Performed by: OPTOMETRIST

## 2023-11-28 PROCEDURE — 99999 PR PBB SHADOW E&M-EST. PATIENT-LVL III: CPT | Mod: PBBFAC,HCNC,, | Performed by: OPTOMETRIST

## 2023-11-28 PROCEDURE — 99283 EMERGENCY DEPT VISIT LOW MDM: CPT | Mod: HCNC

## 2023-11-28 PROCEDURE — 2023F DILAT RTA XM W/O RTNOPTHY: CPT | Mod: HCNC,CPTII,S$GLB, | Performed by: OPTOMETRIST

## 2023-11-28 PROCEDURE — 2023F PR DILATED RETINAL EXAM W/O EVID OF RETINOPATHY: ICD-10-PCS | Mod: HCNC,CPTII,S$GLB, | Performed by: OPTOMETRIST

## 2023-11-28 NOTE — PROGRESS NOTES
HPI    Annual Diabetic Eye Exam   Pt states vision stable since CAT SX     Pt denies Flashes   Pt reports floaters- Stable     Pt denies Dry/ Itchy/ Burning  Gtt: Yes OTC AT Therapy     DM Dx'ed   BS: Unknown   Hemoglobin A1C       Date                     Value               Ref Range             Status                07/12/2023               5.7 (H)             4.0 - 5.6 %           Final                 01/13/2023               5.7 (H)             4.0 - 5.6 %           Final                 07/20/2022               5.5                 4.0 - 5.6 %           Final                Last edited by Jaxon Adams, OD on 11/28/2023  9:51 AM.            Assessment /Plan     For exam results, see Encounter Report.    Diabetes mellitus type 2 without retinopathy  -No retinopathy noted today.  Continued control with primary care physician and annual comprehensive eye exam.    Pseudophakia of both eyes  -Clear OD, Centered OU  -PCO OS monitor    Glaucoma screening  -Monitor with annual eye exam and IOP check    Myopia of both eyes with regular astigmatism  Eyeglass Final Rx       Eyeglass Final Rx         Sphere Cylinder Axis Dist VA Add    Right -3.25 +2.25 080 20/25 +2.50    Left -2.50 +0.75 090 20/30 +2.50      Type: PAL    Expiration Date: 11/28/2024                      RTC 1 yr

## 2023-11-29 NOTE — ED TRIAGE NOTES
Mechanical fall today on her right side. C/o pain and large hematoma to R anterior calf area. Grossly swollen. Unable to ambulate. Denies hitting her head.

## 2023-11-29 NOTE — ED PROVIDER NOTES
Source of History:  Patient     Chief complaint:  Leg Injury (Pt noticed a hematoma to R lower leg after taking a shower. Prior to that pt reports falling while walking on the street. Reports pain to area. )      HPI:  Tanika Wayne is a 63 y.o. female presenting to the emergency department with complaint of swelling to the proximal right lower leg that occurred after a fall while walking earlier today.  Patient reports that she tripped.  Had no immediate injury and was able to ambulate directly afterwards.  Few hours later she noted swelling to her right lower leg.  History of a knee replacement and wants to make sure nothing is broken      This is the extent to the patients complaints today here in the emergency department.    PMH:  As per HPI and below:  Past Medical History:   Diagnosis Date    Abnormal echocardiogram 11/26/2012    Arthritis     Breast cancer 1998    Breast cancer, right breast     Breast cancer, right breast 11/26/2012    S/P Lumpectomy / XRT '98 Dr. Bartholomew     Cardiomyopathy due to systemic disease     Cataract     Clotting disorder     Coronary artery disease     DM (diabetes mellitus) 11/26/2012    Encounter for blood transfusion     HTN (hypertension) 11/26/2012    Increased glucose level 11/26/2012    ITP (idiopathic thrombocytopenic purpura) 11/26/2012    Kidney stones 11/26/2012    PONV (postoperative nausea and vomiting)     Stenosis of lumbosacral spine 11/26/2012    Thyroid disease     Tympanic membrane perforation 9/15/2014    right 2014 Dr. Jeffries     Past Surgical History:   Procedure Laterality Date    BREAST LUMPECTOMY Right 1998    CATARACT EXTRACTION W/  INTRAOCULAR LENS IMPLANT Left 02/04/2021    Procedure: EXTRACTION, CATARACT, WITH IOL INSERTION;  Surgeon: Klever Tolliver MD;  Location: [facility];  Service: Ophthalmology;  Laterality: Left;    CATARACT EXTRACTION W/  INTRAOCULAR LENS IMPLANT Right 02/18/2021    Procedure: EXTRACTION, CATARACT, WITH IOL INSERTION;  Surgeon:  Klever Tolliver MD;  Location: [facility];  Service: Ophthalmology;  Laterality: Right;     SECTION      CHOLECYSTECTOMY      COLONOSCOPY N/A 2022    Procedure: COLONOSCOPY;  Surgeon: Anoop Escobar MD;  Location: [facility] (4TH FLR);  Service: Endoscopy;  Laterality: N/A;  Fully vaccinated/ inst mailed/ clear liquids up to 4 hrs prior/AM prep 2am-3am-RB    ESOPHAGOGASTRODUODENOSCOPY N/A 2021    Procedure: EGD (ESOPHAGOGASTRODUODENOSCOPY);  Surgeon: Silvio Singh Jr., MD;  Location: [facility] 2ND FLR;  Service: General;  Laterality: N/A;    EYE SURGERY      strabismus, , bilateral cataracts    HIP ARTHROPLASTY Right 10/18/2018    Procedure: ARTHROPLASTY, HIP;  Surgeon: Baldomero Fischer MD;  Location: [facility];  Service: Orthopedics;  Laterality: Right;    HIP REPLACEMENT ARTHROPLASTY Right     dr fischer    HYSTERECTOMY      JOINT REPLACEMENT      right BRYNN, TKA    KNEE ARTHROPLASTY Right 2019    Procedure: ARTHROPLASTY, KNEE (ADD ON );  Surgeon: Baldomero Fischer MD;  Location: [facility];  Service: Orthopedics;  Laterality: Right;  (ADD ON )    L3-5 Laminectomy  2017    Dr. Mendoza    LAPAROSCOPIC SLEEVE GASTRECTOMY N/A 2021    Procedure: GASTRECTOMY, SLEEVE, LAPAROSCOPIC;  Surgeon: Silvio Singh Jr., MD;  Location: [facility] 2ND FLR;  Service: General;  Laterality: N/A;    SPLENECTOMY, TOTAL  2001    from [facility]    STRABISMUS SURGERY  2006    OU    THYROIDECTOMY  2016    TONSILLECTOMY         Social History     Tobacco Use    Smoking status: Never    Smokeless tobacco: Never   Substance Use Topics    Alcohol use: Yes     Comment: seasonally    Drug use: No       Review of patient's allergies indicates:  No Known Allergies    ROS: As per HPI and below:  General: No fever.  No chills.  Eyes: No visual changes.   ENT: No sore throat. No ear pain.  Urinary: No abnormal urination.  MSK:  Swelling to right leg  Integument:  No rashes or lesions.       Physical Exam:   "  BP (!) 163/74 (BP Location: Left arm, Patient Position: Sitting)   Pulse 100   Temp 98 °F (36.7 °C) (Oral)   Resp 18   Ht 5' 1" (1.549 m)   Wt 92.5 kg (204 lb)   SpO2 97%   BMI 38.55 kg/m²   Vitals:    11/28/23 1755   BP: (!) 163/74   Pulse: 100   Resp: 18   Temp: 98 °F (36.7 °C)   TempSrc: Oral   SpO2: 97%   Weight: 92.5 kg (204 lb)   Height: 5' 1" (1.549 m)       Nursing note and vital signs reviewed.  Appearance: No acute distress.  Eyes: No conjunctival injection.  Extraocular muscles are intact.  ENT: Normal phonation.  Cardio:  DP +2 bilaterally  Musculoskeletal:  Full range motion of the right knee.  To the right proximal lateral lower leg there is a baseball sized hematoma.  No erythema.  Mild tenderness.  Skin:  No rashes seen.  Good turgor.  No abrasions.  No ecchymoses.  Mental Status:  Alert and oriented x 3.  Appropriate, conversant.      Labs Reviewed - No data to display    X-Ray Tibia Fibula 2 View Right    (Results Pending)         Initial Impression/ Differential Dx:  Hematoma, tib-fib fracture, cellulitis,    Medical Decision Making  Amount and/or Complexity of Data Reviewed  Radiology: ordered.         MDM:    63 y.o. female with trip and fall earlier today.  No immediate injury was able to actively directly afterward.  She noticed a swelling to her right lateral leg a few hours after the fall.  She is able to bear full weight.  History of a knee replacement and requested x-rays.  X-ray was negative for any acute fractures.  This is a hematoma.  Discussed elevation, heating pad and the body will eventually reabsorbed.  Strict return precautions if she develops any fever, increasing pain, redness to the site she is return to emergency department for re-evaluation.  She stated understanding patient was able to ambulate out of the emergency depart without assistance.    ED Course as of 11/28/23 2011 Tue Nov 28, 2023 2007 X-ray per [facility]:  No acute osseous injury, anterior lateral soft " tissue swelling in the proximal lower leg.  Dictated by Dr. Arpit Rowell [AS]      ED Course User Index  [AS] Tammy Darnell FNP       Diagnostic Impression:    1. Hematoma of right lower extremity, initial encounter    2. Fall         ED Disposition Condition    Discharge Stable            ED Prescriptions    None       Follow-up Information       Follow up With Specialties Details Why Contact Info    Rayo Hernandez MD Internal Medicine In 3 days  [address]  [place] [address]  [phone]      [facility] Emergency Dept Emergency Medicine Go to  If symptoms worsen [address] [facility] [id number] [phone]               Tammy Darnell, LUCIANA  11/28/23 2011

## 2023-11-30 ENCOUNTER — PATIENT MESSAGE (OUTPATIENT)
Dept: INTERNAL MEDICINE | Facility: CLINIC | Age: 63
End: 2023-11-30
Payer: MEDICARE

## 2023-12-12 ENCOUNTER — PATIENT MESSAGE (OUTPATIENT)
Dept: BARIATRICS | Facility: CLINIC | Age: 63
End: 2023-12-12
Payer: MEDICARE

## 2023-12-13 ENCOUNTER — PATIENT MESSAGE (OUTPATIENT)
Dept: BARIATRICS | Facility: CLINIC | Age: 63
End: 2023-12-13
Payer: MEDICARE

## 2023-12-20 ENCOUNTER — PATIENT MESSAGE (OUTPATIENT)
Dept: INTERNAL MEDICINE | Facility: CLINIC | Age: 63
End: 2023-12-20
Payer: MEDICARE

## 2023-12-20 DIAGNOSIS — M43.16 SPONDYLOLISTHESIS AT L3-L4 LEVEL: ICD-10-CM

## 2023-12-20 DIAGNOSIS — G89.29 CHRONIC BILATERAL LOW BACK PAIN WITH RIGHT-SIDED SCIATICA: ICD-10-CM

## 2023-12-20 DIAGNOSIS — Z96.641 STATUS POST RIGHT HIP REPLACEMENT: ICD-10-CM

## 2023-12-20 DIAGNOSIS — M54.9 BACK PAIN, UNSPECIFIED BACK LOCATION, UNSPECIFIED BACK PAIN LATERALITY, UNSPECIFIED CHRONICITY: ICD-10-CM

## 2023-12-20 DIAGNOSIS — F11.90 UNCOMPLICATED OPIOID USE: ICD-10-CM

## 2023-12-20 DIAGNOSIS — M48.062 SPINAL STENOSIS OF LUMBAR REGION WITH NEUROGENIC CLAUDICATION: ICD-10-CM

## 2023-12-20 DIAGNOSIS — M54.41 CHRONIC BILATERAL LOW BACK PAIN WITH RIGHT-SIDED SCIATICA: ICD-10-CM

## 2023-12-20 DIAGNOSIS — Z96.651 STATUS POST TOTAL RIGHT KNEE REPLACEMENT: ICD-10-CM

## 2023-12-21 RX ORDER — OXYCODONE AND ACETAMINOPHEN 5; 325 MG/1; MG/1
1 TABLET ORAL EVERY 12 HOURS PRN
Qty: 60 TABLET | Refills: 0 | Status: SHIPPED | OUTPATIENT
Start: 2023-12-21 | End: 2024-01-22 | Stop reason: SDUPTHER

## 2024-01-09 ENCOUNTER — PATIENT MESSAGE (OUTPATIENT)
Dept: BARIATRICS | Facility: CLINIC | Age: 64
End: 2024-01-09
Payer: MEDICARE

## 2024-01-17 ENCOUNTER — LAB VISIT (OUTPATIENT)
Dept: LAB | Facility: HOSPITAL | Age: 64
End: 2024-01-17
Attending: INTERNAL MEDICINE
Payer: MEDICARE

## 2024-01-17 ENCOUNTER — OFFICE VISIT (OUTPATIENT)
Dept: INTERNAL MEDICINE | Facility: CLINIC | Age: 64
End: 2024-01-17
Payer: MEDICARE

## 2024-01-17 VITALS
BODY MASS INDEX: 41.09 KG/M2 | SYSTOLIC BLOOD PRESSURE: 96 MMHG | OXYGEN SATURATION: 95 % | WEIGHT: 217.63 LBS | HEIGHT: 61 IN | DIASTOLIC BLOOD PRESSURE: 80 MMHG | HEART RATE: 104 BPM

## 2024-01-17 DIAGNOSIS — R21 RASH: ICD-10-CM

## 2024-01-17 DIAGNOSIS — M62.838 NIGHT MUSCLE SPASMS: ICD-10-CM

## 2024-01-17 DIAGNOSIS — E66.01 SEVERE OBESITY WITH BODY MASS INDEX (BMI) OF 35.0 TO 35.9 AND COMORBIDITY: ICD-10-CM

## 2024-01-17 DIAGNOSIS — G47.09 OTHER INSOMNIA: ICD-10-CM

## 2024-01-17 DIAGNOSIS — F33.42 RECURRENT MAJOR DEPRESSIVE DISORDER, IN FULL REMISSION: ICD-10-CM

## 2024-01-17 DIAGNOSIS — E11.36 TYPE 2 DIABETES MELLITUS WITH CATARACT: ICD-10-CM

## 2024-01-17 DIAGNOSIS — I15.2 HYPERTENSION ASSOCIATED WITH DIABETES: ICD-10-CM

## 2024-01-17 DIAGNOSIS — E11.59 HYPERTENSION ASSOCIATED WITH DIABETES: ICD-10-CM

## 2024-01-17 DIAGNOSIS — E89.0 POSTOPERATIVE HYPOTHYROIDISM: ICD-10-CM

## 2024-01-17 DIAGNOSIS — I70.0 AORTIC CALCIFICATION: ICD-10-CM

## 2024-01-17 DIAGNOSIS — E89.0 HISTORY OF SUBTOTAL THYROIDECTOMY: ICD-10-CM

## 2024-01-17 DIAGNOSIS — I10 ESSENTIAL HYPERTENSION, MALIGNANT: ICD-10-CM

## 2024-01-17 DIAGNOSIS — E11.42 DIABETIC POLYNEUROPATHY ASSOCIATED WITH TYPE 2 DIABETES MELLITUS: Primary | ICD-10-CM

## 2024-01-17 LAB
ESTIMATED AVG GLUCOSE: 120 MG/DL (ref 68–131)
HBA1C MFR BLD: 5.8 % (ref 4–5.6)

## 2024-01-17 PROCEDURE — 99213 OFFICE O/P EST LOW 20 MIN: CPT | Mod: PBBFAC | Performed by: INTERNAL MEDICINE

## 2024-01-17 PROCEDURE — 83036 HEMOGLOBIN GLYCOSYLATED A1C: CPT | Mod: HCNC | Performed by: INTERNAL MEDICINE

## 2024-01-17 PROCEDURE — 36415 COLL VENOUS BLD VENIPUNCTURE: CPT | Mod: HCNC | Performed by: INTERNAL MEDICINE

## 2024-01-17 PROCEDURE — 99999 PR PBB SHADOW E&M-EST. PATIENT-LVL III: CPT | Mod: PBBFAC,HCNC,, | Performed by: INTERNAL MEDICINE

## 2024-01-17 RX ORDER — CYCLOBENZAPRINE HCL 10 MG
10 TABLET ORAL 2 TIMES DAILY PRN
Qty: 90 TABLET | Refills: 5 | Status: SHIPPED | OUTPATIENT
Start: 2024-01-17

## 2024-01-17 RX ORDER — NIFEDIPINE 90 MG/1
90 TABLET, EXTENDED RELEASE ORAL DAILY
Qty: 90 TABLET | Refills: 3 | Status: SHIPPED | OUTPATIENT
Start: 2024-01-17

## 2024-01-17 RX ORDER — LOSARTAN POTASSIUM AND HYDROCHLOROTHIAZIDE 12.5; 1 MG/1; MG/1
1 TABLET ORAL DAILY
Qty: 90 TABLET | Refills: 3 | Status: SHIPPED | OUTPATIENT
Start: 2024-01-17

## 2024-01-17 RX ORDER — LEVOTHYROXINE SODIUM 100 UG/1
100 TABLET ORAL DAILY
Qty: 30 TABLET | Refills: 11 | Status: SHIPPED | OUTPATIENT
Start: 2024-01-17 | End: 2024-03-04 | Stop reason: SDUPTHER

## 2024-01-17 RX ORDER — CLOTRIMAZOLE AND BETAMETHASONE DIPROPIONATE 10; .64 MG/G; MG/G
CREAM TOPICAL 2 TIMES DAILY PRN
Qty: 45 G | Refills: 1 | Status: SHIPPED | OUTPATIENT
Start: 2024-01-17 | End: 2024-05-30 | Stop reason: SDUPTHER

## 2024-01-17 RX ORDER — TRAZODONE HYDROCHLORIDE 50 MG/1
50 TABLET ORAL NIGHTLY
Qty: 90 TABLET | Refills: 3 | Status: SHIPPED | OUTPATIENT
Start: 2024-01-17

## 2024-01-17 RX ORDER — FAMOTIDINE 40 MG/1
40 TABLET, FILM COATED ORAL
COMMUNITY
Start: 2023-12-23

## 2024-01-17 NOTE — PROGRESS NOTES
Subjective:       Patient ID: Tanika Wayne is a 64 y.o. female.    Chief Complaint: No chief complaint on file.    Patient is here for followup for chronic conditions.    Nose is raw on inside, has been using flonase daily. Denies nose bleeding.    Gabapentin not helpful with NP.    Had one falls a few months ago, injured RLE -- hematoma.    Has rash behind R ear wants examined.    Pain med -- at end of visit she says she has been using 1.5 tabs bid of pain med.      Review of Systems   Constitutional:  Positive for unexpected weight change. Negative for fatigue and fever.   HENT:  Negative for congestion, facial swelling, hearing loss and sore throat.    Eyes:  Negative for visual disturbance.   Respiratory:  Negative for cough, shortness of breath and wheezing.    Cardiovascular:  Negative for chest pain and palpitations.   Gastrointestinal:  Negative for abdominal distention, abdominal pain, blood in stool, diarrhea, nausea and vomiting.   Genitourinary:  Negative for difficulty urinating, dysuria, frequency and hematuria.   Musculoskeletal:  Positive for arthralgias and gait problem. Negative for joint swelling.   Skin:  Positive for rash (behind R ear). Negative for color change.   Neurological:  Positive for numbness. Negative for dizziness, syncope, weakness and headaches.   Hematological:  Negative for adenopathy.   Psychiatric/Behavioral:  Positive for dysphoric mood (from pain). Negative for confusion, decreased concentration and suicidal ideas. The patient is nervous/anxious.            Objective:      Physical Exam  Vitals reviewed.   Constitutional:       General: She is not in acute distress.     Appearance: Normal appearance. She is well-developed. She is obese. She is not ill-appearing, toxic-appearing or diaphoretic.   HENT:      Head: Normocephalic and atraumatic.      Mouth/Throat:      Pharynx: No oropharyngeal exudate.   Eyes:      General: No scleral icterus.     Pupils: Pupils are equal,  round, and reactive to light.   Neck:      Thyroid: No thyromegaly.      Comments: Thyroid surg scar present  Cardiovascular:      Rate and Rhythm: Normal rate and regular rhythm.      Heart sounds: Normal heart sounds. No murmur heard.     No friction rub. No gallop.   Pulmonary:      Effort: Pulmonary effort is normal. No respiratory distress.      Breath sounds: Normal breath sounds. No wheezing or rales.   Abdominal:      General: Bowel sounds are normal. There is no distension.      Palpations: Abdomen is soft. There is no mass.      Tenderness: There is no abdominal tenderness. There is no guarding or rebound.   Musculoskeletal:         General: No tenderness. Normal range of motion.      Cervical back: Normal range of motion and neck supple.      Comments: Diff rising to exam table, able to rise with walker assistance/support  mild midline spine tenderness to deep palpation but no focal. Surgical spine scar seen.  Painful R shoulder rom, but able to perform near full AROM    Protective Sensation (w/ 10 gram monofilament):  Right: Intact  Left: Intact    Visual Inspection:  Bilat hammer toes    Pedal Pulses:   Right: Present  Left: Present    Posterior Tibialis Pulses:   Right:Present  Left: Present       Lymphadenopathy:      Cervical: No cervical adenopathy.   Skin:     Comments: Scaly rash posterior R ear, appears like tinea   Neurological:      General: No focal deficit present.      Mental Status: She is alert and oriented to person, place, and time.   Psychiatric:         Mood and Affect: Mood normal.         Speech: Speech normal.         Behavior: Behavior normal.         Thought Content: Thought content normal.         Judgment: Judgment normal.         Assessment:       1. Diabetic polyneuropathy associated with type 2 diabetes mellitus    2. Hypertension associated with diabetes    3. Severe obesity with body mass index (BMI) of 35.0 to 35.9 and comorbidity    4. Recurrent major depressive disorder,  in full remission    5. Aortic calcification    6. History of subtotal thyroidectomy    7. Postoperative hypothyroidism    8. Essential hypertension, malignant    9. Other insomnia    10. Type 2 diabetes mellitus with cataract    11. Night muscle spasms    12. Rash        Plan:       Diagnoses and all orders for this visit:    Diabetic polyneuropathy associated with type 2 diabetes mellitus  -     Ambulatory referral/consult to Neurology; Future  Gabapentin not effective    Hypertension associated with diabetes  controlled    Severe obesity with body mass index (BMI) of 35.0 to 35.9 and comorbidity  Has gained, she is aware.    Recurrent major depressive disorder, in full remission  Declines SW referral    Aortic calcification  On statin    History of subtotal thyroidectomy  -     levothyroxine (SYNTHROID) 100 MCG tablet; Take 1 tablet (100 mcg total) by mouth once daily.    Postoperative hypothyroidism  -     levothyroxine (SYNTHROID) 100 MCG tablet; Take 1 tablet (100 mcg total) by mouth once daily.    Essential hypertension, malignant  -     losartan-hydrochlorothiazide 100-12.5 mg (HYZAAR) 100-12.5 mg Tab; Take 1 tablet by mouth once daily.  -     NIFEdipine (PROCARDIA-XL) 90 MG (OSM) 24 hr tablet; Take 1 tablet (90 mg total) by mouth once daily.    Other insomnia  -     traZODone (DESYREL) 50 MG tablet; Take 1 tablet (50 mg total) by mouth every evening.    Type 2 diabetes mellitus with cataract  -     Hemoglobin A1C; Future    Night muscle spasms  -     cyclobenzaprine (FLEXERIL) 10 MG tablet; Take 1 tablet (10 mg total) by mouth 2 (two) times daily as needed for Muscle spasms.    Rash  -     clotrimazole-betamethasone 1-0.05% (LOTRISONE) cream; Apply topically 2 (two) times daily as needed.  Maybe tinea behind R ear  Explained that if not better in 1-2 weeks, pt should rtc/call PCP    Chronic pain -- has become tolerant to 5/325 bid, now taking 1.5 tabs bid, will inc to 3 tabs daily when she is near running  out    Health Maintenance         Date Due Completion Date    Diabetes Urine Screening 07/20/2023 7/20/2022    Hemoglobin A1c 01/12/2024 7/12/2023    Mammogram 05/31/2024 5/31/2023    Lipid Panel 07/12/2024 7/12/2023    Eye Exam 11/28/2024 11/28/2023    High Dose Statin 01/17/2025 1/17/2024    Foot Exam 01/17/2025 1/17/2024    Override on 7/20/2022: Done (PCP completed foot exam in office during annual visit)    Override on 11/10/2021: Done    Override on 9/14/2020: Done    Pneumococcal Vaccines (Age 0-64) (3 - PPSV23 or PCV20) 01/01/2026 11/20/2019    TETANUS VACCINE 01/07/2026 1/7/2016    Colorectal Cancer Screening 09/28/2029 9/28/2022            Follow up in about 6 months (around 7/17/2024).    Future Appointments   Date Time Provider Department Center   7/29/2024 10:00 AM Rayo Hernandez MD [facility]

## 2024-01-22 ENCOUNTER — PATIENT MESSAGE (OUTPATIENT)
Dept: INTERNAL MEDICINE | Facility: CLINIC | Age: 64
End: 2024-01-22
Payer: MEDICARE

## 2024-01-22 DIAGNOSIS — G89.29 CHRONIC BILATERAL LOW BACK PAIN WITH RIGHT-SIDED SCIATICA: ICD-10-CM

## 2024-01-22 DIAGNOSIS — M48.062 SPINAL STENOSIS OF LUMBAR REGION WITH NEUROGENIC CLAUDICATION: ICD-10-CM

## 2024-01-22 DIAGNOSIS — Z96.651 STATUS POST TOTAL RIGHT KNEE REPLACEMENT: ICD-10-CM

## 2024-01-22 DIAGNOSIS — F11.90 UNCOMPLICATED OPIOID USE: ICD-10-CM

## 2024-01-22 DIAGNOSIS — M43.16 SPONDYLOLISTHESIS AT L3-L4 LEVEL: ICD-10-CM

## 2024-01-22 DIAGNOSIS — Z96.641 STATUS POST RIGHT HIP REPLACEMENT: ICD-10-CM

## 2024-01-22 DIAGNOSIS — M54.41 CHRONIC BILATERAL LOW BACK PAIN WITH RIGHT-SIDED SCIATICA: ICD-10-CM

## 2024-01-22 DIAGNOSIS — M54.9 BACK PAIN, UNSPECIFIED BACK LOCATION, UNSPECIFIED BACK PAIN LATERALITY, UNSPECIFIED CHRONICITY: ICD-10-CM

## 2024-01-22 RX ORDER — OXYCODONE AND ACETAMINOPHEN 5; 325 MG/1; MG/1
1 TABLET ORAL EVERY 12 HOURS PRN
Qty: 60 TABLET | Refills: 0 | Status: SHIPPED | OUTPATIENT
Start: 2024-01-22 | End: 2024-02-15

## 2024-01-22 RX ORDER — OXYCODONE AND ACETAMINOPHEN 5; 325 MG/1; MG/1
1 TABLET ORAL EVERY 12 HOURS PRN
Qty: 60 TABLET | Refills: 0 | OUTPATIENT
Start: 2024-01-22

## 2024-01-22 NOTE — TELEPHONE ENCOUNTER
No care due was identified.  [facility] Embedded Care Due Messages. Reference number: [id number].   1/22/2024 10:44:59 AM CST

## 2024-01-22 NOTE — TELEPHONE ENCOUNTER
No care due was identified.  [facility] Embedded Care Due Messages. Reference number: [id number].   1/22/2024 11:36:36 AM CST

## 2024-02-14 ENCOUNTER — PATIENT MESSAGE (OUTPATIENT)
Dept: INTERNAL MEDICINE | Facility: CLINIC | Age: 64
End: 2024-02-14
Payer: MEDICARE

## 2024-02-14 ENCOUNTER — PATIENT MESSAGE (OUTPATIENT)
Dept: BARIATRICS | Facility: CLINIC | Age: 64
End: 2024-02-14
Payer: MEDICARE

## 2024-02-14 DIAGNOSIS — M48.062 SPINAL STENOSIS OF LUMBAR REGION WITH NEUROGENIC CLAUDICATION: ICD-10-CM

## 2024-02-14 DIAGNOSIS — M43.16 SPONDYLOLISTHESIS AT L3-L4 LEVEL: ICD-10-CM

## 2024-02-14 DIAGNOSIS — M54.41 CHRONIC BILATERAL LOW BACK PAIN WITH RIGHT-SIDED SCIATICA: ICD-10-CM

## 2024-02-14 DIAGNOSIS — Z96.651 STATUS POST TOTAL RIGHT KNEE REPLACEMENT: ICD-10-CM

## 2024-02-14 DIAGNOSIS — M54.9 BACK PAIN, UNSPECIFIED BACK LOCATION, UNSPECIFIED BACK PAIN LATERALITY, UNSPECIFIED CHRONICITY: ICD-10-CM

## 2024-02-14 DIAGNOSIS — G89.29 CHRONIC BILATERAL LOW BACK PAIN WITH RIGHT-SIDED SCIATICA: ICD-10-CM

## 2024-02-14 DIAGNOSIS — F11.90 UNCOMPLICATED OPIOID USE: ICD-10-CM

## 2024-02-14 DIAGNOSIS — Z96.641 STATUS POST RIGHT HIP REPLACEMENT: ICD-10-CM

## 2024-02-15 RX ORDER — OXYCODONE AND ACETAMINOPHEN 5; 325 MG/1; MG/1
1 TABLET ORAL EVERY 8 HOURS PRN
Qty: 90 TABLET | Refills: 0 | Status: SHIPPED | OUTPATIENT
Start: 2024-02-15 | End: 2024-03-25 | Stop reason: SDUPTHER

## 2024-02-20 ENCOUNTER — PATIENT MESSAGE (OUTPATIENT)
Dept: BARIATRICS | Facility: CLINIC | Age: 64
End: 2024-02-20
Payer: MEDICARE

## 2024-02-29 ENCOUNTER — PATIENT MESSAGE (OUTPATIENT)
Dept: BARIATRICS | Facility: CLINIC | Age: 64
End: 2024-02-29
Payer: MEDICARE

## 2024-03-04 DIAGNOSIS — E89.0 POSTOPERATIVE HYPOTHYROIDISM: ICD-10-CM

## 2024-03-04 DIAGNOSIS — E89.0 HISTORY OF SUBTOTAL THYROIDECTOMY: ICD-10-CM

## 2024-03-04 DIAGNOSIS — E11.42 DIABETIC POLYNEUROPATHY ASSOCIATED WITH TYPE 2 DIABETES MELLITUS: ICD-10-CM

## 2024-03-04 RX ORDER — GABAPENTIN 100 MG/1
200 CAPSULE ORAL 3 TIMES DAILY
Qty: 180 CAPSULE | Refills: 3 | Status: SHIPPED | OUTPATIENT
Start: 2024-03-04 | End: 2024-05-03

## 2024-03-04 RX ORDER — LEVOTHYROXINE SODIUM 100 UG/1
100 TABLET ORAL DAILY
Qty: 90 TABLET | Refills: 11 | Status: SHIPPED | OUTPATIENT
Start: 2024-03-04

## 2024-03-04 NOTE — TELEPHONE ENCOUNTER
Care Due:                  Date            Visit Type   Department     Provider  --------------------------------------------------------------------------------                                EP -                              PRIMARY      [facility] INTERNAL  Last Visit: 01-      CARE ([facility])   MEDICINE       Rayo Hernandez                              EP -                              PRIMARY      [facility] INTERNAL  Next Visit: 07-      CARE ([facility])   MEDICINE       Rayo Hernandez                                                            Last  Test          Frequency    Reason                     Performed    Due Date  --------------------------------------------------------------------------------    TSH.........  12 months..  levothyroxine............  03- 02-    [facility] Embedded Care Due Messages. Reference number: [id number].   3/04/2024 11:37:46 AM CST

## 2024-03-06 ENCOUNTER — PATIENT MESSAGE (OUTPATIENT)
Dept: BARIATRICS | Facility: CLINIC | Age: 64
End: 2024-03-06
Payer: MEDICARE

## 2024-03-25 ENCOUNTER — PATIENT MESSAGE (OUTPATIENT)
Dept: INTERNAL MEDICINE | Facility: CLINIC | Age: 64
End: 2024-03-25
Payer: MEDICARE

## 2024-03-25 DIAGNOSIS — G89.29 CHRONIC BILATERAL LOW BACK PAIN WITH RIGHT-SIDED SCIATICA: ICD-10-CM

## 2024-03-25 DIAGNOSIS — M54.9 BACK PAIN, UNSPECIFIED BACK LOCATION, UNSPECIFIED BACK PAIN LATERALITY, UNSPECIFIED CHRONICITY: ICD-10-CM

## 2024-03-25 DIAGNOSIS — M48.062 SPINAL STENOSIS OF LUMBAR REGION WITH NEUROGENIC CLAUDICATION: ICD-10-CM

## 2024-03-25 DIAGNOSIS — M54.41 CHRONIC BILATERAL LOW BACK PAIN WITH RIGHT-SIDED SCIATICA: ICD-10-CM

## 2024-03-25 DIAGNOSIS — Z96.641 STATUS POST RIGHT HIP REPLACEMENT: ICD-10-CM

## 2024-03-25 DIAGNOSIS — Z96.651 STATUS POST TOTAL RIGHT KNEE REPLACEMENT: ICD-10-CM

## 2024-03-25 DIAGNOSIS — M43.16 SPONDYLOLISTHESIS AT L3-L4 LEVEL: ICD-10-CM

## 2024-03-25 DIAGNOSIS — F11.90 UNCOMPLICATED OPIOID USE: ICD-10-CM

## 2024-03-25 RX ORDER — OXYCODONE AND ACETAMINOPHEN 5; 325 MG/1; MG/1
1 TABLET ORAL EVERY 8 HOURS PRN
Qty: 90 TABLET | Refills: 0 | Status: SHIPPED | OUTPATIENT
Start: 2024-03-25 | End: 2024-04-24 | Stop reason: SDUPTHER

## 2024-04-03 ENCOUNTER — PATIENT MESSAGE (OUTPATIENT)
Dept: BARIATRICS | Facility: CLINIC | Age: 64
End: 2024-04-03
Payer: MEDICARE

## 2024-04-09 ENCOUNTER — PATIENT MESSAGE (OUTPATIENT)
Dept: BARIATRICS | Facility: CLINIC | Age: 64
End: 2024-04-09
Payer: MEDICARE

## 2024-04-24 DIAGNOSIS — M43.16 SPONDYLOLISTHESIS AT L3-L4 LEVEL: ICD-10-CM

## 2024-04-24 DIAGNOSIS — F11.90 UNCOMPLICATED OPIOID USE: ICD-10-CM

## 2024-04-24 DIAGNOSIS — Z96.651 STATUS POST TOTAL RIGHT KNEE REPLACEMENT: ICD-10-CM

## 2024-04-24 DIAGNOSIS — M54.9 BACK PAIN, UNSPECIFIED BACK LOCATION, UNSPECIFIED BACK PAIN LATERALITY, UNSPECIFIED CHRONICITY: ICD-10-CM

## 2024-04-24 DIAGNOSIS — M48.062 SPINAL STENOSIS OF LUMBAR REGION WITH NEUROGENIC CLAUDICATION: ICD-10-CM

## 2024-04-24 DIAGNOSIS — G89.29 CHRONIC BILATERAL LOW BACK PAIN WITH RIGHT-SIDED SCIATICA: ICD-10-CM

## 2024-04-24 DIAGNOSIS — Z96.641 STATUS POST RIGHT HIP REPLACEMENT: ICD-10-CM

## 2024-04-24 DIAGNOSIS — M54.41 CHRONIC BILATERAL LOW BACK PAIN WITH RIGHT-SIDED SCIATICA: ICD-10-CM

## 2024-04-24 NOTE — TELEPHONE ENCOUNTER
Care Due:                  Date            Visit Type   Department     Provider  --------------------------------------------------------------------------------                                EP -                              PRIMARY      [facility] INTERNAL  Last Visit: 01-      CARE ([facility])   MEDICINE       Rayo Hernandez                              [facility]                              PRIMARY      [facility] INTERNAL  Next Visit: 07-      CARE ([facility])   MEDICINE       Rayo Hernandez                                                            Last  Test          Frequency    Reason                     Performed    Due Date  --------------------------------------------------------------------------------    CMP.........  12 months..  atorvastatin,              07- 07-                             losartan-hydrochlorothiaz                             vane, metFORMIN,                             potassium, venlafaxine...    HBA1C.......  6 months...  metFORMIN................  01-   07-    Lipid Panel.  12 months..  atorvastatin.............  07- 07-    [facility] Embedded Care Due Messages. Reference number: [id number].   4/24/2024 6:11:40 PM CDT

## 2024-04-25 RX ORDER — OXYCODONE AND ACETAMINOPHEN 5; 325 MG/1; MG/1
1 TABLET ORAL EVERY 8 HOURS PRN
Qty: 90 TABLET | Refills: 0 | Status: SHIPPED | OUTPATIENT
Start: 2024-04-25 | End: 2024-05-30 | Stop reason: SDUPTHER

## 2024-04-29 ENCOUNTER — PATIENT MESSAGE (OUTPATIENT)
Dept: INTERNAL MEDICINE | Facility: CLINIC | Age: 64
End: 2024-04-29
Payer: MEDICARE

## 2024-05-03 ENCOUNTER — OFFICE VISIT (OUTPATIENT)
Dept: INTERNAL MEDICINE | Facility: CLINIC | Age: 64
End: 2024-05-03
Payer: MEDICARE

## 2024-05-03 ENCOUNTER — PATIENT MESSAGE (OUTPATIENT)
Dept: INTERNAL MEDICINE | Facility: CLINIC | Age: 64
End: 2024-05-03

## 2024-05-03 VITALS
HEART RATE: 100 BPM | HEIGHT: 61 IN | SYSTOLIC BLOOD PRESSURE: 139 MMHG | BODY MASS INDEX: 41.12 KG/M2 | DIASTOLIC BLOOD PRESSURE: 81 MMHG | WEIGHT: 217.81 LBS | OXYGEN SATURATION: 95 %

## 2024-05-03 DIAGNOSIS — E11.22 CKD STAGE 3 DUE TO TYPE 2 DIABETES MELLITUS: ICD-10-CM

## 2024-05-03 DIAGNOSIS — E11.42 DIABETIC POLYNEUROPATHY ASSOCIATED WITH TYPE 2 DIABETES MELLITUS: Primary | ICD-10-CM

## 2024-05-03 DIAGNOSIS — N18.30 CKD STAGE 3 DUE TO TYPE 2 DIABETES MELLITUS: ICD-10-CM

## 2024-05-03 DIAGNOSIS — R30.0 DYSURIA: ICD-10-CM

## 2024-05-03 PROCEDURE — 3044F HG A1C LEVEL LT 7.0%: CPT | Mod: HCNC,CPTII,GC,S$GLB

## 2024-05-03 PROCEDURE — 99213 OFFICE O/P EST LOW 20 MIN: CPT | Mod: HCNC,GC,S$GLB,

## 2024-05-03 PROCEDURE — 3075F SYST BP GE 130 - 139MM HG: CPT | Mod: HCNC,CPTII,GC,S$GLB

## 2024-05-03 PROCEDURE — 99999 PR PBB SHADOW E&M-EST. PATIENT-LVL III: CPT | Mod: PBBFAC,HCNC,GC,

## 2024-05-03 PROCEDURE — 3008F BODY MASS INDEX DOCD: CPT | Mod: HCNC,CPTII,GC,S$GLB

## 2024-05-03 PROCEDURE — 3079F DIAST BP 80-89 MM HG: CPT | Mod: HCNC,CPTII,GC,S$GLB

## 2024-05-03 PROCEDURE — 3072F LOW RISK FOR RETINOPATHY: CPT | Mod: HCNC,CPTII,GC,S$GLB

## 2024-05-03 RX ORDER — CAPSAICIN 0.75 MG/G
CREAM TOPICAL 4 TIMES DAILY PRN
Qty: 120 G | Refills: 0 | Status: SHIPPED | OUTPATIENT
Start: 2024-05-03 | End: 2024-08-01

## 2024-05-03 RX ORDER — CAPSAICIN 0.75 MG/G
CREAM TOPICAL 4 TIMES DAILY PRN
Qty: 120 G | Refills: 0 | Status: SHIPPED | OUTPATIENT
Start: 2024-05-03 | End: 2024-05-03

## 2024-05-03 RX ORDER — GABAPENTIN 300 MG/1
300 CAPSULE ORAL 3 TIMES DAILY
Qty: 270 CAPSULE | Refills: 0 | Status: SHIPPED | OUTPATIENT
Start: 2024-05-03 | End: 2024-05-10 | Stop reason: CLARIF

## 2024-05-03 NOTE — PROGRESS NOTES
Subjective     Chief Complaint: Urgent care    History of Present Illness:  Ms. Tanika Wayne is a 64 y.o. female with a PMHx of lumbar spinal stenosis s/p laminectomy, diabetic neuropathy, spondlyolitshesis, right sided sciatica who presents to the clinic for an urgent care visit. Reports having worsening numbness in her RUE and RLE for the past month. Also associated with worsening burning like pain similar to neuropathy pain and weakness. Occurs mainly in the digits of her RUE and the anterior aspect of her RLE States that she has been dropping things and walking slower due to these symptoms. Reports having falling backwards once 2 weeks ago due to the weakness. Denies any back pain, neck pain. Also reports color changes to her RUE, specifically to her right 3 digit for the past month. States that her finger turns purple intermittently and believes that it may be due to her gabapentin. She does state that gabapentin does help with her pain especially at night. Also reports dysuria for the past month and think she may have a UTI.     Review of Systems   Constitutional:  Negative for chills and fever.   HENT:  Negative for congestion.    Eyes:  Negative for blurred vision and double vision.   Respiratory:  Negative for cough and shortness of breath.    Cardiovascular:  Negative for chest pain and palpitations.   Gastrointestinal:  Negative for abdominal pain, blood in stool, constipation, diarrhea, melena, nausea and vomiting.   Genitourinary:  Positive for dysuria.   Musculoskeletal:  Negative for back pain and neck pain.   Neurological:  Positive for tingling and sensory change. Negative for weakness.   All other systems reviewed and are negative.      PAST HISTORY:     Past Medical History:   Diagnosis Date    Abnormal echocardiogram 11/26/2012    Arthritis     Breast cancer 1998    Breast cancer, right breast     Breast cancer, right breast 11/26/2012    S/P Lumpectomy / XRT '98 Dr. Bartholomew      Cardiomyopathy due to systemic disease     Cataract     Clotting disorder     Coronary artery disease     DM (diabetes mellitus) 2012    Encounter for blood transfusion     HTN (hypertension) 2012    Increased glucose level 2012    ITP (idiopathic thrombocytopenic purpura) 2012    Kidney stones 2012    PONV (postoperative nausea and vomiting)     Stenosis of lumbosacral spine 2012    Thyroid disease     Tympanic membrane perforation 9/15/2014    right 2014 Dr. Jeffries       Past Surgical History:   Procedure Laterality Date    BREAST LUMPECTOMY Right     CATARACT EXTRACTION W/  INTRAOCULAR LENS IMPLANT Left 2021    Procedure: EXTRACTION, CATARACT, WITH IOL INSERTION;  Surgeon: Klever Tolliver MD;  Location: [facility];  Service: Ophthalmology;  Laterality: Left;    CATARACT EXTRACTION W/  INTRAOCULAR LENS IMPLANT Right 2021    Procedure: EXTRACTION, CATARACT, WITH IOL INSERTION;  Surgeon: Klever Tolliver MD;  Location: [facility];  Service: Ophthalmology;  Laterality: Right;     SECTION      CHOLECYSTECTOMY      COLONOSCOPY N/A 2022    Procedure: COLONOSCOPY;  Surgeon: Anoop Escobar MD;  Location: [facility] (4TH FLR);  Service: Endoscopy;  Laterality: N/A;  Fully vaccinated/ inst mailed/ clear liquids up to 4 hrs prior/AM prep 2am-3am-RB    ESOPHAGOGASTRODUODENOSCOPY N/A 2021    Procedure: EGD (ESOPHAGOGASTRODUODENOSCOPY);  Surgeon: Silvio Singh Jr., MD;  Location: [facility] 2ND FLR;  Service: General;  Laterality: N/A;    EYE SURGERY      strabismus, 2006, bilateral cataracts    HIP ARTHROPLASTY Right 10/18/2018    Procedure: ARTHROPLASTY, HIP;  Surgeon: Baldomero Fischer MD;  Location: [facility];  Service: Orthopedics;  Laterality: Right;    HIP REPLACEMENT ARTHROPLASTY Right     dr fischer    HYSTERECTOMY      JOINT REPLACEMENT      right BRYNN, TKA    KNEE ARTHROPLASTY Right 2019    Procedure: ARTHROPLASTY, KNEE (ADD ON );  Surgeon:  Baldomero Fischer MD;  Location: [facility];  Service: Orthopedics;  Laterality: Right;  (ADD ON )    L3-5 Laminectomy  02/2017    Dr. Menodza    LAPAROSCOPIC SLEEVE GASTRECTOMY N/A 06/08/2021    Procedure: GASTRECTOMY, SLEEVE, LAPAROSCOPIC;  Surgeon: Silvio Singh Jr., MD;  [address]  Service: General;  Laterality: N/A;    SPLENECTOMY, TOTAL  2001    from ITP    STRABISMUS SURGERY  08/30/2006    OU    THYROIDECTOMY  09/2016    TONSILLECTOMY         Family History   Problem Relation Name Age of Onset    Heart disease Mother  69        MI    Diabetes Mother      Hypertension Mother      Diabetes Father      Hypertension Father      Stroke Father      Hyperlipidemia Sister      COPD Sister      Hypertension Sister      Emphysema Sister      Diabetes Sister Tita     Hypertension Sister Tita     Diabetes Sister Мария         prediabetic    Hypertension Sister Мария     Migraines Sister Мария     Rheum arthritis Sister Мария     Diabetes Brother Facundo     Hypertension Brother Facundo     No Known Problems Daughter Genae        Social History     Socioeconomic History    Marital status:     Number of children: 1   Occupational History    Occupation: registration     [place] [employer] [place]   Tobacco Use    Smoking status: Never    Smokeless tobacco: Never   Substance and Sexual Activity    Alcohol use: Yes     Comment: seasonally    Drug use: No    Sexual activity: Not Currently     Partners: Male   Social History Narrative    2017 - Using a walker    The patient is not getting much exercise but is able to get about with the aid of a cane or walker.     (has copd, arthritis), 2 kids, dtr 28, son 18 (healthy kids)     Social Determinants of Health     Financial Resource Strain: Medium Risk (4/30/2024)    Overall Financial Resource Strain (CARDIA)     Difficulty of Paying Living Expenses: Somewhat hard   Food Insecurity: Food Insecurity Present (4/30/2024)    Hunger Vital Sign     Worried  About Running Out of Food in the Last Year: Sometimes true     Ran Out of Food in the Last Year: Sometimes true   Transportation Needs: Unmet Transportation Needs (4/30/2024)    PRAPARE - Transportation     Lack of Transportation (Medical): Yes     Lack of Transportation (Non-Medical): Yes   Physical Activity: Inactive (4/30/2024)    Exercise Vital Sign     Days of Exercise per Week: 0 days     Minutes of Exercise per Session: 0 min   Stress: No Stress Concern Present (4/30/2024)    [employer] - Occupational Stress Questionnaire     Feeling of Stress : Only a little       MEDICATIONS & ALLERGIES:     Current Outpatient Medications   Medication Sig Dispense Refill    atorvastatin (LIPITOR) 10 MG tablet Take 1 tablet (10 mg total) by mouth once daily. 90 tablet 11    calcium carbonate 1250 MG capsule Take 1,250 mg by mouth Daily.      capsicum 0.075% (ZOSTRIX) 0.075 % topical cream Apply topically 4 (four) times daily as needed (for pain). 120 g 0    carvediloL (COREG) 3.125 MG tablet Take 1 tablet (3.125 mg total) by mouth 2 (two) times daily. 180 tablet 11    clotrimazole-betamethasone 1-0.05% (LOTRISONE) cream Apply topically 2 (two) times daily as needed. 45 g 1    cyanocobalamin (VITAMIN B-12) 100 MCG tablet Take 100 mcg by mouth once daily.      cyclobenzaprine (FLEXERIL) 10 MG tablet Take 1 tablet (10 mg total) by mouth 2 (two) times daily as needed for Muscle spasms. 90 tablet 5    docusate sodium (COLACE) 100 MG capsule TAKE 1 CAPSULE (100 MG TOTAL) BY MOUTH 2 (TWO) TIMES DAILY AS NEEDED FOR CONSTIPATION. 60 capsule 17    famotidine (PEPCID) 40 MG tablet Take 40 mg by mouth.      fluticasone propionate (FLONASE) 50 mcg/actuation nasal spray INSTILL 2 SPRAYS IN EACH NOSTRIL ONCE A DAY 48 mL 3    gabapentin (NEURONTIN) 300 MG capsule Take 1 capsule (300 mg total) by mouth 3 (three) times daily. 270 capsule 0    levothyroxine (SYNTHROID) 100 MCG tablet Take 1 tablet (100 mcg total)  "by mouth once daily. 90 tablet 11    losartan-hydrochlorothiazide 100-12.5 mg (HYZAAR) 100-12.5 mg Tab Take 1 tablet by mouth once daily. 90 tablet 3    metFORMIN (GLUCOPHAGE-XR) 500 MG ER 24hr tablet Take 1 tablet (500 mg total) by mouth 2 (two) times daily with meals. 180 tablet 11    multivitamin (THERAGRAN) per tablet Take 1 tablet by mouth once daily.      NIFEdipine (PROCARDIA-XL) 90 MG (OSM) 24 hr tablet Take 1 tablet (90 mg total) by mouth once daily. 90 tablet 3    oxyCODONE-acetaminophen (PERCOCET) 5-325 mg per tablet Take 1 tablet by mouth every 8 (eight) hours as needed for Pain. Greater than 7 day quantity Medically Necessary 90 tablet 0    potassium chloride SA (K-DUR,KLOR-CON) 20 MEQ tablet Take 2 tablets (40 mEq total) by mouth 2 (two) times daily. 360 tablet 11    traZODone (DESYREL) 50 MG tablet Take 1 tablet (50 mg total) by mouth every evening. 90 tablet 3    venlafaxine (EFFEXOR-XR) 150 MG Cp24 Take 1 capsule (150 mg total) by mouth once daily. 90 capsule 11     Current Facility-Administered Medications   Medication Dose Route Frequency Provider Last Rate Last Admin    sodium chloride 0.9% flush 10 mL  10 mL Intravenous PRN Klever Tolliver MD        sodium chloride 0.9% flush 10 mL  10 mL Intravenous PRN Klever Tolliver MD         Facility-Administered Medications Ordered in Other Visits   Medication Dose Route Frequency Provider Last Rate Last Admin    sodium chloride 0.9% flush 3 mL  3 mL Intravenous PRN Baldomero Fischer MD           Review of patient's allergies indicates:  No Known Allergies    OBJECTIVE:     Vital Signs:  Vitals:    05/03/24 0828   BP: 139/81   Pulse: 100   SpO2: 95%   Weight: 98.8 kg (217 lb 13 oz)   Height: 5' 1" (1.549 m)       Body mass index is 41.16 kg/m².     Physical Exam  Vitals and nursing note reviewed.   Constitutional:       General: She is not in acute distress.     Appearance: She is obese. She is not ill-appearing.   HENT:      Head: Atraumatic.      " Right Ear: External ear normal.      Left Ear: External ear normal.   Eyes:      Extraocular Movements: Extraocular movements intact.   Neck:      Comments: Negative Spurling and Tinel's test   Cardiovascular:      Rate and Rhythm: Normal rate and regular rhythm.      Pulses: Normal pulses.      Heart sounds: Normal heart sounds. No murmur heard.  Pulmonary:      Effort: Pulmonary effort is normal. No respiratory distress.      Breath sounds: Normal breath sounds.   Abdominal:      General: Abdomen is flat. Bowel sounds are normal.      Palpations: Abdomen is soft. There is no mass.      Tenderness: There is no abdominal tenderness. There is no guarding.   Musculoskeletal:         General: No tenderness. Normal range of motion.      Cervical back: Normal range of motion. No tenderness or bony tenderness.      Thoracic back: No tenderness or bony tenderness.      Lumbar back: No tenderness or bony tenderness. Negative right straight leg raise test and negative left straight leg raise test.      Right lower leg: No edema.      Left lower leg: No edema.   Skin:     General: Skin is warm and dry.   Neurological:      General: No focal deficit present.      Mental Status: She is alert and oriented to person, place, and time.      Sensory: Sensory deficit (Decreased sensation to touch on the lateral and medial aspect of RLE just inferior to the knee. Absent sensation to touch to the medial and lateral aspect of right ankle) present.      Motor: Weakness (4/5 strength in the RUE) present.   Psychiatric:         Mood and Affect: Mood normal.         Behavior: Behavior normal.       Laboratory  No results found for this or any previous visit (from the past 24 hour(s)).    Diagnostic Results:      Health Maintenance Due   Topic Date Due    Diabetes Urine Screening  07/20/2023    Mammogram  05/31/2024         ASSESSMENT & PLAN:   63 y/o F with hx of lumbar spinal stenosis s/p laminectomy, diabetic neuropathy,  spondlyolitshesis, right sided sciatica who presents today for worsening numbness and weakness. Suspect that this worsening of her diabetic neuropathy. Given the lack of improvement, she may benefit for neurology referral for a nerve study. Unsure of etiology in terms of the skin color change given the transient nature though it is less likely that it is 2/2 to the gabapentin. Possible diagnoses include Raynaud's. Instructed patient to take pictures of the color change when it occurs and gave patient a symptom diary to document in. Given that Gabapentin helps her pain especially at night, will increase Gabapentin today and prescribe Capsicum cream for her neuropathy. Patient unable to make urine today in the office. Agrees to return to the lab to provide a urine sample at a later time.     1. Diabetic polyneuropathy associated with type 2 diabetes mellitus  -     gabapentin (NEURONTIN) 300 MG capsule; Take 1 capsule (300 mg total) by mouth 3 (three) times daily.  Dispense: 270 capsule; Refill: 0  -     Ambulatory referral/consult to Neurology; Future; Expected date: 05/10/2024  -     capsicum 0.075% (ZOSTRIX) 0.075 % topical cream; Apply topically 4 (four) times daily as needed (for pain).  Dispense: 120 g; Refill: 0    2. Dysuria  -     Cancel: Urinalysis, Reflex to Urine Culture Urine, Clean Catch  -     Urinalysis, Reflex to Urine Culture Urine, Clean Catch    3. CKD stage 3 due to type 2 diabetes mellitus  -     Microalbumin/creatinine urine ratio    Other orders  -     Discontinue: capsicum 0.075% (ZOSTRIX) 0.075 % topical cream; Apply topically 4 (four) times daily as needed (for pain).  Dispense: 120 g; Refill: 0        See above for further detail.    RTC in 2 months    Discussed with Dr Renteria -- staff attestation to follow      Bobby Bynum DO  Internal Medicine PGY-1  [facility]

## 2024-05-10 ENCOUNTER — LAB VISIT (OUTPATIENT)
Dept: LAB | Facility: HOSPITAL | Age: 64
End: 2024-05-10
Payer: MEDICARE

## 2024-05-10 ENCOUNTER — OFFICE VISIT (OUTPATIENT)
Dept: NEUROLOGY | Facility: CLINIC | Age: 64
End: 2024-05-10
Payer: MEDICARE

## 2024-05-10 DIAGNOSIS — N18.30 CKD STAGE 3 DUE TO TYPE 2 DIABETES MELLITUS: ICD-10-CM

## 2024-05-10 DIAGNOSIS — E11.59 HYPERTENSION ASSOCIATED WITH DIABETES: ICD-10-CM

## 2024-05-10 DIAGNOSIS — G62.9 NEUROPATHY: ICD-10-CM

## 2024-05-10 DIAGNOSIS — R27.8 OTHER LACK OF COORDINATION: ICD-10-CM

## 2024-05-10 DIAGNOSIS — E78.5 HYPERLIPIDEMIA ASSOCIATED WITH TYPE 2 DIABETES MELLITUS: ICD-10-CM

## 2024-05-10 DIAGNOSIS — G62.9 NEUROPATHY: Primary | ICD-10-CM

## 2024-05-10 DIAGNOSIS — E11.22 CKD STAGE 3 DUE TO TYPE 2 DIABETES MELLITUS: ICD-10-CM

## 2024-05-10 DIAGNOSIS — E11.42 DIABETIC POLYNEUROPATHY ASSOCIATED WITH TYPE 2 DIABETES MELLITUS: ICD-10-CM

## 2024-05-10 DIAGNOSIS — M48.061 SPINAL STENOSIS OF LUMBAR REGION, UNSPECIFIED WHETHER NEUROGENIC CLAUDICATION PRESENT: ICD-10-CM

## 2024-05-10 DIAGNOSIS — G60.9 HEREDITARY AND IDIOPATHIC NEUROPATHY, UNSPECIFIED: ICD-10-CM

## 2024-05-10 DIAGNOSIS — E11.69 HYPERLIPIDEMIA ASSOCIATED WITH TYPE 2 DIABETES MELLITUS: ICD-10-CM

## 2024-05-10 DIAGNOSIS — I15.2 HYPERTENSION ASSOCIATED WITH DIABETES: ICD-10-CM

## 2024-05-10 LAB
ERYTHROCYTE [SEDIMENTATION RATE] IN BLOOD BY PHOTOMETRIC METHOD: 21 MM/HR (ref 0–36)
T4 SERPL-MCNC: 10.4 UG/DL (ref 4.5–11.5)
TSH SERPL DL<=0.005 MIU/L-ACNC: 1.91 UIU/ML (ref 0.4–4)

## 2024-05-10 PROCEDURE — 82300 ASSAY OF CADMIUM: CPT | Mod: HCNC | Performed by: PHYSICIAN ASSISTANT

## 2024-05-10 PROCEDURE — 82607 VITAMIN B-12: CPT | Mod: HCNC | Performed by: PHYSICIAN ASSISTANT

## 2024-05-10 PROCEDURE — 84443 ASSAY THYROID STIM HORMONE: CPT | Mod: HCNC | Performed by: PHYSICIAN ASSISTANT

## 2024-05-10 PROCEDURE — 3044F HG A1C LEVEL LT 7.0%: CPT | Mod: HCNC,CPTII,S$GLB, | Performed by: PHYSICIAN ASSISTANT

## 2024-05-10 PROCEDURE — 87522 HEPATITIS C REVRS TRNSCRPJ: CPT | Mod: HCNC | Performed by: PHYSICIAN ASSISTANT

## 2024-05-10 PROCEDURE — 80321 ALCOHOLS BIOMARKERS 1OR 2: CPT | Mod: HCNC | Performed by: PHYSICIAN ASSISTANT

## 2024-05-10 PROCEDURE — 84436 ASSAY OF TOTAL THYROXINE: CPT | Mod: HCNC | Performed by: PHYSICIAN ASSISTANT

## 2024-05-10 PROCEDURE — 1160F RVW MEDS BY RX/DR IN RCRD: CPT | Mod: HCNC,CPTII,S$GLB, | Performed by: PHYSICIAN ASSISTANT

## 2024-05-10 PROCEDURE — 87086 URINE CULTURE/COLONY COUNT: CPT | Mod: HCNC

## 2024-05-10 PROCEDURE — 36415 COLL VENOUS BLD VENIPUNCTURE: CPT | Mod: HCNC | Performed by: PHYSICIAN ASSISTANT

## 2024-05-10 PROCEDURE — 84207 ASSAY OF VITAMIN B-6: CPT | Mod: HCNC | Performed by: PHYSICIAN ASSISTANT

## 2024-05-10 PROCEDURE — 1159F MED LIST DOCD IN RCRD: CPT | Mod: HCNC,CPTII,S$GLB, | Performed by: PHYSICIAN ASSISTANT

## 2024-05-10 PROCEDURE — 99999 PR PBB SHADOW E&M-EST. PATIENT-LVL IV: CPT | Mod: PBBFAC,HCNC,, | Performed by: PHYSICIAN ASSISTANT

## 2024-05-10 PROCEDURE — 82043 UR ALBUMIN QUANTITATIVE: CPT | Mod: HCNC

## 2024-05-10 PROCEDURE — 81001 URINALYSIS AUTO W/SCOPE: CPT | Mod: 59,HCNC

## 2024-05-10 PROCEDURE — 99215 OFFICE O/P EST HI 40 MIN: CPT | Mod: HCNC,S$GLB,, | Performed by: PHYSICIAN ASSISTANT

## 2024-05-10 PROCEDURE — 3072F LOW RISK FOR RETINOPATHY: CPT | Mod: HCNC,CPTII,S$GLB, | Performed by: PHYSICIAN ASSISTANT

## 2024-05-10 PROCEDURE — 83921 ORGANIC ACID SINGLE QUANT: CPT | Mod: HCNC | Performed by: PHYSICIAN ASSISTANT

## 2024-05-10 PROCEDURE — 84425 ASSAY OF VITAMIN B-1: CPT | Mod: HCNC | Performed by: PHYSICIAN ASSISTANT

## 2024-05-10 PROCEDURE — 84252 ASSAY OF VITAMIN B-2: CPT | Mod: HCNC | Performed by: PHYSICIAN ASSISTANT

## 2024-05-10 PROCEDURE — 85652 RBC SED RATE AUTOMATED: CPT | Mod: HCNC | Performed by: PHYSICIAN ASSISTANT

## 2024-05-10 RX ORDER — PREGABALIN 50 MG/1
50 CAPSULE ORAL 2 TIMES DAILY
Qty: 60 CAPSULE | Refills: 5 | Status: SHIPPED | OUTPATIENT
Start: 2024-05-10 | End: 2024-11-08
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
## 2024-05-10 NOTE — PATIENT INSTRUCTIONS
Lab work done on the second floor to evaluate for a metabolic cause       X-ray of the lower back to evaluate if there is anything going on there      EMG if these two other tests do not show anything abnormal     DISCONTINUE Gabapentin     BEGIN- Lyrica 50mg twice a day

## 2024-05-11 LAB
ARSENIC BLD-MCNC: <1 NG/ML
CADMIUM BLD-MCNC: <0.2 NG/ML
CITY: NORMAL
COUNTY: NORMAL
GUARDIAN FIRST NAME: NORMAL
GUARDIAN LAST NAME: NORMAL
HOME PHONE: NORMAL
LEAD BLD-MCNC: <1 MCG/DL
MERCURY BLD-MCNC: <1 NG/ML
RACE: NORMAL
STATE: NORMAL
STREET ADDRESS: NORMAL
VENOUS/CAPILLARY: NORMAL
VIT B12 SERPL-MCNC: 1273 NG/L (ref 180–914)
ZIP: NORMAL

## 2024-05-13 LAB
CLINICAL BIOCHEMIST REVIEW: NORMAL
HCV RNA SERPL QL NAA+PROBE: NOT DETECTED
HCV RNA SPEC NAA+PROBE-ACNC: NOT DETECTED IU/ML
PLPETH BLD-MCNC: <10 NG/ML
POPETH BLD-MCNC: <10 NG/ML

## 2024-05-14 ENCOUNTER — PATIENT MESSAGE (OUTPATIENT)
Dept: BARIATRICS | Facility: CLINIC | Age: 64
End: 2024-05-14
Payer: MEDICARE

## 2024-05-14 LAB
METHYLMALONATE SERPL-SCNC: 0.28 UMOL/L
VIT B2 SERPL-MCNC: 7 MCG/L (ref 1–19)

## 2024-05-15 ENCOUNTER — HOSPITAL ENCOUNTER (OUTPATIENT)
Dept: RADIOLOGY | Facility: OTHER | Age: 64
Discharge: HOME | End: 2024-05-15
Attending: PHYSICIAN ASSISTANT
Payer: MEDICARE

## 2024-05-15 DIAGNOSIS — G62.9 NEUROPATHY: ICD-10-CM

## 2024-05-15 DIAGNOSIS — M48.061 SPINAL STENOSIS OF LUMBAR REGION, UNSPECIFIED WHETHER NEUROGENIC CLAUDICATION PRESENT: ICD-10-CM

## 2024-05-15 LAB — PYRIDOXAL SERPL-MCNC: 31 UG/L (ref 5–50)

## 2024-05-15 PROCEDURE — 72100 X-RAY EXAM L-S SPINE 2/3 VWS: CPT | Mod: TC,HCNC,FY

## 2024-05-15 PROCEDURE — 72100 X-RAY EXAM L-S SPINE 2/3 VWS: CPT | Mod: 26,HCNC,, | Performed by: RADIOLOGY

## 2024-05-17 ENCOUNTER — TELEPHONE (OUTPATIENT)
Dept: PULMONOLOGY | Facility: HOSPITAL | Age: 64
End: 2024-05-17
Payer: MEDICARE

## 2024-05-17 DIAGNOSIS — N30.00 ACUTE CYSTITIS WITHOUT HEMATURIA: Primary | ICD-10-CM

## 2024-05-17 RX ORDER — NITROFURANTOIN 25; 75 MG/1; MG/1
100 CAPSULE ORAL 2 TIMES DAILY
Qty: 10 CAPSULE | Refills: 0 | Status: SHIPPED | OUTPATIENT
Start: 2024-05-17 | End: 2024-05-22

## 2024-05-17 NOTE — TELEPHONE ENCOUNTER
Called patient to discussed results of her UA which is consistent with acute cystitis. Will prescribe 5 days course of Macrobid.     Bobby Bynum DO  Internal Medicine PGY-1  [facility]

## 2024-05-18 LAB — VIT B1 BLD-MCNC: 116 UG/L (ref 38–122)

## 2024-05-20 DIAGNOSIS — M54.16 LUMBAR RADICULOPATHY: Primary | ICD-10-CM

## 2024-05-21 ENCOUNTER — TELEPHONE (OUTPATIENT)
Dept: ORTHOPEDICS | Facility: CLINIC | Age: 64
End: 2024-05-21
Payer: MEDICARE

## 2024-05-22 ENCOUNTER — TELEPHONE (OUTPATIENT)
Dept: ORTHOPEDICS | Facility: CLINIC | Age: 64
End: 2024-05-22
Payer: MEDICARE

## 2024-05-24 ENCOUNTER — TELEPHONE (OUTPATIENT)
Dept: INTERNAL MEDICINE | Facility: CLINIC | Age: 64
End: 2024-05-24
Payer: MEDICARE

## 2024-05-24 NOTE — TELEPHONE ENCOUNTER
----- Message from Roberto Ballesteros sent at 5/16/2024  8:10 PM CDT -----  Regarding: Client Services  Contact: [phone]  Good Morning,     My name is Roberto Ballesteros, I work in the Lab Client Services. We had a problem with some lab work on this patient. If someone from your office could call us at [phone] or [phone] that would be great. Anyone in my department can help.      Thank you,

## 2024-05-30 DIAGNOSIS — M54.9 BACK PAIN, UNSPECIFIED BACK LOCATION, UNSPECIFIED BACK PAIN LATERALITY, UNSPECIFIED CHRONICITY: ICD-10-CM

## 2024-05-30 DIAGNOSIS — M43.16 SPONDYLOLISTHESIS AT L3-L4 LEVEL: ICD-10-CM

## 2024-05-30 DIAGNOSIS — M48.062 SPINAL STENOSIS OF LUMBAR REGION WITH NEUROGENIC CLAUDICATION: ICD-10-CM

## 2024-05-30 DIAGNOSIS — G89.29 CHRONIC BILATERAL LOW BACK PAIN WITH RIGHT-SIDED SCIATICA: ICD-10-CM

## 2024-05-30 DIAGNOSIS — Z96.641 STATUS POST RIGHT HIP REPLACEMENT: ICD-10-CM

## 2024-05-30 DIAGNOSIS — F11.90 UNCOMPLICATED OPIOID USE: ICD-10-CM

## 2024-05-30 DIAGNOSIS — Z96.651 STATUS POST TOTAL RIGHT KNEE REPLACEMENT: ICD-10-CM

## 2024-05-30 DIAGNOSIS — R21 RASH: ICD-10-CM

## 2024-05-30 DIAGNOSIS — M54.41 CHRONIC BILATERAL LOW BACK PAIN WITH RIGHT-SIDED SCIATICA: ICD-10-CM

## 2024-05-30 RX ORDER — OXYCODONE AND ACETAMINOPHEN 5; 325 MG/1; MG/1
1 TABLET ORAL EVERY 8 HOURS PRN
Qty: 90 TABLET | Refills: 0 | Status: SHIPPED | OUTPATIENT
Start: 2024-05-30

## 2024-05-30 RX ORDER — CLOTRIMAZOLE AND BETAMETHASONE DIPROPIONATE 10; .64 MG/G; MG/G
CREAM TOPICAL 2 TIMES DAILY PRN
Qty: 45 G | Refills: 1 | Status: SHIPPED | OUTPATIENT
Start: 2024-05-30

## 2024-05-30 NOTE — TELEPHONE ENCOUNTER
No care due was identified.  [facility] Embedded Care Due Messages. Reference number: [id number].   5/30/2024 12:06:23 PM CDT

## 2024-06-10 ENCOUNTER — PATIENT MESSAGE (OUTPATIENT)
Dept: INTERNAL MEDICINE | Facility: CLINIC | Age: 64
End: 2024-06-10
Payer: MEDICARE

## 2024-06-11 ENCOUNTER — PATIENT MESSAGE (OUTPATIENT)
Dept: BARIATRICS | Facility: CLINIC | Age: 64
End: 2024-06-11
Payer: MEDICARE

## 2024-06-17 ENCOUNTER — PATIENT MESSAGE (OUTPATIENT)
Dept: INTERNAL MEDICINE | Facility: CLINIC | Age: 64
End: 2024-06-17
Payer: MEDICARE

## 2024-06-17 DIAGNOSIS — Z12.31 ENCOUNTER FOR SCREENING MAMMOGRAM FOR MALIGNANT NEOPLASM OF BREAST: Primary | ICD-10-CM

## 2024-06-17 NOTE — TELEPHONE ENCOUNTER
Hi, please contact the patient to assist in scheduling    Orders Placed This Encounter    Mammo Digital Screening P[facility] soto/ [facility]       Thank you, Rayo Hernandez

## 2024-07-02 DIAGNOSIS — G89.29 CHRONIC BILATERAL LOW BACK PAIN WITH RIGHT-SIDED SCIATICA: ICD-10-CM

## 2024-07-02 DIAGNOSIS — M48.062 SPINAL STENOSIS OF LUMBAR REGION WITH NEUROGENIC CLAUDICATION: ICD-10-CM

## 2024-07-02 DIAGNOSIS — F11.90 UNCOMPLICATED OPIOID USE: ICD-10-CM

## 2024-07-02 DIAGNOSIS — M54.41 CHRONIC BILATERAL LOW BACK PAIN WITH RIGHT-SIDED SCIATICA: ICD-10-CM

## 2024-07-02 DIAGNOSIS — Z96.651 STATUS POST TOTAL RIGHT KNEE REPLACEMENT: ICD-10-CM

## 2024-07-02 DIAGNOSIS — M43.16 SPONDYLOLISTHESIS AT L3-L4 LEVEL: ICD-10-CM

## 2024-07-02 DIAGNOSIS — Z96.641 STATUS POST RIGHT HIP REPLACEMENT: ICD-10-CM

## 2024-07-02 DIAGNOSIS — M54.9 BACK PAIN, UNSPECIFIED BACK LOCATION, UNSPECIFIED BACK PAIN LATERALITY, UNSPECIFIED CHRONICITY: ICD-10-CM

## 2024-07-02 RX ORDER — OXYCODONE AND ACETAMINOPHEN 5; 325 MG/1; MG/1
1 TABLET ORAL EVERY 8 HOURS PRN
Qty: 90 TABLET | Refills: 0 | Status: SHIPPED | OUTPATIENT
Start: 2024-07-02

## 2024-07-02 NOTE — TELEPHONE ENCOUNTER
Care Due:                  Date            Visit Type   Department     Provider  --------------------------------------------------------------------------------                                EP -                              PRIMARY      [facility]  Last Visit: 01-      CARE ([facility])   MEDICINE       Rayo Hernandez  Next Visit: None Scheduled  None         None Found                                                            Last  Test          Frequency    Reason                     Performed    Due Date  --------------------------------------------------------------------------------    CMP.........  12 months..  atorvastatin,              07- 07-                             losartan-hydrochlorothiaz                             vane, metFORMIN,                             potassium, venlafaxine...    HBA1C.......  6 months...  metFORMIN................  01-   07-    Lipid Panel.  12 months..  atorvastatin.............  07- 07-    [facility] Embedded Care Due Messages. Reference number: [id number].   7/02/2024 10:26:26 AM CDT

## 2024-07-03 ENCOUNTER — PATIENT MESSAGE (OUTPATIENT)
Dept: BARIATRICS | Facility: CLINIC | Age: 64
End: 2024-07-03
Payer: MEDICARE

## 2024-07-09 ENCOUNTER — PATIENT MESSAGE (OUTPATIENT)
Dept: BARIATRICS | Facility: CLINIC | Age: 64
End: 2024-07-09
Payer: MEDICARE

## 2024-07-10 ENCOUNTER — HOSPITAL ENCOUNTER (OUTPATIENT)
Dept: RADIOLOGY | Facility: OTHER | Age: 64
Discharge: HOME | End: 2024-07-10
Attending: INTERNAL MEDICINE
Payer: MEDICARE

## 2024-07-10 DIAGNOSIS — Z12.31 ENCOUNTER FOR SCREENING MAMMOGRAM FOR MALIGNANT NEOPLASM OF BREAST: ICD-10-CM

## 2024-07-10 PROCEDURE — 77063 BREAST TOMOSYNTHESIS BI: CPT | Mod: TC,HCNC

## 2024-07-12 DIAGNOSIS — I10 ESSENTIAL HYPERTENSION, MALIGNANT: ICD-10-CM

## 2024-07-12 DIAGNOSIS — F32.A DEPRESSION, UNSPECIFIED DEPRESSION TYPE: ICD-10-CM

## 2024-07-12 DIAGNOSIS — E11.9 TYPE 2 DIABETES MELLITUS WITHOUT COMPLICATION, WITHOUT LONG-TERM CURRENT USE OF INSULIN: ICD-10-CM

## 2024-07-12 DIAGNOSIS — J32.9 SINUSITIS, UNSPECIFIED CHRONICITY, UNSPECIFIED LOCATION: ICD-10-CM

## 2024-07-12 DIAGNOSIS — E89.0 POSTOPERATIVE HYPOTHYROIDISM: ICD-10-CM

## 2024-07-12 DIAGNOSIS — E89.0 HISTORY OF SUBTOTAL THYROIDECTOMY: ICD-10-CM

## 2024-07-13 NOTE — TELEPHONE ENCOUNTER
No care due was identified.  [facility] Embedded Care Due Messages. Reference number: [id number].   7/12/2024 8:04:45 PM CDT

## 2024-07-13 NOTE — TELEPHONE ENCOUNTER
No care due was identified.  [facility] Embedded Care Due Messages. Reference number: [id number].   7/12/2024 8:05:34 PM CDT

## 2024-07-15 RX ORDER — LEVOTHYROXINE SODIUM 100 UG/1
100 TABLET ORAL DAILY
Qty: 90 TABLET | Refills: 1 | Status: SHIPPED | OUTPATIENT
Start: 2024-07-15

## 2024-07-15 RX ORDER — CARVEDILOL 3.12 MG/1
3.12 TABLET ORAL 2 TIMES DAILY
Qty: 180 TABLET | Refills: 1 | Status: SHIPPED | OUTPATIENT
Start: 2024-07-15

## 2024-07-15 RX ORDER — FLUTICASONE PROPIONATE 50 MCG
SPRAY, SUSPENSION (ML) NASAL
Qty: 48 ML | Refills: 1 | Status: SHIPPED | OUTPATIENT
Start: 2024-07-15

## 2024-07-15 NOTE — TELEPHONE ENCOUNTER
Refill Routing Note   Medication(s) are not appropriate for processing by [facility] for the following reason(s):        Required labs outdated: CMP, Lipid Panel    [facility] action(s):  Defer  Approve               Appointments  past 12m or future 3m with PCP    Date Provider   Last Visit   1/17/2024 Rayo Hernandez MD   Next Visit   7/29/2024 Rayo Hernandez MD   ED visits in past 90 days: 0        Note composed:8:54 AM 07/15/2024

## 2024-07-15 NOTE — TELEPHONE ENCOUNTER
Refill Decision Note   Tanika Wayne  is requesting a refill authorization.  Brief Assessment and Rationale for Refill:  Approve     Medication Therapy Plan:         Comments:     Note composed:9:27 AM 07/15/2024

## 2024-07-16 RX ORDER — VENLAFAXINE HYDROCHLORIDE 150 MG/1
150 CAPSULE, EXTENDED RELEASE ORAL DAILY
Qty: 90 CAPSULE | Refills: 1 | Status: SHIPPED | OUTPATIENT
Start: 2024-07-16

## 2024-07-16 RX ORDER — ATORVASTATIN CALCIUM 10 MG/1
10 TABLET, FILM COATED ORAL DAILY
Qty: 90 TABLET | Refills: 1 | Status: SHIPPED | OUTPATIENT
Start: 2024-07-16

## 2024-07-16 RX ORDER — METFORMIN HYDROCHLORIDE 500 MG/1
500 TABLET, EXTENDED RELEASE ORAL 2 TIMES DAILY WITH MEALS
Qty: 180 TABLET | Refills: 1 | Status: SHIPPED | OUTPATIENT
Start: 2024-07-16

## 2024-07-16 RX ORDER — POTASSIUM CHLORIDE 20 MEQ/1
40 TABLET, EXTENDED RELEASE ORAL 2 TIMES DAILY
Qty: 360 TABLET | Refills: 1 | Status: SHIPPED | OUTPATIENT
Start: 2024-07-16

## 2024-07-17 DIAGNOSIS — E11.42 DIABETIC PERIPHERAL NEUROPATHY: ICD-10-CM

## 2024-07-17 DIAGNOSIS — E11.9 TYPE 2 DIABETES MELLITUS WITHOUT COMPLICATION: ICD-10-CM

## 2024-07-29 ENCOUNTER — OFFICE VISIT (OUTPATIENT)
Dept: INTERNAL MEDICINE | Facility: CLINIC | Age: 64
End: 2024-07-29
Payer: MEDICARE

## 2024-07-29 ENCOUNTER — LAB VISIT (OUTPATIENT)
Dept: LAB | Facility: HOSPITAL | Age: 64
End: 2024-07-29
Attending: INTERNAL MEDICINE
Payer: MEDICARE

## 2024-07-29 VITALS
SYSTOLIC BLOOD PRESSURE: 120 MMHG | BODY MASS INDEX: 40.55 KG/M2 | DIASTOLIC BLOOD PRESSURE: 62 MMHG | HEIGHT: 61 IN | WEIGHT: 214.75 LBS | HEART RATE: 82 BPM | OXYGEN SATURATION: 96 %

## 2024-07-29 DIAGNOSIS — E11.9 TYPE 2 DIABETES MELLITUS WITHOUT COMPLICATION, WITHOUT LONG-TERM CURRENT USE OF INSULIN: Primary | ICD-10-CM

## 2024-07-29 DIAGNOSIS — E11.9 TYPE 2 DIABETES MELLITUS WITHOUT COMPLICATION, WITHOUT LONG-TERM CURRENT USE OF INSULIN: ICD-10-CM

## 2024-07-29 DIAGNOSIS — L30.9 DERMATITIS: ICD-10-CM

## 2024-07-29 DIAGNOSIS — G62.9 NEUROPATHY: ICD-10-CM

## 2024-07-29 LAB
ALBUMIN SERPL BCP-MCNC: 3.7 G/DL (ref 3.5–5.2)
ALP SERPL-CCNC: 57 U/L (ref 55–135)
ALT SERPL W/O P-5'-P-CCNC: 23 U/L (ref 10–44)
ANION GAP SERPL CALC-SCNC: 10 MMOL/L (ref 8–16)
AST SERPL-CCNC: 26 U/L (ref 10–40)
BASOPHILS # BLD AUTO: 0.03 K/UL (ref 0–0.2)
BASOPHILS NFR BLD: 0.4 % (ref 0–1.9)
BILIRUB SERPL-MCNC: 0.3 MG/DL (ref 0.1–1)
BUN SERPL-MCNC: 23 MG/DL (ref 8–23)
CALCIUM SERPL-MCNC: 9.4 MG/DL (ref 8.7–10.5)
CHLORIDE SERPL-SCNC: 105 MMOL/L (ref 95–110)
CHOLEST SERPL-MCNC: 142 MG/DL (ref 120–199)
CHOLEST/HDLC SERPL: 2.8 {RATIO} (ref 2–5)
CO2 SERPL-SCNC: 26 MMOL/L (ref 23–29)
CREAT SERPL-MCNC: 1.1 MG/DL (ref 0.5–1.4)
DIFFERENTIAL METHOD BLD: ABNORMAL
EOSINOPHIL # BLD AUTO: 0.1 K/UL (ref 0–0.5)
EOSINOPHIL NFR BLD: 1.5 % (ref 0–8)
ERYTHROCYTE [DISTWIDTH] IN BLOOD BY AUTOMATED COUNT: 14.8 % (ref 11.5–14.5)
EST. GFR  (NO RACE VARIABLE): 56.5 ML/MIN/1.73 M^2
ESTIMATED AVG GLUCOSE: 123 MG/DL (ref 68–131)
GLUCOSE SERPL-MCNC: 95 MG/DL (ref 70–110)
HBA1C MFR BLD: 5.9 % (ref 4–5.6)
HCT VFR BLD AUTO: 37.7 % (ref 37–48.5)
HDLC SERPL-MCNC: 51 MG/DL (ref 40–75)
HDLC SERPL: 35.9 % (ref 20–50)
HGB BLD-MCNC: 11.7 G/DL (ref 12–16)
IMM GRANULOCYTES # BLD AUTO: 0.01 K/UL (ref 0–0.04)
IMM GRANULOCYTES NFR BLD AUTO: 0.1 % (ref 0–0.5)
LDLC SERPL CALC-MCNC: 64.2 MG/DL (ref 63–159)
LYMPHOCYTES # BLD AUTO: 4.6 K/UL (ref 1–4.8)
LYMPHOCYTES NFR BLD: 60.9 % (ref 18–48)
MCH RBC QN AUTO: 27.9 PG (ref 27–31)
MCHC RBC AUTO-ENTMCNC: 31 G/DL (ref 32–36)
MCV RBC AUTO: 90 FL (ref 82–98)
MONOCYTES # BLD AUTO: 0.6 K/UL (ref 0.3–1)
MONOCYTES NFR BLD: 7.7 % (ref 4–15)
NEUTROPHILS # BLD AUTO: 2.2 K/UL (ref 1.8–7.7)
NEUTROPHILS NFR BLD: 29.4 % (ref 38–73)
NONHDLC SERPL-MCNC: 91 MG/DL
NRBC BLD-RTO: 0 /100 WBC
PLATELET # BLD AUTO: 346 K/UL (ref 150–450)
PMV BLD AUTO: 10.1 FL (ref 9.2–12.9)
POTASSIUM SERPL-SCNC: 4.2 MMOL/L (ref 3.5–5.1)
PROT SERPL-MCNC: 7.3 G/DL (ref 6–8.4)
RBC # BLD AUTO: 4.19 M/UL (ref 4–5.4)
SODIUM SERPL-SCNC: 141 MMOL/L (ref 136–145)
TRIGL SERPL-MCNC: 134 MG/DL (ref 30–150)
WBC # BLD AUTO: 7.51 K/UL (ref 3.9–12.7)

## 2024-07-29 PROCEDURE — 1160F RVW MEDS BY RX/DR IN RCRD: CPT | Mod: HCNC,CPTII,S$GLB, | Performed by: INTERNAL MEDICINE

## 2024-07-29 PROCEDURE — 99214 OFFICE O/P EST MOD 30 MIN: CPT | Mod: HCNC,S$GLB,, | Performed by: INTERNAL MEDICINE

## 2024-07-29 PROCEDURE — 3066F NEPHROPATHY DOC TX: CPT | Mod: HCNC,CPTII,S$GLB, | Performed by: INTERNAL MEDICINE

## 2024-07-29 PROCEDURE — 85025 COMPLETE CBC W/AUTO DIFF WBC: CPT | Mod: HCNC | Performed by: INTERNAL MEDICINE

## 2024-07-29 PROCEDURE — 80061 LIPID PANEL: CPT | Mod: HCNC | Performed by: INTERNAL MEDICINE

## 2024-07-29 PROCEDURE — 3074F SYST BP LT 130 MM HG: CPT | Mod: HCNC,CPTII,S$GLB, | Performed by: INTERNAL MEDICINE

## 2024-07-29 PROCEDURE — 1159F MED LIST DOCD IN RCRD: CPT | Mod: HCNC,CPTII,S$GLB, | Performed by: INTERNAL MEDICINE

## 2024-07-29 PROCEDURE — 99999 PR PBB SHADOW E&M-EST. PATIENT-LVL V: CPT | Mod: PBBFAC,HCNC,, | Performed by: INTERNAL MEDICINE

## 2024-07-29 PROCEDURE — 83036 HEMOGLOBIN GLYCOSYLATED A1C: CPT | Mod: HCNC | Performed by: INTERNAL MEDICINE

## 2024-07-29 PROCEDURE — 3044F HG A1C LEVEL LT 7.0%: CPT | Mod: HCNC,CPTII,S$GLB, | Performed by: INTERNAL MEDICINE

## 2024-07-29 PROCEDURE — 80053 COMPREHEN METABOLIC PANEL: CPT | Mod: HCNC | Performed by: INTERNAL MEDICINE

## 2024-07-29 PROCEDURE — 3072F LOW RISK FOR RETINOPATHY: CPT | Mod: HCNC,CPTII,S$GLB, | Performed by: INTERNAL MEDICINE

## 2024-07-29 PROCEDURE — G2211 COMPLEX E/M VISIT ADD ON: HCPCS | Mod: HCNC,S$GLB,, | Performed by: INTERNAL MEDICINE

## 2024-07-29 PROCEDURE — 3008F BODY MASS INDEX DOCD: CPT | Mod: HCNC,CPTII,S$GLB, | Performed by: INTERNAL MEDICINE

## 2024-07-29 PROCEDURE — 3060F POS MICROALBUMINURIA REV: CPT | Mod: HCNC,CPTII,S$GLB, | Performed by: INTERNAL MEDICINE

## 2024-07-29 PROCEDURE — 36415 COLL VENOUS BLD VENIPUNCTURE: CPT | Mod: HCNC | Performed by: INTERNAL MEDICINE

## 2024-07-29 PROCEDURE — 3078F DIAST BP <80 MM HG: CPT | Mod: HCNC,CPTII,S$GLB, | Performed by: INTERNAL MEDICINE

## 2024-07-29 RX ORDER — HYDROCORTISONE 25 MG/G
CREAM TOPICAL 2 TIMES DAILY
Qty: 28 G | Refills: 0 | Status: SHIPPED | OUTPATIENT
Start: 2024-07-29

## 2024-07-29 NOTE — PROGRESS NOTES
Subjective:       Patient ID: Tanika Wayne is a 64 y.o. female.    Chief Complaint: Follow-up (6 moths)    Patient is here for followup for chronic conditions.    Chronic neuropathy, symptoms are a bit better with Lyrica.  She has seen Neurology and is not sure if she will need a nerve study.    She has a continued and chronic rash behind the right ear.  Antifungal cream was not helpful.      Review of Systems   Constitutional:  Positive for unexpected weight change. Negative for fatigue and fever.   HENT:  Negative for congestion, facial swelling, hearing loss and sore throat.    Eyes:  Negative for visual disturbance.   Respiratory:  Negative for cough, shortness of breath and wheezing.    Cardiovascular:  Negative for chest pain and palpitations.   Gastrointestinal:  Negative for abdominal distention, abdominal pain, blood in stool, diarrhea, nausea and vomiting.   Genitourinary:  Negative for difficulty urinating, dysuria, frequency and hematuria.   Musculoskeletal:  Positive for arthralgias and gait problem. Negative for joint swelling.   Skin:  Positive for rash (behind R ear). Negative for color change.   Neurological:  Positive for numbness. Negative for dizziness, syncope, weakness and headaches.   Hematological:  Negative for adenopathy.   Psychiatric/Behavioral:  Positive for dysphoric mood (from pain). Negative for confusion, decreased concentration and suicidal ideas. The patient is nervous/anxious.            Objective:      Physical Exam  Vitals reviewed.   Constitutional:       General: She is not in acute distress.     Appearance: Normal appearance. She is well-developed. She is obese. She is not ill-appearing, toxic-appearing or diaphoretic.   HENT:      Head: Normocephalic and atraumatic.      Mouth/Throat:      Pharynx: No oropharyngeal exudate.   Eyes:      General: No scleral icterus.     Pupils: Pupils are equal, round, and reactive to light.   Neck:      Thyroid: No thyromegaly.       "Comments: Thyroid surg scar present  Cardiovascular:      Rate and Rhythm: Normal rate and regular rhythm.      Heart sounds: Normal heart sounds. No murmur heard.     No friction rub. No gallop.   Pulmonary:      Effort: Pulmonary effort is normal. No respiratory distress.      Breath sounds: Normal breath sounds. No wheezing or rales.   Abdominal:      General: Bowel sounds are normal. There is no distension.      Palpations: Abdomen is soft. There is no mass.      Tenderness: There is no abdominal tenderness. There is no guarding or rebound.   Musculoskeletal:         General: No tenderness. Normal range of motion.      Cervical back: Normal range of motion and neck supple.   Lymphadenopathy:      Cervical: No cervical adenopathy.   Skin:     Comments: Scaly rash posterior R ear   Neurological:      General: No focal deficit present.      Mental Status: She is alert and oriented to person, place, and time.   Psychiatric:         Mood and Affect: Mood normal.         Speech: Speech normal.         Behavior: Behavior normal.         Thought Content: Thought content normal.         Judgment: Judgment normal.         Assessment:       1. Type 2 diabetes mellitus without complication, without long-term current use of insulin    2. Dermatitis    3. Neuropathy        Plan:       Tanika was seen today for follow-up.    Diagnoses and all orders for this visit:    Type 2 diabetes mellitus without complication, without long-term current use of insulin  -     CBC Auto Differential; Future  -     Comprehensive Metabolic Panel; Future  -     Lipid Panel; Future  -     Hemoglobin A1C; Future    Dermatitis  -     hydrocortisone 2.5 % cream; Apply topically 2 (two) times daily.    Neuropathy  I communicated with neurology:  "Hi Ms Foster, I prescribe chronic pain medicine for this patient -- that is the reason she did not feel a need to see a pain specialist. She wanted me to let you know that, I am seeing her right now. Do you " "think she still needs an EMG? The lyrica dose you have her on is helpful.Thanks, Rayo Hernandez"      Health Maintenance         Date Due Completion Date    Lipid Panel 07/12/2024 7/12/2023    Hemoglobin A1c 07/17/2024 1/17/2024    Influenza Vaccine (1) 09/01/2024 11/28/2023    Eye Exam 11/28/2024 11/28/2023    Foot Exam 01/17/2025 1/17/2024    Override on 7/20/2022: Done (PCP completed foot exam in office during annual visit)    Override on 11/10/2021: Done    Override on 9/14/2020: Done    Diabetes Urine Screening 05/10/2025 5/10/2024    Mammogram 07/10/2025 7/10/2024    High Dose Statin 07/16/2025 7/16/2024    Pneumococcal Vaccines (Age 0-64) (3 of 3 - PPSV23 or PCV20) 01/01/2026 11/20/2019    TETANUS VACCINE 01/07/2026 1/7/2016    Colorectal Cancer Screening 09/28/2029 9/28/2022            Visit today included increased complexity associated with the care of the episodic problem  addressed and managing the longitudinal care of the patient due to the serious and/or complex managed problem(s) .    Follow up in about 6 months (around 1/29/2025).    Future Appointments   Date Time Provider Department Center   7/29/2024 11:20 AM LAB, APPOINTMENT [facility] GAYATHRI [facility]THEWS   8/12/2024  2:00 PM David Wyman NP [facility]   1/27/2025 10:20 AM Rayo Hernandez MD [facility]               "

## 2024-08-07 DIAGNOSIS — Z96.651 STATUS POST TOTAL RIGHT KNEE REPLACEMENT: ICD-10-CM

## 2024-08-07 DIAGNOSIS — M43.16 SPONDYLOLISTHESIS AT L3-L4 LEVEL: ICD-10-CM

## 2024-08-07 DIAGNOSIS — M54.41 CHRONIC BILATERAL LOW BACK PAIN WITH RIGHT-SIDED SCIATICA: ICD-10-CM

## 2024-08-07 DIAGNOSIS — M54.9 BACK PAIN, UNSPECIFIED BACK LOCATION, UNSPECIFIED BACK PAIN LATERALITY, UNSPECIFIED CHRONICITY: ICD-10-CM

## 2024-08-07 DIAGNOSIS — F11.90 UNCOMPLICATED OPIOID USE: ICD-10-CM

## 2024-08-07 DIAGNOSIS — M48.062 SPINAL STENOSIS OF LUMBAR REGION WITH NEUROGENIC CLAUDICATION: ICD-10-CM

## 2024-08-07 DIAGNOSIS — Z96.641 STATUS POST RIGHT HIP REPLACEMENT: ICD-10-CM

## 2024-08-07 DIAGNOSIS — G89.29 CHRONIC BILATERAL LOW BACK PAIN WITH RIGHT-SIDED SCIATICA: ICD-10-CM

## 2024-08-07 RX ORDER — OXYCODONE AND ACETAMINOPHEN 5; 325 MG/1; MG/1
1 TABLET ORAL EVERY 8 HOURS PRN
Qty: 90 TABLET | Refills: 0 | Status: SHIPPED | OUTPATIENT
Start: 2024-08-07

## 2024-08-09 ENCOUNTER — PATIENT MESSAGE (OUTPATIENT)
Dept: ADMINISTRATIVE | Facility: CLINIC | Age: 64
End: 2024-08-09
Payer: MEDICARE

## 2024-08-12 ENCOUNTER — OFFICE VISIT (OUTPATIENT)
Dept: FAMILY MEDICINE | Facility: CLINIC | Age: 64
End: 2024-08-12
Payer: MEDICARE

## 2024-08-12 ENCOUNTER — PATIENT MESSAGE (OUTPATIENT)
Dept: BARIATRICS | Facility: CLINIC | Age: 64
End: 2024-08-12
Payer: MEDICARE

## 2024-08-12 ENCOUNTER — TELEPHONE (OUTPATIENT)
Dept: INTERNAL MEDICINE | Facility: CLINIC | Age: 64
End: 2024-08-12
Payer: MEDICARE

## 2024-08-12 VITALS
SYSTOLIC BLOOD PRESSURE: 130 MMHG | HEIGHT: 61 IN | DIASTOLIC BLOOD PRESSURE: 80 MMHG | BODY MASS INDEX: 40.55 KG/M2 | WEIGHT: 214.75 LBS

## 2024-08-12 DIAGNOSIS — K21.9 GASTROESOPHAGEAL REFLUX DISEASE WITHOUT ESOPHAGITIS: ICD-10-CM

## 2024-08-12 DIAGNOSIS — I15.2 HYPERTENSION ASSOCIATED WITH DIABETES: ICD-10-CM

## 2024-08-12 DIAGNOSIS — I70.0 AORTIC CALCIFICATION: ICD-10-CM

## 2024-08-12 DIAGNOSIS — E66.01 SEVERE OBESITY WITH BODY MASS INDEX (BMI) OF 35.0 TO 35.9 AND COMORBIDITY: ICD-10-CM

## 2024-08-12 DIAGNOSIS — E89.0 HISTORY OF SUBTOTAL THYROIDECTOMY: ICD-10-CM

## 2024-08-12 DIAGNOSIS — E11.69 HYPERLIPIDEMIA ASSOCIATED WITH TYPE 2 DIABETES MELLITUS: ICD-10-CM

## 2024-08-12 DIAGNOSIS — E11.9 TYPE 2 DIABETES MELLITUS WITHOUT COMPLICATION, WITHOUT LONG-TERM CURRENT USE OF INSULIN: Primary | ICD-10-CM

## 2024-08-12 DIAGNOSIS — F33.42 RECURRENT MAJOR DEPRESSIVE DISORDER, IN FULL REMISSION: ICD-10-CM

## 2024-08-12 DIAGNOSIS — N18.31 CKD STAGE 3A, GFR 45-59 ML/MIN: ICD-10-CM

## 2024-08-12 DIAGNOSIS — E78.5 HYPERLIPIDEMIA ASSOCIATED WITH TYPE 2 DIABETES MELLITUS: ICD-10-CM

## 2024-08-12 DIAGNOSIS — R26.9 ABNORMALITY OF GAIT AND MOBILITY: ICD-10-CM

## 2024-08-12 DIAGNOSIS — E11.42 DIABETIC PERIPHERAL NEUROPATHY: ICD-10-CM

## 2024-08-12 DIAGNOSIS — E11.36 TYPE 2 DIABETES MELLITUS WITH CATARACT: ICD-10-CM

## 2024-08-12 DIAGNOSIS — Z00.00 ENCOUNTER FOR PREVENTIVE HEALTH EXAMINATION: Primary | ICD-10-CM

## 2024-08-12 DIAGNOSIS — E11.59 HYPERTENSION ASSOCIATED WITH DIABETES: ICD-10-CM

## 2024-08-12 PROCEDURE — 1159F MED LIST DOCD IN RCRD: CPT | Mod: HCNC,CPTII,95, | Performed by: NURSE PRACTITIONER

## 2024-08-12 PROCEDURE — 3075F SYST BP GE 130 - 139MM HG: CPT | Mod: HCNC,CPTII,95, | Performed by: NURSE PRACTITIONER

## 2024-08-12 PROCEDURE — 3072F LOW RISK FOR RETINOPATHY: CPT | Mod: HCNC,CPTII,95, | Performed by: NURSE PRACTITIONER

## 2024-08-12 PROCEDURE — 1160F RVW MEDS BY RX/DR IN RCRD: CPT | Mod: HCNC,CPTII,95, | Performed by: NURSE PRACTITIONER

## 2024-08-12 PROCEDURE — 3079F DIAST BP 80-89 MM HG: CPT | Mod: HCNC,CPTII,95, | Performed by: NURSE PRACTITIONER

## 2024-08-12 PROCEDURE — 3044F HG A1C LEVEL LT 7.0%: CPT | Mod: HCNC,CPTII,95, | Performed by: NURSE PRACTITIONER

## 2024-08-12 PROCEDURE — 3060F POS MICROALBUMINURIA REV: CPT | Mod: HCNC,CPTII,95, | Performed by: NURSE PRACTITIONER

## 2024-08-12 PROCEDURE — 3066F NEPHROPATHY DOC TX: CPT | Mod: HCNC,CPTII,95, | Performed by: NURSE PRACTITIONER

## 2024-08-12 PROCEDURE — G0439 PPPS, SUBSEQ VISIT: HCPCS | Mod: HCNC,95,, | Performed by: NURSE PRACTITIONER

## 2024-08-12 NOTE — TELEPHONE ENCOUNTER
Hi, please contact the patient to assist in scheduling    Orders Placed This Encounter    Ambulatory referral/consult to Optometry       Thank you, Rayo Hernandez

## 2024-08-12 NOTE — PROGRESS NOTES
The patient location is: [place]  The chief complaint leading to consultation is:  Medicare AWV    Visit type: audiovisual    Face to Face time with patient:  30 min   45  minutes of total time spent on the encounter, which includes face to face time and non-face to face time preparing to see the patient (eg, review of tests), Obtaining and/or reviewing separately obtained history, Documenting clinical information in the electronic or other health record, Independently interpreting results (not separately reported) and communicating results to the patient/family/caregiver, or Care coordination (not separately reported).         Each patient to whom he or she provides medical services by telemedicine is:  (1) informed of the relationship between the physician and patient and the respective role of any other health care provider with respect to management of the patient; and (2) notified that he or she may decline to receive medical services by telemedicine and may withdraw from such care at any time.    Notes:       Tanika Wayne presented for a  Medicare AWV and comprehensive Health Risk Assessment today. The following components were reviewed and updated:    Medical history  Family History  Social history  Allergies and Current Medications  Health Risk Assessment  Health Maintenance  Care Team         ** See Completed Assessments for Annual Wellness Visit within the encounter summary.**         The following assessments were completed:  Living Situation  CAGE  Depression Screening  Fall Risk Assessment (MACH 10)  Hearing Assessment(HHI)  Cognitive Function Screening  Nutrition Screening  ADL Screening  PAQ Screening    Opioid documentation:      Patient does have a current opioid prescription.      Patient accepted further discussion regarding opioid medication use.      Patient is currently taking oxycodone narcotic for back pain.        Pain level today is 2/10.       In addition to narcotic pain medications,  "patient is also using (no other oral, topical, or alternative treatments) for pain control.       Patient is not followed by a specialist currently for their pain and will not be referred today.       Patient's opioid risk potential based on ORT-OUD tool:       Jan each box that applies   No   Yes     Family history of substance abuse   Alcohol [] [x]   Illegal drugs [] [x]   Rx drugs [x] []     Personal history of substance abuse   Alcohol [x] []   Illegal drugs [x] []   Rx drugs [x] []     Age between 16-45 years   [x]   []     Patient with ADD, OCD, Bipolar disorder, schizoprenia   [x]   []     Patient with depression   []   [x]                         Scoring total                                                        3         Non-opioid treatment options have been discussed today and added to the patient's after visit summary.        Vitals:    08/12/24 1358   BP: 130/80   Weight: 97.4 kg (214 lb 11.7 oz)   Height: 5' 1" (1.549 m)     Body mass index is 40.57 kg/m².  Physical Exam  Constitutional:       General: She is not in acute distress.     Appearance: Normal appearance. She is obese. She is not ill-appearing, toxic-appearing or diaphoretic.   Pulmonary:      Effort: Pulmonary effort is normal.   Neurological:      Mental Status: She is alert and oriented to person, place, and time.   Psychiatric:         Mood and Affect: Mood normal.         Behavior: Behavior normal.               Diagnoses and health risks identified today and associated recommendations/orders:    1. Encounter for preventive health examination  Screenings performed, as noted above.  Personal preventative testing needs reviewed.      2. Abnormality of gait and mobility  Monitored, stable, follow up with pcp    3. Diabetic peripheral neuropathy  Treated with Lyrica, stable, cont tx    4. Hypertension associated with diabetes  Treated with coreg, losartan, hctz, procardia, stable, cont tx    5. Hyperlipidemia associated with type 2 " diabetes mellitus  Treated with atorvastatin, stable, cont tx    6. Severe obesity with body mass index (BMI) of 35.0 to 35.9 and comorbidity  Treated with bariatrics, unstable, enc healthy diet, exercise to tolerance    7. Recurrent major depressive disorder, in full remission  Treated with Effexor, stable, cont tx    8. Aortic calcification  Monitored, stable on a statin, cont tx    9. CKD stage 3a, GFR 45-59 ml/min  Monitored, stable, last GFR 56.5, follow up with pcp    10. History of subtotal thyroidectomy  Treated with levothyroxine, stable cont tx    11. Gastroesophageal reflux disease without esophagitis  Treated with pepcid, stable, cont tx    12. Type 2 diabetes mellitus with cataract  Treated with Metformin, stable, cont tx, last hga1c 5.9        Provided Tanika with a 5-10 year written screening schedule and personal prevention plan. Recommendations were developed using the USPSTF age appropriate recommendations. Education, counseling, and referrals were provided as needed. After Visit Summary printed and given to patient which includes a list of additional screenings\tests needed.    No follow-ups on file.    David Wyman, LAMONT  I offered to discuss advanced care planning, including how to pick a person who would make decisions for you if you were unable to make them for yourself, called a health care power of , and what kind of decisions you might make such as use of life sustaining treatments such as ventilators and tube feeding when faced with a life limiting illness recorded on a living will that they will need to know. (How you want to be cared for as you near the end of your natural life)     X Patient is interested in learning more about how to make advanced directives.  I provided them paperwork and offered to discuss this with them.

## 2024-08-12 NOTE — PATIENT INSTRUCTIONS
Counseling and Referral of Other Preventative  (Italic type indicates deductible and co-insurance are waived)    Patient Name: Tanika Wayne  Today's Date: 8/12/2024    Health Maintenance       Date Due Completion Date    Influenza Vaccine (1) 09/01/2024 11/28/2023    Eye Exam 11/28/2024 11/28/2023    Foot Exam 01/17/2025 1/17/2024    Override on 7/20/2022: Done (PCP completed foot exam in office during annual visit)    Override on 11/10/2021: Done    Override on 9/14/2020: Done    Hemoglobin A1c 01/29/2025 7/29/2024    Diabetes Urine Screening 05/10/2025 5/10/2024    Mammogram 07/10/2025 7/10/2024    High Dose Statin 07/29/2025 7/29/2024    Lipid Panel 07/29/2025 7/29/2024    Pneumococcal Vaccines (Age 0-64) (3 of 3 - PPSV23 or PCV20) 01/01/2026 11/20/2019    TETANUS VACCINE 01/07/2026 1/7/2016    Colorectal Cancer Screening 09/28/2029 9/28/2022        No orders of the defined types were placed in this encounter.    The following information is provided to all patients.  This information is to help you find resources for any of the problems found today that may be affecting your health:                  Living healthy guide: www.[facility].[place].gov      Understanding Diabetes: www.diabetes.org      Eating healthy: www.cdc.gov/healthyweight      CDC home safety checklist: www.cdc.gov/steadi/patient.html      Agency on Aging: www.goea.[place].[facility]      Alcoholics anonymous (AA): www.aa.org      Physical Activity: www.deshawn.nih.gov/hx6pvbs      Tobacco use: www.quitwithusla.org

## 2024-08-12 NOTE — TELEPHONE ENCOUNTER
----- Message from David Wyman NP sent at 8/12/2024  2:34 PM CDT -----  Regarding: appt  Please schedule her diabetic eye appt in Nov, send through her [facility].  thanks

## 2024-09-03 ENCOUNTER — PATIENT MESSAGE (OUTPATIENT)
Dept: BARIATRICS | Facility: CLINIC | Age: 64
End: 2024-09-03
Payer: MEDICARE

## 2024-09-08 DIAGNOSIS — M43.16 SPONDYLOLISTHESIS AT L3-L4 LEVEL: ICD-10-CM

## 2024-09-08 DIAGNOSIS — Z96.641 STATUS POST RIGHT HIP REPLACEMENT: ICD-10-CM

## 2024-09-08 DIAGNOSIS — M54.9 BACK PAIN, UNSPECIFIED BACK LOCATION, UNSPECIFIED BACK PAIN LATERALITY, UNSPECIFIED CHRONICITY: ICD-10-CM

## 2024-09-08 DIAGNOSIS — Z96.651 STATUS POST TOTAL RIGHT KNEE REPLACEMENT: ICD-10-CM

## 2024-09-08 DIAGNOSIS — F11.90 UNCOMPLICATED OPIOID USE: ICD-10-CM

## 2024-09-08 DIAGNOSIS — G89.29 CHRONIC BILATERAL LOW BACK PAIN WITH RIGHT-SIDED SCIATICA: ICD-10-CM

## 2024-09-08 DIAGNOSIS — M54.41 CHRONIC BILATERAL LOW BACK PAIN WITH RIGHT-SIDED SCIATICA: ICD-10-CM

## 2024-09-08 DIAGNOSIS — M48.062 SPINAL STENOSIS OF LUMBAR REGION WITH NEUROGENIC CLAUDICATION: ICD-10-CM

## 2024-09-09 RX ORDER — OXYCODONE AND ACETAMINOPHEN 5; 325 MG/1; MG/1
1 TABLET ORAL EVERY 8 HOURS PRN
Qty: 90 TABLET | Refills: 0 | Status: SHIPPED | OUTPATIENT
Start: 2024-09-09

## 2024-09-10 ENCOUNTER — PATIENT MESSAGE (OUTPATIENT)
Dept: BARIATRICS | Facility: CLINIC | Age: 64
End: 2024-09-10
Payer: MEDICARE

## 2024-10-01 ENCOUNTER — PATIENT MESSAGE (OUTPATIENT)
Dept: BARIATRICS | Facility: CLINIC | Age: 64
End: 2024-10-01
Payer: MEDICARE

## 2024-10-07 DIAGNOSIS — Z96.651 STATUS POST TOTAL RIGHT KNEE REPLACEMENT: ICD-10-CM

## 2024-10-07 DIAGNOSIS — M54.41 CHRONIC BILATERAL LOW BACK PAIN WITH RIGHT-SIDED SCIATICA: ICD-10-CM

## 2024-10-07 DIAGNOSIS — M48.062 SPINAL STENOSIS OF LUMBAR REGION WITH NEUROGENIC CLAUDICATION: ICD-10-CM

## 2024-10-07 DIAGNOSIS — F11.90 UNCOMPLICATED OPIOID USE: ICD-10-CM

## 2024-10-07 DIAGNOSIS — Z96.641 STATUS POST RIGHT HIP REPLACEMENT: ICD-10-CM

## 2024-10-07 DIAGNOSIS — M43.16 SPONDYLOLISTHESIS AT L3-L4 LEVEL: ICD-10-CM

## 2024-10-07 DIAGNOSIS — M54.9 BACK PAIN, UNSPECIFIED BACK LOCATION, UNSPECIFIED BACK PAIN LATERALITY, UNSPECIFIED CHRONICITY: ICD-10-CM

## 2024-10-07 DIAGNOSIS — G89.29 CHRONIC BILATERAL LOW BACK PAIN WITH RIGHT-SIDED SCIATICA: ICD-10-CM

## 2024-10-07 RX ORDER — OXYCODONE AND ACETAMINOPHEN 5; 325 MG/1; MG/1
1 TABLET ORAL EVERY 8 HOURS PRN
Qty: 90 TABLET | Refills: 0 | Status: SHIPPED | OUTPATIENT
Start: 2024-10-07

## 2024-10-07 NOTE — TELEPHONE ENCOUNTER
No care due was identified.  [facility] Embedded Care Due Messages. Reference number: [id number].   10/07/2024 11:30:29 AM CDT

## 2024-10-08 ENCOUNTER — OFFICE VISIT (OUTPATIENT)
Dept: NEUROLOGY | Facility: CLINIC | Age: 64
End: 2024-10-08
Payer: MEDICARE

## 2024-10-08 ENCOUNTER — PATIENT MESSAGE (OUTPATIENT)
Dept: BARIATRICS | Facility: CLINIC | Age: 64
End: 2024-10-08
Payer: MEDICARE

## 2024-10-08 VITALS — HEART RATE: 83 BPM | DIASTOLIC BLOOD PRESSURE: 69 MMHG | SYSTOLIC BLOOD PRESSURE: 131 MMHG

## 2024-10-08 DIAGNOSIS — G62.9 NEUROPATHY: ICD-10-CM

## 2024-10-08 DIAGNOSIS — E11.42 DIABETIC POLYNEUROPATHY ASSOCIATED WITH TYPE 2 DIABETES MELLITUS: ICD-10-CM

## 2024-10-08 PROCEDURE — 99999 PR PBB SHADOW E&M-EST. PATIENT-LVL III: CPT | Mod: PBBFAC,HCNC,GC,

## 2024-10-08 RX ORDER — PREGABALIN 50 MG/1
100 CAPSULE ORAL 2 TIMES DAILY
Qty: 90 CAPSULE | Refills: 3 | Status: SHIPPED | OUTPATIENT
Start: 2024-10-08 | End: 2024-10-11

## 2024-10-08 NOTE — PROGRESS NOTES
[facility] - NEUROLOGY [address]TH [address] [facility], [place]    Date: 10/8/24  Patient Name: Tanika Wayne   MRN: [id number]   PCP: Rayo Hernandez  Referring Provider: No ref. provider found    Assessment:   Tanika Wayne is a 64 y.o. female presenting history of hypertension, hyperlipidemia, hypothyroidism, diabetes, s/p gastric sleeve 2021, CKD 3, low back pan s/p L3-L5 laminectomy 2/22/17, parotitis presenting for neuropathy. She describes bilateral numbness in the legs, R>L, and numbness in bilateral palms. She was switched to lyrica during last appointment and notes mild improvement. No astereognosis or agraphesthesia on exam. Recommend increasing dose of lyrica and starting alpha lipoic acid.     Plan:     - Lyrica increased to 100 mg BID  - Recommend starting over the counter alpha lipoic acid, 600 mg BID  - Discussed capsaicin cream but will hold until follow up   - Recommend occupational therapy driving evaluation on follow up  - Return to clinic in 3 months     Problem List Items Addressed This Visit    None  Visit Diagnoses       Neuropathy        Relevant Medications    pregabalin (LYRICA) 50 MG capsule    Diabetic polyneuropathy associated with type 2 diabetes mellitus        Relevant Medications    pregabalin (LYRICA) 50 MG capsule            Bj Mar MD    Patient note was created using MModal Dictation.  Any errors in syntax or even information may not have been identified and edited on initial review prior to signing this note.  Subjective:   Patient seen in consultation at the request of No ref. provider found for the evaluation of neuropathy. A copy of this note will be sent to the referring physician.        Follow up 10/8/2024:     Ms. Tanika Wayne is a 64 y.o. female with history of hypertension, hyperlipidemia, hypothyroidism, diabetes, s/p gastric sleeve June 2021, CKD 3, low back pan s/p L3-L5 laminectomy 2/22/17, and parotitis  presenting for  "peripheral neuropathy. She was last seen by MABEL Flaherty 5/10/24 and was switched from gabapentin to lyrica. She is tolerating lyrica well and notes mild improvement in numbness. The neuropathy predominantly involves the right leg>left leg from the bottom of the foot to the middle of the shin. In addition, she reports new numbness in both her hands involving the palms but no significant burning or pain. Her latest A1c was 5.9. She uses a roller chair to get around the house and still describes low back pain with limited range of motion.  She denies chest pain, shortness of breath, nausea, vomiting, diarrhea, and dizziness.     HPI: Ayesha Foster 5/10/2024    Ms. Tanika Wayne is a 64 y.o. female with DDD of the lumbar spine (s/p laminectomy 2017), HTN, HLD, s/p gastric sleeve, and CKD stage 3 presenting for evaluation of numbness and tingling. On chart review the patient has tried gabapentin 300mg TID for relief of symptoms but finds it is not sufficient and causes her significant side effects. She notes that her symptoms have been ongoing for about 6 months. She did try lyrica in the past and found it did not provide relief of her symptoms. She notes that her symptoms are more numbness rather than burning/stinging sensation. She finds that nighttime seems to be when symptoms are the worst. She does feel as if her legs are weaker R>L and finds that her numbness/tingling is worse on the R>L. She notes that the gabapentin makes her feel as if she is in a cloud. Does endorse one fall this past week in which it felt as if her legs just "gave out". She does not report any changes in bowel or bladder habits, does report it is difficult to get to the bathroom in a timely fashion but denies any acute changes in bladder/bowel habits. She notes that she also has some symptoms in the R hand. She reports that there were no acute accidents/trauma prior to onset of her symptoms.     PAST MEDICAL HISTORY:  Past " Medical History:   Diagnosis Date    Abnormal echocardiogram 2012    Arthritis     Breast cancer 1998    Breast cancer, right breast     Breast cancer, right breast 2012    S/P Lumpectomy / XRT '98 Dr. Bartholomew     Cardiomyopathy due to systemic disease     Cataract     Clotting disorder     Coronary artery disease     DM (diabetes mellitus) 2012    Encounter for blood transfusion     HTN (hypertension) 2012    Increased glucose level 2012    ITP (idiopathic thrombocytopenic purpura) 2012    Kidney stones 2012    PONV (postoperative nausea and vomiting)     Stenosis of lumbosacral spine 2012    Thyroid disease     Tympanic membrane perforation 9/15/2014    right 2014 Dr. Jeffries       PAST SURGICAL HISTORY:  Past Surgical History:   Procedure Laterality Date    BREAST LUMPECTOMY Right     CATARACT EXTRACTION W/  INTRAOCULAR LENS IMPLANT Left 2021    Procedure: EXTRACTION, CATARACT, WITH IOL INSERTION;  Surgeon: Klever Tolliver MD;  Location: [facility];  Service: Ophthalmology;  Laterality: Left;    CATARACT EXTRACTION W/  INTRAOCULAR LENS IMPLANT Right 2021    Procedure: EXTRACTION, CATARACT, WITH IOL INSERTION;  Surgeon: Klever Tolliver MD;  Location: [facility];  Service: Ophthalmology;  Laterality: Right;     SECTION      CHOLECYSTECTOMY      COLONOSCOPY N/A 2022    Procedure: COLONOSCOPY;  Surgeon: Anoop Escobar MD;  Location: [facility] (4TH FLR);  Service: Endoscopy;  Laterality: N/A;  Fully vaccinated/ inst mailed/ clear liquids up to 4 hrs prior/AM prep 2am-3am-RB    ESOPHAGOGASTRODUODENOSCOPY N/A 2021    Procedure: EGD (ESOPHAGOGASTRODUODENOSCOPY);  Surgeon: Silvio Singh Jr., MD;  Location: [facility] 2ND FLR;  Service: General;  Laterality: N/A;    EYE SURGERY      strabismus, 2006, bilateral cataracts    HIP ARTHROPLASTY Right 10/18/2018    Procedure: ARTHROPLASTY, HIP;  Surgeon: Baldomero Fischer MD;  Location: [facility];   Service: Orthopedics;  Laterality: Right;    HIP REPLACEMENT ARTHROPLASTY Right     dr fischer    HYSTERECTOMY      JOINT REPLACEMENT      right BRYNN, TKA    KNEE ARTHROPLASTY Right 04/11/2019    Procedure: ARTHROPLASTY, KNEE (ADD ON );  Surgeon: Baldomero Fischer MD;  Location: [facility];  Service: Orthopedics;  Laterality: Right;  (ADD ON )    L3-5 Laminectomy  02/2017    Dr. Mendoza    LAPAROSCOPIC SLEEVE GASTRECTOMY N/A 06/08/2021    Procedure: GASTRECTOMY, SLEEVE, LAPAROSCOPIC;  Surgeon: Silvio Singh Jr., MD;  Location: [address];  Service: General;  Laterality: N/A;    SPLENECTOMY, TOTAL  2001    from [facility]    STRABISMUS SURGERY  08/30/2006    OU    THYROIDECTOMY  09/2016    TONSILLECTOMY         CURRENT MEDS:  Current Outpatient Medications   Medication Sig Dispense Refill    atorvastatin (LIPITOR) 10 MG tablet Take 1 tablet (10 mg total) by mouth once daily. 90 tablet 1    calcium carbonate 1250 MG capsule Take 1,250 mg by mouth Daily.      carvediloL (COREG) 3.125 MG tablet Take 1 tablet (3.125 mg total) by mouth 2 (two) times daily. 180 tablet 1    clotrimazole-betamethasone 1-0.05% (LOTRISONE) cream Apply topically 2 (two) times daily as needed. 45 g 1    cyanocobalamin (VITAMIN B-12) 100 MCG tablet Take 100 mcg by mouth once daily.      cyclobenzaprine (FLEXERIL) 10 MG tablet Take 1 tablet (10 mg total) by mouth 2 (two) times daily as needed for Muscle spasms. 90 tablet 5    docusate sodium (COLACE) 100 MG capsule TAKE 1 CAPSULE (100 MG TOTAL) BY MOUTH 2 (TWO) TIMES DAILY AS NEEDED FOR CONSTIPATION. 60 capsule 17    famotidine (PEPCID) 40 MG tablet Take 40 mg by mouth.      fluticasone propionate (FLONASE) 50 mcg/actuation nasal spray INSTILL 2 SPRAYS IN EACH NOSTRIL ONCE A DAY 48 mL 1    hydrocortisone 2.5 % cream Apply topically 2 (two) times daily. 28 g 0    levothyroxine (SYNTHROID) 100 MCG tablet Take 1 tablet (100 mcg total) by mouth once daily. 90 tablet 1    losartan-hydrochlorothiazide  100-12.5 mg (HYZAAR) 100-12.5 mg Tab Take 1 tablet by mouth once daily. 90 tablet 3    metFORMIN (GLUCOPHAGE-XR) 500 MG ER 24hr tablet Take 1 tablet (500 mg total) by mouth 2 (two) times daily with meals. 180 tablet 1    multivitamin (THERAGRAN) per tablet Take 1 tablet by mouth once daily.      NIFEdipine (PROCARDIA-XL) 90 MG (OSM) 24 hr tablet Take 1 tablet (90 mg total) by mouth once daily. 90 tablet 3    oxyCODONE-acetaminophen (PERCOCET) 5-325 mg per tablet Take 1 tablet by mouth every 8 (eight) hours as needed for Pain. Greater than 7 day quantity Medically Necessary 90 tablet 0    potassium chloride SA (K-DUR,KLOR-CON) 20 MEQ tablet Take 2 tablets (40 mEq total) by mouth 2 (two) times daily. 360 tablet 1    pregabalin (LYRICA) 50 MG capsule Take 1 capsule (50 mg total) by mouth 2 (two) times daily. 60 capsule 5    traZODone (DESYREL) 50 MG tablet Take 1 tablet (50 mg total) by mouth every evening. 90 tablet 3    venlafaxine (EFFEXOR-XR) 150 MG Cp24 Take 1 capsule (150 mg total) by mouth once daily. 90 capsule 1     No current facility-administered medications for this visit.       ALLERGIES:  Review of patient's allergies indicates:  No Known Allergies    FAMILY HISTORY:  Family History   Problem Relation Name Age of Onset    Heart disease Mother  69        MI    Diabetes Mother      Hypertension Mother      Diabetes Father      Hypertension Father      Stroke Father      Hyperlipidemia Sister      COPD Sister      Hypertension Sister      Emphysema Sister      Diabetes Sister Tita     Hypertension Sister Tita     Diabetes Sister Мария         prediabetic    Hypertension Sister Мария     Migraines Sister Мария     Rheum arthritis Sister Мария     Diabetes Brother Facundo     Hypertension Brother Facundo     No Known Problems Daughter Genae        SOCIAL HISTORY:  Social History     Tobacco Use    Smoking status: Never    Smokeless tobacco: Never   Substance Use Topics    Alcohol use: Yes     Comment:  seasonally    Drug use: No       Review of Systems:  12 system review of systems is negative except for the symptoms mentioned in HPI.      Objective:   There were no vitals filed for this visit.  General: NAD, well nourished   Eyes: no tearing, discharge, no erythema   ENT: moist mucous membranes of the oral cavity, nares patent    Neck: Supple, full range of motion  Cardiovascular: Warm and well perfused, pulses equal and symmetrical  Lungs: Normal work of breathing, normal chest wall excursions  Skin: No rash, lesions, or breakdown on exposed skin  Psychiatry: Mood and affect are appropriate   Abdomen: soft, non tender, non distended  Extremeties: No cyanosis, clubbing or edema.    Neurological   MENTAL STATUS: Alert and oriented to person, place, and time. Attention and concentration within normal limits. Speech without dysarthria, able to name and repeat without difficulty. Recent and remote memory within normal limits   CRANIAL NERVES: Visual fields intact. Left eye ptosis. EOMI. Facial sensation intact. Face symmetrical. Hearing grossly intact. Full shoulder shrug bilaterally. Tongue protrudes midline   SENSORY: Sensation is reduced to pin and light touch in bilateral lower extremities, R>L  Joint position perception intact. No astereognosis or agraphesthesia in the hands.   MOTOR: Normal bulk and tone. No pronator drift.  5/5 deltoid, biceps, triceps, interosseous, hand  bilaterally.    REFLEXES: 3+ left patellar, 1+ right patellar. Toes down going bilaterally.   CEREBELLAR/COORDINATION/GAIT: Did not assess gait, patient in wheelchair.  Heel to shin intact. Finger to nose intact.

## 2024-10-09 ENCOUNTER — PATIENT MESSAGE (OUTPATIENT)
Dept: NEUROLOGY | Facility: CLINIC | Age: 64
End: 2024-10-09
Payer: MEDICARE

## 2024-10-09 DIAGNOSIS — E11.42 DIABETIC POLYNEUROPATHY ASSOCIATED WITH TYPE 2 DIABETES MELLITUS: ICD-10-CM

## 2024-10-09 DIAGNOSIS — G62.9 NEUROPATHY: ICD-10-CM

## 2024-10-09 NOTE — PROGRESS NOTES
I have seen the patient, reviewed the Resident's progress note. I have personally interviewed and examined the patient at bedside and agree with the findings.       Juan Patel MD  Neurology  [facility] - Neurology 7th [address]

## 2024-10-11 RX ORDER — PREGABALIN 100 MG/1
100 CAPSULE ORAL 2 TIMES DAILY
Qty: 60 CAPSULE | Refills: 5 | Status: SHIPPED | OUTPATIENT
Start: 2024-10-11 | End: 2025-04-09

## 2024-11-02 ENCOUNTER — PATIENT MESSAGE (OUTPATIENT)
Dept: BARIATRICS | Facility: CLINIC | Age: 64
End: 2024-11-02
Payer: MEDICARE

## 2024-11-10 DIAGNOSIS — F11.90 UNCOMPLICATED OPIOID USE: ICD-10-CM

## 2024-11-10 DIAGNOSIS — E11.42 DIABETIC POLYNEUROPATHY ASSOCIATED WITH TYPE 2 DIABETES MELLITUS: Primary | ICD-10-CM

## 2024-11-10 DIAGNOSIS — M54.9 BACK PAIN, UNSPECIFIED BACK LOCATION, UNSPECIFIED BACK PAIN LATERALITY, UNSPECIFIED CHRONICITY: ICD-10-CM

## 2024-11-10 DIAGNOSIS — G89.29 CHRONIC BILATERAL LOW BACK PAIN WITH RIGHT-SIDED SCIATICA: ICD-10-CM

## 2024-11-10 DIAGNOSIS — M48.062 SPINAL STENOSIS OF LUMBAR REGION WITH NEUROGENIC CLAUDICATION: ICD-10-CM

## 2024-11-10 DIAGNOSIS — M43.16 SPONDYLOLISTHESIS AT L3-L4 LEVEL: ICD-10-CM

## 2024-11-10 DIAGNOSIS — Z96.641 STATUS POST RIGHT HIP REPLACEMENT: ICD-10-CM

## 2024-11-10 DIAGNOSIS — Z96.651 STATUS POST TOTAL RIGHT KNEE REPLACEMENT: ICD-10-CM

## 2024-11-10 DIAGNOSIS — M54.41 CHRONIC BILATERAL LOW BACK PAIN WITH RIGHT-SIDED SCIATICA: ICD-10-CM

## 2024-11-10 NOTE — TELEPHONE ENCOUNTER
Care Due:                  Date            Visit Type   Department     Provider  --------------------------------------------------------------------------------                                EP -                              PRIMARY      [facility] INTER[facility]  Last Visit: 07-      CARE ([facility])   MEDICINE       Rayo Hernandez                              EP -                              PRIMARY      [facility] INTER[facility]  Next Visit: 01-      CARE ([facility])   MEDICINE       Rayo Hernandez                                                            Last  Test          Frequency    Reason                     Performed    Due Date  --------------------------------------------------------------------------------    HBA1C.......  6 months...  metFORMIN................  07- 01-    [employer] Embedded Care Due Messages. Reference number: [id number].   11/10/2024 4:57:41 PM CST

## 2024-11-11 RX ORDER — OXYCODONE AND ACETAMINOPHEN 5; 325 MG/1; MG/1
1 TABLET ORAL EVERY 8 HOURS PRN
Qty: 90 TABLET | Refills: 0 | Status: SHIPPED | OUTPATIENT
Start: 2024-11-11

## 2024-11-12 ENCOUNTER — OFFICE VISIT (OUTPATIENT)
Dept: OPTOMETRY | Facility: CLINIC | Age: 64
End: 2024-11-12
Payer: MEDICARE

## 2024-11-12 ENCOUNTER — PATIENT MESSAGE (OUTPATIENT)
Dept: BARIATRICS | Facility: CLINIC | Age: 64
End: 2024-11-12
Payer: MEDICARE

## 2024-11-12 DIAGNOSIS — Z96.1 PSEUDOPHAKIA OF BOTH EYES: ICD-10-CM

## 2024-11-12 DIAGNOSIS — H02.88B MEIBOMIAN GLAND DYSFUNCTION (MGD), BILATERAL, BOTH UPPER AND LOWER LIDS: ICD-10-CM

## 2024-11-12 DIAGNOSIS — Z13.5 GLAUCOMA SCREENING: ICD-10-CM

## 2024-11-12 DIAGNOSIS — H52.13 MYOPIA OF BOTH EYES WITH REGULAR ASTIGMATISM: ICD-10-CM

## 2024-11-12 DIAGNOSIS — H52.223 MYOPIA OF BOTH EYES WITH REGULAR ASTIGMATISM: ICD-10-CM

## 2024-11-12 DIAGNOSIS — H02.88A MEIBOMIAN GLAND DYSFUNCTION (MGD), BILATERAL, BOTH UPPER AND LOWER LIDS: ICD-10-CM

## 2024-11-12 DIAGNOSIS — E11.9 TYPE 2 DIABETES MELLITUS WITHOUT COMPLICATION, WITHOUT LONG-TERM CURRENT USE OF INSULIN: Primary | ICD-10-CM

## 2024-11-12 PROCEDURE — 3044F HG A1C LEVEL LT 7.0%: CPT | Mod: HCNC,CPTII,S$GLB, | Performed by: OPTOMETRIST

## 2024-11-12 PROCEDURE — 3066F NEPHROPATHY DOC TX: CPT | Mod: HCNC,CPTII,S$GLB, | Performed by: OPTOMETRIST

## 2024-11-12 PROCEDURE — 92015 DETERMINE REFRACTIVE STATE: CPT | Mod: HCNC,S$GLB,, | Performed by: OPTOMETRIST

## 2024-11-12 PROCEDURE — 92014 COMPRE OPH EXAM EST PT 1/>: CPT | Mod: HCNC,S$GLB,, | Performed by: OPTOMETRIST

## 2024-11-12 PROCEDURE — 1159F MED LIST DOCD IN RCRD: CPT | Mod: HCNC,CPTII,S$GLB, | Performed by: OPTOMETRIST

## 2024-11-12 PROCEDURE — 3060F POS MICROALBUMINURIA REV: CPT | Mod: HCNC,CPTII,S$GLB, | Performed by: OPTOMETRIST

## 2024-11-12 PROCEDURE — 99999 PR PBB SHADOW E&M-EST. PATIENT-LVL III: CPT | Mod: PBBFAC,HCNC,, | Performed by: OPTOMETRIST

## 2024-11-12 PROCEDURE — 2023F DILAT RTA XM W/O RTNOPTHY: CPT | Mod: HCNC,CPTII,S$GLB, | Performed by: OPTOMETRIST

## 2024-11-12 NOTE — PROGRESS NOTES
HPI    Annual Diabetic Eye Exam   Pt states vision is stable     Pt denies Flashes   Pt reports Floaters- Longstanding     Pt denies Dry/ Itchy  Gtt: Yes AT Therapy PRN   Pt reports relief     DM DX;ed   BS: Unknown   Hemoglobin A1C       Date                     Value               Ref Range             Status                07/29/2024               5.9 (H)             4.0 - 5.6 %           Final                   01/17/2024               5.8 (H)             4.0 - 5.6 %           Final                   07/12/2023               5.7 (H)             4.0 - 5.6 %           Final              Last edited by Jaxon Adams, OD on 11/12/2024  1:22 PM.            Assessment /Plan     For exam results, see Encounter Report.    Type 2 diabetes mellitus without complication, without long-term current use of insulin  -     Ambulatory referral/consult to Optometry  -No retinopathy noted today.  Continued control with primary care physician and annual comprehensive eye exam.    Pseudophakia of both eyes  -clear, centered    Meibomian gland dysfunction (MGD), bilateral, both upper and lower lids  -Sample Systane Complete. BID+    Glaucoma screening  -Monitor with annual eye exam and IOP check    Myopia of both eyes with regular astigmatism  Eyeglass Final Rx       Eyeglass Final Rx         Sphere Cylinder Axis Dist VA Add    Right -3.25 +2.25 080 20/25 +2.50    Left -2.50 +0.50 090 20/25-- +2.50      Type: PAL    Expiration Date: 11/12/2025                      RTC 1 yr

## 2024-11-13 RX ORDER — PREGABALIN 100 MG/1
100 CAPSULE ORAL 2 TIMES DAILY
Qty: 60 CAPSULE | Refills: 5 | Status: SHIPPED | OUTPATIENT
Start: 2024-11-13 | End: 2025-05-12

## 2024-12-03 ENCOUNTER — TELEPHONE (OUTPATIENT)
Dept: INTERNAL MEDICINE | Facility: CLINIC | Age: 64
End: 2024-12-03
Payer: MEDICARE

## 2024-12-03 ENCOUNTER — PATIENT MESSAGE (OUTPATIENT)
Dept: BARIATRICS | Facility: CLINIC | Age: 64
End: 2024-12-03
Payer: MEDICARE

## 2024-12-03 NOTE — TELEPHONE ENCOUNTER
----- Message from Med Assistant Gomes sent at 12/3/2024 12:47 PM CST -----  Contact: [phone]    ----- Message -----  From: Alfreda Ballesteros  Sent: 12/3/2024  12:03 PM CST  To: Mary Cade Staff    .[email or web address]     [employer] is calling for Medical Advice regarding: need yes or no verbal confirmation of pt has diabetes and/or cardiovascular disease    Patient wants a call back or thru [facility]: call    Comments: ref #: [id number]    Please advise patient replies from provider may take up to 48 hours.

## 2024-12-03 NOTE — TELEPHONE ENCOUNTER
Please call back, yes the patient has diabetes.    No the patient does not have cardiovascular disease.    Let me know if insurance has any more questions.  Thank you, Rayo Hernandez

## 2024-12-10 ENCOUNTER — PATIENT MESSAGE (OUTPATIENT)
Dept: BARIATRICS | Facility: CLINIC | Age: 64
End: 2024-12-10
Payer: MEDICARE

## 2024-12-10 DIAGNOSIS — Z96.651 STATUS POST TOTAL RIGHT KNEE REPLACEMENT: ICD-10-CM

## 2024-12-10 DIAGNOSIS — M54.41 CHRONIC BILATERAL LOW BACK PAIN WITH RIGHT-SIDED SCIATICA: ICD-10-CM

## 2024-12-10 DIAGNOSIS — F11.90 UNCOMPLICATED OPIOID USE: ICD-10-CM

## 2024-12-10 DIAGNOSIS — Z96.641 STATUS POST RIGHT HIP REPLACEMENT: ICD-10-CM

## 2024-12-10 DIAGNOSIS — M43.16 SPONDYLOLISTHESIS AT L3-L4 LEVEL: ICD-10-CM

## 2024-12-10 DIAGNOSIS — M48.062 SPINAL STENOSIS OF LUMBAR REGION WITH NEUROGENIC CLAUDICATION: ICD-10-CM

## 2024-12-10 DIAGNOSIS — M54.9 BACK PAIN, UNSPECIFIED BACK LOCATION, UNSPECIFIED BACK PAIN LATERALITY, UNSPECIFIED CHRONICITY: ICD-10-CM

## 2024-12-10 DIAGNOSIS — J32.9 SINUSITIS, UNSPECIFIED CHRONICITY, UNSPECIFIED LOCATION: ICD-10-CM

## 2024-12-10 DIAGNOSIS — G89.29 CHRONIC BILATERAL LOW BACK PAIN WITH RIGHT-SIDED SCIATICA: ICD-10-CM

## 2024-12-10 NOTE — TELEPHONE ENCOUNTER
No care due was identified.  [facility] Embedded Care Due Messages. Reference number: [id number].   12/10/2024 1:19:34 PM CST

## 2024-12-13 RX ORDER — OXYCODONE AND ACETAMINOPHEN 5; 325 MG/1; MG/1
1 TABLET ORAL EVERY 8 HOURS PRN
Qty: 90 TABLET | Refills: 0 | Status: SHIPPED | OUTPATIENT
Start: 2024-12-13

## 2024-12-13 RX ORDER — FLUTICASONE PROPIONATE 50 MCG
SPRAY, SUSPENSION (ML) NASAL
Qty: 48 ML | Refills: 1 | Status: SHIPPED | OUTPATIENT
Start: 2024-12-13

## 2024-12-20 ENCOUNTER — TELEPHONE (OUTPATIENT)
Dept: INTERNAL MEDICINE | Facility: CLINIC | Age: 64
End: 2024-12-20
Payer: MEDICARE

## 2024-12-20 NOTE — TELEPHONE ENCOUNTER
----- Message from Med Assistant Major sent at 12/13/2024 11:15 AM CST -----  Contact: [phone]    ----- Message -----  From: Samra Mcmanus  Sent: 12/13/2024  10:04 AM CST  To: Mary Cade Staff    .[email or web address]     Patient is calling for Medical Advice regarding: Patient is calling to check of a form from [employer] for insurance and patient would like to know if it has been completed and faxed back?      Patient wants a call back or thru [facility]: call    Comments: Please call and advise    Please advise patient replies from provider may take up to 48 hours.

## 2024-12-26 ENCOUNTER — PATIENT MESSAGE (OUTPATIENT)
Dept: BARIATRICS | Facility: CLINIC | Age: 64
End: 2024-12-26
Payer: MEDICARE

## 2024-12-27 ENCOUNTER — PATIENT MESSAGE (OUTPATIENT)
Dept: OPTOMETRY | Facility: CLINIC | Age: 64
End: 2024-12-27
Payer: MEDICARE

## 2024-12-27 ENCOUNTER — PATIENT MESSAGE (OUTPATIENT)
Dept: BARIATRICS | Facility: CLINIC | Age: 64
End: 2024-12-27
Payer: MEDICARE

## 2025-01-02 ENCOUNTER — PATIENT MESSAGE (OUTPATIENT)
Dept: INTERNAL MEDICINE | Facility: CLINIC | Age: 65
End: 2025-01-02
Payer: MEDICARE

## 2025-01-02 DIAGNOSIS — G62.9 NEUROPATHY: Primary | ICD-10-CM

## 2025-01-06 ENCOUNTER — PATIENT MESSAGE (OUTPATIENT)
Dept: BARIATRICS | Facility: CLINIC | Age: 65
End: 2025-01-06
Payer: MEDICARE

## 2025-01-07 ENCOUNTER — OFFICE VISIT (OUTPATIENT)
Dept: NEUROLOGY | Facility: CLINIC | Age: 65
End: 2025-01-07
Payer: MEDICARE

## 2025-01-07 VITALS — HEART RATE: 85 BPM | DIASTOLIC BLOOD PRESSURE: 77 MMHG | SYSTOLIC BLOOD PRESSURE: 148 MMHG

## 2025-01-07 DIAGNOSIS — G62.9 NEUROPATHY: ICD-10-CM

## 2025-01-07 PROCEDURE — 1159F MED LIST DOCD IN RCRD: CPT | Mod: CPTII,GC,S$GLB,

## 2025-01-07 PROCEDURE — 3078F DIAST BP <80 MM HG: CPT | Mod: CPTII,GC,S$GLB,

## 2025-01-07 PROCEDURE — 99213 OFFICE O/P EST LOW 20 MIN: CPT | Mod: PBBFAC

## 2025-01-07 PROCEDURE — 3072F LOW RISK FOR RETINOPATHY: CPT | Mod: CPTII,GC,S$GLB,

## 2025-01-07 PROCEDURE — 99999 PR PBB SHADOW E&M-EST. PATIENT-LVL III: CPT | Mod: PBBFAC,GC,,

## 2025-01-07 PROCEDURE — 99214 OFFICE O/P EST MOD 30 MIN: CPT | Mod: GC,S$GLB,,

## 2025-01-07 PROCEDURE — 3077F SYST BP >= 140 MM HG: CPT | Mod: CPTII,GC,S$GLB,

## 2025-01-07 RX ORDER — CAPSAICIN 0.75 MG/G
CREAM TOPICAL 3 TIMES DAILY PRN
Qty: 1 G | Refills: 0 | Status: SHIPPED | OUTPATIENT
Start: 2025-01-07

## 2025-01-07 RX ORDER — DULOXETIN HYDROCHLORIDE 20 MG/1
20 CAPSULE, DELAYED RELEASE ORAL DAILY
Qty: 60 CAPSULE | Refills: 1 | Status: SHIPPED | OUTPATIENT
Start: 2025-01-07 | End: 2025-05-07

## 2025-01-07 NOTE — PROGRESS NOTES
[facility] - NEUROLOGY [address]TH [address] [facility], [place]    Date: 25  Patient Name: Tanika Wayne   MRN: [id number]   PCP: Rayo Hernandez  Referring Provider: Rayo Hernandez MD    Assessment:   Tanika Wayne is a 65 y.o. female presenting history of hypertension, hyperlipidemia, hypothyroidism, diabetes, s/p gastric sleeve , CKD 3, low back pan s/p L3-L5 laminectomy 17, parotitis presenting for neuropathy. She describes bilateral numbness in the legs, R>L, and numbness in bilateral palms. She was switched to lyrica during first appointment and notes mild improvement with dose increase to 100 mg BID. No astereognosis or agraphesthesia on exam. Recommend continuing lyrica and adding duloxetine, alpha lipoic acid, and capsaicin cream.     Plan:     - Lyrica 100 mg BID  - Start duloxetine 20 mg daily  - Recommend starting over the counter alpha lipoic acid, 600 mg BID  - Capsaicin cream TID prn  - Recommend occupational therapy driving evaluation on follow up  - Return to clinic in 3 months     Problem List Items Addressed This Visit    None  Visit Diagnoses       Neuropathy                Bj Mar MD    Patient note was created using MModal Dictation.  Any errors in syntax or even information may not have been identified and edited on initial review prior to signing this note.  Subjective:   Patient seen in consultation at the request of Rayo Hernandez MD for the evaluation of neuropathy. A copy of this note will be sent to the referring physician.          2025:  Patient reports with mild improvement in neuropathy since last visit. Of note, her  recently  and she is feeling more depressed and anxious. Occasionally she feels shooting pain down her right leg attributed to sciatica. No other associated symptoms during today's visit.       Follow up 10/8/2024:     Ms. Tanika Wayne is a 65 y.o. female with history of hypertension,  "hyperlipidemia, hypothyroidism, diabetes, s/p gastric sleeve June 2021, CKD 3, low back pan s/p L3-L5 laminectomy 2/22/17, and parotitis  presenting for peripheral neuropathy. She was last seen by MABEL Flaherty 5/10/24 and was switched from gabapentin to lyrica. She is tolerating lyrica well and notes mild improvement in numbness. The neuropathy predominantly involves the right leg>left leg from the bottom of the foot to the middle of the shin. In addition, she reports new numbness in both her hands involving the palms but no significant burning or pain. Her latest A1c was 5.9. She uses a roller chair to get around the house and still describes low back pain with limited range of motion.  She denies chest pain, shortness of breath, nausea, vomiting, diarrhea, and dizziness.     HPI: Ayesha Foster 5/10/2024    Ms. Tanika Wayne is a 64 y.o. female with DDD of the lumbar spine (s/p laminectomy 2017), HTN, HLD, s/p gastric sleeve, and CKD stage 3 presenting for evaluation of numbness and tingling. On chart review the patient has tried gabapentin 300mg TID for relief of symptoms but finds it is not sufficient and causes her significant side effects. She notes that her symptoms have been ongoing for about 6 months. She did try lyrica in the past and found it did not provide relief of her symptoms. She notes that her symptoms are more numbness rather than burning/stinging sensation. She finds that nighttime seems to be when symptoms are the worst. She does feel as if her legs are weaker R>L and finds that her numbness/tingling is worse on the R>L. She notes that the gabapentin makes her feel as if she is in a cloud. Does endorse one fall this past week in which it felt as if her legs just "gave out". She does not report any changes in bowel or bladder habits, does report it is difficult to get to the bathroom in a timely fashion but denies any acute changes in bladder/bowel habits. She notes that she also has " some symptoms in the R hand. She reports that there were no acute accidents/trauma prior to onset of her symptoms.     PAST MEDICAL HISTORY:  Past Medical History:   Diagnosis Date    Abnormal echocardiogram 2012    Arthritis     Breast cancer     Breast cancer, right breast     Breast cancer, right breast 2012    S/P Lumpectomy / XRT '98 Dr. Bartholomew     Cardiomyopathy due to systemic disease     Cataract     Clotting disorder     Coronary artery disease     DM (diabetes mellitus) 2012    Encounter for blood transfusion     HTN (hypertension) 2012    Increased glucose level 2012    ITP (idiopathic thrombocytopenic purpura) 2012    Kidney stones 2012    PONV (postoperative nausea and vomiting)     Stenosis of lumbosacral spine 2012    Thyroid disease     Tympanic membrane perforation 9/15/2014    right 2014 Dr. Jeffries       PAST SURGICAL HISTORY:  Past Surgical History:   Procedure Laterality Date    BREAST LUMPECTOMY Right     CATARACT EXTRACTION W/  INTRAOCULAR LENS IMPLANT Left 2021    Procedure: EXTRACTION, CATARACT, WITH IOL INSERTION;  Surgeon: Klever Tolliver MD;  Location: [facility];  Service: Ophthalmology;  Laterality: Left;    CATARACT EXTRACTION W/  INTRAOCULAR LENS IMPLANT Right 2021    Procedure: EXTRACTION, CATARACT, WITH IOL INSERTION;  Surgeon: Klever Tolliver MD;  Location: [facility];  Service: Ophthalmology;  Laterality: Right;     SECTION      CHOLECYSTECTOMY      COLONOSCOPY N/A 2022    Procedure: COLONOSCOPY;  Surgeon: Anoop Escobar MD;  Location: [facility] (4TH FLR);  Service: Endoscopy;  Laterality: N/A;  Fully vaccinated/ inst mailed/ clear liquids up to 4 hrs prior/AM prep 2am-3am-RB    ESOPHAGOGASTRODUODENOSCOPY N/A 2021    Procedure: EGD (ESOPHAGOGASTRODUODENOSCOPY);  Surgeon: Silvio Singh Jr., MD;  Location: [facility] OR 2ND FLR;  Service: General;  Laterality: N/A;    EYE SURGERY      strabismus,  2006, bilateral cataracts    HIP ARTHROPLASTY Right 10/18/2018    Procedure: ARTHROPLASTY, HIP;  Surgeon: Baldomero Fischer MD;  Location: [facility] OR;  Service: Orthopedics;  Laterality: Right;    HIP REPLACEMENT ARTHROPLASTY Right     dr fischer    HYSTERECTOMY      JOINT REPLACEMENT      right BRYNN, TKA    KNEE ARTHROPLASTY Right 04/11/2019    Procedure: ARTHROPLASTY, KNEE (ADD ON );  Surgeon: Baldomero Fischer MD;  Location: [facility] OR;  Service: Orthopedics;  Laterality: Right;  (ADD ON )    L3-5 Laminectomy  02/2017    Dr. Mendoza    LAPAROSCOPIC SLEEVE GASTRECTOMY N/A 06/08/2021    Procedure: GASTRECTOMY, SLEEVE, LAPAROSCOPIC;  Surgeon: Silvio Singh Jr., MD;  Location: [address];  Service: General;  Laterality: N/A;    SPLENECTOMY, TOTAL  2001    from [facility]    STRABISMUS SURGERY  08/30/2006    OU    THYROIDECTOMY  09/2016    TONSILLECTOMY         CURRENT MEDS:  Current Outpatient Medications   Medication Sig Dispense Refill    atorvastatin (LIPITOR) 10 MG tablet Take 1 tablet (10 mg total) by mouth once daily. 90 tablet 1    calcium carbonate 1250 MG capsule Take 1,250 mg by mouth Daily.      carvediloL (COREG) 3.125 MG tablet Take 1 tablet (3.125 mg total) by mouth 2 (two) times daily. 180 tablet 1    clotrimazole-betamethasone 1-0.05% (LOTRISONE) cream Apply topically 2 (two) times daily as needed. 45 g 1    cyanocobalamin (VITAMIN B-12) 100 MCG tablet Take 100 mcg by mouth once daily.      cyclobenzaprine (FLEXERIL) 10 MG tablet Take 1 tablet (10 mg total) by mouth 2 (two) times daily as needed for Muscle spasms. 90 tablet 5    docusate sodium (COLACE) 100 MG capsule TAKE 1 CAPSULE (100 MG TOTAL) BY MOUTH 2 (TWO) TIMES DAILY AS NEEDED FOR CONSTIPATION. 60 capsule 17    famotidine (PEPCID) 40 MG tablet Take 40 mg by mouth.      fluticasone propionate (FLONASE) 50 mcg/actuation nasal spray INSTILL 2 SPRAYS IN EACH NOSTRIL ONCE A DAY 48 mL 1    hydrocortisone 2.5 % cream Apply topically 2 (two) times  daily. 28 g 0    levothyroxine (SYNTHROID) 100 MCG tablet Take 1 tablet (100 mcg total) by mouth once daily. 90 tablet 1    losartan-hydrochlorothiazide 100-12.5 mg (HYZAAR) 100-12.5 mg Tab Take 1 tablet by mouth once daily. 90 tablet 3    metFORMIN (GLUCOPHAGE-XR) 500 MG ER 24hr tablet Take 1 tablet (500 mg total) by mouth 2 (two) times daily with meals. 180 tablet 1    multivitamin (THERAGRAN) per tablet Take 1 tablet by mouth once daily.      NIFEdipine (PROCARDIA-XL) 90 MG (OSM) 24 hr tablet Take 1 tablet (90 mg total) by mouth once daily. 90 tablet 3    oxyCODONE-acetaminophen (PERCOCET) 5-325 mg per tablet Take 1 tablet by mouth every 8 (eight) hours as needed for Pain. Greater than 7 day quantity Medically Necessary 90 tablet 0    potassium chloride SA (K-DUR,KLOR-CON) 20 MEQ tablet Take 2 tablets (40 mEq total) by mouth 2 (two) times daily. 360 tablet 1    pregabalin (LYRICA) 100 MG capsule Take 1 capsule (100 mg total) by mouth 2 (two) times daily. 60 capsule 5    traZODone (DESYREL) 50 MG tablet Take 1 tablet (50 mg total) by mouth every evening. 90 tablet 3    venlafaxine (EFFEXOR-XR) 150 MG Cp24 Take 1 capsule (150 mg total) by mouth once daily. 90 capsule 1     No current facility-administered medications for this visit.       ALLERGIES:  Review of patient's allergies indicates:  No Known Allergies    FAMILY HISTORY:  Family History   Problem Relation Name Age of Onset    Heart disease Mother  69        MI    Diabetes Mother      Hypertension Mother      Diabetes Father      Hypertension Father      Stroke Father      Hyperlipidemia Sister      COPD Sister      Hypertension Sister      Emphysema Sister      Diabetes Sister Tita     Hypertension Sister Tita     Diabetes Sister Мария         prediabetic    Hypertension Sister Мария     Migraines Sister Мария     Rheum arthritis Sister Мария     Diabetes Brother Facundo     Hypertension Brother Facundo     No Known Problems Daughter Genae         SOCIAL HISTORY:  Social History     Tobacco Use    Smoking status: Never    Smokeless tobacco: Never   Substance Use Topics    Alcohol use: Yes     Comment: seasonally    Drug use: No       Review of Systems:  12 system review of systems is negative except for the symptoms mentioned in HPI.      Objective:   There were no vitals filed for this visit.  General: NAD, well nourished   Eyes: no tearing, discharge, no erythema   ENT: moist mucous membranes of the oral cavity, nares patent    Neck: Supple, full range of motion  Cardiovascular: Warm and well perfused, pulses equal and symmetrical  Lungs: Normal work of breathing, normal chest wall excursions  Skin: No rash, lesions, or breakdown on exposed skin  Psychiatry: Mood and affect are appropriate   Abdomen: soft, non tender, non distended  Extremeties: No cyanosis, clubbing or edema.    Neurological   MENTAL STATUS: Alert and oriented to person, place, and time. Attention and concentration within normal limits. Speech without dysarthria, able to name and repeat without difficulty. Recent and remote memory within normal limits   CRANIAL NERVES: Visual fields intact. Left eye ptosis. EOMI. Facial sensation intact. Face symmetrical. Hearing grossly intact. Full shoulder shrug bilaterally. Tongue protrudes midline   SENSORY: Sensation is reduced to pin and light touch in bilateral lower extremities, R>L  Joint position perception intact. No astereognosis or agraphesthesia in the hands.   MOTOR: Normal bulk and tone. No pronator drift.  5/5 deltoid, biceps, triceps, interosseous, hand  bilaterally.    REFLEXES: 3+ left patellar, 1+ right patellar. Toes down going bilaterally.   CEREBELLAR/COORDINATION/GAIT: Did not assess gait, patient in wheelchair.  Heel to shin intact. Finger to nose intact.

## 2025-01-10 DIAGNOSIS — M48.062 SPINAL STENOSIS OF LUMBAR REGION WITH NEUROGENIC CLAUDICATION: ICD-10-CM

## 2025-01-10 DIAGNOSIS — G47.09 OTHER INSOMNIA: ICD-10-CM

## 2025-01-10 DIAGNOSIS — M54.9 BACK PAIN, UNSPECIFIED BACK LOCATION, UNSPECIFIED BACK PAIN LATERALITY, UNSPECIFIED CHRONICITY: ICD-10-CM

## 2025-01-10 DIAGNOSIS — M62.838 NIGHT MUSCLE SPASMS: ICD-10-CM

## 2025-01-10 DIAGNOSIS — Z96.641 STATUS POST RIGHT HIP REPLACEMENT: ICD-10-CM

## 2025-01-10 DIAGNOSIS — I10 ESSENTIAL HYPERTENSION, MALIGNANT: ICD-10-CM

## 2025-01-10 DIAGNOSIS — M43.16 SPONDYLOLISTHESIS AT L3-L4 LEVEL: ICD-10-CM

## 2025-01-10 DIAGNOSIS — G89.29 CHRONIC BILATERAL LOW BACK PAIN WITH RIGHT-SIDED SCIATICA: ICD-10-CM

## 2025-01-10 DIAGNOSIS — F11.90 UNCOMPLICATED OPIOID USE: ICD-10-CM

## 2025-01-10 DIAGNOSIS — E11.9 TYPE 2 DIABETES MELLITUS WITHOUT COMPLICATION, WITHOUT LONG-TERM CURRENT USE OF INSULIN: ICD-10-CM

## 2025-01-10 DIAGNOSIS — Z96.651 STATUS POST TOTAL RIGHT KNEE REPLACEMENT: ICD-10-CM

## 2025-01-10 DIAGNOSIS — M54.41 CHRONIC BILATERAL LOW BACK PAIN WITH RIGHT-SIDED SCIATICA: ICD-10-CM

## 2025-01-10 DIAGNOSIS — R21 RASH: ICD-10-CM

## 2025-01-10 RX ORDER — CLOTRIMAZOLE AND BETAMETHASONE DIPROPIONATE 10; .64 MG/G; MG/G
CREAM TOPICAL 2 TIMES DAILY PRN
Qty: 45 G | Refills: 1 | Status: SHIPPED | OUTPATIENT
Start: 2025-01-10

## 2025-01-10 RX ORDER — LOSARTAN POTASSIUM AND HYDROCHLOROTHIAZIDE 12.5; 1 MG/1; MG/1
1 TABLET ORAL DAILY
Qty: 90 TABLET | Refills: 3 | Status: SHIPPED | OUTPATIENT
Start: 2025-01-10

## 2025-01-10 RX ORDER — CYCLOBENZAPRINE HCL 10 MG
10 TABLET ORAL 2 TIMES DAILY PRN
Qty: 90 TABLET | Refills: 5 | Status: SHIPPED | OUTPATIENT
Start: 2025-01-10

## 2025-01-10 RX ORDER — OXYCODONE AND ACETAMINOPHEN 5; 325 MG/1; MG/1
1 TABLET ORAL EVERY 8 HOURS PRN
Qty: 90 TABLET | Refills: 0 | Status: SHIPPED | OUTPATIENT
Start: 2025-01-10

## 2025-01-10 RX ORDER — NIFEDIPINE 90 MG/1
90 TABLET, EXTENDED RELEASE ORAL DAILY
Qty: 90 TABLET | Refills: 3 | Status: SHIPPED | OUTPATIENT
Start: 2025-01-10

## 2025-01-10 RX ORDER — ATORVASTATIN CALCIUM 10 MG/1
10 TABLET, FILM COATED ORAL DAILY
Qty: 90 TABLET | Refills: 1 | Status: SHIPPED | OUTPATIENT
Start: 2025-01-10

## 2025-01-10 RX ORDER — TRAZODONE HYDROCHLORIDE 50 MG/1
50 TABLET ORAL NIGHTLY
Qty: 90 TABLET | Refills: 3 | Status: SHIPPED | OUTPATIENT
Start: 2025-01-10

## 2025-01-10 RX ORDER — CARVEDILOL 3.12 MG/1
3.12 TABLET ORAL 2 TIMES DAILY
Qty: 180 TABLET | Refills: 1 | Status: SHIPPED | OUTPATIENT
Start: 2025-01-10

## 2025-01-10 NOTE — TELEPHONE ENCOUNTER
Care Due:                  Date            Visit Type   Department     Provider  --------------------------------------------------------------------------------                                EP -                              PRIMARY      [facility] INTERNAL  Last Visit: 07-      CARE ([facility])   MEDICINE       Rayo Hernandez                              EP -                              PRIMARY      [facility] INTER[facility]  Next Visit: 01-      CARE ([facility])   MEDICINE       Rayo Hernandez                                                            Last  Test          Frequency    Reason                     Performed    Due Date  --------------------------------------------------------------------------------    HBA1C.......  6 months...  metFORMIN................  07- 01-    [facility] Embedded Care Due Messages. Reference number: [id number].   1/10/2025 1:03:04 PM CST

## 2025-01-14 ENCOUNTER — PATIENT MESSAGE (OUTPATIENT)
Dept: BARIATRICS | Facility: CLINIC | Age: 65
End: 2025-01-14
Payer: MEDICARE

## 2025-01-27 ENCOUNTER — OFFICE VISIT (OUTPATIENT)
Dept: INTERNAL MEDICINE | Facility: CLINIC | Age: 65
End: 2025-01-27
Payer: MEDICARE

## 2025-01-27 ENCOUNTER — LAB VISIT (OUTPATIENT)
Dept: LAB | Facility: HOSPITAL | Age: 65
End: 2025-01-27
Attending: INTERNAL MEDICINE
Payer: MEDICARE

## 2025-01-27 VITALS
DIASTOLIC BLOOD PRESSURE: 80 MMHG | SYSTOLIC BLOOD PRESSURE: 130 MMHG | HEART RATE: 83 BPM | BODY MASS INDEX: 42.71 KG/M2 | WEIGHT: 226.19 LBS | OXYGEN SATURATION: 98 % | HEIGHT: 61 IN

## 2025-01-27 DIAGNOSIS — F11.90 CHRONIC, CONTINUOUS USE OF OPIOIDS: ICD-10-CM

## 2025-01-27 DIAGNOSIS — E11.9 TYPE 2 DIABETES MELLITUS WITHOUT COMPLICATION, WITHOUT LONG-TERM CURRENT USE OF INSULIN: Primary | ICD-10-CM

## 2025-01-27 DIAGNOSIS — M54.9 BACK PAIN, UNSPECIFIED BACK LOCATION, UNSPECIFIED BACK PAIN LATERALITY, UNSPECIFIED CHRONICITY: ICD-10-CM

## 2025-01-27 DIAGNOSIS — Z79.891 LONG TERM (CURRENT) USE OF OPIATE ANALGESIC: ICD-10-CM

## 2025-01-27 DIAGNOSIS — E11.9 TYPE 2 DIABETES MELLITUS WITHOUT COMPLICATION, WITHOUT LONG-TERM CURRENT USE OF INSULIN: ICD-10-CM

## 2025-01-27 DIAGNOSIS — E11.42 DIABETIC POLYNEUROPATHY ASSOCIATED WITH TYPE 2 DIABETES MELLITUS: ICD-10-CM

## 2025-01-27 DIAGNOSIS — E66.01 SEVERE OBESITY WITH BODY MASS INDEX (BMI) OF 35.0 TO 35.9 AND COMORBIDITY: ICD-10-CM

## 2025-01-27 DIAGNOSIS — R53.81 DEBILITY: ICD-10-CM

## 2025-01-27 DIAGNOSIS — R26.9 IMPAIRED GAIT: ICD-10-CM

## 2025-01-27 DIAGNOSIS — N18.31 CKD STAGE 3A, GFR 45-59 ML/MIN: ICD-10-CM

## 2025-01-27 DIAGNOSIS — M47.816 SPONDYLOSIS OF LUMBAR REGION WITHOUT MYELOPATHY OR RADICULOPATHY: ICD-10-CM

## 2025-01-27 LAB
ESTIMATED AVG GLUCOSE: 126 MG/DL (ref 68–131)
HBA1C MFR BLD: 6 % (ref 4–5.6)

## 2025-01-27 PROCEDURE — 99214 OFFICE O/P EST MOD 30 MIN: CPT | Mod: S$GLB,,, | Performed by: INTERNAL MEDICINE

## 2025-01-27 PROCEDURE — 1159F MED LIST DOCD IN RCRD: CPT | Mod: CPTII,S$GLB,, | Performed by: INTERNAL MEDICINE

## 2025-01-27 PROCEDURE — 1160F RVW MEDS BY RX/DR IN RCRD: CPT | Mod: CPTII,S$GLB,, | Performed by: INTERNAL MEDICINE

## 2025-01-27 PROCEDURE — 83036 HEMOGLOBIN GLYCOSYLATED A1C: CPT | Performed by: INTERNAL MEDICINE

## 2025-01-27 PROCEDURE — 3079F DIAST BP 80-89 MM HG: CPT | Mod: CPTII,S$GLB,, | Performed by: INTERNAL MEDICINE

## 2025-01-27 PROCEDURE — G2211 COMPLEX E/M VISIT ADD ON: HCPCS | Mod: S$GLB,,, | Performed by: INTERNAL MEDICINE

## 2025-01-27 PROCEDURE — 3075F SYST BP GE 130 - 139MM HG: CPT | Mod: CPTII,S$GLB,, | Performed by: INTERNAL MEDICINE

## 2025-01-27 PROCEDURE — 36415 COLL VENOUS BLD VENIPUNCTURE: CPT | Performed by: INTERNAL MEDICINE

## 2025-01-27 PROCEDURE — 99999 PR PBB SHADOW E&M-EST. PATIENT-LVL V: CPT | Mod: PBBFAC,,, | Performed by: INTERNAL MEDICINE

## 2025-01-27 PROCEDURE — 3044F HG A1C LEVEL LT 7.0%: CPT | Mod: CPTII,S$GLB,, | Performed by: INTERNAL MEDICINE

## 2025-01-27 PROCEDURE — 3008F BODY MASS INDEX DOCD: CPT | Mod: CPTII,S$GLB,, | Performed by: INTERNAL MEDICINE

## 2025-01-27 PROCEDURE — 3072F LOW RISK FOR RETINOPATHY: CPT | Mod: CPTII,S$GLB,, | Performed by: INTERNAL MEDICINE
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
## 2025-02-06 NOTE — TELEPHONE ENCOUNTER
"     Office Note      Date: 2025  Patient Name: Raffy Pepe  MRN: [id number]  : 1962    Chief Complaint   Patient presents with    Diabetes     Type II.        History of Present Illness:   Raffy Pepe is a 62 y.o. male who presents for follow-up for type 2 diabetes diagnosed in .  He remains on Basaglar 50 units daily and NovoLog 0 to 8 units with breakfast, 10 units with lunch and 10 units with supper plus an additional 1 unit for every 50 mg/dL his glucose is above 150 mg/dL.  He reports he will need a replacement for Basaglar as this is no longer covered by his insurance.  He continues to use the Dexcom G7 continuous glucose monitor.  He reports his blood sugars have been better recently but were elevated over the holidays and in January he was under increased stress with the ice and snow.  He denies any severe or frequent hypoglycemia.  He is up-to-date on his eye exam he goes annually in May.  He is fasting today for labs.  He denies any trouble with his feet today.  We did his foot exam at his appointment in May.  He remains on atorvastatin 80 mg daily.      Subjective     Review of Systems:   Review of Systems   Constitutional: Negative.    Cardiovascular: Negative.    Gastrointestinal: Negative.    Endocrine: Negative.    Neurological: Negative.        The following portions of the patient's history were reviewed and updated as appropriate: allergies, current medications, past family history, past medical history, past social history, past surgical history, and problem list.    Objective     Vitals:    25 0922   BP: 114/78   BP Location: Left arm   Patient Position: Sitting   Cuff Size: Adult   Pulse: 93   SpO2: 95%   Weight: 96.6 kg (213 lb)   Height: 177.8 cm (70\")     Body mass index is 30.56 kg/m².    Physical Exam  Vitals reviewed.   Constitutional:       General: He is not in acute distress.     Appearance: Normal appearance.   Neurological:      Mental Status: He " I spoke to pt, informed her that her thyroid level is too high, which means her thyroid dose is too strong, and that Dr. Hernandez has sent in a lower dose of levothyroxine (SYNTHROID) 125 MCG tablet. I scheduled pt for a repeat TSH thyroid level in 6 weeks.I informed her that her diabetes is well controlled. Pt verbalized understanding.      is alert.         HEMOGLOBIN A1C  Lab Results   Component Value Date    HGBA1C 8.9 (A) 02/06/2025       GLUCOSE  Glucose   Date Value Ref Range Status   02/06/2025 161 (A) 70 - 130 mg/dL Final       CMP  Lab Results   Component Value Date    GLUCOSE 123 (H) 01/25/2024    BUN 11 01/25/2024    CREATININE 0.70 (L) 01/25/2024    EGFRIFNONA 110 02/03/2022    BCR 15.7 01/25/2024    K 3.5 01/25/2024    CO2 27.9 01/25/2024    CALCIUM 9.3 01/25/2024    AST 19 01/25/2024    ALT 22 01/25/2024       LIPID PANEL  Lab Results   Component Value Date    CHOL 186 01/25/2024    CHLPL 207 (H) 03/25/2016    TRIG 92 01/25/2024    HDL 63 (H) 01/25/2024     (H) 01/25/2024       URINE MICROALBUMIN/CREATININE RATIO  Microalbumin/Creatinine Ratio   Date Value Ref Range Status   01/25/2024 19.4 0.0 - 29.0 mg/g Final       TSH  Lab Results   Component Value Date    TSH 1.420 01/25/2024       Assessment / Plan      Assessment & Plan:  1. Type 2 diabetes mellitus with hyperglycemia, with long-term current use of insulin  His hemoglobin A1c is higher than it was in October and above goal at 8.9%.  We discussed the importance of getting his diabetes under better control.  I reviewed his Dexcom download.  His average glucose for the last 2 weeks is 193 mg/dL with 50% of his readings within range, 32% high, 18% very high, 0% low and 0% very low.  It appears Tresiba will be covered by his insurance this year.  I sent in a prescription for Tresiba U200 50 units daily.  This will replace the Basaglar.  We will increase his NovoLog he will take 5 to 10 units with breakfast depending on food intake, 11 units with lunch and 11 units with supper plus an additional 1 unit for every 50 mg/dL his glucose is above 150 mg/dL.  He will send in blood sugar readings for review and adjustment if needed.  His weight is stable.  I encouraged healthy eating habits and physical activity as tolerated.  Fasting labs pending today.  Will send note with results and  plan.  - POC Glucose, Blood  - POC Glycosylated Hemoglobin (Hb A1C)  - TSH; Future  - Comprehensive Metabolic Panel; Future  - Lipid Panel; Future  - Microalbumin / Creatinine Urine Ratio - Urine, Clean Catch; Future  - TSH  - Comprehensive Metabolic Panel  - Lipid Panel  - Microalbumin / Creatinine Urine Ratio - Urine, Clean Catch    2. Essential hypertension  His blood pressure is excellent today.  He will continue his current blood pressure medications.  I refilled his amlodipine today.    3. Mixed hyperlipidemia  Fasting labs pending today.  Will send note with results and plan.  For now he will continue atorvastatin 80 mg daily.  - Lipid Panel; Future  - Lipid Panel      Return in about 4 months (around 6/6/2025).     This note was dictated using Dragon voice recognition.    Electronically signed by: JUAN RAMON Arnold  02/06/2025

## 2025-02-09 DIAGNOSIS — M54.9 BACK PAIN, UNSPECIFIED BACK LOCATION, UNSPECIFIED BACK PAIN LATERALITY, UNSPECIFIED CHRONICITY: ICD-10-CM

## 2025-02-09 DIAGNOSIS — E89.0 HISTORY OF SUBTOTAL THYROIDECTOMY: ICD-10-CM

## 2025-02-09 DIAGNOSIS — Z96.641 STATUS POST RIGHT HIP REPLACEMENT: ICD-10-CM

## 2025-02-09 DIAGNOSIS — G89.29 CHRONIC BILATERAL LOW BACK PAIN WITH RIGHT-SIDED SCIATICA: ICD-10-CM

## 2025-02-09 DIAGNOSIS — M54.41 CHRONIC BILATERAL LOW BACK PAIN WITH RIGHT-SIDED SCIATICA: ICD-10-CM

## 2025-02-09 DIAGNOSIS — Z96.651 STATUS POST TOTAL RIGHT KNEE REPLACEMENT: ICD-10-CM

## 2025-02-09 DIAGNOSIS — M48.062 SPINAL STENOSIS OF LUMBAR REGION WITH NEUROGENIC CLAUDICATION: ICD-10-CM

## 2025-02-09 DIAGNOSIS — F11.90 UNCOMPLICATED OPIOID USE: ICD-10-CM

## 2025-02-09 DIAGNOSIS — E89.0 POSTOPERATIVE HYPOTHYROIDISM: ICD-10-CM

## 2025-02-09 DIAGNOSIS — M43.16 SPONDYLOLISTHESIS AT L3-L4 LEVEL: ICD-10-CM

## 2025-02-10 RX ORDER — LEVOTHYROXINE SODIUM 100 UG/1
100 TABLET ORAL DAILY
Qty: 90 TABLET | Refills: 3 | Status: SHIPPED | OUTPATIENT
Start: 2025-02-10

## 2025-02-10 RX ORDER — OXYCODONE AND ACETAMINOPHEN 5; 325 MG/1; MG/1
1 TABLET ORAL EVERY 8 HOURS PRN
Qty: 90 TABLET | Refills: 0 | Status: SHIPPED | OUTPATIENT
Start: 2025-02-10

## 2025-02-10 NOTE — TELEPHONE ENCOUNTER
No care due was identified.  [facility] Embedded Care Due Messages. Reference number: [id number].   2/09/2025 8:43:40 PM CST

## 2025-02-11 RX ORDER — DULOXETIN HYDROCHLORIDE 20 MG/1
20 CAPSULE, DELAYED RELEASE ORAL DAILY
Qty: 60 CAPSULE | Refills: 2 | Status: SHIPPED | OUTPATIENT
Start: 2025-02-11 | End: 2025-08-10

## 2025-02-21 DIAGNOSIS — F32.A DEPRESSION, UNSPECIFIED DEPRESSION TYPE: ICD-10-CM

## 2025-02-21 DIAGNOSIS — M62.838 NIGHT MUSCLE SPASMS: ICD-10-CM

## 2025-02-21 NOTE — TELEPHONE ENCOUNTER
No care due was identified.  [facility] Embedded Care Due Messages. Reference number: [id number].   2/21/2025 3:09:15 PM CST

## 2025-02-24 RX ORDER — VENLAFAXINE HYDROCHLORIDE 150 MG/1
150 CAPSULE, EXTENDED RELEASE ORAL DAILY
Qty: 90 CAPSULE | Refills: 2 | Status: SHIPPED | OUTPATIENT
Start: 2025-02-24

## 2025-02-24 RX ORDER — CYCLOBENZAPRINE HCL 10 MG
10 TABLET ORAL 2 TIMES DAILY PRN
Qty: 90 TABLET | Refills: 2 | Status: SHIPPED | OUTPATIENT
Start: 2025-02-24

## 2025-03-12 ENCOUNTER — PATIENT MESSAGE (OUTPATIENT)
Dept: INTERNAL MEDICINE | Facility: CLINIC | Age: 65
End: 2025-03-12

## 2025-03-12 DIAGNOSIS — Z96.651 STATUS POST TOTAL RIGHT KNEE REPLACEMENT: ICD-10-CM

## 2025-03-12 DIAGNOSIS — M48.062 SPINAL STENOSIS OF LUMBAR REGION WITH NEUROGENIC CLAUDICATION: ICD-10-CM

## 2025-03-12 DIAGNOSIS — M54.9 BACK PAIN, UNSPECIFIED BACK LOCATION, UNSPECIFIED BACK PAIN LATERALITY, UNSPECIFIED CHRONICITY: ICD-10-CM

## 2025-03-12 DIAGNOSIS — F11.90 UNCOMPLICATED OPIOID USE: ICD-10-CM

## 2025-03-12 DIAGNOSIS — M43.16 SPONDYLOLISTHESIS AT L3-L4 LEVEL: ICD-10-CM

## 2025-03-12 DIAGNOSIS — M54.41 CHRONIC BILATERAL LOW BACK PAIN WITH RIGHT-SIDED SCIATICA: ICD-10-CM

## 2025-03-12 DIAGNOSIS — Z96.641 STATUS POST RIGHT HIP REPLACEMENT: ICD-10-CM

## 2025-03-12 DIAGNOSIS — G89.29 CHRONIC BILATERAL LOW BACK PAIN WITH RIGHT-SIDED SCIATICA: ICD-10-CM

## 2025-03-12 RX ORDER — OXYCODONE AND ACETAMINOPHEN 5; 325 MG/1; MG/1
1 TABLET ORAL EVERY 8 HOURS PRN
Qty: 90 TABLET | Refills: 0 | Status: SHIPPED | OUTPATIENT
Start: 2025-03-12

## 2025-03-12 RX ORDER — FAMOTIDINE 40 MG/1
40 TABLET, FILM COATED ORAL NIGHTLY PRN
Qty: 90 TABLET | Refills: 11 | Status: SHIPPED | OUTPATIENT
Start: 2025-03-12

## 2025-03-12 NOTE — TELEPHONE ENCOUNTER
No care due was identified.  [facility] Embedded Care Due Messages. Reference number: [id number].   3/12/2025 11:35:46 AM CDT

## 2025-03-12 NOTE — TELEPHONE ENCOUNTER
No care due was identified.  [facility] Embedded Care Due Messages. Reference number: [id number].   3/12/2025 11:47:28 AM CDT

## 2025-04-01 ENCOUNTER — OFFICE VISIT (OUTPATIENT)
Dept: NEUROLOGY | Facility: CLINIC | Age: 65
End: 2025-04-01
Payer: COMMERCIAL

## 2025-04-01 VITALS
WEIGHT: 225.31 LBS | SYSTOLIC BLOOD PRESSURE: 134 MMHG | BODY MASS INDEX: 42.54 KG/M2 | DIASTOLIC BLOOD PRESSURE: 72 MMHG | HEIGHT: 61 IN

## 2025-04-01 DIAGNOSIS — E11.42 DIABETIC POLYNEUROPATHY ASSOCIATED WITH TYPE 2 DIABETES MELLITUS: Primary | ICD-10-CM

## 2025-04-01 DIAGNOSIS — Z98.890 S/P LUMBAR LAMINECTOMY: ICD-10-CM

## 2025-04-01 PROCEDURE — 99999 PR PBB SHADOW E&M-EST. PATIENT-LVL IV: CPT | Mod: PBBFAC,HCNC,,

## 2025-04-01 PROCEDURE — 3072F LOW RISK FOR RETINOPATHY: CPT | Mod: HCNC,,,

## 2025-04-01 PROCEDURE — 99214 OFFICE O/P EST MOD 30 MIN: CPT | Mod: S$PBB,HCNC,,

## 2025-04-01 RX ORDER — DULOXETIN HYDROCHLORIDE 20 MG/1
40 CAPSULE, DELAYED RELEASE ORAL DAILY
Qty: 120 CAPSULE | Refills: 2 | Status: SHIPPED | OUTPATIENT
Start: 2025-04-01 | End: 2025-09-28

## 2025-04-01 NOTE — PROGRESS NOTES
[facility] - NEUROLOGY [address]TH [address] [facility], [place]    Date: 4/1/25  Patient Name: Tanika Wayne   MRN: [id number]   PCP: Rayo Hernandez  Referring Provider: No ref. provider found    Assessment:   Tanika Wayne is a 65 y.o. female presenting history of hypertension, hyperlipidemia, hypothyroidism, diabetes, s/p gastric sleeve 2021, CKD 3, low back pan s/p L3-L5 laminectomy 2/22/17, parotitis presenting for neuropathy follow up. She describes bilateral numbness in the legs, R>L, and numbness in bilateral palms. She was switched to lyrica during first appointment and notes mild improvement with dose increase to 100 mg BID. No astereognosis or agraphesthesia on exam. Recommend continuing lyrica, increasing duloxetine to 40 mg, and starting alpha lipoic acid.      Plan:     - Lyrica 100 mg BID  - Increase duloxetine 20 mg to 40 mg daily  - Recommend starting over the counter alpha lipoic acid, 600 mg BID  - Capsaicin cream TID prn  - Ordered home health physical therapy  - Return to clinic in 4 months     Problem List Items Addressed This Visit          Neuro    S/P lumbar laminectomy     Other Visit Diagnoses         Diabetic polyneuropathy associated with type 2 diabetes mellitus    -  Primary    Relevant Orders    Ambulatory referral/consult to Home Health              Bj Mar MD    Patient note was created using MModal Dictation.  Any errors in syntax or even information may not have been identified and edited on initial review prior to signing this note.  Subjective:   Patient seen in consultation at the request of No ref. provider found for the evaluation of neuropathy. A copy of this note will be sent to the referring physician.        4/1/2025:  - Cymbalta helps though capsaicin not as much  - Uses lidocaine patch prn  - Reports sitting in the wheelchair for longer periods throughout day and wants to become more active  - Inquires about home health physical  therapy   - No other new associated symptoms at this time      2025:  Patient reports mild improvement in neuropathy since last visit. Of note, her  recently  and she is feeling more depressed and anxious. Occasionally she feels shooting pain down her right leg attributed to sciatica. No other associated symptoms during today's visit.       Follow up 10/8/2024:     Ms. Tanika Wayne is a 65 y.o. female with history of hypertension, hyperlipidemia, hypothyroidism, diabetes, s/p gastric sleeve 2021, CKD 3, low back pan s/p L3-L5 laminectomy 17, and parotitis  presenting for peripheral neuropathy. She was last seen by MABEL Flaherty 5/10/24 and was switched from gabapentin to lyrica. She is tolerating lyrica well and notes mild improvement in numbness. The neuropathy predominantly involves the right leg>left leg from the bottom of the foot to the middle of the shin. In addition, she reports new numbness in both her hands involving the palms but no significant burning or pain. Her latest A1c was 5.9. She uses a roller chair to get around the house and still describes low back pain with limited range of motion.  She denies chest pain, shortness of breath, nausea, vomiting, diarrhea, and dizziness.     HPI: Ayesha Foster 5/10/2024    Ms. Tanika Wayne is a 64 y.o. female with DDD of the lumbar spine (s/p laminectomy ), HTN, HLD, s/p gastric sleeve, and CKD stage 3 presenting for evaluation of numbness and tingling. On chart review the patient has tried gabapentin 300mg TID for relief of symptoms but finds it is not sufficient and causes her significant side effects. She notes that her symptoms have been ongoing for about 6 months. She did try lyrica in the past and found it did not provide relief of her symptoms. She notes that her symptoms are more numbness rather than burning/stinging sensation. She finds that nighttime seems to be when symptoms are the worst. She does feel  "as if her legs are weaker R>L and finds that her numbness/tingling is worse on the R>L. She notes that the gabapentin makes her feel as if she is in a cloud. Does endorse one fall this past week in which it felt as if her legs just "gave out". She does not report any changes in bowel or bladder habits, does report it is difficult to get to the bathroom in a timely fashion but denies any acute changes in bladder/bowel habits. She notes that she also has some symptoms in the R hand. She reports that there were no acute accidents/trauma prior to onset of her symptoms.     PAST MEDICAL HISTORY:  Past Medical History:   Diagnosis Date    Abnormal echocardiogram 2012    Arthritis     Breast cancer     Breast cancer, right breast     Breast cancer, right breast 2012    S/P Lumpectomy / XRT '98 Dr. Bartholomew     Cardiomyopathy due to systemic disease     Cataract     Clotting disorder     Coronary artery disease     DM (diabetes mellitus) 2012    Encounter for blood transfusion     HTN (hypertension) 2012    Increased glucose level 2012    ITP (idiopathic thrombocytopenic purpura) 2012    Kidney stones 2012    PONV (postoperative nausea and vomiting)     Stenosis of lumbosacral spine 2012    Thyroid disease     Tympanic membrane perforation 9/15/2014    right 2014 Dr. Jeffries       PAST SURGICAL HISTORY:  Past Surgical History:   Procedure Laterality Date    BREAST LUMPECTOMY Right     CATARACT EXTRACTION W/  INTRAOCULAR LENS IMPLANT Left 2021    Procedure: EXTRACTION, CATARACT, WITH IOL INSERTION;  Surgeon: Klever Tolliver MD;  Location: [facility], [place], operated by [facility] OR;  Service: Ophthalmology;  Laterality: Left;    CATARACT EXTRACTION W/  INTRAOCULAR LENS IMPLANT Right 2021    Procedure: EXTRACTION, CATARACT, WITH IOL INSERTION;  Surgeon: Klever Tolliver MD;  Location: [facility], [place], operated by [facility] OR;  Service: Ophthalmology;  Laterality: Right;     SECTION      CHOLECYSTECTOMY      COLONOSCOPY N/A " 09/28/2022    Procedure: COLONOSCOPY;  Surgeon: Anoop Escobar MD;  Location: [facility] (4TH FLR);  Service: Endoscopy;  Laterality: N/A;  Fully vaccinated/ inst mailed/ clear liquids up to 4 hrs prior/AM prep 2am-3am-RB    ESOPHAGOGASTRODUODENOSCOPY N/A 06/08/2021    Procedure: EGD (ESOPHAGOGASTRODUODENOSCOPY);  Surgeon: Silvio Singh Jr., MD;  Location: [facility] OR 2ND FLR;  Service: General;  Laterality: N/A;    EYE SURGERY      strabismus, 2006, bilateral cataracts    HIP ARTHROPLASTY Right 10/18/2018    Procedure: ARTHROPLASTY, HIP;  Surgeon: Baldomero Fischer MD;  Location: [facility];  Service: Orthopedics;  Laterality: Right;    HIP REPLACEMENT ARTHROPLASTY Right     dr fischer    HYSTERECTOMY      JOINT REPLACEMENT      right BRYNN, TKA    KNEE ARTHROPLASTY Right 04/11/2019    Procedure: ARTHROPLASTY, KNEE (ADD ON );  Surgeon: Baldomero Fischer MD;  Location: [facility];  Service: Orthopedics;  Laterality: Right;  (ADD ON )    L3-5 Laminectomy  02/2017    Dr. Mendoza    LAPAROSCOPIC SLEEVE GASTRECTOMY N/A 06/08/2021    Procedure: GASTRECTOMY, SLEEVE, LAPAROSCOPIC;  Surgeon: Silvio Singh Jr., MD;  Location: [facility] 2ND FLR;  Service: General;  Laterality: N/A;    SPLENECTOMY, TOTAL  2001    from [facility]    STRABISMUS SURGERY  08/30/2006    OU    THYROIDECTOMY  09/2016    TONSILLECTOMY         CURRENT MEDS:  Current Outpatient Medications   Medication Sig Dispense Refill    atorvastatin (LIPITOR) 10 MG tablet Take 1 tablet (10 mg total) by mouth once daily. 90 tablet 1    calcium carbonate 1250 MG capsule Take 1,250 mg by mouth Daily.      capsicum 0.075% (ZOSTRIX) 0.075 % topical cream Apply topically 3 (three) times daily as needed (for peipheral neuropathy). 1 g 0    carvediloL (COREG) 3.125 MG tablet Take 1 tablet (3.125 mg total) by mouth 2 (two) times daily. 180 tablet 1    clotrimazole-betamethasone 1-0.05% (LOTRISONE) cream Apply topically 2 (two) times daily as needed. 45 g 1    cyanocobalamin  (VITAMIN B-12) 100 MCG tablet Take 100 mcg by mouth once daily.      cyclobenzaprine (FLEXERIL) 10 MG tablet Take 1 tablet (10 mg total) by mouth 2 (two) times daily as needed for Muscle spasms. 90 tablet 2    docusate sodium (COLACE) 100 MG capsule TAKE 1 CAPSULE (100 MG TOTAL) BY MOUTH 2 (TWO) TIMES DAILY AS NEEDED FOR CONSTIPATION. 60 capsule 17    DULoxetine (CYMBALTA) 20 MG capsule Take 1 capsule (20 mg total) by mouth once daily. 60 capsule 2    famotidine (PEPCID) 40 MG tablet Take 1 tablet (40 mg total) by mouth nightly as needed for Heartburn. 90 tablet 11    fluticasone propionate (FLONASE) 50 mcg/actuation nasal spray INSTILL 2 SPRAYS IN EACH NOSTRIL ONCE A DAY 48 mL 1    hydrocortisone 2.5 % cream Apply topically 2 (two) times daily. 28 g 0    levothyroxine (SYNTHROID) 100 MCG tablet Take 1 tablet (100 mcg total) by mouth once daily. 90 tablet 3    losartan-hydrochlorothiazide 100-12.5 mg (HYZAAR) 100-12.5 mg Tab Take 1 tablet by mouth once daily. 90 tablet 3    metFORMIN (GLUCOPHAGE-XR) 500 MG ER 24hr tablet Take 1 tablet (500 mg total) by mouth 2 (two) times daily with meals. 180 tablet 1    multivitamin (THERAGRAN) per tablet Take 1 tablet by mouth once daily.      NIFEdipine (PROCARDIA-XL) 90 MG (OSM) 24 hr tablet Take 1 tablet (90 mg total) by mouth once daily. 90 tablet 3    oxyCODONE-acetaminophen (PERCOCET) 5-325 mg per tablet Take 1 tablet by mouth every 8 (eight) hours as needed for Pain. Greater than 7 day quantity Medically Necessary 90 tablet 0    potassium chloride SA (K-DUR,KLOR-CON) 20 MEQ tablet Take 2 tablets (40 mEq total) by mouth 2 (two) times daily. 360 tablet 1    pregabalin (LYRICA) 100 MG capsule Take 1 capsule (100 mg total) by mouth 2 (two) times daily. 60 capsule 5    traZODone (DESYREL) 50 MG tablet Take 1 tablet (50 mg total) by mouth every evening. 90 tablet 3    venlafaxine (EFFEXOR-XR) 150 MG Cp24 Take 1 capsule (150 mg total) by mouth once daily. 90 capsule 2     No  "current facility-administered medications for this visit.       ALLERGIES:  Review of patient's allergies indicates:  No Known Allergies    FAMILY HISTORY:  Family History   Problem Relation Name Age of Onset    Heart disease Mother  69        MI    Diabetes Mother      Hypertension Mother      Diabetes Father      Hypertension Father      Stroke Father      Hyperlipidemia Sister      COPD Sister      Hypertension Sister      Emphysema Sister      Diabetes Sister Tita     Hypertension Sister Tita     Diabetes Sister Мария         prediabetic    Hypertension Sister Мария     Migraines Sister Мария     Rheum arthritis Sister Мария     Diabetes Brother Facundo     Hypertension Brother Facundo     No Known Problems Daughter Genae        SOCIAL HISTORY:  Social History     Tobacco Use    Smoking status: Never    Smokeless tobacco: Never   Substance Use Topics    Alcohol use: Yes     Comment: seasonally    Drug use: No       Review of Systems:  12 system review of systems is negative except for the symptoms mentioned in HPI.      Objective:     Vitals:    04/01/25 1452   BP: 134/72   Pulse: (P) 87   Weight: 102.2 kg (225 lb 5 oz)   Height: 5' 1" (1.549 m)     General: NAD, well nourished   Eyes: no tearing, discharge, no erythema   ENT: moist mucous membranes of the oral cavity, nares patent    Neck: Supple, full range of motion  Cardiovascular: Warm and well perfused, pulses equal and symmetrical  Lungs: Normal work of breathing, normal chest wall excursions  Skin: No rash, lesions, or breakdown on exposed skin  Psychiatry: Mood and affect are appropriate   Abdomen: soft, non tender, non distended  Extremeties: No cyanosis, clubbing or edema.    Neurological   MENTAL STATUS: Alert and oriented to person, place, and time. Attention and concentration within normal limits. Speech without dysarthria, able to name and repeat without difficulty. Recent and remote memory within normal limits   CRANIAL NERVES: Visual " fields intact. Left eye ptosis. EOMI. Facial sensation intact. Face symmetrical. Hearing grossly intact. Full shoulder shrug bilaterally. Tongue protrudes midline   SENSORY: Sensation is reduced to pin and light touch in bilateral lower extremities, R>L  Joint position perception intact. No astereognosis or agraphesthesia in the hands.   MOTOR: Normal bulk and tone. No pronator drift.  5/5 deltoid, biceps, triceps, interosseous, hand  bilaterally.    REFLEXES: 3+ left patellar, 1+ right patellar. Toes down going bilaterally.   CEREBELLAR/COORDINATION/GAIT: Did not assess gait, patient in wheelchair.  Heel to shin intact. Finger to nose intact.

## 2025-04-15 ENCOUNTER — HOSPITAL ENCOUNTER (EMERGENCY)
Facility: HOSPITAL | Age: 65
Discharge: HOME | End: 2025-04-15
Attending: STUDENT IN AN ORGANIZED HEALTH CARE EDUCATION/TRAINING PROGRAM | Admitting: HOSPITALIST
Payer: MEDICARE

## 2025-04-15 VITALS
DIASTOLIC BLOOD PRESSURE: 66 MMHG | HEART RATE: 93 BPM | OXYGEN SATURATION: 96 % | BODY MASS INDEX: 42.48 KG/M2 | WEIGHT: 225 LBS | HEIGHT: 61 IN | SYSTOLIC BLOOD PRESSURE: 153 MMHG | RESPIRATION RATE: 20 BRPM | TEMPERATURE: 98 F

## 2025-04-15 DIAGNOSIS — G89.29 CHRONIC BILATERAL LOW BACK PAIN WITH RIGHT-SIDED SCIATICA: ICD-10-CM

## 2025-04-15 DIAGNOSIS — M48.062 SPINAL STENOSIS OF LUMBAR REGION WITH NEUROGENIC CLAUDICATION: ICD-10-CM

## 2025-04-15 DIAGNOSIS — N39.0 URINARY TRACT INFECTION WITHOUT HEMATURIA, SITE UNSPECIFIED: ICD-10-CM

## 2025-04-15 DIAGNOSIS — M43.16 SPONDYLOLISTHESIS AT L3-L4 LEVEL: ICD-10-CM

## 2025-04-15 DIAGNOSIS — M54.9 BACK PAIN, UNSPECIFIED BACK LOCATION, UNSPECIFIED BACK PAIN LATERALITY, UNSPECIFIED CHRONICITY: ICD-10-CM

## 2025-04-15 DIAGNOSIS — Z96.651 STATUS POST TOTAL RIGHT KNEE REPLACEMENT: ICD-10-CM

## 2025-04-15 DIAGNOSIS — F11.90 UNCOMPLICATED OPIOID USE: ICD-10-CM

## 2025-04-15 DIAGNOSIS — R30.0 DYSURIA: ICD-10-CM
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
## 2025-04-15 DIAGNOSIS — Z96.641 STATUS POST RIGHT HIP REPLACEMENT: ICD-10-CM

## 2025-04-15 DIAGNOSIS — E11.9 TYPE 2 DIABETES MELLITUS WITHOUT COMPLICATION, WITHOUT LONG-TERM CURRENT USE OF INSULIN: ICD-10-CM

## 2025-04-15 DIAGNOSIS — Z79.899 POLYPHARMACY: ICD-10-CM

## 2025-04-15 DIAGNOSIS — M54.50 ACUTE RIGHT-SIDED LOW BACK PAIN WITHOUT SCIATICA: ICD-10-CM

## 2025-04-15 DIAGNOSIS — M54.41 CHRONIC BILATERAL LOW BACK PAIN WITH RIGHT-SIDED SCIATICA: ICD-10-CM

## 2025-04-15 LAB
ABSOLUTE EOSINOPHIL (OHS): 0.2 K/UL
ABSOLUTE MONOCYTE (OHS): 0.61 K/UL (ref 0.3–1)
ABSOLUTE NEUTROPHIL COUNT (OHS): 3.05 K/UL (ref 1.8–7.7)
ALBUMIN SERPL BCP-MCNC: 3.8 G/DL (ref 3.5–5.2)
ALP SERPL-CCNC: 72 UNIT/L (ref 40–150)
ALT SERPL W/O P-5'-P-CCNC: 32 UNIT/L (ref 10–44)
ANION GAP (OHS): 8 MMOL/L (ref 8–16)
AST SERPL-CCNC: 38 UNIT/L (ref 11–45)
BACTERIA #/AREA URNS AUTO: NORMAL /HPF
BASOPHILS # BLD AUTO: 0.04 K/UL
BASOPHILS NFR BLD AUTO: 0.5 %
BILIRUB SERPL-MCNC: 0.4 MG/DL (ref 0.1–1)
BILIRUB UR QL STRIP.AUTO: NEGATIVE
BUN SERPL-MCNC: 17 MG/DL (ref 8–23)
CALCIUM SERPL-MCNC: 9.7 MG/DL (ref 8.7–10.5)
CHLORIDE SERPL-SCNC: 102 MMOL/L (ref 95–110)
CLARITY UR: ABNORMAL
CO2 SERPL-SCNC: 29 MMOL/L (ref 23–29)
COLOR UR AUTO: YELLOW
CREAT SERPL-MCNC: 0.8 MG/DL (ref 0.5–1.4)
ERYTHROCYTE [DISTWIDTH] IN BLOOD BY AUTOMATED COUNT: 14.7 % (ref 11.5–14.5)
GFR SERPLBLD CREATININE-BSD FMLA CKD-EPI: >60 ML/MIN/1.73/M2
GLUCOSE SERPL-MCNC: 116 MG/DL (ref 70–110)
GLUCOSE UR QL STRIP: NEGATIVE
HCT VFR BLD AUTO: 39.3 % (ref 37–48.5)
HGB BLD-MCNC: 12.3 GM/DL (ref 12–16)
HGB UR QL STRIP: NEGATIVE
IMM GRANULOCYTES # BLD AUTO: 0.02 K/UL (ref 0–0.04)
IMM GRANULOCYTES NFR BLD AUTO: 0.2 % (ref 0–0.5)
KETONES UR QL STRIP: NEGATIVE
LEUKOCYTE ESTERASE UR QL STRIP: NEGATIVE
LYMPHOCYTES # BLD AUTO: 4.85 K/UL (ref 1–4.8)
MCH RBC QN AUTO: 27.2 PG (ref 27–31)
MCHC RBC AUTO-ENTMCNC: 31.3 G/DL (ref 32–36)
MCV RBC AUTO: 87 FL (ref 82–98)
MICROSCOPIC COMMENT: NORMAL
NITRITE UR QL STRIP: POSITIVE
NUCLEATED RBC (/100WBC) (OHS): 0 /100 WBC
PH UR STRIP: 8 [PH]
PLATELET # BLD AUTO: 383 K/UL (ref 150–450)
PMV BLD AUTO: 9.8 FL (ref 9.2–12.9)
POTASSIUM SERPL-SCNC: 3.9 MMOL/L (ref 3.5–5.1)
PROT SERPL-MCNC: 8.1 GM/DL (ref 6–8.4)
PROT UR QL STRIP: NEGATIVE
RBC # BLD AUTO: 4.52 M/UL (ref 4–5.4)
RBC #/AREA URNS AUTO: 1 /HPF (ref 0–4)
RELATIVE EOSINOPHIL (OHS): 2.3 %
RELATIVE LYMPHOCYTE (OHS): 55.3 % (ref 18–48)
RELATIVE MONOCYTE (OHS): 7 % (ref 4–15)
RELATIVE NEUTROPHIL (OHS): 34.7 % (ref 38–73)
SODIUM SERPL-SCNC: 139 MMOL/L (ref 136–145)
SP GR UR STRIP: 1.01
SQUAMOUS #/AREA URNS AUTO: 1 /HPF
UROBILINOGEN UR STRIP-ACNC: NEGATIVE EU/DL
WBC # BLD AUTO: 8.77 K/UL (ref 3.9–12.7)
WBC #/AREA URNS AUTO: 1 /HPF (ref 0–5)

## 2025-04-15 PROCEDURE — 25000003 PHARM REV CODE 250: Mod: HCNC

## 2025-04-15 PROCEDURE — 85025 COMPLETE CBC W/AUTO DIFF WBC: CPT | Mod: HCNC

## 2025-04-15 PROCEDURE — 25000003 PHARM REV CODE 250: Performed by: STUDENT IN AN ORGANIZED HEALTH CARE EDUCATION/TRAINING PROGRAM

## 2025-04-15 PROCEDURE — 99283 EMERGENCY DEPT VISIT LOW MDM: CPT | Mod: HCNC

## 2025-04-15 PROCEDURE — 80053 COMPREHEN METABOLIC PANEL: CPT | Mod: HCNC

## 2025-04-15 PROCEDURE — 87086 URINE CULTURE/COLONY COUNT: CPT | Mod: HCNC

## 2025-04-15 PROCEDURE — 25000003 PHARM REV CODE 250: Mod: HCNC | Performed by: HOSPITALIST

## 2025-04-15 PROCEDURE — 81001 URINALYSIS AUTO W/SCOPE: CPT | Mod: HCNC | Performed by: STUDENT IN AN ORGANIZED HEALTH CARE EDUCATION/TRAINING PROGRAM

## 2025-04-15 RX ORDER — OXYCODONE HYDROCHLORIDE 5 MG/1
5 TABLET ORAL
Refills: 0 | Status: COMPLETED | OUTPATIENT
Start: 2025-04-15 | End: 2025-04-15

## 2025-04-15 RX ORDER — CEPHALEXIN 500 MG/1
500 CAPSULE ORAL 2 TIMES DAILY
Qty: 14 CAPSULE | Refills: 0 | Status: SHIPPED | OUTPATIENT
Start: 2025-04-15 | End: 2025-04-25

## 2025-04-15 RX ORDER — ACETAMINOPHEN 500 MG
1000 TABLET ORAL
Status: COMPLETED | OUTPATIENT
Start: 2025-04-15 | End: 2025-04-15

## 2025-04-15 RX ORDER — CARVEDILOL 3.12 MG/1
3.12 TABLET ORAL 2 TIMES DAILY
Status: DISCONTINUED | OUTPATIENT
Start: 2025-04-15 | End: 2025-04-15 | Stop reason: HOSPADM

## 2025-04-15 RX ORDER — CEPHALEXIN 500 MG/1
500 CAPSULE ORAL
Status: COMPLETED | OUTPATIENT
Start: 2025-04-15 | End: 2025-04-15

## 2025-04-15 RX ORDER — ATORVASTATIN CALCIUM 10 MG/1
10 TABLET, FILM COATED ORAL DAILY
Qty: 90 TABLET | Refills: 1 | Status: SHIPPED | OUTPATIENT
Start: 2025-04-15

## 2025-04-15 RX ORDER — NIFEDIPINE 30 MG/1
90 TABLET, EXTENDED RELEASE ORAL DAILY
Status: DISCONTINUED | OUTPATIENT
Start: 2025-04-15 | End: 2025-04-15 | Stop reason: HOSPADM

## 2025-04-15 RX ORDER — OXYCODONE AND ACETAMINOPHEN 5; 325 MG/1; MG/1
1 TABLET ORAL EVERY 8 HOURS PRN
Qty: 90 TABLET | Refills: 0 | Status: SHIPPED | OUTPATIENT
Start: 2025-04-15

## 2025-04-15 RX ADMIN — OXYCODONE 5 MG: 5 TABLET ORAL at 05:04

## 2025-04-15 RX ADMIN — ACETAMINOPHEN 1000 MG: 500 TABLET ORAL at 05:04

## 2025-04-15 RX ADMIN — CARVEDILOL 3.12 MG: 3.12 TABLET, FILM COATED ORAL at 10:04

## 2025-04-15 RX ADMIN — CEPHALEXIN 500 MG: 500 CAPSULE ORAL at 08:04

## 2025-04-15 RX ADMIN — NIFEDIPINE 90 MG: 30 TABLET, FILM COATED, EXTENDED RELEASE ORAL at 10:04

## 2025-04-15 NOTE — CONSULTS
[facility] - Emergency Dept  Hospital Medicine  Consult Note    Patient Name: Tanika Wayne  MRN: [id number]  Admission Date: 4/15/2025  Hospital Length of Stay: 0 days  Attending Physician: No att. providers found   Primary Care Provider: Rayo Hernandez MD           Patient information was obtained from patient and ER records.     Inpatient consult to Internal Medicine  Consult performed by: Salma Pierre MD  Consult ordered by: Anabella Soriano MD        Subjective:     Principal Problem: Fall    Chief Complaint:   Chief Complaint   Patient presents with    Fall     Lower back pain after mechanical fall, -LOC, -head trauma, -blood thinners        HPI: 65 y F presenting to ED after mechanical fall. Slipped and fell ambulating to the bathroom. She ambulates with rolling walker at home. Denies confusion, LOC, syncope, or hitting head. Does take multiple medications that could contribute to mobility difficulty including opiates and muscle relaxer.     In ED nitrite + UA but only 1 WBC. Does endorse dysuria. Started on Keflex per ED.   Ambulated with rolling walker in the ED with no nursing assistance.         Past Medical History:   Diagnosis Date    Abnormal echocardiogram 11/26/2012    Arthritis     Breast cancer 1998    Breast cancer, right breast     Breast cancer, right breast 11/26/2012    S/P Lumpectomy / XRT '98 Dr. Bartholomew     Cardiomyopathy due to systemic disease     Cataract     Clotting disorder     Coronary artery disease     DM (diabetes mellitus) 11/26/2012    Encounter for blood transfusion     HTN (hypertension) 11/26/2012    Increased glucose level 11/26/2012    ITP (idiopathic thrombocytopenic purpura) 11/26/2012    Kidney stones 11/26/2012    PONV (postoperative nausea and vomiting)     Stenosis of lumbosacral spine 11/26/2012    Thyroid disease     Tympanic membrane perforation 9/15/2014    right 2014 Dr. Jeffries       Past Surgical History:   Procedure Laterality Date    BREAST LUMPECTOMY  Right     CATARACT EXTRACTION W/  INTRAOCULAR LENS IMPLANT Left 2021    Procedure: EXTRACTION, CATARACT, WITH IOL INSERTION;  Surgeon: Klever Tolliver MD;  Location: [facility];  Service: Ophthalmology;  Laterality: Left;    CATARACT EXTRACTION W/  INTRAOCULAR LENS IMPLANT Right 2021    Procedure: EXTRACTION, CATARACT, WITH IOL INSERTION;  Surgeon: Klever Tolliver MD;  Location: [facility];  Service: Ophthalmology;  Laterality: Right;     SECTION      CHOLECYSTECTOMY      COLONOSCOPY N/A 2022    Procedure: COLONOSCOPY;  Surgeon: Anoop Escobar MD;  Location: [facility] ENDO (4TH FLR);  Service: Endoscopy;  Laterality: N/A;  Fully vaccinated/ inst mailed/ clear liquids up to 4 hrs prior/AM prep 2am-3am-RB    ESOPHAGOGASTRODUODENOSCOPY N/A 2021    Procedure: EGD (ESOPHAGOGASTRODUODENOSCOPY);  Surgeon: Silvio Singh Jr., MD;  Location: [facility] 2ND FLR;  Service: General;  Laterality: N/A;    EYE SURGERY      strabismus, , bilateral cataracts    HIP ARTHROPLASTY Right 10/18/2018    Procedure: ARTHROPLASTY, HIP;  Surgeon: Baldomero Fischer MD;  Location: [facility];  Service: Orthopedics;  Laterality: Right;    HIP REPLACEMENT ARTHROPLASTY Right     dr fischer    HYSTERECTOMY      JOINT REPLACEMENT      right BRYNN, TKA    KNEE ARTHROPLASTY Right 2019    Procedure: ARTHROPLASTY, KNEE (ADD ON );  Surgeon: Baldomero Fischer MD;  Location: [facility];  Service: Orthopedics;  Laterality: Right;  (ADD ON )    L3-5 Laminectomy  2017    Dr. Mendoza    LAPAROSCOPIC SLEEVE GASTRECTOMY N/A 2021    Procedure: GASTRECTOMY, SLEEVE, LAPAROSCOPIC;  Surgeon: Silvio Singh Jr., MD;  Location: [facility] OR 2ND FLR;  Service: General;  Laterality: N/A;    SPLENECTOMY, TOTAL  2001    [facility]    STRABISMUS SURGERY  2006    OU    THYROIDECTOMY  2016    TONSILLECTOMY         Review of patient's allergies indicates:  No Known Allergies    No current facility-administered medications on  file prior to encounter.     Current Outpatient Medications on File Prior to Encounter   Medication Sig    atorvastatin (LIPITOR) 10 MG tablet Take 1 tablet (10 mg total) by mouth once daily.    calcium carbonate 1250 MG capsule Take 1,250 mg by mouth Daily.    capsicum 0.075% (ZOSTRIX) 0.075 % topical cream Apply topically 3 (three) times daily as needed (for peipheral neuropathy).    carvediloL (COREG) 3.125 MG tablet Take 1 tablet (3.125 mg total) by mouth 2 (two) times daily.    clotrimazole-betamethasone 1-0.05% (LOTRISONE) cream Apply topically 2 (two) times daily as needed.    cyanocobalamin (VITAMIN B-12) 100 MCG tablet Take 100 mcg by mouth once daily.    cyclobenzaprine (FLEXERIL) 10 MG tablet Take 1 tablet (10 mg total) by mouth 2 (two) times daily as needed for Muscle spasms.    docusate sodium (COLACE) 100 MG capsule TAKE 1 CAPSULE (100 MG TOTAL) BY MOUTH 2 (TWO) TIMES DAILY AS NEEDED FOR CONSTIPATION.    DULoxetine (CYMBALTA) 20 MG capsule Take 2 capsules (40 mg total) by mouth once daily.    famotidine (PEPCID) 40 MG tablet Take 1 tablet (40 mg total) by mouth nightly as needed for Heartburn.    fluticasone propionate (FLONASE) 50 mcg/actuation nasal spray INSTILL 2 SPRAYS IN EACH NOSTRIL ONCE A DAY    hydrocortisone 2.5 % cream Apply topically 2 (two) times daily.    levothyroxine (SYNTHROID) 100 MCG tablet Take 1 tablet (100 mcg total) by mouth once daily.    losartan-hydrochlorothiazide 100-12.5 mg (HYZAAR) 100-12.5 mg Tab Take 1 tablet by mouth once daily.    metFORMIN (GLUCOPHAGE-XR) 500 MG ER 24hr tablet Take 1 tablet (500 mg total) by mouth 2 (two) times daily with meals.    multivitamin (THERAGRAN) per tablet Take 1 tablet by mouth once daily.    NIFEdipine (PROCARDIA-XL) 90 MG (OSM) 24 hr tablet Take 1 tablet (90 mg total) by mouth once daily.    oxyCODONE-acetaminophen (PERCOCET) 5-325 mg per tablet Take 1 tablet by mouth every 8 (eight) hours as needed for Pain. Greater than 7 day quantity  Medically Necessary    potassium chloride SA (K-DUR,KLOR-CON) 20 MEQ tablet Take 2 tablets (40 mEq total) by mouth 2 (two) times daily.    pregabalin (LYRICA) 100 MG capsule Take 1 capsule (100 mg total) by mouth 2 (two) times daily.    traZODone (DESYREL) 50 MG tablet Take 1 tablet (50 mg total) by mouth every evening.    venlafaxine (EFFEXOR-XR) 150 MG Cp24 Take 1 capsule (150 mg total) by mouth once daily.     Family History       Problem Relation (Age of Onset)    COPD Sister    Diabetes Mother, Father, Sister, Sister, Brother    Emphysema Sister    Heart disease Mother (69)    Hyperlipidemia Sister    Hypertension Mother, Father, Sister, Sister, Sister, Brother    Migraines Sister    No Known Problems Daughter    Rheum arthritis Sister    Stroke Father          Tobacco Use    Smoking status: Never    Smokeless tobacco: Never   Substance and Sexual Activity    Alcohol use: Yes     Comment: seasonally    Drug use: No    Sexual activity: Not Currently     Partners: Male     Review of Systems   Constitutional:  Negative for fatigue.   Eyes:  Negative for visual disturbance.   Genitourinary:  Positive for dysuria.   Musculoskeletal:  Positive for gait problem.   Neurological:  Negative for dizziness, syncope and light-headedness.   Psychiatric/Behavioral:  Negative for confusion.      Objective:     Vital Signs (Most Recent):  Temp: 98 °F (36.7 °C) (04/15/25 1000)  Pulse: 97 (04/15/25 1000)  Resp: 20 (04/15/25 1000)  BP: (!) 161/58 (04/15/25 1014)  SpO2: 96 % (04/15/25 1000) Vital Signs (24h Range):  Temp:  [97.5 °F (36.4 °C)-98.3 °F (36.8 °C)] 98 °F (36.7 °C)  Pulse:  [86-97] 97  Resp:  [13-20] 20  SpO2:  [94 %-100 %] 96 %  BP: (147-208)/(58-98) 161/58     Weight: 102.1 kg (225 lb)  Body mass index is 42.51 kg/m².     Physical Exam  Vitals and nursing note reviewed.   Constitutional:       General: She is not in acute distress.     Appearance: Normal appearance.   HENT:      Head: Normocephalic and atraumatic.       Nose: Nose normal.      Mouth/Throat:      Mouth: Mucous membranes are moist.      Pharynx: Oropharynx is clear.   Eyes:      Extraocular Movements: Extraocular movements intact.      Conjunctiva/sclera: Conjunctivae normal.   Cardiovascular:      Rate and Rhythm: Normal rate and regular rhythm.      Pulses: Normal pulses.      Heart sounds: Normal heart sounds.   Pulmonary:      Effort: Pulmonary effort is normal.      Breath sounds: Normal breath sounds.   Abdominal:      General: Abdomen is flat. Bowel sounds are normal.      Palpations: Abdomen is soft.   Musculoskeletal:      Cervical back: Normal range of motion.      Right lower leg: No edema.      Left lower leg: No edema.   Skin:     General: Skin is warm and dry.   Neurological:      General: No focal deficit present.      Mental Status: She is alert and oriented to person, place, and time.   Psychiatric:         Mood and Affect: Mood normal.         Behavior: Behavior normal.          Significant Labs: All pertinent labs within the past 24 hours have been reviewed.    Significant Imaging: I have reviewed all pertinent imaging results/findings within the past 24 hours.  Assessment/Plan:     * Mechanical Fall without injury  - polypharmacy likely contributing- counseled on reducing narcotics and muscle relaxers  - ambulated safely in ED with rolling walker without nursing assistance  - recommend outpt PT/OT  [facility]        Polypharmacy  - multiple medications including opiates and muscle relaxers likely contributing  - counseled on limiting use of narcotics and muscle relaxers in the setting of frequent falls.   - [facility] referral for med reconciliation/review, f/u with [facility] and PCP        VTE Risk Mitigation (From admission, onward)      None                Thank you for your consult. I will sign off. Please contact us if you have any additional questions.    Salma Pierre MD  Department of Hospital Medicine   [facility] - Emergency Dept

## 2025-04-15 NOTE — ED NOTES
Patient arrived via EMS from home. Pt states that she was walking from the bathroom and slipped and fell, pt denies hitting head. Pt denies any LOC. Pt states she does have burning with urinating.   Pt walks with walker

## 2025-04-15 NOTE — ED NOTES
Pt states that she needs to know why she fell at home. Educated pt on ED point of view. MD aware.

## 2025-04-15 NOTE — TELEPHONE ENCOUNTER
Care Due:                  Date            Visit Type   Department     Provider  --------------------------------------------------------------------------------                                EP -                              PRIMARY      [facility] INTERNAL  Last Visit: 01-      CARE ([facility])   MEDICINE       Rayo Hernandez                              EP -                              PRIMARY      [facility] INTERNAL  Next Visit: 01-      CARE ([facility])   MEDICINE       Rayo Hernandez                                                            Last  Test          Frequency    Reason                     Performed    Due Date  --------------------------------------------------------------------------------    TSH.........  12 months..  levothyroxine............  05-   05-    [facility] Embedded Care Due Messages. Reference number: [id number].   4/15/2025 1:05:40 PM CDT

## 2025-04-15 NOTE — PROVIDER PROGRESS NOTES - EMERGENCY DEPT.
Encounter Date: 4/15/2025    ED Physician Progress Notes          Physician Note:   Signout Note  I received signout from the previous providers, Dr. Patricio and Dr. Waters.      Chief complaint: Fall     Per sign out and chart review:  Tanika Wayne is a 65 y.o. female, presenting with complaints of mechanical fall from standing.      During ED stay patient received:  Medications   oxyCODONE immediate release tablet 5 mg (5 mg Oral Given 4/15/25 0541)   acetaminophen tablet 1,000 mg (1,000 mg Oral Given 4/15/25 0541)   cephALEXin capsule 500 mg (500 mg Oral Given 4/15/25 0809)        Pt signed out to me pending: UA     Update/ Disposition:  UA concerning for UTI. Discussed plan for discharge with pt who became tearful and reports multiple falls at home. She is worried she is over medicated and reports prior episode of fall where she was on the floor for several hours before someone found her. She uses a rolling walker and lives with her children but states that they work a lot and cannot always be home with her. I discussed this case with HM who agreed to evaluate. Pt qualifies for acute care at home program and feels comfortable with discharge home with this service. We did discuss cutting back on her sedating medications as this could be contributing to falls at home.      Patient, caregiver and/or family understands the plan and verbalized agreement. All questions answered.      Diagnostic Impression:    UTI  Frequent Falls     Anabella Soriano MD  [facility], PGY2

## 2025-04-15 NOTE — ASSESSMENT & PLAN NOTE
- multiple medications including opiates and muscle relaxers likely contributing  - counseled on limiting use of narcotics and muscle relaxers in the setting of frequent falls.   - [facility] referral for med reconciliation/review, f/u with [facility] and PCP

## 2025-04-15 NOTE — DISCHARGE INSTRUCTIONS
Diagnosis: UTI    Follow-Up Plan:  - Follow-up with primary care doctor within 3 - 5 days  - Additional testing and/or evaluation as directed by your primary doctor  - We have sent a prescription for an antibiotic called Keflex to your pharmacy.  Please take this as directed until it was completely gone.  If you continue has symptoms after you finish his medication you should follow-up with your primary care to ensure that your urine infection has resolved.    Return to the Emergency Department for symptoms including but not limited to: worsening symptoms, shortness of breath or chest pain, vomiting with inability to hold down fluids, fevers greater than 100.4°F, passing out/fainting/unconsciousness, or other concerning symptoms.    We would like to thank you for coming today and our hope is that we served you and your family well during your stay

## 2025-04-15 NOTE — ED PROVIDER NOTES
Encounter Date: 4/15/2025       History     Chief Complaint   Patient presents with    Fall     Lower back pain after mechanical fall, -LOC, -head trauma, -blood thinners     HPI  Ms. Wayne is a 65 YOF presenting after a fall.     Patient reports fall early this morning. States that she was ambulating with her walker when her R knee gave out resulting in her falling onto her bottom. Denies hitting her head or LOC. Is not on blood thinners. Was down for approx 1 hr prior to EMS arrival. No is only c/o low back pain that is R of midline. She has had pain here before and has history of laminectomy. She denies any new numbness or weakness. No incontinence. No lightheadedness or dizziness.  Review of patient's allergies indicates:  No Known Allergies  Past Medical History:   Diagnosis Date    Abnormal echocardiogram 11/26/2012    Arthritis     Breast cancer 1998    Breast cancer, right breast     Breast cancer, right breast 11/26/2012    S/P Lumpectomy / XRT '98 Dr. Bartholomew     Cardiomyopathy due to systemic disease     Cataract     Clotting disorder     Coronary artery disease     DM (diabetes mellitus) 11/26/2012    Encounter for blood transfusion     HTN (hypertension) 11/26/2012    Increased glucose level 11/26/2012    ITP (idiopathic thrombocytopenic purpura) 11/26/2012    Kidney stones 11/26/2012    PONV (postoperative nausea and vomiting)     Stenosis of lumbosacral spine 11/26/2012    Thyroid disease     Tympanic membrane perforation 9/15/2014    right 2014 Dr. Jeffries     Past Surgical History:   Procedure Laterality Date    BREAST LUMPECTOMY Right 1998    CATARACT EXTRACTION W/  INTRAOCULAR LENS IMPLANT Left 02/04/2021    Procedure: EXTRACTION, CATARACT, WITH IOL INSERTION;  Surgeon: Klever Tolliver MD;  Location: [facility];  Service: Ophthalmology;  Laterality: Left;    CATARACT EXTRACTION W/  INTRAOCULAR LENS IMPLANT Right 02/18/2021    Procedure: EXTRACTION, CATARACT, WITH IOL INSERTION;  Surgeon: Klever MATHIS  MD Unrluy;  Location: [facility];  Service: Ophthalmology;  Laterality: Right;     SECTION      CHOLECYSTECTOMY      COLONOSCOPY N/A 2022    Procedure: COLONOSCOPY;  Surgeon: Anoop Escobar MD;  Location: [facility] (4TH FLR);  Service: Endoscopy;  Laterality: N/A;  Fully vaccinated/ inst mailed/ clear liquids up to 4 hrs prior/AM prep 2am-3am-RB    ESOPHAGOGASTRODUODENOSCOPY N/A 2021    Procedure: EGD (ESOPHAGOGASTRODUODENOSCOPY);  Surgeon: Silvio Singh Jr., MD;  Location: [facility] 2ND FLR;  Service: General;  Laterality: N/A;    EYE SURGERY      strabismus, , bilateral cataracts    HIP ARTHROPLASTY Right 10/18/2018    Procedure: ARTHROPLASTY, HIP;  Surgeon: Baldomero Fischer MD;  Location: [facility];  Service: Orthopedics;  Laterality: Right;    HIP REPLACEMENT ARTHROPLASTY Right     dr fischer    HYSTERECTOMY      JOINT REPLACEMENT      right BRYNN, TKA    KNEE ARTHROPLASTY Right 2019    Procedure: ARTHROPLASTY, KNEE (ADD ON );  Surgeon: Baldomero Fischer MD;  Location: [facility];  Service: Orthopedics;  Laterality: Right;  (ADD ON )    L3-5 Laminectomy  2017    Dr. Mendoza    LAPAROSCOPIC SLEEVE GASTRECTOMY N/A 2021    Procedure: GASTRECTOMY, SLEEVE, LAPAROSCOPIC;  Surgeon: Silvio Singh Jr., MD;  Location: [facility] 2ND FLR;  Service: General;  Laterality: N/A;    SPLENECTOMY, TOTAL  2001    from [facility]    STRABISMUS SURGERY  2006    OU    THYROIDECTOMY  2016    TONSILLECTOMY       Family History   Problem Relation Name Age of Onset    Heart disease Mother  69        MI    Diabetes Mother      Hypertension Mother      Diabetes Father      Hypertension Father      Stroke Father      Hyperlipidemia Sister      COPD Sister      Hypertension Sister      Emphysema Sister      Diabetes Sister Tita     Hypertension Sister Tita     Diabetes Sister Мария         prediabetic    Hypertension Sister Мария     Migraines Sister Мария     Rheum arthritis Sister  Мария     Diabetes Brother Facundo     Hypertension Brother Facundo     No Known Problems Daughter Genae      Social History[1]  Review of Systems    Physical Exam     Initial Vitals [04/15/25 0412]   BP Pulse Resp Temp SpO2   (!) 147/81 86 19 98.3 °F (36.8 °C) 100 %      MAP       --         Physical Exam    Constitutional: Vital signs are normal. She appears well-developed and well-nourished.   HENT:   Head: Normocephalic and atraumatic.   Eyes: Conjunctivae and EOM are normal. Pupils are equal, round, and reactive to light.   Cardiovascular:  Normal rate and intact distal pulses.           Pulmonary/Chest: No respiratory distress. She has no wheezes. She has no rhonchi. She has no rales.   Abdominal: Abdomen is soft. She exhibits no distension. There is no abdominal tenderness. There is no rebound and no guarding.   Musculoskeletal:      Comments: Pelvis: stable    Spine: no midline TTP, stepoffs or deformities. Right lower paraspinal TTP without overlying skin changes     Neurological: She is alert and oriented to person, place, and time. GCS eye subscore is 4. GCS verbal subscore is 5. GCS motor subscore is 6.         ED Course   Procedures  Labs Reviewed   URINALYSIS, REFLEX TO URINE CULTURE - Abnormal       Result Value    Color, UA Yellow      Appearance, UA Hazy (*)     pH, UA 8.0      Spec Grav UA 1.010      Protein, UA Negative      Glucose, UA Negative      Ketones, UA Negative      Bilirubin, UA Negative      Blood, UA Negative      Nitrites, UA Positive (*)     Urobilinogen, UA Negative      Leukocyte Esterase, UA Negative     COMPREHENSIVE METABOLIC PANEL - Abnormal    Sodium 139      Potassium 3.9      Chloride 102      CO2 29      Glucose 116 (*)     BUN 17      Creatinine 0.8      Calcium 9.7      Protein Total 8.1      Albumin 3.8      Bilirubin Total 0.4      ALP 72      AST 38      ALT 32      Anion Gap 8      eGFR >60     CBC WITH DIFFERENTIAL - Abnormal    WBC 8.77      RBC 4.52      HGB 12.3       HCT 39.3      MCV 87      MCH 27.2      MCHC 31.3 (*)     RDW 14.7 (*)     Platelet Count 383      MPV 9.8      Nucleated RBC 0      Neut % 34.7 (*)     Lymph % 55.3 (*)     Mono % 7.0      Eos % 2.3      Basophil % 0.5      Imm Grans % 0.2      Neut # 3.05      Lymph # 4.85 (*)     Mono # 0.61      Eos # 0.20      Baso # 0.04      Imm Grans # 0.02     CULTURE, URINE    Urine Culture        Value: Multiple organisms isolated. None in predominance. Repeat if clinically necessary.   URINALYSIS MICROSCOPIC    RBC, UA 1      WBC, UA 1      Bacteria, UA Rare      Squamous Epithelial Cells, UA 1      Microscopic Comment       CBC W/ AUTO DIFFERENTIAL    Narrative:     The following orders were created for panel order CBC auto differential.  Procedure                               Abnormality         Status                     ---------                               -----------         ------                     CBC with Differential[[id number]]       Abnormal            Final result                 Please view results for these tests on the individual orders.   GREY TOP URINE HOLD          Imaging Results    None          Medications   oxyCODONE immediate release tablet 5 mg (5 mg Oral Given 4/15/25 0541)   acetaminophen tablet 1,000 mg (1,000 mg Oral Given 4/15/25 0541)   cephALEXin capsule 500 mg (500 mg Oral Given 4/15/25 0809)     Medical Decision Making  Differential considered includes musculoskeletal back pain, contusion, UTI, pyelo    Pt care handed off at 0600, pending UA.     Amount and/or Complexity of Data Reviewed  External Data Reviewed: notes.     Details: Reviewed , patient prescribed oxy 5mg  Labs: ordered.    Risk  OTC drugs.  Prescription drug management.              Attending Attestation:   Physician Attestation Statement for Resident:  As the supervising MD   Physician Attestation Statement: I have personally seen and examined this patient.   I agree with the above history.  -:   As the  supervising MD I agree with the above PE.     As the supervising MD I agree with the above treatment, course, plan, and disposition.                                           Clinical Impression:  Final diagnoses:  [M54.50] Acute right-sided low back pain without sciatica  [W19.XXXA] Fall, initial encounter (Primary)  [R30.0] Dysuria  [N39.0] Urinary tract infection without hematuria, site unspecified          ED Disposition Condition    Discharge Stable          ED Prescriptions       Medication Sig Dispense Start Date End Date Auth. Provider    cephALEXin (KEFLEX) 500 MG capsule Take 1 capsule (500 mg total) by mouth 2 (two) times a day. for 7 days 14 capsule 4/15/2025 4/22/2025 Anabella Soriano MD          Follow-up Information       Follow up With Specialties Details Why Contact Info    Your Primary Care Provider  Schedule an appointment as soon as possible for a visit in 1 week  Follow up with your PCP as soon as possible. If you do not have a PCP, please follow up with [facility], you may contact them to schedule an appointment .    [facility] Emergency Medicine Go to  As needed, If symptoms worsen [id number]  [facility] [id number] [phone]                 [1]   Social History  Tobacco Use    Smoking status: Never    Smokeless tobacco: Never   Substance Use Topics    Alcohol use: Yes     Comment: seasonally    Drug use: No        Kali Waters MD  04/16/25 1266

## 2025-04-15 NOTE — HPI
65 y F presenting to ED after mechanical fall. Slipped and fell ambulating to the bathroom. She ambulates with rolling walker at home. Denies confusion, LOC, syncope, or hitting head. Does take multiple medications that could contribute to mobility difficulty including opiates and muscle relaxer.     In ED nitrite + UA but only 1 WBC. Does endorse dysuria. Started on Keflex per ED.   Ambulated with rolling walker in the ED with no nursing assistance.

## 2025-04-15 NOTE — Clinical Note
Diagnosis: Fall, initial encounter [125635]   Future Attending Provider: FANI DONAHUE [[id number]]   Is the patient being sent to ED Observation?: No

## 2025-04-15 NOTE — SUBJECTIVE & OBJECTIVE
Past Medical History:   Diagnosis Date    Abnormal echocardiogram 2012    Arthritis     Breast cancer 1998    Breast cancer, right breast     Breast cancer, right breast 2012    S/P Lumpectomy / XRT '98 Dr. Bartholomew     Cardiomyopathy due to systemic disease     Cataract     Clotting disorder     Coronary artery disease     DM (diabetes mellitus) 2012    Encounter for blood transfusion     HTN (hypertension) 2012    Increased glucose level 2012    ITP (idiopathic thrombocytopenic purpura) 2012    Kidney stones 2012    PONV (postoperative nausea and vomiting)     Stenosis of lumbosacral spine 2012    Thyroid disease     Tympanic membrane perforation 9/15/2014    right 2014 Dr. Jeffries       Past Surgical History:   Procedure Laterality Date    BREAST LUMPECTOMY Right     CATARACT EXTRACTION W/  INTRAOCULAR LENS IMPLANT Left 2021    Procedure: EXTRACTION, CATARACT, WITH IOL INSERTION;  Surgeon: Klever Tolliver MD;  Location: [facility];  Service: Ophthalmology;  Laterality: Left;    CATARACT EXTRACTION W/  INTRAOCULAR LENS IMPLANT Right 2021    Procedure: EXTRACTION, CATARACT, WITH IOL INSERTION;  Surgeon: Klever Tolliver MD;  Location: [facility];  Service: Ophthalmology;  Laterality: Right;     SECTION      CHOLECYSTECTOMY      COLONOSCOPY N/A 2022    Procedure: COLONOSCOPY;  Surgeon: Anoop Escobar MD;  Location: [facility] (4TH FLR);  Service: Endoscopy;  Laterality: N/A;  Fully vaccinated/ inst mailed/ clear liquids up to 4 hrs prior/AM prep 2am-3am-RB    ESOPHAGOGASTRODUODENOSCOPY N/A 2021    Procedure: EGD (ESOPHAGOGASTRODUODENOSCOPY);  Surgeon: Silvio Singh Jr., MD;  Location: [facility] 2ND FLR;  Service: General;  Laterality: N/A;    EYE SURGERY      strabismus, 2006, bilateral cataracts    HIP ARTHROPLASTY Right 10/18/2018    Procedure: ARTHROPLASTY, HIP;  Surgeon: Baldomero Fischer MD;  Location: [facility];  Service:  Orthopedics;  Laterality: Right;    HIP REPLACEMENT ARTHROPLASTY Right     dr fischer    HYSTERECTOMY      JOINT REPLACEMENT      right BRYNN, TKA    KNEE ARTHROPLASTY Right 04/11/2019    Procedure: ARTHROPLASTY, KNEE (ADD ON );  Surgeon: Baldomero Fischer MD;  Location: [facility];  Service: Orthopedics;  Laterality: Right;  (ADD ON )    L3-5 Laminectomy  02/2017    Dr. Mendoza    LAPAROSCOPIC SLEEVE GASTRECTOMY N/A 06/08/2021    Procedure: GASTRECTOMY, SLEEVE, LAPAROSCOPIC;  Surgeon: Silvio Singh Jr., MD;  Location: [address];  Service: General;  Laterality: N/A;    SPLENECTOMY, TOTAL  2001    from [facility]    STRABISMUS SURGERY  08/30/2006    OU    THYROIDECTOMY  09/2016    TONSILLECTOMY         Review of patient's allergies indicates:  No Known Allergies    No current facility-administered medications on file prior to encounter.     Current Outpatient Medications on File Prior to Encounter   Medication Sig    atorvastatin (LIPITOR) 10 MG tablet Take 1 tablet (10 mg total) by mouth once daily.    calcium carbonate 1250 MG capsule Take 1,250 mg by mouth Daily.    capsicum 0.075% (ZOSTRIX) 0.075 % topical cream Apply topically 3 (three) times daily as needed (for peipheral neuropathy).    carvediloL (COREG) 3.125 MG tablet Take 1 tablet (3.125 mg total) by mouth 2 (two) times daily.    clotrimazole-betamethasone 1-0.05% (LOTRISONE) cream Apply topically 2 (two) times daily as needed.    cyanocobalamin (VITAMIN B-12) 100 MCG tablet Take 100 mcg by mouth once daily.    cyclobenzaprine (FLEXERIL) 10 MG tablet Take 1 tablet (10 mg total) by mouth 2 (two) times daily as needed for Muscle spasms.    docusate sodium (COLACE) 100 MG capsule TAKE 1 CAPSULE (100 MG TOTAL) BY MOUTH 2 (TWO) TIMES DAILY AS NEEDED FOR CONSTIPATION.    DULoxetine (CYMBALTA) 20 MG capsule Take 2 capsules (40 mg total) by mouth once daily.    famotidine (PEPCID) 40 MG tablet Take 1 tablet (40 mg total) by mouth nightly as needed for Heartburn.     fluticasone propionate (FLONASE) 50 mcg/actuation nasal spray INSTILL 2 SPRAYS IN EACH NOSTRIL ONCE A DAY    hydrocortisone 2.5 % cream Apply topically 2 (two) times daily.    levothyroxine (SYNTHROID) 100 MCG tablet Take 1 tablet (100 mcg total) by mouth once daily.    losartan-hydrochlorothiazide 100-12.5 mg (HYZAAR) 100-12.5 mg Tab Take 1 tablet by mouth once daily.    metFORMIN (GLUCOPHAGE-XR) 500 MG ER 24hr tablet Take 1 tablet (500 mg total) by mouth 2 (two) times daily with meals.    multivitamin (THERAGRAN) per tablet Take 1 tablet by mouth once daily.    NIFEdipine (PROCARDIA-XL) 90 MG (OSM) 24 hr tablet Take 1 tablet (90 mg total) by mouth once daily.    oxyCODONE-acetaminophen (PERCOCET) 5-325 mg per tablet Take 1 tablet by mouth every 8 (eight) hours as needed for Pain. Greater than 7 day quantity Medically Necessary    potassium chloride SA (K-DUR,KLOR-CON) 20 MEQ tablet Take 2 tablets (40 mEq total) by mouth 2 (two) times daily.    pregabalin (LYRICA) 100 MG capsule Take 1 capsule (100 mg total) by mouth 2 (two) times daily.    traZODone (DESYREL) 50 MG tablet Take 1 tablet (50 mg total) by mouth every evening.    venlafaxine (EFFEXOR-XR) 150 MG Cp24 Take 1 capsule (150 mg total) by mouth once daily.     Family History       Problem Relation (Age of Onset)    COPD Sister    Diabetes Mother, Father, Sister, Sister, Brother    Emphysema Sister    Heart disease Mother (69)    Hyperlipidemia Sister    Hypertension Mother, Father, Sister, Sister, Sister, Brother    Migraines Sister    No Known Problems Daughter    Rheum arthritis Sister    Stroke Father          Tobacco Use    Smoking status: Never    Smokeless tobacco: Never   Substance and Sexual Activity    Alcohol use: Yes     Comment: seasonally    Drug use: No    Sexual activity: Not Currently     Partners: Male     Review of Systems   Constitutional:  Negative for fatigue.   Eyes:  Negative for visual disturbance.   Genitourinary:  Positive for  dysuria.   Musculoskeletal:  Positive for gait problem.   Neurological:  Negative for dizziness, syncope and light-headedness.   Psychiatric/Behavioral:  Negative for confusion.      Objective:     Vital Signs (Most Recent):  Temp: 98 °F (36.7 °C) (04/15/25 1000)  Pulse: 97 (04/15/25 1000)  Resp: 20 (04/15/25 1000)  BP: (!) 161/58 (04/15/25 1014)  SpO2: 96 % (04/15/25 1000) Vital Signs (24h Range):  Temp:  [97.5 °F (36.4 °C)-98.3 °F (36.8 °C)] 98 °F (36.7 °C)  Pulse:  [86-97] 97  Resp:  [13-20] 20  SpO2:  [94 %-100 %] 96 %  BP: (147-208)/(58-98) 161/58     Weight: 102.1 kg (225 lb)  Body mass index is 42.51 kg/m².     Physical Exam  Vitals and nursing note reviewed.   Constitutional:       General: She is not in acute distress.     Appearance: Normal appearance.   HENT:      Head: Normocephalic and atraumatic.      Nose: Nose normal.      Mouth/Throat:      Mouth: Mucous membranes are moist.      Pharynx: Oropharynx is clear.   Eyes:      Extraocular Movements: Extraocular movements intact.      Conjunctiva/sclera: Conjunctivae normal.   Cardiovascular:      Rate and Rhythm: Normal rate and regular rhythm.      Pulses: Normal pulses.      Heart sounds: Normal heart sounds.   Pulmonary:      Effort: Pulmonary effort is normal.      Breath sounds: Normal breath sounds.   Abdominal:      General: Abdomen is flat. Bowel sounds are normal.      Palpations: Abdomen is soft.   Musculoskeletal:      Cervical back: Normal range of motion.      Right lower leg: No edema.      Left lower leg: No edema.   Skin:     General: Skin is warm and dry.   Neurological:      General: No focal deficit present.      Mental Status: She is alert and oriented to person, place, and time.   Psychiatric:         Mood and Affect: Mood normal.         Behavior: Behavior normal.          Significant Labs: All pertinent labs within the past 24 hours have been reviewed.    Significant Imaging: I have reviewed all pertinent imaging results/findings  within the past 24 hours.

## 2025-04-15 NOTE — ASSESSMENT & PLAN NOTE
- polypharmacy likely contributing- counseled on reducing narcotics and muscle relaxers  - ambulated safely in ED with rolling walker without nursing assistance  - recommend outpt PT/OT  - [facility] referral placed

## 2025-04-16 ENCOUNTER — PATIENT OUTREACH (OUTPATIENT)
Facility: OTHER | Age: 65
End: 2025-04-16
Payer: MEDICARE

## 2025-04-16 LAB — BACTERIA UR CULT: NORMAL

## 2025-04-16 NOTE — PROGRESS NOTES
My Zimmerman  ED Navigator  Emergency Department    Project: [facility] ED Navigator  Role: Community Health Worker    Date: 04/16/2025  Patient Name: Tanika Wayne  MRN: [id number]  PCP: Rayo Hernandez MD    Assessment:     Tanika Wayne is a 65 y.o. female who has presented to ED for Acute right-sided low back pain without sciatica; Fall, initial encounter; Dysuria;  Urinary tract infection without hematuria, site unspecified. Patient has visited the ED 1 times in the past 3 months. Patient did not contact PCP.     ED Navigator Initial Assessment    ED Navigator Enrollment Documentation  Consent to Services  Does patient consent to completing the assessment?: Yes  Contact  Method of Initial Contact: Phone  Transportation  Insurance Coverage  Do you have coverage/adequate coverage?: Yes  Specialist Appointment  Did the patient come to the ED to see a specialist?: No  Does the patient have a pending specialist referral?: No  Does the patient have a specialist appointment made?: Yes  PCP Follow Up Appointment  Medications  Is patient able to afford medication?: Yes  Psychological  Food  Communication/Education  Other Financial Concerns  Other Social Barriers/Concerns  Primary Barrier  Scheduled Appointment Date: 4/16/25  Plan: Provided information for [facility] 24/7 Nurse triage line, [phone] or [phone] ([phone])         Social History     Socioeconomic History    Marital status:     Number of children: 1   Occupational History    [place]upation: [place] [employer] [place]   Tobacco Use    Smoking status: Never    Smokeless tobacco: Never   Substance and Sexual Activity    Alcohol use: Yes     Comment: seasonally    Drug use: No    Sexual activity: Not Currently     Partners: Male   Social History Narrative    2017 - Using a walker    The patient is not getting much exercise but is able to get about with the aid of a cane or walker.    wiodw (had copd, arthritis), 2 kids, dtr 28, son 18  Subjective:       Patient ID: Buddy Park is a 67 y.o. male.    Chief Complaint: Follow-up (6m)    67-year-old male coming in to follow-up on multiple medical problems.  His blood pressure is elevated and he tells me that he had been on a business trip for the last week and a half and had for gotten to take his medications with him.  He just resumed them a few days ago.  He feels good and has no active complaints.  He is having problems with diarrhea which has been a problem since his cholecystectomy and has run out of the Questran he was using to control it.  He is also on metformin but the problem started before its use.    Past Medical History:  No date: Angina pectoris  No date: Anxiety  No date: Biliary colic  No date: Cochlear implant in place  No date: Deaf      Comment:  LEFT EAR  No date: Depression  No date: Heart failure  2/20/2018: History of colon polyps  No date: [place] (hard of hearing)      Comment:  HEARING AID - RIGHT  No date: Hyperlipidemia  No date: Hypertension  No date: Kidney stone  No date: Kidney stones  No date: Renal stones  No date: Trouble in sleeping  No date: Wears glasses    Past Surgical History:  06/2018: CAROTID ARTERY ANGIOPLASTY      Comment:  [facility]   No date: CATARACT EXTRACTION; Bilateral  2-6-2014: CHOLECYSTECTOMY  2/6/2014: CHOLECYSTECTOMY, LAPAROSCOPIC; N/A      Comment:  Performed by Vitaly Amaral MD at [facility] OR  1/9/2013: COLONOSCOPY      Comment:  Dr. Gillette, 5 year recheck (family history colon                cancer)  3/12/2018: COLONOSCOPY; N/A      Comment:  Performed by Garett Go MD at [facility] ENDO  1/9/2013: COLONOSCOPY; N/A      Comment:  Performed by Wilbert Gillette MD at [facility] ENDO  No date: EAR MASTOIDECTOMY W/ COCHLEAR IMPLANT W/ LANDMARK      Comment:  left ear  No date: FOREIGN BODY REMOVAL; Right      Comment:  glass removed from right foot/repair  No date: FRACTURE SURGERY      Comment:  right arm x2  No date: LITHOTRIPSY  No date: ROTATOR CUFF  (healthy kids)     Social Drivers of Health     Financial Resource Strain: Low Risk  (4/16/2025)    Overall Financial Resource Strain (CARDIA)     Difficulty of Paying Living Expenses: Not very hard   Food Insecurity: No Food Insecurity (4/16/2025)    Hunger Vital Sign     Worried About Running Out of Food in the Last Year: Never true     Ran Out of Food in the Last Year: Never true   Transportation Needs: No Transportation Needs (4/16/2025)    PRAPARE - Transportation     Lack of Transportation (Medical): No     Lack of Transportation (Non-Medical): No   Physical Activity: Inactive (4/16/2025)    Exercise Vital Sign     Days of Exercise per Week: 0 days     Minutes of Exercise per Session: 0 min   Stress: Stress Concern Present (4/16/2025)    [employer] [place] [employer] - Occupational Stress Questionnaire     Feeling of Stress : To some extent   Housing Stability: Low Risk  (4/16/2025)    Housing Stability Vital Sign     Unable to Pay for Housing in the Last Year: No     Homeless in the Last Year: No       Plan:   Pt was seen in the ED on 4/15/25 for Acute right-sided low back pain without sciatica; Fall, initial encounter; Dysuria;  Urinary tract infection without hematuria, site unspecified. ED Navigator spoke with pt for Post ED visit follow up navigation to assist with scheduling a 7-day Post ED visit follow up appt. Pt states that she had been contacted by [facility] and was scheduled a 7-day Post ED visit follow up appt in home on today, 4/16/25. Pt does not have transportation issues and has no additional needs at this time.  Closing Encounter.    My Zimmerman             REPAIR      Comment:  Right   No date: SINUS SURGERY  No date: TONSILLECTOMY    Current Outpatient Medications on File Prior to Visit:  aspirin (ECOTRIN) 81 MG EC tablet, Take 81 mg by mouth once daily., Disp: , Rfl:   colchicine (COLCRYS) 0.6 mg tablet, Take 1 tablet (0.6 mg total) by mouth once daily., Disp: 30 tablet, Rfl: 11  EPINEPHrine (EPIPEN) 0.3 mg/0.3 mL AtIn, Inject into the muscle once., Disp: , Rfl:   ibuprofen (ADVIL,MOTRIN) 600 MG tablet, Take 600 mg by mouth every 8 (eight) hours as needed., Disp: , Rfl: 1  rosuvastatin (CRESTOR) 40 MG Tab, Take 1 tablet (40 mg total) by mouth once daily., Disp: 90 tablet, Rfl: 3  (DISCONTINUED) amLODIPine (NORVASC) 10 MG tablet, Take 1 tablet (10 mg total) by mouth once daily., Disp: 90 tablet, Rfl: 1  (DISCONTINUED) buPROPion (WELLBUTRIN XL) 150 MG TB24 tablet, Take 1 tablet (150 mg total) by mouth once daily. In the morning, Disp: 90 tablet, Rfl: 1  (DISCONTINUED) carvedilol (COREG) 6.25 MG tablet, Take 1 tablet (6.25 mg total) by mouth 2 (two) times daily with meals., Disp: 60 tablet, Rfl: 1  (DISCONTINUED) losartan (COZAAR) 100 MG tablet, TAKE 1 TABLET BY MOUTH ONCE DAILY, Disp: 90 tablet, Rfl: 0  (DISCONTINUED) metFORMIN (GLUCOPHAGE) 500 MG tablet, Take 1 tablet (500 mg total) by mouth 2 (two) times daily with meals., Disp: 180 tablet, Rfl: 1  (DISCONTINUED) tamsulosin (FLOMAX) 0.4 mg Cap, TAKE 1 CAPSULE BY MOUTH ONCE DAILY, Disp: 30 capsule, Rfl: 0  b complex vitamins tablet, Take 1 tablet by mouth once daily., Disp: , Rfl:   fish oil-omega-3 fatty acids 300-1,000 mg capsule, Take by mouth once daily., Disp: , Rfl:     No current facility-administered medications on file prior to visit.         Review of Systems    Objective:      Physical Exam   Constitutional: He is oriented to person, place, and time. He appears well-developed. No distress.   Mild increase in systolic blood pressure, patient off medications for most of the last two weeks  Overweight with a  BMI of 28.2 he is up 0.2 lb from his last visit August 24, 2018   HENT:   Head: Normocephalic and atraumatic.   Right Ear: External ear normal.   Left Ear: External ear normal.   Nose: Nose normal.   Mouth/Throat: Oropharynx is clear and moist. No oropharyngeal exudate.   Cochlear implant with hearing aid on right   Eyes: Pupils are equal, round, and reactive to light. EOM are normal. No scleral icterus.   Neck: Normal range of motion. Neck supple. No JVD present. No tracheal deviation present. No thyromegaly present.   Cardiovascular: Normal rate, regular rhythm and normal heart sounds. Exam reveals no gallop and no friction rub.   No murmur heard.  Carotid pulses 2+ no bruit   Pulmonary/Chest: Effort normal and breath sounds normal. No stridor. No respiratory distress. He has no wheezes. He has no rales. He exhibits no tenderness.   Abdominal: Soft. Bowel sounds are normal. He exhibits no distension and no mass. There is no tenderness. There is no rebound and no guarding. No hernia.   Musculoskeletal: Normal range of motion. He exhibits no edema or deformity.   Lymphadenopathy:     He has no cervical adenopathy.   Neurological: He is alert and oriented to person, place, and time. He displays normal reflexes. No cranial nerve deficit or sensory deficit. He exhibits normal muscle tone.   Skin: Skin is warm and dry. He is not diaphoretic.   Psychiatric: He has a normal mood and affect. His behavior is normal. Judgment and thought content normal.   Nursing note and vitals reviewed.      Assessment:       1. Essential hypertension    2. Major depressive disorder with single episode, in full remission    3. Prediabetes    4. BPH without obstruction/lower urinary tract symptoms    5. Postcholecystectomy diarrhea        Plan:       1. Essential hypertension  Stay on blood pressure medications and call me in two weeks with current blood pressure reading  - amLODIPine (NORVASC) 10 MG tablet; Take 1 tablet (10 mg total) by  mouth once daily.  Dispense: 90 tablet; Refill: 3  - carvedilol (COREG) 6.25 MG tablet; Take 1 tablet (6.25 mg total) by mouth 2 (two) times daily with meals.  Dispense: 180 tablet; Refill: 3  - losartan (COZAAR) 100 MG tablet; Take 1 tablet (100 mg total) by mouth once daily.  Dispense: 90 tablet; Refill: 3    2. Major depressive disorder with single episode, in full remission  Doing well no changes needed  - buPROPion (WELLBUTRIN XL) 150 MG TB24 tablet; Take 1 tablet (150 mg total) by mouth once daily. In the morning  Dispense: 90 tablet; Refill: 1    3. Prediabetes  Lab Results   Component Value Date    HGBA1C 5.7 (H) 02/01/2019     Good control with no changes needed  - metFORMIN (GLUCOPHAGE) 500 MG tablet; Take 1 tablet (500 mg total) by mouth 2 (two) times daily with meals.  Dispense: 180 tablet; Refill: 1    4. BPH without obstruction/lower urinary tract symptoms  Asymptomatic  - tamsulosin (FLOMAX) 0.4 mg Cap; Take 1 capsule (0.4 mg total) by mouth once daily.  Dispense: 90 capsule; Refill: 3    5. Postcholecystectomy diarrhea  Resume Questran  - cholestyramine-aspartame (QUESTRAN LIGHT) 4 gram Powd; Take 1 Scoop by mouth once daily.  Dispense: 231 g; Refill: 11

## 2025-04-22 NOTE — PROGRESS NOTES
INTERNAL MEDICINE CLINIC  Progress Note   Recent ED Discharge     PRESENTING HISTORY     PCP: Rayo Hernandez MD  Chief Complaint/Reason for Visit:  Follow up ED Discharge   No chief complaint on file.      History of Present Illness: Ms. Tanika Wayne is a 65 y.o. female who was recently discharged from the ED.    ____________________________________________________________________    Today:  Since most recent discharge, no recurrent fall. Very pleasant lady. Here with her supportive son today. Reports that has been having several falls,and the most recent, she felt weak and her legs gave out, did not hit head or hurt self';  stopped the Flexaril and Trazodone and feels like starting to help; but still with some muscle spasms that are troubling her.  Followed closely by Pain Mgt, Neurology and Dr. Hernandez for her chronic falls and leg weakness.   No longer having urine symptoms, completed the antibiotic. No 'more burning'.  Requesting another muscle relaxant, something mild that would help with the 'muscle spasms'. Overall, feeling ok, 'better with standing and bending forward; not feel like would fall as much since stopped the Flexaril and Trazodone; it was too much and too strong'.   Walks at home with [employer]. Will be starting home healthcare PT next week.   She does not endorse headaches, N/V, resting dizziness, coughing, chest wall discomforts or SOB.     Review of Systems:  Eyes: denies visual changes at this time denies floaters   ENT: no nasal congestion or sore throat  Respiratory: no cough or shortness of breath  Cardiovascular: no chest pain or palpitations  Gastrointestinal: no nausea or vomiting, no abdominal pain or change in bowel habits  Genitourinary: no hematuria or dysuria; denies frequency  Hematologic/Lymphatic: no easy bruising or lymphadenopathy  Musculoskeletal: no arthralgias or myalgias  Neurological: no seizures or tremors  Endocrine: no heat or cold intolerance      PAST HISTORY:      Past Medical History:   Diagnosis Date    Abnormal echocardiogram 2012    Arthritis     Breast cancer 1998    Breast cancer, right breast     Breast cancer, right breast 2012    S/P Lumpectomy / XRT '98 Dr. Bartholomew     Cardiomyopathy due to systemic disease     Cataract     Clotting disorder     Coronary artery disease     DM (diabetes mellitus) 2012    Encounter for blood transfusion     HTN (hypertension) 2012    Increased glucose level 2012    ITP (idiopathic thrombocytopenic purpura) 2012    Kidney stones 2012    PONV (postoperative nausea and vomiting)     Stenosis of lumbosacral spine 2012    Thyroid disease     Tympanic membrane perforation 9/15/2014    right 2014 Dr. Jeffries       Past Surgical History:   Procedure Laterality Date    BREAST LUMPECTOMY Right     CATARACT EXTRACTION W/  INTRAOCULAR LENS IMPLANT Left 2021    Procedure: EXTRACTION, CATARACT, WITH IOL INSERTION;  Surgeon: Klever Tolliver MD;  Location: [facility];  Service: Ophthalmology;  Laterality: Left;    CATARACT EXTRACTION W/  INTRAOCULAR LENS IMPLANT Right 2021    Procedure: EXTRACTION, CATARACT, WITH IOL INSERTION;  Surgeon: Klever Tolliver MD;  Location: [facility];  Service: Ophthalmology;  Laterality: Right;     SECTION      CHOLECYSTECTOMY      COLONOSCOPY N/A 2022    Procedure: COLONOSCOPY;  Surgeon: Anoop Escobar MD;  Location: [facility] (4TH FLR);  Service: Endoscopy;  Laterality: N/A;  Fully vaccinated/ inst mailed/ clear liquids up to 4 hrs prior/AM prep 2am-3am-RB    ESOPHAGOGASTRODUODENOSCOPY N/A 2021    Procedure: EGD (ESOPHAGOGASTRODUODENOSCOPY);  Surgeon: Silvio Singh Jr., MD;  Location: [facility] 2ND FLR;  Service: General;  Laterality: N/A;    EYE SURGERY      strabismus, 2006, bilateral cataracts    HIP ARTHROPLASTY Right 10/18/2018    Procedure: ARTHROPLASTY, HIP;  Surgeon: Baldomero Fischer MD;  Location: [facility];  Service:  Orthopedics;  Laterality: Right;    HIP REPLACEMENT ARTHROPLASTY Right     dr fischer    HYSTERECTOMY      JOINT REPLACEMENT      right BRYNN, TKA    KNEE ARTHROPLASTY Right 04/11/2019    Procedure: ARTHROPLASTY, KNEE (ADD ON );  Surgeon: Baldomero Fischer MD;  Location: [facility];  Service: Orthopedics;  Laterality: Right;  (ADD ON )    L3-5 Laminectomy  02/2017    Dr. Mendoza    LAPAROSCOPIC SLEEVE GASTRECTOMY N/A 06/08/2021    Procedure: GASTRECTOMY, SLEEVE, LAPAROSCOPIC;  Surgeon: Silvio Singh Jr., MD;  Location: [address];  Service: General;  Laterality: N/A;    SPLENECTOMY, TOTAL  2001    from [facility]    STRABISMUS SURGERY  08/30/2006    OU    THYROIDECTOMY  09/2016    TONSILLECTOMY         Family History   Problem Relation Name Age of Onset    Heart disease Mother  69        MI    Diabetes Mother      Hypertension Mother      Diabetes Father      Hypertension Father      Stroke Father      Hyperlipidemia Sister      COPD Sister      Hypertension Sister      Emphysema Sister      Diabetes Sister Tita     Hypertension Sister Tita     Diabetes Sister Мария         prediabetic    Hypertension Sister Мария     Migraines Sister Мария     Rheum arthritis Sister Мария     Diabetes Brother Facundo     Hypertension Brother Facundo     No Known Problems Daughter Genae        Social History     Socioeconomic History    Marital status:     Number of children: 1   Occupational History    Occupation: registration     Comment: [employer] [facility] [place], [place]   Tobacco Use    Smoking status: Never    Smokeless tobacco: Never   Substance and Sexual Activity    Alcohol use: Yes     Comment: seasonally    Drug use: No    Sexual activity: Not Currently     Partners: Male   Social History Narrative    2017 - Using a walker    The patient is not getting much exercise but is able to get about with the aid of a cane or walker.    wiodw (had copd, arthritis), 2 kids, dtr 28, son 18 (healthy kids)     Social Drivers of Health     Financial  Resource Strain: Low Risk  (4/16/2025)    Overall Financial Resource Strain (CARDIA)     Difficulty of Paying Living Expenses: Not very hard   Food Insecurity: No Food Insecurity (4/16/2025)    Hunger Vital Sign     Worried About Running Out of Food in the Last Year: Never true     Ran Out of Food in the Last Year: Never true   Transportation Needs: No Transportation Needs (4/16/2025)    PRAPARE - Transportation     Lack of Transportation (Medical): No     Lack of Transportation (Non-Medical): No   Physical Activity: Inactive (4/16/2025)    Exercise Vital Sign     Days of Exercise per Week: 0 days     Minutes of Exercise per Session: 0 min   Stress: Stress Concern Present (4/16/2025)    [employer] - Occupational Stress Questionnaire     Feeling of Stress : To some extent   Housing Stability: Low Risk  (4/16/2025)    Housing Stability Vital Sign     Unable to Pay for Housing in the Last Year: No     Homeless in the Last Year: No       MEDICATIONS & ALLERGIES:     Medications Ordered Prior to Encounter[1]     Review of patient's allergies indicates:  No Known Allergies    Medications Reconciliation:   I have reconciled the patient's home medications and discharge medications with the patient/family. I have updated all changes.  Refer to After-Visit Medication List.    OBJECTIVE:     Vital Signs:  There were no vitals filed for this visit.  Wt Readings from Last 3 Encounters:   04/15/25 0412 102.1 kg (225 lb)   04/01/25 1452 102.2 kg (225 lb 5 oz)   01/27/25 1011 102.6 kg (226 lb 3.1 oz)     There is no height or weight on file to calculate BMI.   Wt Readings from Last 3 Encounters:   04/25/25 100 kg (220 lb 7.4 oz)   04/15/25 102.1 kg (225 lb)   04/01/25 102.2 kg (225 lb 5 oz)     Temp Readings from Last 3 Encounters:   04/15/25 98 °F (36.7 °C) (Oral)   11/28/23 98 °F (36.7 °C) (Oral)   09/28/22 97.7 °F (36.5 °C)     BP Readings from Last 3 Encounters:   04/25/25 (!) 119/59   04/15/25 (!)  "153/66   04/01/25 134/72     Pulse Readings from Last 3 Encounters:   04/25/25 86   04/15/25 93   04/01/25 (P) 87       Physical Exam:  General: Well developed, well nourished. No distress.  HEENT: Head is normocephalic, atraumatic  Eyes: Clear conjunctiva.  Neck: Supple, symmetrical neck; trachea midline.  Lungs: Clear to auscultation bilaterally and normal respiratory effort.  Cardiovascular: Heart with regular rate and rhythm. No murmurs, gallops or rubs  Extremities: No LE edema. Pulses 2+ and symmetric.   Skin: Skin color, texture, turgor normal. No rashes.  Neurologic:No focal numbness or weakness.     Laboratory  Lab Results   Component Value Date    WBC 8.77 04/15/2025    HGB 12.3 04/15/2025    HCT 39.3 04/15/2025    MCV 87 04/15/2025     04/15/2025     BMP  Lab Results   Component Value Date     04/15/2025    K 3.9 04/15/2025     04/15/2025    CO2 29 04/15/2025    BUN 17 04/15/2025    CREATININE 0.8 04/15/2025    CALCIUM 9.7 04/15/2025    ANIONGAP 8 04/15/2025    EGFRNORACEVR >60 04/15/2025     Lab Results   Component Value Date    ALT 32 04/15/2025    AST 38 04/15/2025    ALKPHOS 72 04/15/2025    BILITOT 0.4 04/15/2025     Lab Results   Component Value Date    INR 1.0 02/15/2017     Lab Results   Component Value Date    HGBA1C 6.0 (H) 01/27/2025     No results for input(s): "POCTGLUCOSE" in the last 72 hours.      ASSESSMENT & PLAN:     HIGH RISK CONDITION(S):    Follow-up exam    Recent ER visit for a mechanical Fall without head trauma(4/15/2025)  *Found to have had a UTI and tx'd with Keflex.   ` Pending to start PT, with [facility], on Monday,  4/28/2025  *Chronic Muscle Spasm Disorder:   -     methocarbamoL (ROBAXIN) 500 MG Tab; Take 1 tablet (500 mg total) by mouth every 6 (six) hours as needed (Muscle Relaxant).  Dispense: 30 tablet; Refill: 2  *Stopped the Flexaril and request to try something milder.     Recent ER visit for UTI:   *Noted Culture on 4/15/2025: no isolates  ` Keflex " completed     Hypertension;   BP Readings from Last 3 Encounters:   04/25/25 (!) 119/59   04/15/25 (!) 153/66   04/01/25 134/72    Today: 119/59 (optimal)  ` Coreg, Procardia XL  ` Hyzaar     Future Appointments   Date Time Provider Department Center   7/21/2025  2:20 PM Rayo Hernandez MD [facility]   1/29/2026 10:40 AM Rayo Hernandez MD [facility]        Medication List            Accurate as of April 25, 2025  5:03 PM. If you have any questions, ask your nurse or doctor.                START taking these medications      methocarbamoL 500 MG Tab  Commonly known as: Robaxin  Take 1 tablet (500 mg total) by mouth every 6 (six) hours as needed (Muscle Relaxant).  Started by: LUCIANA Paulino            CONTINUE taking these medications      atorvastatin 10 MG tablet  Commonly known as: LIPITOR  Take 1 tablet (10 mg total) by mouth once daily.     calcium carbonate 1250 MG capsule     capsicum 0.075% 0.075 % topical cream  Commonly known as: ZOSTRIX  Apply topically 3 (three) times daily as needed (for peipheral neuropathy).     carvediloL 3.125 MG tablet  Commonly known as: COREG  Take 1 tablet (3.125 mg total) by mouth 2 (two) times daily.     clotrimazole-betamethasone 1-0.05% cream  Commonly known as: LOTRISONE  Apply topically 2 (two) times daily as needed.     cyanocobalamin 100 MCG tablet  Commonly known as: VITAMIN B-12     docusate sodium 100 MG capsule  Commonly known as: COLACE  TAKE 1 CAPSULE (100 MG TOTAL) BY MOUTH 2 (TWO) TIMES DAILY AS NEEDED FOR CONSTIPATION.     DULoxetine 20 MG capsule  Commonly known as: CYMBALTA  Take 2 capsules (40 mg total) by mouth once daily.     famotidine 40 MG tablet  Commonly known as: PEPCID  Take 1 tablet (40 mg total) by mouth nightly as needed for Heartburn.     fluticasone propionate 50 mcg/actuation nasal spray  Commonly known as: FLONASE  INSTILL 2 SPRAYS IN EACH NOSTRIL ONCE A DAY     hydrocortisone 2.5 % cream  Apply topically 2  (two) times daily.     levothyroxine 100 MCG tablet  Commonly known as: SYNTHROID  Take 1 tablet (100 mcg total) by mouth once daily.     losartan-hydrochlorothiazide 100-12.5 mg 100-12.5 mg Tab  Commonly known as: HYZAAR  Take 1 tablet by mouth once daily.     metFORMIN 500 MG ER 24hr tablet  Commonly known as: GLUCOPHAGE-XR  Take 1 tablet (500 mg total) by mouth 2 (two) times daily with meals.     multivitamin per tablet  Commonly known as: THERAGRAN     NIFEdipine 90 MG (OSM) 24 hr tablet  Commonly known as: PROCARDIA-XL  Take 1 tablet (90 mg total) by mouth once daily.     oxyCODONE-acetaminophen 5-325 mg per tablet  Commonly known as: PERCOCET  Take 1 tablet by mouth every 8 (eight) hours as needed for Pain. Greater than 7 day quantity Medically Necessary     potassium chloride SA 20 MEQ tablet  Commonly known as: K-DUR,KLOR-CON  Take 2 tablets (40 mEq total) by mouth 2 (two) times daily.     pregabalin 100 MG capsule  Commonly known as: LYRICA  Take 1 capsule (100 mg total) by mouth 2 (two) times daily.     traZODone 50 MG tablet  Commonly known as: DESYREL  Take 1 tablet (50 mg total) by mouth every evening.     venlafaxine 150 MG Cp24  Commonly known as: EFFEXOR-XR  Take 1 capsule (150 mg total) by mouth once daily.            STOP taking these medications      cephALEXin 500 MG capsule  Commonly known as: KEFLEX  Stopped by: LUCIANA Paulino     cyclobenzaprine 10 MG tablet  Commonly known as: FLEXERIL  Stopped by: LUCIANA Paulino               Where to Get Your Medications        These medications were sent to [facility]pharmacy #[id number] - [place], [place] - [address]  [address] [place] [address]      Phone: [phone]   methocarbamoL 500 MG Tab       Signing Physician:  LUCIANA Paulino         [1]   Current Outpatient Medications on File Prior to Visit   Medication Sig Dispense Refill    atorvastatin (LIPITOR) 10 MG tablet Take 1 tablet (10 mg total) by  mouth once daily. 90 tablet 1    calcium carbonate 1250 MG capsule Take 1,250 mg by mouth Daily.      capsicum 0.075% (ZOSTRIX) 0.075 % topical cream Apply topically 3 (three) times daily as needed (for peipheral neuropathy). 1 g 0    carvediloL (COREG) 3.125 MG tablet Take 1 tablet (3.125 mg total) by mouth 2 (two) times daily. 180 tablet 1    cephALEXin (KEFLEX) 500 MG capsule Take 1 capsule (500 mg total) by mouth 2 (two) times a day. for 7 days 14 capsule 0    clotrimazole-betamethasone 1-0.05% (LOTRISONE) cream Apply topically 2 (two) times daily as needed. 45 g 1    cyanocobalamin (VITAMIN B-12) 100 MCG tablet Take 100 mcg by mouth once daily.      cyclobenzaprine (FLEXERIL) 10 MG tablet Take 1 tablet (10 mg total) by mouth 2 (two) times daily as needed for Muscle spasms. 90 tablet 2    docusate sodium (COLACE) 100 MG capsule TAKE 1 CAPSULE (100 MG TOTAL) BY MOUTH 2 (TWO) TIMES DAILY AS NEEDED FOR CONSTIPATION. 60 capsule 17    DULoxetine (CYMBALTA) 20 MG capsule Take 2 capsules (40 mg total) by mouth once daily. 120 capsule 2    famotidine (PEPCID) 40 MG tablet Take 1 tablet (40 mg total) by mouth nightly as needed for Heartburn. 90 tablet 11    fluticasone propionate (FLONASE) 50 mcg/actuation nasal spray INSTILL 2 SPRAYS IN EACH NOSTRIL ONCE A DAY 48 mL 1    hydrocortisone 2.5 % cream Apply topically 2 (two) times daily. 28 g 0    levothyroxine (SYNTHROID) 100 MCG tablet Take 1 tablet (100 mcg total) by mouth once daily. 90 tablet 3    losartan-hydrochlorothiazide 100-12.5 mg (HYZAAR) 100-12.5 mg Tab Take 1 tablet by mouth once daily. 90 tablet 3    metFORMIN (GLUCOPHAGE-XR) 500 MG ER 24hr tablet Take 1 tablet (500 mg total) by mouth 2 (two) times daily with meals. 180 tablet 1    multivitamin (THERAGRAN) per tablet Take 1 tablet by mouth once daily.      NIFEdipine (PROCARDIA-XL) 90 MG (OSM) 24 hr tablet Take 1 tablet (90 mg total) by mouth once daily. 90 tablet 3    oxyCODONE-acetaminophen (PERCOCET)  5-325 mg per tablet Take 1 tablet by mouth every 8 (eight) hours as needed for Pain. Greater than 7 day quantity Medically Necessary 90 tablet 0    potassium chloride SA (K-DUR,KLOR-CON) 20 MEQ tablet Take 2 tablets (40 mEq total) by mouth 2 (two) times daily. 360 tablet 1    pregabalin (LYRICA) 100 MG capsule Take 1 capsule (100 mg total) by mouth 2 (two) times daily. 60 capsule 5    traZODone (DESYREL) 50 MG tablet Take 1 tablet (50 mg total) by mouth every evening. 90 tablet 3    venlafaxine (EFFEXOR-XR) 150 MG Cp24 Take 1 capsule (150 mg total) by mouth once daily. 90 capsule 2     No current facility-administered medications on file prior to visit.

## 2025-04-25 ENCOUNTER — OFFICE VISIT (OUTPATIENT)
Dept: INTERNAL MEDICINE | Facility: CLINIC | Age: 65
End: 2025-04-25
Payer: MEDICARE

## 2025-04-25 VITALS
BODY MASS INDEX: 41.66 KG/M2 | HEART RATE: 86 BPM | OXYGEN SATURATION: 97 % | SYSTOLIC BLOOD PRESSURE: 119 MMHG | DIASTOLIC BLOOD PRESSURE: 59 MMHG | WEIGHT: 220.44 LBS
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
## 2025-04-25 DIAGNOSIS — R29.6 RECURRENT FALLS: ICD-10-CM

## 2025-04-25 DIAGNOSIS — M62.838 MUSCLE SPASM: ICD-10-CM

## 2025-04-25 DIAGNOSIS — I15.2 HYPERTENSION ASSOCIATED WITH DIABETES: ICD-10-CM

## 2025-04-25 DIAGNOSIS — Z09 FOLLOW-UP EXAM: Primary | ICD-10-CM

## 2025-04-25 DIAGNOSIS — E11.59 HYPERTENSION ASSOCIATED WITH DIABETES: ICD-10-CM

## 2025-04-25 DIAGNOSIS — N39.0 URINARY TRACT INFECTION WITHOUT HEMATURIA, SITE UNSPECIFIED: ICD-10-CM

## 2025-04-25 PROCEDURE — 99999 PR PBB SHADOW E&M-EST. PATIENT-LVL V: CPT | Mod: PBBFAC,HCNC,, | Performed by: NURSE PRACTITIONER

## 2025-04-25 RX ORDER — METHOCARBAMOL 500 MG/1
500 TABLET, FILM COATED ORAL EVERY 6 HOURS PRN
Qty: 30 TABLET | Refills: 2 | Status: SHIPPED | OUTPATIENT
Start: 2025-04-25 | End: 2025-05-05

## 2025-05-12 DIAGNOSIS — Z96.651 STATUS POST TOTAL RIGHT KNEE REPLACEMENT: ICD-10-CM

## 2025-05-12 DIAGNOSIS — M54.9 BACK PAIN, UNSPECIFIED BACK LOCATION, UNSPECIFIED BACK PAIN LATERALITY, UNSPECIFIED CHRONICITY: ICD-10-CM

## 2025-05-12 DIAGNOSIS — F11.90 UNCOMPLICATED OPIOID USE: ICD-10-CM

## 2025-05-12 DIAGNOSIS — J32.9 SINUSITIS, UNSPECIFIED CHRONICITY, UNSPECIFIED LOCATION: ICD-10-CM

## 2025-05-12 DIAGNOSIS — M54.41 CHRONIC BILATERAL LOW BACK PAIN WITH RIGHT-SIDED SCIATICA: ICD-10-CM

## 2025-05-12 DIAGNOSIS — G89.29 CHRONIC BILATERAL LOW BACK PAIN WITH RIGHT-SIDED SCIATICA: ICD-10-CM

## 2025-05-12 DIAGNOSIS — M48.062 SPINAL STENOSIS OF LUMBAR REGION WITH NEUROGENIC CLAUDICATION: ICD-10-CM

## 2025-05-12 DIAGNOSIS — Z96.641 STATUS POST RIGHT HIP REPLACEMENT: ICD-10-CM

## 2025-05-12 DIAGNOSIS — R21 RASH: ICD-10-CM

## 2025-05-12 DIAGNOSIS — M43.16 SPONDYLOLISTHESIS AT L3-L4 LEVEL: ICD-10-CM

## 2025-05-12 RX ORDER — OXYCODONE AND ACETAMINOPHEN 5; 325 MG/1; MG/1
1 TABLET ORAL EVERY 8 HOURS PRN
Qty: 90 TABLET | Refills: 0 | Status: SHIPPED | OUTPATIENT
Start: 2025-05-12

## 2025-05-12 RX ORDER — FLUTICASONE PROPIONATE 50 MCG
SPRAY, SUSPENSION (ML) NASAL
Qty: 48 ML | Refills: 1 | Status: SHIPPED | OUTPATIENT
Start: 2025-05-12

## 2025-05-12 RX ORDER — CLOTRIMAZOLE AND BETAMETHASONE DIPROPIONATE 10; .64 MG/G; MG/G
CREAM TOPICAL 2 TIMES DAILY PRN
Qty: 45 G | Refills: 1 | Status: SHIPPED | OUTPATIENT
Start: 2025-05-12

## 2025-05-12 NOTE — TELEPHONE ENCOUNTER
Care Due:                  Date            Visit Type   Department     Provider  --------------------------------------------------------------------------------                                EP -                              PRIMARY      [facility] INTERNAL  Last Visit: 01-      CARE ([facility])   MEDICINE       Rayo Hernandez                               -                              PRIMARY      [facility] INTERNAL  Next Visit: 07-      CARE ([facility])   MEDICINE       Rayo Hernandez                                                            Last  Test          Frequency    Reason                     Performed    Due Date  --------------------------------------------------------------------------------    HBA1C.......  6 months...  metFORMIN................  01- 07-    Lipid Panel.  12 months..  atorvastatin.............  07- 07-    [facility] Embedded Care Due Messages. Reference number: [id number].   5/12/2025 10:24:44 AM CDT

## 2025-05-13 ENCOUNTER — TELEPHONE (OUTPATIENT)
Dept: INTERNAL MEDICINE | Facility: CLINIC | Age: 65
End: 2025-05-13
Payer: MEDICARE

## 2025-05-13 ENCOUNTER — E-VISIT (OUTPATIENT)
Dept: INTERNAL MEDICINE | Facility: CLINIC | Age: 65
End: 2025-05-13
Payer: MEDICARE

## 2025-05-13 DIAGNOSIS — R30.0 DYSURIA: Primary | ICD-10-CM

## 2025-05-13 DIAGNOSIS — E11.42 DIABETIC POLYNEUROPATHY ASSOCIATED WITH TYPE 2 DIABETES MELLITUS: ICD-10-CM

## 2025-05-13 RX ORDER — PREGABALIN 100 MG/1
100 CAPSULE ORAL 2 TIMES DAILY
Qty: 60 CAPSULE | Refills: 5 | Status: SHIPPED | OUTPATIENT
Start: 2025-05-13 | End: 2025-11-09

## 2025-05-13 NOTE — TELEPHONE ENCOUNTER
----- Message from Sumeet sent at 5/13/2025 11:12 AM CDT -----  Contact: [phone]  .1MEDICALADVICE Patient is calling for Medical Advice regarding:pt is calling in regards to possibly having a UTI and is wondering if she can get some medication for her UTI as soon as possible because she is experiencing some pain How long has patient had these symptoms:4 daysPharmacy name and phone#:[id number] - [place], [place] - [id number] [place] [address]Awisc: [phone] Fax: [phone] wants a call back or thru [facility]:call backComments:Please advise patient replies from provider may take up to 48 hours.

## 2025-05-13 NOTE — TELEPHONE ENCOUNTER
Hi, please contact the patient and let her know she would need to complete an E visit.  She can go on her [facility] and complete the E visit questions.  Orders Placed This Encounter    [facility] E-Visit       Thank you, Rayo Hernandez

## 2025-05-13 NOTE — TELEPHONE ENCOUNTER
Called pt and left detailed VM for complete E-Visit with Dr. Hernandez .  Contact no was provided to requested call back if pt have any question.   d

## 2025-05-14 ENCOUNTER — TELEPHONE (OUTPATIENT)
Dept: INTERNAL MEDICINE | Facility: CLINIC | Age: 65
End: 2025-05-14
Payer: MEDICARE

## 2025-05-14 ENCOUNTER — PATIENT MESSAGE (OUTPATIENT)
Dept: INTERNAL MEDICINE | Facility: CLINIC | Age: 65
End: 2025-05-14
Payer: MEDICARE

## 2025-05-14 DIAGNOSIS — E11.9 TYPE 2 DIABETES MELLITUS WITHOUT COMPLICATION: ICD-10-CM

## 2025-05-14 NOTE — PROGRESS NOTES
Patient ID: Tanika Wayne is a 65 y.o. female.    Chief Complaint: Urinary Tract Infection (Entered automatically based on patient selection in [employer].)    The patient initiated a request through [employer] on 5/13/2025 for evaluation and management with a chief complaint of Urinary Tract Infection (Entered automatically based on patient selection in [employer].)     I evaluated the questionnaire submission on 05/19/2025  .    [facility] P[facility] Evisit Uti Questionnaire    5/14/2025  1:07 PM CDT - Filed by Patient   Do you agree to participate in an E-Visit? Yes   If you have any of the following symptoms, please go to the nearest emergency room or call 911: I acknowledge   Choose the state of your primary residence [place]   What is the main issue you would like addressed today? Burning upon urination   Do you have any of the following symptoms? Passing urine more frequently   When did your concern begin? 5/11/2025   What medications or treatments have you used to help your symptoms? Drinking more fluids   What does your urine look like? Cloudy   Have you had any of the following symptoms during the past 24 hours?   Urgency (a sudden and uncontrollable urge to urinate) Moderate   Frequency (going to the toilet very often) Moderate   Burning pain when urinating Moderate   Incomplete bladder emptying (still feel like you need to urinate again after urination) (None, Mild, Moderate, Severe) Mild   Pain in the lower belly when youre not urinating. None   Discomfort from your urinary symptoms. Mild   Have you had any of the following symptoms during the past 24 hours?   Blood seen in the urine None   Pain in the lower back on one or both sides (flank pain) Mild   Abnormal Vaginal Discharge (abnormal amount, color and/or odor) None   Discharge from the opening you urinate from (urethra) when not urinating. None   Have your symptoms interfered with your every day activities? Mild   Do you have a fever? No   Are you a diabetic?  Yvrose Ramirez called me back and apologized  She said Dr Boo Briceño from the peer to peer department will be available tomorrow  I advised her of the times that you will be in the office tomorrow  She was given the direct number to the  desk so that she can reach out to you and hopefully get the peer to peer done or set up a good time to call  Yes   Are you currently experiencing any of the following while completing this questionnaire? None   Do you have a history of kidney stones? Yes   Provide any additional information you feel is important.    Please attach any relevant images or files (if you have performed a home test for UTI, please submit a photo of results)    Are you able to take your vital signs? No         Encounter Diagnosis   Name Primary?    Dysuria Yes        Orders Placed This Encounter   Procedures    Urinalysis, Reflex to Urine Culture Urine, Clean Catch     Standing Status:   Future     Number of Occurrences:   1     Expected Date:   5/14/2025     Expiration Date:   7/13/2026     Preferred Collection Type:   Urine, Clean Catch     Specimen Source:   Urine          Neg urine culture, will offer in person appt        No follow-ups on file.      E-Visit Time Tracking:    Day 1 Time (in minutes): 11    Total Time (in minutes): 11

## 2025-05-14 NOTE — TELEPHONE ENCOUNTER
Hi, please contact the patient to assist in scheduling  Orders Placed This Encounter   Procedures    Urinalysis, Reflex to Urine Culture Urine, Clean Catch     Standing Status:   Future     Expected Date:   5/14/2025     Expiration Date:   7/13/2026     Preferred Collection Type:   Urine, Clean Catch     Specimen Source:   Urine               Thank you, Rayo Hernandez

## 2025-05-15 ENCOUNTER — TELEPHONE (OUTPATIENT)
Dept: INTERNAL MEDICINE | Facility: CLINIC | Age: 65
End: 2025-05-15
Payer: MEDICARE

## 2025-05-15 ENCOUNTER — LAB VISIT (OUTPATIENT)
Dept: LAB | Facility: HOSPITAL | Age: 65
End: 2025-05-15
Attending: INTERNAL MEDICINE
Payer: MEDICARE

## 2025-05-15 DIAGNOSIS — R30.0 DYSURIA: ICD-10-CM

## 2025-05-15 DIAGNOSIS — E11.9 TYPE 2 DIABETES MELLITUS WITHOUT COMPLICATION: ICD-10-CM

## 2025-05-15 LAB — HOLD SPECIMEN: NORMAL

## 2025-05-15 PROCEDURE — 82570 ASSAY OF URINE CREATININE: CPT | Mod: HCNC

## 2025-05-15 PROCEDURE — 87086 URINE CULTURE/COLONY COUNT: CPT | Mod: HCNC

## 2025-05-15 PROCEDURE — 81003 URINALYSIS AUTO W/O SCOPE: CPT | Mod: HCNC

## 2025-05-15 NOTE — TELEPHONE ENCOUNTER
----- Message from Kate sent at 5/15/2025  9:43 AM CDT -----  Contact: [phone]  .1MEDICALADVICE Patient is calling for Medical Advice regarding: Patient is requesting to fax over UA order to  [facility]. Patient wants a call back or thru [facility]: Call back Comments: Thank you Please advise patient replies from provider may take up to 48 hours.

## 2025-05-15 NOTE — TELEPHONE ENCOUNTER
Hi, please call her and ask to get the name and number of her home nurse and then call the nurse and provide my verbal authorization for a home:  Urine analysis and reflex culture  Let me know if patient has any more questions.  Thank you, Rayo Hernandez

## 2025-05-16 LAB
ALBUMIN/CREAT UR: 227.5 UG/MG
BACTERIA #/AREA URNS AUTO: ABNORMAL /HPF
BACTERIA UR CULT: NORMAL
BILIRUB UR QL STRIP.AUTO: NEGATIVE
CLARITY UR: ABNORMAL
COLOR UR AUTO: YELLOW
CREAT UR-MCNC: 102 MG/DL (ref 15–325)
GLUCOSE UR QL STRIP: NEGATIVE
HGB UR QL STRIP: NEGATIVE
HYALINE CASTS UR QL AUTO: 0 /LPF (ref 0–1)
KETONES UR QL STRIP: NEGATIVE
LEUKOCYTE ESTERASE UR QL STRIP: NEGATIVE
MICROALBUMIN UR-MCNC: 232 UG/ML (ref ?–5000)
MICROSCOPIC COMMENT: ABNORMAL
NITRITE UR QL STRIP: NEGATIVE
PH UR STRIP: 6 [PH]
PROT UR QL STRIP: ABNORMAL
RBC #/AREA URNS AUTO: 2 /HPF (ref 0–4)
SP GR UR STRIP: 1.02
SQUAMOUS #/AREA URNS AUTO: 11 /HPF
UROBILINOGEN UR STRIP-ACNC: NEGATIVE EU/DL
WBC #/AREA URNS AUTO: 32 /HPF (ref 0–5)

## 2025-05-18 ENCOUNTER — HOSPITAL ENCOUNTER (INPATIENT)
Facility: HOSPITAL | Age: 65
LOS: 4 days | Discharge: HOME HEALTH | DRG: 690 | End: 2025-05-23
Attending: STUDENT IN AN ORGANIZED HEALTH CARE EDUCATION/TRAINING PROGRAM | Admitting: INTERNAL MEDICINE
Payer: MEDICARE

## 2025-05-18 DIAGNOSIS — R10.9 FLANK PAIN: Primary | ICD-10-CM

## 2025-05-18 DIAGNOSIS — D72.829 LEUKOCYTOSIS, UNSPECIFIED TYPE: ICD-10-CM

## 2025-05-18 DIAGNOSIS — R30.0 DYSURIA: ICD-10-CM

## 2025-05-18 DIAGNOSIS — R50.9 FEVER AND CHILLS: ICD-10-CM

## 2025-05-18 DIAGNOSIS — R07.9 CHEST PAIN: ICD-10-CM

## 2025-05-18 DIAGNOSIS — R53.1 GENERALIZED WEAKNESS: ICD-10-CM

## 2025-05-18 DIAGNOSIS — N12 PYELONEPHRITIS: ICD-10-CM

## 2025-05-18 DIAGNOSIS — R53.1 WEAKNESS: ICD-10-CM

## 2025-05-18 DIAGNOSIS — R00.0 TACHYCARDIA: ICD-10-CM

## 2025-05-18 LAB
ABSOLUTE EOSINOPHIL (OHS): 0.01 K/UL
ABSOLUTE MONOCYTE (OHS): 1.72 K/UL (ref 0.3–1)
ABSOLUTE NEUTROPHIL COUNT (OHS): 16.33 K/UL (ref 1.8–7.7)
ALBUMIN SERPL BCP-MCNC: 3.5 G/DL (ref 3.5–5.2)
ALP SERPL-CCNC: 70 UNIT/L (ref 40–150)
ALT SERPL W/O P-5'-P-CCNC: 26 UNIT/L (ref 10–44)
ANION GAP (OHS): 15 MMOL/L (ref 8–16)
AST SERPL-CCNC: 27 UNIT/L (ref 11–45)
BASOPHILS # BLD AUTO: 0.06 K/UL
BASOPHILS NFR BLD AUTO: 0.3 %
BILIRUB SERPL-MCNC: 0.4 MG/DL (ref 0.1–1)
BIPAP: 0
BNP SERPL-MCNC: <10 PG/ML (ref 0–99)
BUN SERPL-MCNC: 28 MG/DL (ref 8–23)
CALCIUM SERPL-MCNC: 9.2 MG/DL (ref 8.7–10.5)
CHLORIDE SERPL-SCNC: 100 MMOL/L (ref 95–110)
CO2 SERPL-SCNC: 25 MMOL/L (ref 23–29)
CREAT SERPL-MCNC: 1.2 MG/DL (ref 0.5–1.4)
ERYTHROCYTE [DISTWIDTH] IN BLOOD BY AUTOMATED COUNT: 15.3 % (ref 11.5–14.5)
FIO2: 21 %
GFR SERPLBLD CREATININE-BSD FMLA CKD-EPI: 51 ML/MIN/1.73/M2
GLUCOSE SERPL-MCNC: 116 MG/DL (ref 70–110)
HCT VFR BLD AUTO: 38.4 % (ref 37–48.5)
HCV AB SERPL QL IA: NORMAL
HGB BLD-MCNC: 12 GM/DL (ref 12–16)
HIV 1+2 AB+HIV1 P24 AG SERPL QL IA: NORMAL
HOLD SPECIMEN: NORMAL
IMM GRANULOCYTES # BLD AUTO: 0.15 K/UL (ref 0–0.04)
IMM GRANULOCYTES NFR BLD AUTO: 0.6 % (ref 0–0.5)
LDH SERPL L TO P-CCNC: 1.8 MMOL/L
LYMPHOCYTES # BLD AUTO: 4.99 K/UL (ref 1–4.8)
MCH RBC QN AUTO: 27.4 PG (ref 27–31)
MCHC RBC AUTO-ENTMCNC: 31.3 G/DL (ref 32–36)
MCV RBC AUTO: 88 FL (ref 82–98)
NUCLEATED RBC (/100WBC) (OHS): 0 /100 WBC
PLATELET # BLD AUTO: 404 K/UL (ref 150–450)
PMV BLD AUTO: 10.2 FL (ref 9.2–12.9)
POC PERFORMED BY: NORMAL
POC TEMPERATURE: 37 C
POTASSIUM SERPL-SCNC: 4.3 MMOL/L (ref 3.5–5.1)
PROT SERPL-MCNC: 7.6 GM/DL (ref 6–8.4)
RBC # BLD AUTO: 4.38 M/UL (ref 4–5.4)
RELATIVE EOSINOPHIL (OHS): 0 %
RELATIVE LYMPHOCYTE (OHS): 21.5 % (ref 18–48)
RELATIVE MONOCYTE (OHS): 7.4 % (ref 4–15)
RELATIVE NEUTROPHIL (OHS): 70.2 % (ref 38–73)
SODIUM SERPL-SCNC: 140 MMOL/L (ref 136–145)
SPECIMEN SOURCE: NORMAL
TROPONIN I SERPL HS-MCNC: 10 NG/L
TROPONIN I SERPL HS-MCNC: 11 NG/L
TSH SERPL-ACNC: 1.28 UIU/ML (ref 0.4–4)
WBC # BLD AUTO: 23.26 K/UL (ref 3.9–12.7)

## 2025-05-18 PROCEDURE — 85025 COMPLETE CBC W/AUTO DIFF WBC: CPT | Mod: HCNC | Performed by: EMERGENCY MEDICINE

## 2025-05-18 PROCEDURE — 96361 HYDRATE IV INFUSION ADD-ON: CPT | Mod: HCNC

## 2025-05-18 PROCEDURE — 81003 URINALYSIS AUTO W/O SCOPE: CPT | Mod: HCNC | Performed by: STUDENT IN AN ORGANIZED HEALTH CARE EDUCATION/TRAINING PROGRAM

## 2025-05-18 PROCEDURE — 93010 ELECTROCARDIOGRAM REPORT: CPT | Mod: HCNC,,, | Performed by: INTERNAL MEDICINE

## 2025-05-18 PROCEDURE — 25000003 PHARM REV CODE 250: Mod: HCNC | Performed by: STUDENT IN AN ORGANIZED HEALTH CARE EDUCATION/TRAINING PROGRAM

## 2025-05-18 PROCEDURE — 96375 TX/PRO/DX INJ NEW DRUG ADDON: CPT | Mod: HCNC

## 2025-05-18 PROCEDURE — 84443 ASSAY THYROID STIM HORMONE: CPT | Mod: HCNC | Performed by: STUDENT IN AN ORGANIZED HEALTH CARE EDUCATION/TRAINING PROGRAM

## 2025-05-18 PROCEDURE — 94761 N-INVAS EAR/PLS OXIMETRY MLT: CPT | Mod: HCNC

## 2025-05-18 PROCEDURE — 84484 ASSAY OF TROPONIN QUANT: CPT | Mod: HCNC | Performed by: EMERGENCY MEDICINE

## 2025-05-18 PROCEDURE — 87389 HIV-1 AG W/HIV-1&-2 AB AG IA: CPT | Mod: HCNC | Performed by: PHYSICIAN ASSISTANT

## 2025-05-18 PROCEDURE — 87040 BLOOD CULTURE FOR BACTERIA: CPT | Mod: HCNC | Performed by: STUDENT IN AN ORGANIZED HEALTH CARE EDUCATION/TRAINING PROGRAM

## 2025-05-18 PROCEDURE — 96365 THER/PROPH/DIAG IV INF INIT: CPT | Mod: HCNC

## 2025-05-18 PROCEDURE — 80053 COMPREHEN METABOLIC PANEL: CPT | Mod: HCNC | Performed by: EMERGENCY MEDICINE

## 2025-05-18 PROCEDURE — 86803 HEPATITIS C AB TEST: CPT | Mod: HCNC | Performed by: PHYSICIAN ASSISTANT

## 2025-05-18 PROCEDURE — 63600175 PHARM REV CODE 636 W HCPCS: Mod: JZ,TB,HCNC | Performed by: STUDENT IN AN ORGANIZED HEALTH CARE EDUCATION/TRAINING PROGRAM

## 2025-05-18 PROCEDURE — 83735 ASSAY OF MAGNESIUM: CPT | Mod: HCNC | Performed by: STUDENT IN AN ORGANIZED HEALTH CARE EDUCATION/TRAINING PROGRAM

## 2025-05-18 PROCEDURE — 99285 EMERGENCY DEPT VISIT HI MDM: CPT | Mod: 25,HCNC

## 2025-05-18 PROCEDURE — 93005 ELECTROCARDIOGRAM TRACING: CPT | Mod: HCNC

## 2025-05-18 PROCEDURE — 83880 ASSAY OF NATRIURETIC PEPTIDE: CPT | Mod: HCNC | Performed by: EMERGENCY MEDICINE

## 2025-05-18 RX ORDER — MORPHINE SULFATE 4 MG/ML
4 INJECTION, SOLUTION INTRAMUSCULAR; INTRAVENOUS
Refills: 0 | Status: COMPLETED | OUTPATIENT
Start: 2025-05-18 | End: 2025-05-18

## 2025-05-18 RX ORDER — VANCOMYCIN 2 GRAM/500 ML IN 0.9 % SODIUM CHLORIDE INTRAVENOUS
2000
Status: COMPLETED | OUTPATIENT
Start: 2025-05-18 | End: 2025-05-19

## 2025-05-18 RX ORDER — ONDANSETRON HYDROCHLORIDE 2 MG/ML
4 INJECTION, SOLUTION INTRAVENOUS EVERY 6 HOURS PRN
Status: DISCONTINUED | OUTPATIENT
Start: 2025-05-18 | End: 2025-05-23 | Stop reason: HOSPADM

## 2025-05-18 RX ORDER — KETOROLAC TROMETHAMINE 30 MG/ML
15 INJECTION, SOLUTION INTRAMUSCULAR; INTRAVENOUS
Status: COMPLETED | OUTPATIENT
Start: 2025-05-18 | End: 2025-05-18

## 2025-05-18 RX ADMIN — PIPERACILLIN SODIUM AND TAZOBACTAM SODIUM 4.5 G: 4; .5 INJECTION, POWDER, FOR SOLUTION INTRAVENOUS at 10:05

## 2025-05-18 RX ADMIN — SODIUM CHLORIDE, POTASSIUM CHLORIDE, SODIUM LACTATE AND CALCIUM CHLORIDE 500 ML: 600; 310; 30; 20 INJECTION, SOLUTION INTRAVENOUS at 10:05

## 2025-05-18 RX ADMIN — MORPHINE SULFATE 4 MG: 4 INJECTION INTRAVENOUS at 09:05

## 2025-05-18 RX ADMIN — KETOROLAC TROMETHAMINE 15 MG: 30 INJECTION, SOLUTION INTRAMUSCULAR; INTRAVENOUS at 09:05

## 2025-05-18 RX ADMIN — IOHEXOL 100 ML: 350 INJECTION, SOLUTION INTRAVENOUS at 11:05

## 2025-05-18 RX ADMIN — VANCOMYCIN HYDROCHLORIDE 2000 MG: 10 INJECTION, POWDER, LYOPHILIZED, FOR SOLUTION INTRAVENOUS at 10:05

## 2025-05-18 RX ADMIN — SODIUM CHLORIDE, POTASSIUM CHLORIDE, SODIUM LACTATE AND CALCIUM CHLORIDE 1000 ML: 600; 310; 30; 20 INJECTION, SOLUTION INTRAVENOUS at 09:05

## 2025-05-18 NOTE — Clinical Note
Right: Back.   Scrubbed with ChloroPrep With Tint.    Hair: N/A.  Skin prep dry before draping.  Prepped by: Lavelle Tapia MD 5/19/2025 4:25 PM.

## 2025-05-19 PROBLEM — N12 PYELONEPHRITIS: Status: ACTIVE | Noted: 2025-05-19

## 2025-05-19 PROBLEM — Z90.3 H/O GASTRIC SLEEVE: Status: ACTIVE | Noted: 2025-05-19

## 2025-05-19 PROBLEM — D72.829 LEUKOCYTOSIS: Status: ACTIVE | Noted: 2025-05-19

## 2025-05-19 PROBLEM — G47.00 INSOMNIA: Status: ACTIVE | Noted: 2025-05-19

## 2025-05-19 LAB
ABSOLUTE EOSINOPHIL (OHS): 0.06 K/UL
ABSOLUTE MONOCYTE (OHS): 1.65 K/UL (ref 0.3–1)
ABSOLUTE NEUTROPHIL COUNT (OHS): 18.83 K/UL (ref 1.8–7.7)
ALBUMIN SERPL BCP-MCNC: 3 G/DL (ref 3.5–5.2)
ALP SERPL-CCNC: 67 UNIT/L (ref 40–150)
ALT SERPL W/O P-5'-P-CCNC: 38 UNIT/L (ref 10–44)
ANION GAP (OHS): 12 MMOL/L (ref 8–16)
APPEARANCE FLD: NORMAL
AST SERPL-CCNC: 42 UNIT/L (ref 11–45)
BACTERIA #/AREA URNS AUTO: ABNORMAL /HPF
BASOPHILS # BLD AUTO: 0.09 K/UL
BASOPHILS NFR BLD AUTO: 0.3 %
BILIRUB SERPL-MCNC: 0.4 MG/DL (ref 0.1–1)
BILIRUB UR QL STRIP.AUTO: NEGATIVE
BUN SERPL-MCNC: 18 MG/DL (ref 8–23)
CALCIUM SERPL-MCNC: 9 MG/DL (ref 8.7–10.5)
CHLORIDE SERPL-SCNC: 96 MMOL/L (ref 95–110)
CLARITY UR: ABNORMAL
CO2 SERPL-SCNC: 23 MMOL/L (ref 23–29)
COLOR FLD: NORMAL
COLOR UR AUTO: YELLOW
CREAT SERPL-MCNC: 0.9 MG/DL (ref 0.5–1.4)
EAG (OHS): 126 MG/DL (ref 68–131)
ERYTHROCYTE [DISTWIDTH] IN BLOOD BY AUTOMATED COUNT: 15.3 % (ref 11.5–14.5)
GFR SERPLBLD CREATININE-BSD FMLA CKD-EPI: >60 ML/MIN/1.73/M2
GLUCOSE SERPL-MCNC: 147 MG/DL (ref 70–110)
GLUCOSE UR QL STRIP: NEGATIVE
GRAM STN SPEC: NORMAL
GRAM STN SPEC: NORMAL
HBA1C MFR BLD: 6 % (ref 4–5.6)
HCT VFR BLD AUTO: 35.1 % (ref 37–48.5)
HGB BLD-MCNC: 11.5 GM/DL (ref 12–16)
HGB UR QL STRIP: NEGATIVE
HOLD SPECIMEN: NORMAL
HOLD SPECIMEN: NORMAL
HYALINE CASTS UR QL AUTO: 3 /LPF (ref 0–1)
IMM GRANULOCYTES # BLD AUTO: 0.45 K/UL (ref 0–0.04)
IMM GRANULOCYTES NFR BLD AUTO: 1.6 % (ref 0–0.5)
INR PPP: 1.1 (ref 0.8–1.2)
KETONES UR QL STRIP: NEGATIVE
LEUKOCYTE ESTERASE UR QL STRIP: ABNORMAL
LYMPHOCYTES # BLD AUTO: 7.24 K/UL (ref 1–4.8)
LYMPHOCYTES NFR FLD MANUAL: 32 %
MAGNESIUM SERPL-MCNC: 1.8 MG/DL (ref 1.6–2.6)
MAGNESIUM SERPL-MCNC: 1.8 MG/DL (ref 1.6–2.6)
MCH RBC QN AUTO: 27.9 PG (ref 27–31)
MCHC RBC AUTO-ENTMCNC: 32.8 G/DL (ref 32–36)
MCV RBC AUTO: 85 FL (ref 82–98)
MICROSCOPIC COMMENT: ABNORMAL
MONOS+MACROS NFR FLD MANUAL: 9 %
NEUTROPHILS NFR FLD MANUAL: 59 %
NITRITE UR QL STRIP: POSITIVE
NUCLEATED RBC (/100WBC) (OHS): 0 /100 WBC
OHS QRS DURATION: 88 MS
OHS QRS DURATION: 94 MS
OHS QRS DURATION: 98 MS
OHS QTC CALCULATION: 454 MS
OHS QTC CALCULATION: 459 MS
OHS QTC CALCULATION: 461 MS
PH UR STRIP: 6 [PH]
PHOSPHATE SERPL-MCNC: 3.3 MG/DL (ref 2.7–4.5)
PLATELET # BLD AUTO: 339 K/UL (ref 150–450)
PMV BLD AUTO: 10.9 FL (ref 9.2–12.9)
POCT GLUCOSE: 104 MG/DL (ref 70–110)
POCT GLUCOSE: 105 MG/DL (ref 70–110)
POCT GLUCOSE: 139 MG/DL (ref 70–110)
POCT GLUCOSE: 145 MG/DL (ref 70–110)
POTASSIUM SERPL-SCNC: 3.6 MMOL/L (ref 3.5–5.1)
PROT SERPL-MCNC: 6.7 GM/DL (ref 6–8.4)
PROT UR QL STRIP: ABNORMAL
PROTHROMBIN TIME: 11.8 SECONDS (ref 9–12.5)
RBC # BLD AUTO: 4.12 M/UL (ref 4–5.4)
RBC #/AREA URNS AUTO: 1 /HPF (ref 0–4)
RELATIVE EOSINOPHIL (OHS): 0.2 %
RELATIVE LYMPHOCYTE (OHS): 25.6 % (ref 18–48)
RELATIVE MONOCYTE (OHS): 5.8 % (ref 4–15)
RELATIVE NEUTROPHIL (OHS): 66.5 % (ref 38–73)
SODIUM SERPL-SCNC: 131 MMOL/L (ref 136–145)
SP GR UR STRIP: 1.03
SQUAMOUS #/AREA URNS AUTO: 8 /HPF
UROBILINOGEN UR STRIP-ACNC: NEGATIVE EU/DL
WBC # BLD AUTO: 28.32 K/UL (ref 3.9–12.7)
WBC # FLD: 4130 /CU MM
WBC #/AREA URNS AUTO: 5 /HPF (ref 0–5)

## 2025-05-19 PROCEDURE — C1751 CATH, INF, PER/CENT/MIDLINE: HCPCS | Mod: HCNC

## 2025-05-19 PROCEDURE — 85025 COMPLETE CBC W/AUTO DIFF WBC: CPT | Mod: HCNC

## 2025-05-19 PROCEDURE — 87070 CULTURE OTHR SPECIMN AEROBIC: CPT | Mod: HCNC

## 2025-05-19 PROCEDURE — 63600175 PHARM REV CODE 636 W HCPCS: Mod: HCNC

## 2025-05-19 PROCEDURE — 63600175 PHARM REV CODE 636 W HCPCS: Mod: HCNC | Performed by: RADIOLOGY

## 2025-05-19 PROCEDURE — 88112 CYTOPATH CELL ENHANCE TECH: CPT | Mod: 26,HCNC,, | Performed by: STUDENT IN AN ORGANIZED HEALTH CARE EDUCATION/TRAINING PROGRAM

## 2025-05-19 PROCEDURE — 25000003 PHARM REV CODE 250: Mod: HCNC | Performed by: NURSE PRACTITIONER

## 2025-05-19 PROCEDURE — 88305 TISSUE EXAM BY PATHOLOGIST: CPT | Mod: 26,HCNC,, | Performed by: STUDENT IN AN ORGANIZED HEALTH CARE EDUCATION/TRAINING PROGRAM

## 2025-05-19 PROCEDURE — 25000242 PHARM REV CODE 250 ALT 637 W/ HCPCS: Mod: HCNC

## 2025-05-19 PROCEDURE — 87075 CULTR BACTERIA EXCEPT BLOOD: CPT | Mod: HCNC

## 2025-05-19 PROCEDURE — 87107 FUNGI IDENTIFICATION MOLD: CPT | Mod: HCNC

## 2025-05-19 PROCEDURE — 89051 BODY FLUID CELL COUNT: CPT | Mod: HCNC

## 2025-05-19 PROCEDURE — 25500020 PHARM REV CODE 255: Mod: HCNC | Performed by: STUDENT IN AN ORGANIZED HEALTH CARE EDUCATION/TRAINING PROGRAM

## 2025-05-19 PROCEDURE — 87205 SMEAR GRAM STAIN: CPT | Mod: HCNC

## 2025-05-19 PROCEDURE — 20600001 HC STEP DOWN PRIVATE ROOM: Mod: HCNC

## 2025-05-19 PROCEDURE — 36415 COLL VENOUS BLD VENIPUNCTURE: CPT | Mod: HCNC

## 2025-05-19 PROCEDURE — 63600175 PHARM REV CODE 636 W HCPCS: Mod: HCNC | Performed by: STUDENT IN AN ORGANIZED HEALTH CARE EDUCATION/TRAINING PROGRAM

## 2025-05-19 PROCEDURE — 87206 SMEAR FLUORESCENT/ACID STAI: CPT | Mod: HCNC

## 2025-05-19 PROCEDURE — 84100 ASSAY OF PHOSPHORUS: CPT | Mod: HCNC

## 2025-05-19 PROCEDURE — 83036 HEMOGLOBIN GLYCOSYLATED A1C: CPT | Mod: HCNC

## 2025-05-19 PROCEDURE — 25000003 PHARM REV CODE 250: Mod: HCNC

## 2025-05-19 PROCEDURE — 36415 COLL VENOUS BLD VENIPUNCTURE: CPT | Mod: HCNC | Performed by: PHYSICIAN ASSISTANT

## 2025-05-19 PROCEDURE — 83735 ASSAY OF MAGNESIUM: CPT | Mod: HCNC

## 2025-05-19 PROCEDURE — 36410 VNPNXR 3YR/> PHY/QHP DX/THER: CPT | Mod: HCNC

## 2025-05-19 PROCEDURE — 25000003 PHARM REV CODE 250: Mod: HCNC | Performed by: STUDENT IN AN ORGANIZED HEALTH CARE EDUCATION/TRAINING PROGRAM

## 2025-05-19 PROCEDURE — 93005 ELECTROCARDIOGRAM TRACING: CPT | Mod: HCNC

## 2025-05-19 PROCEDURE — 93010 ELECTROCARDIOGRAM REPORT: CPT | Mod: HCNC,,, | Performed by: INTERNAL MEDICINE

## 2025-05-19 PROCEDURE — 85610 PROTHROMBIN TIME: CPT | Mod: HCNC | Performed by: PHYSICIAN ASSISTANT

## 2025-05-19 PROCEDURE — 88305 TISSUE EXAM BY PATHOLOGIST: CPT | Mod: TC,HCNC

## 2025-05-19 PROCEDURE — 99223 1ST HOSP IP/OBS HIGH 75: CPT | Mod: HCNC,,, | Performed by: PHYSICIAN ASSISTANT

## 2025-05-19 PROCEDURE — 80053 COMPREHEN METABOLIC PANEL: CPT | Mod: HCNC

## 2025-05-19 PROCEDURE — 87116 MYCOBACTERIA CULTURE: CPT | Mod: HCNC

## 2025-05-19 PROCEDURE — 0T903ZZ DRAINAGE OF RIGHT KIDNEY, PERCUTANEOUS APPROACH: ICD-10-PCS | Performed by: RADIOLOGY

## 2025-05-19 PROCEDURE — 76937 US GUIDE VASCULAR ACCESS: CPT | Mod: HCNC

## 2025-05-19 RX ORDER — CEFTRIAXONE 1 G/1
1 INJECTION, POWDER, FOR SOLUTION INTRAMUSCULAR; INTRAVENOUS
Status: DISCONTINUED | OUTPATIENT
Start: 2025-05-20 | End: 2025-05-19

## 2025-05-19 RX ORDER — OXYCODONE AND ACETAMINOPHEN 5; 325 MG/1; MG/1
1 TABLET ORAL EVERY 4 HOURS PRN
Refills: 0 | Status: DISCONTINUED | OUTPATIENT
Start: 2025-05-19 | End: 2025-05-23 | Stop reason: HOSPADM

## 2025-05-19 RX ORDER — IBUPROFEN 200 MG
16 TABLET ORAL
Status: DISCONTINUED | OUTPATIENT
Start: 2025-05-19 | End: 2025-05-23 | Stop reason: HOSPADM

## 2025-05-19 RX ORDER — ATORVASTATIN CALCIUM 10 MG/1
10 TABLET, FILM COATED ORAL DAILY
Status: DISCONTINUED | OUTPATIENT
Start: 2025-05-19 | End: 2025-05-23 | Stop reason: HOSPADM

## 2025-05-19 RX ORDER — IBUPROFEN 200 MG
24 TABLET ORAL
Status: DISCONTINUED | OUTPATIENT
Start: 2025-05-19 | End: 2025-05-23 | Stop reason: HOSPADM

## 2025-05-19 RX ORDER — MUPIROCIN 20 MG/G
OINTMENT TOPICAL 2 TIMES DAILY
Status: DISCONTINUED | OUTPATIENT
Start: 2025-05-19 | End: 2025-05-23 | Stop reason: HOSPADM

## 2025-05-19 RX ORDER — OXYCODONE AND ACETAMINOPHEN 5; 325 MG/1; MG/1
1 TABLET ORAL EVERY 8 HOURS PRN
Refills: 0 | Status: DISCONTINUED | OUTPATIENT
Start: 2025-05-19 | End: 2025-05-19

## 2025-05-19 RX ORDER — FLUTICASONE PROPIONATE 50 MCG
2 SPRAY, SUSPENSION (ML) NASAL DAILY
Status: DISCONTINUED | OUTPATIENT
Start: 2025-05-19 | End: 2025-05-23 | Stop reason: HOSPADM

## 2025-05-19 RX ORDER — TRAZODONE HYDROCHLORIDE 50 MG/1
50 TABLET ORAL NIGHTLY PRN
Status: DISCONTINUED | OUTPATIENT
Start: 2025-05-19 | End: 2025-05-23 | Stop reason: HOSPADM

## 2025-05-19 RX ORDER — CYANOCOBALAMIN (VITAMIN B-12) 250 MCG
250 TABLET ORAL DAILY
Status: DISCONTINUED | OUTPATIENT
Start: 2025-05-19 | End: 2025-05-23 | Stop reason: HOSPADM

## 2025-05-19 RX ORDER — SODIUM CHLORIDE 0.9 % (FLUSH) 0.9 %
10 SYRINGE (ML) INJECTION EVERY 12 HOURS PRN
Status: DISCONTINUED | OUTPATIENT
Start: 2025-05-19 | End: 2025-05-23 | Stop reason: HOSPADM

## 2025-05-19 RX ORDER — CEFTRIAXONE 1 G/1
1 INJECTION, POWDER, FOR SOLUTION INTRAMUSCULAR; INTRAVENOUS
Status: DISCONTINUED | OUTPATIENT
Start: 2025-05-19 | End: 2025-05-19

## 2025-05-19 RX ORDER — POTASSIUM CHLORIDE 20 MEQ/1
40 TABLET, EXTENDED RELEASE ORAL ONCE
Status: COMPLETED | OUTPATIENT
Start: 2025-05-19 | End: 2025-05-19

## 2025-05-19 RX ORDER — LIDOCAINE HYDROCHLORIDE 10 MG/ML
INJECTION, SOLUTION INFILTRATION; PERINEURAL
Status: COMPLETED | OUTPATIENT
Start: 2025-05-19 | End: 2025-05-19

## 2025-05-19 RX ORDER — POTASSIUM CHLORIDE 20 MEQ/1
40 TABLET, EXTENDED RELEASE ORAL 2 TIMES DAILY
Status: DISCONTINUED | OUTPATIENT
Start: 2025-05-19 | End: 2025-05-19

## 2025-05-19 RX ORDER — HEPARIN SODIUM 5000 [USP'U]/ML
7500 INJECTION, SOLUTION INTRAVENOUS; SUBCUTANEOUS EVERY 8 HOURS
Status: DISCONTINUED | OUTPATIENT
Start: 2025-05-19 | End: 2025-05-23 | Stop reason: HOSPADM

## 2025-05-19 RX ORDER — LABETALOL HCL 20 MG/4 ML
10 SYRINGE (ML) INTRAVENOUS ONCE
Status: DISCONTINUED | OUTPATIENT
Start: 2025-05-19 | End: 2025-05-19

## 2025-05-19 RX ORDER — FENTANYL CITRATE 50 UG/ML
INJECTION, SOLUTION INTRAMUSCULAR; INTRAVENOUS
Status: COMPLETED | OUTPATIENT
Start: 2025-05-19 | End: 2025-05-19

## 2025-05-19 RX ORDER — DULOXETIN HYDROCHLORIDE 20 MG/1
40 CAPSULE, DELAYED RELEASE ORAL DAILY
Status: DISCONTINUED | OUTPATIENT
Start: 2025-05-19 | End: 2025-05-23 | Stop reason: HOSPADM

## 2025-05-19 RX ORDER — ENOXAPARIN SODIUM 100 MG/ML
40 INJECTION SUBCUTANEOUS EVERY 12 HOURS
Status: DISCONTINUED | OUTPATIENT
Start: 2025-05-19 | End: 2025-05-19

## 2025-05-19 RX ORDER — FAMOTIDINE 20 MG/1
40 TABLET, FILM COATED ORAL NIGHTLY PRN
Status: DISCONTINUED | OUTPATIENT
Start: 2025-05-19 | End: 2025-05-23 | Stop reason: HOSPADM

## 2025-05-19 RX ORDER — PREGABALIN 50 MG/1
100 CAPSULE ORAL 2 TIMES DAILY
Status: DISCONTINUED | OUTPATIENT
Start: 2025-05-19 | End: 2025-05-23 | Stop reason: HOSPADM

## 2025-05-19 RX ORDER — GLUCAGON 1 MG
1 KIT INJECTION
Status: DISCONTINUED | OUTPATIENT
Start: 2025-05-19 | End: 2025-05-23 | Stop reason: HOSPADM

## 2025-05-19 RX ORDER — LEVOTHYROXINE SODIUM 50 UG/1
100 TABLET ORAL
Status: DISCONTINUED | OUTPATIENT
Start: 2025-05-19 | End: 2025-05-23 | Stop reason: HOSPADM

## 2025-05-19 RX ORDER — HYDRALAZINE HYDROCHLORIDE 20 MG/ML
INJECTION INTRAMUSCULAR; INTRAVENOUS
Status: COMPLETED | OUTPATIENT
Start: 2025-05-19 | End: 2025-05-19

## 2025-05-19 RX ORDER — OXYCODONE AND ACETAMINOPHEN 7.5; 325 MG/1; MG/1
1 TABLET ORAL EVERY 4 HOURS PRN
Refills: 0 | Status: DISCONTINUED | OUTPATIENT
Start: 2025-05-19 | End: 2025-05-23 | Stop reason: HOSPADM

## 2025-05-19 RX ORDER — INSULIN ASPART 100 [IU]/ML
0-5 INJECTION, SOLUTION INTRAVENOUS; SUBCUTANEOUS
Status: DISCONTINUED | OUTPATIENT
Start: 2025-05-19 | End: 2025-05-23 | Stop reason: HOSPADM

## 2025-05-19 RX ORDER — NALOXONE HCL 0.4 MG/ML
0.02 VIAL (ML) INJECTION
Status: DISCONTINUED | OUTPATIENT
Start: 2025-05-19 | End: 2025-05-23 | Stop reason: HOSPADM

## 2025-05-19 RX ORDER — VENLAFAXINE HYDROCHLORIDE 75 MG/1
150 CAPSULE, EXTENDED RELEASE ORAL DAILY
Status: DISCONTINUED | OUTPATIENT
Start: 2025-05-19 | End: 2025-05-23 | Stop reason: HOSPADM

## 2025-05-19 RX ORDER — MAGNESIUM SULFATE HEPTAHYDRATE 40 MG/ML
2 INJECTION, SOLUTION INTRAVENOUS ONCE
Status: COMPLETED | OUTPATIENT
Start: 2025-05-19 | End: 2025-05-19

## 2025-05-19 RX ORDER — CLOTRIMAZOLE AND BETAMETHASONE DIPROPIONATE 10; .64 MG/G; MG/G
CREAM TOPICAL 2 TIMES DAILY PRN
Status: DISCONTINUED | OUTPATIENT
Start: 2025-05-19 | End: 2025-05-23 | Stop reason: HOSPADM

## 2025-05-19 RX ADMIN — SODIUM CHLORIDE, POTASSIUM CHLORIDE, SODIUM LACTATE AND CALCIUM CHLORIDE 500 ML: 600; 310; 30; 20 INJECTION, SOLUTION INTRAVENOUS at 10:05

## 2025-05-19 RX ADMIN — POTASSIUM CHLORIDE 40 MEQ: 1500 TABLET, EXTENDED RELEASE ORAL at 06:05

## 2025-05-19 RX ADMIN — CEFTRIAXONE 1 G: 1 INJECTION, POWDER, FOR SOLUTION INTRAMUSCULAR; INTRAVENOUS at 06:05

## 2025-05-19 RX ADMIN — CYANOCOBALAMIN TAB 250 MCG 250 MCG: 250 TAB at 08:05

## 2025-05-19 RX ADMIN — MAGNESIUM SULFATE HEPTAHYDRATE 2 G: 40 INJECTION, SOLUTION INTRAVENOUS at 06:05

## 2025-05-19 RX ADMIN — POTASSIUM CHLORIDE 40 MEQ: 1500 TABLET, EXTENDED RELEASE ORAL at 08:05

## 2025-05-19 RX ADMIN — DULOXETINE HYDROCHLORIDE 40 MG: 20 CAPSULE, DELAYED RELEASE ORAL at 08:05

## 2025-05-19 RX ADMIN — HYDRALAZINE HYDROCHLORIDE 10 MG: 20 INJECTION INTRAMUSCULAR; INTRAVENOUS at 04:05

## 2025-05-19 RX ADMIN — MUPIROCIN: 20 OINTMENT TOPICAL at 09:05

## 2025-05-19 RX ADMIN — OXYCODONE HYDROCHLORIDE AND ACETAMINOPHEN 1 TABLET: 7.5; 325 TABLET ORAL at 08:05

## 2025-05-19 RX ADMIN — HEPARIN SODIUM 5000 UNITS: 5000 INJECTION INTRAVENOUS; SUBCUTANEOUS at 09:05

## 2025-05-19 RX ADMIN — PREGABALIN 100 MG: 50 CAPSULE ORAL at 09:05

## 2025-05-19 RX ADMIN — HEPARIN SODIUM 7500 UNITS: 5000 INJECTION INTRAVENOUS; SUBCUTANEOUS at 06:05

## 2025-05-19 RX ADMIN — PIPERACILLIN SODIUM AND TAZOBACTAM SODIUM 4.5 G: 4; .5 INJECTION, POWDER, FOR SOLUTION INTRAVENOUS at 10:05

## 2025-05-19 RX ADMIN — LEVOTHYROXINE SODIUM 100 MCG: 0.05 TABLET ORAL at 06:05

## 2025-05-19 RX ADMIN — FENTANYL CITRATE 50 MCG: 50 INJECTION, SOLUTION INTRAMUSCULAR; INTRAVENOUS at 04:05

## 2025-05-19 RX ADMIN — MUPIROCIN: 20 OINTMENT TOPICAL at 08:05

## 2025-05-19 RX ADMIN — OXYCODONE HYDROCHLORIDE AND ACETAMINOPHEN 1 TABLET: 5; 325 TABLET ORAL at 03:05

## 2025-05-19 RX ADMIN — THERA TABS 1 TABLET: TAB at 08:05

## 2025-05-19 RX ADMIN — OXYCODONE HYDROCHLORIDE AND ACETAMINOPHEN 1 TABLET: 7.5; 325 TABLET ORAL at 02:05

## 2025-05-19 RX ADMIN — ATORVASTATIN CALCIUM 10 MG: 10 TABLET, FILM COATED ORAL at 08:05

## 2025-05-19 RX ADMIN — OXYCODONE HYDROCHLORIDE AND ACETAMINOPHEN 1 TABLET: 7.5; 325 TABLET ORAL at 09:05

## 2025-05-19 RX ADMIN — PIPERACILLIN SODIUM AND TAZOBACTAM SODIUM 4.5 G: 4; .5 INJECTION, POWDER, FOR SOLUTION INTRAVENOUS at 06:05

## 2025-05-19 RX ADMIN — PREGABALIN 100 MG: 50 CAPSULE ORAL at 08:05

## 2025-05-19 RX ADMIN — LIDOCAINE HYDROCHLORIDE 5 ML: 10 INJECTION, SOLUTION INFILTRATION; PERINEURAL at 04:05

## 2025-05-19 RX ADMIN — VENLAFAXINE HYDROCHLORIDE 150 MG: 75 CAPSULE, EXTENDED RELEASE ORAL at 08:05

## 2025-05-19 RX ADMIN — MICONAZOLE NITRATE: 20 OINTMENT TOPICAL at 09:05

## 2025-05-19 RX ADMIN — FLUTICASONE PROPIONATE 100 MCG: 50 SPRAY, METERED NASAL at 08:05

## 2025-05-19 NOTE — CONSULTS
Interventional Radiology  Consult/History & Physical Note    Consult Requested By: Teodoro Grace MD  Reason for Consult: c/f perinephric abscess 2.2 cm    SUBJECTIVE:     Chief Complaint: perinephric abscess vs renal cyst    History of Present Illness:  Tanika Wayne is a 65 y.o. female with a PMHx of ITP, diabetes, hypertension, cardiomyopathy, degenerative disc disease, CKD stage 3, nephrolithiasis, who presented to the ED with R flank pain, dysuria and weakness on 5/18, was found to have leukocytosis and possible R perinephric abscess vs cyst. Interventional Radiology has been consulted for image guided percutaneous aspiration for management of her R perinephric abscess vs cyst. Pt has had recent imaging including a CT a/p on 5/18/25 which revealed a 2.2 cm hypodense structure at the upper pole of the R kidney which may represent a cyst, cannot rule out abscess. Per primary team, pt was recently treated for UTI with abx x 7 days and is now presenting with worsening symptoms, which is why they are inquiring about cyst/abscess drainage. The pt's WBC is 28.32 from 23.26 despite initiating bs abx yesterday. Blood cultures drawn on 5/18/25 reveal NGTD. Pt is afebrile, -120s, BP WNL. Satting well on RA. She  denies a history of YUMI requiring nightly CPAP or difficulty breathing when lying flat. She does not take any anticoagulants. She ate breakfast this morning.    Review of Systems   Constitutional:  Positive for malaise/fatigue. Negative for chills and fever.   Genitourinary:  Positive for flank pain (R).   Neurological:  Positive for weakness (generalized).       Scheduled Meds:   atorvastatin  10 mg Oral Daily    cyanocobalamin  250 mcg Oral Daily    DULoxetine  40 mg Oral Daily    fluticasone propionate  2 spray Each Nostril Daily    heparin (porcine)  7,500 Units Subcutaneous Q8H    levothyroxine  100 mcg Oral Before breakfast    multivitamin  1 tablet Oral Daily    mupirocin   Nasal BID     piperacillin-tazobactam (Zosyn) IV (PEDS and ADULTS) (extended infusion is not appropriate)  4.5 g Intravenous Q8H    pregabalin  100 mg Oral BID    venlafaxine  150 mg Oral Daily     Continuous Infusions:  PRN Meds:  Current Facility-Administered Medications:     clotrimazole-betamethasone 1-0.05%, , Topical (Top), BID PRN    dextrose 50%, 12.5 g, Intravenous, PRN    dextrose 50%, 25 g, Intravenous, PRN    docusate sodium, 50 mg, Oral, BID PRN    famotidine, 40 mg, Oral, Nightly PRN    glucagon (human recombinant), 1 mg, Intramuscular, PRN    glucose, 16 g, Oral, PRN    glucose, 24 g, Oral, PRN    insulin aspart U-100, 0-5 Units, Subcutaneous, QID (AC + HS) PRN    naloxone, 0.02 mg, Intravenous, PRN    ondansetron, 4 mg, Intravenous, Q6H PRN    oxyCODONE-acetaminophen, 1 tablet, Oral, Q4H PRN    oxyCODONE-acetaminophen, 1 tablet, Oral, Q4H PRN    sodium chloride 0.9%, 10 mL, Intravenous, Q12H PRN    traZODone, 50 mg, Oral, Nightly PRN    Review of patient's allergies indicates:  No Known Allergies    Past Medical History:   Diagnosis Date    Abnormal echocardiogram 11/26/2012    Arthritis     Breast cancer 1998    Breast cancer, right breast     Breast cancer, right breast 11/26/2012    S/P Lumpectomy / XRT '98 Dr. Bartholomew     Cardiomyopathy due to systemic disease     Cataract     Clotting disorder     Coronary artery disease     DM (diabetes mellitus) 11/26/2012    Encounter for blood transfusion     HTN (hypertension) 11/26/2012    Increased glucose level 11/26/2012    ITP (idiopathic thrombocytopenic purpura) 11/26/2012    Kidney stones 11/26/2012    PONV (postoperative nausea and vomiting)     Stenosis of lumbosacral spine 11/26/2012    Thyroid disease     Tympanic membrane perforation 9/15/2014    right 2014 Dr. Jeffries     Past Surgical History:   Procedure Laterality Date    BREAST LUMPECTOMY Right 1998    CATARACT EXTRACTION W/  INTRAOCULAR LENS IMPLANT Left 02/04/2021    Procedure: EXTRACTION, CATARACT,  WITH IOL INSERTION;  Surgeon: Klever Tolliver MD;  Location: [facility];  Service: Ophthalmology;  Laterality: Left;    CATARACT EXTRACTION W/  INTRAOCULAR LENS IMPLANT Right 2021    Procedure: EXTRACTION, CATARACT, WITH IOL INSERTION;  Surgeon: Klever Tolliver MD;  Location: [facility];  Service: Ophthalmology;  Laterality: Right;     SECTION      CHOLECYSTECTOMY      COLONOSCOPY N/A 2022    Procedure: COLONOSCOPY;  Surgeon: Anoop Escobar MD;  Location: [facility] (4TH FLR);  Service: Endoscopy;  Laterality: N/A;  Fully vaccinated/ inst mailed/ clear liquids up to 4 hrs prior/AM prep 2am-3am-RB    ESOPHAGOGASTRODUODENOSCOPY N/A 2021    Procedure: EGD (ESOPHAGOGASTRODUODENOSCOPY);  Surgeon: Silvio Singh Jr., MD;  Location: [facility] 2ND FLR;  Service: General;  Laterality: N/A;    EYE SURGERY      strabismus, , bilateral cataracts    HIP ARTHROPLASTY Right 10/18/2018    Procedure: ARTHROPLASTY, HIP;  Surgeon: Baldomero Fischer MD;  Location: [facility];  Service: Orthopedics;  Laterality: Right;    HIP REPLACEMENT ARTHROPLASTY Right     dr fischer    HYSTERECTOMY      JOINT REPLACEMENT      right BRYNN, TKA    KNEE ARTHROPLASTY Right 2019    Procedure: ARTHROPLASTY, KNEE (ADD ON );  Surgeon: Baldomero Fischer MD;  Location: [facility];  Service: Orthopedics;  Laterality: Right;  (ADD ON )    L3-5 Laminectomy  2017    Dr. Mendoza    LAPAROSCOPIC SLEEVE GASTRECTOMY N/A 2021    Procedure: GASTRECTOMY, SLEEVE, LAPAROSCOPIC;  Surgeon: Silvio Singh Jr., MD;  Location: [facility] 2ND FLR;  Service: General;  Laterality: N/A;    SPLENECTOMY, TOTAL  2001    from ITP    STRABISMUS SURGERY  2006    OU    THYROIDECTOMY  2016    TONSILLECTOMY       Family History   Problem Relation Name Age of Onset    Heart disease Mother  69        MI    Diabetes Mother      Hypertension Mother      Diabetes Father      Hypertension Father      Stroke Father      Hyperlipidemia Sister       COPD Sister      Hypertension Sister      Emphysema Sister      Diabetes Sister Tita     Hypertension Sister Tita     Diabetes Sister Мария         prediabetic    Hypertension Sister Мария     Migraines Sister Мария     Rheum arthritis Sister Мария     Diabetes Brother Facundo     Hypertension Brother Facundo     No Known Problems Daughter Genae      Social History[1]    OBJECTIVE:     Vital Signs (Most Recent)  Temp: 99.2 °F (37.3 °C) (05/19/25 1124)  Pulse: 110 (05/19/25 1124)  Resp: 18 (05/19/25 1124)  BP: (!) 150/65 (05/19/25 1124)  SpO2: (!) 94 % (05/19/25 1124)    Physical Exam:  Physical Exam  Vitals and nursing note reviewed.   Constitutional:       General: She is not in acute distress.     Appearance: She is obese. She is ill-appearing.   HENT:      Head: Normocephalic and atraumatic.      Right Ear: External ear normal.      Left Ear: External ear normal.   Eyes:      Extraocular Movements: Extraocular movements intact.      Conjunctiva/sclera: Conjunctivae normal.      Pupils: Pupils are equal, round, and reactive to light.   Cardiovascular:      Rate and Rhythm: Tachycardia present.   Pulmonary:      Effort: Pulmonary effort is normal. No respiratory distress.   Abdominal:      General: Abdomen is flat. There is no distension.   Skin:     General: Skin is warm and dry.      Coloration: Skin is not jaundiced.   Neurological:      General: No focal deficit present.      Mental Status: She is alert and oriented to person, place, and time.   Psychiatric:         Mood and Affect: Mood normal.         Behavior: Behavior normal.         Thought Content: Thought content normal.         Judgment: Judgment normal.         Laboratory  I have reviewed all pertinent lab results within the past 24 hours.  CBC:   Recent Labs   Lab 05/19/25  0840   WBC 28.32*   RBC 4.12   HGB 11.5*   HCT 35.1*      MCV 85   MCH 27.9   MCHC 32.8     BMP:   Recent Labs   Lab 05/19/25  0840   *   *   K 3.6   CL  96   CO2 23   BUN 18   CREATININE 0.9   CALCIUM 9.0   MG 1.8     CMP:   Recent Labs   Lab 05/19/25  0840   *   CALCIUM 9.0   ALBUMIN 3.0*   PROT 6.7   *   K 3.6   CO2 23   CL 96   BUN 18   CREATININE 0.9   ALKPHOS 67   ALT 38   AST 42   BILITOT 0.4     LFTs:   Recent Labs   Lab 05/19/25  0840   ALT 38   AST 42   ALKPHOS 67   BILITOT 0.4   PROT 6.7   ALBUMIN 3.0*     Coagulation:   Recent Labs   Lab 05/19/25  1036   INR 1.1     Microbiology Results (last 7 days)       Procedure Component Value Units Date/Time    Blood culture x two cultures. Draw prior to antibiotics. [[id number]]  (Normal) Collected: 05/18/25 2121    Order Status: Completed Specimen: Blood from Peripheral, Forearm, Left Updated: 05/19/25 0702     Blood Culture No Growth After 6 Hours    Blood culture x two cultures. Draw prior to antibiotics. [[id number]]  (Normal) Collected: 05/18/25 2147    Order Status: Completed Specimen: Blood from Peripheral, Antecubital, Right Updated: 05/19/25 0702     Blood Culture No Growth After 6 Hours            ASA/Mallampati  ASA: 3  Mallampati: 2    Imaging:  Recent imaging studies including CT a/p on 5/18/25 which was independently reviewed by Lavelle Tapia MD.     EXAMINATION:  CT ABDOMEN PELVIS WITH IV CONTRAST     CLINICAL HISTORY:  Flank pain, kidney stone suspected;R flank pain;     TECHNIQUE:  Low dose axial images, sagittal and coronal reformations were obtained from the lung bases to the pubic symphysis following the IV administration of 100 mL of Omnipaque 350 oral contrast was not utilized.  Single phase postcontrast CT examination of the abdomen and pelvis is submitted.     COMPARISON:  CT examination May 8, 2007     FINDINGS:  The visualized lung bases demonstrate appearance of atelectatic change with bandlike areas of opacity consistent with atelectasis at the lung bases bilaterally.  The stomach demonstrates postoperative change, appears decompressed.  The gallbladder surgically absent.      The spleen is not identified, correlation for prior splenectomy is needed.  The adrenal glands appear unremarkable bilaterally.  There is no evidence for acute process of the liver.  Mild biliary dilatation may relate to passive biliary dilatation of prior cholecystectomy.     There is an ill-defined area of diminished attenuation associated with the body of the pancreas as seen axial image 61, this measures approximately 1.3 cm in size, there is no surrounding inflammatory change, follow-up pancreatic protocol MRI examination is recommended.  Distal to this there is some degree of atrophic change of the pancreas noted.     There is no evidence for ureteral calculus or obstructive uropathy bilaterally.  There are small renal hypodensities bilaterally, too small for characterization.  There is perinephric stranding involving the upper pole of the right kidney.  At the upper pole right kidney there is a hypodense structure measuring up to approximately 2.2 cm on axial imaging, 23 Hounsfield units.  This may relate to a complex cyst, however was not present on the prior study.  There does appear to be mild heterogeneity of the renal parenchyma it adjacent to this, and given the appearance of perinephric stranding about the upper pole of the right kidney the possibility that this represents acute pyelonephritis with focal abscess is to be considered.  The renal vein and renal artery bilaterally demonstrate appropriate opacification.     The celiac artery appears small, stable appearance.  The abdominal aorta appears normal in caliber, demonstrates appropriate opacification.  Artifact associated with right hip arthroplasty affects the lower pelvis to some degree.  When accounting for this the urinary bladder appears unremarkable for degree of distention.  The patient appears status post hysterectomy.  Right inguinal hernia of fat density without bowel involvement is noted.     Small fat density umbilical hernia and  small fat density supraumbilical hernia noted, no bowel involvement.  There is no evidence for small bowel obstructive process.  The appendix is identified, it does not appear inflamed.  There is no evidence for inflammatory or obstructive process of the colon.  There is mild prominence of the sigmoid colon with stool without inflammatory appearance.  There is no free intraperitoneal air.     The visualized osseous structures demonstrate chronic change.  Right hip arthroplasty noted.  There are findings of the left femoral head that may relate to changes of avascular necrosis.     Impression:     Perinephric stranding involving the upper pole of the right kidney, hypodense lesion at the upper pole of the right kidney noted, the possibility of acute pyelonephritis of the upper pole of the right kidney with associated renal abscess is to be considered, clinical and historical correlation and follow-up is recommended.     There is no evidence for ureteral calculus or obstructive uropathy bilaterally.     Indeterminate lesion of the pancreas, follow-up nonemergent pancreatic protocol MRI examination is recommended.     Findings referable to the left femoral head may relate to changes of avascular necrosis.     Additional findings as above.     This report was flagged in Epic as abnormal.        Electronically signed by: Huan Apple  Date:                                            05/18/2025  Time:                                           23:45        ASSESSMENT/PLAN:     Assessment:  65 y.o. female with a PMHx of  ITP, diabetes, hypertension, cardiomyopathy, degenerative disc disease, CKD stage 3, nephrolithiasis who has been referred to IR for image guided percutaneous aspiration for management of her R perinephric abscess vs cyst. Case discussed with IR staff. Pt has multiple cysts throughout both kidneys. Suspect the R renal upper pole hyperdense structure is also a cyst, however, cannot rule out abscess.  Discussed with HM provider who would like to proceed with aspiration. Will be performing procedure under local anesthesia and IV fentanyl due to pt eating breakfast this AM, is not NPO. The procedure was discussed in great detail with the patient including thorough explanations of the potential risks and benefits of percutaneous drain placement/aspiration. Risks include but are not limited to sepsis, severe infection, hemorrhage, damage to surrounding structures, catheter malfunction, inability to remove catheter, catheter dislodgment and need for additional procedures. The patient is a candidate for CT guided percutaneous aspiration of the R perinephric abscess vs cyst under local anesthesia with IV fentanyl only. Plan discussed with ordering physician and pt who verbalized understanding of the plan and would like to proceed.    Plan:  Will proceed with CT guided percutaneous aspiration of the R perinephric abscess vs cyst under local anesthesia with IV fentanyl only on 5/19/25.   No need for NPO status as pt will not be receiving full sedation  Primary team is responsible for ordering any labs/cultures to be drawn on fluid sample collected during the procedure.  Anticoagulation history reviewed.   If starting prophylactic AC following procedure, ok to start lovenox 12 hours following procedure and ok to start SQ heparin 6-8 hours following procedure per SIR guidelines  Coagulation labs reviewed. INR 1.1 and plt count 339  Thank you for the consult. Please contact with questions via [facility] secure chat or [employer]    Time spent during patient care today was 77 minutes. This includes time spent before the visit reviewing the chart, discussing case with staff physician and ordering provider, time spent during the face to face patient visit, and time spent after the visit on documentation. Time excludes procedure time.     Polina Quinonez PA-C  Interventional Radiology  Spectra: [phone]        [1]   Social  History  Tobacco Use    Smoking status: Never    Smokeless tobacco: Never   Substance Use Topics    Alcohol use: Yes     Comment: seasonally    Drug use: No

## 2025-05-19 NOTE — ASSESSMENT & PLAN NOTE
"Patient's FSGs are controlled on current medication regimen.  Last A1c reviewed-   Lab Results   Component Value Date    LABA1C 6.4 (H) 08/18/2016    HGBA1C 6.0 (H) 01/27/2025     Most recent fingerstick glucose reviewed- No results for input(s): "POCTGLUCOSE" in the last 24 hours.  Current correctional scale  Low  Maintain anti-hyperglycemic dose as follows-   Antihyperglycemics (From admission, onward)      Start     Stop Route Frequency Ordered    05/19/25 0224  insulin aspart U-100 pen 0-5 Units         -- SubQ Before meals & nightly PRN 05/19/25 0124          Hold Oral hypoglycemics while patient is in the hospital.     "

## 2025-05-19 NOTE — SUBJECTIVE & OBJECTIVE
Past Medical History:   Diagnosis Date    Abnormal echocardiogram 2012    Arthritis     Breast cancer 1998    Breast cancer, right breast     Breast cancer, right breast 2012    S/P Lumpectomy / XRT '98 Dr. Bartholomew     Cardiomyopathy due to systemic disease     Cataract     Clotting disorder     Coronary artery disease     DM (diabetes mellitus) 2012    Encounter for blood transfusion     HTN (hypertension) 2012    Increased glucose level 2012    ITP (idiopathic thrombocytopenic purpura) 2012    Kidney stones 2012    PONV (postoperative nausea and vomiting)     Stenosis of lumbosacral spine 2012    Thyroid disease     Tympanic membrane perforation 9/15/2014    right 2014 Dr. Jeffries       Past Surgical History:   Procedure Laterality Date    BREAST LUMPECTOMY Right     CATARACT EXTRACTION W/  INTRAOCULAR LENS IMPLANT Left 2021    Procedure: EXTRACTION, CATARACT, WITH IOL INSERTION;  Surgeon: Klever Tolliver MD;  Location: [facility];  Service: Ophthalmology;  Laterality: Left;    CATARACT EXTRACTION W/  INTRAOCULAR LENS IMPLANT Right 2021    Procedure: EXTRACTION, CATARACT, WITH IOL INSERTION;  Surgeon: Klever Tolliver MD;  Location: [facility];  Service: Ophthalmology;  Laterality: Right;     SECTION      CHOLECYSTECTOMY      COLONOSCOPY N/A 2022    Procedure: COLONOSCOPY;  Surgeon: Anoop Escobar MD;  Location: [facility] (4TH FLR);  Service: Endoscopy;  Laterality: N/A;  Fully vaccinated/ inst mailed/ clear liquids up to 4 hrs prior/AM prep 2am-3am-RB    ESOPHAGOGASTRODUODENOSCOPY N/A 2021    Procedure: EGD (ESOPHAGOGASTRODUODENOSCOPY);  Surgeon: Silvio Singh Jr., MD;  Location: [facility] 2ND FLR;  Service: General;  Laterality: N/A;    EYE SURGERY      strabismus, 2006, bilateral cataracts    HIP ARTHROPLASTY Right 10/18/2018    Procedure: ARTHROPLASTY, HIP;  Surgeon: Baldomero Fischer MD;  Location: [facility];  Service:  Orthopedics;  Laterality: Right;    HIP REPLACEMENT ARTHROPLASTY Right     dr fischer    HYSTERECTOMY      JOINT REPLACEMENT      right BRYNN, TKA    KNEE ARTHROPLASTY Right 04/11/2019    Procedure: ARTHROPLASTY, KNEE (ADD ON );  Surgeon: Baldomero Fischer MD;  Location: [facility];  Service: Orthopedics;  Laterality: Right;  (ADD ON )    L3-5 Laminectomy  02/2017    Dr. Mendoza    LAPAROSCOPIC SLEEVE GASTRECTOMY N/A 06/08/2021    Procedure: GASTRECTOMY, SLEEVE, LAPAROSCOPIC;  Surgeon: Silvio Singh Jr., MD;  Location: [address];  Service: General;  Laterality: N/A;    SPLENECTOMY, TOTAL  2001    from [facility]    STRABISMUS SURGERY  08/30/2006    OU    THYROIDECTOMY  09/2016    TONSILLECTOMY         Review of patient's allergies indicates:  No Known Allergies    No current facility-administered medications on file prior to encounter.     Current Outpatient Medications on File Prior to Encounter   Medication Sig    atorvastatin (LIPITOR) 10 MG tablet Take 1 tablet (10 mg total) by mouth once daily.    calcium carbonate 1250 MG capsule Take 1,250 mg by mouth Daily.    capsicum 0.075% (ZOSTRIX) 0.075 % topical cream Apply topically 3 (three) times daily as needed (for peipheral neuropathy).    carvediloL (COREG) 3.125 MG tablet Take 1 tablet (3.125 mg total) by mouth 2 (two) times daily.    clotrimazole-betamethasone 1-0.05% (LOTRISONE) cream Apply topically 2 (two) times daily as needed.    cyanocobalamin (VITAMIN B-12) 100 MCG tablet Take 100 mcg by mouth once daily.    docusate sodium (COLACE) 100 MG capsule TAKE 1 CAPSULE (100 MG TOTAL) BY MOUTH 2 (TWO) TIMES DAILY AS NEEDED FOR CONSTIPATION.    DULoxetine (CYMBALTA) 20 MG capsule Take 2 capsules (40 mg total) by mouth once daily.    famotidine (PEPCID) 40 MG tablet Take 1 tablet (40 mg total) by mouth nightly as needed for Heartburn.    fluticasone propionate (FLONASE) 50 mcg/actuation nasal spray INSTILL 2 SPRAYS IN EACH NOSTRIL ONCE A DAY    hydrocortisone  2.5 % cream Apply topically 2 (two) times daily.    levothyroxine (SYNTHROID) 100 MCG tablet Take 1 tablet (100 mcg total) by mouth once daily.    losartan-hydrochlorothiazide 100-12.5 mg (HYZAAR) 100-12.5 mg Tab Take 1 tablet by mouth once daily.    metFORMIN (GLUCOPHAGE-XR) 500 MG ER 24hr tablet Take 1 tablet (500 mg total) by mouth 2 (two) times daily with meals.    multivitamin (THERAGRAN) per tablet Take 1 tablet by mouth once daily.    NIFEdipine (PROCARDIA-XL) 90 MG (OSM) 24 hr tablet Take 1 tablet (90 mg total) by mouth once daily.    oxyCODONE-acetaminophen (PERCOCET) 5-325 mg per tablet Take 1 tablet by mouth every 8 (eight) hours as needed for Pain. Greater than 7 day quantity Medically Necessary    potassium chloride SA (K-DUR,KLOR-CON) 20 MEQ tablet Take 2 tablets (40 mEq total) by mouth 2 (two) times daily.    pregabalin (LYRICA) 100 MG capsule Take 1 capsule (100 mg total) by mouth 2 (two) times daily.    traZODone (DESYREL) 50 MG tablet Take 1 tablet (50 mg total) by mouth every evening.    venlafaxine (EFFEXOR-XR) 150 MG Cp24 Take 1 capsule (150 mg total) by mouth once daily.     Family History       Problem Relation (Age of Onset)    COPD Sister    Diabetes Mother, Father, Sister, Sister, Brother    Emphysema Sister    Heart disease Mother (69)    Hyperlipidemia Sister    Hypertension Mother, Father, Sister, Sister, Sister, Brother    Migraines Sister    No Known Problems Daughter    Rheum arthritis Sister    Stroke Father          Tobacco Use    Smoking status: Never    Smokeless tobacco: Never   Substance and Sexual Activity    Alcohol use: Yes     Comment: seasonally    Drug use: No    Sexual activity: Not Currently     Partners: Male     Review of Systems   Constitutional:  Positive for chills, fatigue and fever.   Eyes:  Negative for visual disturbance.   Respiratory:  Negative for cough, chest tightness and shortness of breath.    Cardiovascular:  Negative for leg swelling.    Gastrointestinal:  Positive for constipation and nausea. Negative for abdominal pain, diarrhea and vomiting.   Genitourinary:  Positive for dysuria.   Musculoskeletal:  Positive for back pain and gait problem.   Neurological:  Positive for dizziness and numbness (b/l LE neuropathy). Negative for syncope, light-headedness and headaches.   Psychiatric/Behavioral:  Negative for agitation, behavioral problems and confusion.    All other systems reviewed and are negative.    Objective:     Vital Signs (Most Recent):  Temp: 99.7 °F (37.6 °C) (05/18/25 2200)  Pulse: 103 (05/19/25 0100)  Resp: 19 (05/18/25 2200)  BP: (!) 169/71 (05/18/25 2200)  SpO2: 100 % (05/19/25 0100) Vital Signs (24h Range):  Temp:  [98.7 °F (37.1 °C)-99.8 °F (37.7 °C)] 99.7 °F (37.6 °C)  Pulse:  [] 103  Resp:  [16-20] 19  SpO2:  [86 %-100 %] 100 %  BP: (123-169)/(67-89) 169/71        There is no height or weight on file to calculate BMI.     Physical Exam  Vitals and nursing note reviewed.   Constitutional:       General: She is not in acute distress.     Appearance: Normal appearance. She is obese.   HENT:      Head: Normocephalic and atraumatic.      Nose: Nose normal.      Mouth/Throat:      Mouth: Mucous membranes are moist.      Pharynx: Oropharynx is clear.   Eyes:      Extraocular Movements: Extraocular movements intact.      Conjunctiva/sclera: Conjunctivae normal.   Cardiovascular:      Rate and Rhythm: Regular rhythm. Tachycardia present.      Pulses: Normal pulses.      Heart sounds: Normal heart sounds.   Pulmonary:      Effort: Pulmonary effort is normal.      Breath sounds: Normal breath sounds.   Abdominal:      General: Abdomen is flat.      Palpations: Abdomen is soft.      Tenderness: There is right CVA tenderness.   Musculoskeletal:      Cervical back: Normal range of motion.      Right lower leg: No edema.      Left lower leg: No edema.   Skin:     General: Skin is warm and dry.   Neurological:      General: No focal  deficit present.      Mental Status: She is alert and oriented to person, place, and time.   Psychiatric:         Mood and Affect: Mood normal.         Behavior: Behavior normal.                Significant Labs: All pertinent labs within the past 24 hours have been reviewed.    Significant Imaging: I have reviewed all pertinent imaging results/findings within the past 24 hours.

## 2025-05-19 NOTE — PROVIDER PROGRESS NOTES - EMERGENCY DEPT.
Encounter Date: 5/18/2025    ED Physician Progress Notes          Physician Note:   Signout Note     Chief complaint:  Right flank pain and generalized weakness     Per sign out and chart review:  Tanika Wayne is a 65 y.o. female, presenting with complaints of right flank pain and difficulty walking.  She notes that she is feeling tired and has had this pain for the past few days.  Notes that has 7/10 and radiates to her abdomen.  Has had subjective fevers and then intermittent nausea.    During ED stay patient received:  Medications   ondansetron injection 4 mg (has no administration in time range)   vancomycin 2 g in 0.9% sodium chloride 500 mL IVPB (2,000 mg Intravenous New Bag 5/18/25 2234)   lactated ringers bolus 1,000 mL (0 mLs Intravenous Stopped 5/18/25 2331)   morphine injection 4 mg (4 mg Intravenous Given 5/18/25 2158)   ketorolac injection 15 mg (15 mg Intravenous Given 5/18/25 2158)   lactated ringers bolus 500 mL (0 mLs Intravenous Stopped 5/18/25 2356)   piperacillin-tazobactam (ZOSYN) 4.5 g in D5W 100 mL IVPB (MB+) (0 g Intravenous Stopped 5/18/25 2356)   iohexoL (OMNIPAQUE 350) injection 75 mL (100 mLs Intravenous Given 5/18/25 2311)        Pt signed out to me pending:  CT abdomen, urinalysis and re-evaluation.     Update/ Disposition:  Upon my initial evaluation of the patient, she was no longer as tachycardic as when she 1st arrived.  She was comfortably resting in the bed but did continue to endorse pain in her right flank.  CT abdomen significant for right-sided pyelonephritis with a possible right renal abscess.  Patient to be admitted to hospital medicine for IV antibiotics/further management of her pyelonephritis.     Patient, caregiver and/or family understands the plan and verbalized agreement. All questions answered.      Diagnostic Impression:    Pyelonephritis

## 2025-05-19 NOTE — ED NOTES
Patient presents via EMS with c/o fatigue, cold sweats, dysuria, low grade fever (tmax 99.8) and general body aches. Patient denies N/V/D/HA. Patient states she ambulates at baseline; however, she has been too weak the last couple of weeks. At ~2200 patient began to de-sat into the 80's- 2L NC applied and SATs are now stable at 95%. Sepsis workup completed. 20g PIV inserted to RAC.

## 2025-05-19 NOTE — ASSESSMENT & PLAN NOTE
65 y.o. female presenting with pyelonephritis based on abdominal imaging. Patient was seen recently for UTI and given 7 days of keflex. She presents with tachycardia and leukocytosis. UA shows occasional bacteria.    Plan:  -- Start rocephin 1g  -- Given 1.5L LR in the ED

## 2025-05-19 NOTE — ASSESSMENT & PLAN NOTE
Patients blood pressure range in the last 24 hours was: BP  Min: 123/89  Max: 169/71.The patient's inpatient anti-hypertensive regimen is listed below:  Current Antihypertensives       Home coreg 3.125 BID, losartan-HCTZ 100-12.5mg QD, nifedipine 90mg QD    Plan  - BP is controlled, no changes needed to their regimen  - Hold home antihypertensives in the setting of infection

## 2025-05-19 NOTE — ED NOTES
Pt care assumed. Report received by GLENNY Moody. Pt lying in stretcher in low and locked position and side rails raised x2. Call light, pt's belongings, and bedside table within pt's reach. Pt on continuous cardiac monitoring, pulse oximetry, and BP cycling every 30 minutes. Pt in NAD and verbalized no needs at this time.

## 2025-05-19 NOTE — NURSING
Pt got back from IR. Vitals with elevated BP, MD althea notified. No pain reported at the moment. Gauze applied in the back after IR. Call light within reach. POC continues.

## 2025-05-19 NOTE — PLAN OF CARE
Right perinephric aspiration completed, pt tolerated well. No s/s of complications noted. 10 cc removed from right perinephric, dressing applied CDI. Labs collected and sent. Pt to be transferred to mpu for 1 hour recovery per Dr Tapia. Report to be given at bedside to RN.    Report to be called and given to floor RN.

## 2025-05-19 NOTE — ASSESSMENT & PLAN NOTE
Creatine stable for now. BMP reviewed- noted Estimated Creatinine Clearance: 51.1 mL/min (based on SCr of 1.2 mg/dL). according to latest data. Based on current GFR, CKD stage is stage 3 - GFR 30-59.  Monitor UOP and serial BMP and adjust therapy as needed. Renally dose meds. Avoid nephrotoxic medications and procedures.

## 2025-05-19 NOTE — ASSESSMENT & PLAN NOTE
S/p gastric sleeve June 2021.  -- Continue home pepcid PRN  -- Continue home Lotrisone cream PRN for irritation under excessive abdominal skin

## 2025-05-19 NOTE — PLAN OF CARE
Recommendations  --Continue Consistent Carbohydrate diet as tolerated and clinically indicated   --Encourage good intakes   --Nursing: please continue to document % meal eaten on flowsheet   --RD to monitor weight, PO intake     Goals: Meet % EEN/EPN by next RD follow-up  Nutrition Goal Status: new  Communication of RD Recs:  (POC)

## 2025-05-19 NOTE — PROGRESS NOTES
"[facility]  Adult Nutrition  Progress Note    SUMMARY       Recommendations  --Continue Consistent Carbohydrate diet as tolerated and clinically indicated   --Encourage good intakes   --Nursing: please continue to document % meal eaten on flowsheet   --RD to monitor weight, PO intake     Goals: Meet % EEN/EPN by next RD follow-up  Nutrition Goal Status: new  Communication of RD Recs:  (POC)    Nutrition Discharge Planning    Nutrition Discharge Planning: Therapeutic diet (comments)  Therapeutic diet (comments): Diabetic diet    Reason for Assessment    Reason For Assessment: identified at risk by screening criteria (MSt 3)  Diagnosis:  (Pyelonephritis)  General Information Comments: 65-year-old female with history of ITP, diabetes, hypertension, cardiomyopathy, degenerative disc disease, CKD stage 3, nephrolithiasis, presenting for generalized weakness, right flank pain, dysuria, polyuria and fever and chills. Identified at risk by screening criteria - MST 3. Attempted to speak to pt at bedside - pt MARIYA for procedure. Noted 100% PO intake per chart. Weight stable - no concerns for malnutrition at this time.     Nutrition Related Social Determinants of Health: SDOH: Adequate food in home environment     Food Insecurity: No Food Insecurity (5/19/2025)    Hunger Vital Sign     Worried About Running Out of Food in the Last Year: Never true     Ran Out of Food in the Last Year: Never true       Nutrition/Diet History    Spiritual, Cultural Beliefs, [employer] Practices, Values that Affect Care: no  Food Allergies: NKFA    Anthropometrics    Height: 5' 1" (154.9 cm)  Height (inches): 61 in  Height Method: Measured  Weight: 99.1 kg (218 lb 7.6 oz)  Weight (lb): 218.48 lb  Weight Method: Bed Scale  Ideal Body Weight (IBW), Female: 105 lb  % Ideal Body Weight, Female (lb): 208.08 %  BMI (Calculated): 41.3  BMI Grade: greater than 40 - morbid obesity       Lab/Procedures/Meds    Pertinent Labs Reviewed: reviewed - " Na 131, glucose 147, A1c 6    Pertinent Medications Reviewed: reviewed - atorvastatin, cyanocobalamin, heparin, levothyroxine, magnesium sulfate, MVI , potassium chloride    Estimated/Assessed Needs    Weight Used For Calorie Calculations: 99.1 kg (218 lb 7.6 oz)  Energy Calorie Requirements (kcal): 1478 kcal/day (x 1.0 AF)  Energy Need Method: [place]-St Jeor  Protein Requirements: 79-99 g/day (0.8-1.0 g/kg)  Weight Used For Protein Calculations: 99.1 kg (218 lb 7.6 oz)     Estimated Fluid Requirement Method: RDA Method  RDA Method (mL): 1478  CHO Requirement: 185 g/day      Nutrition Prescription Ordered    Current Diet Order: Consistent Carbohydrate    Evaluation of Received Nutrient/Fluid Intake    Energy Calories Required: meeting needs  Protein Required: meeting needs  Fluid Required:  (Per MD)  Comments: LBM 5/18  Tolerance: tolerating  % Intake of Estimated Energy Needs: 75 - 100 %  % Meal Intake: 75 - 100 %    PES Statement   (Altered nutrition related laboratory values) related to  (Endocrine dysfunction) as evidenced by  (A1c of 6%)  Status: New    Nutrition Risk    Level of Risk/Frequency of Follow-up: moderate - high     Monitor and Evaluation    Monitor and Evaluation: Energy intake, Food and beverage intake, Diet order, Food and nutrition knowledge, Weight, Electrolyte and renal panel, Gastrointestinal profile, Glucose/endocrine profile, Inflammatory profile, Lipid profile, Nutrition focused physical findings, Skin     Nutrition Follow-Up    RD Follow-up?: Yes

## 2025-05-19 NOTE — HPI
65-year-old female with history of ITP, diabetes, hypertension, cardiomyopathy, degenerative disc disease, CKD stage 3, nephrolithiasis, presenting for generalized weakness, right flank pain, dysuria, polyuria and fever and chills. She presents with 1 day of abrupt-onset generalized weakness, inability to ambulate, and lightheadedness. These symptoms occur in the context of 4 days of progressively worsening urinary symptoms, including dysuria, polyuria, and a burning sensation with urination, as well as right flank pain. She describes the flank pain as 7/10 in intensity, located in the right flank with radiation around the abdomen. She also endorses subjective fevers and chills, intermittent nausea without vomiting, and decreased oral intake today due to reduced appetite. She took a Percocet earlier today without any relief of symptoms. Of note, the patient was hospitalized for a UTI on 4/15 and was discharged with a 7-day course of Keflex, with initial improvement in symptoms. However, she subsequently began experiencing weakness and was evaluated by her PCP, who obtained a urinalysis that was notable for the presence of bacteria. Despite these findings, she has not received any additional antibiotic treatment since then.    On arrival, vitals are notable for borderline fever of 99.8, tachycardia in the 110s to 120s. Labs notable for significant leukocytosis (WBC 23.26), mild VIOLET (Cr 1.2). UA showed occasional bacteria. Blood cultures drawn. CT abdomen and pelvis consistent with right-sided pyelonephritis, small renal abscess. She was given IV Toradol, morphine, IV vancomycin and Zosyn, IV fluid rehydration at 30 cc/kilos of ideal body weight (1.5L LR total). Patient admitted to hospital medicine for management of pyelonephritis.

## 2025-05-19 NOTE — PROVIDER PROGRESS NOTES - EMERGENCY DEPT.
Encounter Date: 5/18/2025    ED Physician Progress Notes          EKG: Rate 105, sinus tachycardia, no STEMI   Patient is due for a PHQ-9.  Index start date:7/19/2019  Index end date:11/16/2019    Please call patient.

## 2025-05-19 NOTE — PROCEDURES
Radiology Post-Procedure Note    Pre Op Diagnosis: Renal cyst  Post Op Diagnosis: Same    Procedure: CT guided renal cyst aspiration    Procedure performed by: Lavelle Tapia MD    Written Informed Consent Obtained: Yes  Specimen Removed: YES 8mL bloody fluid  Estimated Blood Loss: Minimal    Findings:   CT guided aspiration of right renal cyst.  8mL bloody fluid removed.  No complication.    Patient tolerated procedure well.    Lavelle Tapia MD  Interventional Radiologist  Department of Radiology

## 2025-05-19 NOTE — H&P
[facility]  History & Physical    Patient Name: Tanika Wayne  MRN: [id number]  Patient Class: IP- Inpatient  Admission Date: 5/18/2025  Attending Physician: Suki Pierre*   Primary Care Provider: Rayo Hernandez MD         Patient information was obtained from patient and ER records.     Subjective:     Principal Problem:Pyelonephritis    Chief Complaint:   Chief Complaint   Patient presents with    Fatigue     Generalized weakness since this morning        HPI: 65-year-old female with history of ITP, diabetes, hypertension, cardiomyopathy, degenerative disc disease, CKD stage 3, nephrolithiasis, presenting for generalized weakness, right flank pain, dysuria, polyuria and fever and chills. She presents with 1 day of abrupt-onset generalized weakness, inability to ambulate, and lightheadedness. These symptoms occur in the context of 4 days of progressively worsening urinary symptoms, including dysuria, polyuria, and a burning sensation with urination, as well as right flank pain. She describes the flank pain as 7/10 in intensity, located in the right flank with radiation around the abdomen. She also endorses subjective fevers and chills, intermittent nausea without vomiting, and decreased oral intake today due to reduced appetite. She took a Percocet earlier today without any relief of symptoms. Of note, the patient was hospitalized for a UTI on 4/15 and was discharged with a 7-day course of Keflex, with initial improvement in symptoms. However, she subsequently began experiencing weakness and was evaluated by her PCP, who obtained a urinalysis that was notable for the presence of bacteria. Despite these findings, she has not received any additional antibiotic treatment since then.    On arrival, vitals are notable for borderline fever of 99.8, tachycardia in the 110s to 120s. Labs notable for significant leukocytosis (WBC 23.26), mild VIOLET (Cr 1.2). UA showed occasional  bacteria. Blood cultures drawn. CT abdomen and pelvis consistent with right-sided pyelonephritis, small renal abscess. She was given IV Toradol, morphine, IV vancomycin and Zosyn, IV fluid rehydration at 30 cc/kilos of ideal body weight (1.5L LR total). Patient admitted to hospital medicine for management of pyelonephritis.     Past Medical History:   Diagnosis Date    Abnormal echocardiogram 2012    Arthritis     Breast cancer     Breast cancer, right breast     Breast cancer, right breast 2012    S/P Lumpectomy / XRT '98 Dr. Bartholomew     Cardiomyopathy due to systemic disease     Cataract     Clotting disorder     Coronary artery disease     DM (diabetes mellitus) 2012    Encounter for blood transfusion     HTN (hypertension) 2012    Increased glucose level 2012    ITP (idiopathic thrombocytopenic purpura) 2012    Kidney stones 2012    PONV (postoperative nausea and vomiting)     Stenosis of lumbosacral spine 2012    Thyroid disease     Tympanic membrane perforation 9/15/2014    right 2014 Dr. Jeffries       Past Surgical History:   Procedure Laterality Date    BREAST LUMPECTOMY Right     CATARACT EXTRACTION W/  INTRAOCULAR LENS IMPLANT Left 2021    Procedure: EXTRACTION, CATARACT, WITH IOL INSERTION;  Surgeon: Klever Tolliver MD;  Location: [facility];  Service: Ophthalmology;  Laterality: Left;    CATARACT EXTRACTION W/  INTRAOCULAR LENS IMPLANT Right 2021    Procedure: EXTRACTION, CATARACT, WITH IOL INSERTION;  Surgeon: Klever Tolliver MD;  Location: [facility];  Service: Ophthalmology;  Laterality: Right;     SECTION      CHOLECYSTECTOMY      COLONOSCOPY N/A 2022    Procedure: COLONOSCOPY;  Surgeon: Anoop Escobar MD;  Location: [address]);  Service: Endoscopy;  Laterality: N/A;  Fully vaccinated/ inst mailed/ clear liquids up to 4 hrs prior/AM prep 2am-3am-RB    ESOPHAGOGASTRODUODENOSCOPY N/A 2021    Procedure: EGD  (ESOPHAGOGASTRODUODENOSCOPY);  Surgeon: Silvio Singh Jr., MD;  Location: [address];  Service: General;  Laterality: N/A;    EYE SURGERY      strabismus, 2006, bilateral cataracts    HIP ARTHROPLASTY Right 10/18/2018    Procedure: ARTHROPLASTY, HIP;  Surgeon: Baldomero Fischer MD;  Location: [facility];  Service: Orthopedics;  Laterality: Right;    HIP REPLACEMENT ARTHROPLASTY Right     dr fischer    HYSTERECTOMY      JOINT REPLACEMENT      right BRYNN, TKA    KNEE ARTHROPLASTY Right 04/11/2019    Procedure: ARTHROPLASTY, KNEE (ADD ON );  Surgeon: Baldomero Fischer MD;  Location: [facility];  Service: Orthopedics;  Laterality: Right;  (ADD ON )    L3-5 Laminectomy  02/2017    Dr. Mendoza    LAPAROSCOPIC SLEEVE GASTRECTOMY N/A 06/08/2021    Procedure: GASTRECTOMY, SLEEVE, LAPAROSCOPIC;  Surgeon: Silvio Singh Jr., MD;  Location: [address];  Service: General;  Laterality: N/A;    SPLENECTOMY, TOTAL  2001    from [facility]    STRABISMUS SURGERY  08/30/2006    OU    THYROIDECTOMY  09/2016    TONSILLECTOMY         Review of patient's allergies indicates:  No Known Allergies    No current facility-administered medications on file prior to encounter.     Current Outpatient Medications on File Prior to Encounter   Medication Sig    atorvastatin (LIPITOR) 10 MG tablet Take 1 tablet (10 mg total) by mouth once daily.    calcium carbonate 1250 MG capsule Take 1,250 mg by mouth Daily.    capsicum 0.075% (ZOSTRIX) 0.075 % topical cream Apply topically 3 (three) times daily as needed (for peipheral neuropathy).    carvediloL (COREG) 3.125 MG tablet Take 1 tablet (3.125 mg total) by mouth 2 (two) times daily.    clotrimazole-betamethasone 1-0.05% (LOTRISONE) cream Apply topically 2 (two) times daily as needed.    cyanocobalamin (VITAMIN B-12) 100 MCG tablet Take 100 mcg by mouth once daily.    docusate sodium (COLACE) 100 MG capsule TAKE 1 CAPSULE (100 MG TOTAL) BY MOUTH 2 (TWO) TIMES DAILY AS NEEDED FOR CONSTIPATION.     DULoxetine (CYMBALTA) 20 MG capsule Take 2 capsules (40 mg total) by mouth once daily.    famotidine (PEPCID) 40 MG tablet Take 1 tablet (40 mg total) by mouth nightly as needed for Heartburn.    fluticasone propionate (FLONASE) 50 mcg/actuation nasal spray INSTILL 2 SPRAYS IN EACH NOSTRIL ONCE A DAY    hydrocortisone 2.5 % cream Apply topically 2 (two) times daily.    levothyroxine (SYNTHROID) 100 MCG tablet Take 1 tablet (100 mcg total) by mouth once daily.    losartan-hydrochlorothiazide 100-12.5 mg (HYZAAR) 100-12.5 mg Tab Take 1 tablet by mouth once daily.    metFORMIN (GLUCOPHAGE-XR) 500 MG ER 24hr tablet Take 1 tablet (500 mg total) by mouth 2 (two) times daily with meals.    multivitamin (THERAGRAN) per tablet Take 1 tablet by mouth once daily.    NIFEdipine (PROCARDIA-XL) 90 MG (OSM) 24 hr tablet Take 1 tablet (90 mg total) by mouth once daily.    oxyCODONE-acetaminophen (PERCOCET) 5-325 mg per tablet Take 1 tablet by mouth every 8 (eight) hours as needed for Pain. Greater than 7 day quantity Medically Necessary    potassium chloride SA (K-DUR,KLOR-CON) 20 MEQ tablet Take 2 tablets (40 mEq total) by mouth 2 (two) times daily.    pregabalin (LYRICA) 100 MG capsule Take 1 capsule (100 mg total) by mouth 2 (two) times daily.    traZODone (DESYREL) 50 MG tablet Take 1 tablet (50 mg total) by mouth every evening.    venlafaxine (EFFEXOR-XR) 150 MG Cp24 Take 1 capsule (150 mg total) by mouth once daily.     Family History       Problem Relation (Age of Onset)    COPD Sister    Diabetes Mother, Father, Sister, Sister, Brother    Emphysema Sister    Heart disease Mother (69)    Hyperlipidemia Sister    Hypertension Mother, Father, Sister, Sister, Sister, Brother    Migraines Sister    No Known Problems Daughter    Rheum arthritis Sister    Stroke Father          Tobacco Use    Smoking status: Never    Smokeless tobacco: Never   Substance and Sexual Activity    Alcohol use: Yes     Comment: seasonally    Drug  use: No    Sexual activity: Not Currently     Partners: Male     Review of Systems   Constitutional:  Positive for chills, fatigue and fever.   Eyes:  Negative for visual disturbance.   Respiratory:  Negative for cough, chest tightness and shortness of breath.    Cardiovascular:  Negative for leg swelling.   Gastrointestinal:  Positive for constipation and nausea. Negative for abdominal pain, diarrhea and vomiting.   Genitourinary:  Positive for dysuria.   Musculoskeletal:  Positive for back pain and gait problem.   Neurological:  Positive for dizziness and numbness (b/l LE neuropathy). Negative for syncope, light-headedness and headaches.   Psychiatric/Behavioral:  Negative for agitation, behavioral problems and confusion.    All other systems reviewed and are negative.    Objective:     Vital Signs (Most Recent):  Temp: 99.7 °F (37.6 °C) (05/18/25 2200)  Pulse: 103 (05/19/25 0100)  Resp: 19 (05/18/25 2200)  BP: (!) 169/71 (05/18/25 2200)  SpO2: 100 % (05/19/25 0100) Vital Signs (24h Range):  Temp:  [98.7 °F (37.1 °C)-99.8 °F (37.7 °C)] 99.7 °F (37.6 °C)  Pulse:  [] 103  Resp:  [16-20] 19  SpO2:  [86 %-100 %] 100 %  BP: (123-169)/(67-89) 169/71        There is no height or weight on file to calculate BMI.     Physical Exam  Vitals and nursing note reviewed.   Constitutional:       General: She is not in acute distress.     Appearance: Normal appearance. She is obese.   HENT:      Head: Normocephalic and atraumatic.      Nose: Nose normal.      Mouth/Throat:      Mouth: Mucous membranes are moist.      Pharynx: Oropharynx is clear.   Eyes:      Extraocular Movements: Extraocular movements intact.      Conjunctiva/sclera: Conjunctivae normal.   Cardiovascular:      Rate and Rhythm: Regular rhythm. Tachycardia present.      Pulses: Normal pulses.      Heart sounds: Normal heart sounds.   Pulmonary:      Effort: Pulmonary effort is normal.      Breath sounds: Normal breath sounds.   Abdominal:      General:  "Abdomen is flat.      Palpations: Abdomen is soft.      Tenderness: There is right CVA tenderness.   Musculoskeletal:      Cervical back: Normal range of motion.      Right lower leg: No edema.      Left lower leg: No edema.   Skin:     General: Skin is warm and dry.   Neurological:      General: No focal deficit present.      Mental Status: She is alert and oriented to person, place, and time.   Psychiatric:         Mood and Affect: Mood normal.         Behavior: Behavior normal.                Significant Labs: All pertinent labs within the past 24 hours have been reviewed.    Significant Imaging: I have reviewed all pertinent imaging results/findings within the past 24 hours.  Assessment/Plan:     Assessment & Plan  Pyelonephritis  Leukocytosis  65 y.o. female presenting with pyelonephritis based on abdominal imaging. Patient was seen recently for UTI and given 7 days of keflex. She presents with tachycardia and leukocytosis. UA shows occasional bacteria.    Plan:  -- Start rocephin 1g  -- Given 1.5L LR in the ED        Hypertension associated with diabetes  Patients blood pressure range in the last 24 hours was: BP  Min: 123/89  Max: 169/71.The patient's inpatient anti-hypertensive regimen is listed below:  Current Antihypertensives       Home coreg 3.125 BID, losartan-HCTZ 100-12.5mg QD, nifedipine 90mg QD    Plan  - BP is controlled, no changes needed to their regimen  - Hold home antihypertensives in the setting of infection   Type 2 diabetes mellitus with cataract  Patient's FSGs are controlled on current medication regimen.  Last A1c reviewed-   Lab Results   Component Value Date    LABA1C 6.4 (H) 08/18/2016    HGBA1C 6.0 (H) 01/27/2025     Most recent fingerstick glucose reviewed- No results for input(s): "POCTGLUCOSE" in the last 24 hours.  Current correctional scale  Low  Maintain anti-hyperglycemic dose as follows-   Antihyperglycemics (From admission, onward)      Start     Stop Route Frequency Ordered "    05/19/25 0224  insulin aspart U-100 pen 0-5 Units         -- SubQ Before meals & nightly PRN 05/19/25 0124          Hold Oral hypoglycemics while patient is in the hospital.     History of subtotal thyroidectomy  Continue home synthroid     S/P lumbar laminectomy  Spinal stenosis, lumbar  Chronic bilateral low back pain with right-sided sciatica  Continue home percocet    Severe obesity with body mass index (BMI) of 35.0 to 35.9 and comorbidity  Body mass index is 41.28 kg/m². Morbid obesity complicates all aspects of disease management from diagnostic modalities to treatment. Weight loss encouraged and health benefits explained to patient.       Hyperlipidemia associated with type 2 diabetes mellitus  Continue home lipitor 10mg    Recurrent major depressive disorder, in full remission  Continue home duloxetine  Diabetic peripheral neuropathy  Continue home lyrica    CKD stage 3a, GFR 45-59 ml/min  Creatine stable for now. BMP reviewed- noted Estimated Creatinine Clearance: 51.1 mL/min (based on SCr of 1.2 mg/dL). according to latest data. Based on current GFR, CKD stage is stage 3 - GFR 30-59.  Monitor UOP and serial BMP and adjust therapy as needed. Renally dose meds. Avoid nephrotoxic medications and procedures.  H/O gastric sleeve  Gastroesophageal reflux disease without esophagitis  S/p gastric sleeve June 2021.  -- Continue home pepcid PRN  -- Continue home Lotrisone cream PRN for irritation under excessive abdominal skin    Insomnia  Continue home trazodone      VTE Risk Mitigation (From admission, onward)           Ordered     heparin (porcine) injection 7,500 Units  Every 8 hours         05/19/25 0112     IP VTE HIGH RISK PATIENT  Once         05/19/25 0103     Place sequential compression device  Until discontinued         05/19/25 0103                                    Kwame Kenyon DO  Department of Hospital Medicine  [facility]

## 2025-05-19 NOTE — ED PROVIDER NOTES
Encounter Date: 5/18/2025       History     Chief Complaint   Patient presents with    Fatigue     Generalized weakness since this morning     HPI    65-year-old female with history of ITP, diabetes, hypertension, cardiomyopathy, degenerative disc disease, CKD stage 3, nephrolithiasis, presenting for generalized weakness.    Patient reports 1 day of abrupt onset generalized weakness, inability to ambulate, and lightheadedness.  This is in the setting of 4 days of progressive worsening dysuria, polyuria, burning with urination and right flank pain.  She reports her flank pain is 7/10, in the right flank, with radiation around her abdomen.  She has subjective fevers and chills, intermittent nausea without vomiting.  She reports decreased oral intake secondary to decreased appetite today.  She took a Percocet today without any improvement of her symptoms.    Review of patient's allergies indicates:  No Known Allergies  Past Medical History:   Diagnosis Date    Abnormal echocardiogram 11/26/2012    Arthritis     Breast cancer 1998    Breast cancer, right breast     Breast cancer, right breast 11/26/2012    S/P Lumpectomy / XRT '98 Dr. Bartholomew     Cardiomyopathy due to systemic disease     Cataract     Clotting disorder     Coronary artery disease     DM (diabetes mellitus) 11/26/2012    Encounter for blood transfusion     HTN (hypertension) 11/26/2012    Increased glucose level 11/26/2012    ITP (idiopathic thrombocytopenic purpura) 11/26/2012    Kidney stones 11/26/2012    PONV (postoperative nausea and vomiting)     Stenosis of lumbosacral spine 11/26/2012    Thyroid disease     Tympanic membrane perforation 9/15/2014    right 2014 Dr. Jeffries     Past Surgical History:   Procedure Laterality Date    BREAST LUMPECTOMY Right 1998    CATARACT EXTRACTION W/  INTRAOCULAR LENS IMPLANT Left 02/04/2021    Procedure: EXTRACTION, CATARACT, WITH IOL INSERTION;  Surgeon: Klever Tolliver MD;  Location: [facility];  Service:  Ophthalmology;  Laterality: Left;    CATARACT EXTRACTION W/  INTRAOCULAR LENS IMPLANT Right 2021    Procedure: EXTRACTION, CATARACT, WITH IOL INSERTION;  Surgeon: Klever Tolliver MD;  Location: [facility];  Service: Ophthalmology;  Laterality: Right;     SECTION      CHOLECYSTECTOMY      COLONOSCOPY N/A 2022    Procedure: COLONOSCOPY;  Surgeon: Anoop Escobar MD;  Location: [facility] ENDO (4TH FLR);  Service: Endoscopy;  Laterality: N/A;  Fully vaccinated/ inst mailed/ clear liquids up to 4 hrs prior/AM prep 2am-3am-RB    ESOPHAGOGASTRODUODENOSCOPY N/A 2021    Procedure: EGD (ESOPHAGOGASTRODUODENOSCOPY);  Surgeon: Silvio Singh Jr., MD;  Location: [facility] 2ND FLR;  Service: General;  Laterality: N/A;    EYE SURGERY      strabismus, , bilateral cataracts    HIP ARTHROPLASTY Right 10/18/2018    Procedure: ARTHROPLASTY, HIP;  Surgeon: Baldomero Fischer MD;  Location: [facility];  Service: Orthopedics;  Laterality: Right;    HIP REPLACEMENT ARTHROPLASTY Right     dr fischer    HYSTERECTOMY      JOINT REPLACEMENT      right BRYNN, TKA    KNEE ARTHROPLASTY Right 2019    Procedure: ARTHROPLASTY, KNEE (ADD ON );  Surgeon: Baldomero Fischer MD;  Location: [facility];  Service: Orthopedics;  Laterality: Right;  (ADD ON )    L3-5 Laminectomy  2017    Dr. Mendoza    LAPAROSCOPIC SLEEVE GASTRECTOMY N/A 2021    Procedure: GASTRECTOMY, SLEEVE, LAPAROSCOPIC;  Surgeon: Silvio Singh Jr., MD;  Location: [facility] 2ND FLR;  Service: General;  Laterality: N/A;    SPLENECTOMY, TOTAL  2001    from [facility]    STRABISMUS SURGERY  2006    OU    THYROIDECTOMY  2016    TONSILLECTOMY       Family History   Problem Relation Name Age of Onset    Heart disease Mother  69        MI    Diabetes Mother      Hypertension Mother      Diabetes Father      Hypertension Father      Stroke Father      Hyperlipidemia Sister      COPD Sister      Hypertension Sister      Emphysema Sister      Diabetes Sister  Tita     Hypertension Sister Tita     Diabetes Sister Мария         prediabetic    Hypertension Sister Мария     Migraines Sister Мария     Rheum arthritis Sister Мария     Diabetes Brother Facundo     Hypertension Brother Facundo     No Known Problems Daughter Genae      Social History[1]  Review of Systems  See HPI for pertinent ROS.   Physical Exam     Initial Vitals [05/18/25 1908]   BP Pulse Resp Temp SpO2   123/89 (!) 116 16 98.7 °F (37.1 °C) 100 %      MAP       --         Physical Exam    Constitutional: She appears well-developed and well-nourished.   HENT:   Head: Normocephalic and atraumatic.   Eyes: Conjunctivae are normal. No scleral icterus.   Neck: No JVD present.   Cardiovascular:  Regular rhythm and normal heart sounds.     Exam reveals no gallop and no friction rub.       No murmur heard.  Tachycardia   Pulmonary/Chest: Breath sounds normal. No stridor. She has no wheezes. She has no rhonchi. She has no rales.   Abdominal: Abdomen is soft. There is abdominal tenderness (R flank with mild R CVAT). There is no rebound and no guarding.   Musculoskeletal:         General: No tenderness or edema.     Neurological: She is alert.   Skin: Skin is warm. Capillary refill takes less than 2 seconds.   Psychiatric: She has a normal mood and affect.         ED Course   Procedures  Labs Reviewed   COMPREHENSIVE METABOLIC PANEL - Abnormal       Result Value    Sodium 140      Potassium 4.3      Chloride 100      CO2 25      Glucose 116 (*)     BUN 28 (*)     Creatinine 1.2      Calcium 9.2      Protein Total 7.6      Albumin 3.5      Bilirubin Total 0.4      ALP 70      AST 27      ALT 26      Anion Gap 15      eGFR 51 (*)    CBC WITH DIFFERENTIAL - Abnormal    WBC 23.26 (*)     RBC 4.38      HGB 12.0      HCT 38.4      MCV 88      MCH 27.4      MCHC 31.3 (*)     RDW 15.3 (*)     Platelet Count 404      MPV 10.2      Nucleated RBC 0      Neut % 70.2      Lymph % 21.5      Mono % 7.4      Eos % 0.0       Basophil % 0.3      Imm Grans % 0.6 (*)     Neut # 16.33 (*)     Lymph # 4.99 (*)     Mono # 1.72 (*)     Eos # 0.01      Baso # 0.06      Imm Grans # 0.15 (*)    HEPATITIS C ANTIBODY - Normal    Hep C Ab Interp Non-Reactive     HIV 1 / 2 ANTIBODY - Normal    HIV 1/2 Ag/Ab Non-Reactive     TROPONIN I HIGH SENSITIVITY - Normal    Troponin High Sensitive 10     TROPONIN I HIGH SENSITIVITY - Normal    Troponin High Sensitive 11     B-TYPE NATRIURETIC PEPTIDE - Normal    BNP <10     CULTURE, BLOOD   CULTURE, BLOOD   CBC W/ AUTO DIFFERENTIAL    Narrative:     The following orders were created for panel order CBC auto differential.  Procedure                               Abnormality         Status                     ---------                               -----------         ------                     CBC with Differential[[id number]]       Abnormal            Final result                 Please view results for these tests on the individual orders.   HEP C VIRUS HOLD SPECIMEN   TSH   MAGNESIUM   URINALYSIS, REFLEX TO URINE CULTURE   EXTRA TUBES    Narrative:     The following orders were created for panel order EXTRA TUBES.  Procedure                               Abnormality         Status                     ---------                               -----------         ------                     Light Green Top Hold[[id number]]                                                         Please view results for these tests on the individual orders.   LIGHT GREEN TOP HOLD   EXTRA TUBES    Narrative:     The following orders were created for panel order EXTRA TUBES.  Procedure                               Abnormality         Status                     ---------                               -----------         ------                     Light Green Top Hold[[id number]]                            In process                 Light Green Top Hold[[id number]]                                                         Please view  results for these tests on the individual orders.   LIGHT GREEN TOP HOLD   LIGHT GREEN TOP HOLD          Imaging Results              X-Ray Chest 1 View (In process)                      Medications   ondansetron injection 4 mg (has no administration in time range)   lactated ringers bolus 500 mL (has no administration in time range)   vancomycin 2 g in 0.9% sodium chloride 500 mL IVPB (has no administration in time range)   piperacillin-tazobactam (ZOSYN) 4.5 g in D5W 100 mL IVPB (MB+) (4.5 g Intravenous New Bag 5/18/25 2202)   lactated ringers bolus 1,000 mL (1,000 mLs Intravenous New Bag 5/18/25 2120)   morphine injection 4 mg (4 mg Intravenous Given 5/18/25 2158)   ketorolac injection 15 mg (15 mg Intravenous Given 5/18/25 2158)     Medical Decision Making  Amount and/or Complexity of Data Reviewed  Labs: ordered.  Radiology: ordered.    Risk  Prescription drug management.               ED Course as of 05/18/25 2231   Sun May 18, 2025   2230 CBC auto differential(!)  Significant leukocytosis, no anemia, no thrombocytopenia. [KB]   2230 Comprehensive metabolic panel(!)  No VIOLET, no significant electrolyte abnormality,  no evidence of acute hepatobiliary obstruction.   [KB]   2230 B-Type natriuretic peptide (BNP)  CHFe less likely  [KB]   2230 Troponin I High Sensitivity #2  ACS less likely [KB]   2230 X-Ray Chest 1 View  On my personal interpretation of this CXR, there is no evidence of acute pneumonia, pneumothorax, pleural effusion, or pulmonary edema.    [KB]      ED Course User Index  [KB] Silvia Price MD                       EKG, normal sinus rhythm, rate 96, normal axis deviation, no QTC prolongation or ST segment elevation.    See ED course for personal interpretation of workup/ differential.       65-year-old female with history of ITP, diabetes, hypertension, cardiomyopathy, degenerative disc disease, CKD stage 3, nephrolithiasis, presenting for generalized weakness, right flank pain, dysuria, polyuria  and fever and chills.    On arrival, vitals are notable for borderline fever of 99.8, tachycardia in the 110s to 120s, and right flank pain.  She has mild right CVAT tenderness as well, but no abdominal tenderness to palpation, Love's sign negative.  Given her tachycardia, borderline fever of 99.8° F, infectious urinary symptoms, sepsis protocol initiated.   Labs notable for significant leukocytosis of 23,000, mild VIOLET.  Lactate not elevated.  To evaluate for infected stone versus pyelonephritis, CT abdomen and pelvis with IV contrast was ordered.  She was given IV Toradol, morphine, IV vancomycin and Zosyn, IV fluid rehydration at 30 cc/kilos of ideal body weight.  She will likely need admission for sepsis, likely UTI versus pyelonephritis, she was signed out pending urinalysis and CT abdomen and pelvis    This patient does have evidence of infective focus  My overall impression is sepsis.  Source: Urinary Tract  Antibiotics given-   Antibiotics (72h ago, onward)      Start     Stop Route Frequency Ordered    05/18/25 2145  vancomycin 2 g in 0.9% sodium chloride 500 mL IVPB         05/19/25 0944 IV ED 1 Time 05/18/25 2141    05/18/25 2145  piperacillin-tazobactam (ZOSYN) 4.5 g in D5W 100 mL IVPB (MB+)         05/19/25 0944 IV ED 1 Time 05/18/25 2141          Latest lactate reviewed-  Recent Labs   Lab 05/18/25 2131   POCLAC 1.8     Organ dysfunction indicated by leukocytosis    Fluid challenge Ideal Body Weight- The patient is obese (BMI>30) and their ideal body weight of Patient weight not recorded will be used to calculate fluid bolus of 30 ml/kg.     Post- resuscitation assessment Yes - I attest a sepsis perfusion exam was performed within 6 hours of sepsis, severe sepsis, or septic shock presentation, following fluid resuscitation.      Will Not start Pressors- Levophed for MAP of 65  Source control achieved by: abx       Clinical Impression:  Final diagnoses:  [R00.0] Tachycardia  [R07.9] Chest  pain  [R53.1] Generalized weakness  [R10.9] Flank pain (Primary)  [R50.9] Fever and chills  [R30.0] Dysuria  [D72.829] Leukocytosis, unspecified type                     [1]   Social History  Tobacco Use    Smoking status: Never    Smokeless tobacco: Never   Substance Use Topics    Alcohol use: Yes     Comment: seasonally    Drug use: No        Silvia Price MD  Resident  05/18/25 4021

## 2025-05-19 NOTE — CARE UPDATE
Unit [facility] Care Support Interaction      I have reviewed the chart of Tanika Wayne who is hospitalized for Pyelonephritis. The patient is currently located in the following unit: [facility]              I have seen and examined the patient and provided the following support:     Skin - waffle mattress and/or specialty bed ordered       GREGORIO Carmichael  Unit Based [facility]

## 2025-05-19 NOTE — PLAN OF CARE

## 2025-05-19 NOTE — PLAN OF CARE
Pt arrived to IR for R perinephric abscess vs cyst. Pt oriented to unit and staff. Plan of care reviewed with patient, patient verbalizes understanding. Comfort measures utilized. Pt safely transferred from stretcher to procedural table. Fall risk reviewed with patient, fall risk interventions maintained. Safety strap applied, positioner pillows utilized to minimize pressure points. Blankets applied. Pt prepped and draped utilizing standard sterile technique. Patient placed on continuous monitoring, as required by sedation policy. Timeouts completed utilizing standard universal time-out, per department and facility policy. RN to remain at bedside, continuous monitoring maintained. Pt resting comfortably. Denies pain/discomfort. Will continue to monitor. See flow sheets for monitoring, medication administration, and updates.

## 2025-05-19 NOTE — CARE UPDATE
[facility] Care Support Interaction      I have reviewed the chart of Tanika Wayne who is hospitalized for Pyelonephritis. The patient is currently located in the following unit: [facility]                  I have seen and examined the patient and provided the following support:     Clinical support - confirmed care plan  Proactive rounds - patient is stable changed leads HR now in the 120s  Vitals stable       Akira Watson, JULIANP-C  Unit Based [facility]

## 2025-05-19 NOTE — CLINICAL REVIEW
IP Sepsis Screen (most recent)       Sepsis Screen (IP) - 05/19/25 6437       Is the patient's history or complaint suggestive of a possible infection? Yes  -WL    Are there at least two of the following signs and symptoms present? Yes  -WL    Sepsis signs/symptoms - Tachycardia Tachycardia     >90  -WL    Sepsis signs/symptoms - WBC WBC < 4,000 or WBC > 12,000  -WL    Are any of the following organ dysfunction criteria present and not considered to be due to a chronic condition? Yes  -WL    Organ Dysfunction Criteria - O2 O2 Saturation < 95% on room air  -WL    Initiate Sepsis Protocol No  -WL    Reason sepsis not considered Pt. receiving appropriate management   BCx2 ngtd, LA wnl, IVABs -WL              User Key  (r) = Recorded By, (t) = Taken By, (c) = Cosigned By      Initials Name    Mari Nettles RN

## 2025-05-19 NOTE — CARE UPDATE
[facility] Care Support Interaction      I have reviewed the chart of Tanika Wayne who is hospitalized for Pyelonephritis. The patient is currently located in the following unit: [facility]        I have assisted the primary physician in management of the following:      MRSA Decolonization - Mupirocin ordered and CHG ordered                 GREGORIO Carmichael  Unit Based [facility]

## 2025-05-19 NOTE — CONSULTS
[facility] VASCULAR ACCESS NOTE       Bed:1022/1022 A    [facility]    NIAS not on service at this time.     On chart review, Anesthesia obtained access 05/19/25 @ 0400    Please re-consult if needed.    Teodoro Renteria RN

## 2025-05-19 NOTE — ED NOTES
Telemetry Verification   Patient placed on Telemetry Box  Verified with War Room  Box # [id number]   Monitor Tech    Rate 108   Rhythm Sinus Tach

## 2025-05-20 LAB
ABSOLUTE EOSINOPHIL (OHS): 0.11 K/UL
ABSOLUTE MONOCYTE (OHS): 1.58 K/UL (ref 0.3–1)
ABSOLUTE NEUTROPHIL COUNT (OHS): 13.15 K/UL (ref 1.8–7.7)
ACID FAST MOD KINY STN SPEC: NORMAL
ALBUMIN SERPL BCP-MCNC: 2.7 G/DL (ref 3.5–5.2)
ALP SERPL-CCNC: 75 UNIT/L (ref 40–150)
ALT SERPL W/O P-5'-P-CCNC: 28 UNIT/L (ref 10–44)
ANION GAP (OHS): 13 MMOL/L (ref 8–16)
AST SERPL-CCNC: 26 UNIT/L (ref 11–45)
BASOPHILS # BLD AUTO: 0.06 K/UL
BASOPHILS NFR BLD AUTO: 0.3 %
BILIRUB SERPL-MCNC: 0.4 MG/DL (ref 0.1–1)
BUN SERPL-MCNC: 19 MG/DL (ref 8–23)
CALCIUM SERPL-MCNC: 8.8 MG/DL (ref 8.7–10.5)
CHLORIDE SERPL-SCNC: 101 MMOL/L (ref 95–110)
CO2 SERPL-SCNC: 21 MMOL/L (ref 23–29)
CREAT SERPL-MCNC: 0.9 MG/DL (ref 0.5–1.4)
ERYTHROCYTE [DISTWIDTH] IN BLOOD BY AUTOMATED COUNT: 14.8 % (ref 11.5–14.5)
GFR SERPLBLD CREATININE-BSD FMLA CKD-EPI: >60 ML/MIN/1.73/M2
GLUCOSE SERPL-MCNC: 112 MG/DL (ref 70–110)
HCT VFR BLD AUTO: 35.5 % (ref 37–48.5)
HGB BLD-MCNC: 11.6 GM/DL (ref 12–16)
IMM GRANULOCYTES # BLD AUTO: 0.14 K/UL (ref 0–0.04)
IMM GRANULOCYTES NFR BLD AUTO: 0.7 % (ref 0–0.5)
LYMPHOCYTES # BLD AUTO: 4.93 K/UL (ref 1–4.8)
MAGNESIUM SERPL-MCNC: 2.2 MG/DL (ref 1.6–2.6)
MCH RBC QN AUTO: 27.9 PG (ref 27–31)
MCHC RBC AUTO-ENTMCNC: 32.7 G/DL (ref 32–36)
MCV RBC AUTO: 85 FL (ref 82–98)
NUCLEATED RBC (/100WBC) (OHS): 0 /100 WBC
PHOSPHATE SERPL-MCNC: 3.4 MG/DL (ref 2.7–4.5)
PLATELET # BLD AUTO: 265 K/UL (ref 150–450)
PMV BLD AUTO: 11.4 FL (ref 9.2–12.9)
POCT GLUCOSE: 112 MG/DL (ref 70–110)
POCT GLUCOSE: 117 MG/DL (ref 70–110)
POCT GLUCOSE: 153 MG/DL (ref 70–110)
POCT GLUCOSE: 75 MG/DL (ref 70–110)
POTASSIUM SERPL-SCNC: 3.5 MMOL/L (ref 3.5–5.1)
PROT SERPL-MCNC: 6.7 GM/DL (ref 6–8.4)
RBC # BLD AUTO: 4.16 M/UL (ref 4–5.4)
RELATIVE EOSINOPHIL (OHS): 0.6 %
RELATIVE LYMPHOCYTE (OHS): 24.7 % (ref 18–48)
RELATIVE MONOCYTE (OHS): 7.9 % (ref 4–15)
RELATIVE NEUTROPHIL (OHS): 65.8 % (ref 38–73)
SODIUM SERPL-SCNC: 135 MMOL/L (ref 136–145)
WBC # BLD AUTO: 19.97 K/UL (ref 3.9–12.7)

## 2025-05-20 PROCEDURE — 84100 ASSAY OF PHOSPHORUS: CPT | Mod: HCNC

## 2025-05-20 PROCEDURE — 25000003 PHARM REV CODE 250: Mod: HCNC

## 2025-05-20 PROCEDURE — 97165 OT EVAL LOW COMPLEX 30 MIN: CPT | Mod: HCNC

## 2025-05-20 PROCEDURE — 83735 ASSAY OF MAGNESIUM: CPT | Mod: HCNC

## 2025-05-20 PROCEDURE — 97161 PT EVAL LOW COMPLEX 20 MIN: CPT | Mod: HCNC

## 2025-05-20 PROCEDURE — 25000003 PHARM REV CODE 250: Mod: HCNC | Performed by: NURSE PRACTITIONER

## 2025-05-20 PROCEDURE — 63600175 PHARM REV CODE 636 W HCPCS: Mod: HCNC | Performed by: STUDENT IN AN ORGANIZED HEALTH CARE EDUCATION/TRAINING PROGRAM

## 2025-05-20 PROCEDURE — 25000003 PHARM REV CODE 250: Mod: HCNC | Performed by: STUDENT IN AN ORGANIZED HEALTH CARE EDUCATION/TRAINING PROGRAM

## 2025-05-20 PROCEDURE — 36415 COLL VENOUS BLD VENIPUNCTURE: CPT | Mod: HCNC

## 2025-05-20 PROCEDURE — 97530 THERAPEUTIC ACTIVITIES: CPT | Mod: HCNC

## 2025-05-20 PROCEDURE — 97116 GAIT TRAINING THERAPY: CPT | Mod: HCNC

## 2025-05-20 PROCEDURE — 63600175 PHARM REV CODE 636 W HCPCS: Mod: HCNC

## 2025-05-20 PROCEDURE — 20600001 HC STEP DOWN PRIVATE ROOM: Mod: HCNC

## 2025-05-20 PROCEDURE — 85025 COMPLETE CBC W/AUTO DIFF WBC: CPT | Mod: HCNC

## 2025-05-20 PROCEDURE — 80053 COMPREHEN METABOLIC PANEL: CPT | Mod: HCNC

## 2025-05-20 RX ORDER — LOSARTAN POTASSIUM AND HYDROCHLOROTHIAZIDE 12.5; 1 MG/1; MG/1
1 TABLET ORAL DAILY
Status: DISCONTINUED | OUTPATIENT
Start: 2025-05-20 | End: 2025-05-20

## 2025-05-20 RX ORDER — LOSARTAN POTASSIUM 50 MG/1
100 TABLET ORAL DAILY
Status: DISCONTINUED | OUTPATIENT
Start: 2025-05-21 | End: 2025-05-23 | Stop reason: HOSPADM

## 2025-05-20 RX ORDER — LOSARTAN POTASSIUM 50 MG/1
100 TABLET ORAL DAILY
Status: DISCONTINUED | OUTPATIENT
Start: 2025-05-20 | End: 2025-05-20

## 2025-05-20 RX ORDER — POLYETHYLENE GLYCOL 3350 17 G/17G
17 POWDER, FOR SOLUTION ORAL DAILY
Status: DISCONTINUED | OUTPATIENT
Start: 2025-05-20 | End: 2025-05-21

## 2025-05-20 RX ADMIN — PIPERACILLIN SODIUM AND TAZOBACTAM SODIUM 4.5 G: 4; .5 INJECTION, POWDER, FOR SOLUTION INTRAVENOUS at 05:05

## 2025-05-20 RX ADMIN — HEPARIN SODIUM 7500 UNITS: 5000 INJECTION INTRAVENOUS; SUBCUTANEOUS at 02:05

## 2025-05-20 RX ADMIN — PREGABALIN 100 MG: 50 CAPSULE ORAL at 09:05

## 2025-05-20 RX ADMIN — OXYCODONE HYDROCHLORIDE AND ACETAMINOPHEN 1 TABLET: 7.5; 325 TABLET ORAL at 09:05

## 2025-05-20 RX ADMIN — LEVOTHYROXINE SODIUM 100 MCG: 0.05 TABLET ORAL at 05:05

## 2025-05-20 RX ADMIN — THERA TABS 1 TABLET: TAB at 08:05

## 2025-05-20 RX ADMIN — MUPIROCIN: 20 OINTMENT TOPICAL at 09:05

## 2025-05-20 RX ADMIN — POLYETHYLENE GLYCOL 3350 17 G: 17 POWDER, FOR SOLUTION ORAL at 12:05

## 2025-05-20 RX ADMIN — OXYCODONE HYDROCHLORIDE AND ACETAMINOPHEN 1 TABLET: 5; 325 TABLET ORAL at 12:05

## 2025-05-20 RX ADMIN — SODIUM CHLORIDE, POTASSIUM CHLORIDE, SODIUM LACTATE AND CALCIUM CHLORIDE 500 ML: 600; 310; 30; 20 INJECTION, SOLUTION INTRAVENOUS at 02:05

## 2025-05-20 RX ADMIN — PIPERACILLIN SODIUM AND TAZOBACTAM SODIUM 4.5 G: 4; .5 INJECTION, POWDER, FOR SOLUTION INTRAVENOUS at 09:05

## 2025-05-20 RX ADMIN — PIPERACILLIN SODIUM AND TAZOBACTAM SODIUM 4.5 G: 4; .5 INJECTION, POWDER, FOR SOLUTION INTRAVENOUS at 02:05

## 2025-05-20 RX ADMIN — OXYCODONE HYDROCHLORIDE AND ACETAMINOPHEN 1 TABLET: 7.5; 325 TABLET ORAL at 12:05

## 2025-05-20 RX ADMIN — OXYCODONE HYDROCHLORIDE AND ACETAMINOPHEN 1 TABLET: 7.5; 325 TABLET ORAL at 07:05

## 2025-05-20 RX ADMIN — FLUTICASONE PROPIONATE 100 MCG: 50 SPRAY, METERED NASAL at 08:05

## 2025-05-20 RX ADMIN — HEPARIN SODIUM 5000 UNITS: 5000 INJECTION INTRAVENOUS; SUBCUTANEOUS at 05:05

## 2025-05-20 RX ADMIN — MICONAZOLE NITRATE: 20 OINTMENT TOPICAL at 09:05

## 2025-05-20 RX ADMIN — DULOXETINE HYDROCHLORIDE 40 MG: 20 CAPSULE, DELAYED RELEASE ORAL at 08:05

## 2025-05-20 RX ADMIN — PREGABALIN 100 MG: 50 CAPSULE ORAL at 08:05

## 2025-05-20 RX ADMIN — ATORVASTATIN CALCIUM 10 MG: 10 TABLET, FILM COATED ORAL at 08:05

## 2025-05-20 RX ADMIN — OXYCODONE HYDROCHLORIDE AND ACETAMINOPHEN 1 TABLET: 5; 325 TABLET ORAL at 05:05

## 2025-05-20 RX ADMIN — CYANOCOBALAMIN TAB 250 MCG 250 MCG: 250 TAB at 08:05

## 2025-05-20 RX ADMIN — VENLAFAXINE HYDROCHLORIDE 150 MG: 75 CAPSULE, EXTENDED RELEASE ORAL at 09:05

## 2025-05-20 RX ADMIN — HEPARIN SODIUM 7500 UNITS: 5000 INJECTION INTRAVENOUS; SUBCUTANEOUS at 09:05

## 2025-05-20 NOTE — PROGRESS NOTES
[facility] Medicine  Progress Note    Patient Name: Tanika Wayne  MRN: [id number]  Patient Class: IP- Inpatient   Admission Date: 5/18/2025  Length of Stay: 1 days  Attending Physician: Soraya Ennis MD  Primary Care Provider: Rayo Hernandez MD        Subjective     Principal Problem:Pyelonephritis        HPI:  65-year-old female with history of ITP, diabetes, hypertension, cardiomyopathy, degenerative disc disease, CKD stage 3, nephrolithiasis, presenting for generalized weakness, right flank pain, dysuria, polyuria and fever and chills. She presents with 1 day of abrupt-onset generalized weakness, inability to ambulate, and lightheadedness. These symptoms occur in the context of 4 days of progressively worsening urinary symptoms, including dysuria, polyuria, and a burning sensation with urination, as well as right flank pain. She describes the flank pain as 7/10 in intensity, located in the right flank with radiation around the abdomen. She also endorses subjective fevers and chills, intermittent nausea without vomiting, and decreased oral intake today due to reduced appetite. She took a Percocet earlier today without any relief of symptoms. Of note, the patient was hospitalized for a UTI on 4/15 and was discharged with a 7-day course of Keflex, with initial improvement in symptoms. However, she subsequently began experiencing weakness and was evaluated by her PCP, who obtained a urinalysis that was notable for the presence of bacteria. Despite these findings, she has not received any additional antibiotic treatment since then.    On arrival, vitals are notable for borderline fever of 99.8, tachycardia in the 110s to 120s. Labs notable for significant leukocytosis (WBC 23.26), mild VIOLET (Cr 1.2). UA showed occasional bacteria. Blood cultures drawn. CT abdomen and pelvis consistent with right-sided pyelonephritis, small renal abscess. She was given IV Toradol, morphine, IV vancomycin and Zosyn,  IV fluid rehydration at 30 cc/kilos of ideal body weight (1.5L LR total). Patient admitted to hospital medicine for management of pyelonephritis.     Overview/Hospital Course:  65 yof presented with dysuria, flank pain, fever chills. Pt found to have renal abscess, IR drainage and cultures sent. Abscess cultures growing GNR. Pt started on zosyn on arrival, will de-escalate. Pt also with pancreatic lesion noted on CT scan, recommended non-urgent MRI.     Interval History: Pt feeling better, improvement in burning with urination, continued fever, will monitor    Review of Systems  Objective:     Vital Signs (Most Recent):  Temp: 98.3 °F (36.8 °C) (05/20/25 1125)  Pulse: 97 (05/20/25 1125)  Resp: 18 (05/20/25 1211)  BP: (!) 153/72 (05/20/25 1125)  SpO2: 96 % (05/20/25 1125) Vital Signs (24h Range):  Temp:  [97.2 °F (36.2 °C)-100.8 °F (38.2 °C)] 98.3 °F (36.8 °C)  Pulse:  [] 97  Resp:  [15-21] 18  SpO2:  [92 %-100 %] 96 %  BP: (129-201)/() 153/72     Weight: 99.1 kg (218 lb 7.6 oz)  Body mass index is 41.28 kg/m².    Intake/Output Summary (Last 24 hours) at 5/20/2025 1331  Last data filed at 5/20/2025 0534  Gross per 24 hour   Intake 318.7 ml   Output 1060 ml   Net -741.3 ml         Physical Exam  Vitals and nursing note reviewed.   Constitutional:       General: She is not in acute distress.     Appearance: Normal appearance. She is obese.   HENT:      Head: Normocephalic and atraumatic.      Nose: Nose normal.      Mouth/Throat:      Mouth: Mucous membranes are moist.      Pharynx: Oropharynx is clear.   Eyes:      Extraocular Movements: Extraocular movements intact.      Conjunctiva/sclera: Conjunctivae normal.   Cardiovascular:      Rate and Rhythm: Normal rate and regular rhythm.      Pulses: Normal pulses.      Heart sounds: Normal heart sounds.   Pulmonary:      Effort: Pulmonary effort is normal.      Breath sounds: Normal breath sounds.   Abdominal:      General: There is no distension.       Palpations: Abdomen is soft.      Tenderness: There is no abdominal tenderness. There is no right CVA tenderness.   Musculoskeletal:      Right lower leg: No edema.      Left lower leg: No edema.   Skin:     General: Skin is warm and dry.      Comments: Some skin fold candida   Neurological:      General: No focal deficit present.      Mental Status: She is alert and oriented to person, place, and time.   Psychiatric:         Mood and Affect: Mood normal.         Behavior: Behavior normal.               Significant Labs: All pertinent labs within the past 24 hours have been reviewed.    Significant Imaging: I have reviewed all pertinent imaging results/findings within the past 24 hours.      Assessment & Plan  Pyelonephritis  Leukocytosis  65 y.o. female presenting with pyelonephritis based on abdominal imaging. Patient was seen recently for UTI and given 7 days of keflex. She presents with tachycardia and leukocytosis. UA shows occasional bacteria. Pt's CT abd/pelvis with concerns for abscess formation about 2 cm.    Plan:  -- switched rocephin 1g to zosyn for greater coverage  -- Given 1.5L LR in the ED and extra 500cc bolus with tachycardia  -- Consulted IR for abscess drainage, cultures growing GNR, no drain left in place  -- de-escalate zosyn once able    65 y.o. female presenting with pyelonephritis based on abdominal imaging. Patient was seen recently for UTI and given 7 days of keflex. She presents with tachycardia and leukocytosis. UA shows occasional bacteria.    Plan:  -- switched rocephin 1g to zosyn for greater coverage  -- Given 1.5L LR in the ED and extra 500cc bolus with tachycardia  -- Consulted IR for abscess drainage, cultures growing GNR, no drain left in place  -- de-escalate zosyn once able      Hypertension associated with diabetes  Patients blood pressure range in the last 24 hours was: BP  Min: 123/89  Max: 201/91.The patient's inpatient anti-hypertensive regimen is listed below:  Current  Antihypertensives  losartan-hydrochlorothiazide 100-12.5 mg per tablet 1 tablet, Daily, Oral    Home coreg 3.125 BID, losartan-HCTZ 100-12.5mg QD, nifedipine 90mg QD    Plan  - BP is controlled, no changes needed to their regimen  - restarted home antihypertensives slowly in the setting of infection   Type 2 diabetes mellitus with cataract  Patient's FSGs are controlled on current medication regimen.  Last A1c reviewed-   Lab Results   Component Value Date    LABA1C 6.4 (H) 08/18/2016    HGBA1C 6.0 (H) 05/19/2025     Most recent fingerstick glucose reviewed-   Recent Labs   Lab 05/19/25  1805 05/19/25  2113 05/20/25  0738 05/20/25  1220   POCTGLUCOSE 105 104 75 117*     Current correctional scale  Low  Maintain anti-hyperglycemic dose as follows-   Antihyperglycemics (From admission, onward)      Start     Stop Route Frequency Ordered    05/19/25 0224  insulin aspart U-100 pen 0-5 Units         -- SubQ Before meals & nightly PRN 05/19/25 0124          Hold Oral hypoglycemics while patient is in the hospital.     History of subtotal thyroidectomy  Continue home synthroid     S/P lumbar laminectomy  Spinal stenosis, lumbar  Chronic bilateral low back pain with right-sided sciatica  Continue home percocet    Severe obesity with body mass index (BMI) of 35.0 to 35.9 and comorbidity  Body mass index is 41.28 kg/m². Morbid obesity complicates all aspects of disease management from diagnostic modalities to treatment. Weight loss encouraged and health benefits explained to patient.       Hyperlipidemia associated with type 2 diabetes mellitus  Continue home lipitor 10mg    Recurrent major depressive disorder, in full remission  Continue home duloxetine  Diabetic peripheral neuropathy  Continue home lyrica    CKD stage 3a, GFR 45-59 ml/min  Creatine stable for now. BMP reviewed- noted Estimated Creatinine Clearance: 68.1 mL/min (based on SCr of 0.9 mg/dL). according to latest data. Based on current GFR, CKD stage is stage 3  - GFR 30-59.  Monitor UOP and serial BMP and adjust therapy as needed. Renally dose meds. Avoid nephrotoxic medications and procedures.  H/O gastric sleeve  Gastroesophageal reflux disease without esophagitis  S/p gastric sleeve June 2021.  -- Continue home pepcid PRN  -- Continue home Lotrisone cream PRN for irritation under excessive abdominal skin    Insomnia  Continue home trazodone    VTE Risk Mitigation (From admission, onward)           Ordered     heparin (porcine) injection 7,500 Units  Every 8 hours         05/19/25 0112     IP VTE HIGH RISK PATIENT  Once         05/19/25 0103     Place sequential compression device  Until discontinued         05/19/25 0103                    Discharge Planning   SHERI:      Code Status: Full Code   Medical Readiness for Discharge Date:   Discharge Plan A: Home with family                Please place Justification for DME        Silvio Christensen DO  Department of Hospital Medicine   [facility]

## 2025-05-20 NOTE — ASSESSMENT & PLAN NOTE
Patients blood pressure range in the last 24 hours was: BP  Min: 123/89  Max: 201/91.The patient's inpatient anti-hypertensive regimen is listed below:  Current Antihypertensives  losartan-hydrochlorothiazide 100-12.5 mg per tablet 1 tablet, Daily, Oral    Home coreg 3.125 BID, losartan-HCTZ 100-12.5mg QD, nifedipine 90mg QD    Plan  - BP is controlled, no changes needed to their regimen  - restarted home antihypertensives slowly in the setting of infection

## 2025-05-20 NOTE — NURSING
Anesthesia came to place peripheral but patient have 1 placed yesterday and it was working. Also, she didn't have the things to place a midline or PICC.

## 2025-05-20 NOTE — ASSESSMENT & PLAN NOTE
Patient's FSGs are controlled on current medication regimen.  Last A1c reviewed-   Lab Results   Component Value Date    LABA1C 6.4 (H) 08/18/2016    HGBA1C 6.0 (H) 05/19/2025     Most recent fingerstick glucose reviewed-   Recent Labs   Lab 05/19/25  1805 05/19/25  2113 05/20/25  0738 05/20/25  1220   POCTGLUCOSE 105 104 75 117*     Current correctional scale  Low  Maintain anti-hyperglycemic dose as follows-   Antihyperglycemics (From admission, onward)      Start     Stop Route Frequency Ordered    05/19/25 0224  insulin aspart U-100 pen 0-5 Units         -- SubQ Before meals & nightly PRN 05/19/25 0124          Hold Oral hypoglycemics while patient is in the hospital.

## 2025-05-20 NOTE — CONSULTS
[facility] VASCULAR ACCESS NOTE       Bed:1022/1022 A    [facility]/25 @ 1935, but was unsuccessful. No other veins found with ultrasound appropriate to attempt.     [facility] again 05/19/25 @ 2058 for PICC insertion. [facility] not [facility] at this [facility]. Based on previous assessment, pt does not have the vasculature to support PICC insertion.     Ordering provider and bedside RN notified via secure chat that pt will need tunneled line placed by IR if central access is needed.    Teodoro Renteria RN

## 2025-05-20 NOTE — CONSULTS
[facility]  Wound Care    Patient Name:  Tanika Wayne   MRN:  [id number]  Date: 5/20/2025  Diagnosis: Pyelonephritis    History:     Past Medical History:   Diagnosis Date    Abnormal echocardiogram 11/26/2012    Arthritis     Breast cancer 1998    Breast cancer, right breast     Breast cancer, right breast 11/26/2012    S/P Lumpectomy / XRT '98 Dr. Bartholomew     Cardiomyopathy due to systemic disease     Cataract     Clotting disorder     Coronary artery disease     DM (diabetes mellitus) 11/26/2012    Encounter for blood transfusion     HTN (hypertension) 11/26/2012    Increased glucose level 11/26/2012    ITP (idiopathic thrombocytopenic purpura) 11/26/2012    Kidney stones 11/26/2012    PONV (postoperative nausea and vomiting)     Stenosis of lumbosacral spine 11/26/2012    Thyroid disease     Tympanic membrane perforation 9/15/2014    right 2014 Dr. Jeffries       Social History[1]    Precautions:     Allergies as of 05/18/2025    (No Known Allergies)       WO Assessment Details/Treatment     Wound care consult received for skin folds.  Chart reviewed for this encounter.    Per chart review, pt w/ moisture associated dermatitis to skin folds w/ fungal appearance. Miconazole topicals already ordered for care of skin folds. WC will sign off.          [1]   Social History  Socioeconomic History    Marital status:     Number of children: 1   Occupational History    Occupation: registration     [place] [employer] [place]   Tobacco Use    Smoking status: Never    Smokeless tobacco: Never   Substance and Sexual Activity    Alcohol use: Yes     Comment: seasonally    Drug use: No    Sexual activity: Not Currently     Partners: Male   Social History Narrative    2017 - Using a walker    The patient is not getting much exercise but is able to get about with the aid of a cane or walker.    wiodw (had copd, arthritis), 2 kids, dtr 28, son 18 (healthy kids)     Social Drivers of Health     Financial Resource Strain: Low Risk   (5/19/2025)    Overall Financial Resource Strain (CARDIA)     Difficulty of Paying Living Expenses: Not very hard   Food Insecurity: No Food Insecurity (5/19/2025)    Hunger Vital Sign     Worried About Running Out of Food in the Last Year: Never true     Ran Out of Food in the Last Year: Never true   Transportation Needs: No Transportation Needs (5/19/2025)    PRAPARE - Transportation     Lack of Transportation (Medical): No     Lack of Transportation (Non-Medical): No   Physical Activity: Inactive (4/16/2025)    Exercise Vital Sign     Days of Exercise per Week: 0 days     Minutes of Exercise per Session: 0 min   Stress: No Stress Concern Present (5/19/2025)    [employer] - Occupational Stress Questionnaire     Feeling of Stress : Only a little   Recent Concern: Stress - Stress Concern Present (4/16/2025)    [employer] - Occupational Stress Questionnaire     Feeling of Stress : To some extent   Housing Stability: Low Risk  (5/19/2025)    Housing Stability Vital Sign     Unable to Pay for Housing in the Last Year: No     Homeless in the Last Year: No

## 2025-05-20 NOTE — PLAN OF CARE
PT eval completed- see note for details, goals and POC established.     Problem: Physical Therapy  Goal: Physical Therapy Goal  Description: Goals to be met by: 25     Patient will increase functional independence with mobility by performin. Supine to sit with Stand-by Assistance  2. Sit to stand transfer with Supervision  3. Bed to chair transfer with Supervision using LRAD  4. Gait  x 100 feet with Stand-by Assistance using LRAD.   5. Lower extremity exercise program x30 reps per handout, with independence    Outcome: Progressing   2025

## 2025-05-20 NOTE — CARE UPDATE
"RAPID RESPONSE NURSE CHART REVIEW        Chart Reviewed: 05/19/2025, 9:47 PM    MRN: [id number]  Bed: 1022/1022 A    Dx: Pyelonephritis    Tanika Wayne has a past medical history of Abnormal echocardiogram, Arthritis, Breast cancer, Breast cancer, right breast, Breast cancer, right breast, Cardiomyopathy due to systemic disease, Cataract, Clotting disorder, Coronary artery disease, DM (diabetes mellitus), Encounter for blood transfusion, HTN (hypertension), Increased glucose level, ITP (idiopathic thrombocytopenic purpura), Kidney stones, PONV (postoperative nausea and vomiting), Stenosis of lumbosacral spine, Thyroid disease, and Tympanic membrane perforation.    Last VS: BP (!) 140/72 (BP Location: Left arm, Patient Position: Lying)   Pulse (!) 117   Temp (!) 100.8 °F (38.2 °C) (Oral)   Resp 18   Ht 5' 1" (1.549 m)   Wt 99.1 kg (218 lb 7.6 oz)   SpO2 (!) 92%   BMI 41.28 kg/m²     24H Vital Sign Range:  Temp:  [97.2 °F (36.2 °C)-100.8 °F (38.2 °C)]   Pulse:  []   Resp:  [15-21]   BP: (130-201)/()   SpO2:  [92 %-100 %]     Level of Consciousness (AVPU): alert    Recent Labs     05/18/25 2053 05/19/25  0840   WBC 23.26* 28.32*   HGB 12.0 11.5*   HCT 38.4 35.1*    339       Recent Labs     05/18/25 2053 05/18/25  2336 05/19/25  0840     --  131*   K 4.3  --  3.6     --  96   CO2 25  --  23   BUN 28*  --  18   CREATININE 1.2  --  0.9   *  --  147*   PHOS  --   --  3.3   MG  --  1.8 1.8        OXYGEN:RA       MEWS score: 3    Rounding completed with charge nurse Tameka for tachycardia and fever reports no acute issues at this time. Reached out to primary nurse Nereida about ordered fluids not yet documented as administered, who states she is going to administer the fluids after the completion of the zosyn d/t incompatibility and only 1 PIV. No additional concerns verbalized at this time. Instructed to call [phone] for further concerns or assistance.    Honorio Sanchez RN     "

## 2025-05-20 NOTE — PLAN OF CARE
Problem: Occupational Therapy  Goal: Occupational Therapy Goal  Description: Goals to be met by: 6/19/25     Patient will increase functional independence with ADLs by performing:    UE Dressing with Supervision.  LE Dressing with Minimal Assistance.  Grooming while standing at sink with Stand-by Assistance.  Toileting from toilet with Stand-by Assistance for hygiene and clothing management.   All functional transfers performed with SBA    Outcome: Progressing

## 2025-05-20 NOTE — SUBJECTIVE & OBJECTIVE
Interval History: Pt feeling better, improvement in burning with urination, continued fever, will monitor    Review of Systems  Objective:     Vital Signs (Most Recent):  Temp: 98.3 °F (36.8 °C) (05/20/25 1125)  Pulse: 97 (05/20/25 1125)  Resp: 18 (05/20/25 1211)  BP: (!) 153/72 (05/20/25 1125)  SpO2: 96 % (05/20/25 1125) Vital Signs (24h Range):  Temp:  [97.2 °F (36.2 °C)-100.8 °F (38.2 °C)] 98.3 °F (36.8 °C)  Pulse:  [] 97  Resp:  [15-21] 18  SpO2:  [92 %-100 %] 96 %  BP: (129-201)/() 153/72     Weight: 99.1 kg (218 lb 7.6 oz)  Body mass index is 41.28 kg/m².    Intake/Output Summary (Last 24 hours) at 5/20/2025 1331  Last data filed at 5/20/2025 0534  Gross per 24 hour   Intake 318.7 ml   Output 1060 ml   Net -741.3 ml         Physical Exam  Vitals and nursing note reviewed.   Constitutional:       General: She is not in acute distress.     Appearance: Normal appearance. She is obese.   HENT:      Head: Normocephalic and atraumatic.      Nose: Nose normal.      Mouth/Throat:      Mouth: Mucous membranes are moist.      Pharynx: Oropharynx is clear.   Eyes:      Extraocular Movements: Extraocular movements intact.      Conjunctiva/sclera: Conjunctivae normal.   Cardiovascular:      Rate and Rhythm: Normal rate and regular rhythm.      Pulses: Normal pulses.      Heart sounds: Normal heart sounds.   Pulmonary:      Effort: Pulmonary effort is normal.      Breath sounds: Normal breath sounds.   Abdominal:      General: There is no distension.      Palpations: Abdomen is soft.      Tenderness: There is no abdominal tenderness. There is no right CVA tenderness.   Musculoskeletal:      Right lower leg: No edema.      Left lower leg: No edema.   Skin:     General: Skin is warm and dry.      Comments: Some skin fold candida   Neurological:      General: No focal deficit present.      Mental Status: She is alert and oriented to person, place, and time.   Psychiatric:         Mood and Affect: Mood normal.          Behavior: Behavior normal.               Significant Labs: All pertinent labs within the past 24 hours have been reviewed.    Significant Imaging: I have reviewed all pertinent imaging results/findings within the past 24 hours.

## 2025-05-20 NOTE — HOSPITAL COURSE
65 yof presented with dysuria, flank pain, fever chills. Pt found to have renal abscess, IR drainage and cultures sent. Abscess cultures growing GNR. Pt started on zosyn on arrival, will de-escalate. Pt also with pancreatic lesion noted on CT scan, recommended non-urgent MRI. 5/21 pt found to be growing E. Coli, abx switched to Cipro 750 Q12 as she continues to improve. She had improvement with abx, but did experience some loose stools and GI upset, so bowel reg stopped and imodium/probiotic added to use while taking abx. Determined to be stable for d/c 5/23, sent with Cipro 750 Q12 to finish 7 day course, and will be going home with HH services.

## 2025-05-20 NOTE — CONSULTS
NIAS consulted for peripheral access.   Attempted PIV and midline, unable to thread catheter x2. Bedside nurse notified.

## 2025-05-20 NOTE — PLAN OF CARE
Problem: Adult Inpatient Plan of Care  Goal: Plan of Care Review  Outcome: Progressing  Goal: Patient-Specific Goal (Individualized)  Outcome: Progressing  Goal: Absence of Hospital-Acquired Illness or Injury  Outcome: Progressing  Goal: Optimal Comfort and Wellbeing  Outcome: Progressing  Goal: Readiness for Transition of Care  Outcome: Progressing   [facility] Plan of Care Note  Dx Pyelonephritis    Shift Events got from bed to chair with OT/PT, need 2 persons assist - HR went up to 150s when pt stood up- pt felt dizzy while standing up ( BP could not read while pt standing up)- HR  then eventually went down to 100s+ /84 after sitting down- dizziness resolved- notified IM 3 team- 500cc bolus LR given; complained of cramping pain intermittently- pain is under control with Oxycodone; 2 saturated pads of urine ( unmeasured)+ 350cc yellow urine per purewick; total bath given    Goals of Care: VS and lab WDL; ambulating; pain control    Neuro: AAOx4    Vital Signs: stable    Respiratory: RA    Diet: 2000 kaiser    Is patient tolerating current diet? yes    GTTS: none    Bowel Movement: 0 BM    Drains/Tubes/Tube Feeds (include total output/shift): none    Lines: 1 PIV      Accuchecks:ac.hs    Skin: puncture incision on back is healing; pressure on sacral healing- protective foam dressing applied on heel and sacrum; ointment+ interdry fabric applied bw skinfold      Activity level? X2 persons assist    Any scheduled procedures? none    Any safety concerns? Fall risk precaution    Other: none

## 2025-05-20 NOTE — ASSESSMENT & PLAN NOTE
Creatine stable for now. BMP reviewed- noted Estimated Creatinine Clearance: 68.1 mL/min (based on SCr of 0.9 mg/dL). according to latest data. Based on current GFR, CKD stage is stage 3 - GFR 30-59.  Monitor UOP and serial BMP and adjust therapy as needed. Renally dose meds. Avoid nephrotoxic medications and procedures.

## 2025-05-20 NOTE — PT/OT/SLP EVAL
Occupational Therapy   Evaluation    Name: Tanika Wayne  MRN: [id number]  Admitting Diagnosis: Pyelonephritis  Recent Surgery: * No surgery found *      Recommendations:     Discharge Recommendations: Low Intensity Therapy  Discharge Equipment Recommendations:  bedside commode  Barriers to discharge:  Other (Comment) (increased assistance needed)    Assessment:   CO-TX with PT for pt safety and max participation with both disciplines.     Tanika Wayne is a 65 y.o. female with a medical diagnosis of Pyelonephritis.  She presents with hypertension and elevated HR with transfers and activities 136/81 sitting EOB to 158/84 standing. Performance deficits affecting function: weakness, impaired endurance, impaired self care skills, impaired functional mobility, gait instability, impaired balance, decreased lower extremity function, impaired cardiopulmonary response to activity. Upon eval, pt limited by elevated HR.      Rehab Prognosis: Good; patient would benefit from acute skilled OT services to address these deficits and reach maximum level of function.       Plan:     Patient to be seen 3 x/week to address the above listed problems via self-care/home management, therapeutic activities, therapeutic exercises  Plan of Care Expires: 06/19/25  Plan of Care Reviewed with: patient    Subjective     Chief Complaint: Elevated HR  Patient/Family Comments/goals: return home    Occupational Profile:  Living Environment: Pt lives with daughter and son in duplex with ramp entrance. Bathroom: tub/shower combo   Previous level of function: Indp  Equipment Used at Home: rollator, grab bar  Assistance upon Discharge: family    Pain/Comfort:  Pain Rating 1: 0/10    Patients cultural, spiritual, Jewish conflicts given the current situation: no    Objective:     Communicated with: Nsg prior to session.  Patient found HOB elevated with telemetry, PureWick upon OT entry to room.    General Precautions: Standard,  fall  Orthopedic Precautions: N/A  Braces: N/A  Respiratory Status: Room air    Occupational Performance:    Bed Mobility:    Patient completed Scooting/Bridging with minimum assistance  Patient completed Supine to Sit with moderate assistance    Functional Mobility/Transfers:  Patient completed Sit <> Stand Transfer with minimum assistance and of 2 persons  with  rolling walker   Patient completed Bed <> Chair Transfer using Stand Pivot technique with minimum assistance and of 2 persons with rolling walker  Functional Mobility: (refer to PT note)    Activities of Daily Living:  Grooming: supervision facial care seated in chair  Upper Body Dressing: minimum assistance don gown for backside    Cognitive/Visual Perceptual:  A&O x4    Physical Exam:  BUE WFL  Balance: intact    AMPAC 6 Click ADL:  AMPAC Total Score: 19    Treatment & Education:  Pt educated on role and purpose of therapy  Pt educated on goal setting  Pt educated on benefits of OOB activity  Pt educated on self advocacy     Patient left up in chair with all lines intact, call button in reach, and nsg present    GOALS:   Multidisciplinary Problems       Occupational Therapy Goals          Problem: Occupational Therapy    Goal Priority Disciplines Outcome Interventions   Occupational Therapy Goal     OT, PT/OT Progressing    Description: Goals to be met by: 6/19/25     Patient will increase functional independence with ADLs by performing:    UE Dressing with Supervision.  LE Dressing with Minimal Assistance.  Grooming while standing at sink with Stand-by Assistance.  Toileting from toilet with Stand-by Assistance for hygiene and clothing management.   All functional transfers performed with SBA                         DME Justifications:   Tanika requires a commode for home use because she is confined to one level of the home environment and there is no toilet on that level.    History:     Past Medical History:   Diagnosis Date    Abnormal echocardiogram  2012    Arthritis     Breast cancer     Breast cancer, right breast     Breast cancer, right breast 2012    S/P Lumpectomy / XRT '98 Dr. Bartholomew     Cardiomyopathy due to systemic disease     Cataract     Clotting disorder     Coronary artery disease     DM (diabetes mellitus) 2012    Encounter for blood transfusion     HTN (hypertension) 2012    Increased glucose level 2012    ITP (idiopathic thrombocytopenic purpura) 2012    Kidney stones 2012    PONV (postoperative nausea and vomiting)     Stenosis of lumbosacral spine 2012    Thyroid disease     Tympanic membrane perforation 9/15/2014    right 2014 Dr. Jeffries         Past Surgical History:   Procedure Laterality Date    BREAST LUMPECTOMY Right     CATARACT EXTRACTION W/  INTRAOCULAR LENS IMPLANT Left 2021    Procedure: EXTRACTION, CATARACT, WITH IOL INSERTION;  Surgeon: Klever Tolliver MD;  Location: [facility];  Service: Ophthalmology;  Laterality: Left;    CATARACT EXTRACTION W/  INTRAOCULAR LENS IMPLANT Right 2021    Procedure: EXTRACTION, CATARACT, WITH IOL INSERTION;  Surgeon: Klever Tolliver MD;  Location: [facility];  Service: Ophthalmology;  Laterality: Right;     SECTION      CHOLECYSTECTOMY      COLONOSCOPY N/A 2022    Procedure: COLONOSCOPY;  Surgeon: Anoop Escobar MD;  Location: [facility] (4TH FLR);  Service: Endoscopy;  Laterality: N/A;  Fully vaccinated/ inst mailed/ clear liquids up to 4 hrs prior/AM prep 2am-3am-RB    ESOPHAGOGASTRODUODENOSCOPY N/A 2021    Procedure: EGD (ESOPHAGOGASTRODUODENOSCOPY);  Surgeon: Silvio Singh Jr., MD;  Location: [facility] OR 2ND FLR;  Service: General;  Laterality: N/A;    EYE SURGERY      strabismus, 2006, bilateral cataracts    HIP ARTHROPLASTY Right 10/18/2018    Procedure: ARTHROPLASTY, HIP;  Surgeon: Baldomero Fischer MD;  Location: [facility];  Service: Orthopedics;  Laterality: Right;    HIP REPLACEMENT ARTHROPLASTY Right      dr fischer    HYSTERECTOMY      JOINT REPLACEMENT      right BRYNN, TKA    KNEE ARTHROPLASTY Right 04/11/2019    Procedure: ARTHROPLASTY, KNEE (ADD ON );  Surgeon: Baldomero Fischer MD;  Location: [facility];  Service: Orthopedics;  Laterality: Right;  (ADD ON )    L3-5 Laminectomy  02/2017    Dr. Mendoza    LAPAROSCOPIC SLEEVE GASTRECTOMY N/A 06/08/2021    Procedure: GASTRECTOMY, SLEEVE, LAPAROSCOPIC;  Surgeon: Silvio Singh Jr., MD;  Location: [address];  Service: General;  Laterality: N/A;    SPLENECTOMY, TOTAL  2001    from [facility]    STRABISMUS SURGERY  08/30/2006    OU    THYROIDECTOMY  09/2016    TONSILLECTOMY         Time Tracking:     OT Date of Treatment: 05/20/25  OT Start Time: 1319  OT Stop Time: 1339  OT Total Time (min): 20 min    Billable Minutes:Evaluation 12  Self Care/Home Management 8    5/20/2025

## 2025-05-20 NOTE — CARE UPDATE
Unit [facility] Care Support Interaction      I have reviewed the chart of Tanika Wayne who is hospitalized for Pyelonephritis. The patient is currently located in the following unit: [facility]         I have seen and examined the patient and provided the following support:     Proactive rounds - patient stabilized with noted with continued rise in wbc and mild fever no IV access .Reached out to team to get tunnel line if possible        Akira Watson, JULIANP-C  Unit Based [facility]

## 2025-05-20 NOTE — PT/OT/SLP EVAL
Physical Therapy Co-Evaluation and Treatment    OT present for coeval due to pt's multiple medical comorbidities and functional/cognition deficits requiring two skilled therapists to appropriately progress pt's musculoskeletal strength, neuromuscular control, and endurance while taking into consideration medical acuity and pt safety.    Patient Name:  Tanika Wayne   MRN:  [id number]    Recommendations:     Discharge Recommendations: Low Intensity Therapy   Discharge Equipment Recommendations: bedside commode   Barriers to discharge: None    Assessment:     Tanika Wayne is a 65 y.o. female admitted with a medical diagnosis of Pyelonephritis.  She presents with the following impairments/functional limitations: weakness, impaired endurance, impaired self care skills, impaired functional mobility, gait instability, impaired balance, decreased lower extremity function, impaired cardiopulmonary response to activity     Pt receptive and tolerated PT co-eval with OT well. Pt able to amb to the bedside chair with RW and min A of 2 persons, however demonstrated increased HR (140s), but able to recover once sitting a few minuets. RN present with transfer and BP taken before and after transfer. Patient currently demonstrates a need for low intensity therapy on a scheduled basis secondary to a decline in functional status due to illness.    Rehab Prognosis: Good; patient would benefit from acute skilled PT services to address these deficits and reach maximum level of function.    Recent Surgery: * No surgery found *      Plan:     During this hospitalization, patient to be seen 4 x/week to address the identified rehab impairments via gait training, therapeutic activities, therapeutic exercises, neuromuscular re-education and progress toward the following goals:    Plan of Care Expires:  06/19/25    Subjective     Chief Complaint: mild dizziness with mobility  Patient/Family Comments/goals: to get  better  Pain/Comfort:  Pain Rating 1: 0/10  Pain Rating Post-Intervention 1: 0/10    Patients cultural, spiritual, Sikhism conflicts given the current situation: no    Patient History:     Living Environment: Pt lives with daughter and son in duplex with ramp entrance. Bathroom: tub/shower combo   Prior Level of Function: mod I using rollator  DME owned: rollator, grab bar  Caregiver Assistance: family      Objective:     Communicated with RN prior to session.  Patient found HOB elevated with telemetry, PureWick  upon PT entry to room.    General Precautions: Standard, fall  Orthopedic Precautions:N/A   Braces: N/A  Respiratory Status: Room air    Exams:  Gross Motor Coordination:  WFL  Sensation:    -       Impaired  pt reports some neuropathy to bottom of Jian feet  RLE ROM: WFL  RLE Strength: grossly 4-/5  LLE ROM: WFL  LLE Strength: grossly 4-/5    Functional Mobility:    Bed Mobility:     Supine > Sit: moderate assistance  Scooting to EOB: minimum assistance    Transfers:   Sit <> Stand Transfer: minimum assistance and of 2 persons from EOB using rolling walker  x2 Trials  Bed <> Chair: minimum assistance and of 2 persons using rolling walker     Balance:   Sitting balance: FAIR+: Maintains balance through MINIMAL excursions of active trunk motion  Standing balance:   FAIR: Maintains without assist but unable to take challenges  POOR+: Needs MIN (minimal ) assist during gait                 Gait:  Distance: ~6 steps to bedside chair   Assistive Device: RW  Assistance Level: minimum assistance and of 2 persons  Gait Assessment: pt amb with small steps to bedside chair needing verbal cues for direction     Vitals    Sitting before amb: BP - 136/81 HR - 121   Sitting in chair after amb: BP - 158/89 HR - 147    AM-PAC 6 CLICK MOBILITY  Total Score:15       Treatment & Education:  Pt educated on tip to reduce fall risk and safety with mobility and using call button for assistance from nursing staff with OOB  mobility.  Pt educated on sitting up in chair throughout most of day  All questions answered within the scope of PT.  White board updated accordingly.    Patient left up in chair with all lines intact, call button in reach, and RN present.    GOALS:   Multidisciplinary Problems       Physical Therapy Goals          Problem: Physical Therapy    Goal Priority Disciplines Outcome Interventions   Physical Therapy Goal     PT, PT/OT Progressing    Description: Goals to be met by: 25     Patient will increase functional independence with mobility by performin. Supine to sit with Stand-by Assistance  2. Sit to stand transfer with Supervision  3. Bed to chair transfer with Supervision using LRAD  4. Gait  x 100 feet with Stand-by Assistance using LRAD.   5. Lower extremity exercise program x30 reps per handout, with independence                         DME Justifications:   Tanika requires a commode for home use because she is confined to a single room.    History:     Past Medical History:   Diagnosis Date    Abnormal echocardiogram 2012    Arthritis     Breast cancer     Breast cancer, right breast     Breast cancer, right breast 2012    S/P Lumpectomy / XRT '98 Dr. Bartholomew     Cardiomyopathy due to systemic disease     Cataract     Clotting disorder     Coronary artery disease     DM (diabetes mellitus) 2012    Encounter for blood transfusion     HTN (hypertension) 2012    Increased glucose level 2012    ITP (idiopathic thrombocytopenic purpura) 2012    Kidney stones 2012    PONV (postoperative nausea and vomiting)     Stenosis of lumbosacral spine 2012    Thyroid disease     Tympanic membrane perforation 9/15/2014    right  Dr. Jeffries       Past Surgical History:   Procedure Laterality Date    BREAST LUMPECTOMY Right     CATARACT EXTRACTION W/  INTRAOCULAR LENS IMPLANT Left 2021    Procedure: EXTRACTION, CATARACT, WITH IOL INSERTION;  Surgeon:  Klever Tolliver MD;  Location: [facility];  Service: Ophthalmology;  Laterality: Left;    CATARACT EXTRACTION W/  INTRAOCULAR LENS IMPLANT Right 2021    Procedure: EXTRACTION, CATARACT, WITH IOL INSERTION;  Surgeon: Klever Tolliver MD;  Location: [facility];  Service: Ophthalmology;  Laterality: Right;     SECTION      CHOLECYSTECTOMY      COLONOSCOPY N/A 2022    Procedure: COLONOSCOPY;  Surgeon: Anoop Escobar MD;  Location: [facility] ENDO (4TH FLR);  Service: Endoscopy;  Laterality: N/A;  Fully vaccinated/ inst mailed/ clear liquids up to 4 hrs prior/AM prep 2am-3am-RB    ESOPHAGOGASTRODUODENOSCOPY N/A 2021    Procedure: EGD (ESOPHAGOGASTRODUODENOSCOPY);  Surgeon: Silvio Singh Jr., MD;  Location: [facility] 2ND FLR;  Service: General;  Laterality: N/A;    EYE SURGERY      strabismus, , bilateral cataracts    HIP ARTHROPLASTY Right 10/18/2018    Procedure: ARTHROPLASTY, HIP;  Surgeon: Baldomero Fischer MD;  Location: [facility];  Service: Orthopedics;  Laterality: Right;    HIP REPLACEMENT ARTHROPLASTY Right     dr fischer    HYSTERECTOMY      JOINT REPLACEMENT      right BRYNN, TKA    KNEE ARTHROPLASTY Right 2019    Procedure: ARTHROPLASTY, KNEE (ADD ON );  Surgeon: Baldomero Fischer MD;  Location: [facility];  Service: Orthopedics;  Laterality: Right;  (ADD ON )    L3-5 Laminectomy  2017    Dr. Mendoza    LAPAROSCOPIC SLEEVE GASTRECTOMY N/A 2021    Procedure: GASTRECTOMY, SLEEVE, LAPAROSCOPIC;  Surgeon: Silvio Singh Jr., MD;  [address]  Service: General;  Laterality: N/A;    SPLENECTOMY, TOTAL  2001    from [facility]    STRABISMUS SURGERY  2006    OU    THYROIDECTOMY  2016    TONSILLECTOMY         Time Tracking:     PT Received On: 25  PT Start Time: 1318     PT Stop Time: 1339  PT Total Time (min): 21 min     Billable Minutes: Evaluation 11 and Gait Training 10      2025

## 2025-05-20 NOTE — ASSESSMENT & PLAN NOTE
65 y.o. female presenting with pyelonephritis based on abdominal imaging. Patient was seen recently for UTI and given 7 days of keflex. She presents with tachycardia and leukocytosis. UA shows occasional bacteria. Pt's CT abd/pelvis with concerns for abscess formation about 2 cm.    Plan:  -- switched rocephin 1g to zosyn for greater coverage  -- Given 1.5L LR in the ED and extra 500cc bolus with tachycardia  -- Consulted IR for abscess drainage, cultures growing GNR, no drain left in place  -- de-escalate zosyn once able    65 y.o. female presenting with pyelonephritis based on abdominal imaging. Patient was seen recently for UTI and given 7 days of keflex. She presents with tachycardia and leukocytosis. UA shows occasional bacteria.    Plan:  -- switched rocephin 1g to zosyn for greater coverage  -- Given 1.5L LR in the ED and extra 500cc bolus with tachycardia  -- Consulted IR for abscess drainage, cultures growing GNR, no drain left in place  -- de-escalate zosyn once able

## 2025-05-21 LAB
ABSOLUTE EOSINOPHIL (OHS): 0.22 K/UL
ABSOLUTE MONOCYTE (OHS): 1.09 K/UL (ref 0.3–1)
ABSOLUTE NEUTROPHIL COUNT (OHS): 6.6 K/UL (ref 1.8–7.7)
ALBUMIN SERPL BCP-MCNC: 2.4 G/DL (ref 3.5–5.2)
ALP SERPL-CCNC: 92 UNIT/L (ref 40–150)
ALT SERPL W/O P-5'-P-CCNC: 47 UNIT/L (ref 10–44)
ANION GAP (OHS): 11 MMOL/L (ref 8–16)
AST SERPL-CCNC: 69 UNIT/L (ref 11–45)
BACTERIA SPEC AEROBE CULT: ABNORMAL
BASOPHILS # BLD AUTO: 0.02 K/UL
BASOPHILS NFR BLD AUTO: 0.2 %
BILIRUB SERPL-MCNC: 0.4 MG/DL (ref 0.1–1)
BUN SERPL-MCNC: 15 MG/DL (ref 8–23)
CALCIUM SERPL-MCNC: 9 MG/DL (ref 8.7–10.5)
CHLORIDE SERPL-SCNC: 98 MMOL/L (ref 95–110)
CO2 SERPL-SCNC: 25 MMOL/L (ref 23–29)
CREAT SERPL-MCNC: 0.7 MG/DL (ref 0.5–1.4)
ERYTHROCYTE [DISTWIDTH] IN BLOOD BY AUTOMATED COUNT: 14.7 % (ref 11.5–14.5)
ESTROGEN SERPL-MCNC: NORMAL PG/ML
GFR SERPLBLD CREATININE-BSD FMLA CKD-EPI: >60 ML/MIN/1.73/M2
GLUCOSE SERPL-MCNC: 96 MG/DL (ref 70–110)
HCT VFR BLD AUTO: 33.7 % (ref 37–48.5)
HGB BLD-MCNC: 10.8 GM/DL (ref 12–16)
IMM GRANULOCYTES # BLD AUTO: 0.04 K/UL (ref 0–0.04)
IMM GRANULOCYTES NFR BLD AUTO: 0.4 % (ref 0–0.5)
INSULIN SERPL-ACNC: NORMAL U[IU]/ML
LAB AP GROSS DESCRIPTION: NORMAL
LAB AP NON-GYN INTERPRETATION SPECIMEN 1: NORMAL
LAB AP PERFORMING LOCATION(S): NORMAL
LYMPHOCYTES # BLD AUTO: 3 K/UL (ref 1–4.8)
MAGNESIUM SERPL-MCNC: 2 MG/DL (ref 1.6–2.6)
MCH RBC QN AUTO: 27.4 PG (ref 27–31)
MCHC RBC AUTO-ENTMCNC: 32 G/DL (ref 32–36)
MCV RBC AUTO: 86 FL (ref 82–98)
NUCLEATED RBC (/100WBC) (OHS): 0 /100 WBC
PHOSPHATE SERPL-MCNC: 2.6 MG/DL (ref 2.7–4.5)
PLATELET # BLD AUTO: 317 K/UL (ref 150–450)
PMV BLD AUTO: 10.6 FL (ref 9.2–12.9)
POCT GLUCOSE: 106 MG/DL (ref 70–110)
POCT GLUCOSE: 108 MG/DL (ref 70–110)
POCT GLUCOSE: 134 MG/DL (ref 70–110)
POCT GLUCOSE: 151 MG/DL (ref 70–110)
POTASSIUM SERPL-SCNC: 3.4 MMOL/L (ref 3.5–5.1)
PROT SERPL-MCNC: 6.5 GM/DL (ref 6–8.4)
RBC # BLD AUTO: 3.94 M/UL (ref 4–5.4)
RELATIVE EOSINOPHIL (OHS): 2 %
RELATIVE LYMPHOCYTE (OHS): 27.3 % (ref 18–48)
RELATIVE MONOCYTE (OHS): 9.9 % (ref 4–15)
RELATIVE NEUTROPHIL (OHS): 60.2 % (ref 38–73)
SODIUM SERPL-SCNC: 134 MMOL/L (ref 136–145)
WBC # BLD AUTO: 10.97 K/UL (ref 3.9–12.7)

## 2025-05-21 PROCEDURE — 25000003 PHARM REV CODE 250: Mod: HCNC

## 2025-05-21 PROCEDURE — 63600175 PHARM REV CODE 636 W HCPCS: Mod: HCNC | Performed by: STUDENT IN AN ORGANIZED HEALTH CARE EDUCATION/TRAINING PROGRAM

## 2025-05-21 PROCEDURE — 36415 COLL VENOUS BLD VENIPUNCTURE: CPT | Mod: HCNC

## 2025-05-21 PROCEDURE — 85025 COMPLETE CBC W/AUTO DIFF WBC: CPT | Mod: HCNC

## 2025-05-21 PROCEDURE — 82374 ASSAY BLOOD CARBON DIOXIDE: CPT | Mod: HCNC

## 2025-05-21 PROCEDURE — 25000003 PHARM REV CODE 250: Mod: HCNC | Performed by: NURSE PRACTITIONER

## 2025-05-21 PROCEDURE — 63600175 PHARM REV CODE 636 W HCPCS: Mod: HCNC

## 2025-05-21 PROCEDURE — 20600001 HC STEP DOWN PRIVATE ROOM: Mod: HCNC

## 2025-05-21 PROCEDURE — 83735 ASSAY OF MAGNESIUM: CPT | Mod: HCNC

## 2025-05-21 PROCEDURE — 84100 ASSAY OF PHOSPHORUS: CPT | Mod: HCNC

## 2025-05-21 PROCEDURE — 25000003 PHARM REV CODE 250: Mod: HCNC | Performed by: STUDENT IN AN ORGANIZED HEALTH CARE EDUCATION/TRAINING PROGRAM

## 2025-05-21 RX ORDER — CARVEDILOL 3.12 MG/1
3.12 TABLET ORAL 2 TIMES DAILY
Status: DISCONTINUED | OUTPATIENT
Start: 2025-05-21 | End: 2025-05-23 | Stop reason: HOSPADM

## 2025-05-21 RX ORDER — SENNOSIDES 8.6 MG/1
8.6 TABLET ORAL DAILY
Status: DISCONTINUED | OUTPATIENT
Start: 2025-05-21 | End: 2025-05-21

## 2025-05-21 RX ORDER — POLYETHYLENE GLYCOL 3350 17 G/17G
17 POWDER, FOR SOLUTION ORAL 2 TIMES DAILY
Status: DISCONTINUED | OUTPATIENT
Start: 2025-05-21 | End: 2025-05-22

## 2025-05-21 RX ORDER — POTASSIUM CHLORIDE 20 MEQ/1
40 TABLET, EXTENDED RELEASE ORAL ONCE
Status: COMPLETED | OUTPATIENT
Start: 2025-05-21 | End: 2025-05-21

## 2025-05-21 RX ORDER — SENNOSIDES 8.6 MG/1
8.6 TABLET ORAL 2 TIMES DAILY
Status: DISCONTINUED | OUTPATIENT
Start: 2025-05-21 | End: 2025-05-22

## 2025-05-21 RX ADMIN — SENNOSIDES 8.6 MG: 8.6 TABLET, FILM COATED ORAL at 08:05

## 2025-05-21 RX ADMIN — PREGABALIN 100 MG: 50 CAPSULE ORAL at 08:05

## 2025-05-21 RX ADMIN — LEVOTHYROXINE SODIUM 100 MCG: 0.05 TABLET ORAL at 05:05

## 2025-05-21 RX ADMIN — CIPROFOLXACIN 750 MG: 250 TABLET ORAL at 08:05

## 2025-05-21 RX ADMIN — POTASSIUM CHLORIDE 40 MEQ: 1500 TABLET, EXTENDED RELEASE ORAL at 07:05

## 2025-05-21 RX ADMIN — FAMOTIDINE 40 MG: 20 TABLET, FILM COATED ORAL at 05:05

## 2025-05-21 RX ADMIN — MICONAZOLE NITRATE: 20 OINTMENT TOPICAL at 08:05

## 2025-05-21 RX ADMIN — POLYETHYLENE GLYCOL 3350 17 G: 17 POWDER, FOR SOLUTION ORAL at 08:05

## 2025-05-21 RX ADMIN — MICONAZOLE NITRATE: 20 OINTMENT TOPICAL at 09:05

## 2025-05-21 RX ADMIN — HEPARIN SODIUM 7500 UNITS: 5000 INJECTION INTRAVENOUS; SUBCUTANEOUS at 05:05

## 2025-05-21 RX ADMIN — MUPIROCIN: 20 OINTMENT TOPICAL at 08:05

## 2025-05-21 RX ADMIN — ATORVASTATIN CALCIUM 10 MG: 10 TABLET, FILM COATED ORAL at 08:05

## 2025-05-21 RX ADMIN — ONDANSETRON 4 MG: 2 INJECTION INTRAMUSCULAR; INTRAVENOUS at 08:05

## 2025-05-21 RX ADMIN — FLUTICASONE PROPIONATE 100 MCG: 50 SPRAY, METERED NASAL at 08:05

## 2025-05-21 RX ADMIN — HEPARIN SODIUM 7500 UNITS: 5000 INJECTION INTRAVENOUS; SUBCUTANEOUS at 09:05

## 2025-05-21 RX ADMIN — OXYCODONE HYDROCHLORIDE AND ACETAMINOPHEN 1 TABLET: 5; 325 TABLET ORAL at 10:05

## 2025-05-21 RX ADMIN — CIPROFOLXACIN 750 MG: 250 TABLET ORAL at 12:05

## 2025-05-21 RX ADMIN — CYANOCOBALAMIN TAB 250 MCG 250 MCG: 250 TAB at 08:05

## 2025-05-21 RX ADMIN — VENLAFAXINE HYDROCHLORIDE 150 MG: 75 CAPSULE, EXTENDED RELEASE ORAL at 08:05

## 2025-05-21 RX ADMIN — OXYCODONE HYDROCHLORIDE AND ACETAMINOPHEN 1 TABLET: 7.5; 325 TABLET ORAL at 09:05

## 2025-05-21 RX ADMIN — HEPARIN SODIUM 7500 UNITS: 5000 INJECTION INTRAVENOUS; SUBCUTANEOUS at 02:05

## 2025-05-21 RX ADMIN — PIPERACILLIN SODIUM AND TAZOBACTAM SODIUM 4.5 G: 4; .5 INJECTION, POWDER, FOR SOLUTION INTRAVENOUS at 02:05

## 2025-05-21 RX ADMIN — PIPERACILLIN SODIUM AND TAZOBACTAM SODIUM 4.5 G: 4; .5 INJECTION, POWDER, FOR SOLUTION INTRAVENOUS at 11:05

## 2025-05-21 RX ADMIN — CARVEDILOL 3.12 MG: 3.12 TABLET, FILM COATED ORAL at 08:05

## 2025-05-21 RX ADMIN — LOSARTAN POTASSIUM 100 MG: 50 TABLET, FILM COATED ORAL at 08:05

## 2025-05-21 RX ADMIN — DULOXETINE HYDROCHLORIDE 40 MG: 20 CAPSULE, DELAYED RELEASE ORAL at 08:05

## 2025-05-21 RX ADMIN — THERA TABS 1 TABLET: TAB at 08:05

## 2025-05-21 RX ADMIN — OXYCODONE HYDROCHLORIDE AND ACETAMINOPHEN 1 TABLET: 5; 325 TABLET ORAL at 06:05

## 2025-05-21 NOTE — ASSESSMENT & PLAN NOTE
Body mass index is 41.28 kg/m². Morbid obesity complicates all aspects of disease management from diagnostic modalities to treatment. Weight loss encouraged and health benefits explained to patient.        voicemail left to call office for results

## 2025-05-21 NOTE — ASSESSMENT & PLAN NOTE
Creatine stable for now. BMP reviewed- noted Estimated Creatinine Clearance: 87.5 mL/min (based on SCr of 0.7 mg/dL). according to latest data. Based on current GFR, CKD stage is stage 3 - GFR 30-59.  Monitor UOP and serial BMP and adjust therapy as needed. Renally dose meds. Avoid nephrotoxic medications and procedures.

## 2025-05-21 NOTE — NURSING
"[facility] Plan of Care Note    Dx:   Dysuria [R30.0]  Fever and chills [R50.9]  Tachycardia [R00.0]  Flank pain [R10.9]  Generalized weakness [R53.1]  Chest pain [R07.9]  Leukocytosis, unspecified type [D72.829]    Shift Events: N/A    Goals of Care: Progressing towards goals    Neuro: AOx4    Vital Signs: /81   Pulse 108   Temp 98.7 °F (37.1 °C) (Oral)   Resp 18   Ht 5' 1" (1.549 m)   Wt 99.1 kg (218 lb 7.6 oz)   SpO2 (!) 93%   BMI 41.28 kg/m²     Respiratory: RA    Diet: Diet Consistent Carbohydrate 2000 Calories (up to 75 gm per meal)      Is patient tolerating current diet? Yes    GTTS: N/A    Urine Output/Bowel Movement:   I/O this shift:  In: -   Out: 1350 [Urine:1350]  Last Bowel Movement: 05/18/25      Drains/Tubes/Tube Feeds (include total output/shift):   I/O this shift:  In: -   Out: 1350 [Urine:1350]      Lines: PIV  x 1      Accuchecks:ACHS    Skin: Foam dressing, antifungal cream applied     Fall Risk Score: 14    Activity level? X 2 assist    Any scheduled procedures? N/A    Any safety concerns? Fall risk    Other: N/A    "

## 2025-05-21 NOTE — ASSESSMENT & PLAN NOTE
Patient's FSGs are controlled on current medication regimen.  Last A1c reviewed-   Lab Results   Component Value Date    LABA1C 6.4 (H) 08/18/2016    HGBA1C 6.0 (H) 05/19/2025     Most recent fingerstick glucose reviewed-   Recent Labs   Lab 05/20/25  1220 05/20/25  1656 05/20/25  2107 05/21/25  0834   POCTGLUCOSE 117* 153* 112* 108     Current correctional scale  Low  Maintain anti-hyperglycemic dose as follows-   Antihyperglycemics (From admission, onward)      Start     Stop Route Frequency Ordered    05/19/25 0224  insulin aspart U-100 pen 0-5 Units         -- SubQ Before meals & nightly PRN 05/19/25 0124          Hold Oral hypoglycemics while patient is in the hospital.

## 2025-05-21 NOTE — SUBJECTIVE & OBJECTIVE
Interval History: CHILOMARIZAADOLFO pt has been afebrile overnight and continues to feel better. Aerobic cx from IR aspiration procedure growing GNR's. Awaiting speciation.    Review of Systems  Objective:     Vital Signs (Most Recent):  Temp: 98.7 °F (37.1 °C) (05/21/25 0504)  Pulse: 108 (05/21/25 0504)  Resp: 18 (05/21/25 0504)  BP: 138/81 (05/21/25 0504)  SpO2: (!) 93 % (05/21/25 0504) Vital Signs (24h Range):  Temp:  [98.1 °F (36.7 °C)-100 °F (37.8 °C)] 98.7 °F (37.1 °C)  Pulse:  [] 108  Resp:  [16-18] 18  SpO2:  [93 %-97 %] 93 %  BP: (117-158)/(69-84) 138/81     Weight: 99.1 kg (218 lb 7.6 oz)  Body mass index is 41.28 kg/m².    Intake/Output Summary (Last 24 hours) at 5/21/2025 0644  Last data filed at 5/21/2025 0525  Gross per 24 hour   Intake 1817.92 ml   Output 1700 ml   Net 117.92 ml         Physical Exam  Vitals and nursing note reviewed.   Constitutional:       General: She is not in acute distress.     Appearance: Normal appearance. She is obese.   HENT:      Head: Normocephalic and atraumatic.      Nose: Nose normal.      Mouth/Throat:      Mouth: Mucous membranes are moist.      Pharynx: Oropharynx is clear.   Eyes:      Extraocular Movements: Extraocular movements intact.      Conjunctiva/sclera: Conjunctivae normal.   Cardiovascular:      Rate and Rhythm: Normal rate and regular rhythm.      Pulses: Normal pulses.      Heart sounds: Normal heart sounds.   Pulmonary:      Effort: Pulmonary effort is normal.      Breath sounds: Normal breath sounds.   Abdominal:      General: There is no distension.      Palpations: Abdomen is soft.      Tenderness: There is no abdominal tenderness. There is no right CVA tenderness.   Musculoskeletal:      Right lower leg: No edema.      Left lower leg: No edema.   Skin:     General: Skin is warm and dry.      Comments: Some skin fold candida   Neurological:      General: No focal deficit present.      Mental Status: She is alert and oriented to person, place, and time.    Psychiatric:         Mood and Affect: Mood normal.         Behavior: Behavior normal.               Significant Labs: All pertinent labs within the past 24 hours have been reviewed.  CBC:   Recent Labs   Lab 05/19/25  0840 05/20/25  0426 05/21/25  0339   WBC 28.32* 19.97* 10.97   HGB 11.5* 11.6* 10.8*   HCT 35.1* 35.5* 33.7*    265 317     CMP:   Recent Labs   Lab 05/19/25  0840 05/20/25  0426 05/21/25  0339   * 135* 134*   K 3.6 3.5 3.4*   CL 96 101 98   CO2 23 21* 25   * 112* 96   BUN 18 19 15   CREATININE 0.9 0.9 0.7   CALCIUM 9.0 8.8 9.0   PROT 6.7 6.7 6.5   ALBUMIN 3.0* 2.7* 2.4*   BILITOT 0.4 0.4 0.4   ALKPHOS 67 75 92   AST 42 26 69*   ALT 38 28 47*   ANIONGAP 12 13 11       Significant Imaging: I have reviewed all pertinent imaging results/findings within the past 24 hours.

## 2025-05-21 NOTE — ASSESSMENT & PLAN NOTE
Patients blood pressure range in the last 24 hours was: BP  Min: 117/71  Max: 201/91.The patient's inpatient anti-hypertensive regimen is listed below:  Current Antihypertensives  losartan tablet 100 mg, Daily, Oral  carvediloL tablet 3.125 mg, 2 times daily, Oral    Home coreg 3.125 BID, losartan-HCTZ 100-12.5mg QD, nifedipine 90mg QD    Plan  - BP is controlled, no changes needed to their regimen  - restarted home antihypertensives slowly in the setting of infection

## 2025-05-21 NOTE — PROGRESS NOTES
[facility]  Progress Note    Patient Name: Tanika Wayne  MRN: [id number]  Patient Class: IP- Inpatient   Admission Date: 5/18/2025  Length of Stay: 2 days  Attending Physician: Soraya Ennis MD  Primary Care Provider: Rayo Hernandez MD        Subjective     Principal Problem:Pyelonephritis        HPI:  65-year-old female with history of ITP, diabetes, hypertension, cardiomyopathy, degenerative disc disease, CKD stage 3, nephrolithiasis, presenting for generalized weakness, right flank pain, dysuria, polyuria and fever and chills. She presents with 1 day of abrupt-onset generalized weakness, inability to ambulate, and lightheadedness. These symptoms occur in the context of 4 days of progressively worsening urinary symptoms, including dysuria, polyuria, and a burning sensation with urination, as well as right flank pain. She describes the flank pain as 7/10 in intensity, located in the right flank with radiation around the abdomen. She also endorses subjective fevers and chills, intermittent nausea without vomiting, and decreased oral intake today due to reduced appetite. She took a Percocet earlier today without any relief of symptoms. Of note, the patient was hospitalized for a UTI on 4/15 and was discharged with a 7-day course of Keflex, with initial improvement in symptoms. However, she subsequently began experiencing weakness and was evaluated by her PCP, who obtained a urinalysis that was notable for the presence of bacteria. Despite these findings, she has not received any additional antibiotic treatment since then.    On arrival, vitals are notable for borderline fever of 99.8, tachycardia in the 110s to 120s. Labs notable for significant leukocytosis (WBC 23.26), mild VIOLET (Cr 1.2). UA showed occasional bacteria. Blood cultures drawn. CT abdomen and pelvis consistent with right-sided pyelonephritis, small renal abscess. She was given IV Toradol, morphine, IV vancomycin and Zosyn,  IV fluid rehydration at 30 cc/kilos of ideal body weight (1.5L LR total). Patient admitted to hospital medicine for management of pyelonephritis.     Overview/Hospital Course:  65 yof presented with dysuria, flank pain, fever chills. Pt found to have renal abscess, IR drainage and cultures sent. Abscess cultures growing GNR. Pt started on zosyn on arrival, will de-escalate. Pt also with pancreatic lesion noted on CT scan, recommended non-urgent MRI.     Interval History: NAEO, pt has been afebrile overnight and continues to feel better. Aerobic cx from IR aspiration procedure growing GNR's. Awaiting speciation.    Review of Systems  Objective:     Vital Signs (Most Recent):  Temp: 98.7 °F (37.1 °C) (05/21/25 0504)  Pulse: 108 (05/21/25 0504)  Resp: 18 (05/21/25 0504)  BP: 138/81 (05/21/25 0504)  SpO2: (!) 93 % (05/21/25 0504) Vital Signs (24h Range):  Temp:  [98.1 °F (36.7 °C)-100 °F (37.8 °C)] 98.7 °F (37.1 °C)  Pulse:  [] 108  Resp:  [16-18] 18  SpO2:  [93 %-97 %] 93 %  BP: (117-158)/(69-84) 138/81     Weight: 99.1 kg (218 lb 7.6 oz)  Body mass index is 41.28 kg/m².    Intake/Output Summary (Last 24 hours) at 5/21/2025 0644  Last data filed at 5/21/2025 0525  Gross per 24 hour   Intake 1817.92 ml   Output 1700 ml   Net 117.92 ml         Physical Exam  Vitals and nursing note reviewed.   Constitutional:       General: She is not in acute distress.     Appearance: Normal appearance. She is obese.   HENT:      Head: Normocephalic and atraumatic.      Nose: Nose normal.      Mouth/Throat:      Mouth: Mucous membranes are moist.      Pharynx: Oropharynx is clear.   Eyes:      Extraocular Movements: Extraocular movements intact.      Conjunctiva/sclera: Conjunctivae normal.   Cardiovascular:      Rate and Rhythm: Normal rate and regular rhythm.      Pulses: Normal pulses.      Heart sounds: Normal heart sounds.   Pulmonary:      Effort: Pulmonary effort is normal.      Breath sounds: Normal breath sounds.    Abdominal:      General: There is no distension.      Palpations: Abdomen is soft.      Tenderness: There is no abdominal tenderness. There is no right CVA tenderness.   Musculoskeletal:      Right lower leg: No edema.      Left lower leg: No edema.   Skin:     General: Skin is warm and dry.      Comments: Some skin fold candida   Neurological:      General: No focal deficit present.      Mental Status: She is alert and oriented to person, place, and time.   Psychiatric:         Mood and Affect: Mood normal.         Behavior: Behavior normal.               Significant Labs: All pertinent labs within the past 24 hours have been reviewed.  CBC:   Recent Labs   Lab 05/19/25  0840 05/20/25  0426 05/21/25  0339   WBC 28.32* 19.97* 10.97   HGB 11.5* 11.6* 10.8*   HCT 35.1* 35.5* 33.7*    265 317     CMP:   Recent Labs   Lab 05/19/25  0840 05/20/25  0426 05/21/25  0339   * 135* 134*   K 3.6 3.5 3.4*   CL 96 101 98   CO2 23 21* 25   * 112* 96   BUN 18 19 15   CREATININE 0.9 0.9 0.7   CALCIUM 9.0 8.8 9.0   PROT 6.7 6.7 6.5   ALBUMIN 3.0* 2.7* 2.4*   BILITOT 0.4 0.4 0.4   ALKPHOS 67 75 92   AST 42 26 69*   ALT 38 28 47*   ANIONGAP 12 13 11       Significant Imaging: I have reviewed all pertinent imaging results/findings within the past 24 hours.      Assessment & Plan  Pyelonephritis  Leukocytosis  65 y.o. female presenting with pyelonephritis based on abdominal imaging. Patient was seen recently for UTI and given 7 days of keflex. She presents with tachycardia and leukocytosis. UA shows occasional bacteria. Pt's CT abd/pelvis with concerns for abscess formation about 2 cm.    Plan:  -- switched rocephin 1g to zosyn for greater coverage  -- Given 1.5L LR in the ED and extra 500cc bolus with tachycardia  -- Consulted IR for abscess drainage, cultures growing GNR, no drain left in place  -- 5/21 abscess cx growing pan-sensitive E. Coli, de-escalate zosyn to Cipro 750 Q12      65 y.o. female presenting with  pyelonephritis based on abdominal imaging. Patient was seen recently for UTI and given 7 days of keflex. She presents with tachycardia and leukocytosis. UA shows occasional bacteria. Pt's CT abd/pelvis with concerns for abscess formation about 2 cm.    Plan:  -- switched rocephin 1g to zosyn for greater coverage  -- Given 1.5L LR in the ED and extra 500cc bolus with tachycardia  -- Consulted IR for abscess drainage, cultures growing GNR, no drain left in place  -- de-escalate zosyn once able    65 y.o. female presenting with pyelonephritis based on abdominal imaging. Patient was seen recently for UTI and given 7 days of keflex. She presents with tachycardia and leukocytosis. UA shows occasional bacteria.    Plan:  -- switched rocephin 1g to zosyn for greater coverage  -- Given 1.5L LR in the ED and extra 500cc bolus with tachycardia  -- Consulted IR for abscess drainage, cultures growing GNR, no drain left in place  -- de-escalate zosyn once able      Hypertension associated with diabetes  Patients blood pressure range in the last 24 hours was: BP  Min: 117/71  Max: 201/91.The patient's inpatient anti-hypertensive regimen is listed below:  Current Antihypertensives  losartan tablet 100 mg, Daily, Oral  carvediloL tablet 3.125 mg, 2 times daily, Oral    Home coreg 3.125 BID, losartan-HCTZ 100-12.5mg QD, nifedipine 90mg QD    Plan  - BP is controlled, no changes needed to their regimen  - restarted home antihypertensives slowly in the setting of infection   Type 2 diabetes mellitus with cataract  Patient's FSGs are controlled on current medication regimen.  Last A1c reviewed-   Lab Results   Component Value Date    LABA1C 6.4 (H) 08/18/2016    HGBA1C 6.0 (H) 05/19/2025     Most recent fingerstick glucose reviewed-   Recent Labs   Lab 05/20/25  1220 05/20/25  1656 05/20/25  2107 05/21/25  0834   POCTGLUCOSE 117* 153* 112* 108     Current correctional scale  Low  Maintain anti-hyperglycemic dose as follows-    Antihyperglycemics (From admission, onward)      Start     Stop Route Frequency Ordered    05/19/25 0224  insulin aspart U-100 pen 0-5 Units         -- SubQ Before meals & nightly PRN 05/19/25 0124          Hold Oral hypoglycemics while patient is in the hospital.     History of subtotal thyroidectomy  Continue home synthroid     S/P lumbar laminectomy  Spinal stenosis, lumbar  Chronic bilateral low back pain with right-sided sciatica  Continue home percocet    Severe obesity with body mass index (BMI) of 35.0 to 35.9 and comorbidity  Body mass index is 41.28 kg/m². Morbid obesity complicates all aspects of disease management from diagnostic modalities to treatment. Weight loss encouraged and health benefits explained to patient.       Hyperlipidemia associated with type 2 diabetes mellitus  Continue home lipitor 10mg    Recurrent major depressive disorder, in full remission  Continue home duloxetine  Diabetic peripheral neuropathy  Continue home lyrica    CKD stage 3a, GFR 45-59 ml/min  Creatine stable for now. BMP reviewed- noted Estimated Creatinine Clearance: 87.5 mL/min (based on SCr of 0.7 mg/dL). according to latest data. Based on current GFR, CKD stage is stage 3 - GFR 30-59.  Monitor UOP and serial BMP and adjust therapy as needed. Renally dose meds. Avoid nephrotoxic medications and procedures.  H/O gastric sleeve  Gastroesophageal reflux disease without esophagitis  S/p gastric sleeve June 2021.  -- Continue home pepcid PRN  -- Continue home Lotrisone cream PRN for irritation under excessive abdominal skin    Insomnia  Continue home trazodone    VTE Risk Mitigation (From admission, onward)           Ordered     heparin (porcine) injection 7,500 Units  Every 8 hours         05/19/25 0112     IP VTE HIGH RISK PATIENT  Once         05/19/25 0103     Place sequential compression device  Until discontinued         05/19/25 0103                    Discharge Planning   SHERI:      Code Status: Full Code    Medical Readiness for Discharge Date:   Discharge Plan A: Home with family                Please place Justification for DME        Teodoro Grace MD  Department of Hospital Medicine   [address] [facility] [place]

## 2025-05-21 NOTE — ASSESSMENT & PLAN NOTE
65 y.o. female presenting with pyelonephritis based on abdominal imaging. Patient was seen recently for UTI and given 7 days of keflex. She presents with tachycardia and leukocytosis. UA shows occasional bacteria. Pt's CT abd/pelvis with concerns for abscess formation about 2 cm.    Plan:  -- switched rocephin 1g to zosyn for greater coverage  -- Given 1.5L LR in the ED and extra 500cc bolus with tachycardia  -- Consulted IR for abscess drainage, cultures growing GNR, no drain left in place  -- 5/21 abscess cx growing pan-sensitive E. Coli, de-escalate zosyn to Cipro 750 Q12      65 y.o. female presenting with pyelonephritis based on abdominal imaging. Patient was seen recently for UTI and given 7 days of keflex. She presents with tachycardia and leukocytosis. UA shows occasional bacteria. Pt's CT abd/pelvis with concerns for abscess formation about 2 cm.    Plan:  -- switched rocephin 1g to zosyn for greater coverage  -- Given 1.5L LR in the ED and extra 500cc bolus with tachycardia  -- Consulted IR for abscess drainage, cultures growing GNR, no drain left in place  -- de-escalate zosyn once able    65 y.o. female presenting with pyelonephritis based on abdominal imaging. Patient was seen recently for UTI and given 7 days of keflex. She presents with tachycardia and leukocytosis. UA shows occasional bacteria.    Plan:  -- switched rocephin 1g to zosyn for greater coverage  -- Given 1.5L LR in the ED and extra 500cc bolus with tachycardia  -- Consulted IR for abscess drainage, cultures growing GNR, no drain left in place  -- de-escalate zosyn once able

## 2025-05-22 LAB
ABSOLUTE EOSINOPHIL (OHS): 0.21 K/UL
ABSOLUTE MONOCYTE (OHS): 1.44 K/UL (ref 0.3–1)
ABSOLUTE NEUTROPHIL COUNT (OHS): 6.3 K/UL (ref 1.8–7.7)
ALBUMIN SERPL BCP-MCNC: 2.3 G/DL (ref 3.5–5.2)
ALP SERPL-CCNC: 92 UNIT/L (ref 40–150)
ALT SERPL W/O P-5'-P-CCNC: 37 UNIT/L (ref 10–44)
ANION GAP (OHS): 7 MMOL/L (ref 8–16)
AST SERPL-CCNC: 34 UNIT/L (ref 11–45)
BASOPHILS # BLD AUTO: 0.03 K/UL
BASOPHILS NFR BLD AUTO: 0.2 %
BILIRUB SERPL-MCNC: 0.2 MG/DL (ref 0.1–1)
BUN SERPL-MCNC: 18 MG/DL (ref 8–23)
CALCIUM SERPL-MCNC: 9.2 MG/DL (ref 8.7–10.5)
CHLORIDE SERPL-SCNC: 101 MMOL/L (ref 95–110)
CO2 SERPL-SCNC: 28 MMOL/L (ref 23–29)
CREAT SERPL-MCNC: 0.8 MG/DL (ref 0.5–1.4)
ERYTHROCYTE [DISTWIDTH] IN BLOOD BY AUTOMATED COUNT: 15.2 % (ref 11.5–14.5)
GFR SERPLBLD CREATININE-BSD FMLA CKD-EPI: >60 ML/MIN/1.73/M2
GLUCOSE SERPL-MCNC: 118 MG/DL (ref 70–110)
HCT VFR BLD AUTO: 32.9 % (ref 37–48.5)
HGB BLD-MCNC: 10.5 GM/DL (ref 12–16)
IMM GRANULOCYTES # BLD AUTO: 0.05 K/UL (ref 0–0.04)
IMM GRANULOCYTES NFR BLD AUTO: 0.4 % (ref 0–0.5)
LYMPHOCYTES # BLD AUTO: 4.2 K/UL (ref 1–4.8)
MAGNESIUM SERPL-MCNC: 2.2 MG/DL (ref 1.6–2.6)
MCH RBC QN AUTO: 27.7 PG (ref 27–31)
MCHC RBC AUTO-ENTMCNC: 31.9 G/DL (ref 32–36)
MCV RBC AUTO: 87 FL (ref 82–98)
NUCLEATED RBC (/100WBC) (OHS): 0 /100 WBC
PHOSPHATE SERPL-MCNC: 2.4 MG/DL (ref 2.7–4.5)
PLATELET # BLD AUTO: 358 K/UL (ref 150–450)
PMV BLD AUTO: 10.1 FL (ref 9.2–12.9)
POCT GLUCOSE: 134 MG/DL (ref 70–110)
POCT GLUCOSE: 213 MG/DL (ref 70–110)
POCT GLUCOSE: 90 MG/DL (ref 70–110)
POCT GLUCOSE: 95 MG/DL (ref 70–110)
POTASSIUM SERPL-SCNC: 4 MMOL/L (ref 3.5–5.1)
PROT SERPL-MCNC: 6.5 GM/DL (ref 6–8.4)
RBC # BLD AUTO: 3.79 M/UL (ref 4–5.4)
RELATIVE EOSINOPHIL (OHS): 1.7 %
RELATIVE LYMPHOCYTE (OHS): 34.3 % (ref 18–48)
RELATIVE MONOCYTE (OHS): 11.8 % (ref 4–15)
RELATIVE NEUTROPHIL (OHS): 51.6 % (ref 38–73)
SODIUM SERPL-SCNC: 136 MMOL/L (ref 136–145)
WBC # BLD AUTO: 12.23 K/UL (ref 3.9–12.7)

## 2025-05-22 PROCEDURE — 85025 COMPLETE CBC W/AUTO DIFF WBC: CPT | Mod: HCNC

## 2025-05-22 PROCEDURE — 63600175 PHARM REV CODE 636 W HCPCS: Mod: HCNC

## 2025-05-22 PROCEDURE — 25000003 PHARM REV CODE 250: Mod: HCNC

## 2025-05-22 PROCEDURE — 80053 COMPREHEN METABOLIC PANEL: CPT | Mod: HCNC

## 2025-05-22 PROCEDURE — 97116 GAIT TRAINING THERAPY: CPT | Mod: HCNC,CQ

## 2025-05-22 PROCEDURE — 83735 ASSAY OF MAGNESIUM: CPT | Mod: HCNC

## 2025-05-22 PROCEDURE — 97530 THERAPEUTIC ACTIVITIES: CPT | Mod: HCNC,CQ

## 2025-05-22 PROCEDURE — 97530 THERAPEUTIC ACTIVITIES: CPT | Mod: HCNC

## 2025-05-22 PROCEDURE — 36415 COLL VENOUS BLD VENIPUNCTURE: CPT | Mod: HCNC

## 2025-05-22 PROCEDURE — 20600001 HC STEP DOWN PRIVATE ROOM: Mod: HCNC

## 2025-05-22 PROCEDURE — 97535 SELF CARE MNGMENT TRAINING: CPT | Mod: HCNC

## 2025-05-22 PROCEDURE — 84100 ASSAY OF PHOSPHORUS: CPT | Mod: HCNC

## 2025-05-22 RX ORDER — CIPROFLOXACIN 750 MG/1
750 TABLET, FILM COATED ORAL EVERY 12 HOURS
Qty: 11 TABLET | Refills: 0 | Status: SHIPPED | OUTPATIENT
Start: 2025-05-23 | End: 2025-05-29

## 2025-05-22 RX ORDER — LOPERAMIDE HYDROCHLORIDE 2 MG/1
2 CAPSULE ORAL 3 TIMES DAILY
Status: DISCONTINUED | OUTPATIENT
Start: 2025-05-22 | End: 2025-05-23 | Stop reason: HOSPADM

## 2025-05-22 RX ORDER — LIDOCAINE 560 MG/1
1 PATCH PERCUTANEOUS; TOPICAL; TRANSDERMAL DAILY PRN
Status: ON HOLD | COMMUNITY
End: 2025-05-23 | Stop reason: HOSPADM

## 2025-05-22 RX ORDER — LOPERAMIDE HYDROCHLORIDE 2 MG/1
2 CAPSULE ORAL 3 TIMES DAILY
Qty: 20 CAPSULE | Refills: 0 | Status: SHIPPED | OUTPATIENT
Start: 2025-05-22 | End: 2025-06-01

## 2025-05-22 RX ORDER — METHOCARBAMOL 500 MG/1
500 TABLET, FILM COATED ORAL EVERY 6 HOURS PRN
Status: ON HOLD | COMMUNITY
Start: 2025-05-12 | End: 2025-05-23 | Stop reason: HOSPADM

## 2025-05-22 RX ORDER — MENTHOL 100 MG/G
CREAM TOPICAL DAILY PRN
Status: ON HOLD | COMMUNITY
End: 2025-05-23 | Stop reason: HOSPADM

## 2025-05-22 RX ADMIN — Medication 1 CAPSULE: at 11:05

## 2025-05-22 RX ADMIN — PREGABALIN 100 MG: 50 CAPSULE ORAL at 08:05

## 2025-05-22 RX ADMIN — ATORVASTATIN CALCIUM 10 MG: 10 TABLET, FILM COATED ORAL at 09:05

## 2025-05-22 RX ADMIN — CIPROFOLXACIN 750 MG: 250 TABLET ORAL at 09:05

## 2025-05-22 RX ADMIN — OXYCODONE HYDROCHLORIDE AND ACETAMINOPHEN 1 TABLET: 5; 325 TABLET ORAL at 04:05

## 2025-05-22 RX ADMIN — NIFEDIPINE 90 MG: 60 TABLET, FILM COATED, EXTENDED RELEASE ORAL at 09:05

## 2025-05-22 RX ADMIN — HEPARIN SODIUM 7500 UNITS: 5000 INJECTION INTRAVENOUS; SUBCUTANEOUS at 09:05

## 2025-05-22 RX ADMIN — VENLAFAXINE HYDROCHLORIDE 150 MG: 75 CAPSULE, EXTENDED RELEASE ORAL at 09:05

## 2025-05-22 RX ADMIN — OXYCODONE HYDROCHLORIDE AND ACETAMINOPHEN 1 TABLET: 5; 325 TABLET ORAL at 09:05

## 2025-05-22 RX ADMIN — FLUTICASONE PROPIONATE 100 MCG: 50 SPRAY, METERED NASAL at 09:05

## 2025-05-22 RX ADMIN — CIPROFOLXACIN 750 MG: 250 TABLET ORAL at 08:05

## 2025-05-22 RX ADMIN — DULOXETINE HYDROCHLORIDE 40 MG: 20 CAPSULE, DELAYED RELEASE ORAL at 09:05

## 2025-05-22 RX ADMIN — LOPERAMIDE HYDROCHLORIDE 2 MG: 2 CAPSULE ORAL at 08:05

## 2025-05-22 RX ADMIN — LEVOTHYROXINE SODIUM 100 MCG: 0.05 TABLET ORAL at 05:05

## 2025-05-22 RX ADMIN — SENNOSIDES 8.6 MG: 8.6 TABLET, FILM COATED ORAL at 09:05

## 2025-05-22 RX ADMIN — THERA TABS 1 TABLET: TAB at 09:05

## 2025-05-22 RX ADMIN — OXYCODONE HYDROCHLORIDE AND ACETAMINOPHEN 1 TABLET: 5; 325 TABLET ORAL at 05:05

## 2025-05-22 RX ADMIN — MICONAZOLE NITRATE: 20 OINTMENT TOPICAL at 08:05

## 2025-05-22 RX ADMIN — CARVEDILOL 3.12 MG: 3.12 TABLET, FILM COATED ORAL at 09:05

## 2025-05-22 RX ADMIN — HEPARIN SODIUM 7500 UNITS: 5000 INJECTION INTRAVENOUS; SUBCUTANEOUS at 05:05

## 2025-05-22 RX ADMIN — MICONAZOLE NITRATE: 20 OINTMENT TOPICAL at 09:05

## 2025-05-22 RX ADMIN — PREGABALIN 100 MG: 50 CAPSULE ORAL at 09:05

## 2025-05-22 RX ADMIN — MUPIROCIN: 20 OINTMENT TOPICAL at 08:05

## 2025-05-22 RX ADMIN — LOSARTAN POTASSIUM 100 MG: 50 TABLET, FILM COATED ORAL at 09:05

## 2025-05-22 RX ADMIN — CYANOCOBALAMIN TAB 250 MCG 250 MCG: 250 TAB at 09:05

## 2025-05-22 RX ADMIN — LOPERAMIDE HYDROCHLORIDE 2 MG: 2 CAPSULE ORAL at 02:05

## 2025-05-22 RX ADMIN — CARVEDILOL 3.12 MG: 3.12 TABLET, FILM COATED ORAL at 08:05

## 2025-05-22 RX ADMIN — HEPARIN SODIUM 7500 UNITS: 5000 INJECTION INTRAVENOUS; SUBCUTANEOUS at 02:05

## 2025-05-22 NOTE — PROGRESS NOTES
[facility]  Progress Note    Patient Name: Tanika Wayne  MRN: [id number]  Patient Class: IP- Inpatient   Admission Date: 5/18/2025  Length of Stay: 3 days  Attending Physician: Suki Pierre*  Primary Care Provider: Rayo Hernandez MD        Subjective     Principal Problem:Pyelonephritis        HPI:  65-year-old female with history of ITP, diabetes, hypertension, cardiomyopathy, degenerative disc disease, CKD stage 3, nephrolithiasis, presenting for generalized weakness, right flank pain, dysuria, polyuria and fever and chills. She presents with 1 day of abrupt-onset generalized weakness, inability to ambulate, and lightheadedness. These symptoms occur in the context of 4 days of progressively worsening urinary symptoms, including dysuria, polyuria, and a burning sensation with urination, as well as right flank pain. She describes the flank pain as 7/10 in intensity, located in the right flank with radiation around the abdomen. She also endorses subjective fevers and chills, intermittent nausea without vomiting, and decreased oral intake today due to reduced appetite. She took a Percocet earlier today without any relief of symptoms. Of note, the patient was hospitalized for a UTI on 4/15 and was discharged with a 7-day course of Keflex, with initial improvement in symptoms. However, she subsequently began experiencing weakness and was evaluated by her PCP, who obtained a urinalysis that was notable for the presence of bacteria. Despite these findings, she has not received any additional antibiotic treatment since then.    On arrival, vitals are notable for borderline fever of 99.8, tachycardia in the 110s to 120s. Labs notable for significant leukocytosis (WBC 23.26), mild VIOLET (Cr 1.2). UA showed occasional bacteria. Blood cultures drawn. CT abdomen and pelvis consistent with right-sided pyelonephritis, small renal abscess. She was given IV Toradol, morphine, IV vancomycin  and Zosyn, IV fluid rehydration at 30 cc/kilos of ideal body weight (1.5L LR total). Patient admitted to hospital medicine for management of pyelonephritis.     Overview/Hospital Course:  65 yof presented with dysuria, flank pain, fever chills. Pt found to have renal abscess, IR drainage and cultures sent. Abscess cultures growing GNR. Pt started on zosyn on arrival, will de-escalate. Pt also with pancreatic lesion noted on CT scan, recommended non-urgent MRI. 5/21 pt found to be growing E. Coli, abx switched to Cipro 750 Q12 as she continues to improve.    Interval History: NAEO, pt has been afebrile HDS. She is now on Cipro 750 Q12 for her cx that grew E. Coli. Pt has had some loose stools since starting abx, bowel reg d/c and pt started on imodium and probiotic regimen.    Review of Systems  Objective:     Vital Signs (Most Recent):  Temp: 98.6 °F (37 °C) (05/22/25 0513)  Pulse: 95 (05/22/25 0513)  Resp: 16 (05/22/25 0533)  BP: (!) 148/84 (05/22/25 0513)  SpO2: 95 % (05/22/25 0513) Vital Signs (24h Range):  Temp:  [98.4 °F (36.9 °C)-99.5 °F (37.5 °C)] 98.6 °F (37 °C)  Pulse:  [] 95  Resp:  [16-18] 16  SpO2:  [92 %-95 %] 95 %  BP: (139-177)/(69-84) 148/84     Weight: 99.1 kg (218 lb 7.6 oz)  Body mass index is 41.28 kg/m².    Intake/Output Summary (Last 24 hours) at 5/22/2025 0710  Last data filed at 5/22/2025 0535  Gross per 24 hour   Intake 280 ml   Output 1400 ml   Net -1120 ml         Physical Exam  Vitals and nursing note reviewed.   Constitutional:       General: She is not in acute distress.     Appearance: Normal appearance. She is obese.   HENT:      Head: Normocephalic and atraumatic.      Nose: Nose normal.      Mouth/Throat:      Mouth: Mucous membranes are moist.      Pharynx: Oropharynx is clear.   Eyes:      Extraocular Movements: Extraocular movements intact.      Conjunctiva/sclera: Conjunctivae normal.   Cardiovascular:      Rate and Rhythm: Normal rate and regular rhythm.      Pulses:  Normal pulses.      Heart sounds: Normal heart sounds.   Pulmonary:      Effort: Pulmonary effort is normal.      Breath sounds: Normal breath sounds.   Abdominal:      General: There is no distension.      Palpations: Abdomen is soft.      Tenderness: There is no abdominal tenderness. There is no right CVA tenderness.   Musculoskeletal:      Right lower leg: No edema.      Left lower leg: No edema.   Skin:     General: Skin is warm and dry.      Comments: Some skin fold candida   Neurological:      General: No focal deficit present.      Mental Status: She is alert and oriented to person, place, and time.   Psychiatric:         Mood and Affect: Mood normal.         Behavior: Behavior normal.               Significant Labs: All pertinent labs within the past 24 hours have been reviewed.  CBC:   Recent Labs   Lab 05/21/25  0339 05/22/25  0350   WBC 10.97 12.23   HGB 10.8* 10.5*   HCT 33.7* 32.9*    358     CMP:   Recent Labs   Lab 05/21/25  0339 05/22/25  0350   * 136   K 3.4* 4.0   CL 98 101   CO2 25 28   GLU 96 118*   BUN 15 18   CREATININE 0.7 0.8   CALCIUM 9.0 9.2   PROT 6.5 6.5   ALBUMIN 2.4* 2.3*   BILITOT 0.4 0.2   ALKPHOS 92 92   AST 69* 34   ALT 47* 37   ANIONGAP 11 7*       Significant Imaging: I have reviewed all pertinent imaging results/findings within the past 24 hours.      Assessment & Plan  Pyelonephritis  65 y.o. female presenting with pyelonephritis based on abdominal imaging. Patient was seen recently for UTI and given 7 days of keflex. She presents with tachycardia and leukocytosis. UA shows occasional bacteria. Pt's CT abd/pelvis with concerns for abscess formation about 2 cm.    Plan:  -- switched rocephin 1g to zosyn for greater coverage  -- Given 1.5L LR in the ED and extra 500cc bolus with tachycardia  -- Consulted IR for abscess drainage, cultures growing GNR, no drain left in place  -- 5/21 abscess cx growing pan-sensitive E. Coli, de-escalate zosyn to Cipro 750 Q12  --5/22 pt  reported some loose stools with abx, bowel reg d/c and added on imodium and probiotic          Hypertension associated with diabetes  Patients blood pressure range in the last 24 hours was: BP  Min: 117/71  Max: 201/91.The patient's inpatient anti-hypertensive regimen is listed below:  Current Antihypertensives  losartan tablet 100 mg, Daily, Oral  carvediloL tablet 3.125 mg, 2 times daily, Oral    Home coreg 3.125 BID, losartan-HCTZ 100-12.5mg QD, nifedipine 90mg QD    Plan  - BP is controlled, no changes needed to their regimen  - restarted home antihypertensives slowly in the setting of infection   Type 2 diabetes mellitus with cataract  Patient's FSGs are controlled on current medication regimen.  Last A1c reviewed-   Lab Results   Component Value Date    LABA1C 6.4 (H) 08/18/2016    HGBA1C 6.0 (H) 05/19/2025     Most recent fingerstick glucose reviewed-   Recent Labs   Lab 05/21/25  1726 05/21/25  2241 05/22/25  0850 05/22/25  1201   POCTGLUCOSE 134* 106 213* 134*     Current correctional scale  Low  Maintain anti-hyperglycemic dose as follows-   Antihyperglycemics (From admission, onward)      Start     Stop Route Frequency Ordered    05/19/25 0224  insulin aspart U-100 pen 0-5 Units         -- SubQ Before meals & nightly PRN 05/19/25 0124          Hold Oral hypoglycemics while patient is in the hospital.     History of subtotal thyroidectomy  Continue home synthroid     S/P lumbar laminectomy  Spinal stenosis, lumbar  Chronic bilateral low back pain with right-sided sciatica  Continue home percocet    Severe obesity with body mass index (BMI) of 35.0 to 35.9 and comorbidity  Body mass index is 41.28 kg/m². Morbid obesity complicates all aspects of disease management from diagnostic modalities to treatment. Weight loss encouraged and health benefits explained to patient.       Hyperlipidemia associated with type 2 diabetes mellitus  Continue home lipitor 10mg    Recurrent major depressive disorder, in full  remission  Continue home duloxetine  Diabetic peripheral neuropathy  Continue home lyrica    CKD stage 3a, GFR 45-59 ml/min  Creatine stable for now. BMP reviewed- noted Estimated Creatinine Clearance: 76.6 mL/min (based on SCr of 0.8 mg/dL). according to latest data. Based on current GFR, CKD stage is stage 3 - GFR 30-59.  Monitor UOP and serial BMP and adjust therapy as needed. Renally dose meds. Avoid nephrotoxic medications and procedures.  H/O gastric sleeve  Gastroesophageal reflux disease without esophagitis  S/p gastric sleeve June 2021.  -- Continue home pepcid PRN  -- Continue home Lotrisone cream PRN for irritation under excessive abdominal skin    Insomnia  Continue home trazodone    VTE Risk Mitigation (From admission, onward)           Ordered     heparin (porcine) injection 7,500 Units  Every 8 hours         05/19/25 0112     IP VTE HIGH RISK PATIENT  Once         05/19/25 0103     Place sequential compression device  Until discontinued         05/19/25 0103                    Discharge Planning   SHERI: 5/24/2025     Code Status: Full Code   Medical Readiness for Discharge Date:   Discharge Plan A: Home with family                Please place Justification for DME        Teodoro Grace MD  Department of Hospital Medicine   [place] [place]

## 2025-05-22 NOTE — ASSESSMENT & PLAN NOTE
65 y.o. female presenting with pyelonephritis based on abdominal imaging. Patient was seen recently for UTI and given 7 days of keflex. She presents with tachycardia and leukocytosis. UA shows occasional bacteria. Pt's CT abd/pelvis with concerns for abscess formation about 2 cm.    Plan:  -- switched rocephin 1g to zosyn for greater coverage  -- Given 1.5L LR in the ED and extra 500cc bolus with tachycardia  -- Consulted IR for abscess drainage, cultures growing GNR, no drain left in place  -- 5/21 abscess cx growing pan-sensitive E. Coli, de-escalate zosyn to Cipro 750 Q12  --5/22 pt reported some loose stools with abx, bowel reg d/c and added on imodium and probiotic

## 2025-05-22 NOTE — PT/OT/SLP PROGRESS
Physical Therapy Treatment    Patient Name:  Tanika Wayne   MRN:  [id number]    Recommendations:     Discharge Recommendations: Low Intensity Therapy  Discharge Equipment Recommendations: bedside commode  Barriers to discharge: None    Assessment:     Tanika Wayne is a 65 y.o. female admitted with a medical diagnosis of Pyelonephritis.  She presents with the following impairments/functional limitations: weakness, impaired self care skills, impaired endurance, impaired functional mobility, gait instability, impaired balance, decreased safety awareness, impaired cardiopulmonary response to activity. Pt was agreeable and motivated to work with PT wanting to get into chair at bedside. Pt was limited to do more today due to bowel incontinence, but with no c/o of increased pain. Pt would continue to benefit from skilled acute PT in order to address current deficits and progress functional mobility.      Rehab Prognosis: Good; patient would benefit from acute skilled PT services to address these deficits and reach maximum level of function.    Recent Surgery: * No surgery found *      Plan:     During this hospitalization, patient to be seen 4 x/week to address the identified rehab impairments via gait training, therapeutic activities, therapeutic exercises, neuromuscular re-education and progress toward the following goals:    Plan of Care Expires:  06/19/25    Subjective     Chief Complaint: none stated   Patient/Family Comments/goals: to get better   Pain/Comfort:  Pain Rating 1: 0/10      Objective:     Communicated with RN prior to session.  Patient found HOB elevated with telemetry upon PT entry to room.     General Precautions: Standard, fall  Orthopedic Precautions: N/A  Braces: N/A  Respiratory Status: Room air     Functional Mobility:  Bed Mobility:     Scooting: stand by assistance  Supine to Sit: stand by assistance    Transfers:     Sit to Stand: x2 trials;  minimum assistance and of 1 persons with  rolling walker    Gait: x3 steps forwards + 3 steps backward back to bed + >6 steps to chair   CGA with RW  Pt to be reseated due to bowel incontinence  Deviations: slow edmond, wide VIVEK, decreased edmond, step length, foot clearance  Increased cueing for hand placement, RW management, and sequencing     Balance:   Static Sit: NORMAL; SBA   Static Stand: GOOD; CGA       AM-PAC 6 CLICK MOBILITY  Turning over in bed (including adjusting bedclothes, sheets and blankets)?: 3  Sitting down on and standing up from a chair with arms (e.g., wheelchair, bedside commode, etc.): 2  Moving from lying on back to sitting on the side of the bed?: 3  Moving to and from a bed to a chair (including a wheelchair)?: 3  Need to walk in hospital room?: 2  Climbing 3-5 steps with a railing?: 2  Basic Mobility Total Score: 15       Treatment & Education:  Educated pt on PT role/POC  Educated pt on importance of OOB activity and daily ambulation  Time provided for active listening, education, counseling and discussion of health disposition in regards to patient's current status   Questions/concerns addressed within PTA scope of practice. Answered to pt satisfaction, no further questions  White board updated accordingly   RN aware of patient's mobility needs and status  TherEx education   Energy conservation techniques   Gait belt utilized during session for patient safety  Bedside table in front of patient and area set up for function, convenience, and safety     Patient left up in chair with all lines intact, call button in reach, RN notified, and family present..    GOALS:   Multidisciplinary Problems       Physical Therapy Goals          Problem: Physical Therapy    Goal Priority Disciplines Outcome Interventions   Physical Therapy Goal     PT, PT/OT Progressing    Description: Goals to be met by: 25     Patient will increase functional independence with mobility by performin. Supine to sit with Stand-by Assistance  2. Sit  3 to stand transfer with Supervision  3. Bed to chair transfer with Supervision using LRAD  4. Gait  x 100 feet with Stand-by Assistance using LRAD.   5. Lower extremity exercise program x30 reps per handout, with independence                         DME Justifications:  Tanika requires a commode for home use because she is confined to a single room.     Time Tracking:     PT Received On: 05/22/25  PT Start Time: 1324     PT Stop Time: 1347  PT Total Time (min): 23 min     Billable Minutes: Gait Training 8 and Therapeutic Activity 15    Treatment Type: Treatment  PT/PTA: PTA     Number of PTA visits since last PT visit: 1 05/22/2025

## 2025-05-22 NOTE — SUBJECTIVE & OBJECTIVE
Interval History: NAEO, pt has been afebrile HDS. She is now on Cipro 750 Q12 for her cx that grew E. Coli. Pt has had some loose stools since starting abx, bowel reg d/c and pt started on imodium and probiotic regimen.    Review of Systems  Objective:     Vital Signs (Most Recent):  Temp: 98.6 °F (37 °C) (05/22/25 0513)  Pulse: 95 (05/22/25 0513)  Resp: 16 (05/22/25 0533)  BP: (!) 148/84 (05/22/25 0513)  SpO2: 95 % (05/22/25 0513) Vital Signs (24h Range):  Temp:  [98.4 °F (36.9 °C)-99.5 °F (37.5 °C)] 98.6 °F (37 °C)  Pulse:  [] 95  Resp:  [16-18] 16  SpO2:  [92 %-95 %] 95 %  BP: (139-177)/(69-84) 148/84     Weight: 99.1 kg (218 lb 7.6 oz)  Body mass index is 41.28 kg/m².    Intake/Output Summary (Last 24 hours) at 5/22/2025 0710  Last data filed at 5/22/2025 0535  Gross per 24 hour   Intake 280 ml   Output 1400 ml   Net -1120 ml         Physical Exam  Vitals and nursing note reviewed.   Constitutional:       General: She is not in acute distress.     Appearance: Normal appearance. She is obese.   HENT:      Head: Normocephalic and atraumatic.      Nose: Nose normal.      Mouth/Throat:      Mouth: Mucous membranes are moist.      Pharynx: Oropharynx is clear.   Eyes:      Extraocular Movements: Extraocular movements intact.      Conjunctiva/sclera: Conjunctivae normal.   Cardiovascular:      Rate and Rhythm: Normal rate and regular rhythm.      Pulses: Normal pulses.      Heart sounds: Normal heart sounds.   Pulmonary:      Effort: Pulmonary effort is normal.      Breath sounds: Normal breath sounds.   Abdominal:      General: There is no distension.      Palpations: Abdomen is soft.      Tenderness: There is no abdominal tenderness. There is no right CVA tenderness.   Musculoskeletal:      Right lower leg: No edema.      Left lower leg: No edema.   Skin:     General: Skin is warm and dry.      Comments: Some skin fold candida   Neurological:      General: No focal deficit present.      Mental Status: She is  alert and oriented to person, place, and time.   Psychiatric:         Mood and Affect: Mood normal.         Behavior: Behavior normal.               Significant Labs: All pertinent labs within the past 24 hours have been reviewed.  CBC:   Recent Labs   Lab 05/21/25 0339 05/22/25  0350   WBC 10.97 12.23   HGB 10.8* 10.5*   HCT 33.7* 32.9*    358     CMP:   Recent Labs   Lab 05/21/25 0339 05/22/25  0350   * 136   K 3.4* 4.0   CL 98 101   CO2 25 28   GLU 96 118*   BUN 15 18   CREATININE 0.7 0.8   CALCIUM 9.0 9.2   PROT 6.5 6.5   ALBUMIN 2.4* 2.3*   BILITOT 0.4 0.2   ALKPHOS 92 92   AST 69* 34   ALT 47* 37   ANIONGAP 11 7*       Significant Imaging: I have reviewed all pertinent imaging results/findings within the past 24 hours.

## 2025-05-22 NOTE — ASSESSMENT & PLAN NOTE
Creatine stable for now. BMP reviewed- noted Estimated Creatinine Clearance: 76.6 mL/min (based on SCr of 0.8 mg/dL). according to latest data. Based on current GFR, CKD stage is stage 3 - GFR 30-59.  Monitor UOP and serial BMP and adjust therapy as needed. Renally dose meds. Avoid nephrotoxic medications and procedures.

## 2025-05-22 NOTE — ASSESSMENT & PLAN NOTE
Patient's FSGs are controlled on current medication regimen.  Last A1c reviewed-   Lab Results   Component Value Date    LABA1C 6.4 (H) 08/18/2016    HGBA1C 6.0 (H) 05/19/2025     Most recent fingerstick glucose reviewed-   Recent Labs   Lab 05/21/25  1726 05/21/25  2241 05/22/25  0850 05/22/25  1201   POCTGLUCOSE 134* 106 213* 134*     Current correctional scale  Low  Maintain anti-hyperglycemic dose as follows-   Antihyperglycemics (From admission, onward)      Start     Stop Route Frequency Ordered    05/19/25 0224  insulin aspart U-100 pen 0-5 Units         -- SubQ Before meals & nightly PRN 05/19/25 0124          Hold Oral hypoglycemics while patient is in the hospital.

## 2025-05-22 NOTE — PLAN OF CARE
[facility]  [facility]  Initial Discharge Assessment       Primary Care Provider: Rayo Hernandez MD    Admission Diagnosis: Dysuria [R30.0]  Fever and chills [R50.9]  Tachycardia [R00.0]  Flank pain [R10.9]  Generalized weakness [R53.1]  Chest pain [R07.9]  Leukocytosis, unspecified type [D72.829]    Admission Date: 5/18/2025  Expected Discharge Date: 5/24/2025    Transition of Care Barriers: None    Payor: HUMANA MANAGED MEDICARE / Plan: [employer] HMO PPO SPECIAL NEEDS / Product Type: Medicare Advantage /     Extended Emergency Contact Information  Primary Emergency Contact: Nilesh Wayne   [place]  Mobile Phone: [phone]  Relation: Daughter   needed? No  Secondary Emergency Contact: Tita Ballesteros   [place]  Mobile Phone: [phone]  Relation: Sister   needed? No    Discharge Plan A: Home with family  Discharge Plan B: Home Health      [id number] - [place] - [address]  [address]  [place] [address]  Phone: [phone] Fax: [phone]    [id number] - [place] - [address] AT [facility]  [address] [place] [address]  Phone: [phone] Fax: [phone]    [facility] [address]  [address]  [facility] [address]  Phone: [phone] Fax: [phone]      Initial Assessment (most recent)       Adult Discharge Assessment - 05/22/25 1624          Discharge Assessment    Assessment Type Discharge Planning Assessment     Confirmed/corrected address, phone number and insurance Yes     Confirmed Demographics Correct on Facesheet     Source of Information patient     Communicated SHERI with patient/caregiver Yes     People in Home child(jennifer), adult     Do you expect to return to your current living situation? Yes     Do you have help at home or someone to help you manage your care at home? Yes     Home  Layout Able to live on 1st floor     Do you take prescription medications? Yes     Do you have prescription coverage? Yes     Do you have any problems affording any of your prescribed medications? No     Is the patient taking medications as prescribed? yes     Who is going to help you get home at discharge? Son     How do you get to doctors appointments? family or friend will provide     Are you on dialysis? No     Do you take coumadin? No     Discharge Plan A Home with family     Discharge Plan B Home Health     Discharge Plan discussed with: Patient     Transition of Care Barriers None     SDOH --   none       Physical Activity    On average, how many days per week do you engage in moderate to strenuous exercise (like a brisk walk)? 0 days     On average, how many minutes do you engage in exercise at this level? 0 min        Financial Resource Strain    How hard is it for you to pay for the very basics like food, housing, medical care, and heating? Not hard at all        Housing Stability    In the last 12 months, was there a time when you were not able to pay the mortgage or rent on time? No     At any time in the past 12 months, were you homeless or living in a shelter (including now)? No        Transportation Needs    In the past 12 months, has lack of transportation kept you from medical appointments or from getting medications? No     In the past 12 months, has lack of transportation kept you from meetings, work, or from getting things needed for daily living? No        Food Insecurity    Within the past 12 months, you worried that your food would run out before you got the money to buy more. Never true     Within the past 12 months, the food you bought just didn't last and you didn't have money to get more. Never true        Social Isolation    How often do you feel lonely or isolated from those around you?  Never        Alcohol Use    Q1: How often do you have a drink containing alcohol? Never     Q2: How  many drinks containing alcohol do you have on a typical day when you are drinking? Patient does not drink     Q3: How often do you have six or more drinks on one occasion? Never        Utilities    In the past 12 months has the electric, gas, oil, or water company threatened to shut off services in your home? No        Health Literacy    How often do you need to have someone help you when you read instructions, pamphlets, or other written material from your doctor or pharmacy? Never                      The CM met with the patient at bedside to complete the DPA. The CM placed name and contact information on the blackboard in the patient's room.  Use preferred pharmacy / bedside delivery for any necessary  medications at the time of discharge. The patient is independent with all ADLs. The patient is not on Dialysis or Coumadin. The patient's daughter and son will provide assistance to the patient upon discharge. The patient's daughter and son will provide transportation upon discharge. The CM will continue to follow for course of hospitalization.

## 2025-05-22 NOTE — NURSING
"[facility] Plan of Care Note    Dx:   Dysuria [R30.0]  Fever and chills [R50.9]  Tachycardia [R00.0]  Flank pain [R10.9]  Generalized weakness [R53.1]  Chest pain [R07.9]  Leukocytosis, unspecified type [D72.829]    Shift Events: Large BM overnight requires x 2 assistance     Goals of Care: Progressing towards goals     Neuro: AOx4    Vital Signs: /80   Pulse 96   Temp 98.5 °F (36.9 °C) (Oral)   Resp 18   Ht 5' 1" (1.549 m)   Wt 99.1 kg (218 lb 7.6 oz)   SpO2 (!) 93%   BMI 41.28 kg/m²     Respiratory: RA     Diet: Diet Consistent Carbohydrate 2000 Calories (up to 75 gm per meal)      Is patient tolerating current diet? Yes     GTTS: N/A    Urine Output/Bowel Movement:   I/O this shift:  In: -   Out: 800 [Urine:800]  Last Bowel Movement: 05/22/25      Drains/Tubes/Tube Feeds (include total output/shift):   I/O this shift:  In: -   Out: 800 [Urine:800]      Lines: PIV  x 1        Accuchecks:ACHS     Skin: Foam dressing, antifungal cream applied      Fall Risk Score: 14     Activity level? X 2 assist     Any scheduled procedures? N/A     Any safety concerns? Fall risk     Other: N/A    "

## 2025-05-22 NOTE — PHARMACY MED REC
"  Admission Medication History     The home medication history was taken by Niels James.    You may go to "Admission" then "Reconcile Home Medications" tabs to review and/or act upon these items.     The home medication list has been updated by the Pharmacy department.   Please read ALL comments highlighted in yellow.   Please address this information as you see fit.    Feel free to contact us if you have any questions or require assistance.      The medications listed below were removed from the home medication list. Please reorder if appropriate:  Patient reports no longer taking the following medication(s):  ZOSTRIX 0.075% TOPICAL CREAM  LOTRISOME CREAM  COLACE 100 MG CAPSULE      Medications listed below were obtained from: Patient/family and Analytic software- [employer] Medications   Medication Sig    atorvastatin (LIPITOR) 10 MG tablet Take 1 tablet (10 mg total) by mouth once daily.        calcium carbonate 1250 MG capsule Take 1,250 mg by mouth Daily.        carvediloL (COREG) 3.125 MG tablet Take 1 tablet (3.125 mg total) by mouth 2 (two)   times daily.      cyanocobalamin (VITAMIN B-12) 100 MCG tablet Take 100 mcg by mouth once daily.        DULoxetine (CYMBALTA) 20 MG capsule Take 2 capsules (40 mg total) by mouth once daily.        famotidine (PEPCID) 40 MG tablet take 40 mg by mouth daily.        fluticasone propionate (FLONASE) 50 mcg/actuation nasal spray INSTILL 2 SPRAYS IN EACH NOSTRIL ONCE A DAY        hydrocortisone 2.5 % cream Apply topically Daily.      levothyroxine (SYNTHROID) 100 MCG tablet Take 1 tablet (100 mcg total) by mouth once daily.        LIDOcaine 4 % PtMd Apply 1 patch topically daily as needed (pain).      losartan-hydrochlorothiazide 100-12.5 mg (HYZAAR) 100-12.5 mg Tab Take 1 tablet by mouth once daily.          menthol (BIOFREEZE, MENTHOL,) 10 % Crea Apply topically daily as needed (pain).        metFORMIN (GLUCOPHAGE-XR) 500 MG ER 24hr tablet Take 1 tablet (500 mg total) " by mouth 2 (two) times daily with meals.        methocarbamoL (ROBAXIN) 500 MG Tab Take 500 mg by mouth every 6 (six) hours as needed.      multivitamin (THERAGRAN) per tablet Take 1 tablet by mouth once daily.        NIFEdipine (PROCARDIA-XL) 90 MG (OSM) 24 hr tablet Take 1 tablet (90 mg total) by mouth once daily.        oxyCODONE-acetaminophen (PERCOCET) 5-325 mg per tablet Take 1 tablet by mouth every 8 (eight) hours as needed for Pain. Greater than 7 day quantity Medically Necessary      potassium chloride SA (K-DUR,KLOR-CON) 20 MEQ tablet Take 2 tablets (40 mEq total) by mouth 2 (two) times daily.        pregabalin (LYRICA) 100 MG capsule Take 1 capsule (100 mg total) by mouth 2 (two) times daily.      traZODone (DESYREL) 50 MG tablet Take 50 mg by mouth nightly as needed for Insomnia.      venlafaxine (EFFEXOR-XR) 150 MG Cp24 Take 1 capsule (150 mg total) by mouth once daily.               Niels James  EXT [id number]               .

## 2025-05-22 NOTE — PT/OT/SLP PROGRESS
Occupational Therapy   Treatment    Name: Tanika Wayne  MRN: [id number]  Admitting Diagnosis:  Pyelonephritis       Recommendations:     Discharge Recommendations: Low Intensity Therapy  Discharge Equipment Recommendations:  bedside commode  Barriers to discharge:       Assessment:     Tanika Wayne is a 65 y.o. female with a medical diagnosis of Pyelonephritis. Pt became emotional during session - consoled by staff. Performance deficits affecting function are weakness, impaired endurance, decreased coordination, impaired self care skills, impaired functional mobility, gait instability, impaired balance, decreased safety awareness, decreased lower extremity function.     Rehab Prognosis:  Good; patient would benefit from acute skilled OT services to address these deficits and reach maximum level of function.       Plan:     Patient to be seen 4 x/week to address the above listed problems via self-care/home management, therapeutic activities, therapeutic exercises  Plan of Care Expires: 06/19/25  Plan of Care Reviewed with: patient    Subjective     Pain/Comfort:  Pain Rating 1: 0/10    Objective:     Communicated with: rn prior to session.  Patient found supine with telemetry upon OT entry to room.    General Precautions: Standard, fall    Orthopedic Precautions:N/A  Braces: N/A  Respiratory Status: Room air     Occupational Performance:     Bed Mobility:    Patient completed Scooting/Bridging with stand by assistance  Patient completed Supine to Sit with moderate assistance  Patient completed Sit to Supine with moderate assistance     Functional Mobility/Transfers:  Patient completed Sit <> Stand Transfer with minimum assistance  with  rolling walker   Patient completed Bed <> Chair Transfer using Step Transfer technique with contact guard assistance with rolling walker  Patient completed Toilet Transfer Stand Pivot technique with minimum assistance with  rolling walker and bedside commode  Functional  Mobility: Pt took 4 steps forward then sat in the chair CGA-Min A with a RW.    Activities of Daily Living  Upper Body Dressing: supervision  Toileting: stand by assistance pericare.    [facility] 6 Click ADL: 19    Treatment & Education:  Discussed OT POC and progress.    Patient left supine with all lines intact and call button in reach    GOALS:   Multidisciplinary Problems       Occupational Therapy Goals          Problem: Occupational Therapy    Goal Priority Disciplines Outcome Interventions   Occupational Therapy Goal     OT, PT/OT Progressing    Description: Goals to be met by: 6/19/25     Patient will increase functional independence with ADLs by performing:    UE Dressing with Supervision.  LE Dressing with Minimal Assistance.  Grooming while standing at sink with Stand-by Assistance.  Toileting from toilet with Stand-by Assistance for hygiene and clothing management.   All functional transfers performed with SBA                         DME Justifications:  No DME recommended requiring DME justifications    Time Tracking:     OT Date of Treatment: 05/22/25  OT Start Time: 1111  OT Stop Time: 1145  OT Total Time (min): 34 min    Billable Minutes:Self Care/Home Management 12  Therapeutic Activity 22    OT/JAIR: OT          5/22/2025

## 2025-05-23 VITALS
TEMPERATURE: 98 F | HEIGHT: 61 IN | SYSTOLIC BLOOD PRESSURE: 146 MMHG | OXYGEN SATURATION: 95 % | DIASTOLIC BLOOD PRESSURE: 71 MMHG | RESPIRATION RATE: 18 BRPM | HEART RATE: 96 BPM | BODY MASS INDEX: 41.25 KG/M2 | WEIGHT: 218.5 LBS

## 2025-05-23 PROBLEM — E83.39 HYPOPHOSPHATEMIA: Status: ACTIVE | Noted: 2025-05-23

## 2025-05-23 PROBLEM — D64.9 ANEMIA: Status: ACTIVE | Noted: 2025-05-23

## 2025-05-23 LAB
ABSOLUTE EOSINOPHIL (OHS): 0.16 K/UL
ABSOLUTE MONOCYTE (OHS): 1.42 K/UL (ref 0.3–1)
ABSOLUTE NEUTROPHIL COUNT (OHS): 5.84 K/UL (ref 1.8–7.7)
ALBUMIN SERPL BCP-MCNC: 2.4 G/DL (ref 3.5–5.2)
ALP SERPL-CCNC: 85 UNIT/L (ref 40–150)
ALT SERPL W/O P-5'-P-CCNC: 30 UNIT/L (ref 10–44)
ANION GAP (OHS): 13 MMOL/L (ref 8–16)
AST SERPL-CCNC: 24 UNIT/L (ref 11–45)
BACTERIA SPEC ANAEROBE CULT: NORMAL
BASOPHILS # BLD AUTO: 0.04 K/UL
BASOPHILS NFR BLD AUTO: 0.3 %
BILIRUB SERPL-MCNC: 0.2 MG/DL (ref 0.1–1)
BUN SERPL-MCNC: 14 MG/DL (ref 8–23)
CALCIUM SERPL-MCNC: 9 MG/DL (ref 8.7–10.5)
CHLORIDE SERPL-SCNC: 102 MMOL/L (ref 95–110)
CO2 SERPL-SCNC: 25 MMOL/L (ref 23–29)
CREAT SERPL-MCNC: 0.7 MG/DL (ref 0.5–1.4)
ERYTHROCYTE [DISTWIDTH] IN BLOOD BY AUTOMATED COUNT: 14.8 % (ref 11.5–14.5)
GFR SERPLBLD CREATININE-BSD FMLA CKD-EPI: >60 ML/MIN/1.73/M2
GLUCOSE SERPL-MCNC: 108 MG/DL (ref 70–110)
HCT VFR BLD AUTO: 31.2 % (ref 37–48.5)
HGB BLD-MCNC: 10.3 GM/DL (ref 12–16)
IMM GRANULOCYTES # BLD AUTO: 0.13 K/UL (ref 0–0.04)
IMM GRANULOCYTES NFR BLD AUTO: 1 % (ref 0–0.5)
LYMPHOCYTES # BLD AUTO: 4.8 K/UL (ref 1–4.8)
MAGNESIUM SERPL-MCNC: 2.1 MG/DL (ref 1.6–2.6)
MCH RBC QN AUTO: 27.5 PG (ref 27–31)
MCHC RBC AUTO-ENTMCNC: 33 G/DL (ref 32–36)
MCV RBC AUTO: 83 FL (ref 82–98)
NUCLEATED RBC (/100WBC) (OHS): 0 /100 WBC
PHOSPHATE SERPL-MCNC: 3.2 MG/DL (ref 2.7–4.5)
PLATELET # BLD AUTO: 332 K/UL (ref 150–450)
PMV BLD AUTO: 11.2 FL (ref 9.2–12.9)
POCT GLUCOSE: 111 MG/DL (ref 70–110)
POCT GLUCOSE: 117 MG/DL (ref 70–110)
POCT GLUCOSE: 97 MG/DL (ref 70–110)
POTASSIUM SERPL-SCNC: 3.6 MMOL/L (ref 3.5–5.1)
PROT SERPL-MCNC: 6.8 GM/DL (ref 6–8.4)
RBC # BLD AUTO: 3.75 M/UL (ref 4–5.4)
RELATIVE EOSINOPHIL (OHS): 1.3 %
RELATIVE LYMPHOCYTE (OHS): 38.7 % (ref 18–48)
RELATIVE MONOCYTE (OHS): 11.5 % (ref 4–15)
RELATIVE NEUTROPHIL (OHS): 47.2 % (ref 38–73)
SODIUM SERPL-SCNC: 140 MMOL/L (ref 136–145)
WBC # BLD AUTO: 12.39 K/UL (ref 3.9–12.7)

## 2025-05-23 PROCEDURE — 80053 COMPREHEN METABOLIC PANEL: CPT | Mod: HCNC

## 2025-05-23 PROCEDURE — 85025 COMPLETE CBC W/AUTO DIFF WBC: CPT | Mod: HCNC

## 2025-05-23 PROCEDURE — 25000003 PHARM REV CODE 250: Mod: HCNC

## 2025-05-23 PROCEDURE — 36415 COLL VENOUS BLD VENIPUNCTURE: CPT | Mod: HCNC

## 2025-05-23 PROCEDURE — 83735 ASSAY OF MAGNESIUM: CPT | Mod: HCNC

## 2025-05-23 PROCEDURE — 63600175 PHARM REV CODE 636 W HCPCS: Mod: HCNC

## 2025-05-23 PROCEDURE — 25000003 PHARM REV CODE 250: Mod: HCNC | Performed by: HOSPITALIST

## 2025-05-23 PROCEDURE — 84100 ASSAY OF PHOSPHORUS: CPT | Mod: HCNC

## 2025-05-23 RX ORDER — FLUCONAZOLE 150 MG/1
150 TABLET ORAL ONCE
Status: COMPLETED | OUTPATIENT
Start: 2025-05-23 | End: 2025-05-23

## 2025-05-23 RX ORDER — DICYCLOMINE HYDROCHLORIDE 10 MG/1
10 CAPSULE ORAL 4 TIMES DAILY
Status: DISCONTINUED | OUTPATIENT
Start: 2025-05-23 | End: 2025-05-23 | Stop reason: HOSPADM

## 2025-05-23 RX ADMIN — LOPERAMIDE HYDROCHLORIDE 2 MG: 2 CAPSULE ORAL at 08:05

## 2025-05-23 RX ADMIN — Medication 1 CAPSULE: at 08:05

## 2025-05-23 RX ADMIN — THERA TABS 1 TABLET: TAB at 08:05

## 2025-05-23 RX ADMIN — MUPIROCIN: 20 OINTMENT TOPICAL at 08:05

## 2025-05-23 RX ADMIN — CYANOCOBALAMIN TAB 250 MCG 250 MCG: 250 TAB at 08:05

## 2025-05-23 RX ADMIN — FLUCONAZOLE 150 MG: 150 TABLET ORAL at 04:05

## 2025-05-23 RX ADMIN — LOPERAMIDE HYDROCHLORIDE 2 MG: 2 CAPSULE ORAL at 02:05

## 2025-05-23 RX ADMIN — DICYCLOMINE HYDROCHLORIDE 10 MG: 10 CAPSULE ORAL at 04:05

## 2025-05-23 RX ADMIN — OXYCODONE HYDROCHLORIDE AND ACETAMINOPHEN 1 TABLET: 7.5; 325 TABLET ORAL at 06:05

## 2025-05-23 RX ADMIN — OXYCODONE HYDROCHLORIDE AND ACETAMINOPHEN 1 TABLET: 7.5; 325 TABLET ORAL at 01:05

## 2025-05-23 RX ADMIN — DULOXETINE HYDROCHLORIDE 40 MG: 20 CAPSULE, DELAYED RELEASE ORAL at 08:05

## 2025-05-23 RX ADMIN — PREGABALIN 100 MG: 50 CAPSULE ORAL at 08:05

## 2025-05-23 RX ADMIN — FLUTICASONE PROPIONATE 100 MCG: 50 SPRAY, METERED NASAL at 08:05

## 2025-05-23 RX ADMIN — LOSARTAN POTASSIUM 100 MG: 50 TABLET, FILM COATED ORAL at 08:05

## 2025-05-23 RX ADMIN — CARVEDILOL 3.12 MG: 3.12 TABLET, FILM COATED ORAL at 08:05

## 2025-05-23 RX ADMIN — CIPROFOLXACIN 750 MG: 250 TABLET ORAL at 08:05

## 2025-05-23 RX ADMIN — ATORVASTATIN CALCIUM 10 MG: 10 TABLET, FILM COATED ORAL at 08:05

## 2025-05-23 RX ADMIN — VENLAFAXINE HYDROCHLORIDE 150 MG: 75 CAPSULE, EXTENDED RELEASE ORAL at 08:05

## 2025-05-23 RX ADMIN — MICONAZOLE NITRATE: 20 OINTMENT TOPICAL at 08:05

## 2025-05-23 RX ADMIN — HEPARIN SODIUM 7500 UNITS: 5000 INJECTION INTRAVENOUS; SUBCUTANEOUS at 05:05

## 2025-05-23 RX ADMIN — LEVOTHYROXINE SODIUM 100 MCG: 0.05 TABLET ORAL at 05:05

## 2025-05-23 NOTE — PLAN OF CARE
[facility]      HOME HEALTH ORDERS  FACE TO FACE ENCOUNTER    Patient Name: Tanika Wayne  YOB: 1960    PCP: Rayo Hernandez MD   PCP Address: [address] [facility] [address]  PCP Phone Number: [phone]  PCP Fax: [phone]    Encounter Date: 5/18/25    Admit to Home Health    Diagnoses:  Active Hospital Problems    Diagnosis  POA    *Pyelonephritis [N12]  Yes    Leukocytosis [D72.829]  Yes    H/O gastric sleeve [Z90.3]  Not Applicable    Insomnia [G47.00]  Yes    CKD stage 3a, GFR 45-59 ml/min [N18.31]  Yes    Gastroesophageal reflux disease without esophagitis [K21.9]  Yes    Diabetic peripheral neuropathy [E11.42]  Yes    Hyperlipidemia associated with type 2 diabetes mellitus [E11.69, E78.5]  Yes    Recurrent major depressive disorder, in full remission [F33.42]  Yes    Severe obesity with body mass index (BMI) of 35.0 to 35.9 and comorbidity [E66.01, Z68.35]  Not Applicable    Chronic bilateral low back pain with right-sided sciatica [G89.29, M54.41]  Yes    S/P lumbar laminectomy [Z98.890]  Not Applicable    Spinal stenosis, lumbar [M48.061]  Yes    History of subtotal thyroidectomy [E89.0]  Yes     for MNG 9/16      Type 2 diabetes mellitus with cataract [E11.36]  Yes     2011      Hypertension associated with diabetes [E11.59, I15.2]  Yes     1995        Resolved Hospital Problems   No resolved problems to display.       Follow Up Appointments:  Future Appointments   Date Time Provider Department Center   7/21/2025  2:20 PM Rayo Hernandez MD [facility]   1/29/2026 10:40 AM Rayo Hernandez MD [facility]       Allergies:Review of patient's allergies indicates:  No Known Allergies    Medications: Review discharge medications with patient and family and provide education.    Current Medications[1]     Medication List        START taking these medications      ciprofloxacin HCl 750 MG tablet  Commonly known as: CIPRO  Take 1 tablet (750 mg total)  by mouth every 12 (twelve) hours. for 6 days     Lactobacillus rhamnosus GG 10 billion cell capsule  Commonly known as: CULTURELLE  Take 1 capsule by mouth once daily.     loperamide 2 mg capsule  Commonly known as: IMODIUM  Take 1 capsule (2 mg total) by mouth 3 (three) times daily. for 10 days            CHANGE how you take these medications      famotidine 40 MG tablet  Commonly known as: PEPCID  Take 1 tablet (40 mg total) by mouth nightly as needed for Heartburn.  What changed: when to take this     hydrocortisone 2.5 % cream  Apply topically 2 (two) times daily.  What changed: when to take this     traZODone 50 MG tablet  Commonly known as: DESYREL  Take 1 tablet (50 mg total) by mouth every evening.  What changed:   when to take this  reasons to take this            CONTINUE taking these medications      atorvastatin 10 MG tablet  Commonly known as: LIPITOR  Take 1 tablet (10 mg total) by mouth once daily.     calcium carbonate 1250 MG capsule  Take 1,250 mg by mouth Daily.     carvediloL 3.125 MG tablet  Commonly known as: COREG  Take 1 tablet (3.125 mg total) by mouth 2 (two) times daily.     cyanocobalamin 100 MCG tablet  Commonly known as: VITAMIN B-12  Take 100 mcg by mouth once daily.     DULoxetine 20 MG capsule  Commonly known as: CYMBALTA  Take 2 capsules (40 mg total) by mouth once daily.     fluticasone propionate 50 mcg/actuation nasal spray  Commonly known as: FLONASE  INSTILL 2 SPRAYS IN EACH NOSTRIL ONCE A DAY     levothyroxine 100 MCG tablet  Commonly known as: SYNTHROID  Take 1 tablet (100 mcg total) by mouth once daily.     losartan-hydrochlorothiazide 100-12.5 mg 100-12.5 mg Tab  Commonly known as: HYZAAR  Take 1 tablet by mouth once daily.     metFORMIN 500 MG ER 24hr tablet  Commonly known as: GLUCOPHAGE-XR  Take 1 tablet (500 mg total) by mouth 2 (two) times daily with meals.     multivitamin per tablet  Commonly known as: THERAGRAN  Take 1 tablet by mouth once daily.     NIFEdipine 90  MG (OSM) 24 hr tablet  Commonly known as: PROCARDIA-XL  Take 1 tablet (90 mg total) by mouth once daily.     oxyCODONE-acetaminophen 5-325 mg per tablet  Commonly known as: PERCOCET  Take 1 tablet by mouth every 8 (eight) hours as needed for Pain. Greater than 7 day quantity Medically Necessary     potassium chloride SA 20 MEQ tablet  Commonly known as: K-DUR,KLOR-CON  Take 2 tablets (40 mEq total) by mouth 2 (two) times daily.     pregabalin 100 MG capsule  Commonly known as: LYRICA  Take 1 capsule (100 mg total) by mouth 2 (two) times daily.     venlafaxine 150 MG Cp24  Commonly known as: EFFEXOR-XR  Take 1 capsule (150 mg total) by mouth once daily.            STOP taking these medications      BIOFREEZE (MENTHOL) 10 % Crea  Generic drug: menthol     LIDOcaine 4 % Ptmd     methocarbamoL 500 MG Tab  Commonly known as: Robaxin                I have seen and examined this patient within the last 30 days. My clinical findings that support the need for the home health skilled services and home bound status are the following:no   Weakness/numbness causing balance and gait disturbance due to Infection making it taxing to leave home.     Diet:   diabetic diet 2000 calorie    Labs:  Report Lab results to PCP.    Referrals/ Consults  Physical Therapy to evaluate and treat. Evaluate for home safety and equipment needs; Establish/upgrade home exercise program. Perform / instruct on therapeutic exercises, gait training, transfer training, and Range of Motion.  Occupational Therapy to evaluate and treat. Evaluate home environment for safety and equipment needs. Perform/Instruct on transfers, ADL training, ROM, and therapeutic exercises.      Activities:   activity as tolerated    Nursing:   Agency to admit patient within 24 hours of hospital discharge unless specified on physician order or at patient request    SN to complete comprehensive assessment including routine vital signs. Instruct on disease process and s/s of  complications to report to MD. Review/verify medication list sent home with the patient at time of discharge  and instruct patient/caregiver as needed. Frequency may be adjusted depending on start of care date.     Skilled nurse to perform up to 3 visits PRN for symptoms related to diagnosis    Notify MD if SBP > 160 or < 90; DBP > 90 or < 50; HR > 120 or < 50; Temp > 101; O2 < 88%; Other:       Ok to schedule additional visits based on staff availability and patient request on consecutive days within the home health episode.    When multiple disciplines ordered:    Start of Care occurs on Sunday - Wednesday schedule remaining discipline evaluations as ordered on separate consecutive days following the start of care.    Thursday SOC -schedule subsequent evaluations Friday and Monday the following week.     Friday - Saturday SOC - schedule subsequent discipline evaluations on consecutive days starting Monday of the following week.    For all post-discharge communication and subsequent orders please contact patient's primary care physician. If unable to reach primary care physician or do not receive response within 30 minutes, please contact PCP for clinical staff order clarification        Home Health Aide:  Physical Therapy Three times weekly and Occupational Therapy Three times weekly                  [1]   Current Facility-Administered Medications   Medication Dose Route Frequency Provider Last Rate Last Admin    atorvastatin tablet 10 mg  10 mg Oral Daily Kwame Kenyon DO   10 mg at 05/22/25 0913    carvediloL tablet 3.125 mg  3.125 mg Oral BID Silvio Christensen DO   3.125 mg at 05/22/25 2026    ciprofloxacin HCl tablet 750 mg  750 mg Oral Q12H Silvio Christensen DO   750 mg at 05/22/25 2027    clotrimazole-betamethasone 1-0.05% cream   Topical (Top) BID PRN Kwame Kenyon DO        cyanocobalamin tablet 250 mcg  250 mcg Oral Daily Kwame Kenyon DO   250 mcg at 05/22/25 0913    dextrose 50% injection  12.5 g  12.5 g Intravenous PRN Kwame Kenyon, DO        dextrose 50% injection 25 g  25 g Intravenous PRN Kwame Kenyon,         docusate sodium capsule 50 mg  50 mg Oral BID PRN Kwame Kenyon,         DULoxetine DR capsule 40 mg  40 mg Oral Daily Kwame Kenyon, DO   40 mg at 05/22/25 0920    famotidine tablet 40 mg  40 mg Oral Nightly PRN Kwame Kenyon DO   40 mg at 05/21/25 0524    fluticasone propionate 50 mcg/actuation nasal spray 100 mcg  2 spray Each Nostril Daily Kwame Kenyon DO   100 mcg at 05/22/25 0914    glucagon (human recombinant) injection 1 mg  1 mg Intramuscular PRN Kwame Kenyon,         glucose chewable tablet 16 g  16 g Oral PRN Kwame Kenyon,         glucose chewable tablet 24 g  24 g Oral PRN Kwame Kenyon, DO        heparin (porcine) injection 7,500 Units  7,500 Units Subcutaneous Q8H Kwame Kenyon DO   7,500 Units at 05/23/25 0541    insulin aspart U-100 pen 0-5 Units  0-5 Units Subcutaneous QID (AC + HS) PRN Kwame Kenyon DO        Lactobacillus rhamnosus GG capsule 1 capsule  1 capsule Oral Daily Teodoro Grace MD   1 capsule at 05/22/25 1134    levothyroxine tablet 100 mcg  100 mcg Oral Before breakfast Kwame Kenyon DO   100 mcg at 05/23/25 0541    loperamide capsule 2 mg  2 mg Oral TID Teodoro Grace MD   2 mg at 05/22/25 2027    losartan tablet 100 mg  100 mg Oral Daily Silvio Christensen DO   100 mg at 05/22/25 0913    miconazole nitrate 2% ointment   Topical (Top) BID Silvio Christensen DO   Given at 05/22/25 2030    multivitamin tablet  1 tablet Oral Daily Kwame Kenyon DO   1 tablet at 05/22/25 0914    mupirocin 2 % ointment   Nasal BID Akira Watson FNP-C   Given at 05/22/25 2031    naloxone 0.4 mg/mL injection 0.02 mg  0.02 mg Intravenous PRN Kwame Kenyon DO        ondansetron injection 4 mg  4 mg Intravenous Q6H PRN Silvia Price MD   4 mg at 05/21/25 0840    oxyCODONE-acetaminophen 5-325 mg per tablet 1 tablet   1 tablet Oral Q4H PRN Teodoro Grace MD   1 tablet at 05/22/25 2118    oxyCODONE-acetaminophen 7.5-325 mg per tablet 1 tablet  1 tablet Oral Q4H PRN Teodoro Grace MD   1 tablet at 05/23/25 0616    pregabalin capsule 100 mg  100 mg Oral BID Kwame Kenyon DO   100 mg at 05/22/25 2027    sodium chloride 0.9% flush 10 mL  10 mL Intravenous Q12H PRN Kwame Kenyon DO        traZODone tablet 50 mg  50 mg Oral Nightly PRN Kwame Kenyon DO        venlafaxine 24 hr capsule 150 mg  150 mg Oral Daily Kwame Kenyon DO   150 mg at 05/22/25 0913

## 2025-05-23 NOTE — PLAN OF CARE
[facility]  Discharge Final Note    Primary Care Provider: Rayo Hernandez MD  Expected Discharge Date: 5/23/2025    Patient medically ready for discharge to home with [facility] home [facility].     Transportation by family.     Is family/patient aware of discharge: yes     Hospital follow up scheduled: yes       Final Discharge Note (most recent)       Final Note - 05/23/25 1548          Final Note    Assessment Type Final Discharge Note     [place] [facility] [place]ospital Resources/Appts/Education Provided Provided patient/caregiver with written discharge plan information                     Important Message from Medicare         Referral Info (most recent)       Referral Info    No documentation.                   Future Appointments   Date Time Provider Department Center   7/21/2025  2:20 PM Rayo Hernandez MD [facility]   1/29/2026 10:40 AM Rayo Hernandez MD [facility] RN  Case Management  Ext: [phone]  05/23/2025

## 2025-05-23 NOTE — PROGRESS NOTES
Patient noted 2 loose BMs at 1400. Discussed with Dr Pierre in [place]. Patient still good for discharged. Dr Pierre discussed with patient. Primary nurse Yara metzger. Charge nurse notified as well.

## 2025-05-23 NOTE — PROGRESS NOTES
Notified On-Call MD, Dr. Apoorva Odom via secure chat.  Informed MD that patient has been having elevated B/P's. Do you want to order PRN B/p medications? MD stated that she would look at MAR and add appropriate medication.       05/22/25 2025   Vital Signs   Pulse 91   SpO2 (!) 94 %   BP (!) 173/97   MAP (mmHg) 122

## 2025-05-23 NOTE — PLAN OF CARE
[facility]  Discharge Final Note    Primary Care Provider: Rayo Hernandez MD  Expected Discharge Date: 5/23/2025    Patient medically ready for discharge to home with [facility] home [facility].     Transportation by family.     Is family/patient aware of discharge: yes     Hospital follow up scheduled: yes       Final Discharge Note (most recent)       Final Note - 05/23/25 1100          Final Note    Assessment Type Final Discharge Note     [place] [facility] [place]     Hospital Resources/Appts/Education Provided Provided patient/caregiver with written discharge plan information                     Important Message from Medicare         Referral Info (most recent)       Referral Info    No documentation.                   Future Appointments   Date Time Provider Department Center   7/21/2025  2:20 PM Rayo Hernandez MD [facility]   1/29/2026 10:40 AM Rayo Hernandez MD [facility] RN  Case Management  Ext: [phone]  05/23/2025  '

## 2025-05-23 NOTE — DISCHARGE SUMMARY
[facility] Medicine  Discharge Summary      Patient Name: Tanika Wayne  MRN: [id number]  LC: [id number]  Patient Class: IP- Inpatient  Admission Date: 5/18/2025  Hospital Length of Stay: 4 days  Discharge Date and Time: 05/23/2025 11:43 AM  Attending Physician: Suki Pierre*   Discharging Provider: Teodoro Grace MD  Primary Care Provider: Rayo Hernandez MD  [facility] Medicine Team: [facility] Teodoro Grace MD  Primary Care Team: [facility]    HPI:   65-year-old female with history of ITP, diabetes, hypertension, cardiomyopathy, degenerative disc disease, CKD stage 3, nephrolithiasis, presenting for generalized weakness, right flank pain, dysuria, polyuria and fever and chills. She presents with 1 day of abrupt-onset generalized weakness, inability to ambulate, and lightheadedness. These symptoms occur in the context of 4 days of progressively worsening urinary symptoms, including dysuria, polyuria, and a burning sensation with urination, as well as right flank pain. She describes the flank pain as 7/10 in intensity, located in the right flank with radiation around the abdomen. She also endorses subjective fevers and chills, intermittent nausea without vomiting, and decreased oral intake today due to reduced appetite. She took a Percocet earlier today without any relief of symptoms. Of note, the patient was hospitalized for a UTI on 4/15 and was discharged with a 7-day course of Keflex, with initial improvement in symptoms. However, she subsequently began experiencing weakness and was evaluated by her PCP, who obtained a urinalysis that was notable for the presence of bacteria. Despite these findings, she has not received any additional antibiotic treatment since then.    On arrival, vitals are notable for borderline fever of 99.8, tachycardia in the 110s to 120s. Labs notable for significant leukocytosis (WBC 23.26), mild VIOLET (Cr 1.2). UA showed occasional bacteria. Blood  cultures drawn. CT abdomen and pelvis consistent with right-sided pyelonephritis, small renal abscess. She was given IV Toradol, morphine, IV vancomycin and Zosyn, IV fluid rehydration at 30 cc/kilos of ideal body weight (1.5L LR total). Patient admitted to hospital medicine for management of pyelonephritis.     * No surgery found *      Hospital Course:   65 yof presented with dysuria, flank pain, fever chills. Pt found to have renal abscess, IR drainage and cultures sent. Abscess cultures growing GNR. Pt started on zosyn on arrival, will de-escalate. Pt also with pancreatic lesion noted on CT scan, recommended non-urgent MRI. 5/21 pt found to be growing E. Coli, abx switched to Cipro 750 Q12 as she continues to improve. She had improvement with abx, but did experience some loose stools and GI upset, so bowel reg stopped and imodium/probiotic added to use while taking abx. Determined to be stable for d/c 5/23, sent with Cipro 750 Q12 to finish 7 day course, and will be going home with  services.     Goals of Care Treatment Preferences:  Code Status: Full Code    Physical Exam  HENT:      Head: Normocephalic and atraumatic.   Eyes:      Extraocular Movements: Extraocular movements intact.      Pupils: Pupils are equal, round, and reactive to light.   Cardiovascular:      Rate and Rhythm: Normal rate and regular rhythm.      Pulses: Normal pulses.      Heart sounds: Normal heart sounds.   Pulmonary:      Effort: Pulmonary effort is normal.      Breath sounds: Normal breath sounds.   Abdominal:      General: Bowel sounds are normal. There is no distension.      Tenderness: There is no abdominal tenderness.   Musculoskeletal:      Right lower leg: No edema.      Left lower leg: No edema.   Skin:     General: Skin is warm and dry.   Neurological:      Mental Status: She is alert and oriented to person, place, and time.         SDOH Screening:  The patient was screened for utility difficulties, food insecurity,  transport difficulties, housing insecurity, and interpersonal safety and there were no concerns identified this admission.     Consults:   Consults (From admission, onward)          Status Ordering Provider     Inpatient consult to PICC team (NIAS)  Once        Provider:  (Not yet assigned)    Completed PILY GARCIA     Inpatient consult to Midline team  Once        Provider:  (Not yet assigned)    Completed DAVID LLANOS     Inpatient consult to Interventional Radiology  Once        Provider:  (Not yet assigned)    Completed AIDA ZENG     Inpatient consult to Midline team  Once        Provider:  (Not yet assigned)    Completed NANCY SOLITARIO     IP consult to case management  Once        Provider:  (Not yet assigned)    Acknowledged YEMI ANDERSON            Assessment & Plan    Final Active Diagnoses:    Diagnosis Date Noted POA    PRINCIPAL PROBLEM:  Pyelonephritis [N12] 05/19/2025 Yes    Leukocytosis [D72.829] 05/19/2025 Yes    H/O gastric sleeve [Z90.3] 05/19/2025 Not Applicable    Insomnia [G47.00] 05/19/2025 Yes    CKD stage 3a, GFR 45-59 ml/min [N18.31] 08/12/2024 Yes    Gastroesophageal reflux disease without esophagitis [K21.9] 08/12/2024 Yes    Diabetic peripheral neuropathy [E11.42] 08/01/2023 Yes    Hyperlipidemia associated with type 2 diabetes mellitus [E11.69, E78.5] 11/10/2021 Yes    Recurrent major depressive disorder, in full remission [F33.42] 11/10/2021 Yes    Severe obesity with body mass index (BMI) of 35.0 to 35.9 and comorbidity [E66.01, Z68.35] 07/23/2018 Not Applicable    Chronic bilateral low back pain with right-sided sciatica [G89.29, M54.41] 08/11/2017 Yes    S/P lumbar laminectomy [Z98.890] 03/21/2017 Not Applicable    Spinal stenosis, lumbar [M48.061] 01/10/2017 Yes    History of subtotal thyroidectomy [E89.0] 09/09/2016 Yes    Type 2 diabetes mellitus with cataract [E11.36] 11/26/2012 Yes    Hypertension associated with diabetes [E11.59, I15.2] 11/26/2012 Yes     "  Problems Resolved During this Admission:       Discharged Condition: stable    Disposition: Home-Health Care [facility]    Follow Up:    Patient Instructions:      COMMODE FOR HOME USE     Order Specific Question Answer Comments   Type: Standard    Height: 5' 1" (1.549 m)    Weight: 99.1 kg (218 lb 7.6 oz)    Does patient have medical equipment at home? rollator    Does patient have medical equipment at home? grab bar    Length of need (1-99 months): 99        Significant Diagnostic Studies: Labs: CMP   Recent Labs   Lab 05/22/25  0350 05/23/25  0655    140   K 4.0 3.6    102   CO2 28 25   * 108   BUN 18 14   CREATININE 0.8 0.7   CALCIUM 9.2 9.0   PROT 6.5 6.8   ALBUMIN 2.3* 2.4*   BILITOT 0.2 0.2   ALKPHOS 92 85   AST 34 24   ALT 37 30   ANIONGAP 7* 13    and CBC   Recent Labs   Lab 05/22/25  0350 05/23/25  0655   WBC 12.23 12.39   HGB 10.5* 10.3*   HCT 32.9* 31.2*    332       Pending Diagnostic Studies:       Procedure Component Value Units Date/Time    EXTRA TUBES [[id number]] Collected: 05/18/25 2106    Order Status: Sent Lab Status: In process Updated: 05/19/25 0003    Specimen: Blood, Venous     Narrative:      The following orders were created for panel order EXTRA TUBES.  Procedure                               Abnormality         Status                     ---------                               -----------         ------                     Light Green Top Hold[[id number]]                            Final result               Light Green Top Hold[[id number]]                                                         Please view results for these tests on the individual orders.    EXTRA TUBES [[id number]]     Order Status: Sent Lab Status: No result     Specimen: Blood, Venous     Narrative:      The following orders were created for panel order EXTRA TUBES.  Procedure                               Abnormality         Status                     ---------                               " -----------         ------                     Light Green Top Hold[[id number]]                                                         Please view results for these tests on the individual orders.    Light Green Top Hold [[id number]]     Order Status: Sent Lab Status: No result     Specimen: Blood, Venous     Light Green Top Hold [[id number]] Collected: 05/18/25    Order Status: Sent Lab Status: No result     Specimen: Blood, Venous            Medications:  Reconciled Home Medications:      Medication List        START taking these medications      ciprofloxacin HCl 750 MG tablet  Commonly known as: CIPRO  Take 1 tablet (750 mg total) by mouth every 12 (twelve) hours. for 6 days     Lactobacillus rhamnosus GG 10 billion cell capsule  Commonly known as: CULTURELLE  Take 1 capsule by mouth once daily.     loperamide 2 mg capsule  Commonly known as: IMODIUM  Take 1 capsule (2 mg total) by mouth 3 (three) times daily. for 10 days            CHANGE how you take these medications      famotidine 40 MG tablet  Commonly known as: PEPCID  Take 1 tablet (40 mg total) by mouth nightly as needed for Heartburn.  What changed: when to take this     hydrocortisone 2.5 % cream  Apply topically 2 (two) times daily.  What changed: when to take this     traZODone 50 MG tablet  Commonly known as: DESYREL  Take 1 tablet (50 mg total) by mouth every evening.  What changed:   when to take this  reasons to take this            CONTINUE taking these medications      atorvastatin 10 MG tablet  Commonly known as: LIPITOR  Take 1 tablet (10 mg total) by mouth once daily.     calcium carbonate 1250 MG capsule  Take 1,250 mg by mouth Daily.     carvediloL 3.125 MG tablet  Commonly known as: COREG  Take 1 tablet (3.125 mg total) by mouth 2 (two) times daily.     cyanocobalamin 100 MCG tablet  Commonly known as: VITAMIN B-12  Take 100 mcg by mouth once daily.     DULoxetine 20 MG capsule  Commonly known as: CYMBALTA  Take 2 capsules (40 mg total)  by mouth once daily.     fluticasone propionate 50 mcg/actuation nasal spray  Commonly known as: FLONASE  INSTILL 2 SPRAYS IN EACH NOSTRIL ONCE A DAY     levothyroxine 100 MCG tablet  Commonly known as: SYNTHROID  Take 1 tablet (100 mcg total) by mouth once daily.     losartan-hydrochlorothiazide 100-12.5 mg 100-12.5 mg Tab  Commonly known as: HYZAAR  Take 1 tablet by mouth once daily.     metFORMIN 500 MG ER 24hr tablet  Commonly known as: GLUCOPHAGE-XR  Take 1 tablet (500 mg total) by mouth 2 (two) times daily with meals.     multivitamin per tablet  Commonly known as: THERAGRAN  Take 1 tablet by mouth once daily.     NIFEdipine 90 MG (OSM) 24 hr tablet  Commonly known as: PROCARDIA-XL  Take 1 tablet (90 mg total) by mouth once daily.     oxyCODONE-acetaminophen 5-325 mg per tablet  Commonly known as: PERCOCET  Take 1 tablet by mouth every 8 (eight) hours as needed for Pain. Greater than 7 day quantity Medically Necessary     potassium chloride SA 20 MEQ tablet  Commonly known as: K-DUR,KLOR-CON  Take 2 tablets (40 mEq total) by mouth 2 (two) times daily.     pregabalin 100 MG capsule  Commonly known as: LYRICA  Take 1 capsule (100 mg total) by mouth 2 (two) times daily.     venlafaxine 150 MG Cp24  Commonly known as: EFFEXOR-XR  Take 1 capsule (150 mg total) by mouth once daily.            STOP taking these medications      BIOFREEZE (MENTHOL) 10 % Crea  Generic drug: menthol     LIDOcaine 4 % Ptmd     methocarbamoL 500 MG Tab  Commonly known as: Robaxin              Indwelling Lines/Drains at time of discharge:   Lines/Drains/Airways       Drain  Duration             Female External Urinary Catheter w/ Suction 05/18/25 5862 4 days                    Time spent on the discharge of patient: 45 minutes         Teodoro Grace MD  Department of Hospital Medicine  [facility]

## 2025-05-24 LAB
BACTERIA BLD CULT: NORMAL
BACTERIA BLD CULT: NORMAL

## 2025-05-26 ENCOUNTER — PATIENT OUTREACH (OUTPATIENT)
Dept: ADMINISTRATIVE | Facility: CLINIC | Age: 65
End: 2025-05-26
Payer: MEDICARE

## 2025-05-26 NOTE — PROGRESS NOTES
C3 nurse attempted to contact Tanika Wayne  for a TCC post hospital discharge follow up call. No answer. LVM requesting a callback at [phone].    The patient does not have a scheduled HOSFU appointment. Message sent to PCP's staff to assist with HOSFU appointment scheduling.

## 2025-05-27 LAB — MYCOBACTERIUM SPEC QL CULT: NORMAL

## 2025-05-27 NOTE — PROGRESS NOTES
Third attempt made to reach the patient for TCC outreach call. I am unable to reach the patient at this time.

## 2025-05-30 LAB — FUNGUS SPEC CULT: ABNORMAL

## 2025-06-02 LAB — MYCOBACTERIUM SPEC QL CULT: NORMAL

## 2025-06-12 DIAGNOSIS — Z96.641 STATUS POST RIGHT HIP REPLACEMENT: ICD-10-CM

## 2025-06-12 DIAGNOSIS — M54.41 CHRONIC BILATERAL LOW BACK PAIN WITH RIGHT-SIDED SCIATICA: ICD-10-CM

## 2025-06-12 DIAGNOSIS — F11.90 UNCOMPLICATED OPIOID USE: ICD-10-CM

## 2025-06-12 DIAGNOSIS — Z96.651 STATUS POST TOTAL RIGHT KNEE REPLACEMENT: ICD-10-CM

## 2025-06-12 DIAGNOSIS — M43.16 SPONDYLOLISTHESIS AT L3-L4 LEVEL: ICD-10-CM

## 2025-06-12 DIAGNOSIS — M54.9 BACK PAIN, UNSPECIFIED BACK LOCATION, UNSPECIFIED BACK PAIN LATERALITY, UNSPECIFIED CHRONICITY: ICD-10-CM

## 2025-06-12 DIAGNOSIS — G89.29 CHRONIC BILATERAL LOW BACK PAIN WITH RIGHT-SIDED SCIATICA: ICD-10-CM

## 2025-06-12 DIAGNOSIS — M48.062 SPINAL STENOSIS OF LUMBAR REGION WITH NEUROGENIC CLAUDICATION: ICD-10-CM

## 2025-06-12 RX ORDER — OXYCODONE AND ACETAMINOPHEN 5; 325 MG/1; MG/1
1 TABLET ORAL EVERY 8 HOURS PRN
Qty: 90 TABLET | Refills: 0 | Status: SHIPPED | OUTPATIENT
Start: 2025-06-12

## 2025-06-12 NOTE — TELEPHONE ENCOUNTER
Spoke with pt daughter asking about how is pt doing after she fell today morning.  Pt daughter said she is doing ok right now .   [facility] want to give update to Dr. Hernandez

## 2025-06-12 NOTE — TELEPHONE ENCOUNTER
No care due was identified.  Health [facility] Embedded Care Due Messages. Reference number: [id number].   6/12/2025 1:07:02 PM CDT

## 2025-06-12 NOTE — TELEPHONE ENCOUNTER
Copied from CRM #[id number]. Topic: General Inquiry - Patient Advice  >> Jun 12, 2025 12:58 PM Kate wrote:  .1MEDICALADVICE     Patient is calling for Medical Advice regarding: Ms Inman  with Physical Therapy with [facility] want to report patient had a fall at home this morning no injuries.    How long has patient had these symptoms: this morning     Pharmacy name and phone#: n/a    Patient wants a call back or thru [facility], provide patient's call back phone number: Ms. Inman [phone]    Comments: Thank you     Please advise patient replies from provider may take up to 48 hours.

## 2025-06-21 ENCOUNTER — PATIENT MESSAGE (OUTPATIENT)
Dept: ADMINISTRATIVE | Facility: CLINIC | Age: 65
End: 2025-06-21
Payer: MEDICARE

## 2025-06-24 ENCOUNTER — PATIENT MESSAGE (OUTPATIENT)
Dept: INTERNAL MEDICINE | Facility: CLINIC | Age: 65
End: 2025-06-24
Payer: MEDICARE

## 2025-06-24 ENCOUNTER — PATIENT OUTREACH (OUTPATIENT)
Dept: ADMINISTRATIVE | Facility: OTHER | Age: 65
End: 2025-06-24
Payer: MEDICARE

## 2025-06-24 ENCOUNTER — OFFICE VISIT (OUTPATIENT)
Dept: HOME HEALTH SERVICES | Facility: CLINIC | Age: 65
End: 2025-06-24
Payer: MEDICARE

## 2025-06-24 VITALS — BODY MASS INDEX: 41.16 KG/M2 | WEIGHT: 218 LBS | HEIGHT: 61 IN

## 2025-06-24 DIAGNOSIS — Z59.82 PROBLEM WITH TRANSPORTATION: ICD-10-CM

## 2025-06-24 DIAGNOSIS — E78.5 HYPERLIPIDEMIA ASSOCIATED WITH TYPE 2 DIABETES MELLITUS: ICD-10-CM

## 2025-06-24 DIAGNOSIS — E66.01 MORBID OBESITY WITH BMI OF 40.0-44.9, ADULT: ICD-10-CM

## 2025-06-24 DIAGNOSIS — R30.0 DYSURIA: ICD-10-CM

## 2025-06-24 DIAGNOSIS — R26.9 ABNORMALITY OF GAIT AND MOBILITY: ICD-10-CM

## 2025-06-24 DIAGNOSIS — Z74.01 BEDBOUND: ICD-10-CM

## 2025-06-24 DIAGNOSIS — Z59.41 MILD FOOD INSECURITY: ICD-10-CM

## 2025-06-24 DIAGNOSIS — E11.59 HYPERTENSION ASSOCIATED WITH DIABETES: ICD-10-CM

## 2025-06-24 DIAGNOSIS — F33.42 RECURRENT MAJOR DEPRESSIVE DISORDER, IN FULL REMISSION: ICD-10-CM

## 2025-06-24 DIAGNOSIS — Z00.00 ENCOUNTER FOR MEDICARE ANNUAL WELLNESS EXAM: Primary | ICD-10-CM

## 2025-06-24 DIAGNOSIS — Z99.89 DEPENDENCE ON OTHER ENABLING MACHINES AND DEVICES: ICD-10-CM

## 2025-06-24 DIAGNOSIS — M48.061 SPINAL STENOSIS OF LUMBAR REGION, UNSPECIFIED WHETHER NEUROGENIC CLAUDICATION PRESENT: ICD-10-CM

## 2025-06-24 DIAGNOSIS — E11.69 HYPERLIPIDEMIA ASSOCIATED WITH TYPE 2 DIABETES MELLITUS: ICD-10-CM

## 2025-06-24 DIAGNOSIS — I15.2 HYPERTENSION ASSOCIATED WITH DIABETES: ICD-10-CM

## 2025-06-24 SDOH — SOCIAL DETERMINANTS OF HEALTH (SDOH): TRANSPORTATION INSECURITY: Z59.82

## 2025-06-24 SDOH — SOCIAL DETERMINANTS OF HEALTH (SDOH): FOOD INSECURITY: Z59.41

## 2025-06-24 NOTE — PROGRESS NOTES
CHW - Initial Contact    This Community Health Worker verified the Social Determinant of Health questionnaire with patient via telephone today.      Pt identified barriers of most importance are: referral states: transportation and food       Pt stated she's not sure if her [employer] plan has NEMT       Pt also stated she applied for SNAP (food stamps) was only approved for $49, doesn't want meals on wheels       Support and Services: CHW will call [employer] to verify NEMT benefit and look into other food resources      Follow up required: yes      Follow-up Outreach - Due: 6/25/2025

## 2025-06-24 NOTE — Clinical Note
Please send ACP docs to [facility] address: [address] [place] [id number]

## 2025-06-24 NOTE — PROGRESS NOTES
The patient location is: [place]  The chief complaint leading to consultation is: Medicare AWV    Visit type: audiovisual    Face to Face time with patient: 40  55 minutes of total time spent on the encounter, which includes face to face time and non-face to face time preparing to see the patient (eg, review of tests), Obtaining and/or reviewing separately obtained history, Documenting clinical information in the electronic or other health record, Independently interpreting results (not separately reported) and communicating results to the patient/family/caregiver, or Care coordination (not separately reported).         Each patient to whom he or she provides medical services by telemedicine is:  (1) informed of the relationship between the physician and patient and the respective role of any other health care provider with respect to management of the patient; and (2) notified that he or she may decline to receive medical services by telemedicine and may withdraw from such care at any time.    Notes:                         Tanika Wayne presented for a follow-up Medicare AWV today. The following components were reviewed and updated:    Medical history  Family History  Social history  Allergies and Current Medications  Health Risk Assessment  Health Maintenance  Care Team    **See Completed Assessments for Annual Wellness visit with in the encounter summary    The following assessments were completed:  Depression Screening  Cognitive function Screening  Timed Get Up Test  Whisper Test      Opioid documentation:      Patient does have a current opioid prescription.      Patient accepted further discussion regarding opioid medication use.      Patient is currently taking hydrocodone narcotic for back and bladder pain.        Pain level today is 9/10.       In addition to narcotic pain medications, patient is also using (no other oral, topical, or alternative treatments) for pain control.       Patient is not  "followed by a specialist currently for their pain and will not be referred today.       Patient's opioid risk potential based on ORT-OUD tool:       Jan each box that applies   No   Yes     Family history of substance abuse   Alcohol [x] []   Illegal drugs [x] []   Rx drugs [x] []     Personal history of substance abuse   Alcohol [x] []   Illegal drugs [x] []   Rx drugs [x] []     Age between 16-45 years   [x]   []     Patient with ADD, OCD, Bipolar disorder, schizoprenia   [x]   []     Patient with depression   []   [x]                         Scoring total                                                   1              Non-opioid treatment options have been discussed today and added to the patient's after visit summary.          Vitals:    06/24/25 1401   Weight: 98.9 kg (218 lb)   Height: 5' 1" (1.549 m)     Body mass index is 41.19 kg/m².       Physical Exam  Constitutional:       Appearance: She is obese.      Comments: In bed, trapeze noted over bed   HENT:      Head: Normocephalic.   Eyes:      Comments: Ptosis left eye    Neurological:      Mental Status: She is alert.   Psychiatric:         Mood and Affect: Mood normal.         Behavior: Behavior normal.           Diagnoses and health risks identified today and associated recommendations/orders:  1. Encounter for Medicare annual wellness exam  - Screenings performed, as noted above. Personal preventative testing needs reviewed.     2. Dysuria  - Recently discharged from hospital stay related to pyelonephritis. Discharged on cipro. No dysuria on cipro, but since finishing RX this has presented. PCP notified .    3. Spinal stenosis of lumbar region, unspecified whether neurogenic claudication present  - Stable, on percocet, previously followed by PM&R, now followed by PCP    4. Recurrent major depressive disorder, in full remission  - Stable on effexor and cymbalta, followed by PCP. PHQ2 score = 1 today    5. Hypertension associated with diabetes  - " Slightly elevated at last visit. On nifedipine, losartan/hctz, and coreg.     6. Hyperlipidemia associated with type 2 diabetes mellitus  - Stable, on atorvastatin    7. Morbid obesity with BMI of 40.0-44.9, adult  - Weight is stable.     8. Bedbound  - Bedbound since hospital discharge 5/23/25. Receiving in home PT    9. Abnormality of gait and mobility  - Mobility worse than when admitted to hospital    10. Dependence on other enabling machines and devices  - Dependent for most ADLs and IADLs at present time    11. Problem with transportation  - Ambulatory referral/consult to Outpatient Case Management    12. Mild food insecurity  - Ambulatory referral/consult to Outpatient Case Management      Provided Tanika with a 5-10 year written screening schedule and personal prevention plan. Recommendations were developed using the USPSTF age appropriate recommendations. Education, counseling, and referrals were provided as needed.  After Visit Summary printed and given to patient which includes a list of additional screenings\tests needed.    Follow up in about 1 year (around 6/24/2026) for your next annual wellness visit.      Ayesha Small, LAMONT      I offered to discuss advanced care planning, including how to pick a person who would make decisions for you if you were unable to make them for yourself, called a health care power of , and what kind of decisions you might make such as use of life sustaining treatments such as ventilators and tube feeding when faced with a life limiting illness recorded on a living will that they will need to know. (How you want to be cared for as you near the end of your natural life)     X Patient is interested in learning more about how to make advanced directives.  I provided them paperwork and offered to discuss this with them.

## 2025-06-24 NOTE — Clinical Note
Shady Hernandez,  I am seeing patients for AWVs prn and just saw Ms. Wayne via virtual visit.  She was discharged from the hospital, where she spent 5 days for pyelonephritis, on 5/23 on cipro. Since completing the course of cipro, she's developed dysuria (rate urinary pain 9/10). She is getting home mohsen from [facility]. I wanted to touch base with you about this, since I was only seeing her for a one-off wellness visit. I suspect she may have been reinfected with e coli, given body habitus and bedbound status.  Do you want me to call in an antibiotic based on her inpatient C&S results or would you prefer to follow up with this?   Thanks Ayesha

## 2025-06-25 ENCOUNTER — TELEPHONE (OUTPATIENT)
Dept: INTERNAL MEDICINE | Facility: CLINIC | Age: 65
End: 2025-06-25

## 2025-06-25 ENCOUNTER — PATIENT OUTREACH (OUTPATIENT)
Dept: ADMINISTRATIVE | Facility: OTHER | Age: 65
End: 2025-06-25
Payer: MEDICARE

## 2025-06-25 DIAGNOSIS — M54.9 BACK PAIN, UNSPECIFIED BACK LOCATION, UNSPECIFIED BACK PAIN LATERALITY, UNSPECIFIED CHRONICITY: Primary | ICD-10-CM

## 2025-06-25 DIAGNOSIS — R29.6 RECURRENT FALLS: ICD-10-CM

## 2025-06-25 NOTE — TELEPHONE ENCOUNTER
Copied from CRM #[id number]. Topic: Medications - Medication Authorization  >> Jun 25, 2025  3:31 PM Margret wrote:  Patient would like to get a referral.    Referral to what specialty: Physical Therapists is asking for order of in home therapy 2 times a week for 8 weeks     Does the patient want the referral with a specific physician:  Henny- with [facility]     [phone]  Reason (be specific):  In addition, Patient is having Burning pain when Urinating [phone]         Would the patient like a call back, or a response through their [facility] portal?:   [phone]    Best call back number:    Comments:

## 2025-06-25 NOTE — PROGRESS NOTES
CHW - Follow Up    This Community Health Worker completed a follow up visit with patient via telephone today.    Pt/Caregiver reported: CHW informed pt about call with [employer] and that her plan her transportation  Pt stated that's good to know and if CHW could send it to her portal    Community Health Worker provided: sent [employer] Care Car info and food resources to pt's [employer] portal per request    Follow up required: yes    Follow-up Outreach - Due: 7/1/2025

## 2025-06-25 NOTE — TELEPHONE ENCOUNTER
Hi, please contact the patient to assist in scheduling to come in for a urine test/UA which I just ordered with her E visit.  I will be away for the rest of the week and she can call on Friday about the urine test results or on Monday when I return.         Thank you, Rayo Hernandez

## 2025-06-25 NOTE — TELEPHONE ENCOUNTER
Pt messaged with c/o UTI, attempted to schedule VV, no availability. Evisit sent. Did not order UA as pt said she cannot walk at this time and she has HH PT and OT.     LOV with Rayo Hernandez MD , 5/13/2025

## 2025-06-25 NOTE — TELEPHONE ENCOUNTER
Hi, please contact the patient I have ordered:    Orders Placed This Encounter    Ambulatory referral/consult to Home Health       Thank you, Rayo Hernandez

## 2025-06-26 NOTE — TELEPHONE ENCOUNTER
Call attempted to pt and Home health nurse to inform that Dr. Hernandez placed referral for home health  .  Contact no was provided to requested call back .

## 2025-07-01 ENCOUNTER — PATIENT MESSAGE (OUTPATIENT)
Dept: INTERNAL MEDICINE | Facility: CLINIC | Age: 65
End: 2025-07-01
Payer: MEDICARE

## 2025-07-01 NOTE — TELEPHONE ENCOUNTER
Dr Hernandez, pt is stating that her HH PT is recommending inpatient skilled nursing for pt and told her to call our office to start the authorization process. States this will help her return to walking. Is this something that is usually done from the hospital? Is this something that can be lined up outpatient?

## 2025-07-01 NOTE — TELEPHONE ENCOUNTER
Hi,  I would need to see the patient either in person or figure video visit to further discuss going to an inpatient skilled nursing facility.  I also would like to reach out to our case management department to help with her request.  Please offer her an in-person or video visit with me for 1 week from today -- 7/8, one of the open slots  Thank you, Rayo Hernandez

## 2025-07-01 NOTE — TELEPHONE ENCOUNTER
Called pt and left detailed VM for informed that she is virtually  scheduled to see Dr. Hernandez ..,  Contact no was provided to requested call back.

## 2025-07-02 ENCOUNTER — CLINICAL SUPPORT (OUTPATIENT)
Dept: PRIMARY CARE CLINIC | Facility: CLINIC | Age: 65
End: 2025-07-02
Payer: MEDICARE

## 2025-07-02 DIAGNOSIS — R30.0 DYSURIA: ICD-10-CM

## 2025-07-02 LAB
BACTERIA #/AREA URNS AUTO: ABNORMAL /HPF
BILIRUB UR QL STRIP.AUTO: NEGATIVE
CLARITY UR: ABNORMAL
COLOR UR AUTO: YELLOW
GLUCOSE UR QL STRIP: NEGATIVE
HGB UR QL STRIP: NEGATIVE
KETONES UR QL STRIP: NEGATIVE
LEUKOCYTE ESTERASE UR QL STRIP: ABNORMAL
MICROSCOPIC COMMENT: ABNORMAL
NITRITE UR QL STRIP: NEGATIVE
PH UR STRIP: 5 [PH]
PROT UR QL STRIP: NEGATIVE
RBC #/AREA URNS AUTO: 9 /HPF (ref 0–4)
SP GR UR STRIP: 1.01
SQUAMOUS #/AREA URNS AUTO: 2 /HPF
UROBILINOGEN UR STRIP-ACNC: NEGATIVE EU/DL
WBC #/AREA URNS AUTO: 77 /HPF (ref 0–5)
YEAST UR QL AUTO: ABNORMAL /HPF

## 2025-07-02 PROCEDURE — 87086 URINE CULTURE/COLONY COUNT: CPT | Mod: HCNC

## 2025-07-02 PROCEDURE — 81001 URINALYSIS AUTO W/SCOPE: CPT | Mod: HCNC

## 2025-07-03 ENCOUNTER — E-VISIT (OUTPATIENT)
Dept: INTERNAL MEDICINE | Facility: CLINIC | Age: 65
End: 2025-07-03
Payer: MEDICARE

## 2025-07-03 ENCOUNTER — TELEPHONE (OUTPATIENT)
Dept: INTERNAL MEDICINE | Facility: CLINIC | Age: 65
End: 2025-07-03
Payer: MEDICARE

## 2025-07-03 ENCOUNTER — RESULTS FOLLOW-UP (OUTPATIENT)
Dept: INTERNAL MEDICINE | Facility: CLINIC | Age: 65
End: 2025-07-03

## 2025-07-03 DIAGNOSIS — R30.0 DYSURIA: Primary | ICD-10-CM

## 2025-07-03 NOTE — PROGRESS NOTES
Patient ID: Tanika Wayne is a 65 y.o. female.        E-Visit Time Tracking:   Day 1 Time (in minutes): 12  Total Time (in minutes): 12      Chief Complaint: Urinary Tract Infection (Entered automatically based on patient selection in [employer].)      The patient initiated a request through [employer] on 7/3/2025 for evaluation and management with a chief complaint of Urinary Tract Infection (Entered automatically based on patient selection in [employer].)     I evaluated the questionnaire submission on 07/03/2025.    [facility] Evisit Uti Questionnaire    7/3/2025  3:46 PM CDT - Filed by Patient   Do you agree to participate in an E-Visit? Yes   If you have any of the following symptoms, please go to the nearest emergency room or call 911: I acknowledge   Choose the state of your primary residence [place]   What is the main issue you would like addressed today? Burning when urinating   Do you have any of the following symptoms? ( Discomfort or pain passing urine, Passing urine more frequently, Cloudy urine, Discharge in urine, Other, None) Discomfort or pain passing urine   When did your concern begin? 6/18/2025   What medications or treatments have you used to help your symptoms? (Antibiotics, Cranberry products, Drinking more fluids, Painkillers, Other, None) Cranberry products   What does your urine look like? (Clear, Cloudy, Dark yellow, Light yellow, Pink or red (bloody), Foamy, Not sure) Light yellow   Have you had any of the following symptoms during the past 24 hours?   Urgency (a sudden and uncontrollable urge to urinate) (None, Mild, Moderate, Severe) Mild   Frequency (going to the toilet very often) (None, Mild, Moderate, Severe) Mild   Burning pain when urinating (None, Mild, Moderate, Severe) Moderate   Incomplete bladder emptying (still feel like you need to urinate again after urination) (None, Mild, Moderate, Severe) Moderate   Pain in the lower belly when youre not urinating. (None, Mild, Moderate,  Severe) Moderate   Please rate how much discomfort these symptoms have caused in the last 24 hours. (None, Mild, Moderate, Severe) Moderate   Have you had any of the following symptoms during the past 24 hours?   Blood seen in the urine (None, Mild, Moderate, Severe) None   Pain in the lower back on one or both sides (flank pain) (None, Mild, Moderate, Severe) Mild   Abnormal Vaginal Discharge (abnormal amount, color and/or odor) (None, Mild, Moderate, Severe) Mild   Discharge from the opening you urinate from (urethra) when not urinating. (None, Mild, Moderate, Severe) Mild   Tell us how the symptoms have interfered with your every day activities/work in the past 24 hours. (None, Mild, Moderate, Severe) Mild   Do you have a fever? (Less than 100.4°F, 100.4°F or greater, No, Not sure) No   Are you a diabetic? Yes   If you are female, please tell us if you have any of the following right now (as youre completing the questionnaire): (Menstrual period (menses), PMS (Premenstrual syndrome),Signs of menopause such as hot flashes, Pregnancy, None) None   Do you have a history of kidney stones? Yes   Provide any information you feel is important to your history not asked above    Please attach any relevant images or files (if you have performed a home test for UTI, please submit a photo of results)    Are you able to take your vitals? No         Encounter Diagnosis   Name Primary?    Dysuria Yes        No orders of the defined types were placed in this encounter.           No follow-ups on file.

## 2025-07-06 LAB — BACTERIA UR CULT: ABNORMAL

## 2025-07-07 ENCOUNTER — TELEPHONE (OUTPATIENT)
Dept: INTERNAL MEDICINE | Facility: CLINIC | Age: 65
End: 2025-07-07
Payer: MEDICARE

## 2025-07-07 DIAGNOSIS — N39.0 URINARY TRACT INFECTION WITHOUT HEMATURIA, SITE UNSPECIFIED: Primary | ICD-10-CM

## 2025-07-07 RX ORDER — PHENAZOPYRIDINE HYDROCHLORIDE 200 MG/1
200 TABLET, FILM COATED ORAL 3 TIMES DAILY PRN
Qty: 9 TABLET | Refills: 0 | Status: SHIPPED | OUTPATIENT
Start: 2025-07-07 | End: 2025-07-10

## 2025-07-07 RX ORDER — NITROFURANTOIN 25; 75 MG/1; MG/1
100 CAPSULE ORAL 2 TIMES DAILY
Qty: 14 CAPSULE | Refills: 0 | Status: SHIPPED | OUTPATIENT
Start: 2025-07-07 | End: 2025-07-14

## 2025-07-07 NOTE — TELEPHONE ENCOUNTER
I have also sent in --  Orders Placed This Encounter    nitrofurantoin, macrocrystal-monohydrate, (MACROBID) 100 MG capsule    phenazopyridine (PYRIDIUM) 200 MG tablet     This can help with burning symptoms -- phenazopyridine     Let me know if patient has any other questions.  Thank you, Rayo Hernandez

## 2025-07-07 NOTE — TELEPHONE ENCOUNTER
I spoke with the patient and let her know the doctor prescribed pyridium for the urgency and burning sensation she was having when urinating. She verbalized understanding.

## 2025-07-07 NOTE — TELEPHONE ENCOUNTER
Hi, please call the patient.  Her urine culture shows that she has a urinary infection a UTI.    I recommend she take this antibiotic twice per day for 7 days:      Orders Placed This Encounter    nitrofurantoin, macrocrystal-monohydrate, (MACROBID) 100 MG capsule       Usually people do fine with this antibiotic, rarely it could cause an allergic reaction or stomach upset.    Let me know if patient has any questions.  Thank you, Rayo Hernandez

## 2025-07-07 NOTE — TELEPHONE ENCOUNTER
I spoke with the patient and let her know her culture results and her new medication that was prescribed. And the medication has been sent to her pharmacy at [facility]. She verbalized understanding, and asked if she could be prescribed something for urgency and painful urination.

## 2025-07-08 ENCOUNTER — OFFICE VISIT (OUTPATIENT)
Dept: INTERNAL MEDICINE | Facility: CLINIC | Age: 65
End: 2025-07-08
Payer: MEDICARE

## 2025-07-08 DIAGNOSIS — R62.7 FAILURE TO THRIVE IN ADULT: ICD-10-CM

## 2025-07-08 DIAGNOSIS — I10 ESSENTIAL HYPERTENSION, MALIGNANT: ICD-10-CM

## 2025-07-08 DIAGNOSIS — R53.1 WEAKNESS: ICD-10-CM

## 2025-07-08 DIAGNOSIS — E66.01 MORBID OBESITY WITH BMI OF 40.0-44.9, ADULT: Primary | ICD-10-CM

## 2025-07-08 DIAGNOSIS — E11.36 TYPE 2 DIABETES MELLITUS WITH CATARACT: ICD-10-CM

## 2025-07-08 PROCEDURE — 3066F NEPHROPATHY DOC TX: CPT | Mod: CPTII,HCNC,95, | Performed by: INTERNAL MEDICINE

## 2025-07-08 PROCEDURE — 1160F RVW MEDS BY RX/DR IN RCRD: CPT | Mod: CPTII,HCNC,95, | Performed by: INTERNAL MEDICINE

## 2025-07-08 PROCEDURE — G2211 COMPLEX E/M VISIT ADD ON: HCPCS | Mod: HCNC,95,, | Performed by: INTERNAL MEDICINE

## 2025-07-08 PROCEDURE — 98007 SYNCH AUDIO-VIDEO EST HI 40: CPT | Mod: HCNC,95,, | Performed by: INTERNAL MEDICINE

## 2025-07-08 PROCEDURE — 1159F MED LIST DOCD IN RCRD: CPT | Mod: CPTII,HCNC,95, | Performed by: INTERNAL MEDICINE

## 2025-07-08 PROCEDURE — 3060F POS MICROALBUMINURIA REV: CPT | Mod: CPTII,HCNC,95, | Performed by: INTERNAL MEDICINE

## 2025-07-08 PROCEDURE — 3044F HG A1C LEVEL LT 7.0%: CPT | Mod: CPTII,HCNC,95, | Performed by: INTERNAL MEDICINE

## 2025-07-08 PROCEDURE — 3072F LOW RISK FOR RETINOPATHY: CPT | Mod: CPTII,HCNC,95, | Performed by: INTERNAL MEDICINE

## 2025-07-08 RX ORDER — NIFEDIPINE 90 MG/1
90 TABLET, EXTENDED RELEASE ORAL DAILY
Qty: 90 TABLET | Refills: 3 | Status: SHIPPED | OUTPATIENT
Start: 2025-07-08

## 2025-07-08 NOTE — PROGRESS NOTES
The patient location is: [place]  The chief complaint leading to consultation is: Patient is here for followup for chronic conditions.        Visit type: audiovisual    Face to Face time with patient: 45   minutes of total time spent on the encounter, which includes face to face time and non-face to face time preparing to see the patient (eg, review of tests), Obtaining and/or reviewing separately obtained history, Documenting clinical information in the electronic or other health record, Independently interpreting results (not separately reported) and communicating results to the patient/family/caregiver, or Care coordination (not separately reported).         Each patient to whom he or she provides medical services by telemedicine is:  (1) informed of the relationship between the physician and patient and the respective role of any other health care provider with respect to management of the patient; and (2) notified that he or she may decline to receive medical services by telemedicine and may withdraw from such care at any time.    Notes:         Subjective     Patient ID: Tanika Wayne is a 65 y.o. female.    Chief Complaint: No chief complaint on file.             History of Present Illness    CHIEF COMPLAINT:  Tanika presents today to discuss skilled nursing facility placement for physical therapy.    HISTORY OF PRESENT ILLNESS:  She was hospitalized from Sunday to Friday in May for high fever and extreme weakness preventing ambulation. She was diagnosed with UTI and kidney abscess, requiring aspiration/biopsy and antibiotic treatment. She reports improvement in urinary symptoms with decreased pain during urination. She continues to experience weakness, which she attributes to the kidney infection interrupting her previous physical therapy progress. She recently experienced a fall in the home.    CURRENT LIVING SITUATION:  She currently resides with her sister due to inability to live independently  following recent health changes. She became a  after her  passed away on December 8th from COPD and pulmonary hypertension. Her children, one a , are unable to provide full-time care due to their work schedules.    HOME HEALTH SERVICES:  Home health services began mid-May following her hospitalization. After approximately 4 weeks of home health physical therapy, transfer to an inpatient skilled nursing facility has been recommended to improve strength and mobility. She agrees with this recommendation, believing the facility will provide better equipment and opportunities to progress her walking abilities.  I spoke with home health physical therapist Henny, [phone], who explained that patient needs 2-3 people to assist and needs parallel bars and these are not available for home health.  She would need inpatient skilled nursing rehabilitation at this time.    MEDICATIONS:  She takes Percocet as needed for pain management during physical therapy and continues Pregabalin as prescribed. She is not taking Flexeril due to concerns about medication-induced weakness.      ROS:  Constitutional: negative activity change, negative unexpected weight change, +difficulty walking, +muscle weakness, +fevers  HENT: negative trouble swallowing  Eyes: negative discharge, negative visual disturbance  Respiratory: negative chest tightness, negative wheezing, negative shortness of breath  Cardiovascular: negative chest pain, negative palpitations  Gastrointestinal: negative blood in stool, negative constipation, negative diarrhea, negative vomiting  Endocrine: negative polydipsia, negative polyuria  Genitourinary: negative difficulty urinating, negative dysuria, negative hematuria  Musculoskeletal: negative arthralgias, negative joint swelling, negative neck pain  Neurological: +weakness, negative headaches  Psychiatric/Behavioral: negative confusion, negative dysphoric mood         Dysuria   This is a  recurrent problem. The current episode started 1 to 4 weeks ago. The problem occurs every urination. The problem has been gradually worsening. The quality of the pain is described as burning. The pain is at a severity of 7/10. The pain is moderate. There has been no fever. The fever has been present for Less than 1 day. She is Not sexually active. There is A history of pyelonephritis. Associated symptoms include flank pain, urgency, constipation and withholding. Pertinent negatives include no behavior changes, chills, discharge, frequency, hematuria, nausea, possible pregnancy, sweats, vomiting, weight loss or rash. She has tried nothing for the symptoms. The treatment provided no relief. Her past medical history is significant for hypertension, kidney stones and recurrent UTIs. There is no history of catheterization, diabetes insipidus, diabetes mellitus, genitourinary reflux, a single kidney, STD, urinary stasis or a urological procedure.     Review of Systems   Constitutional:  Negative for chills and weight loss.   Gastrointestinal:  Positive for constipation. Negative for nausea and vomiting.   Genitourinary:  Positive for dysuria, flank pain and urgency. Negative for frequency and hematuria.   Skin:  Negative for rash.        Objective     Physical Exam  Constitutional:       General: She is not in acute distress.     Appearance: Normal appearance. She is well-developed. She is not ill-appearing, toxic-appearing or diaphoretic.      Comments: Well appearing, breathing comfortably, speaking full sentences, no coughing during encounter     Pulmonary:      Effort: Pulmonary effort is normal. No respiratory distress.   Skin:     Findings: No rash.   Neurological:      Mental Status: She is alert and oriented to person, place, and time.   Psychiatric:         Behavior: Behavior normal.         Thought Content: Thought content normal.         Judgment: Judgment normal.            Assessment and Plan     1. Morbid  obesity with BMI of 40.0-44.9, adult    2. Essential hypertension, malignant  -     NIFEdipine (PROCARDIA-XL) 90 MG (OSM) 24 hr tablet; Take 1 tablet (90 mg total) by mouth once daily.  Dispense: 90 tablet; Refill: 3    3. Type 2 diabetes mellitus with cataract  Overview:  2011      4. Failure to thrive in adult    5. Weakness        Assessment & Plan    URINARY TRACT INFECTION AND KIDNEY ABSCESS:  - Tanika was recently hospitalized in May for UTI and kidney abscess.  - Currently showing improvement in urinary symptoms with better urine output and less pain when urinating.      WEAKNESS AND DIFFICULTY WALKING:  - Tanika reports significant weakness since January that has been exacerbated by the recent kidney infection, impacting progress in physical therapy.  - Currently experiencing inability to walk independently.  - Pregabalin was considered as a possible contributor to weakness through medication side effects.  - Referred to skilled nursing facility for rehabilitation to address mobility issues and improve walking ability.    NEED FOR CONTINUOUS SUPERVISION AND ASSISTANCE WITH PERSONAL CARE:  - Home health physical therapist recommended skilled nursing facility placement due to safety concerns and need for more intensive rehabilitation.  - Tanika currently resides with sister as she requires continuous supervision and assistance with personal care due to weakness.  - Initiated referral process to skilled nursing facility, specifically considering [facility] on [place].    STRESSFUL LIFE EVENTS:  - Tanika's  passed away in December, contributing to current living situation and increased need for support.    PAIN MANAGEMENT:  - Current pain management includes Percocet (oxycodone) as needed, which patient is using appropriately during physical therapy sessions.    FOLLOW-UP:  - Follow up appointment scheduled for July 21st, pending confirmation of skilled nursing facility placement.  - Tanika advised to  contact the office regarding arrangements for skilled nursing facility placement.         No follow-ups on file.    Visit today included increased complexity associated with the care of the episodic problem  addressed and managing the longitudinal care of the patient due to the serious and/or complex managed problem(s) .    Future Appointments   Date Time Provider Department Center   7/21/2025  2:20 PM Rayo Hernandez MD [facility] [address] [facility]   1/29/2026 10:40 AM Rayo Hernandez MD [facility]     Over 40 minutes spent with patient and majority in counseling and patient education.      This note was generated with the assistance of ambient listening technology. Verbal consent was obtained by the patient and accompanying visitor(s) for the recording of patient appointment to facilitate this note. I attest to having reviewed and edited the generated note for accuracy, though some syntax or spelling errors may persist. Please contact the author of this note for any clarification.

## 2025-07-10 ENCOUNTER — PATIENT MESSAGE (OUTPATIENT)
Dept: INTERNAL MEDICINE | Facility: CLINIC | Age: 65
End: 2025-07-10
Payer: MEDICARE

## 2025-07-13 DIAGNOSIS — M54.41 CHRONIC BILATERAL LOW BACK PAIN WITH RIGHT-SIDED SCIATICA: ICD-10-CM

## 2025-07-13 DIAGNOSIS — Z96.651 STATUS POST TOTAL RIGHT KNEE REPLACEMENT: ICD-10-CM

## 2025-07-13 DIAGNOSIS — M43.16 SPONDYLOLISTHESIS AT L3-L4 LEVEL: ICD-10-CM

## 2025-07-13 DIAGNOSIS — M54.9 BACK PAIN, UNSPECIFIED BACK LOCATION, UNSPECIFIED BACK PAIN LATERALITY, UNSPECIFIED CHRONICITY: ICD-10-CM

## 2025-07-13 DIAGNOSIS — F11.90 UNCOMPLICATED OPIOID USE: ICD-10-CM

## 2025-07-13 DIAGNOSIS — Z96.641 STATUS POST RIGHT HIP REPLACEMENT: ICD-10-CM

## 2025-07-13 DIAGNOSIS — G89.29 CHRONIC BILATERAL LOW BACK PAIN WITH RIGHT-SIDED SCIATICA: ICD-10-CM

## 2025-07-13 DIAGNOSIS — M48.062 SPINAL STENOSIS OF LUMBAR REGION WITH NEUROGENIC CLAUDICATION: ICD-10-CM

## 2025-07-14 ENCOUNTER — PATIENT MESSAGE (OUTPATIENT)
Dept: INTERNAL MEDICINE | Facility: CLINIC | Age: 65
End: 2025-07-14
Payer: MEDICARE

## 2025-07-14 ENCOUNTER — E-VISIT (OUTPATIENT)
Dept: INTERNAL MEDICINE | Facility: CLINIC | Age: 65
End: 2025-07-14
Payer: MEDICARE

## 2025-07-14 DIAGNOSIS — I10 ESSENTIAL HYPERTENSION, MALIGNANT: ICD-10-CM

## 2025-07-14 DIAGNOSIS — R30.0 DYSURIA: Primary | ICD-10-CM

## 2025-07-14 RX ORDER — OXYCODONE AND ACETAMINOPHEN 5; 325 MG/1; MG/1
1 TABLET ORAL EVERY 8 HOURS PRN
Qty: 90 TABLET | Refills: 0 | Status: SHIPPED | OUTPATIENT
Start: 2025-07-14

## 2025-07-14 NOTE — TELEPHONE ENCOUNTER
No care due was identified.  [facility] Embedded Care Due Messages. Reference number: [id number].   7/14/2025 4:45:27 PM CDT

## 2025-07-14 NOTE — TELEPHONE ENCOUNTER
Care Due:                  Date            Visit Type   Department     Provider  --------------------------------------------------------------------------------                                ESTABLISHED                              PATIENT -    [facility] INTERNAL  Last Visit: 07-      VIRTUAL      MEDICINE       Rayo Hernandez                               -                              PRIMARY      [facility] INTERNAL  Next Visit: 07-      CARE ([facility])   MEDICINE       Rayo Hernandez                                                            Last  Test          Frequency    Reason                     Performed    Due Date  --------------------------------------------------------------------------------    Lipid Panel.  12 months..  atorvastatin.............  07- 07-    [facility] Embedded Care Due Messages. Reference number: [id number].   7/13/2025 10:03:41 PM CDT

## 2025-07-15 RX ORDER — CARVEDILOL 3.12 MG/1
3.12 TABLET ORAL 2 TIMES DAILY
Qty: 180 TABLET | Refills: 3 | Status: SHIPPED | OUTPATIENT
Start: 2025-07-15

## 2025-07-15 NOTE — TELEPHONE ENCOUNTER
Refill Routing Note   Medication(s) are not appropriate for processing by [facility] for the following reason(s):        Required vitals abnormal    [facility] action(s):  Defer               Appointments  past 12m or future 3m with PCP    Date Provider   Last Visit   7/8/2025 Rayo Hernandez MD   Next Visit   7/21/2025 Rayo Hernandez MD   ED visits in past 90 days: 0        Note composed:2:40 PM 07/15/2025

## 2025-07-15 NOTE — PROGRESS NOTES
Patient ID: Tanika Wayne is a 65 y.o. female.        E-Visit Time Tracking:   Day 1 Time (in minutes): 12  Total Time (in minutes): 12      Chief Complaint: General Illness (Entered automatically based on patient selection in [employer].)      The patient initiated a request through [employer] on 7/14/2025 for evaluation and management with a chief complaint of General Illness (Entered automatically based on patient selection in [employer].)     I evaluated the questionnaire submission on 07/16/2025.    [facility]    7/14/2025 10:52 PM CDT - Filed by Patient   What do you need help with? Urinary Symptoms   Do you agree to participate in an E-Visit? Yes   If you have any of the following symptoms, please go to the nearest emergency room or call 911: I acknowledge   What is the main issue you would like addressed today? Persistent pain and burning when urinating with a low grade fever of 99.9   Do you have any of the following symptoms? ( Discomfort or pain passing urine, Passing urine more frequently, Cloudy urine, Discharge in urine, Other, None) Discomfort or pain passing urine   When did your concern begin? 6/18/2025   What medications or treatments have you used to help your symptoms? (Antibiotics, Cranberry products, Drinking more fluids, Painkillers, Other, None) Antibiotics;  Cranberry products;  Drinking more fluids;  Painkillers   What does your urine look like? (Clear, Cloudy, Dark yellow, Light yellow, Pink or red (bloody), Foamy, Not sure) Light yellow   Have you had any of the following symptoms during the past 24 hours?   Urgency (a sudden and uncontrollable urge to urinate) (None, Mild, Moderate, Severe) Moderate   Frequency (going to the toilet very often) (None, Mild, Moderate, Severe) Mild   Burning pain when urinating (None, Mild, Moderate, Severe) Moderate   Incomplete bladder emptying (still feel like you need to urinate again after urination) (None, Mild, Moderate,  Severe) Moderate   Pain in the lower belly when youre not urinating. (None, Mild, Moderate, Severe) Mild   Please rate how much discomfort these symptoms have caused in the last 24 hours. (None, Mild, Moderate, Severe) Moderate   Have you had any of the following symptoms during the past 24 hours?   Blood seen in the urine (None, Mild, Moderate, Severe) None   Pain in the lower back on one or both sides (flank pain) (None, Mild, Moderate, Severe) Mild   Abnormal Vaginal Discharge (abnormal amount, color and/or odor) (None, Mild, Moderate, Severe) None   Discharge from the opening you urinate from (urethra) when not urinating. (None, Mild, Moderate, Severe) Mild   Tell us how the symptoms have interfered with your every day activities/work in the past 24 hours. (None, Mild, Moderate, Severe) Moderate   Do you have a fever? (Less than 100.4°F, 100.4°F or greater, No, Not sure) Less than 100.4°F   Are you a diabetic? Yes   If you are female, please tell us if you have any of the following right now (as youre completing the questionnaire): (Menstrual period (menses), PMS (Premenstrual syndrome),Signs of menopause such as hot flashes, Pregnancy, None) None   Do you have a history of kidney stones? Yes   Provide any information you feel is important to your history not asked above    Please attach any relevant images or files (if you have performed a home test for UTI, please submit a photo of results)    Are you able to take your vitals? No         Encounter Diagnosis   Name Primary?    Dysuria Yes      We will redo urine tests.    No orders of the defined types were placed in this encounter.           No follow-ups on file.

## 2025-07-16 ENCOUNTER — TELEPHONE (OUTPATIENT)
Dept: INTERNAL MEDICINE | Facility: CLINIC | Age: 65
End: 2025-07-16
Payer: MEDICARE

## 2025-07-16 ENCOUNTER — PATIENT MESSAGE (OUTPATIENT)
Dept: INTERNAL MEDICINE | Facility: CLINIC | Age: 65
End: 2025-07-16
Payer: MEDICARE

## 2025-07-16 DIAGNOSIS — R30.0 DYSURIA: Primary | ICD-10-CM

## 2025-07-16 NOTE — TELEPHONE ENCOUNTER
Spoke with the pt and informed to drop off urine specimen today at the lab  so we can start a treatment in timely manner. Dr. Hernandez order is in pt chart.   Pt verbalized understand and agreed to cancel virtual apt at this time.   Deny further question at this time.

## 2025-07-17 NOTE — TELEPHONE ENCOUNTER
Copied from CRM #[id number]. Topic: Appointments - Amb Referral  >> Jul 17, 2025 11:01 JERALD Pillai wrote:  Diabetic or Medical Supplies.  What supplies are needed: wheelchair, wheelchair cushion and tejal lift   What is the brand name of the supplies: N/A  Is this a refill or new prescription:  new   Who prescribed the supplies:  DR Hernandez  Pharmacy or company name, phone # and location:  [facility] fax   Would the patient like a call back, or a response through their [facility] portal?:   call back to pt   Comments:

## 2025-07-17 NOTE — TELEPHONE ENCOUNTER
Copied from CRM #[id number]. Topic: Appointments - Amb Referral  >> Jul 17, 2025 11:02 JERALD Pillia wrote:  Diabetic or Medical Supplies.  What supplies are needed: pure wick   What is the brand name of the supplies: N/A  Is this a refill or new prescription:  new  Who prescribed the supplies:  Dr Hernandez  Pharmacy or company name, phone # and location:  N/A  Would the patient like a call back, or a response through their [facility] portal?:  call back to pt to discuss [phone]  Comments:

## 2025-07-17 NOTE — TELEPHONE ENCOUNTER
Copied from CRM #[id number]. Topic: General Inquiry - Patient Advice  >> Jul 17, 2025 11:04 AM Freya wrote:  Patient would like to get medical advice.  Symptoms (please be specific):   Pt is still having issues with recurring UTIs; burning and pain with urination. Home Health PT advised the pt is going to the ER today to be assessed   How long have you had these symptoms: on going   Would you like a call back, or a response through your [facility] portal?:   call back to pt [phone]  Pharmacy name and phone # (copy from chart):   N/A  Comments:

## 2025-07-18 ENCOUNTER — CLINICAL SUPPORT (OUTPATIENT)
Dept: PRIMARY CARE CLINIC | Facility: CLINIC | Age: 65
End: 2025-07-18
Payer: MEDICARE

## 2025-07-18 DIAGNOSIS — R30.0 DYSURIA: ICD-10-CM

## 2025-07-18 LAB
BACTERIA #/AREA URNS AUTO: ABNORMAL /HPF
BILIRUB UR QL STRIP.AUTO: NEGATIVE
CLARITY UR: ABNORMAL
COLOR UR AUTO: YELLOW
GLUCOSE UR QL STRIP: NEGATIVE
HGB UR QL STRIP: NEGATIVE
HYALINE CASTS UR QL AUTO: 3 /LPF (ref 0–1)
KETONES UR QL STRIP: NEGATIVE
LEUKOCYTE ESTERASE UR QL STRIP: ABNORMAL
MICROSCOPIC COMMENT: ABNORMAL
NITRITE UR QL STRIP: NEGATIVE
PH UR STRIP: 6 [PH]
PROT UR QL STRIP: ABNORMAL
RBC #/AREA URNS AUTO: 10 /HPF (ref 0–4)
SP GR UR STRIP: 1.02
SQUAMOUS #/AREA URNS AUTO: 3 /HPF
UROBILINOGEN UR STRIP-ACNC: NEGATIVE EU/DL
WBC #/AREA URNS AUTO: >100 /HPF (ref 0–5)
YEAST UR QL AUTO: ABNORMAL /HPF

## 2025-07-18 PROCEDURE — 87086 URINE CULTURE/COLONY COUNT: CPT | Mod: HCNC

## 2025-07-18 PROCEDURE — 81003 URINALYSIS AUTO W/O SCOPE: CPT | Mod: HCNC

## 2025-07-20 LAB — BACTERIA UR CULT: NORMAL

## 2025-07-21 ENCOUNTER — PATIENT MESSAGE (OUTPATIENT)
Dept: INTERNAL MEDICINE | Facility: CLINIC | Age: 65
End: 2025-07-21
Payer: MEDICARE

## 2025-07-21 DIAGNOSIS — R30.0 DYSURIA: Primary | ICD-10-CM

## 2025-07-21 NOTE — TELEPHONE ENCOUNTER
Hi, I recommend she see a urogynecologist, someone who specializes in women with urinary issues.  please contact the patient to assist in scheduling    Orders Placed This Encounter    Ambulatory referral/consult to Urogynecology       Thank you, Rayo Hernandez

## 2025-07-22 ENCOUNTER — TELEPHONE (OUTPATIENT)
Dept: INTERNAL MEDICINE | Facility: CLINIC | Age: 65
End: 2025-07-22
Payer: MEDICARE

## 2025-07-22 DIAGNOSIS — M54.41 CHRONIC BILATERAL LOW BACK PAIN WITH RIGHT-SIDED SCIATICA: ICD-10-CM

## 2025-07-22 DIAGNOSIS — G89.29 CHRONIC BILATERAL LOW BACK PAIN WITH RIGHT-SIDED SCIATICA: ICD-10-CM

## 2025-07-22 DIAGNOSIS — M48.062 SPINAL STENOSIS OF LUMBAR REGION WITH NEUROGENIC CLAUDICATION: Primary | ICD-10-CM

## 2025-07-22 DIAGNOSIS — R29.898 LEG WEAKNESS, BILATERAL: ICD-10-CM

## 2025-07-22 NOTE — TELEPHONE ENCOUNTER
Hi, please send to [facility]    Orders Placed This Encounter    WHEELCHAIR FOR HOME USE    PATIENT (GILDARDO) LIFT FOR HOME USE       Thank you, Rayo Hernandez

## 2025-07-22 NOTE — TELEPHONE ENCOUNTER
From E:  Good morning! This patient is awaiting orders for a wheelchair and patient lift. Will your office be taking care of this? If so, DME justifications will need to be added to notes stating the patient cannot walk and requires maximum assists with bed transfers and chair transfers. Just let em know if you need any more information. Thank you!

## 2025-07-23 ENCOUNTER — TELEPHONE (OUTPATIENT)
Dept: UROGYNECOLOGY | Facility: CLINIC | Age: 65
End: 2025-07-23
Payer: MEDICARE

## 2025-07-23 ENCOUNTER — TELEPHONE (OUTPATIENT)
Dept: UROLOGY | Facility: CLINIC | Age: 65
End: 2025-07-23
Payer: MEDICARE

## 2025-07-23 ENCOUNTER — PATIENT MESSAGE (OUTPATIENT)
Dept: INTERNAL MEDICINE | Facility: CLINIC | Age: 65
End: 2025-07-23
Payer: MEDICARE

## 2025-07-23 NOTE — TELEPHONE ENCOUNTER
Pt's referral is for urogyn. Message sent to them to reach out to pt.    Copied from CRM #[id number]. Topic: Appointments - Appointment Scheduling  >> Jul 23, 2025 11:36 AM Luann wrote:  Type: General Call Back     Name of Caller:pt   Reason pt needs a virtual appt,   Would the patient rather a call back or a response via [facility]? Call   Best Call Back Number:[phone]  Additional Information:

## 2025-07-23 NOTE — TELEPHONE ENCOUNTER
----- Message from Med Assistant Carter sent at 7/23/2025 11:44 AM CDT -----  Hey, patient's message was sent to us to get her scheduled. I looked in her chart and the referral is for Urogyn. Please reach out to schedule patient.

## 2025-07-23 NOTE — TELEPHONE ENCOUNTER
Pt stated she's not experiencing any symptoms now and is requesting a virtual appointment due to her being unable to walk, requires a tejal lift and EMS transportation. Informed pt due to her being a new patient it's best that she come in for a I'll call her back once I discuss

## 2025-07-25 ENCOUNTER — HOSPITAL ENCOUNTER (EMERGENCY)
Facility: HOSPITAL | Age: 65
Discharge: HOME | End: 2025-07-25
Attending: EMERGENCY MEDICINE
Payer: MEDICARE

## 2025-07-25 ENCOUNTER — TELEPHONE (OUTPATIENT)
Dept: UROGYNECOLOGY | Facility: CLINIC | Age: 65
End: 2025-07-25
Payer: MEDICARE

## 2025-07-25 VITALS
OXYGEN SATURATION: 94 % | SYSTOLIC BLOOD PRESSURE: 128 MMHG | DIASTOLIC BLOOD PRESSURE: 63 MMHG | TEMPERATURE: 98 F | BODY MASS INDEX: 41.17 KG/M2 | RESPIRATION RATE: 17 BRPM | HEART RATE: 85 BPM | WEIGHT: 218.06 LBS | HEIGHT: 61 IN

## 2025-07-25 DIAGNOSIS — N30.90 CYSTITIS: Primary | ICD-10-CM

## 2025-07-25 DIAGNOSIS — B37.9 YEAST INFECTION: ICD-10-CM

## 2025-07-25 LAB
ABSOLUTE EOSINOPHIL (OHS): 0.25 K/UL
ABSOLUTE MONOCYTE (OHS): 0.58 K/UL (ref 0.3–1)
ABSOLUTE NEUTROPHIL COUNT (OHS): 2.16 K/UL (ref 1.8–7.7)
ALBUMIN SERPL BCP-MCNC: 3.6 G/DL (ref 3.5–5.2)
ALP SERPL-CCNC: 63 UNIT/L (ref 40–150)
ALT SERPL W/O P-5'-P-CCNC: 18 UNIT/L (ref 0–55)
ANION GAP (OHS): 6 MMOL/L (ref 8–16)
AST SERPL-CCNC: 26 UNIT/L (ref 0–50)
BASOPHILS # BLD AUTO: 0.05 K/UL
BASOPHILS NFR BLD AUTO: 0.6 %
BILIRUB SERPL-MCNC: 0.3 MG/DL (ref 0.1–1)
BILIRUB UR QL STRIP.AUTO: NEGATIVE
BUN SERPL-MCNC: 13 MG/DL (ref 8–23)
CALCIUM SERPL-MCNC: 9.4 MG/DL (ref 8.7–10.5)
CHLORIDE SERPL-SCNC: 106 MMOL/L (ref 95–110)
CLARITY UR: CLEAR
CO2 SERPL-SCNC: 30 MMOL/L (ref 23–29)
COLOR UR AUTO: YELLOW
CREAT SERPL-MCNC: 0.6 MG/DL (ref 0.5–1.4)
ERYTHROCYTE [DISTWIDTH] IN BLOOD BY AUTOMATED COUNT: 15.4 % (ref 11.5–14.5)
GFR SERPLBLD CREATININE-BSD FMLA CKD-EPI: >60 ML/MIN/1.73/M2
GLUCOSE SERPL-MCNC: 91 MG/DL (ref 70–110)
GLUCOSE UR QL STRIP: NEGATIVE
HCT VFR BLD AUTO: 36.8 % (ref 37–48.5)
HGB BLD-MCNC: 11.8 GM/DL (ref 12–16)
HGB UR QL STRIP: NEGATIVE
IMM GRANULOCYTES # BLD AUTO: 0.01 K/UL (ref 0–0.04)
IMM GRANULOCYTES NFR BLD AUTO: 0.1 % (ref 0–0.5)
KETONES UR QL STRIP: NEGATIVE
LEUKOCYTE ESTERASE UR QL STRIP: ABNORMAL
LYMPHOCYTES # BLD AUTO: 5.95 K/UL (ref 1–4.8)
MCH RBC QN AUTO: 27.6 PG (ref 27–31)
MCHC RBC AUTO-ENTMCNC: 32.1 G/DL (ref 32–36)
MCV RBC AUTO: 86 FL (ref 82–98)
MICROSCOPIC COMMENT: ABNORMAL
NITRITE UR QL STRIP: NEGATIVE
NUCLEATED RBC (/100WBC) (OHS): 0 /100 WBC
OHS QRS DURATION: 96 MS
OHS QTC CALCULATION: 454 MS
PH UR STRIP: 6 [PH]
PLATELET # BLD AUTO: 345 K/UL (ref 150–450)
PMV BLD AUTO: 9.6 FL (ref 9.2–12.9)
POTASSIUM SERPL-SCNC: 3.8 MMOL/L (ref 3.5–5.1)
PROT SERPL-MCNC: 7.4 GM/DL (ref 6–8.4)
PROT UR QL STRIP: NEGATIVE
RBC # BLD AUTO: 4.27 M/UL (ref 4–5.4)
RBC #/AREA URNS AUTO: 2 /HPF (ref 0–4)
RELATIVE EOSINOPHIL (OHS): 2.8 %
RELATIVE LYMPHOCYTE (OHS): 66.1 % (ref 18–48)
RELATIVE MONOCYTE (OHS): 6.4 % (ref 4–15)
RELATIVE NEUTROPHIL (OHS): 24 % (ref 38–73)
SODIUM SERPL-SCNC: 142 MMOL/L (ref 136–145)
SP GR UR STRIP: 1.01
SQUAMOUS #/AREA URNS AUTO: 3 /HPF
UROBILINOGEN UR STRIP-ACNC: NEGATIVE EU/DL
WBC # BLD AUTO: 9 K/UL (ref 3.9–12.7)
WBC #/AREA URNS AUTO: 35 /HPF (ref 0–5)

## 2025-07-25 PROCEDURE — 82040 ASSAY OF SERUM ALBUMIN: CPT | Mod: HCNC | Performed by: EMERGENCY MEDICINE

## 2025-07-25 PROCEDURE — 87186 SC STD MICRODIL/AGAR DIL: CPT | Mod: HCNC | Performed by: EMERGENCY MEDICINE

## 2025-07-25 PROCEDURE — 25000003 PHARM REV CODE 250: Mod: HCNC | Performed by: STUDENT IN AN ORGANIZED HEALTH CARE EDUCATION/TRAINING PROGRAM

## 2025-07-25 PROCEDURE — 81001 URINALYSIS AUTO W/SCOPE: CPT | Mod: HCNC | Performed by: EMERGENCY MEDICINE

## 2025-07-25 PROCEDURE — 85025 COMPLETE CBC W/AUTO DIFF WBC: CPT | Mod: HCNC | Performed by: EMERGENCY MEDICINE

## 2025-07-25 PROCEDURE — 96374 THER/PROPH/DIAG INJ IV PUSH: CPT | Mod: HCNC

## 2025-07-25 PROCEDURE — 99284 EMERGENCY DEPT VISIT MOD MDM: CPT | Mod: 25,HCNC

## 2025-07-25 PROCEDURE — 63600175 PHARM REV CODE 636 W HCPCS: Mod: JZ,TB,HCNC | Performed by: STUDENT IN AN ORGANIZED HEALTH CARE EDUCATION/TRAINING PROGRAM

## 2025-07-25 RX ORDER — DOXYLAMINE SUCCINATE 25 MG
TABLET ORAL 2 TIMES DAILY
Qty: 30 G | Refills: 0 | Status: SHIPPED | OUTPATIENT
Start: 2025-07-25

## 2025-07-25 RX ORDER — SULFAMETHOXAZOLE AND TRIMETHOPRIM 800; 160 MG/1; MG/1
1 TABLET ORAL 2 TIMES DAILY
Qty: 6 TABLET | Refills: 0 | Status: SHIPPED | OUTPATIENT
Start: 2025-07-25 | End: 2025-07-25

## 2025-07-25 RX ORDER — DOXYLAMINE SUCCINATE 25 MG
TABLET ORAL 2 TIMES DAILY
Qty: 30 G | Refills: 0 | Status: SHIPPED | OUTPATIENT
Start: 2025-07-25 | End: 2025-07-25

## 2025-07-25 RX ORDER — FLUCONAZOLE 150 MG/1
150 TABLET ORAL
Status: COMPLETED | OUTPATIENT
Start: 2025-07-25 | End: 2025-07-25

## 2025-07-25 RX ORDER — SULFAMETHOXAZOLE AND TRIMETHOPRIM 800; 160 MG/1; MG/1
1 TABLET ORAL 2 TIMES DAILY
Qty: 6 TABLET | Refills: 0 | Status: SHIPPED | OUTPATIENT
Start: 2025-07-25 | End: 2025-07-28

## 2025-07-25 RX ORDER — PHENAZOPYRIDINE HYDROCHLORIDE 95 MG/1
95 TABLET ORAL 3 TIMES DAILY PRN
Qty: 9 TABLET | Refills: 0 | Status: SHIPPED | OUTPATIENT
Start: 2025-07-25 | End: 2025-07-28

## 2025-07-25 RX ORDER — KETOROLAC TROMETHAMINE 30 MG/ML
15 INJECTION, SOLUTION INTRAMUSCULAR; INTRAVENOUS
Status: COMPLETED | OUTPATIENT
Start: 2025-07-25 | End: 2025-07-25

## 2025-07-25 RX ADMIN — KETOROLAC TROMETHAMINE 15 MG: 30 INJECTION, SOLUTION INTRAMUSCULAR; INTRAVENOUS at 02:07

## 2025-07-25 RX ADMIN — FLUCONAZOLE 150 MG: 150 TABLET ORAL at 12:07

## 2025-07-25 NOTE — ED PROVIDER NOTES
Encounter Date: 7/25/2025       History     Chief Complaint   Patient presents with    Dysuria     Arrives via EMS. Hx of UTI. Earlier Urology appointment November HPI  Pt is a 65 y.o. F with PMHx of DM, HTN, HLD, CAD, CKD stage 3a, chronic back pain, pyelonephritis with renal abscess, and kidney stones presenting to ED c/o dysuria and intermittent R lower abdominal pain. Pt states since June, she has been experiencing burning urethral pain upon urination which she rates a 10/10 as well as burning pain at rest which she rates a 7/10. She endorses some surrounding skin irritation as well as mild abnormal discharge for the past 3 days. She takes percocet for chronic back pain and she states that has not helped her urinary tract pain. She was admitted in May of this year for similar symptoms and was found to have R sided pyelo and R renal abscess requiring aspiration/biopsy and antibiotic treatment with improvement. She states that in this time she has also been experiencing generalized weakness which she attributes to her previous hospitalization/disruption to her PT. Endorses intermittent R lower quadrant cramping abdominal pain, occasional constipation due to percocet use and occasional nausea secondary to taking metformin. Denies fevers, chills, chest pain, SOB, cough, flank pain, changes in urinary urgency or frequency, vomiting, or diarrhea. Recently had urine culture done on 7/2 which grew staph for which she was given macrobid x 7 days with little improvement. Repeat culture on 7/18 showed no growth. Of note, she has a urology appointment in November but states her pain has been increasing and she did not think she could wait that long.   Review of patient's allergies indicates:  No Known Allergies  Past Medical History:   Diagnosis Date    Abnormal echocardiogram 11/26/2012    Arthritis     Breast cancer 1998    Breast cancer, right breast     Breast cancer, right breast 11/26/2012    S/P Lumpectomy / XRT  '98 Dr. Bartholomew     Cardiomyopathy due to systemic disease     Cataract     Clotting disorder     Coronary artery disease     DM (diabetes mellitus) 2012    Encounter for blood transfusion     HTN (hypertension) 2012    Increased glucose level 2012    ITP (idiopathic thrombocytopenic purpura) 2012    Kidney stones 2012    PONV (postoperative nausea and vomiting)     Stenosis of lumbosacral spine 2012    Thyroid disease     Tympanic membrane perforation 9/15/2014    right 2014 Dr. Jeffries     Past Surgical History:   Procedure Laterality Date    BREAST LUMPECTOMY Right     CATARACT EXTRACTION W/  INTRAOCULAR LENS IMPLANT Left 2021    Procedure: EXTRACTION, CATARACT, WITH IOL INSERTION;  Surgeon: Klever Tolliver MD;  Location: [facility];  Service: Ophthalmology;  Laterality: Left;    CATARACT EXTRACTION W/  INTRAOCULAR LENS IMPLANT Right 2021    Procedure: EXTRACTION, CATARACT, WITH IOL INSERTION;  Surgeon: Klever Tolliver MD;  Location: [facility];  Service: Ophthalmology;  Laterality: Right;     SECTION      CHOLECYSTECTOMY      COLONOSCOPY N/A 2022    Procedure: COLONOSCOPY;  Surgeon: Anoop Escobar MD;  Location: [facility] (4TH FLR);  Service: Endoscopy;  Laterality: N/A;  Fully vaccinated/ inst mailed/ clear liquids up to 4 hrs prior/AM prep 2am-3am-RB    ESOPHAGOGASTRODUODENOSCOPY N/A 2021    Procedure: EGD (ESOPHAGOGASTRODUODENOSCOPY);  Surgeon: Silvio Singh Jr., MD;  Location: [facility] 2ND FLR;  Service: General;  Laterality: N/A;    EYE SURGERY      strabismus, 2006, bilateral cataracts    HIP ARTHROPLASTY Right 10/18/2018    Procedure: ARTHROPLASTY, HIP;  Surgeon: Baldomero Fischer MD;  Location: [facility];  Service: Orthopedics;  Laterality: Right;    HIP REPLACEMENT ARTHROPLASTY Right     dr fischer    HYSTERECTOMY      JOINT REPLACEMENT      right BRYNN, TKA    KNEE ARTHROPLASTY Right 2019    Procedure: ARTHROPLASTY, KNEE (ADD ON  );  Surgeon: Baldomero Fischer MD;  Location: [facility];  Service: Orthopedics;  Laterality: Right;  (ADD ON )    L3-5 Laminectomy  02/2017    Dr. Mendoza    LAPAROSCOPIC SLEEVE GASTRECTOMY N/A 06/08/2021    Procedure: GASTRECTOMY, SLEEVE, LAPAROSCOPIC;  Surgeon: Silvio Singh Jr., MD;  Location: [address];  Service: General;  Laterality: N/A;    SPLENECTOMY, TOTAL  2001    from [facility]    STRABISMUS SURGERY  08/30/2006    OU    THYROIDECTOMY  09/2016    TONSILLECTOMY       Family History   Problem Relation Name Age of Onset    Heart disease Mother  69        MI    Diabetes Mother      Hypertension Mother      Diabetes Father      Hypertension Father      Stroke Father      Hyperlipidemia Sister      COPD Sister      Hypertension Sister      Emphysema Sister      Diabetes Sister Tita     Hypertension Sister Tita     Diabetes Sister Мария         prediabetic    Hypertension Sister Мария     Migraines Sister Мария     Rheum arthritis Sister Мария     Diabetes Brother Facundo     Hypertension Brother Facundo     No Known Problems Daughter Genae      Social History[1]  Review of Systems    Physical Exam     Initial Vitals [07/25/25 0857]   BP Pulse Resp Temp SpO2   134/76 84 15 97.6 °F (36.4 °C) 99 %      MAP       --         Physical Exam    Nursing note and vitals reviewed.  Constitutional: She appears well-developed and well-nourished.   HENT:   Head: Normocephalic and atraumatic.   Eyes: Conjunctivae and EOM are normal. Pupils are equal, round, and reactive to light.   Cardiovascular:  Normal rate, regular rhythm and intact distal pulses.           Murmur heard.  Pulmonary/Chest: Breath sounds normal. No respiratory distress. She has no wheezes. She has no rhonchi. She has no rales.   Abdominal: Abdomen is soft. Bowel sounds are normal. She exhibits no distension. There is no abdominal tenderness.   No cva tenderness   Genitourinary: There is rash on the right labia. There is rash on the left labia.     Genitourinary Comments: Rash on bilateral labia, white residue from previous monistat usage       Neurological: She is alert.   Skin: Capillary refill takes less than 2 seconds.   Psychiatric: She has a normal mood and affect.         ED Course   Procedures  Labs Reviewed   COMPREHENSIVE METABOLIC PANEL - Abnormal       Result Value    Sodium 142      Potassium 3.8      Chloride 106      CO2 30 (*)     Glucose 91      BUN 13      Creatinine 0.6      Calcium 9.4      Protein Total 7.4      Albumin 3.6      Bilirubin Total 0.3      ALP 63      AST 26      ALT 18      Anion Gap 6 (*)     eGFR >60     URINALYSIS, REFLEX TO URINE CULTURE - Abnormal    Color, UA Yellow      Appearance, UA Clear      pH, UA 6.0      Spec Grav UA 1.010      Protein, UA Negative      Glucose, UA Negative      Ketones, UA Negative      Bilirubin, UA Negative      Blood, UA Negative      Nitrites, UA Negative      Urobilinogen, UA Negative      Leukocyte Esterase, UA 3+ (*)    CBC WITH DIFFERENTIAL - Abnormal    WBC 9.00      RBC 4.27      HGB 11.8 (*)     HCT 36.8 (*)     MCV 86      MCH 27.6      MCHC 32.1      RDW 15.4 (*)     Platelet Count 345      MPV 9.6      Nucleated RBC 0      Neut % 24.0 (*)     Lymph % 66.1 (*)     Mono % 6.4      Eos % 2.8      Basophil % 0.6      Imm Grans % 0.1      Neut # 2.16      Lymph # 5.95 (*)     Mono # 0.58      Eos # 0.25      Baso # 0.05      Imm Grans # 0.01     URINALYSIS MICROSCOPIC - Abnormal    RBC, UA 2      WBC, UA 35 (*)     Squamous Epithelial Cells, UA 3      Microscopic Comment       CULTURE, URINE   CBC W/ AUTO DIFFERENTIAL    Narrative:     The following orders were created for panel order CBC auto differential.  Procedure                               Abnormality         Status                     ---------                               -----------         ------                     CBC with Differential[[id number]]       Abnormal            Final result                 Please view  results for these tests on the individual orders.   GREY TOP URINE HOLD          Imaging Results    None          Medications   fluconazole tablet 150 mg (150 mg Oral Given 7/25/25 4811)     Medical Decision Making  Pt is a 65 y.o. F with PMHx of DM, HTN, HLD, CAD, CKD stage 3a, chronic back pain, pyelonephritis with renal abscess, and kidney stones presenting to ED c/o dysuria and intermittent R lower abdominal pain.  On arrival patient is hemodynamically stable.  Exam notable for no abdominal tenderness.  She does have some vaginal atrophy with local erythema and white residue likely secondary from her Monistat usage.  Differentials at this time include vulvovaginal yeast infection, other vaginitis, UTI, pyelonephritis and kidney stones-though less likely given no reproducible CVA tenderness and primary concern is vulvar burning with urination.  At this time we will defer CT abdomen and pelvis..  We will obtain CBC, CMP, UA.  We will treat empirically with Diflucan.    CBC and CMP grossly unremarkable.  Urine looks grossly infected.  Reviewed previous sensitivities him plan to discharge on Bactrim.  Patient was also given topical Monistat for her vulvar itching.  Recommend discharge home with follow up with her PCP to ensure resolution of her symptoms.  Return precautions provided.    Amount and/or Complexity of Data Reviewed  Labs: ordered. Decision-making details documented in ED Course.    Risk  OTC drugs.  Prescription drug management.              Attending Attestation:   Physician Attestation Statement for Resident:  As the supervising MD   Physician Attestation Statement: I have personally seen and examined this patient.   I agree with the above history.  -:   As the supervising MD I agree with the above PE.     As the supervising MD I agree with the above treatment, course, plan, and disposition.                      I have reviewed and concur with the resident's history, physical, assessment, and plan.  I  have personally interviewed and examined the patient at bedside.   I did supervise any and all procedures and was present for any critical portion, and was always immediately available for help and as a resource.     The above history physical, review of symptoms, HPI and physical exam reflect my independent interpretation and evaluation.    Complexity: Moderate High Risk    Final diagnoses:  [N30.90] Cystitis (Primary)  [B37.9] Yeast infection     Germain Holguin DO, FAAEM, FACEP  Senior Emergency Staff Physician   Dept of Emergency Medicine   [facility]        Disclaimer: This note has been generated using voice-recognition software. There may be typographical errors that have been missed during proof-reading.            ED Course as of 07/25/25 1338   Fri Jul 25, 2025   1047 CO2(!): 30 [AC]   1047 WBC: 9.00 [AC]   1047 Hemoglobin(!): 11.8 [AC]   1047 Platelet Count: 345 [AC]   1313 Leukocyte Esterase, UA(!): 3+ [AC]   1313 WBC, UA(!): 35 [AC]      ED Course User Index  [AC] Sada Patricio MD                               Clinical Impression:  Final diagnoses:  [N30.90] Cystitis (Primary)  [B37.9] Yeast infection          ED Disposition Condition    Discharge Stable          ED Prescriptions       Medication Sig Dispense Start Date End Date Auth. Provider    miconazole (MICOTIN) 2 % cream Apply topically 2 (two) times daily. 30 g 7/25/2025 -- Sada Patricio MD    sulfamethoxazole-trimethoprim 800-160mg (BACTRIM DS) 800-160 mg Tab Take 1 tablet by mouth 2 (two) times daily. for 3 days 6 tablet 7/25/2025 7/28/2025 Sada Patricio MD          Follow-up Information       Follow up With Specialties Details Why Contact Info    Rayo Hernandez MD Internal Medicine   [address]  [place] [address]  [phone]      [facility] [address] - Emergency Dept Emergency Medicine  As needed [address] [facility] [id number] [phone]                     Sada Patricio,  MD  Resident  07/25/25 1338         [1]   Social History  Tobacco Use    Smoking status: Never    Smokeless tobacco: Never   Substance Use Topics    Alcohol use: Yes     Comment: seasonally    Drug use: No        Germain Holguin,   07/26/25 1111

## 2025-07-25 NOTE — ED TRIAGE NOTES
Pt arrives to ED via EMS and states hx of uti and kidney stones and states she feels like she has having similar symptoms when she had a uti previously. States having worsening burning urination.

## 2025-07-25 NOTE — DISCHARGE INSTRUCTIONS
You were seen in the emergency department for burning with urination.  It looks like you have a vulvovaginal yeast infection and a urinary tract infection.  You were given a dose of anti fungal medication here.  You were also getting discharged with a topical cream to treat your itching.  You are also getting antibiotics which he will take for 3 days to treat your UTI.  You should follow up with your primary care doctor to make sure that your symptoms completely resolved.    Please return to the emergency department if you develop fevers/chills, uncontrolled vomiting, severe abdominal pain, chest pain, difficulty breathing, or any new concern.

## 2025-07-25 NOTE — FIRST PROVIDER EVALUATION
"Medical screening examination initiated.  I have conducted a focused provider triage encounter, findings are as follows:    Brief history of present illness:  65-year-old female with a history of ITP, diabetes, hypertension, cardiomyopathy, degenerative disc disease, CKD stage 3, history of nephrolithiasis presenting with urinary frequency and dysuria.  Denies any fevers, chills but reports some right lower quadrant and right flank pain.  Denies any falls or trauma, denies any hematuria, hematochezia, melena hemoptysis or hematemesis.    Vitals:    07/25/25 0857   BP: 134/76   Pulse: 84   Resp: 15   Temp: 97.6 °F (36.4 °C)   TempSrc: Oral   SpO2: 99%   Weight: 98.9 kg (218 lb 0.6 oz)   Height: 5' 1" (1.549 m)       Pertinent physical exam:  Nontoxic appearing, no focal deficits, no peritonitis    Brief workup plan:  UA, labs    Preliminary workup initiated; this workup will be continued and followed by the physician or advanced practice provider that is assigned to the patient when roomed.    Germain Holguin DO, RASHIDA, FACEP  Senior Emergency Staff Physician   Dept of Emergency Medicine   [facility]          Disclaimer: This note has been generated using voice-recognition software. There may be typographical errors that have been missed during proof-reading.    "

## 2025-07-26 LAB — HOLD SPECIMEN: NORMAL

## 2025-07-28 ENCOUNTER — TELEPHONE (OUTPATIENT)
Dept: INTERNAL MEDICINE | Facility: CLINIC | Age: 65
End: 2025-07-28
Payer: MEDICARE

## 2025-07-28 ENCOUNTER — PATIENT OUTREACH (OUTPATIENT)
Facility: OTHER | Age: 65
End: 2025-07-28
Payer: MEDICARE

## 2025-07-28 LAB — BACTERIA UR CULT: ABNORMAL

## 2025-07-28 RX ORDER — CEPHALEXIN 500 MG/1
500 CAPSULE ORAL EVERY 12 HOURS
Qty: 14 CAPSULE | Refills: 0 | Status: SHIPPED | OUTPATIENT
Start: 2025-07-28 | End: 2025-08-05

## 2025-07-28 RX ORDER — PHENAZOPYRIDINE HYDROCHLORIDE 200 MG/1
200 TABLET, FILM COATED ORAL 3 TIMES DAILY
Qty: 12 TABLET | Refills: 0 | Status: SHIPPED | OUTPATIENT
Start: 2025-07-28

## 2025-07-28 NOTE — TELEPHONE ENCOUNTER
Copied from CRM #[id number]. Topic: General Inquiry - Patient Advice  >> Jul 28, 2025 10:54 AM Allison wrote:  Type:  Needs Medical Advice    Who Called: pt   Symptoms (please be specific): asking for a return call in regards to her medical equipment    How long has patient had these symptoms:  na   Pharmacy name and phone #:  na   Would the patient rather a call back or a response via [facility]? Call back   Best Call Back Number: [phone]  Additional Information: no

## 2025-07-28 NOTE — PROGRESS NOTES
Patient was seen in the ED on 7/25/25. Phoned patient to assist with Post ED Discharge Navigation. Patient declined assistance scheduling a PCP follow-up.  Kavin Watson

## 2025-07-30 ENCOUNTER — LAB VISIT (OUTPATIENT)
Dept: LAB | Facility: HOSPITAL | Age: 65
End: 2025-07-30
Payer: MEDICARE

## 2025-07-30 ENCOUNTER — OFFICE VISIT (OUTPATIENT)
Dept: NEUROLOGY | Facility: CLINIC | Age: 65
End: 2025-07-30
Payer: MEDICARE

## 2025-07-30 VITALS
DIASTOLIC BLOOD PRESSURE: 64 MMHG | WEIGHT: 218.06 LBS | SYSTOLIC BLOOD PRESSURE: 109 MMHG | BODY MASS INDEX: 41.17 KG/M2 | HEIGHT: 61 IN | HEART RATE: 94 BPM

## 2025-07-30 DIAGNOSIS — G60.9 HEREDITARY AND IDIOPATHIC NEUROPATHY, UNSPECIFIED: ICD-10-CM

## 2025-07-30 DIAGNOSIS — E11.42 DIABETIC POLYNEUROPATHY ASSOCIATED WITH TYPE 2 DIABETES MELLITUS: Primary | ICD-10-CM

## 2025-07-30 DIAGNOSIS — E11.42 DIABETIC POLYNEUROPATHY ASSOCIATED WITH TYPE 2 DIABETES MELLITUS: ICD-10-CM

## 2025-07-30 LAB
FOLATE SERPL-MCNC: 13.6 NG/ML (ref 4–24)
VIT B12 SERPL-MCNC: 1587 PG/ML (ref 210–950)

## 2025-07-30 PROCEDURE — 84165 PROTEIN E-PHORESIS SERUM: CPT | Mod: HCNC

## 2025-07-30 PROCEDURE — 84425 ASSAY OF VITAMIN B-1: CPT | Mod: HCNC

## 2025-07-30 PROCEDURE — 82746 ASSAY OF FOLIC ACID SERUM: CPT | Mod: HCNC

## 2025-07-30 PROCEDURE — 84630 ASSAY OF ZINC: CPT | Mod: HCNC

## 2025-07-30 PROCEDURE — 84446 ASSAY OF VITAMIN E: CPT | Mod: HCNC

## 2025-07-30 PROCEDURE — 99999 PR PBB SHADOW E&M-EST. PATIENT-LVL III: CPT | Mod: PBBFAC,HCNC,GC,

## 2025-07-30 PROCEDURE — 84207 ASSAY OF VITAMIN B-6: CPT | Mod: HCNC

## 2025-07-30 PROCEDURE — 36415 COLL VENOUS BLD VENIPUNCTURE: CPT | Mod: HCNC

## 2025-07-30 PROCEDURE — 82607 VITAMIN B-12: CPT | Mod: HCNC

## 2025-07-30 PROCEDURE — 82525 ASSAY OF COPPER: CPT | Mod: HCNC

## 2025-07-30 NOTE — PROGRESS NOTES
[facility] - NEUROLOGY 7TH [address] [facility], [place]    Date: 7/30/25  Patient Name: Tanika Wayne   MRN: [id number]   PCP: Rayo Hernandez  Referring Provider: No ref. provider found    Assessment:   Tanika Wayne is a 65 y.o. female presenting with history of hypertension, hyperlipidemia, hypothyroidism, diabetes, s/p gastric sleeve 2021, CKD 3, low back pan s/p L3-L5 laminectomy 2/22/17, parotitis presenting for neuropathy follow up. She describes bilateral numbness in the legs, R>L. She was switched to lyrica during her first appointment and notes mild improvement with dose increase to 100 mg BID. That being said, she feels the addition of duloxetine has not helped. She continues home health PT but she is still wheelchair bound. Unclear cause of concurrent weakness in her legs at this time. She underwent prior lumbar laminectomy but denies pain shooting from her back down her legs. Recommend two extremity EMG/NCS. RTC within 4 months after emg.    Plan:     - Recommend 2 extremity EMG/NCS   - Nutritional labs below   - Continue Lyrica 100 mg BID  - Continue duloxetine 40 mg daily  - Recommend starting over the counter alpha lipoic acid, 600 mg BID  - Capsaicin cream TID prn  - Continue home health physical therapy  - Return to clinic in 4 months     Problem List Items Addressed This Visit    None  Visit Diagnoses         Diabetic polyneuropathy associated with type 2 diabetes mellitus    -  Primary    Relevant Orders    EMG W/ ULTRASOUND AND NERVE CONDUCTION TEST 2 Extremities    Vitamin B1    Vitamin B6    FOLATE (Completed)    VITAMIN B12 (Completed)    VITAMIN E    ZINC    COPPER, SERUM    Protein Electrophoresis, Serum      Hereditary and idiopathic neuropathy, unspecified        Relevant Orders    Vitamin B6    FOLATE (Completed)    VITAMIN B12 (Completed)                Bj Mar MD    Patient note was created using MModal Dictation.  Any errors in syntax or  even information may not have been identified and edited on initial review prior to signing this note.  Subjective:   Patient seen in consultation at the request of No ref. provider found for the evaluation of neuropathy. A copy of this note will be sent to the referring physician.        2025:  - Patient reports no additional improvement with cymbalta  - She continues home health PT  - Adherent with lyrica         2025:  - Cymbalta helps though capsaicin not as much  - Uses lidocaine patch prn  - Reports sitting in the wheelchair for longer periods throughout day and wants to become more active  - Inquires about home health physical therapy   - No other new associated symptoms at this time      2025:  Patient reports mild improvement in neuropathy since last visit. Of note, her  recently  and she is feeling more depressed and anxious. Occasionally she feels shooting pain down her right leg attributed to sciatica. No other associated symptoms during today's visit.       Follow up 10/8/2024:     Ms. Tanika Wayne is a 65 y.o. female with history of hypertension, hyperlipidemia, hypothyroidism, diabetes, s/p gastric sleeve 2021, CKD 3, low back pan s/p L3-L5 laminectomy 17, and parotitis  presenting for peripheral neuropathy. She was last seen by MABEL Flaherty 5/10/24 and was switched from gabapentin to lyrica. She is tolerating lyrica well and notes mild improvement in numbness. The neuropathy predominantly involves the right leg>left leg from the bottom of the foot to the middle of the shin. In addition, she reports new numbness in both her hands involving the palms but no significant burning or pain. Her latest A1c was 5.9. She uses a roller chair to get around the house and still describes low back pain with limited range of motion.  She denies chest pain, shortness of breath, nausea, vomiting, diarrhea, and dizziness.     HPI: Ayesha Foster 5/10/2024    Ms. Tanika Meraz  "Tone is a 64 y.o. female with DDD of the lumbar spine (s/p laminectomy 2017), HTN, HLD, s/p gastric sleeve, and CKD stage 3 presenting for evaluation of numbness and tingling. On chart review the patient has tried gabapentin 300mg TID for relief of symptoms but finds it is not sufficient and causes her significant side effects. She notes that her symptoms have been ongoing for about 6 months. She did try lyrica in the past and found it did not provide relief of her symptoms. She notes that her symptoms are more numbness rather than burning/stinging sensation. She finds that nighttime seems to be when symptoms are the worst. She does feel as if her legs are weaker R>L and finds that her numbness/tingling is worse on the R>L. She notes that the gabapentin makes her feel as if she is in a cloud. Does endorse one fall this past week in which it felt as if her legs just "gave out". She does not report any changes in bowel or bladder habits, does report it is difficult to get to the bathroom in a timely fashion but denies any acute changes in bladder/bowel habits. She notes that she also has some symptoms in the R hand. She reports that there were no acute accidents/trauma prior to onset of her symptoms.     PAST MEDICAL HISTORY:  Past Medical History:   Diagnosis Date    Abnormal echocardiogram 11/26/2012    Arthritis     Breast cancer 1998    Breast cancer, right breast     Breast cancer, right breast 11/26/2012    S/P Lumpectomy / XRT '98 Dr. Bartholomew     Cardiomyopathy due to systemic disease     Cataract     Clotting disorder     Coronary artery disease     DM (diabetes mellitus) 11/26/2012    Encounter for blood transfusion     HTN (hypertension) 11/26/2012    Increased glucose level 11/26/2012    ITP (idiopathic thrombocytopenic purpura) 11/26/2012    Kidney stones 11/26/2012    PONV (postoperative nausea and vomiting)     Stenosis of lumbosacral spine 11/26/2012    Thyroid disease     Tympanic membrane " perforation 9/15/2014    right 2014 Dr. Jeffries       PAST SURGICAL HISTORY:  Past Surgical History:   Procedure Laterality Date    BREAST LUMPECTOMY Right     CATARACT EXTRACTION W/  INTRAOCULAR LENS IMPLANT Left 2021    Procedure: EXTRACTION, CATARACT, WITH IOL INSERTION;  Surgeon: Klever Tolliver MD;  Location: [facility];  Service: Ophthalmology;  Laterality: Left;    CATARACT EXTRACTION W/  INTRAOCULAR LENS IMPLANT Right 2021    Procedure: EXTRACTION, CATARACT, WITH IOL INSERTION;  Surgeon: Klever Tolliver MD;  Location: [facility];  Service: Ophthalmology;  Laterality: Right;     SECTION      CHOLECYSTECTOMY      COLONOSCOPY N/A 2022    Procedure: COLONOSCOPY;  Surgeon: Anoop Escobar MD;  Location: [facility] ENDO (4TH FLR);  Service: Endoscopy;  Laterality: N/A;  Fully vaccinated/ inst mailed/ clear liquids up to 4 hrs prior/AM prep 2am-3am-RB    ESOPHAGOGASTRODUODENOSCOPY N/A 2021    Procedure: EGD (ESOPHAGOGASTRODUODENOSCOPY);  Surgeon: Silvio Singh Jr., MD;  Location: [facility] OR 2ND FLR;  Service: General;  Laterality: N/A;    EYE SURGERY      strabismus, , bilateral cataracts    HIP ARTHROPLASTY Right 10/18/2018    Procedure: ARTHROPLASTY, HIP;  Surgeon: Baldomero Fischer MD;  Location: [facility];  Service: Orthopedics;  Laterality: Right;    HIP REPLACEMENT ARTHROPLASTY Right     dr fischer    HYSTERECTOMY      JOINT REPLACEMENT      right BRYNN, TKA    KNEE ARTHROPLASTY Right 2019    Procedure: ARTHROPLASTY, KNEE (ADD ON );  Surgeon: Baldomero Fischer MD;  Location: [facility];  Service: Orthopedics;  Laterality: Right;  (ADD ON )    L3-5 Laminectomy  2017    Dr. Mendoza    LAPAROSCOPIC SLEEVE GASTRECTOMY N/A 2021    Procedure: GASTRECTOMY, SLEEVE, LAPAROSCOPIC;  Surgeon: Silvio Singh Jr., MD;  Location: [facility] OR 2ND FLR;  Service: General;  Laterality: N/A;    SPLENECTOMY, TOTAL  2001    from [facility]    STRABISMUS SURGERY  2006    OU    THYROIDECTOMY   09/2016    TONSILLECTOMY         CURRENT MEDS:  Current Outpatient Medications   Medication Sig Dispense Refill    atorvastatin (LIPITOR) 10 MG tablet Take 1 tablet (10 mg total) by mouth once daily. 90 tablet 1    calcium carbonate 1250 MG capsule Take 1,250 mg by mouth Daily.      carvediloL (COREG) 3.125 MG tablet Take 1 tablet (3.125 mg total) by mouth 2 (two) times daily. 180 tablet 3    cephALEXin (KEFLEX) 500 MG capsule Take 1 capsule (500 mg total) by mouth every 12 (twelve) hours. for 7 days 14 capsule 0    cyanocobalamin (VITAMIN B-12) 100 MCG tablet Take 100 mcg by mouth once daily.      DULoxetine (CYMBALTA) 20 MG capsule Take 2 capsules (40 mg total) by mouth once daily. 120 capsule 2    famotidine (PEPCID) 40 MG tablet Take 1 tablet (40 mg total) by mouth nightly as needed for Heartburn. 90 tablet 11    fluticasone propionate (FLONASE) 50 mcg/actuation nasal spray INSTILL 2 SPRAYS IN EACH NOSTRIL ONCE A DAY 48 mL 1    hydrocortisone 2.5 % cream Apply topically 2 (two) times daily. 28 g 0    levothyroxine (SYNTHROID) 100 MCG tablet Take 1 tablet (100 mcg total) by mouth once daily. 90 tablet 3    losartan-hydrochlorothiazide 100-12.5 mg (HYZAAR) 100-12.5 mg Tab Take 1 tablet by mouth once daily. 90 tablet 3    metFORMIN (GLUCOPHAGE-XR) 500 MG ER 24hr tablet Take 1 tablet (500 mg total) by mouth 2 (two) times daily with meals. 180 tablet 1    miconazole (MICOTIN) 2 % cream Apply topically 2 (two) times daily. 30 g 0    multivitamin (THERAGRAN) per tablet Take 1 tablet by mouth once daily.      NIFEdipine (PROCARDIA-XL) 90 MG (OSM) 24 hr tablet Take 1 tablet (90 mg total) by mouth once daily. 90 tablet 3    oxyCODONE-acetaminophen (PERCOCET) 5-325 mg per tablet Take 1 tablet by mouth every 8 (eight) hours as needed for Pain. Greater than 7 day quantity Medically Necessary 90 tablet 0    phenazopyridine (PYRIDIUM) 200 MG tablet Take 1 tablet (200 mg total) by mouth 3 (three) times daily. 12 tablet 0     potassium chloride SA (K-DUR,KLOR-CON) 20 MEQ tablet Take 2 tablets (40 mEq total) by mouth 2 (two) times daily. 360 tablet 1    pregabalin (LYRICA) 100 MG capsule Take 1 capsule (100 mg total) by mouth 2 (two) times daily. 60 capsule 5    traZODone (DESYREL) 50 MG tablet Take 1 tablet (50 mg total) by mouth every evening. 90 tablet 3    venlafaxine (EFFEXOR-XR) 150 MG Cp24 Take 1 capsule (150 mg total) by mouth once daily. 90 capsule 2     No current facility-administered medications for this visit.       ALLERGIES:  Review of patient's allergies indicates:  No Known Allergies    FAMILY HISTORY:  Family History   Problem Relation Name Age of Onset    Heart disease Mother  69        MI    Diabetes Mother      Hypertension Mother      Diabetes Father      Hypertension Father      Stroke Father      Hyperlipidemia Sister      COPD Sister      Hypertension Sister      Emphysema Sister      Diabetes Sister Tita     Hypertension Sister Tita     Diabetes Sister Мария         prediabetic    Hypertension Sister Мария     Migraines Sister Мария     Rheum arthritis Sister Мария     Diabetes Brother Facundo     Hypertension Brother Facundo     No Known Problems Daughter Genae        SOCIAL HISTORY:  Social History     Tobacco Use    Smoking status: Never    Smokeless tobacco: Never   Substance Use Topics    Alcohol use: Yes     Comment: seasonally    Drug use: No       Review of Systems:  12 system review of systems is negative except for the symptoms mentioned in HPI.      Objective:     There were no vitals filed for this visit.    General: NAD, well nourished   Eyes: no tearing, discharge, no erythema   ENT: moist mucous membranes of the oral cavity, nares patent    Neck: Supple, full range of motion  Cardiovascular: Warm and well perfused, pulses equal and symmetrical  Lungs: Normal work of breathing, normal chest wall excursions  Skin: No rash, lesions, or breakdown on exposed skin  Psychiatry: Mood and affect are  appropriate   Abdomen: soft, non tender, non distended  Extremeties: No cyanosis, clubbing or edema.    Neurological   MENTAL STATUS: Alert and oriented to person, place, and time. Attention and concentration within normal limits. Speech without dysarthria, able to name and repeat without difficulty. Recent and remote memory within normal limits   CRANIAL NERVES: Visual fields intact. Left eye ptosis. EOMI. Facial sensation intact. Face symmetrical. Hearing grossly intact. Full shoulder shrug bilaterally. Tongue protrudes midline   SENSORY: Sensation is reduced to pin and light touch in bilateral lower extremities, R>L  Joint position perception intact. No astereognosis or agraphesthesia in the hands.   MOTOR: Normal bulk and tone. No pronator drift.  5/5 deltoid, biceps, triceps, interosseous, hand  bilaterally.  3+/5 bilateral proximal lower extremities. 3+/5 bilateral proximal lower extremities.   REFLEXES: 3+ left patellar, 1+ right patellar. Toes down going bilaterally.   CEREBELLAR/COORDINATION/GAIT: Did not assess gait, patient in wheelchair.  Heel to shin intact. Finger to nose intact.

## 2025-07-31 ENCOUNTER — TELEPHONE (OUTPATIENT)
Dept: INTERNAL MEDICINE | Facility: CLINIC | Age: 65
End: 2025-07-31
Payer: MEDICARE

## 2025-07-31 ENCOUNTER — DOCUMENT SCAN (OUTPATIENT)
Dept: HOME HEALTH SERVICES | Facility: HOSPITAL | Age: 65
End: 2025-07-31
Payer: MEDICARE

## 2025-07-31 LAB
ALBUMIN, SPE (OHS): 3.91 G/DL (ref 3.35–5.55)
ALPHA 1 GLOB (OHS): 0.36 GM/DL (ref 0.17–0.41)
ALPHA 2 GLOB (OHS): 0.94 GM/DL (ref 0.43–0.99)
BETA GLOB (OHS): 1.08 GM/DL (ref 0.5–1.1)
GAMMA GLOBULIN (OHS): 1.22 GM/DL (ref 0.67–1.58)
PROT SERPL-MCNC: 7.5 GM/DL (ref 6–8.4)

## 2025-07-31 NOTE — TELEPHONE ENCOUNTER
Copied from CRM #[id number]. Topic: General Inquiry - Patient Advice  >> Jul 31, 2025  8:07 AM Allison wrote:  Type:  Needs Medical Advice    Who Called: Priscila with [facility]   Symptoms (please be specific): na    How long has patient had these symptoms:  adore   Pharmacy name and phone #:  na   Would the patient rather a call back or a response via [facility]? Call back   Best Call Back Number: [phone] Priscila   Additional Information: she states they are needing orders to continue occupational therapy for the pt please advise

## 2025-07-31 NOTE — TELEPHONE ENCOUNTER
Spoke with Priscila [facility] and gave verbal authorization to continue Home health  therapy for pt.

## 2025-08-04 LAB
PATHOLOGIST REVIEW - SPE (OHS): NORMAL
W ZINC: 73 UG/DL

## 2025-08-05 ENCOUNTER — DOCUMENT SCAN (OUTPATIENT)
Dept: HOME HEALTH SERVICES | Facility: HOSPITAL | Age: 65
End: 2025-08-05
Payer: MEDICARE

## 2025-08-05 LAB
W COPPER: 1364 UG/L
W VITAMIN B1: 98 UG/L
W VITAMIN B6: 37 UG/L
W VITAMIN E: 1697 UG/DL

## 2025-08-13 DIAGNOSIS — M54.41 CHRONIC BILATERAL LOW BACK PAIN WITH RIGHT-SIDED SCIATICA: ICD-10-CM

## 2025-08-13 DIAGNOSIS — M43.16 SPONDYLOLISTHESIS AT L3-L4 LEVEL: ICD-10-CM

## 2025-08-13 DIAGNOSIS — F11.90 UNCOMPLICATED OPIOID USE: ICD-10-CM

## 2025-08-13 DIAGNOSIS — M54.9 BACK PAIN, UNSPECIFIED BACK LOCATION, UNSPECIFIED BACK PAIN LATERALITY, UNSPECIFIED CHRONICITY: ICD-10-CM

## 2025-08-13 DIAGNOSIS — Z96.641 STATUS POST RIGHT HIP REPLACEMENT: ICD-10-CM

## 2025-08-13 DIAGNOSIS — Z96.651 STATUS POST TOTAL RIGHT KNEE REPLACEMENT: ICD-10-CM

## 2025-08-13 DIAGNOSIS — M48.062 SPINAL STENOSIS OF LUMBAR REGION WITH NEUROGENIC CLAUDICATION: ICD-10-CM

## 2025-08-13 DIAGNOSIS — G89.29 CHRONIC BILATERAL LOW BACK PAIN WITH RIGHT-SIDED SCIATICA: ICD-10-CM

## 2025-08-14 RX ORDER — OXYCODONE AND ACETAMINOPHEN 5; 325 MG/1; MG/1
1 TABLET ORAL EVERY 8 HOURS PRN
Qty: 90 TABLET | Refills: 0 | Status: SHIPPED | OUTPATIENT
Start: 2025-08-14

## (undated) DEVICE — CARTRIDGE OIL

## (undated) DEVICE — SCRUB HIBICLENS 4% CHG 4OZ

## (undated) DEVICE — SHEARS HARMONIC 5CM 36CM

## (undated) DEVICE — KIT FIBRIN SEALANT EVICEL 5 ML

## (undated) DEVICE — COVER BACK TABLE 72X21

## (undated) DEVICE — SEE MEDLINE ITEM 146313

## (undated) DEVICE — DRAPE SURG W/TWL 17 5/8X23

## (undated) DEVICE — Device

## (undated) DEVICE — DRAPE STERI U-SHAPED 47X51IN

## (undated) DEVICE — PAD ABD 8X10 STERILE

## (undated) DEVICE — GOWN SMART IMP BREATHABLE XXLG

## (undated) DEVICE — BLADE SAW SAG 25.40MM X 1.27MM

## (undated) DEVICE — GAUZE SPONGE 4'X4 12 PLY

## (undated) DEVICE — SEE MEDLINE ITEM 157150

## (undated) DEVICE — GLOVE BIOGEL SKINSENSE PI 6.5

## (undated) DEVICE — SEE MEDLINE ITEM 152487

## (undated) DEVICE — SUT VICRYL PLUS 0 CT1 18IN

## (undated) DEVICE — COVER MAYO STAND REINFRCD 30

## (undated) DEVICE — SYR 10CC LUER LOCK

## (undated) DEVICE — DRESSING N ADH OIL EMUL 3X8 3S

## (undated) DEVICE — GAUZE SPONGE 4X4 12PLY

## (undated) DEVICE — STAPLER SKIN ROTATING HEAD

## (undated) DEVICE — CASSETTE INFINITI

## (undated) DEVICE — HOOD T-5 TEAR AWAY STERILE

## (undated) DEVICE — DIFFUSER

## (undated) DEVICE — GLOVE BIOGEL SKINSENSE PI 8.5

## (undated) DEVICE — TUBING HF INSUFFLATION W/ FLTR

## (undated) DEVICE — SOL 9P NACL IRR PIC IL

## (undated) DEVICE — BLADE ELECTRO EDGE INSULATED

## (undated) DEVICE — BLADE SURG STAINLESS STEEL #15

## (undated) DEVICE — SYS SMOKE EVACUATION LAP

## (undated) DEVICE — SOL BETADINE 5%

## (undated) DEVICE — SEE MEDLINE ITEM 153151

## (undated) DEVICE — SYR 30CC LUER LOCK

## (undated) DEVICE — ELECTRODE REM PLYHSV RETURN 9

## (undated) DEVICE — TRAY FOLEY 16FR INFECTION CONT

## (undated) DEVICE — SEE MEDLINE ITEM 157117

## (undated) DEVICE — GLOVE BIOGEL SKINSENSE PI 7.0

## (undated) DEVICE — SUT VICRYL+ 1 CTX 18IN VIOL

## (undated) DEVICE — SYR SLIP TIP 1CC

## (undated) DEVICE — SUT 0 VICRYL / UR6 (J603)

## (undated) DEVICE — COVER SNAP 36IN X 30IN

## (undated) DEVICE — DRAPE CORETEMP FLD WRM 56X62IN

## (undated) DEVICE — ADHESIVE MASTISOL VIAL 48/BX

## (undated) DEVICE — PAD CAST SPECIALIST STRL 6

## (undated) DEVICE — GLOVE BIOGEL SKINSENSE PI 7.5

## (undated) DEVICE — DRESSING MEPORE ISLAND 31/2X4

## (undated) DEVICE — DRAPE INCISE IOBAN 2 23X17IN

## (undated) DEVICE — PULSAVAC ZIMMER

## (undated) DEVICE — SEE MEDLINE ITEM 152523

## (undated) DEVICE — UNDERGLOVES BIOGEL PI SZ 7 LF

## (undated) DEVICE — DRESSING TELFA N ADH 3X8

## (undated) DEVICE — SYR 50CC LL

## (undated) DEVICE — TAPE MEDIPORE 3 X 10YD

## (undated) DEVICE — DRAPE HIP TIBURON 87X115X134

## (undated) DEVICE — SEE MEDLINE ITEM 146298

## (undated) DEVICE — CLOSURE SKIN STERI STRIP 1/2X4

## (undated) DEVICE — KIT TOTAL HIP OPTIVAC

## (undated) DEVICE — ALCOHOL 70% ISOP W/GREEN 16OZ

## (undated) DEVICE — CANNULA ENDOPATH XCEL 5X100MM

## (undated) DEVICE — BUR BONE CUT MICRO TPS 3X3.8MM

## (undated) DEVICE — STAPLER ECHELON FLEX 60MM 44CM

## (undated) DEVICE — GLOVE BIOGEL ORTHOPEDIC 8

## (undated) DEVICE — SPONGE SURGIFOAM 100 8.5X12X10

## (undated) DEVICE — GLOVE BIOGEL ECLIPSE SZ 7

## (undated) DEVICE — DRESSING N ADH OIL EMUL 3X8

## (undated) DEVICE — DRAPE STERI-DRAPE 1000 17X11IN

## (undated) DEVICE — SUT VICRYL PLUS 3-0 SH 18IN

## (undated) DEVICE — WAX BONE STERILE 2.5G

## (undated) DEVICE — SEE MEDLINE ITEM 146231

## (undated) DEVICE — DRAPE ABDOMINAL TIBURON 14X11

## (undated) DEVICE — TROCAR ENDOPATH XCEL 5X100MM

## (undated) DEVICE — SUT VICRYL 2-0 8-18 CP-2

## (undated) DEVICE — SUT VICRYL PLUS 2-0 CT1 18

## (undated) DEVICE — PILLOW SMALL ABDUCTION

## (undated) DEVICE — NDL ECLIPSE SAFETY 18GX1-1/2IN

## (undated) DEVICE — RELOAD ECHELON FLEX BLU 60MM

## (undated) DEVICE — DRAPE STERI INSTRUMENT 1018

## (undated) DEVICE — APPLICATOR CHLORAPREP ORN 26ML

## (undated) DEVICE — NDL SPINAL 18GX3.5 SPINOCAN

## (undated) DEVICE — TRAY DRY SKIN SCRUB PREP

## (undated) DEVICE — SUT MCRYL PLUS 4-0 PS2 27IN

## (undated) DEVICE — STOCKINETTE TUBULAR 2PL 6 X 4

## (undated) DEVICE — SEE L#120831

## (undated) DEVICE — NDL 18GA X1 1/2 REG BEVEL

## (undated) DEVICE — NDL HYPO REG 25G X 1 1/2

## (undated) DEVICE — DRESSING SURGICAL 3/4X3/4

## (undated) DEVICE — CATH IV CATHLON W/HUB14GAX

## (undated) DEVICE — MASK FLYTE HOOD PEEL AWAY

## (undated) DEVICE — ORTHOCORD W/OS-6

## (undated) DEVICE — DRAPE OPMI STERILE

## (undated) DEVICE — TOURNIQUET SB QC DP 34X4IN

## (undated) DEVICE — TROCAR ENDOPATH XCEL 12MM 10CM

## (undated) DEVICE — UNDERGLOVE BIOGEL PI SZ 6.5 LF

## (undated) DEVICE — SEE MEDLINE ITEM 156902

## (undated) DEVICE — PILLOW ABDUCTION MED

## (undated) DEVICE — ADHESIVE DERMABOND ADVANCED

## (undated) DEVICE — TROCAR ENDOPATH XCEL 12X100MM

## (undated) DEVICE — RELOAD ECHELON FLEX GRN 60MM